# Patient Record
Sex: MALE | Race: WHITE | NOT HISPANIC OR LATINO | Employment: FULL TIME | URBAN - METROPOLITAN AREA
[De-identification: names, ages, dates, MRNs, and addresses within clinical notes are randomized per-mention and may not be internally consistent; named-entity substitution may affect disease eponyms.]

---

## 2017-02-16 ENCOUNTER — MEDICAL CORRESPONDENCE (OUTPATIENT)
Dept: HEALTH INFORMATION MANAGEMENT | Facility: CLINIC | Age: 57
End: 2017-02-16

## 2017-02-16 DIAGNOSIS — R21 RASH AND OTHER NONSPECIFIC SKIN ERUPTION: ICD-10-CM

## 2017-02-16 DIAGNOSIS — G82.50 QUADRIPLEGIA (H): ICD-10-CM

## 2017-02-16 NOTE — LETTER
[facility]  [address]  [address] [facility] [address]      February 17, 2017      Bart Corea  [address] [facility] [address]        Dear Bart,      This letter is a reminder that you are due in March to see your Primary Care Provider for an Annual Visit and needed labs. You must be seen by your Primary Care Provider on a yearly basis and have appropriate labs drawn for continued care and prescription refills. Please call [phone] to schedule an appointment for an Annual Visit with Dr Jason Long MD.       You have been given a 30 day supply/refill of your minocycline (MINOCIN/DYNACIN) 50 MG capsule while you get your clinic visit/labs completed.      Regards,    [facility]

## 2017-02-17 RX ORDER — MINOCYCLINE HYDROCHLORIDE 50 MG/1
50 CAPSULE ORAL 2 TIMES DAILY
Qty: 60 CAPSULE | Refills: 0 | Status: SHIPPED
Start: 2017-02-17 | End: 2017-03-20

## 2017-02-17 NOTE — TELEPHONE ENCOUNTER
minocycline (MINOCIN/DYNACIN) 50 MG capsule      Last Written Prescription Date:  9-21-16  Last Fill Quantity: 180,   # refills: 1  Last Office Visit : 3-18-16  Future Office visit:  none    Kathleen M Doege RN

## 2017-02-21 ENCOUNTER — MEDICAL CORRESPONDENCE (OUTPATIENT)
Dept: HEALTH INFORMATION MANAGEMENT | Facility: CLINIC | Age: 57
End: 2017-02-21

## 2017-02-22 ENCOUNTER — TELEPHONE (OUTPATIENT)
Dept: INTERNAL MEDICINE | Facility: CLINIC | Age: 57
End: 2017-02-22

## 2017-02-22 DIAGNOSIS — G82.50 QUADRIPLEGIA (H): ICD-10-CM

## 2017-02-22 DIAGNOSIS — M79.603 PAIN OF UPPER EXTREMITY, UNSPECIFIED LATERALITY: Primary | ICD-10-CM

## 2017-02-22 NOTE — TELEPHONE ENCOUNTER
Pt is requesting a referral for hand clinic due to developing a sore on arm and needing a resting splint.  Ok for the referral ?    Soon-Mi

## 2017-03-20 DIAGNOSIS — G82.50 QUADRIPLEGIA (H): ICD-10-CM

## 2017-03-20 DIAGNOSIS — R21 RASH AND OTHER NONSPECIFIC SKIN ERUPTION: ICD-10-CM

## 2017-03-21 DIAGNOSIS — G82.50 QUADRIPLEGIA (H): ICD-10-CM

## 2017-03-21 DIAGNOSIS — E78.00 HIGH CHOLESTEROL: ICD-10-CM

## 2017-03-21 DIAGNOSIS — R21 RASH AND OTHER NONSPECIFIC SKIN ERUPTION: ICD-10-CM

## 2017-03-21 DIAGNOSIS — R25.2 SPASM: ICD-10-CM

## 2017-03-21 DIAGNOSIS — R10.30 GROIN PAIN, UNSPECIFIED LATERALITY: ICD-10-CM

## 2017-03-21 DIAGNOSIS — G82.50 QUADRIPLEGIA, UNSPECIFIED (H): ICD-10-CM

## 2017-03-21 DIAGNOSIS — G47.00 INSOMNIA, UNSPECIFIED: ICD-10-CM

## 2017-03-21 DIAGNOSIS — J20.9 BRONCHITIS WITH BRONCHOSPASM: ICD-10-CM

## 2017-03-21 DIAGNOSIS — R06.2 WHEEZING: ICD-10-CM

## 2017-03-21 NOTE — TELEPHONE ENCOUNTER
MINOCYCLINE 50MG        Last Written Prescription Date:  2/17/17  Last Fill Quantity: 60,   # refills: 0  Last Office Visit : 3/18/16  Future Office visit:  NONE  Routing refill request to provider for review/approval because:  OVERDUE MD APPT.  REMINDER LETTER + 30 DAY RF 2/17/17  NO PENDING APPT.

## 2017-03-22 RX ORDER — MINOCYCLINE HYDROCHLORIDE 50 MG/1
50 CAPSULE ORAL 2 TIMES DAILY
Qty: 60 CAPSULE | Refills: 3 | Status: SHIPPED | OUTPATIENT
Start: 2017-03-22 | End: 2017-07-24

## 2017-03-22 RX ORDER — DOCUSATE SODIUM 100 MG/1
100 CAPSULE, LIQUID FILLED ORAL DAILY
Qty: 30 CAPSULE | Refills: 0 | Status: SHIPPED | OUTPATIENT
Start: 2017-03-22 | End: 2017-04-20

## 2017-03-22 RX ORDER — DIAZEPAM 10 MG
10 TABLET ORAL DAILY
Qty: 30 TABLET | Refills: 0
Start: 2017-03-22 | End: 2017-04-20

## 2017-03-22 RX ORDER — BACLOFEN 20 MG/1
TABLET ORAL
Qty: 180 TABLET | Refills: 0 | Status: SHIPPED | OUTPATIENT
Start: 2017-03-22 | End: 2017-04-20

## 2017-03-22 RX ORDER — OXYBUTYNIN CHLORIDE 10 MG/1
10 TABLET, EXTENDED RELEASE ORAL DAILY
Qty: 30 TABLET | Refills: 0 | Status: SHIPPED | OUTPATIENT
Start: 2017-03-22 | End: 2017-04-20

## 2017-03-22 RX ORDER — ZOLPIDEM TARTRATE 10 MG/1
TABLET ORAL
Qty: 30 TABLET | Refills: 0
Start: 2017-03-22 | End: 2017-04-20

## 2017-03-22 NOTE — TELEPHONE ENCOUNTER
VITAMIN D3       Last Written Prescription Date:  9/21/16  Last Fill Quantity: 90,   # refills: 1  Last Office Visit : 3/18/16  Future Office visit:  NONE  OVERDUE    DOCQLACE       Last Written Prescription Date:  9/21/16  Last Fill Quantity: 90,   # refills: 1     oxybutynin       Last Written Prescription Date:  3/18/16  Last Fill Quantity: 30,   # refills: 11  3 MEDS  (ABOVE) LETTER TO PT  SCHED./OVERDUE MD APPT. + 30 DAY RF    baclofen (LIORESAL) 20 MG     Last Written Prescription Date:  3/18/16  Last Fill Quantity: 180,   # refills: 11  Routing refill request to provider for review/approval because:  Drug not on refill protocol      diazepam (VALIUM) 10 MG      Last Written Prescription Date:  9/21/16  Last Fill Quantity: 30,   # refills: 5  Routing refill request to provider for review/approval because:  Drug not on the [facility], [facility] or  [facility] refill protocol or controlled substance    zolpidem (AMBIEN) 10 MG        Last Written Prescription Date:  9/21/16  Last Fill Quantity: 30,   # refills: 5  Routing refill request to provider for review/approval because:  Drug not on the [facility], [facility] or [facility] refill protocol or controlled substance

## 2017-03-31 ENCOUNTER — OFFICE VISIT (OUTPATIENT)
Dept: FAMILY MEDICINE | Facility: CLINIC | Age: 57
End: 2017-03-31

## 2017-03-31 VITALS — DIASTOLIC BLOOD PRESSURE: 70 MMHG | HEART RATE: 63 BPM | SYSTOLIC BLOOD PRESSURE: 100 MMHG

## 2017-03-31 DIAGNOSIS — R06.01 ORTHOPNEA: ICD-10-CM

## 2017-03-31 DIAGNOSIS — R63.5 ABNORMAL WEIGHT GAIN: ICD-10-CM

## 2017-03-31 DIAGNOSIS — Z00.00 ROUTINE GENERAL MEDICAL EXAMINATION AT A HEALTH CARE FACILITY: ICD-10-CM

## 2017-03-31 DIAGNOSIS — M54.5 LOW BACK PAIN, UNSPECIFIED BACK PAIN LATERALITY, UNSPECIFIED CHRONICITY, WITH SCIATICA PRESENCE UNSPECIFIED: Primary | ICD-10-CM

## 2017-03-31 DIAGNOSIS — N31.9 NEUROGENIC BLADDER: ICD-10-CM

## 2017-03-31 DIAGNOSIS — R10.30 LOWER ABDOMINAL PAIN: ICD-10-CM

## 2017-03-31 LAB
ALBUMIN SERPL-MCNC: 3.3 G/DL (ref 3.4–5)
ALP SERPL-CCNC: 118 U/L (ref 40–150)
ALT SERPL W P-5'-P-CCNC: 16 U/L (ref 0–70)
ANION GAP SERPL CALCULATED.3IONS-SCNC: 11 MMOL/L (ref 3–14)
AST SERPL W P-5'-P-CCNC: 17 U/L (ref 0–45)
BASOPHILS # BLD AUTO: 0.1 10E9/L (ref 0–0.2)
BASOPHILS NFR BLD AUTO: 1 %
BILIRUB SERPL-MCNC: 0.5 MG/DL (ref 0.2–1.3)
BUN SERPL-MCNC: 18 MG/DL (ref 7–30)
CALCIUM SERPL-MCNC: 8.8 MG/DL (ref 8.5–10.1)
CHLORIDE SERPL-SCNC: 102 MMOL/L (ref 94–109)
CHOLEST SERPL-MCNC: 170 MG/DL
CO2 SERPL-SCNC: 25 MMOL/L (ref 20–32)
CREAT SERPL-MCNC: 0.28 MG/DL (ref 0.66–1.25)
DIFFERENTIAL METHOD BLD: NORMAL
EOSINOPHIL # BLD AUTO: 0.2 10E9/L (ref 0–0.7)
EOSINOPHIL NFR BLD AUTO: 2.8 %
ERYTHROCYTE [DISTWIDTH] IN BLOOD BY AUTOMATED COUNT: 13.7 % (ref 10–15)
GFR SERPL CREATININE-BSD FRML MDRD: ABNORMAL ML/MIN/1.7M2
GLUCOSE SERPL-MCNC: 84 MG/DL (ref 70–99)
HCT VFR BLD AUTO: 47.4 % (ref 40–53)
HDLC SERPL-MCNC: 39 MG/DL
HGB BLD-MCNC: 15.1 G/DL (ref 13.3–17.7)
IMM GRANULOCYTES # BLD: 0 10E9/L (ref 0–0.4)
IMM GRANULOCYTES NFR BLD: 0.5 %
LDLC SERPL CALC-MCNC: 110 MG/DL
LYMPHOCYTES # BLD AUTO: 1.2 10E9/L (ref 0.8–5.3)
LYMPHOCYTES NFR BLD AUTO: 20.3 %
MCH RBC QN AUTO: 30.7 PG (ref 26.5–33)
MCHC RBC AUTO-ENTMCNC: 31.9 G/DL (ref 31.5–36.5)
MCV RBC AUTO: 96 FL (ref 78–100)
MONOCYTES # BLD AUTO: 0.6 10E9/L (ref 0–1.3)
MONOCYTES NFR BLD AUTO: 9.5 %
NEUTROPHILS # BLD AUTO: 3.9 10E9/L (ref 1.6–8.3)
NEUTROPHILS NFR BLD AUTO: 65.9 %
NONHDLC SERPL-MCNC: 131 MG/DL
NRBC # BLD AUTO: 0 10*3/UL
NRBC BLD AUTO-RTO: 0 /100
PLATELET # BLD AUTO: 164 10E9/L (ref 150–450)
POTASSIUM SERPL-SCNC: 4.5 MMOL/L (ref 3.4–5.3)
PROT SERPL-MCNC: 7.2 G/DL (ref 6.8–8.8)
RBC # BLD AUTO: 4.92 10E12/L (ref 4.4–5.9)
SODIUM SERPL-SCNC: 138 MMOL/L (ref 133–144)
TRIGL SERPL-MCNC: 105 MG/DL
TSH SERPL DL<=0.005 MIU/L-ACNC: 1.71 MU/L (ref 0.4–4)
WBC # BLD AUTO: 6 10E9/L (ref 4–11)

## 2017-03-31 RX ORDER — MULTIVIT WITH MINERALS/LUTEIN
1 TABLET ORAL DAILY
COMMUNITY
End: 2021-01-01

## 2017-03-31 ASSESSMENT — PAIN SCALES - GENERAL: PAINLEVEL: MILD PAIN (2)
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
## 2017-03-31 NOTE — MR AVS SNAPSHOT
After Visit Summary   3/31/2017    Bart Corea    MRN: [id number]           Patient Information     Date Of Birth          1960        Visit Information        Provider Department      3/31/2017 9:35 AM Jason Long MD [facility]        Today's Diagnoses     Low back pain, unspecified back pain laterality, unspecified chronicity, with sciatica presence unspecified    -  1    Lower abdominal pain        Orthopnea        Neurogenic bladder        Routine general medical examination at a health care facility        Abnormal weight gain          Care Instructions    Primary Care Center Medication Refill Request Information:  * Please contact your pharmacy regarding ANY request for medication refills.  ** [facility] Prescription Fax = [phone]  * Please allow 3 business days for routine medication refills.  * Please allow 5 business days for controlled substance medication refills.     Primary Care Center Test Result notification information:  *You will be notified within 7-10 days of your appointment day regarding the results of your test.  If you are on MyChart you will be notified as soon as the provider has reviewed the results and signed off on them.    MRI Screening Tool    Does the patient have any metals in their body? no  Is the patient claustrophobic? no  Does the patient need sedation? no  Is the patient able to transport themself to a table? No              Follow-ups after your visit        Additional Services     UROLOGY ADULT REFERRAL       Your provider has referred you to: [facility]: [facility] [place] [phone]   [email or web address]    Please be aware that coverage of these services is subject to the terms and limitations of your health insurance plan.  Call member services at your health plan with any benefit or coverage questions.      Please bring the  following with you to your appointment:    (1) Any X-Rays, CTs or MRIs which have been performed.  Contact the facility where they were done to arrange for  prior to your scheduled appointment.    (2) List of current medications  (3) This referral request   (4) Any documents/labs given to you for this referral                  Your next 10 appointments already scheduled     Apr 06, 2017 11:00 AM CDT   Ech Complete with [address] Echo ([facility])    [address] [place] [address]  3rd [facility] [address] [phone]           1.  Please bring or wear a comfortable two-piece outfit. 2.  You may eat, drink and take your normal medicines. 3.  For any questions that cannot be answered, please contact the ordering physician            Apr 06, 2017 12:20 PM CDT   (Arrive by 12:05 PM)   CT ABDOMEN PELVIS W/O & W CONTRAST with [address] ([facility])    [address] [place] [id number] [phone]           Please bring any scans or X-rays taken at other hospitals, if similar tests were done. Also bring a list of your medicines, including vitamins, minerals and over-the-counter drugs. It is safest to leave personal items at home.  Be sure to tell your doctor:   If you have any allergies.   If there s any chance you are pregnant.   If you are breastfeeding.   If you have any special needs.  You may have contrast for this exam. To prepare:   Do not eat or drink for 2 hours before your exam. If you need to take medicine, you may take it with small sips of water. (We may ask you to take liquid medicine as well.)   The day before your exam, drink extra fluids at least six 8-ounce glasses (unless your doctor tells you to restrict your fluids).  Patients over 70 or patients with diabetes or kidney problems:   If you haven t had a blood test (creatinine test) within the last 30 days, go to your clinic or  Diagnostic Imaging Department for this test.  If you have diabetes:   If your kidney function is normal, continue taking your metformin (Avandamet, Glucophage, Glucovance, Metaglip) on the day of your exam.   If your kidney function is abnormal, wait 48 hours before restarting this medicine.  You will have oral contrast for this exam:   You will drink the contrast at home. Get this from your clinic or Diagnostic Imaging Department. Please follow the directions given.  Please wear loose clothing, such as a sweat suit or jogging clothes. Avoid snaps, zippers and other metal. We may ask you to undress and put on a hospital gown.  If you have any questions, please call the Imaging Department where you will have your exam.            Apr 06, 2017  1:00 PM CDT   (Arrive by 12:45 PM)   MR LUMBAR SPINE W/O CONTRAST with [address] MRI ([facility])    [address] [address] [phone]           Take your medicines as usual, unless your doctor tells you not to. Bring a list of your current medicines to your exam (including vitamins, minerals and over-the-counter drugs). Also bring the results of similar scans you may have had.  Please remove any body piercings and hair extensions before you arrive.  Follow your doctor s orders. If you do not, we may have to postpone your exam.  You will not have contrast for this exam. You do not need to do anything special to prepare.  The MRI machine uses a strong magnet. Please wear clothes without metal (snaps, zippers). A sweatsuit works well, or we may give you a hospital gown.   **IMPORTANT** THE INSTRUCTIONS BELOW ARE ONLY FOR THOSE PATIENTS WHO HAVE BEEN TOLD THEY WILL RECEIVE SEDATION OR GENERAL ANESTHESIA DURING THEIR MRI PROCEDURE:  IF YOU WILL RECEIVE SEDATION (take medicine to help you relax during your exam):   You must get the medicine from your doctor before you arrive. Bring the medicine to  the exam. Do not take it at home.   Arrive one hour early. Bring someone who can take you home after the test. Your medicine will make you sleepy. After the exam, you may not drive, take a bus or take a taxi by yourself.   No eating 8 hours before your exam. You may have clear liquids up until 4 hours before your exam. (Clear liquids include water, clear tea, black coffee and fruit juice without pulp.)  IF YOU WILL RECEIVE ANESTHESIA (be asleep for your exam):   Arrive 1 1/2 hours early. Bring someone who can take you home after the test. You may not drive, take a bus or take a taxi by yourself.   No eating 8 hours before your exam. You may have clear liquids up until 4 hours before your exam. (Clear liquids include water, clear tea, black coffee and fruit juice without pulp.)   You will spend four to five hours in the recovery room.  Please call the Imaging Department at your exam site with any questions.            Apr 14, 2017 10:35 AM CDT   (Arrive by 10:20 AM)   Return Visit with Jason Long MD   [facility] ([facility])    [address] [address] [phone]            Apr 20, 2017  9:00 AM CDT   LUISITO Hand with Rabia Ruiz, [facility] ([facility])    [address] [place] [address]  [address] [address] [phone]            Apr 24, 2017  1:00 PM CDT   (Arrive by 12:45 PM)   Return Visit with Obi Uriostegui MD   [facility] ([facility])    [address] [address] [phone]              Future tests that were ordered for you today     Open Future Orders        Priority Expected Expires Ordered    Echocardiogram Routine  3/31/2018 3/31/2017    CT Abdomen/Pelvis w/o & w contrast Routine  3/31/2018 3/31/2017    MRI Lumbar spine w/o contrast Routine   3/31/2018 3/31/2017            Who to contact     Please call your clinic at [phone] to:    Ask questions about your health    Make or cancel appointments    Discuss your medicines    Learn about your test results    Speak to your doctor   If you have compliments or concerns about an experience at your clinic, or if you wish to file a complaint, please contact [facility] Patient Relations at [phone] or email us at [email or web address]         Additional Information About Your Visit        [employer] Information     [employer] is an electronic gateway that provides easy, online access to your medical records. With [employer], you can request a clinic appointment, read your test results, renew a prescription or communicate with your care team.     To sign up for [employer] visit the website at [email or web address]   You will be asked to enter the access code listed below, as well as some personal information. Please follow the directions to create your username and password.     Your access code is: [email or web address]  Expires: 2017  6:30 AM     Your access code will  in 90 days. If you need help or a new code, please contact your [facility] or call [phone] for assistance.        Care EveryWhere ID     This is your Care EveryWhere ID. This could be used by other organizations to access your [place] medical records  [id number]        Your Vitals Were     Pulse                   63            Blood Pressure from Last 3 Encounters:   17 100/70   16 (!) 109/96   16 (!) 86/59    Weight from Last 3 Encounters:   16 77.1 kg (170 lb)   16 81.6 kg (180 lb)   16 81.6 kg (180 lb)              We Performed the Following     UROLOGY ADULT REFERRAL          Today's Medication Changes          These changes are accurate as of: 3/31/17  2:01 PM.  If you have any questions, ask your nurse or doctor.                These medicines have changed or have updated prescriptions.        Dose/Directions    clindamycin 1 % topical gel   Commonly known as:  CLINDAMAX   This may have changed:    - how to take this  - when to take this  - reasons to take this  - additional instructions   Used for:  Need for prophylactic vaccination against Hemophilus influenza type B (Hib), Insomnia, unspecified, Bronchitis with bronchospasm, Rash and other nonspecific skin eruption, Wheezing, Quadriplegia, unspecified (H), Unspecified constipation, Neurogenic bladder, NOS, Quadriplegia (H), Spasm, High cholesterol        Apply bid   Quantity:  60 g   Refills:  11                Primary Care Provider Office Phone # Fax #    Jason Long [phone]       PRIMARY CARE CENTER [address] [place] [id number]        Thank you!     Thank you for choosing [facility]  for your care. Our goal is always to provide you with excellent care. Hearing back from our patients is one way we can continue to improve our services. Please take a few minutes to complete the written survey that you may receive in the mail after your visit with us. Thank you!             Your Updated Medication List - Protect others around you: Learn how to safely use, store and throw away your medicines at www.disposemymeds.org.          This list is accurate as of: 3/31/17  2:01 PM.  Always use your most recent med list.                   Brand Name Dispense Instructions for use    * albuterol (2.5 MG/3ML) 0.083% neb solution     1 Box    Take 1 vial (2.5 mg) by nebulization 4 times daily as needed       * albuterol 108 (90 BASE) MCG/ACT Inhaler    VENTOLIN HFA    3 Inhaler    Inhale 2 puffs into the lungs every 4 hours as needed       ALCOHOL PREP PADS      USE AS DIRECTED       amcinonide 0.1 % cream    CYCLOCORT    120 g    Apply topically 2 times daily as needed for itching       ascorbic acid 1000 MG Tabs    vitamin C     Take 1  "tablet by mouth daily       baclofen 20 MG tablet    LIORESAL    180 tablet    TAKE ONE TABLET BY MOUTH UP TO 6 TIMES DAILY       * bisacodyl 10 MG Suppository    DULCOLAX     Place 20 mg rectally every 3 days Active ingredient in Magic Bullet (10mg per suppository)       * MAGIC BULLETS 10 MG Suppository   Generic drug:  bisacodyl     30 suppository    Place 1 suppository (10 mg) rectally daily as needed for constipation       cholecalciferol 1000 UNIT tablet    vitamin D    30 tablet    Take 1 tablet (1,000 Units) by mouth daily *LETTER SENT  / MD APPT. NEEDED FOR REFILLS *       clindamycin 1 % topical gel    CLINDAMAX    60 g    Apply bid       Cotton Swabs Swab     450 each    Please dispense sterile wrapped cotton swabs; use up to five/day for medical needs       diazepam 10 MG tablet    VALIUM    30 tablet    Take 1 tablet (10 mg) by mouth daily AS DIRECTED       docusate sodium 100 MG capsule    DOCQLACE    30 capsule    Take 1 capsule (100 mg) by mouth daily **LETTER SENT  / MD APPT. NEEDED FOR REFILLS *       * Gauze Bandage 2\" Misc     360 each    IV STERILE GUAZE Use up to 4/day Supply 360 for three month supply       * Gauze Dressing 4\"X4\" Pads     720 each    NON STERILE GAUZE Use up to 8/day  Supply 720 for three month supply       hydrocortisone 2.5 % cream    ANUSOL-HC    120 g    Bid prn       ibuprofen 800 MG tablet    ADVIL/MOTRIN    60 tablet    Take 1 tablet (800 mg) by mouth 3 times daily as needed       lidocaine 2 % topical gel    XYLOCAINE     Apply  topically. APPLY AS DIRECTED       minocycline 50 MG capsule    MINOCIN/DYNACIN    60 capsule    Take 1 capsule (50 mg) by mouth 2 times daily       MULTIVITAMIN & MINERAL PO      Take 1 capsule by mouth daily.       NORVASC PO      Take 2.5 mg by mouth daily as needed       nystatin 193371 UNIT/GM Powd    MYCOSTATIN    1 Bottle    Apply 1 dose topically 3 times daily as needed.       order for DME     1 Units    Equipment being ordered: Other: " Nebulizer Treatment Diagnosis: Mild intermittent asthma       oxybutynin 10 MG 24 hr tablet    DITROPAN-XL    30 tablet    Take 1 tablet (10 mg) by mouth daily LETTER SENT  / MD APPT. NEEDED FOR REFILLS *       oxyCODONE 5 MG IR tablet    ROXICODONE     Take 1 tablet by mouth every 4 hours as needed.       sennosides 8.6 MG tablet    SENOKOT    90 each    Take 1 tablet by mouth daily       sildenafil 100 MG cap/tab    VIAGRA    10 tablet    Take 1 tablet (100 mg) by mouth daily as needed TAKE 1 HOUR BEFORE NEEDED       sodium chloride 3 % Nebu neb solution     500 mL    Take 3 mLs by nebulization every 3 hours as needed for wheezing       terbinafine 1 % cream    lamISIL     Apply topically daily as needed       URINARY LEG BAG      USE AS NEEDED       Urostomy Night Bag Misc     9 Bottle    Please dispense three night bags/month (three month worth at a time) with 4 refills re: needed for chronic urinary dysfunction due to quadreplegia. Type: Bard 2000 ml.       YADI DAYQUIL COLD & FLU 10-5-325 MG/15ML Liqd   Generic drug:  DM-Phenylephrine-Acetaminophen      Take by mouth as needed       VICKS NYQUIL COLD & FLU NIGHT 15-6. MG/15ML Liqd   Generic drug:  DM-Doxylamine-Acetaminophen      Take by mouth as needed       zolpidem 10 MG tablet    AMBIEN    30 tablet    1/2 TO 1 TABLET BY MOUTH EVERY NIGHT AT BEDTIME ASNEEDED       * Notice:  This list has 6 medication(s) that are the same as other medications prescribed for you. Read the directions carefully, and ask your doctor or other care provider to review them with you.

## 2017-03-31 NOTE — PATIENT INSTRUCTIONS
Primary Care Center Medication Refill Request Information:  * Please contact your pharmacy regarding ANY request for medication refills.  ** [facility] Prescription Fax = [phone]  * Please allow 3 business days for routine medication refills.  * Please allow 5 business days for controlled substance medication refills.     Primary Care Center Test Result notification information:  *You will be notified within 7-10 days of your appointment day regarding the results of your test.  If you are on MyChart you will be notified as soon as the provider has reviewed the results and signed off on them.    MRI Screening Tool    Does the patient have any metals in their body? no  Is the patient claustrophobic? no  Does the patient need sedation? no  Is the patient able to transport themself to a table? No

## 2017-03-31 NOTE — NURSING NOTE
Chief Complaint   Patient presents with     Physical     Pt is here for a physical.     Rachel Dill LPN March 31, 2017 9:41 AM

## 2017-04-06 ENCOUNTER — RADIANT APPOINTMENT (OUTPATIENT)
Dept: CARDIOLOGY | Facility: CLINIC | Age: 57
End: 2017-04-06
Attending: FAMILY MEDICINE

## 2017-04-06 DIAGNOSIS — R06.01 ORTHOPNEA: ICD-10-CM

## 2017-04-07 ENCOUNTER — TELEPHONE (OUTPATIENT)
Dept: INTERNAL MEDICINE | Facility: CLINIC | Age: 57
End: 2017-04-07

## 2017-04-07 DIAGNOSIS — K76.9 LIVER LESION: Primary | ICD-10-CM

## 2017-04-07 DIAGNOSIS — K86.9 PANCREATIC LESION: ICD-10-CM

## 2017-04-07 NOTE — TELEPHONE ENCOUNTER
I usually communicate with the pt through e mail due to pt's physical limit.  I e mailed him regarding the results and recommendations.  -----------------------------------------------------------      ----- Message from Jason Long MD sent at 4/6/2017  4:52 PM CDT -----  Can you call him    Echo looked fine    Nothing on belly ct to explain the discomfort he's had since [place] incident but radiologist mentioned what looked like cysts in liver and pancreas--these are very very common and usually nothing, but an MRI would give us a better look--he should have abdomen MRI soon re: liver and pancreas lesions. This would be with and without contrast.    If cannot do MR for some reason let me know.

## 2017-04-12 ENCOUNTER — HOSPITAL ENCOUNTER (OUTPATIENT)
Dept: MRI IMAGING | Facility: CLINIC | Age: 57
Discharge: HOME | End: 2017-04-12
Attending: FAMILY MEDICINE | Admitting: FAMILY MEDICINE
Payer: COMMERCIAL

## 2017-04-12 DIAGNOSIS — K76.9 LIVER LESION: ICD-10-CM

## 2017-04-12 DIAGNOSIS — K86.9 PANCREATIC LESION: ICD-10-CM

## 2017-04-12 PROCEDURE — 40000556 ZZH STATISTIC PERIPHERAL IV START W US GUIDANCE

## 2017-04-12 PROCEDURE — 25000128 H RX IP 250 OP 636: Performed by: FAMILY MEDICINE

## 2017-04-12 PROCEDURE — 25500064 ZZH RX 255 OP 636: Performed by: FAMILY MEDICINE

## 2017-04-12 PROCEDURE — 74183 MRI ABD W/O CNTR FLWD CNTR: CPT

## 2017-04-12 PROCEDURE — A9581 GADOXETATE DISODIUM INJ: HCPCS | Performed by: FAMILY MEDICINE

## 2017-04-12 RX ADMIN — GADOXETATE DISODIUM 10 ML: 181.43 INJECTION, SOLUTION INTRAVENOUS at 09:34

## 2017-04-12 RX ADMIN — SODIUM CHLORIDE 50 ML: 9 INJECTION, SOLUTION INTRAVENOUS at 09:34

## 2017-04-14 ENCOUNTER — OFFICE VISIT (OUTPATIENT)
Dept: FAMILY MEDICINE | Facility: CLINIC | Age: 57
End: 2017-04-14

## 2017-04-14 VITALS — SYSTOLIC BLOOD PRESSURE: 98 MMHG | HEART RATE: 48 BPM | DIASTOLIC BLOOD PRESSURE: 65 MMHG

## 2017-04-14 DIAGNOSIS — K04.7 DENTAL INFECTION: Primary | ICD-10-CM

## 2017-04-14 ASSESSMENT — PAIN SCALES - GENERAL: PAINLEVEL: MILD PAIN (3)

## 2017-04-14 NOTE — PATIENT INSTRUCTIONS
Primary Care Center Medication Refill Request Information:  * Please contact your pharmacy regarding ANY request for medication refills.  ** [facility] Prescription Fax = [phone]  * Please allow 3 business days for routine medication refills.  * Please allow 5 business days for controlled substance medication refills.     Primary Care Center Test Result notification information:  *You will be notified with in 7-10 days of your appointment day regarding the results of your test.  If you are on MyChart you will be notified as soon as the provider has reviewed the results and signed off on them.      Primary Care Center   [facility]  [address] [place] [address] (4th Floor )   [place] [address] [phone]  [phone]

## 2017-04-14 NOTE — MR AVS SNAPSHOT
After Visit Summary   4/14/2017    Bart Corea    MRN: [id number]           Patient Information     Date Of Birth          1960        Visit Information        Provider Department      4/14/2017 10:35 AM Jason Long MD [facility]        Today's Diagnoses     Dental infection    -  1      Care Instructions    Primary Care Center Medication Refill Request Information:  * Please contact your pharmacy regarding ANY request for medication refills.  ** [facility] Prescription Fax = [phone]  * Please allow 3 business days for routine medication refills.  * Please allow 5 business days for controlled substance medication refills.     Primary Care Center Test Result notification information:  *You will be notified with in 7-10 days of your appointment day regarding the results of your test.  If you are on MyChart you will be notified as soon as the provider has reviewed the results and signed off on them.      Primary Care Center   [facility]  [address] [place] [address] (4th Floor )   [place] [address] [phone]  [phone]          Follow-ups after your visit        Your next 10 appointments already scheduled     Apr 20, 2017  9:00 AM CDT   [facility] Hand with Rabia Ruiz [facility] ([facility])    [facility] [address]  [address]  [facility] [id number] [phone]            Apr 24, 2017  1:00 PM CDT   (Arrive by 12:45 PM)   Return Visit with Obi Uriostegui MD   [facility] ([facility])    [address] [place] [address]  4th Floor  [facility] [id number] [phone]              Who to contact     Please call your clinic at [phone] to:    Ask questions about your health    Make or cancel appointments    Discuss your medicines    Learn about your test results    Speak to your doctor   If you have compliments or concerns about an experience  at your clinic, or if you wish to file a complaint, please contact [facility] Patient Relations at [phone] or email us at [email or web address]         Additional Information About Your Visit        Fundgrazinghart Information     Phloronol is an electronic gateway that provides easy, online access to your medical records. With Phloronol, you can request a clinic appointment, read your test results, renew a prescription or communicate with your care team.     To sign up for Phloronol visit the website at [email or web address]   You will be asked to enter the access code listed below, as well as some personal information. Please follow the directions to create your username and password.     Your access code is: [email or web address]  Expires: 2017  6:30 AM     Your access code will  in 90 days. If you need help or a new code, please contact your [facility] or call [phone] for assistance.        Care EveryWhere ID     This is your Care EveryWhere ID. This could be used by other organizations to access your [place] medical records  [id number]        Your Vitals Were     Pulse                   48            Blood Pressure from Last 3 Encounters:   17 98/65   17 100/70   16 (!) 109/96    Weight from Last 3 Encounters:   16 77.1 kg (170 lb)   16 81.6 kg (180 lb)   16 81.6 kg (180 lb)              Today, you had the following     No orders found for display         Today's Medication Changes          These changes are accurate as of: 17  5:30 PM.  If you have any questions, ask your nurse or doctor.               Start taking these medicines.        Dose/Directions    amoxicillin-clavulanate 875-125 MG per tablet   Commonly known as:  AUGMENTIN   Used for:  Dental infection   Started by:  Jason Long MD        Dose:  1 tablet   Take 1 tablet by mouth 2 times daily   Quantity:  14 tablet   Refills:   0         These medicines have changed or have updated prescriptions.        Dose/Directions    clindamycin 1 % topical gel   Commonly known as:  CLINDAMAX   This may have changed:    - how to take this  - when to take this  - reasons to take this  - additional instructions   Used for:  Need for prophylactic vaccination against Hemophilus influenza type B (Hib), Insomnia, unspecified, Bronchitis with bronchospasm, Rash and other nonspecific skin eruption, Wheezing, Quadriplegia, unspecified (H), Unspecified constipation, Neurogenic bladder, NOS, Quadriplegia (H), Spasm, High cholesterol        Apply bid   Quantity:  60 g   Refills:  11            Where to get your medicines      These medications were sent to [facility] [address]  [address] [place] [id number]     Phone:  [phone]     amoxicillin-clavulanate 875-125 MG per tablet                Primary Care Provider Office Phone # Fax #    Jason Lnog [phone]       PRIMARY CARE CENTER [address] [place] [id number]        Thank you!     Thank you for choosing [facility]  for your care. Our goal is always to provide you with excellent care. Hearing back from our patients is one way we can continue to improve our services. Please take a few minutes to complete the written survey that you may receive in the mail after your visit with us. Thank you!             Your Updated Medication List - Protect others around you: Learn how to safely use, store and throw away your medicines at www.disposemymeds.org.          This list is accurate as of: 4/14/17  5:30 PM.  Always use your most recent med list.                   Brand Name Dispense Instructions for use    * albuterol (2.5 MG/3ML) 0.083% neb solution     1 Box    Take 1 vial (2.5 mg) by nebulization 4 times daily as needed       * albuterol 108 (90 BASE) MCG/ACT Inhaler    VENTOLIN HFA    3 Inhaler    Inhale 2  "puffs into the lungs every 4 hours as needed       ALCOHOL PREP PADS      USE AS DIRECTED       amcinonide 0.1 % cream    CYCLOCORT    120 g    Apply topically 2 times daily as needed for itching       amoxicillin-clavulanate 875-125 MG per tablet    AUGMENTIN    14 tablet    Take 1 tablet by mouth 2 times daily       ascorbic acid 1000 MG Tabs    vitamin C     Take 1 tablet by mouth daily       baclofen 20 MG tablet    LIORESAL    180 tablet    TAKE ONE TABLET BY MOUTH UP TO 6 TIMES DAILY       * bisacodyl 10 MG Suppository    DULCOLAX     Place 20 mg rectally every 3 days Active ingredient in Magic Bullet (10mg per suppository)       * MAGIC BULLETS 10 MG Suppository   Generic drug:  bisacodyl     30 suppository    Place 1 suppository (10 mg) rectally daily as needed for constipation       cholecalciferol 1000 UNIT tablet    vitamin D    30 tablet    Take 1 tablet (1,000 Units) by mouth daily *LETTER SENT  / MD APPT. NEEDED FOR REFILLS *       clindamycin 1 % topical gel    CLINDAMAX    60 g    Apply bid       Cotton Swabs Swab     450 each    Please dispense sterile wrapped cotton swabs; use up to five/day for medical needs       diazepam 10 MG tablet    VALIUM    30 tablet    Take 1 tablet (10 mg) by mouth daily AS DIRECTED       docusate sodium 100 MG capsule    DOCQLACE    30 capsule    Take 1 capsule (100 mg) by mouth daily **LETTER SENT  / MD APPT. NEEDED FOR REFILLS *       * Gauze Bandage 2\" Misc     360 each    IV STERILE GUAZE Use up to 4/day Supply 360 for three month supply       * Gauze Dressing 4\"X4\" Pads     720 each    NON STERILE GAUZE Use up to 8/day  Supply 720 for three month supply       hydrocortisone 2.5 % cream    ANUSOL-HC    120 g    Bid prn       ibuprofen 800 MG tablet    ADVIL/MOTRIN    60 tablet    Take 1 tablet (800 mg) by mouth 3 times daily as needed       lidocaine 2 % topical gel    XYLOCAINE     Apply  topically. APPLY AS DIRECTED       minocycline 50 MG capsule    " MINOCIN/DYNACIN    60 capsule    Take 1 capsule (50 mg) by mouth 2 times daily       MULTIVITAMIN & MINERAL PO      Take 1 capsule by mouth daily.       NORVASC PO      Take 2.5 mg by mouth daily as needed       nystatin 829193 UNIT/GM Powd    MYCOSTATIN    1 Bottle    Apply 1 dose topically 3 times daily as needed.       order for DME     1 Units    Equipment being ordered: Other: Nebulizer Treatment Diagnosis: Mild intermittent asthma       oxybutynin 10 MG 24 hr tablet    DITROPAN-XL    30 tablet    Take 1 tablet (10 mg) by mouth daily LETTER SENT  / MD APPT. NEEDED FOR REFILLS *       oxyCODONE 5 MG IR tablet    ROXICODONE     Take 1 tablet by mouth every 4 hours as needed.       sennosides 8.6 MG tablet    SENOKOT    90 each    Take 1 tablet by mouth daily       sildenafil 100 MG cap/tab    VIAGRA    10 tablet    Take 1 tablet (100 mg) by mouth daily as needed TAKE 1 HOUR BEFORE NEEDED       sodium chloride 3 % Nebu neb solution     500 mL    Take 3 mLs by nebulization every 3 hours as needed for wheezing       terbinafine 1 % cream    lamISIL     Apply topically daily as needed       URINARY LEG BAG      USE AS NEEDED       Urostomy Night Bag Misc     9 Bottle    Please dispense three night bags/month (three month worth at a time) with 4 refills re: needed for chronic urinary dysfunction due to quadreplegia. Type: Bard 2000 ml.       LULYKS DAYQUIL COLD & FLU 10-5-325 MG/15ML Liqd   Generic drug:  DM-Phenylephrine-Acetaminophen      Take by mouth as needed       VICKS NYQUIL COLD & FLU NIGHT 15-6. MG/15ML Liqd   Generic drug:  DM-Doxylamine-Acetaminophen      Take by mouth as needed       zolpidem 10 MG tablet    AMBIEN    30 tablet    1/2 TO 1 TABLET BY MOUTH EVERY NIGHT AT BEDTIME ASNEEDED       * Notice:  This list has 6 medication(s) that are the same as other medications prescribed for you. Read the directions carefully, and ask your doctor or other care provider to review them with you.

## 2017-04-14 NOTE — NURSING NOTE
Chief Complaint   Patient presents with     Results     pt here to discuss test results     Medication Request     pt would like to discuss medications     She Jack CMA at 11:08 AM on 4/14/2017.

## 2017-04-14 NOTE — PROGRESS NOTES
SUBJECTIVE:    Pt is a 57 year old male with pmh of     Patient Active Problem List   Diagnosis     Abdominal pain     Neurogenic bladder      Mild LIZA with hypoventilation     Sleep related hypoventilation/hypoxemia in other disease     Cataracts, bilateral     Myopic astigmatism of both eyes     Presbyopia     Injury at C4 level of cervical spinal cord (H)     Injury at C5 level of cervical spinal cord (H)     Injury at C6 level of cervical spinal cord (H)     Pneumonia     Bacteremia     Contracture of hand joint, right     Quadriplegia (H)     Mechanical problems with limbs     Scrotal swelling       who is here for evaluation of had concerns including Results and Medication Request.    Here with his wife.  The odd symptom where his abdominals tense up and he can hardly breath, just pants, for about five minutes, is still there--he gets this when goes from lying to sitting, or when goes from sitting to lying, nearly every time. Lasts about five minutes then passes. Might've started w/ shyanne incident mentioned in my last note, but he's not sure if even there before that. Echo normal. Lumbar MR minimal degenerative change.    Abd ct unrevealing except tract of the urine catheter thickened--unchanged, no abscess, likely scar vs infection--he sees Urology soon. On CT pancreas and liver lesions seen; on MR pancreas no lesions and MR benign lesions, discussed and reviewed.      Allergies   Allergen Reactions     Vancomycin Anaphylaxis         Current Outpatient Prescriptions   Medication Sig Dispense Refill     amoxicillin-clavulanate (AUGMENTIN) 875-125 MG per tablet Take 1 tablet by mouth 2 times daily 14 tablet 0     AmLODIPine Besylate (NORVASC PO) Take 2.5 mg by mouth daily as needed       ascorbic acid (VITAMIN C) 1000 MG TABS Take 1 tablet by mouth daily       minocycline (MINOCIN/DYNACIN) 50 MG capsule Take 1 capsule (50 mg) by mouth 2 times daily 60 capsule 3     cholecalciferol (VITAMIN D3) 1000 UNIT tablet  Take 1 tablet (1,000 Units) by mouth daily *LETTER SENT  / MD APPT. NEEDED FOR REFILLS * 30 tablet 0     docusate sodium (DOCQLACE) 100 MG capsule Take 1 capsule (100 mg) by mouth daily **LETTER SENT  / MD APPT. NEEDED FOR REFILLS * 30 capsule 0     diazepam (VALIUM) 10 MG tablet Take 1 tablet (10 mg) by mouth daily AS DIRECTED 30 tablet 0     zolpidem (AMBIEN) 10 MG tablet 1/2 TO 1 TABLET BY MOUTH EVERY NIGHT AT BEDTIME ASNEEDED 30 tablet 0     oxybutynin (DITROPAN-XL) 10 MG 24 hr tablet Take 1 tablet (10 mg) by mouth daily LETTER SENT  / MD APPT. NEEDED FOR REFILLS * 30 tablet 0     baclofen (LIORESAL) 20 MG tablet TAKE ONE TABLET BY MOUTH UP TO 6 TIMES DAILY 180 tablet 0     MAGIC BULLETS 10 MG Suppository Place 1 suppository (10 mg) rectally daily as needed for constipation 30 suppository 3     sennosides (SENOKOT) 8.6 MG tablet Take 1 tablet by mouth daily 90 each 1     nystatin (MYCOSTATIN) 930698 UNIT/GM POWD Apply 1 dose topically 3 times daily as needed. 1 Bottle 5     sodium chloride 3 % NEBU Take 3 mLs by nebulization every 3 hours as needed for wheezing 500 mL 0     albuterol (VENTOLIN HFA) 108 (90 BASE) MCG/ACT inhaler Inhale 2 puffs into the lungs every 4 hours as needed 3 Inhaler 1     order for DME Equipment being ordered: Other: Nebulizer  Treatment Diagnosis: Mild intermittent asthma 1 Units 0     DM-Phenylephrine-Acetaminophen (VICKS DAYQUIL COLD & FLU) 10-5-325 MG/15ML LIQD Take by mouth as needed       DM-Doxylamine-Acetaminophen (VICKS NYQUIL COLD & FLU NIGHT) 15-6. MG/15ML LIQD Take by mouth as needed       bisacodyl (DULCOLAX) 10 MG suppository Place 20 mg rectally every 3 days Active ingredient in Magic Bullet (10mg per suppository)       amcinonide (CYCLOCORT) 0.1 % cream Apply topically 2 times daily as needed for itching 120 g 1     hydrocortisone (ANUSOL-HC) 2.5 % rectal cream Bid prn 120 g 11     albuterol (2.5 MG/3ML) 0.083% nebulizer solution Take 1 vial (2.5 mg) by nebulization  "4 times daily as needed 1 Box 2     Applicators (COTTON SWABS) SWAB Please dispense sterile wrapped cotton swabs; use up to five/day for medical needs 450 each 3     clindamycin (CLINDAMAX) 1 % gel Apply bid (Patient taking differently: Apply topically 2 times daily as needed (face) ) 60 g 11     Gauze Pads & Dressings (GAUZE BANDAGE 2\") MISC IV STERILE GUAZE Use up to 4/day Supply 360 for three month supply 360 each 3     Gauze Pads & Dressings (GAUZE DRESSING) 4\"X4\" PADS NON STERILE GAUZE Use up to 8/day  Supply 720 for three month supply 720 each 3     ibuprofen (ADVIL,MOTRIN) 800 MG tablet Take 1 tablet (800 mg) by mouth 3 times daily as needed 60 tablet 11     Ostomy Supplies (UROSTOMY NIGHT BAG) MISC Please dispense three night bags/month (three month worth at a time) with 4 refills re: needed for chronic urinary dysfunction due to quadreplegia. Type: Bard 2000 ml. 9 Bottle 4     sildenafil (VIAGRA) 100 MG tablet Take 1 tablet (100 mg) by mouth daily as needed TAKE 1 HOUR BEFORE NEEDED 10 tablet 11     Alcohol Swabs (ALCOHOL PREP PADS) USE AS DIRECTED       lidocaine (XYLOCAINE) 2 % jelly Apply  topically. APPLY AS DIRECTED       Multiple Vitamins-Minerals (MULTIVITAMIN & MINERAL PO) Take 1 capsule by mouth daily.       oxycodone (ROXICODONE) 5 MG immediate release tablet Take 1 tablet by mouth every 4 hours as needed.       terbinafine (LAMISIL) 1 % cream Apply topically daily as needed        Incontinence Supplies (URINARY LEG BAG) USE AS NEEDED         Social History   Substance Use Topics     Smoking status: Current Every Day Smoker     Packs/day: 0.30     Types: Cigarettes     Smokeless tobacco: Never Used      Comment: since early teens     Alcohol use Yes      Comment: on weekends, 3-4+           OBJECTIVE:  BP 98/65 (BP Location: Left arm, Patient Position: Chair, Cuff Size: Adult Regular)  Pulse (!) 48  GENERAL APPEARANCE: Alert, no acute distress  NEURO: Alert, oriented, speech and mentation " normal  PSYCHE: mentation appears normal, affect and mood normal    ASSESSMENT/PLAN:    Position related dyspnea:   I'm unclear as to etiology but will rvw w/ his PMR MD, consider asking Dr Plascencia pulsydnie input too    Benign liver lesions rvwd    1/2 hr visit over half couns/coord care majority discussing possible eval/treat options for the dyspnea        ALMA HODGE MD

## 2017-04-17 ENCOUNTER — PRE VISIT (OUTPATIENT)
Dept: UROLOGY | Facility: CLINIC | Age: 57
End: 2017-04-17

## 2017-04-17 DIAGNOSIS — N31.9 NEUROGENIC BLADDER: Primary | ICD-10-CM

## 2017-04-17 NOTE — TELEPHONE ENCOUNTER
Patient coming in for NGB follow up with renal US and labs to be done prior. Patient chart reviewed, imaging and lab appointment made with patient.

## 2017-04-20 ENCOUNTER — THERAPY VISIT (OUTPATIENT)
Dept: OCCUPATIONAL THERAPY | Facility: CLINIC | Age: 57
End: 2017-04-20
Payer: COMMERCIAL

## 2017-04-20 DIAGNOSIS — M24.541 CONTRACTURE OF HAND JOINT, RIGHT: ICD-10-CM

## 2017-04-20 DIAGNOSIS — R06.2 WHEEZING: ICD-10-CM

## 2017-04-20 DIAGNOSIS — R29.898 MECHANICAL LIMB PROBLEMS: ICD-10-CM

## 2017-04-20 DIAGNOSIS — R10.30 GROIN PAIN, UNSPECIFIED LATERALITY: ICD-10-CM

## 2017-04-20 DIAGNOSIS — M24.542 CONTRACTURE OF HAND JOINT, LEFT: Primary | ICD-10-CM

## 2017-04-20 DIAGNOSIS — R25.2 SPASM: ICD-10-CM

## 2017-04-20 DIAGNOSIS — G82.50 QUADRIPLEGIA, UNSPECIFIED (H): ICD-10-CM

## 2017-04-20 DIAGNOSIS — R21 RASH AND OTHER NONSPECIFIC SKIN ERUPTION: ICD-10-CM

## 2017-04-20 DIAGNOSIS — G82.50 QUADRIPLEGIA (H): ICD-10-CM

## 2017-04-20 DIAGNOSIS — J20.9 BRONCHITIS WITH BRONCHOSPASM: ICD-10-CM

## 2017-04-20 DIAGNOSIS — G47.00 INSOMNIA, UNSPECIFIED: ICD-10-CM

## 2017-04-20 DIAGNOSIS — E78.00 HIGH CHOLESTEROL: ICD-10-CM

## 2017-04-20 PROCEDURE — 97165 OT EVAL LOW COMPLEX 30 MIN: CPT | Mod: GO | Performed by: OCCUPATIONAL THERAPIST

## 2017-04-20 PROCEDURE — 97760 ORTHOTIC MGMT&TRAING 1ST ENC: CPT | Mod: GO | Performed by: OCCUPATIONAL THERAPIST

## 2017-04-20 NOTE — PROGRESS NOTES
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    Patient reports needing new replacement orthosis and symptoms of a new forearm sore on the left arm, volar forearm, and is requesting the left splint be replaced.  The sore has been noted for a couple of months, which occurred due to compromised health due to his tetraplegia status. It was his PCA which noted the forearm sore. It is being watched and cared for by his PCA. Since onset symptoms are Gradually getting worse.  Special tests:  none.  Previous treatment: wound care for the forearm sore.    General health as reported by patient is good.  Pertinent medical history includes:quadraplegic  Medical allergies:See EMR  Surgical history: orthopedic: SCI cervical fusion, multilple medical procedures, see EMR.  Medication history: sleep, muscle relaxants.  Additional Occupational Profile Information (patterns of daily living, interests, values and needs): . Pt reports difficulty with bathing/showering, toileting, dressing, feeding, functional mobility, personal device care, hygiene and grooming, driving and community mobility, health management and maintenance, home establishment and management, meal preparation and cleanup and sleep.    Job Tasks: prolonged sitting, pushing/pulling, repetitive tasks, computer work  Barriers include:transportation, requires assistance with dressing, personal hygiene, transfers, mobility, has a  with his own van.   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  Living situation: lives with their spouse  Additional work context information: Employed - occupation - , working full time  Mobility: No difficulty getting around  home and community  With electric w/c  Transportation: Unable to drive car for transportation due to current injury, Has a  with own van,   Caregiver for: none others  Values / Spirituality: Patient identifies with [place] community  Daily routine: works full time, likes sports  Leisure activities / hobbies: sports participant from the [facility]    Functional Outcome Measure:  See flowsheet      Objective:    Pain:  VAS (0-10)  DATE: 4/20/2017      At Rest: 0 /10      With Use:  0/10            Orthotic Status: Pt arrives with reports of skin maceration/open sore on the Left volar midforearm. It is bandaged today. He notes the sore has been surfacing for a couple of months, it is healing per the pt, however, he is requesting a new orthosis. The orthosis he currently has for the LUE has old moleskin from 2013, and noting a wet area which matches his current forearm open sore. In removing the moleskin, it is noted there is moisture under the moleskin, on the plastic forearm base.   The orthosis is in good condition, no fractures noted in the material. No new orthosis is required this date.   The moleskin was then replaced, and ventilation holes were made in the thermoplastic orthosis. Matched holes were cut into the moleskin to allow for ventilation.     Strength:  [x]   Contraindicated  Edema:  none of the  hand  Wound/Scar: actively draining and dressed per the PCA   Sensation:  Decreased due to C5-6 quadriplegic status       Assessment:   Patient presents with symptoms consistent with above diagnosis,  with non-surgical intervention.     Patient's limitations or Problem List includes:  Decreased ROM/motion, Sensory disturbance, Tightness in musculature and requires orthotic support to maintain current ROM for skin hygiene of the bilateral wrist and hand which interferes with the patient's ability to have good skin integrity and to prevent further finger contractures as compared to previous level of  function.    Rehab Potential:  Poor - Return to restricted activity    Patient will benefit from skilled Occupational Therapy to increase ROM and skin integrity and decrease propensity for skin breakdown to return to previous activity level and resume normal daily tasks and to reach their rehab potential.    Barriers to Learning:  No barrier    Communication Issues:  Patient appears to be able to clearly communicate and understand verbal and written communication and follow directions correctly.  Clinical Decision Making (Complexity): Low complexity  Chart Review, Brief history including review of medical and/or therapy records relating to the presenting problem and Simple history review with patient    Assessment of Occupational Performance:  5 or more Performance Deficits  Identified Performance Deficits: bathing/showering, toileting, dressing, feeding, functional mobility, personal device care, hygiene and grooming, driving and community mobility, health management and maintenance, home establishment and management, meal preparation and cleanup, sleep and work      Treatment Explanation:  The following has been discussed with the patient:  RX ordered/plan of care  Anticipated outcomes  Possible risks and side effects    Treatment Plan:   Frequency:  3 X a month, once daily  Duration:  for 3 months     Orthotic Fabrication:  Static Forearm based resting hand orthosis, Bilateral      Discharge Plan:  Achieve all LTG.  Independent in home treatment program.  Reach maximal therapeutic benefit.    Home Exercise Program:  Wear orthosis at night    Next Visit:  Fit R orthosis with new moleskin, assess for replacement  Replace L orthosis as needed  Needs 2 hours time for next appt

## 2017-04-20 NOTE — MR AVS SNAPSHOT
After Visit Summary   4/20/2017    Bart Corea    MRN: [id number]           Patient Information     Date Of Birth          1960        Visit Information        Provider Department      4/20/2017 9:00 AM Rabia Ruiz [facility]        Today's Diagnoses     Contracture of hand joint, left    -  1    Contracture of hand joint, right        Mechanical limb problems           Follow-ups after your visit        Your next 10 appointments already scheduled     Jul 19, 2017  8:40 AM CDT   (Arrive by 8:25 AM)   New Patient Visit with Jaziel Webster MD   [facility] ([facility])    [address] [place] [address] [id number] [phone]            Apr 09, 2018 10:45 AM CDT   US RENAL COMPLETE with [facility] [place] [address] Imaging Center [facility] ([facility])    [address] [place] [id number] [phone]           Please bring a list of your medicines (including vitamins, minerals and over-the-counter drugs). Also, tell your doctor about any allergies you may have. Wear comfortable clothes and leave your valuables at home.  You do not need to do anything special to prepare for your exam.  Please call the Imaging Department at your exam site with any questions.            Apr 09, 2018 12:00 PM CDT   LAB with  LAB   [facility] ([facility])    [address] [place] [id number] [phone]           Patient must bring picture ID.  Patient should be prepared to give a urine specimen  Please do not eat 10-12 hours before your appointment if you are coming in fasting for labs on lipids, cholesterol, or glucose (sugar).  Pregnant women should follow their Care Team instructions. Water with medications is okay. Do not drink coffee or other fluids.   If you have concerns about taking  your medications,  "please ask at office or if scheduling via [employer], send a message by clicking on Secure Messaging, Message Your Care Team.            2018  1:00 PM CDT   (Arrive by 12:45 PM)   Return Visit with Obi Uriostegui MD   [facility] ([facility])    [address] [id number] [phone]              Who to contact     If you have questions or need follow up information about today's clinic visit or your schedule please contact [facility] directly at [phone].  Normal or non-critical lab and imaging results will be communicated to you by MyChart, letter or phone within 4 business days after the clinic has received the results. If you do not hear from us within 7 days, please contact the clinic through [employer] or phone. If you have a critical or abnormal lab result, we will notify you by phone as soon as possible.  Submit refill requests through [employer] or call your pharmacy and they will forward the refill request to us. Please allow 3 business days for your refill to be completed.          Additional Information About Your Visit        [employer] Information     [employer] lets you send messages to your doctor, view your test results, renew your prescriptions, schedule appointments and more. To sign up, go to [email or web address] . Click on \"Log in\" on the left side of the screen, which will take you to the Welcome page. Then click on \"Sign up Now\" on the right side of the page.     You will be asked to enter the access code listed below, as well as some personal information. Please follow the directions to create your username and password.     Your access code is: [email or web address]  Expires: 2017  6:30 AM     Your access code will  in 90 days. If you need help or a new code, please call your [facility] clinic or [phone].        Care EveryWhere ID     This is your " Care EveryWhere ID. This could be used by other organizations to access your [facility] medical records  [id number]         Blood Pressure from Last 3 Encounters:   04/14/17 98/65   03/31/17 100/70   08/28/16 (!) 109/96    Weight from Last 3 Encounters:   08/28/16 77.1 kg (170 lb)   06/20/16 81.6 kg (180 lb)   06/14/16 81.6 kg (180 lb)              We Performed the Following     HC OT EVAL, LOW COMPLEXITY     LUISITO INITIAL EVAL REPORT     ORTHOTIC MGMT AND TRAINING, EACH 15 MIN          Today's Medication Changes          These changes are accurate as of: 4/20/17  2:35 PM.  If you have any questions, ask your nurse or doctor.               These medicines have changed or have updated prescriptions.        Dose/Directions    clindamycin 1 % topical gel   Commonly known as:  CLINDAMAX   This may have changed:    - how to take this  - when to take this  - reasons to take this  - additional instructions   Used for:  Need for prophylactic vaccination against Hemophilus influenza type B (Hib), Insomnia, unspecified, Bronchitis with bronchospasm, Rash and other nonspecific skin eruption, Wheezing, Quadriplegia, unspecified (H), Unspecified constipation, Neurogenic bladder, NOS, Quadriplegia (H), Spasm, High cholesterol        Apply bid   Quantity:  60 g   Refills:  11                Primary Care Provider Office Phone # Fax #    Jason Long [phone]       [facility] [address] [place] [address]  [facility] [address]        Thank you!     Thank you for choosing [facility]  for your care. Our goal is always to provide you with excellent care. Hearing back from our patients is one way we can continue to improve our services. Please take a few minutes to complete the written survey that you may receive in the mail after your visit with us. Thank you!             Your Updated Medication List - Protect others around you: Learn how to safely use, store and throw away your  "medicines at www.disposemymeds.org.          This list is accurate as of: 4/20/17  2:35 PM.  Always use your most recent med list.                   Brand Name Dispense Instructions for use    * albuterol (2.5 MG/3ML) 0.083% neb solution     1 Box    Take 1 vial (2.5 mg) by nebulization 4 times daily as needed       * albuterol 108 (90 BASE) MCG/ACT Inhaler    VENTOLIN HFA    3 Inhaler    Inhale 2 puffs into the lungs every 4 hours as needed       ALCOHOL PREP PADS      USE AS DIRECTED       amcinonide 0.1 % cream    CYCLOCORT    120 g    Apply topically 2 times daily as needed for itching       amoxicillin-clavulanate 875-125 MG per tablet    AUGMENTIN    14 tablet    Take 1 tablet by mouth 2 times daily       ascorbic acid 1000 MG Tabs    vitamin C     Take 1 tablet by mouth daily       baclofen 20 MG tablet    LIORESAL    180 tablet    TAKE ONE TABLET BY MOUTH UP TO 6 TIMES DAILY       * bisacodyl 10 MG Suppository    DULCOLAX     Place 20 mg rectally every 3 days Active ingredient in Magic Bullet (10mg per suppository)       * MAGIC BULLETS 10 MG Suppository   Generic drug:  bisacodyl     30 suppository    Place 1 suppository (10 mg) rectally daily as needed for constipation       cholecalciferol 1000 UNIT tablet    vitamin D    30 tablet    Take 1 tablet (1,000 Units) by mouth daily *LETTER SENT  / MD APPT. NEEDED FOR REFILLS *       clindamycin 1 % topical gel    CLINDAMAX    60 g    Apply bid       Cotton Swabs Swab     450 each    Please dispense sterile wrapped cotton swabs; use up to five/day for medical needs       diazepam 10 MG tablet    VALIUM    30 tablet    Take 1 tablet (10 mg) by mouth daily AS DIRECTED       docusate sodium 100 MG capsule    DOCQLACE    30 capsule    Take 1 capsule (100 mg) by mouth daily **LETTER SENT  / MD APPT. NEEDED FOR REFILLS *       * Gauze Bandage 2\" Misc     360 each    IV STERILE GUAZE Use up to 4/day Supply 360 for three month supply       * Gauze Dressing 4\"X4\" Pads "     720 each    NON STERILE GAUZE Use up to 8/day  Supply 720 for three month supply       hydrocortisone 2.5 % cream    ANUSOL-HC    120 g    Bid prn       ibuprofen 800 MG tablet    ADVIL/MOTRIN    60 tablet    Take 1 tablet (800 mg) by mouth 3 times daily as needed       lidocaine 2 % topical gel    XYLOCAINE     Apply  topically. APPLY AS DIRECTED       minocycline 50 MG capsule    MINOCIN/DYNACIN    60 capsule    Take 1 capsule (50 mg) by mouth 2 times daily       MULTIVITAMIN & MINERAL PO      Take 1 capsule by mouth daily.       NORVASC PO      Take 2.5 mg by mouth daily as needed       nystatin 283171 UNIT/GM Powd    MYCOSTATIN    1 Bottle    Apply 1 dose topically 3 times daily as needed.       order for DME     1 Units    Equipment being ordered: Other: Nebulizer Treatment Diagnosis: Mild intermittent asthma       oxybutynin 10 MG 24 hr tablet    DITROPAN-XL    30 tablet    Take 1 tablet (10 mg) by mouth daily LETTER SENT  / MD APPT. NEEDED FOR REFILLS *       oxyCODONE 5 MG IR tablet    ROXICODONE     Take 1 tablet by mouth every 4 hours as needed.       sennosides 8.6 MG tablet    SENOKOT    90 each    Take 1 tablet by mouth daily       sildenafil 100 MG cap/tab    VIAGRA    10 tablet    Take 1 tablet (100 mg) by mouth daily as needed TAKE 1 HOUR BEFORE NEEDED       sodium chloride 3 % Nebu neb solution     500 mL    Take 3 mLs by nebulization every 3 hours as needed for wheezing       terbinafine 1 % cream    lamISIL     Apply topically daily as needed       URINARY LEG BAG      USE AS NEEDED       Urostomy Night Bag Misc     9 Bottle    Please dispense three night bags/month (three month worth at a time) with 4 refills re: needed for chronic urinary dysfunction due to quadreplegia. Type: Bard 2000 ml.       YADI DAYQUIL COLD & FLU 10-5-325 MG/15ML Liqd   Generic drug:  DM-Phenylephrine-Acetaminophen      Take by mouth as needed       VICKS NYQUIL COLD & FLU NIGHT 15-6. MG/15ML Liqd   Generic  drug:  DM-Doxylamine-Acetaminophen      Take by mouth as needed       zolpidem 10 MG tablet    AMBIEN    30 tablet    1/2 TO 1 TABLET BY MOUTH EVERY NIGHT AT BEDTIME ASNEEDED       * Notice:  This list has 6 medication(s) that are the same as other medications prescribed for you. Read the directions carefully, and ask your doctor or other care provider to review them with you.

## 2017-04-22 ENCOUNTER — MEDICAL CORRESPONDENCE (OUTPATIENT)
Dept: HEALTH INFORMATION MANAGEMENT | Facility: CLINIC | Age: 57
End: 2017-04-22

## 2017-04-23 RX ORDER — OXYBUTYNIN CHLORIDE 10 MG/1
10 TABLET, EXTENDED RELEASE ORAL DAILY
Qty: 90 TABLET | Refills: 0 | Status: SHIPPED | OUTPATIENT
Start: 2017-04-23 | End: 2017-07-24

## 2017-04-23 RX ORDER — DOCUSATE SODIUM 100 MG/1
100 CAPSULE, LIQUID FILLED ORAL DAILY
Qty: 90 CAPSULE | Refills: 0 | Status: SHIPPED | OUTPATIENT
Start: 2017-04-23 | End: 2017-07-24

## 2017-04-24 RX ORDER — ZOLPIDEM TARTRATE 10 MG/1
5-10 TABLET ORAL AT BEDTIME
Qty: 30 TABLET | Refills: 0 | Status: SHIPPED | OUTPATIENT
Start: 2017-04-24 | End: 2017-05-17

## 2017-04-24 RX ORDER — BACLOFEN 20 MG/1
TABLET ORAL
Qty: 180 TABLET | Refills: 0 | Status: SHIPPED | OUTPATIENT
Start: 2017-04-24 | End: 2017-05-17

## 2017-04-24 RX ORDER — DIAZEPAM 10 MG
10 TABLET ORAL DAILY
Qty: 30 TABLET | Refills: 0 | Status: SHIPPED | OUTPATIENT
Start: 2017-04-24 | End: 2017-05-17

## 2017-04-24 NOTE — TELEPHONE ENCOUNTER
DOCQLACE    VITAMIN D3   oxybutynin    Last Written Prescription Date:  3/22/17  Last Fill Quantity:30,   # refills: 0  Last Office Visit : 4/14/17  Future Office visit:  none       ZOLPIDEM 10MG TAB    DIAZEPAM 10MG TAB  Last Written Prescription Date:  3/22/17  Last Fill Quantity: 30,   # refills: 0  Routing refill req ZOLPIDEM 10MG TABuest to provider for review/approval because:  Drug not on the [facility], [facility] or [facility] refill protocol or controlled substance     BACLOFEN       Last Written Prescription Date:  3/22/17  Last Fill Quantity: 180,   # refills: 0  Drug not on the [facility], [facility] or [facility] refill protocol or controlled substance

## 2017-05-03 ENCOUNTER — TELEPHONE (OUTPATIENT)
Dept: INTERNAL MEDICINE | Facility: CLINIC | Age: 57
End: 2017-05-03

## 2017-05-03 DIAGNOSIS — S14.105A: ICD-10-CM

## 2017-05-03 DIAGNOSIS — S14.106A: ICD-10-CM

## 2017-05-03 DIAGNOSIS — S14.104A: Primary | ICD-10-CM

## 2017-05-03 NOTE — TELEPHONE ENCOUNTER
Pt is requesting DME orders to fax to [facility] (fax [phone]).    1. needs some adaptations to wheelchair to mount an I-pad/cellphone and mouthstick sanchez to wheelchair.   2. uses a swivel spork to eat.  current one broke and the new one (swivel spork) requires an extension.  3. uses a mobile arm support to assist motion to eat and exercise.  The mobile arm support requires some adjustments on the chair to function properly.  4. needs a couple of mouthstick sanchez/stands made.    He needs to see one of their occupational therapist to get things straight there.    Referral was faxed.

## 2017-05-09 ENCOUNTER — TELEPHONE (OUTPATIENT)
Dept: INTERNAL MEDICINE | Facility: CLINIC | Age: 57
End: 2017-05-09

## 2017-05-09 DIAGNOSIS — S14.105A: ICD-10-CM

## 2017-05-09 DIAGNOSIS — N31.9 NEUROGENIC BLADDER: ICD-10-CM

## 2017-05-09 DIAGNOSIS — S14.104A: Primary | ICD-10-CM

## 2017-05-09 DIAGNOSIS — S14.106A: ICD-10-CM

## 2017-05-09 NOTE — TELEPHONE ENCOUNTER
Pt is requesting DME order for M 9 cleaning solution for leg and night bag cleaning. Rx faxed to [facility] per request.

## 2017-05-17 DIAGNOSIS — R10.30 GROIN PAIN, UNSPECIFIED LATERALITY: ICD-10-CM

## 2017-05-17 DIAGNOSIS — G47.00 INSOMNIA, UNSPECIFIED: ICD-10-CM

## 2017-05-17 DIAGNOSIS — J20.9 BRONCHITIS WITH BRONCHOSPASM: ICD-10-CM

## 2017-05-17 DIAGNOSIS — G82.50 QUADRIPLEGIA, UNSPECIFIED (H): ICD-10-CM

## 2017-05-17 DIAGNOSIS — R25.2 SPASM: ICD-10-CM

## 2017-05-17 DIAGNOSIS — R06.2 WHEEZING: ICD-10-CM

## 2017-05-17 DIAGNOSIS — G82.50 QUADRIPLEGIA (H): ICD-10-CM

## 2017-05-17 DIAGNOSIS — R21 RASH AND OTHER NONSPECIFIC SKIN ERUPTION: ICD-10-CM

## 2017-05-18 RX ORDER — ZOLPIDEM TARTRATE 10 MG/1
TABLET ORAL
Qty: 90 TABLET | Refills: 1
Start: 2017-05-18 | End: 2017-11-10

## 2017-05-18 RX ORDER — BACLOFEN 20 MG/1
TABLET ORAL
Qty: 540 TABLET | Refills: 3 | Status: SHIPPED | OUTPATIENT
Start: 2017-05-18 | End: 2018-05-04

## 2017-05-18 RX ORDER — DIAZEPAM 10 MG
TABLET ORAL
Qty: 90 TABLET | Refills: 1
Start: 2017-05-18 | End: 2017-11-10

## 2017-06-05 ENCOUNTER — RADIANT APPOINTMENT (OUTPATIENT)
Dept: GENERAL RADIOLOGY | Facility: CLINIC | Age: 57
End: 2017-06-05
Attending: FAMILY MEDICINE
Payer: COMMERCIAL

## 2017-06-05 ENCOUNTER — OFFICE VISIT (OUTPATIENT)
Dept: URGENT CARE | Facility: URGENT CARE | Age: 57
End: 2017-06-05
Payer: COMMERCIAL

## 2017-06-05 VITALS
TEMPERATURE: 101.6 F | DIASTOLIC BLOOD PRESSURE: 61 MMHG | HEART RATE: 60 BPM | SYSTOLIC BLOOD PRESSURE: 94 MMHG | OXYGEN SATURATION: 97 %

## 2017-06-05 DIAGNOSIS — R05.9 COUGH: ICD-10-CM

## 2017-06-05 DIAGNOSIS — R50.9 FEVER AND CHILLS: Primary | ICD-10-CM

## 2017-06-05 DIAGNOSIS — R50.9 FEVER AND CHILLS: ICD-10-CM

## 2017-06-05 DIAGNOSIS — R06.02 SOB (SHORTNESS OF BREATH): ICD-10-CM

## 2017-06-05 DIAGNOSIS — I10 BENIGN ESSENTIAL HYPERTENSION: ICD-10-CM

## 2017-06-05 LAB
ANION GAP SERPL CALCULATED.3IONS-SCNC: 9 MMOL/L (ref 3–14)
BASOPHILS # BLD AUTO: 0 10E9/L (ref 0–0.2)
BASOPHILS NFR BLD AUTO: 0.3 %
BUN SERPL-MCNC: 8 MG/DL (ref 7–30)
CALCIUM SERPL-MCNC: 8.2 MG/DL (ref 8.5–10.1)
CHLORIDE SERPL-SCNC: 104 MMOL/L (ref 94–109)
CO2 SERPL-SCNC: 26 MMOL/L (ref 20–32)
CREAT SERPL-MCNC: 0.22 MG/DL (ref 0.66–1.25)
DIFFERENTIAL METHOD BLD: ABNORMAL
EOSINOPHIL # BLD AUTO: 0.2 10E9/L (ref 0–0.7)
EOSINOPHIL NFR BLD AUTO: 4.2 %
ERYTHROCYTE [DISTWIDTH] IN BLOOD BY AUTOMATED COUNT: 14.9 % (ref 10–15)
GFR SERPL CREATININE-BSD FRML MDRD: ABNORMAL ML/MIN/1.7M2
GLUCOSE SERPL-MCNC: 89 MG/DL (ref 70–99)
HCT VFR BLD AUTO: 45.8 % (ref 40–53)
HGB BLD-MCNC: 14.9 G/DL (ref 13.3–17.7)
LYMPHOCYTES # BLD AUTO: 0.7 10E9/L (ref 0.8–5.3)
LYMPHOCYTES NFR BLD AUTO: 19.5 %
MCH RBC QN AUTO: 31.4 PG (ref 26.5–33)
MCHC RBC AUTO-ENTMCNC: 32.5 G/DL (ref 31.5–36.5)
MCV RBC AUTO: 96 FL (ref 78–100)
MONOCYTES # BLD AUTO: 0.6 10E9/L (ref 0–1.3)
MONOCYTES NFR BLD AUTO: 16.1 %
NEUTROPHILS # BLD AUTO: 2.3 10E9/L (ref 1.6–8.3)
NEUTROPHILS NFR BLD AUTO: 59.9 %
PLATELET # BLD AUTO: ABNORMAL 10E9/L (ref 150–450)
POTASSIUM SERPL-SCNC: 4.9 MMOL/L (ref 3.4–5.3)
RBC # BLD AUTO: 4.75 10E12/L (ref 4.4–5.9)
SODIUM SERPL-SCNC: 139 MMOL/L (ref 133–144)
WBC # BLD AUTO: 3.8 10E9/L (ref 4–11)

## 2017-06-05 PROCEDURE — 80048 BASIC METABOLIC PNL TOTAL CA: CPT | Performed by: FAMILY MEDICINE

## 2017-06-05 PROCEDURE — 85025 COMPLETE CBC W/AUTO DIFF WBC: CPT | Performed by: FAMILY MEDICINE

## 2017-06-05 PROCEDURE — 71020 XR CHEST 2 VW: CPT

## 2017-06-05 PROCEDURE — 94640 AIRWAY INHALATION TREATMENT: CPT | Performed by: FAMILY MEDICINE

## 2017-06-05 PROCEDURE — 99214 OFFICE O/P EST MOD 30 MIN: CPT | Mod: 25 | Performed by: FAMILY MEDICINE

## 2017-06-05 PROCEDURE — 36415 COLL VENOUS BLD VENIPUNCTURE: CPT | Performed by: FAMILY MEDICINE

## 2017-06-05 RX ORDER — ALBUTEROL SULFATE 0.83 MG/ML
1 SOLUTION RESPIRATORY (INHALATION) EVERY 6 HOURS PRN
Qty: 25 VIAL | Refills: 1 | Status: SHIPPED | OUTPATIENT
Start: 2017-06-05 | End: 2019-07-12

## 2017-06-05 RX ORDER — ALBUTEROL SULFATE 0.83 MG/ML
SOLUTION RESPIRATORY (INHALATION)
Qty: 1 VIAL | Refills: 0
Start: 2017-06-05 | End: 2020-01-10

## 2017-06-05 RX ORDER — ALBUTEROL SULFATE 0.83 MG/ML
SOLUTION RESPIRATORY (INHALATION)
Qty: 1 VIAL | Refills: 0 | Status: CANCELLED
Start: 2017-06-05 | End: 2017-06-06

## 2017-06-05 NOTE — NURSING NOTE
"Chief Complaint   Patient presents with     Cough     low grade fever, cough, runny nose , difficulty breathing. Pt had left over Z- jackie that he has been using the past 2 days.        Initial BP 94/61 (BP Location: Left arm, Patient Position: Chair, Cuff Size: Adult Regular)  Pulse 60  Temp 101.6  F (38.7  C) (Oral)  SpO2 97% Estimated body mass index is 25.85 kg/(m^2) as calculated from the following:    Height as of 8/28/16: 5' 8\" (1.727 m).    Weight as of 8/28/16: 170 lb (77.1 kg).  Medication Reconciliation: complete    "

## 2017-06-05 NOTE — MR AVS SNAPSHOT
After Visit Summary   6/5/2017    Bart Corea    MRN: [id number]           Patient Information     Date Of Birth          1960        Visit Information        Provider Department      6/5/2017 3:20 PM Sandro Carablalo DO [facility]        Today's Diagnoses     Fever and chills    -  1    Cough        SOB (shortness of breath)        Benign essential hypertension           Follow-ups after your visit        Your next 10 appointments already scheduled     Jul 19, 2017  8:40 AM CDT   (Arrive by 8:25 AM)   New Patient Visit with Jaziel Webster MD   [facility] ([facility])    [address] [place] [address] [id number] [phone]            Apr 09, 2018 10:45 AM CDT   US RENAL COMPLETE with [address] [facility] ([facility])    [address] [place] [id number] [phone]           Please bring a list of your medicines (including vitamins, minerals and over-the-counter drugs). Also, tell your doctor about any allergies you may have. Wear comfortable clothes and leave your valuables at home.  You do not need to do anything special to prepare for your exam.  Please call the Imaging Department at your exam site with any questions.            Apr 09, 2018 12:00 PM CDT   LAB with  LAB   [facility] ([facility])    [address] [place] [address] [phone] [phone]           Patient must bring picture ID.  Patient should be prepared to give a urine specimen  Please do not eat 10-12 hours before your appointment if you are coming in fasting for labs on lipids, cholesterol, or glucose (sugar).  Pregnant women should follow their Care Team instructions. Water with medications is okay. Do not drink coffee or other fluids.   If you have concerns about taking  your medications,  "please ask at office or if scheduling via [employer], send a message by clicking on Secure Messaging, Message Your Care Team.            2018  1:00 PM CDT   (Arrive by 12:45 PM)   Return Visit with Obi Uriostegui MD   [facility] ([facility])    [address] [id number] [phone]              Who to contact     If you have questions or need follow up information about today's clinic visit or your schedule please contact [facility] directly at [phone].  Normal or non-critical lab and imaging results will be communicated to you by MyChart, letter or phone within 4 business days after the clinic has received the results. If you do not hear from us within 7 days, please contact the clinic through [employer] or phone. If you have a critical or abnormal lab result, we will notify you by phone as soon as possible.  Submit refill requests through [employer] or call your pharmacy and they will forward the refill request to us. Please allow 3 business days for your refill to be completed.          Additional Information About Your Visit        [employer] Information     [employer] lets you send messages to your doctor, view your test results, renew your prescriptions, schedule appointments and more. To sign up, go to [email or web address] . Click on \"Log in\" on the left side of the screen, which will take you to the Welcome page. Then click on \"Sign up Now\" on the right side of the page.     You will be asked to enter the access code listed below, as well as some personal information. Please follow the directions to create your username and password.     Your access code is: [email or web address]  Expires: 2017  6:30 AM     Your access code will  in 90 days. If you need help or a new code, please call your [place] clinic or [phone].        Care EveryWhere ID     This is " your Care EveryWhere ID. This could be used by other organizations to access your [place] medical records  [id number]        Your Vitals Were     Pulse Temperature Pulse Oximetry             60 101.6  F (38.7  C) (Oral) 97%          Blood Pressure from Last 3 Encounters:   06/05/17 94/61   04/14/17 98/65   03/31/17 100/70    Weight from Last 3 Encounters:   08/28/16 170 lb (77.1 kg)   06/20/16 180 lb (81.6 kg)   06/14/16 180 lb (81.6 kg)              We Performed the Following     ALBUTEROL UNIT DOSE, 1 MG -      Basic metabolic panel     CBC with platelets differential     INHALATION/NEBULIZER TREATMENT, INITIAL     INHALATION/NEBULIZER TREATMENT, INITIAL          Today's Medication Changes          These changes are accurate as of: 6/5/17  5:30 PM.  If you have any questions, ask your nurse or doctor.               These medicines have changed or have updated prescriptions.        Dose/Directions    * albuterol (2.5 MG/3ML) 0.083% neb solution   This may have changed:  Another medication with the same name was added. Make sure you understand how and when to take each.   Used for:  Bronchitis with bronchospasm, Need for prophylactic vaccination against Hemophilus influenza type B (Hib), Insomnia, unspecified, Rash and other nonspecific skin eruption, Wheezing, Quadriplegia, unspecified (H), Unspecified constipation, Neurogenic bladder, NOS, Quadriplegia (H), Spasm, High cholesterol   Changed by:  Jason Long MD        Dose:  1 vial   Take 1 vial (2.5 mg) by nebulization 4 times daily as needed   Quantity:  1 Box   Refills:  2       * albuterol 108 (90 BASE) MCG/ACT Inhaler   Commonly known as:  VENTOLIN HFA   This may have changed:  Another medication with the same name was added. Make sure you understand how and when to take each.   Used for:  Wheezing, Bronchitis with bronchospasm, Quadriplegia (H), Spasm   Changed by:  Jason Long MD        Dose:  2 puff   Inhale 2 puffs into the lungs  every 4 hours as needed   Quantity:  3 Inhaler   Refills:  1       * albuterol (2.5 MG/3ML) 0.083% neb solution   This may have changed:  You were already taking a medication with the same name, and this prescription was added. Make sure you understand how and when to take each.   Used for:  Cough, SOB (shortness of breath)   Changed by:  Sandro Caraballo DO        1 neb in clinic   Quantity:  1 vial   Refills:  0       * albuterol (2.5 MG/3ML) 0.083% neb solution   This may have changed:  You were already taking a medication with the same name, and this prescription was added. Make sure you understand how and when to take each.   Used for:  SOB (shortness of breath)   Changed by:  Sandro Caraballo DO        Dose:  1 vial   Take 1 vial (2.5 mg) by nebulization every 6 hours as needed for shortness of breath / dyspnea or wheezing   Quantity:  25 vial   Refills:  1       clindamycin 1 % topical gel   Commonly known as:  CLINDAMAX   This may have changed:    - how to take this  - when to take this  - reasons to take this  - additional instructions   Used for:  Need for prophylactic vaccination against Hemophilus influenza type B (Hib), Insomnia, unspecified, Bronchitis with bronchospasm, Rash and other nonspecific skin eruption, Wheezing, Quadriplegia, unspecified (H), Unspecified constipation, Neurogenic bladder, NOS, Quadriplegia (H), Spasm, High cholesterol        Apply bid   Quantity:  60 g   Refills:  11       * order for DME   This may have changed:  Another medication with the same name was added. Make sure you understand how and when to take each.   Used for:  Pneumonia of left lower lobe due to infectious organism, Mild intermittent asthma with acute exacerbation        Equipment being ordered: Other: Nebulizer Treatment Diagnosis: Mild intermittent asthma   Quantity:  1 Units   Refills:  0       * order for DME   This may have changed:  You were already taking a medication with the same name, and this  prescription was added. Make sure you understand how and when to take each.   Used for:  Cough, SOB (shortness of breath)   Changed by:  Sandro Caraballo, DO        Nebulizer with compressor   Quantity:  1 Device   Refills:  0       * order for DME   This may have changed:  You were already taking a medication with the same name, and this prescription was added. Make sure you understand how and when to take each.   Used for:  Benign essential hypertension   Changed by:  Sandro Caraballo, DO        Blood Pressure monitor for home   Quantity:  1 Device   Refills:  0       * Notice:  This list has 7 medication(s) that are the same as other medications prescribed for you. Read the directions carefully, and ask your doctor or other care provider to review them with you.         Where to get your medicines      These medications were sent to [facility] - [address] [address]     Phone:  [phone]     albuterol (2.5 MG/3ML) 0.083% neb solution         Some of these will need a paper prescription and others can be bought over the counter.  Ask your nurse if you have questions.     Bring a paper prescription for each of these medications     order for DME    order for DME       You don't need a prescription for these medications     albuterol (2.5 MG/3ML) 0.083% neb solution                Primary Care Provider Office Phone # Fax #    Jason Long [phone]       [facility] [address] [place] [id number]        Thank you!     Thank you for choosing [facility]  for your care. Our goal is always to provide you with excellent care. Hearing back from our patients is one way we can continue to improve our services. Please take a few minutes to complete the written survey that you may receive in the mail after your visit with us. Thank you!             Your Updated Medication List - Protect  others around you: Learn how to safely use, store and throw away your medicines at www.disposemymeds.org.          This list is accurate as of: 6/5/17  5:30 PM.  Always use your most recent med list.                   Brand Name Dispense Instructions for use    * albuterol (2.5 MG/3ML) 0.083% neb solution     1 Box    Take 1 vial (2.5 mg) by nebulization 4 times daily as needed       * albuterol 108 (90 BASE) MCG/ACT Inhaler    VENTOLIN HFA    3 Inhaler    Inhale 2 puffs into the lungs every 4 hours as needed       * albuterol (2.5 MG/3ML) 0.083% neb solution     1 vial    1 neb in clinic       * albuterol (2.5 MG/3ML) 0.083% neb solution     25 vial    Take 1 vial (2.5 mg) by nebulization every 6 hours as needed for shortness of breath / dyspnea or wheezing       ALCOHOL PREP PADS      USE AS DIRECTED       amcinonide 0.1 % cream    CYCLOCORT    120 g    Apply topically 2 times daily as needed for itching       amoxicillin-clavulanate 875-125 MG per tablet    AUGMENTIN    14 tablet    Take 1 tablet by mouth 2 times daily       ascorbic acid 1000 MG Tabs    vitamin C     Take 1 tablet by mouth daily       baclofen 20 MG tablet    LIORESAL    540 tablet    TAKE ONE TABLET BY MOUTH UP TO 6 TIMES DAILY       * bisacodyl 10 MG Suppository    DULCOLAX     Place 20 mg rectally every 3 days Active ingredient in Magic Bullet (10mg per suppository)       * MAGIC BULLETS 10 MG Suppository   Generic drug:  bisacodyl     30 suppository    Place 1 suppository (10 mg) rectally daily as needed for constipation       cholecalciferol 1000 UNIT tablet    vitamin D    90 tablet    Take 1 tablet (1,000 Units) by mouth daily       clindamycin 1 % topical gel    CLINDAMAX    60 g    Apply bid       Cotton Swabs Swab     450 each    Please dispense sterile wrapped cotton swabs; use up to five/day for medical needs       diazepam 10 MG tablet    VALIUM    90 tablet    TAKE 1 TABLET BY MOUTH ONCE DAILY AS DIRECTED       docusate sodium 100  "MG capsule    DOCQLACE    90 capsule    Take 1 capsule (100 mg) by mouth daily       * Gauze Bandage 2\" Misc     360 each    IV STERILE GUAZE Use up to 4/day Supply 360 for three month supply       * Gauze Dressing 4\"X4\" Pads     720 each    NON STERILE GAUZE Use up to 8/day  Supply 720 for three month supply       hydrocortisone 2.5 % cream    ANUSOL-HC    120 g    Bid prn       ibuprofen 800 MG tablet    ADVIL/MOTRIN    60 tablet    Take 1 tablet (800 mg) by mouth 3 times daily as needed       lidocaine 2 % topical gel    XYLOCAINE     Apply  topically. APPLY AS DIRECTED       minocycline 50 MG capsule    MINOCIN/DYNACIN    60 capsule    Take 1 capsule (50 mg) by mouth 2 times daily       MULTIVITAMIN & MINERAL PO      Take 1 capsule by mouth daily.       NORVASC PO      Take 2.5 mg by mouth daily as needed       nystatin 054187 UNIT/GM Powd    MYCOSTATIN    1 Bottle    Apply 1 dose topically 3 times daily as needed.       * order for DME     1 Units    Equipment being ordered: Other: Nebulizer Treatment Diagnosis: Mild intermittent asthma       * order for DME     1 Device    Nebulizer with compressor       * order for DME     1 Device    Blood Pressure monitor for home       oxybutynin 10 MG 24 hr tablet    DITROPAN-XL    90 tablet    Take 1 tablet (10 mg) by mouth daily       oxyCODONE 5 MG IR tablet    ROXICODONE     Take 1 tablet by mouth every 4 hours as needed.       sennosides 8.6 MG tablet    SENOKOT    90 each    Take 1 tablet by mouth daily       sildenafil 100 MG cap/tab    VIAGRA    10 tablet    Take 1 tablet (100 mg) by mouth daily as needed TAKE 1 HOUR BEFORE NEEDED       sodium chloride 3 % Nebu neb solution     500 mL    Take 3 mLs by nebulization every 3 hours as needed for wheezing       terbinafine 1 % cream    lamISIL     Apply topically daily as needed       URINARY LEG BAG      USE AS NEEDED       Urostomy Night Bag Misc     9 Bottle    Please dispense three night bags/month (three month " worth at a time) with 4 refills re: needed for chronic urinary dysfunction due to quadreplegia. Type: Bard 2000 ml.       YADI DAYQUIL COLD & FLU 10-5-325 MG/15ML Liqd   Generic drug:  DM-Phenylephrine-Acetaminophen      Take by mouth as needed       VICKS NYQUIL COLD & FLU NIGHT 15-6. MG/15ML Liqd   Generic drug:  DM-Doxylamine-Acetaminophen      Take by mouth as needed       zolpidem 10 MG tablet    AMBIEN    90 tablet    TAKE 1/2 TO 1 TABLET BY MOUTH AT BEDTIME       * Notice:  This list has 11 medication(s) that are the same as other medications prescribed for you. Read the directions carefully, and ask your doctor or other care provider to review them with you.

## 2017-06-16 ENCOUNTER — OFFICE VISIT (OUTPATIENT)
Dept: INTERNAL MEDICINE | Facility: CLINIC | Age: 57
End: 2017-06-16

## 2017-06-16 VITALS — DIASTOLIC BLOOD PRESSURE: 69 MMHG | HEART RATE: 53 BPM | SYSTOLIC BLOOD PRESSURE: 104 MMHG | TEMPERATURE: 97.9 F

## 2017-06-16 DIAGNOSIS — N31.9 NEUROGENIC BLADDER: ICD-10-CM

## 2017-06-16 DIAGNOSIS — Z12.11 SCREEN FOR COLON CANCER: ICD-10-CM

## 2017-06-16 DIAGNOSIS — Z11.59 NEED FOR HEPATITIS C SCREENING TEST: ICD-10-CM

## 2017-06-16 DIAGNOSIS — R05.9 COUGH: Primary | ICD-10-CM

## 2017-06-16 LAB
ANION GAP SERPL CALCULATED.3IONS-SCNC: 8 MMOL/L (ref 3–14)
BUN SERPL-MCNC: 13 MG/DL (ref 7–30)
CALCIUM SERPL-MCNC: 8.7 MG/DL (ref 8.5–10.1)
CHLORIDE SERPL-SCNC: 104 MMOL/L (ref 94–109)
CO2 SERPL-SCNC: 26 MMOL/L (ref 20–32)
CREAT SERPL-MCNC: 0.27 MG/DL (ref 0.66–1.25)
GFR SERPL CREATININE-BSD FRML MDRD: ABNORMAL ML/MIN/1.7M2
GLUCOSE SERPL-MCNC: 91 MG/DL (ref 70–99)
HCV AB SERPL QL IA: NORMAL
POTASSIUM SERPL-SCNC: 4.4 MMOL/L (ref 3.4–5.3)
SODIUM SERPL-SCNC: 138 MMOL/L (ref 133–144)

## 2017-06-16 RX ORDER — MONTELUKAST SODIUM 10 MG/1
10 TABLET ORAL AT BEDTIME
Qty: 30 TABLET | Refills: 1 | Status: SHIPPED | OUTPATIENT
Start: 2017-06-16 | End: 2020-01-10

## 2017-06-16 NOTE — MR AVS SNAPSHOT
After Visit Summary   6/16/2017    Bart Corea    MRN: [id number]           Patient Information     Date Of Birth          1960        Visit Information        Provider Department      6/16/2017 9:20 AM Paula Millan MD [facility]        Today's Diagnoses     Cough    -  1    Screen for colon cancer        Need for hepatitis C screening test          Care Instructions    Primary Care Center Medication Refill Request Information:  * Please contact your pharmacy regarding ANY request for medication refills.  ** [facility] Prescription Fax = [phone]  * Please allow 3 business days for routine medication refills.  * Please allow 5 business days for controlled substance medication refills.     Primary Care Center Test Result notification information:  *You will be notified with in 7-10 days of your appointment day regarding the results of your test.  If you are on MyChart you will be notified as soon as the provider has reviewed the results and signed off on them.    Primary Care Center [phone]           Follow-ups after your visit        Your next 10 appointments already scheduled     Jun 16, 2017 10:30 AM CDT   LAB with [facility] ([facility])    [address] [place] [id number] [phone]           Patient must bring picture ID.  Patient should be prepared to give a urine specimen  Please do not eat 10-12 hours before your appointment if you are coming in fasting for labs on lipids, cholesterol, or glucose (sugar).  Pregnant women should follow their Care Team instructions. Water with medications is okay. Do not drink coffee or other fluids.   If you have concerns about taking  your medications, please ask at office or if scheduling via [employer], send a message by clicking on Secure Messaging, Message Your Care Team.            Jun 16, 2017 10:45 AM CDT   (Arrive by 10:30 AM)   XR CHEST 2  VIEWS with [address] [place] [address] Center Xray ([facility])    [address] [place] [id number] [phone]           Please bring a list of your current medicines to your exam. (Include vitamins, minerals and over-thecounter medicines.) Leave your valuables at home.  Tell your doctor if there is a chance you may be pregnant.  You do not need to do anything special for this exam.            Jul 19, 2017  8:40 AM CDT   (Arrive by 8:25 AM)   New Patient Visit with Jaziel Webster MD   [facility] ([facility])    [address] [place] [id number] [phone]            Apr 09, 2018 10:45 AM CDT   US RENAL COMPLETE with [address] [place] [address] Center US ([facility])    [address] [place] [id number] [phone]           Please bring a list of your medicines (including vitamins, minerals and over-the-counter drugs). Also, tell your doctor about any allergies you may have. Wear comfortable clothes and leave your valuables at home.  You do not need to do anything special to prepare for your exam.  Please call the Imaging Department at your exam site with any questions.            Apr 09, 2018 12:00 PM CDT   LAB with  LAB   [facility] ([facility])    [address] [place] [address] [id number] [phone]           Patient must bring picture ID.  Patient should be prepared to give a urine specimen  Please do not eat 10-12 hours before your appointment if you are coming in fasting for labs on lipids, cholesterol, or glucose (sugar).  Pregnant women should follow their Care Team instructions. Water with medications is okay. Do not drink coffee or other fluids.   If you have concerns about taking  your medications, please ask at office or if scheduling via mPATH, send a message by  clicking on Secure Messaging, Message Your Care Team.            2018  1:00 PM CDT   (Arrive by 12:45 PM)   Return Visit with Obi Uriostegui MD   [facility] ([facility])    [address] [address] [phone]              Future tests that were ordered for you today     Open Future Orders        Priority Expected Expires Ordered    Hepatitis C Screen Reflex to HCV RNA Quant and Genotype Routine 2017    Fecal colorectal cancer screen FIT - Future (S+30) Routine 2017    XR Chest 2 Views Routine 2017            Who to contact     Please call your clinic at [phone] to:    Ask questions about your health    Make or cancel appointments    Discuss your medicines    Learn about your test results    Speak to your doctor   If you have compliments or concerns about an experience at your clinic, or if you wish to file a complaint, please contact [facility] Patient Relations at [phone] or email us at [email or web address]         Additional Information About Your Visit        [employer] Information     [employer] is an electronic gateway that provides easy, online access to your medical records. With [employer], you can request a clinic appointment, read your test results, renew a prescription or communicate with your care team.     To sign up for [employer] visit the website at [email or web address]   You will be asked to enter the access code listed below, as well as some personal information. Please follow the directions to create your username and password.     Your access code is: [email or web address]  Expires: 2017  6:30 AM     Your access code will  in 90 days. If you need help or a new code, please contact your [facility] or call [phone] for  assistance.        Care EveryWhere ID     This is your Care EveryWhere ID. This could be used by other organizations to access your [place] medical records  [id number]        Your Vitals Were     Pulse Temperature                53 97.9  F (36.6  C)           Blood Pressure from Last 3 Encounters:   06/16/17 104/69   06/05/17 94/61   04/14/17 98/65    Weight from Last 3 Encounters:   08/28/16 77.1 kg (170 lb)   06/20/16 81.6 kg (180 lb)   06/14/16 81.6 kg (180 lb)                 Today's Medication Changes          These changes are accurate as of: 6/16/17  9:47 AM.  If you have any questions, ask your nurse or doctor.               Start taking these medicines.        Dose/Directions    montelukast 10 MG tablet   Commonly known as:  SINGULAIR   Used for:  Cough   Started by:  Paula Millan MD        Dose:  10 mg   Take 1 tablet (10 mg) by mouth At Bedtime   Quantity:  30 tablet   Refills:  1         These medicines have changed or have updated prescriptions.        Dose/Directions    clindamycin 1 % topical gel   Commonly known as:  CLINDAMAX   This may have changed:    - how to take this  - when to take this  - reasons to take this  - additional instructions   Used for:  Need for prophylactic vaccination against Hemophilus influenza type B (Hib), Insomnia, unspecified, Bronchitis with bronchospasm, Rash and other nonspecific skin eruption, Wheezing, Quadriplegia, unspecified (H), Unspecified constipation, Neurogenic bladder, NOS, Quadriplegia (H), Spasm, High cholesterol        Apply bid   Quantity:  60 g   Refills:  11            Where to get your medicines      These medications were sent to [address] [address]     Phone:  [phone]     montelukast 10 MG tablet                Primary Care Provider Office Phone # Fax #    Jason Long [phone]       PRIMARY CARE CENTER [address] [place] [address]  88  [facility] [address]        Thank you!     Thank you for choosing [facility]  for your care. Our goal is always to provide you with excellent care. Hearing back from our patients is one way we can continue to improve our services. Please take a few minutes to complete the written survey that you may receive in the mail after your visit with us. Thank you!             Your Updated Medication List - Protect others around you: Learn how to safely use, store and throw away your medicines at www.disposemymeds.org.          This list is accurate as of: 6/16/17  9:47 AM.  Always use your most recent med list.                   Brand Name Dispense Instructions for use    * albuterol (2.5 MG/3ML) 0.083% neb solution     1 Box    Take 1 vial (2.5 mg) by nebulization 4 times daily as needed       * albuterol 108 (90 BASE) MCG/ACT Inhaler    VENTOLIN HFA    3 Inhaler    Inhale 2 puffs into the lungs every 4 hours as needed       * albuterol (2.5 MG/3ML) 0.083% neb solution     25 vial    Take 1 vial (2.5 mg) by nebulization every 6 hours as needed for shortness of breath / dyspnea or wheezing       ALCOHOL PREP PADS      USE AS DIRECTED       amcinonide 0.1 % cream    CYCLOCORT    120 g    Apply topically 2 times daily as needed for itching       ascorbic acid 1000 MG Tabs    vitamin C     Take 1 tablet by mouth daily       baclofen 20 MG tablet    LIORESAL    540 tablet    TAKE ONE TABLET BY MOUTH UP TO 6 TIMES DAILY       * bisacodyl 10 MG Suppository    DULCOLAX     Place 20 mg rectally every 3 days Active ingredient in Magic Bullet (10mg per suppository)       * MAGIC BULLETS 10 MG Suppository   Generic drug:  bisacodyl     30 suppository    Place 1 suppository (10 mg) rectally daily as needed for constipation       cholecalciferol 1000 UNIT tablet    vitamin D    90 tablet    Take 1 tablet (1,000 Units) by mouth daily       clindamycin 1 % topical gel    CLINDAMAX    60 g    Apply bid       Cotton  "Swabs Swab     450 each    Please dispense sterile wrapped cotton swabs; use up to five/day for medical needs       diazepam 10 MG tablet    VALIUM    90 tablet    TAKE 1 TABLET BY MOUTH ONCE DAILY AS DIRECTED       docusate sodium 100 MG capsule    DOCQLACE    90 capsule    Take 1 capsule (100 mg) by mouth daily       * Gauze Bandage 2\" Misc     360 each    IV STERILE GUAZE Use up to 4/day Supply 360 for three month supply       * Gauze Dressing 4\"X4\" Pads     720 each    NON STERILE GAUZE Use up to 8/day  Supply 720 for three month supply       hydrocortisone 2.5 % cream    ANUSOL-HC    120 g    Bid prn       ibuprofen 800 MG tablet    ADVIL/MOTRIN    60 tablet    Take 1 tablet (800 mg) by mouth 3 times daily as needed       lidocaine 2 % topical gel    XYLOCAINE     Apply  topically. APPLY AS DIRECTED       minocycline 50 MG capsule    MINOCIN/DYNACIN    60 capsule    Take 1 capsule (50 mg) by mouth 2 times daily       montelukast 10 MG tablet    SINGULAIR    30 tablet    Take 1 tablet (10 mg) by mouth At Bedtime       MULTIVITAMIN & MINERAL PO      Take 1 capsule by mouth daily.       NORVASC PO      Take 2.5 mg by mouth daily as needed       nystatin 208645 UNIT/GM Powd    MYCOSTATIN    1 Bottle    Apply 1 dose topically 3 times daily as needed.       * order for DME     1 Units    Equipment being ordered: Other: Nebulizer Treatment Diagnosis: Mild intermittent asthma       * order for DME     1 Device    Nebulizer with compressor       * order for DME     1 Device    Blood Pressure monitor for home       oxybutynin 10 MG 24 hr tablet    DITROPAN-XL    90 tablet    Take 1 tablet (10 mg) by mouth daily       oxyCODONE 5 MG IR tablet    ROXICODONE     Take 1 tablet by mouth every 4 hours as needed.       sennosides 8.6 MG tablet    SENOKOT    90 each    Take 1 tablet by mouth daily       sildenafil 100 MG cap/tab    VIAGRA    10 tablet    Take 1 tablet (100 mg) by mouth daily as needed TAKE 1 HOUR BEFORE NEEDED "       sodium chloride 3 % Nebu neb solution     500 mL    Take 3 mLs by nebulization every 3 hours as needed for wheezing       terbinafine 1 % cream    lamISIL     Apply topically daily as needed       URINARY LEG BAG      USE AS NEEDED       Urostomy Night Bag Misc     9 Bottle    Please dispense three night bags/month (three month worth at a time) with 4 refills re: needed for chronic urinary dysfunction due to quadreplegia. Type: Bard 2000 ml.       YADI DAYQUIL COLD & FLU 10-5-325 MG/15ML Liqd   Generic drug:  DM-Phenylephrine-Acetaminophen      Take by mouth as needed       VICKS NYQUIL COLD & FLU NIGHT 15-6. MG/15ML Liqd   Generic drug:  DM-Doxylamine-Acetaminophen      Take by mouth as needed       zolpidem 10 MG tablet    AMBIEN    90 tablet    TAKE 1/2 TO 1 TABLET BY MOUTH AT BEDTIME       * Notice:  This list has 10 medication(s) that are the same as other medications prescribed for you. Read the directions carefully, and ask your doctor or other care provider to review them with you.

## 2017-06-16 NOTE — NURSING NOTE
Chief Complaint   Patient presents with     Cough     follow up pneumonia(diagnosed 2 weeks ago)   Silvana Grewal LPN 9:28 AM on 6/16/2017

## 2017-06-16 NOTE — PROGRESS NOTES
Bart is a 57 year old male with a past medical history of LIZA (obstructive sleep apnea) (12/3/2014) and Quadriplegia, C1-C4 incomplete (H).     He is here today for follow up pneumonia.  He was seen at a local urgent care and diagnosed with pneumonia and given a z pack.  He tells me that there was pneumonia on the lower left lung.  He finished the zpack, but is still coughing, particularly at night.  He denies any chest pain, dyspnea, fevers or chills.  His caregiver had a similar illness.    On exam  B/P: 104/69, T: 97.9, P: 53  Gen:  Alert, NAD  OP clear  Cor RRR  Lungs CTA  Ext no edema        A/P  57 year old male recently treated for pneumonia; clinically improving, other than residual cough.  Offered to treat symptomatically with singulair.      Cough  -     XR Chest 2 Views; Future:  Shows a retrocardiac opacity but no new findings.  Likely this will take weeks to clear.  -     montelukast (SINGULAIR) 10 MG tablet; Take 1 tablet (10 mg) by mouth At Bedtime    Screen for colon cancer  -     Fecal colorectal cancer screen FIT - Future (S+30); Future    Need for hepatitis C screening test  -     Hepatitis C Screen Reflex to HCV RNA Quant and Genotype; Future    RTC in 1-2 months with usual primary MD for f/u    Patrice Gay MD

## 2017-06-16 NOTE — PATIENT INSTRUCTIONS
[facility] Medication Refill Request Information:  * Please contact your pharmacy regarding ANY request for medication refills.  ** [facility] Prescription Fax = [phone]  * Please allow 3 business days for routine medication refills.  * Please allow 5 business days for controlled substance medication refills.     [facility] Test Result notification information:  *You will be notified with in 7-10 days of your appointment day regarding the results of your test.  If you are on MyChart you will be notified as soon as the provider has reviewed the results and signed off on them.    [facility] [phone]

## 2017-06-19 NOTE — PROGRESS NOTES
"SUBJECTIVE: Bart Corea is a 57 year old male presenting with a chief complaint of fever, cough  and sob and elevated BP.  Onset of symptoms was day(s) ago.  Course of illness is worsening.    Severity moderate  Current and Associated symptoms: \"cold symptoms\"  Treatment measures tried include OTC meds.  Predisposing factors include None.    Past Medical History:   Diagnosis Date     LIZA (obstructive sleep apnea) 12/3/2014     Quadriplegia, C1-C4 incomplete (H)      Allergies   Allergen Reactions     Vancomycin Anaphylaxis     Social History   Substance Use Topics     Smoking status: Current Every Day Smoker     Packs/day: 0.30     Types: Cigarettes     Smokeless tobacco: Never Used      Comment: since early teens     Alcohol use Yes      Comment: on weekends, 3-4+       ROS:  SKIN: no rash  GI: no vomiting    OBJECTIVE:  BP 94/61 (BP Location: Left arm, Patient Position: Chair, Cuff Size: Adult Regular)  Pulse 60  Temp 101.6  F (38.7  C) (Oral)  SpO2 97%GENERAL APPEARANCE: healthy, alert and no distress  EYES: EOMI,  PERRL, conjunctiva clear  HENT: ear canals and TM's normal.  Nose and mouth without ulcers, erythema or lesions  NECK: supple, nontender, no lymphadenopathy  RESP: lungs clear to auscultation - no rales, rhonchi or wheezes  CV: regular rates and rhythm, normal S1 S2, no murmur noted  ABDOMEN:  soft, nontender, no HSM or masses and bowel sounds normal  NEURO: Normal strength and tone, sensory exam grossly normal,  normal speech and mentation  SKIN: no suspicious lesions or rashes    Xray without acute findings, read by Sandro Caraballo D.O.      ICD-10-CM    1. Fever and chills R50.9 CBC with platelets differential     Basic metabolic panel     XR Chest 2 Views     ALBUTEROL UNIT DOSE, 1 MG -      INHALATION/NEBULIZER TREATMENT, INITIAL     CANCELED: UA with Microscopic   2. Cough R05 CBC with platelets differential     Basic metabolic panel     XR Chest 2 Views     albuterol (2.5 MG/3ML) " 0.083% neb solution     INHALATION/NEBULIZER TREATMENT, INITIAL     ALBUTEROL UNIT DOSE, 1 MG -      INHALATION/NEBULIZER TREATMENT, INITIAL     order for DME   3. SOB (shortness of breath) R06.02 CBC with platelets differential     Basic metabolic panel     XR Chest 2 Views     albuterol (2.5 MG/3ML) 0.083% neb solution     INHALATION/NEBULIZER TREATMENT, INITIAL     ALBUTEROL UNIT DOSE, 1 MG -      INHALATION/NEBULIZER TREATMENT, INITIAL     albuterol (2.5 MG/3ML) 0.083% neb solution     order for DME   4. Benign essential hypertension I10 order for DME     Felt better after neb  Fluids/Rest, f/u if worse/not any better

## 2017-06-21 ENCOUNTER — MEDICAL CORRESPONDENCE (OUTPATIENT)
Dept: HEALTH INFORMATION MANAGEMENT | Facility: CLINIC | Age: 57
End: 2017-06-21

## 2017-06-22 DIAGNOSIS — K59.00 CONSTIPATION: ICD-10-CM

## 2017-06-22 NOTE — TELEPHONE ENCOUNTER
lyndsay  Last Written Prescription Date:  12/7/16  Last Fill Quantity: 90,   # refills: 1  Last Office Visit : 4/4/17  Future Office visit:  no    Pt also prescribed Ducolax tabs and suppository and magic bullet supp.  Routing due to multiple similar meds

## 2017-06-28 RX ORDER — SENNOSIDES 8.6 MG
1 TABLET ORAL DAILY
Qty: 90 EACH | Refills: 3 | Status: SHIPPED | OUTPATIENT
Start: 2017-06-28 | End: 2018-05-17

## 2017-07-13 ENCOUNTER — TELEPHONE (OUTPATIENT)
Dept: INTERNAL MEDICINE | Facility: CLINIC | Age: 57
End: 2017-07-13

## 2017-07-13 DIAGNOSIS — S14.105A: ICD-10-CM

## 2017-07-13 DIAGNOSIS — S14.106A: ICD-10-CM

## 2017-07-13 DIAGNOSIS — N31.9 NEUROGENIC BLADDER: Primary | ICD-10-CM

## 2017-07-13 DIAGNOSIS — S14.104A: ICD-10-CM

## 2017-07-13 NOTE — TELEPHONE ENCOUNTER
Pt states that his BP was 220/100 when he was sitting on the toilet for BM yesterday, then rechecked BP was 150-100/100-80, c/o headache, no other sxs. I coordinated scheduling tomorrow.    He also requested to send DME order for sterile urine sample cup to [facility]. He will collect urine sample at home due to his physical limit. DME order was faxed.

## 2017-07-14 ENCOUNTER — HOSPITAL ENCOUNTER (EMERGENCY)
Facility: CLINIC | Age: 57
Discharge: HOME | End: 2017-07-14
Attending: EMERGENCY MEDICINE | Admitting: EMERGENCY MEDICINE
Payer: COMMERCIAL

## 2017-07-14 ENCOUNTER — OFFICE VISIT (OUTPATIENT)
Dept: INTERNAL MEDICINE | Facility: CLINIC | Age: 57
End: 2017-07-14

## 2017-07-14 ENCOUNTER — APPOINTMENT (OUTPATIENT)
Dept: GENERAL RADIOLOGY | Facility: CLINIC | Age: 57
End: 2017-07-14
Attending: EMERGENCY MEDICINE
Payer: COMMERCIAL

## 2017-07-14 ENCOUNTER — APPOINTMENT (OUTPATIENT)
Dept: CT IMAGING | Facility: CLINIC | Age: 57
End: 2017-07-14
Attending: EMERGENCY MEDICINE
Payer: COMMERCIAL

## 2017-07-14 VITALS
HEART RATE: 62 BPM | SYSTOLIC BLOOD PRESSURE: 200 MMHG | DIASTOLIC BLOOD PRESSURE: 92 MMHG | RESPIRATION RATE: 20 BRPM | OXYGEN SATURATION: 96 %

## 2017-07-14 VITALS
BODY MASS INDEX: 27.43 KG/M2 | HEART RATE: 61 BPM | SYSTOLIC BLOOD PRESSURE: 106 MMHG | OXYGEN SATURATION: 100 % | WEIGHT: 180.4 LBS | RESPIRATION RATE: 16 BRPM | TEMPERATURE: 97.9 F | DIASTOLIC BLOOD PRESSURE: 75 MMHG

## 2017-07-14 DIAGNOSIS — T83.510A URINARY TRACT INFECTION ASSOCIATED WITH CYSTOSTOMY CATHETER, INITIAL ENCOUNTER (H): ICD-10-CM

## 2017-07-14 DIAGNOSIS — G90.4 AUTONOMIC DYSREFLEXIA: ICD-10-CM

## 2017-07-14 DIAGNOSIS — G90.4 AUTONOMIC DYSREFLEXIA: Primary | ICD-10-CM

## 2017-07-14 DIAGNOSIS — N31.9 NEUROGENIC BLADDER: ICD-10-CM

## 2017-07-14 DIAGNOSIS — N39.0 URINARY TRACT INFECTION ASSOCIATED WITH CYSTOSTOMY CATHETER, INITIAL ENCOUNTER (H): ICD-10-CM

## 2017-07-14 LAB
ALBUMIN SERPL-MCNC: 3.3 G/DL (ref 3.4–5)
ALBUMIN UR-MCNC: NEGATIVE MG/DL
ALP SERPL-CCNC: 101 U/L (ref 40–150)
ALT SERPL W P-5'-P-CCNC: 18 U/L (ref 0–70)
ANION GAP SERPL CALCULATED.3IONS-SCNC: 4 MMOL/L (ref 3–14)
APPEARANCE UR: ABNORMAL
AST SERPL W P-5'-P-CCNC: 8 U/L (ref 0–45)
BACTERIA #/AREA URNS HPF: ABNORMAL /HPF
BASOPHILS # BLD AUTO: 0 10E9/L (ref 0–0.2)
BASOPHILS NFR BLD AUTO: 0.7 %
BILIRUB SERPL-MCNC: 0.2 MG/DL (ref 0.2–1.3)
BILIRUB UR QL STRIP: NEGATIVE
BUN SERPL-MCNC: 16 MG/DL (ref 7–30)
CALCIUM SERPL-MCNC: 9 MG/DL (ref 8.5–10.1)
CHLORIDE SERPL-SCNC: 108 MMOL/L (ref 94–109)
CO2 SERPL-SCNC: 31 MMOL/L (ref 20–32)
COLOR UR AUTO: YELLOW
CREAT SERPL-MCNC: 0.28 MG/DL (ref 0.66–1.25)
DIFFERENTIAL METHOD BLD: NORMAL
EOSINOPHIL # BLD AUTO: 0.3 10E9/L (ref 0–0.7)
EOSINOPHIL NFR BLD AUTO: 4.2 %
ERYTHROCYTE [DISTWIDTH] IN BLOOD BY AUTOMATED COUNT: 14.2 % (ref 10–15)
GFR SERPL CREATININE-BSD FRML MDRD: ABNORMAL ML/MIN/1.7M2
GLUCOSE SERPL-MCNC: 103 MG/DL (ref 70–99)
GLUCOSE UR STRIP-MCNC: NEGATIVE MG/DL
HCT VFR BLD AUTO: 42.3 % (ref 40–53)
HGB BLD-MCNC: 13.7 G/DL (ref 13.3–17.7)
HGB UR QL STRIP: ABNORMAL
IMM GRANULOCYTES # BLD: 0 10E9/L (ref 0–0.4)
IMM GRANULOCYTES NFR BLD: 0.5 %
INTERPRETATION ECG - MUSE: NORMAL
KETONES UR STRIP-MCNC: NEGATIVE MG/DL
LEUKOCYTE ESTERASE UR QL STRIP: ABNORMAL
LIPASE SERPL-CCNC: 187 U/L (ref 73–393)
LYMPHOCYTES # BLD AUTO: 1.2 10E9/L (ref 0.8–5.3)
LYMPHOCYTES NFR BLD AUTO: 19.6 %
MCH RBC QN AUTO: 31.1 PG (ref 26.5–33)
MCHC RBC AUTO-ENTMCNC: 32.4 G/DL (ref 31.5–36.5)
MCV RBC AUTO: 96 FL (ref 78–100)
MONOCYTES # BLD AUTO: 0.6 10E9/L (ref 0–1.3)
MONOCYTES NFR BLD AUTO: 9.6 %
NEUTROPHILS # BLD AUTO: 3.9 10E9/L (ref 1.6–8.3)
NEUTROPHILS NFR BLD AUTO: 65.4 %
NITRATE UR QL: NEGATIVE
NRBC # BLD AUTO: 0 10*3/UL
NRBC BLD AUTO-RTO: 0 /100
PH UR STRIP: 6 PH (ref 5–7)
PLATELET # BLD AUTO: 180 10E9/L (ref 150–450)
POTASSIUM SERPL-SCNC: 4.1 MMOL/L (ref 3.4–5.3)
PROT SERPL-MCNC: 6.7 G/DL (ref 6.8–8.8)
RBC # BLD AUTO: 4.4 10E12/L (ref 4.4–5.9)
RBC #/AREA URNS AUTO: 13 /HPF (ref 0–2)
SODIUM SERPL-SCNC: 143 MMOL/L (ref 133–144)
SP GR UR STRIP: 1 (ref 1–1.03)
TROPONIN I SERPL-MCNC: NORMAL UG/L (ref 0–0.04)
URN SPEC COLLECT METH UR: ABNORMAL
UROBILINOGEN UR STRIP-MCNC: 0 MG/DL (ref 0–2)
WBC # BLD AUTO: 6 10E9/L (ref 4–11)
WBC #/AREA URNS AUTO: 114 /HPF (ref 0–2)
WBC CLUMPS #/AREA URNS HPF: PRESENT /HPF

## 2017-07-14 PROCEDURE — 40000556 ZZH STATISTIC PERIPHERAL IV START W US GUIDANCE

## 2017-07-14 PROCEDURE — 93010 ELECTROCARDIOGRAM REPORT: CPT | Mod: Z6 | Performed by: EMERGENCY MEDICINE

## 2017-07-14 PROCEDURE — 85025 COMPLETE CBC W/AUTO DIFF WBC: CPT | Performed by: EMERGENCY MEDICINE

## 2017-07-14 PROCEDURE — 99285 EMERGENCY DEPT VISIT HI MDM: CPT | Mod: 25 | Performed by: EMERGENCY MEDICINE

## 2017-07-14 PROCEDURE — 99285 EMERGENCY DEPT VISIT HI MDM: CPT | Mod: 25

## 2017-07-14 PROCEDURE — 74176 CT ABD & PELVIS W/O CONTRAST: CPT

## 2017-07-14 PROCEDURE — 25000125 ZZHC RX 250

## 2017-07-14 PROCEDURE — 71010 XR CHEST PORT 1 VW: CPT

## 2017-07-14 PROCEDURE — 25000132 ZZH RX MED GY IP 250 OP 250 PS 637: Performed by: EMERGENCY MEDICINE

## 2017-07-14 PROCEDURE — 83690 ASSAY OF LIPASE: CPT | Performed by: EMERGENCY MEDICINE

## 2017-07-14 PROCEDURE — 80053 COMPREHEN METABOLIC PANEL: CPT | Performed by: EMERGENCY MEDICINE

## 2017-07-14 PROCEDURE — 84484 ASSAY OF TROPONIN QUANT: CPT | Performed by: EMERGENCY MEDICINE

## 2017-07-14 PROCEDURE — 93005 ELECTROCARDIOGRAM TRACING: CPT

## 2017-07-14 RX ORDER — GRANULES FOR ORAL 3 G/1
3 POWDER ORAL
Qty: 2 PACKET | Refills: 0 | Status: SHIPPED | OUTPATIENT
Start: 2017-07-17 | End: 2017-07-14

## 2017-07-14 RX ORDER — GRANULES FOR ORAL 3 G/1
3 POWDER ORAL ONCE
Status: COMPLETED | OUTPATIENT
Start: 2017-07-14 | End: 2017-07-14

## 2017-07-14 RX ADMIN — FOSFOMYCIN TROMETHAMINE 3 G: 3 POWDER ORAL at 16:03

## 2017-07-14 ASSESSMENT — ENCOUNTER SYMPTOMS
COUGH: 0
HEADACHES: 0
CONSTIPATION: 0
VOMITING: 0
ARTHRALGIAS: 0
ABDOMINAL PAIN: 0
FEVER: 0
CONFUSION: 0
NECK STIFFNESS: 0
SHORTNESS OF BREATH: 0
COLOR CHANGE: 0
NAUSEA: 0
DIFFICULTY URINATING: 0
EYE REDNESS: 0

## 2017-07-14 ASSESSMENT — PAIN SCALES - GENERAL: PAINLEVEL: EXTREME PAIN (8)

## 2017-07-14 NOTE — NURSING NOTE
Patient brought urine for UA/UC from home (saved in refrigerator since collected at 11 pm-7/13/17). Urine take to lab and lab did take urine to do UA/UC on.Silvana Grewal LPN 9:38 AM on 7/14/2017

## 2017-07-14 NOTE — ED PROVIDER NOTES
History     Chief Complaint   Patient presents with     Hypertension     HPI  Bart Corea is a 57 year old male who with a history of quadriplegia C1-C4 incomplete and autonomic dysreflexia. Patient states his symptoms began 2 days ago and states his blood pressure spiked after he took his suppository. He reports he has been taking a suppository every 3 days for many years now with normal bowel movements and did have a recent bowel movement. He states he has been having normal function of his suprapubic catheter and has had kidney stones in the past. He also has complaints of some centralized chest discomfort.  He otherwise currently denies cough, shortness of breath, nausea, or vomiting.       PAST MEDICAL HISTORY  Past Medical History:   Diagnosis Date     LIZA (obstructive sleep apnea) 12/3/2014     Quadriplegia, C1-C4 incomplete (H)      PAST SURGICAL HISTORY  Past Surgical History:   Procedure Laterality Date     Bilateral ureteroscopy with holmium laser lithotripsy and basketing and removal of fragments  Left ureteral stent placement.  02/10/2010     skin flap on bottom       sp tube absess surgery       FAMILY HISTORY  No family history on file.  SOCIAL HISTORY  Social History   Substance Use Topics     Smoking status: Current Every Day Smoker     Packs/day: 0.30     Types: Cigarettes     Smokeless tobacco: Never Used      Comment: since early teens     Alcohol use Yes      Comment: on weekends, 3-4+     MEDICATIONS  No current facility-administered medications for this encounter.      Current Outpatient Prescriptions   Medication     nitroGLYcerin (NITRO-BID) 2 % OINT ointment     sennosides (SENOKOT) 8.6 MG tablet     montelukast (SINGULAIR) 10 MG tablet     albuterol (2.5 MG/3ML) 0.083% neb solution     baclofen (LIORESAL) 20 MG tablet     diazepam (VALIUM) 10 MG tablet     zolpidem (AMBIEN) 10 MG tablet     oxybutynin (DITROPAN-XL) 10 MG 24 hr tablet     cholecalciferol (VITAMIN D3) 1000 UNIT  "tablet     docusate sodium (DOCQLACE) 100 MG capsule     AmLODIPine Besylate (NORVASC PO)     ascorbic acid (VITAMIN C) 1000 MG TABS     minocycline (MINOCIN/DYNACIN) 50 MG capsule     MAGIC BULLETS 10 MG Suppository     nystatin (MYCOSTATIN) 376737 UNIT/GM POWD     sodium chloride 3 % NEBU     albuterol (VENTOLIN HFA) 108 (90 BASE) MCG/ACT inhaler     order for DME     DM-Phenylephrine-Acetaminophen (VICKS DAYQUIL COLD & FLU) 10-5-325 MG/15ML LIQD     DM-Doxylamine-Acetaminophen (VICKS NYQUIL COLD & FLU NIGHT) 15-6. MG/15ML LIQD     bisacodyl (DULCOLAX) 10 MG suppository     amcinonide (CYCLOCORT) 0.1 % cream     hydrocortisone (ANUSOL-HC) 2.5 % rectal cream     albuterol (2.5 MG/3ML) 0.083% nebulizer solution     Applicators (COTTON SWABS) SWAB     clindamycin (CLINDAMAX) 1 % gel     Gauze Pads & Dressings (GAUZE BANDAGE 2\") MISC     Gauze Pads & Dressings (GAUZE DRESSING) 4\"X4\" PADS     ibuprofen (ADVIL,MOTRIN) 800 MG tablet     Ostomy Supplies (UROSTOMY NIGHT BAG) MISC     sildenafil (VIAGRA) 100 MG tablet     Alcohol Swabs (ALCOHOL PREP PADS)     lidocaine (XYLOCAINE) 2 % jelly     Multiple Vitamins-Minerals (MULTIVITAMIN & MINERAL PO)     oxycodone (ROXICODONE) 5 MG immediate release tablet     terbinafine (LAMISIL) 1 % cream     Incontinence Supplies (URINARY LEG BAG)     ALLERGIES  Allergies   Allergen Reactions     Vancomycin Anaphylaxis       I have reviewed the Medications, Allergies, Past Medical and Surgical History, and Social History in the Epic system.    Review of Systems   Constitutional: Negative for fever.   HENT: Negative for congestion.    Eyes: Negative for redness.   Respiratory: Negative for cough and shortness of breath.    Cardiovascular: Positive for chest pain (centralized chest discomfort).   Gastrointestinal: Negative for abdominal pain, constipation, nausea and vomiting.   Genitourinary: Negative for difficulty urinating.   Musculoskeletal: Negative for arthralgias and neck " "stiffness.   Skin: Negative for color change.   Neurological: Negative for headaches.   Psychiatric/Behavioral: Negative for confusion.   All other systems reviewed and are negative.      Physical Exam   BP: 100/58  Pulse: 61  Temp: 97.9  F (36.6  C)  Resp: 16  Weight: 81.8 kg (180 lb 6.4 oz)  SpO2: 100 %  Physical Exam   Constitutional: He appears well-developed and well-nourished. No distress.   HENT:   Head: Normocephalic and atraumatic.   Mouth/Throat: Oropharynx is clear and moist. No oropharyngeal exudate.   Eyes: Pupils are equal, round, and reactive to light. No scleral icterus.   Cardiovascular: Normal rate, regular rhythm, normal heart sounds and intact distal pulses.    Pulmonary/Chest: Effort normal and breath sounds normal. No respiratory distress.   Abdominal: Soft. Bowel sounds are normal. There is no tenderness.   Musculoskeletal: He exhibits no edema or tenderness.   Neurological:   Quadriplegia   Skin: Skin is warm. He is not diaphoretic.   Nursing note and vitals reviewed.      ED Course     ED Course     Procedures   11:04 AM  The patient was seen and examined by Dr. Low in Room 3.  Patient resisting request to undress for skin inspection.  \"My skin is fine.\"  Requested PMNR input.  Discussed with Dr. Lujan- will see patient.  Also seen by Urology to evaluate for recurrent scrotal abscess- scrotum and sacrum/perineum looks normal.             EKG Interpretation:      Interpreted by ARMOND LOW MD  Time reviewed: 1130  Symptoms at time of EKG: chest pain   Rhythm: sinus bradycardia  Rate: 47  Axis: Normal  Ectopy: none  Conduction: normal  ST Segments/ T Waves: Early repolarization  Q Waves: none  Comparison to prior: Unchanged from 10/5/2009    Clinical Impression: no acute changes    Results for orders placed or performed during the hospital encounter of 07/14/17 (from the past 24 hour(s))   EKG 12 lead   Result Value Ref Range    Interpretation ECG Click View Image link to view waveform " and result    Chest  XR, 1 view portable    Narrative    XR CHEST PORT 1 VW  7/14/2017 11:35 AM      HISTORY: chest pain    COMPARISON: 6/16/2017    FINDINGS: Upper abdomen is unremarkable.  Asymmetric elevation of the  left hemidiaphragm. Left pleural effusion. Moderate enlargement of the  cardiac silhouette. No pneumothorax. Left lower lobe linear opacities.  No acute osseous abnormalities.      Impression    IMPRESSION:   1. Left lower lobe opacities, which may represent atelectasis or  pneumonia.  2. Left pleural effusion.  3. Moderate enlargement of the cardiac silhouette.    I have personally reviewed the examination and initial interpretation  and I agree with the findings.    GUSTAVO DUNBAR MD   Abd/pelvis CT no contrast - Stone Protocol    Narrative    EXAMINATION: CT ABDOMEN PELVIS W/O CONTRAST, 7/14/2017 11:45 AM    TECHNIQUE:  Helical CT images from the lung bases through the  symphysis pubis were obtained without IV contrast.    COMPARISON: MRI 4/12/2017, CT 4/6/2017    HISTORY: Autonomic dysreflexia, history of ureterolithiasis    FINDINGS:  LIVER: The previously described enhancing lesion is slightly hypodense  on this noncontrast examination and remains compatible with FNH.  Scattered cysts in the liver are unchanged, including 1.8 cm  hypodensity in segment 4.    BILIARY: Within normal limits.    PANCREAS: Within normal limits.    SPLEEN: Within normal limits.    ADRENAL GLANDS: Adrenal lesions measuring up to 1.5 cm on the left,  with average attenuation consistent with a lipid rich adenoma.    URINARY TRACT: Multiple stones visualized in the bladder neck  measuring up to 4 mm. 2 mm nonobstructing calyceal tip stone seen in  the right kidney lower pole. Punctate stone within the proximal left  ureter just beyond the ureteral pelvic junction, with minimal  stranding about the ureter at this level.  No hydroureteronephrosis  bilaterally. Suprapubic tube in place. Soft tissue stranding in  density  around the suprapubic catheter tract, unchanged from 4/6/2017,  compatible with chronic inflammation versus scarring.    REPRODUCTIVE ORGANS: Coarse prostatic calcifications.    STOMACH: Within normal limits.    BOWEL: Normal caliber of the small and large bowel.  Appendix is  within normal limits, which is noted to extend superiorly and lie just  underneath the liver.    PERITONEUM/FLUID: No free fluid.    VESSELS: Atherosclerotic plaque of the abdominal aorta and iliac  arteries, without dilatation.    LYMPH NODES: Subcentimeter lymph nodes, including 8 mm portacaval node  (series 2, image 43).    LUNG BASES: Left pleural effusion, stable from 4/6/2017, with  overlying linear opacities. Small pericardial effusion.    BONES/SOFT TISSUES: Diffuse osteopenia and fatty replacement of the  musculature, consistent with history of quadriplegia. No aggressive  osseous lesion.      Impression    IMPRESSION:   1. New punctate <2 mm stone in the proximal left ureter with  associated periureteral stranding, without proximal dilatation of the  ureter to suggest obstruction. Additional stones are seen near the  bladder neck, largest 4 mm. Nonobstructing punctate right lower pole  calyceal tip stone.  2. Unchanged soft tissue thickening along the suprapubic catheter  track, similar to prior, compatible with chronic inflammation vs  scarring.  3. Small left pleural effusion with overlying linear opacities, most  likely atelectasis, but infection and aspiration are on the  differential.   4. Bilateral adrenal adenomas.    Findings of urothelial stones discussed with Dr. Summers at 1:28 PM  7/14/2017.    I have personally reviewed the examination and initial interpretation  and I agree with the findings.    DENNIS CANDELARIA MD   CBC with platelets differential   Result Value Ref Range    WBC 6.0 4.0 - 11.0 10e9/L    RBC Count 4.40 4.4 - 5.9 10e12/L    Hemoglobin 13.7 13.3 - 17.7 g/dL    Hematocrit 42.3 40.0 - 53.0 %    MCV 96 78 -  100 fl    MCH 31.1 26.5 - 33.0 pg    MCHC 32.4 31.5 - 36.5 g/dL    RDW 14.2 10.0 - 15.0 %    Platelet Count 180 150 - 450 10e9/L    Diff Method Automated Method     % Neutrophils 65.4 %    % Lymphocytes 19.6 %    % Monocytes 9.6 %    % Eosinophils 4.2 %    % Basophils 0.7 %    % Immature Granulocytes 0.5 %    Nucleated RBCs 0 0 /100    Absolute Neutrophil 3.9 1.6 - 8.3 10e9/L    Absolute Lymphocytes 1.2 0.8 - 5.3 10e9/L    Absolute Monocytes 0.6 0.0 - 1.3 10e9/L    Absolute Eosinophils 0.3 0.0 - 0.7 10e9/L    Absolute Basophils 0.0 0.0 - 0.2 10e9/L    Abs Immature Granulocytes 0.0 0 - 0.4 10e9/L    Absolute Nucleated RBC 0.0    Comprehensive metabolic panel   Result Value Ref Range    Sodium 143 133 - 144 mmol/L    Potassium 4.1 3.4 - 5.3 mmol/L    Chloride 108 94 - 109 mmol/L    Carbon Dioxide 31 20 - 32 mmol/L    Anion Gap 4 3 - 14 mmol/L    Glucose 103 (H) 70 - 99 mg/dL    Urea Nitrogen 16 7 - 30 mg/dL    Creatinine 0.28 (L) 0.66 - 1.25 mg/dL    GFR Estimate >90  Non  GFR Calc   >60 mL/min/1.7m2    GFR Estimate If Black >90   GFR Calc   >60 mL/min/1.7m2    Calcium 9.0 8.5 - 10.1 mg/dL    Bilirubin Total 0.2 0.2 - 1.3 mg/dL    Albumin 3.3 (L) 3.4 - 5.0 g/dL    Protein Total 6.7 (L) 6.8 - 8.8 g/dL    Alkaline Phosphatase 101 40 - 150 U/L    ALT 18 0 - 70 U/L    AST 8 0 - 45 U/L   Lipase   Result Value Ref Range    Lipase 187 73 - 393 U/L   Troponin I   Result Value Ref Range    Troponin I ES  0.000 - 0.045 ug/L     <0.015  The 99th percentile for upper reference range is 0.045 ug/L.  Troponin values in   the range of 0.045 - 0.120 ug/L may be associated with risks of adverse   clinical events.     Physical Medicine & Rehab General Adult IP Consult: Patient to be seen: ASAP within 4 hrs; Call back #: [phone]; Autonomic dysreflexia; Consultant may enter orders: Yes    Narrative    Eddie Lujan MD     7/14/2017  2:55 PM  [facility]  "  PM&R CONSULT    Consulting Provider: Myles Summers MD (ER)  Reason for Consult: Autonomic Dysreflexia   Location of Patient: Emergency Room 03  Date of Encounter: 7/14/2017       ASSESSMENT/PLAN:    Mr. Bart Corea is a 57 year old male with a past   medical history most notable for \"C4-5-6 incomplete\" tetraplegia   (FREDDY score unknown) for 35 years who presents with autonomic   dysreflexia for 2 days.  Given the positional component (occurs   with forward bending/trunk flexion), differentials may include   intra-abdominal process or skin involvement such as scrotal   abscess or suprapubic abscess compression however the   differentials of autonomic dysreflexia remain wide and is not   always discoverable.      1. Agree with work-up already in progress (labs, chest x-ray, CT   abdomen with stone protocol) to evaluate and treat for noxious   stimuli that could produce autonomic dysreflexia.      2. Recommend establishing a home autonomic dysreflexia protocol   (0.5-1\" nitropaste, education on use of nitropaste for patient   caregivers, etc.).    3. Recommend follow-up appointment as soon as possible in PM&R   Clinic with Dr. Jaziel Webster.     History of Presenting Illness:      Mr. Corea is a 57 year old male with a past medical history   notable for a C4-5-6 incomplete spinal cord injury (FREDDY score   unknown) 35 years ago.  Two days ago, during his routine bowel   program, he had a sudden onset of a severe pounding headache   consistent with his autonomic dysreflexia symptoms.  He took   2.5mg amlodipine and an ibuprofen tablet and the pounding   headache subsided.  He did not check his blood pressure at that   time.  He went to work, and when he got home, he checked his   blood pressure and it was 160/85mmHg (vs baseline of   100/45-55mmHg).    Since this episode, he has been getting severe headaches every   time his attendants reposition him, which involves forward trunk   flexion and twisting, or " "any time he even leans far forward in   his chair or shyanne lift. The initial pounding headache will   resolve in 15 minutes after repositioning, but a dull headache   lingers.      His bowel program 2 days ago was successful and he denies any   change in bowel programs (usual bowel program Q3day - next   scheduled is tomorrow). He denies nausea and vomiting.  He is   insensate below the nipple line so he cannot comment on abdominal   pain.    He has a suprapubic catheter which is changed every 2 weeks (due   tomorrow for next change) and he reports that for about a year,   every time it is changed brownish pus is discharged from the cath   site. He says this has been dicussed with his urologist and he   does not think there is anything to be done with this.  He also   reports a history of an abscess at the site of his suprapubic   catheter that was surgically treated many years ago.  He denies   any change in urine appearance or odour. He does report a history   of kidney stones.     He denies any recent skin breakdown.  He does endorse a groin   abscess that opens and closes, and he states that \"everytime I'm   about to come in and have it lanced, it pops on its own.\" Chart   review demonstrates a history of a scrotal abscess.     He endorses a pneumonia a few months ago, but states that he   feels fully recovered from this episode. He denies recent fever,   chills and difficulty breathing.       SOCIAL HISTORY/Home Setting/Support:  Mr. Corea works as a  40hours/week.  He   lives in an accessible home with his wife.  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     Social History     Social History     Marital status:      Spouse name: N/A     Number of children: N/A     Years of education: N/A     Social History Main Topics     Smoking status: Current Every Day Smoker     Packs/day: 0.30     Types: Cigarettes     Smokeless tobacco: Never Used      Comment: since " "early teens     Alcohol use Yes      Comment: on weekends, 3-4+     Drug use: Yes      Comment: occ marijuana for spasms     Sexual activity: No     Other Topics Concern     None     Social History Narrative         Past Medical History:  Past Medical History:   Diagnosis Date     LIZA (obstructive sleep apnea) 12/3/2014     Quadriplegia, C1-C4 incomplete (H)          Current Medications:  No current facility-administered medications for this encounter.        Current Outpatient Prescriptions   Medication     sennosides (SENOKOT) 8.6 MG tablet     montelukast (SINGULAIR) 10 MG tablet     albuterol (2.5 MG/3ML) 0.083% neb solution     baclofen (LIORESAL) 20 MG tablet     diazepam (VALIUM) 10 MG tablet     zolpidem (AMBIEN) 10 MG tablet     oxybutynin (DITROPAN-XL) 10 MG 24 hr tablet     cholecalciferol (VITAMIN D3) 1000 UNIT tablet     docusate sodium (DOCQLACE) 100 MG capsule     AmLODIPine Besylate (NORVASC PO)     ascorbic acid (VITAMIN C) 1000 MG TABS     minocycline (MINOCIN/DYNACIN) 50 MG capsule     MAGIC BULLETS 10 MG Suppository     nystatin (MYCOSTATIN) 308258 UNIT/GM POWD     sodium chloride 3 % NEBU     albuterol (VENTOLIN HFA) 108 (90 BASE) MCG/ACT inhaler     order for DME     DM-Phenylephrine-Acetaminophen (VICKS DAYQUIL COLD & FLU)   10-5-325 MG/15ML LIQD     DM-Doxylamine-Acetaminophen (VICKS NYQUIL COLD & FLU NIGHT)   15-6. MG/15ML LIQD     bisacodyl (DULCOLAX) 10 MG suppository     amcinonide (CYCLOCORT) 0.1 % cream     hydrocortisone (ANUSOL-HC) 2.5 % rectal cream     albuterol (2.5 MG/3ML) 0.083% nebulizer solution     Applicators (COTTON SWABS) SWAB     clindamycin (CLINDAMAX) 1 % gel     Gauze Pads & Dressings (GAUZE BANDAGE 2\") MISC     Gauze Pads & Dressings (GAUZE DRESSING) 4\"X4\" PADS     ibuprofen (ADVIL,MOTRIN) 800 MG tablet     Ostomy Supplies (UROSTOMY NIGHT BAG) MISC     sildenafil (VIAGRA) 100 MG tablet     Alcohol Swabs (ALCOHOL PREP PADS)     lidocaine (XYLOCAINE) 2 % jelly     " Multiple Vitamins-Minerals (MULTIVITAMIN & MINERAL PO)     oxycodone (ROXICODONE) 5 MG immediate release tablet     terbinafine (LAMISIL) 1 % cream     Incontinence Supplies (URINARY LEG BAG)         Review of Systems:  General: feels well overall apart from intermittent headaches   Eyes/Nose/Throat: Denies change in vision and cold symptoms.   Cardiovascular: No palpitations, no chest pain  Pulmonary: No cough or shortness of breath  Gastrointestinal: No nausea, vomiting, diarrhea or constipation  Extremities: Denies pain over sensate region (upper limbs and   above nipple line)  Neurological: Endorses progressive arm weakness over past several   years.   Skin: endorses groin abscess that will open and close  : denies change in urine appearance or odour, or issues with   suprapubic catheter.       Labs   Lab Results   Component Value Date    WBC 3.8 (L) 06/05/2017    HGB 14.9 06/05/2017    HCT 45.8 06/05/2017    MCV 96 06/05/2017    PLT Platelets clumped, platelet count unavailable 06/05/2017     Lab Results   Component Value Date     06/16/2017    POTASSIUM 4.4 06/16/2017    CHLORIDE 104 06/16/2017    CO2 26 06/16/2017    GLC 91 06/16/2017     Lab Results   Component Value Date    GFRESTIMATED >90  Non  GFR Calc   06/16/2017    GFRESTBLACK >90   GFR Calc   06/16/2017     Lab Results   Component Value Date    AST 17 03/31/2017    ALT 16 03/31/2017    ALKPHOS 118 03/31/2017    BILITOTAL 0.5 03/31/2017    BILICONJ 0.0 07/27/2008    KIA <9 (L) 10/21/2009     Lab Results   Component Value Date    INR 1.06 02/01/2010     Lab Results   Component Value Date    BUN 13 06/16/2017    CR 0.27 (L) 06/16/2017         ON EXAMINATION:  Vitals:    07/14/17 1045 07/14/17 1115 07/14/17 1130   BP: 100/58     Pulse: 61     Resp: 16     Temp: 97.9  F (36.6  C)     TempSrc: Oral     SpO2: 100% 100% 100%   Weight: 81.8 kg (180 lb 6.4 oz)         Physical Exam:  Blood pressure 100/58, pulse 61,  temperature 97.9  F (36.6  C),   temperature source Oral, resp. rate 16, weight 81.8 kg (180 lb   6.4 oz), SpO2 100 %.    GENERAL: alert and oriented, no acute distress, lying on bed in   hospital room with blankets on and a towel over his head for   warmth.   NEUROLOGIC: able to demonstrate limited antigravity strength in   upper limbs with right having greater strength than the left.   CARDIOVASCULAR: regular rate and rhythm and systolic murmur on   auscultation  PULMONARY/CHEST: speaking in full sentences, no cyanosis, no   respiratory distress, breathing comfortably on room air,   symmetrical chest wall expansion, lungs clear to auscultation  ABDOMEN: Positive Bowel Sounds, rounded but soft and   non-distended, soft, abdomen is insensate    --  Patient interviewed and discussed with attending Dr. Tai Wheeler-Connie Askew MD  PM&R Resident, PGY-4    Patient seen by me and discussed with Dr Askew. I agree with her   note, exam and plan for this patient.    Total time spent >60 min with this consult.    Eddie Lujan MD    [phone]              Critical Care time:    Medications   fosfomycin (MONUROL) Packet 3 g (3 g Oral Given 7/14/17 1603)        No recurrent episodes of autonomic dysreflexia here in the emergency department.        Assessments & Plan (with Medical Decision Making)   57 year old male to the emergency department with 2 days of autonomic dysreflexia symptoms.  The patient underwent extensive evaluation here in the emergency department.  He was reluctant to allow me to do a cutaneous evaluation but did allow the urology consultants to evaluate his scrotum which appear normal as well as his perineum and sacrum which all appeared normal per report.  The patient's labs are normal.  He does have a suprapubic catheter and so does have white cells, red cells, and bacteria in his urine.  Urine culture is currently pending.  The patient does not have any EKG changes and his troponin is negative  after 2 days of intermittent symptoms like do not suspect ACS.  Chest radiograph did not reveal any convincing infiltrate or acute abnormality.  He does not have a fever nor an elevated white blood cell count so do not suspect pneumonia.  There is a small, less than 2 mm left proximal ureteral stone that is not felt to be a condition related factor by urology nor PMNR.  Aside from his urine, there is no other cause for his autonomic dysreflexia evident.  Previously, it has occurred when his suprapubic catheter gets kinked during transfers and this may be what is occurring now as well.  As there is no obvious identifiable source, urology is recommended that we treat the patient's urine as if it is infected.  He was given a single dose of fosfomycin here in the emergency department based on previous urine cultures.  An additional 2 doses were prescribed but unfortunately is not covered by the patient's insurance.  He would not be due for another dose for 3 more days.  As such, we will await his urine culture result and base further treatment on that.  Additionally, him and are as recommended the patient utilizes nitroglycerin paste instead of amlodipine to treat his autonomic dysreflexia symptoms.  Specific verbal and written instructions as to how to execute administration and discontinuing the nitroglycerin paste were given to the patient.  He will follow up with Dr. Webster as possible.  Patient is stable and asymptomatic.  He will be discharged home.    I have reviewed the nursing notes.    I have reviewed the findings, diagnosis, plan and need for follow up with the patient.    Discharge Medication List as of 7/14/2017  4:10 PM      START taking these medications    Details   nitroGLYcerin (NITRO-BID) 2 % OINT ointment Place 0.5-1 inches (7.5-15 mg) onto the skin every 24 hours as needed for autonomic dysreflexia symptoms.  Remove ointment with gloved hand once symptoms resolve., Disp-30 g, R-0, Local Print       fosfomycin (MONUROL) 3 G Packet Take 1 packet (3 g) by mouth every 3 days for 2 doses, Disp-2 packet, R-0, Local Print             Final diagnoses:   Autonomic dysreflexia   Urinary tract infection associated with cystostomy catheter, initial encounter (H)     Lee MORRISON, am serving as a trained medical scribe to document services personally performed by Myles Summers MD, based on the provider's statements to me.      IMyles MD, was physically present and have reviewed and verified the accuracy of this note documented by Lee López.     7/14/2017   [facility], [place], EMERGENCY DEPARTMENT     Myles Summers MD  07/14/17 1727       Myles Summers MD  07/14/17 1727

## 2017-07-14 NOTE — PROGRESS NOTES
Bart Corea is a 57 year old male who comes in for    CC: hypertension  HPI:    Mr. Corea presents for BP of 200/100 two days ago when going to the bathroom. He had a severe headache from this, rated 10+/10 and felt chest pain. Eyes were pounding and ears ringing. Wasn't sweating during these episodes. The BP came down to 150s/80s over the next 10-15 minutes.  He has been taking 3 of his amlodipine tablets (7.5 mg) per day over the last 3 days. BPs normally run around 80s/50s.  Bowel movements are regular, per his baseline--uses a suppository every 3 days and has a BM. They are not hard or painful to pass. He is able to pass gas without difficulty.  Has a suprapubic catheter for neurogenic bladder--it has been draining without difficulty. He did try irrigating it to make sure it was flowing okay. It is due to be changed tomorrow.  Denies SOB, dyspnea, cough, N/V.   Denies skin changes, swelling or abdominal distension.   Previously saw Dr. Webster in PM&R and Dr. Gramajo in urology.      Other issues discussed today:     Patient Active Problem List   Diagnosis     Abdominal pain     Neurogenic bladder      Mild LIZA with hypoventilation     Sleep related hypoventilation/hypoxemia in other disease     Cataracts, bilateral     Myopic astigmatism of both eyes     Presbyopia     Injury at C4 level of cervical spinal cord (H)     Injury at C5 level of cervical spinal cord (H)     Injury at C6 level of cervical spinal cord (H)     Pneumonia     Bacteremia     Scrotal swelling     Contracture of hand joint, right     Contracture of hand joint, left     Mechanical limb problems       Current Outpatient Prescriptions   Medication Sig Dispense Refill     sennosides (SENOKOT) 8.6 MG tablet Take 1 tablet by mouth daily 90 each 3     montelukast (SINGULAIR) 10 MG tablet Take 1 tablet (10 mg) by mouth At Bedtime 30 tablet 1     albuterol (2.5 MG/3ML) 0.083% neb solution Take 1 vial (2.5 mg) by nebulization every 6  hours as needed for shortness of breath / dyspnea or wheezing 25 vial 1     baclofen (LIORESAL) 20 MG tablet TAKE ONE TABLET BY MOUTH UP TO 6 TIMES DAILY 540 tablet 3     diazepam (VALIUM) 10 MG tablet TAKE 1 TABLET BY MOUTH ONCE DAILY AS DIRECTED 90 tablet 1     zolpidem (AMBIEN) 10 MG tablet TAKE 1/2 TO 1 TABLET BY MOUTH AT BEDTIME 90 tablet 1     oxybutynin (DITROPAN-XL) 10 MG 24 hr tablet Take 1 tablet (10 mg) by mouth daily 90 tablet 0     cholecalciferol (VITAMIN D3) 1000 UNIT tablet Take 1 tablet (1,000 Units) by mouth daily 90 tablet 3     docusate sodium (DOCQLACE) 100 MG capsule Take 1 capsule (100 mg) by mouth daily 90 capsule 0     AmLODIPine Besylate (NORVASC PO) Take 2.5 mg by mouth daily as needed       ascorbic acid (VITAMIN C) 1000 MG TABS Take 1 tablet by mouth daily       minocycline (MINOCIN/DYNACIN) 50 MG capsule Take 1 capsule (50 mg) by mouth 2 times daily 60 capsule 3     MAGIC BULLETS 10 MG Suppository Place 1 suppository (10 mg) rectally daily as needed for constipation 30 suppository 3     nystatin (MYCOSTATIN) 839486 UNIT/GM POWD Apply 1 dose topically 3 times daily as needed. 1 Bottle 5     sodium chloride 3 % NEBU Take 3 mLs by nebulization every 3 hours as needed for wheezing 500 mL 0     albuterol (VENTOLIN HFA) 108 (90 BASE) MCG/ACT inhaler Inhale 2 puffs into the lungs every 4 hours as needed 3 Inhaler 1     order for DME Equipment being ordered: Other: Nebulizer  Treatment Diagnosis: Mild intermittent asthma 1 Units 0     DM-Phenylephrine-Acetaminophen (VICKS DAYQUIL COLD & FLU) 10-5-325 MG/15ML LIQD Take by mouth as needed       DM-Doxylamine-Acetaminophen (VICKS NYQUIL COLD & FLU NIGHT) 15-6. MG/15ML LIQD Take by mouth as needed       bisacodyl (DULCOLAX) 10 MG suppository Place 20 mg rectally every 3 days Active ingredient in Magic Bullet (10mg per suppository)       amcinonide (CYCLOCORT) 0.1 % cream Apply topically 2 times daily as needed for itching 120 g 1      "hydrocortisone (ANUSOL-HC) 2.5 % rectal cream Bid prn 120 g 11     albuterol (2.5 MG/3ML) 0.083% nebulizer solution Take 1 vial (2.5 mg) by nebulization 4 times daily as needed 1 Box 2     Applicators (COTTON SWABS) SWAB Please dispense sterile wrapped cotton swabs; use up to five/day for medical needs 450 each 3     clindamycin (CLINDAMAX) 1 % gel Apply bid (Patient taking differently: Apply topically 2 times daily as needed (face) ) 60 g 11     Gauze Pads & Dressings (GAUZE BANDAGE 2\") MISC IV STERILE GUAZE Use up to 4/day Supply 360 for three month supply 360 each 3     Gauze Pads & Dressings (GAUZE DRESSING) 4\"X4\" PADS NON STERILE GAUZE Use up to 8/day  Supply 720 for three month supply 720 each 3     ibuprofen (ADVIL,MOTRIN) 800 MG tablet Take 1 tablet (800 mg) by mouth 3 times daily as needed 60 tablet 11     Ostomy Supplies (UROSTOMY NIGHT BAG) MISC Please dispense three night bags/month (three month worth at a time) with 4 refills re: needed for chronic urinary dysfunction due to quadreplegia. Type: Bard 2000 ml. 9 Bottle 4     sildenafil (VIAGRA) 100 MG tablet Take 1 tablet (100 mg) by mouth daily as needed TAKE 1 HOUR BEFORE NEEDED 10 tablet 11     Alcohol Swabs (ALCOHOL PREP PADS) USE AS DIRECTED       lidocaine (XYLOCAINE) 2 % jelly Apply  topically. APPLY AS DIRECTED       Multiple Vitamins-Minerals (MULTIVITAMIN & MINERAL PO) Take 1 capsule by mouth daily.       oxycodone (ROXICODONE) 5 MG immediate release tablet Take 1 tablet by mouth every 4 hours as needed.       terbinafine (LAMISIL) 1 % cream Apply topically daily as needed        Incontinence Supplies (URINARY LEG BAG) USE AS NEEDED           ALLERGIES: Vancomycin    PAST MEDICAL HX:   Past Medical History:   Diagnosis Date     LIZA (obstructive sleep apnea) 12/3/2014     Quadriplegia, C1-C4 incomplete (H)        PAST SURGICAL HX:   Past Surgical History:   Procedure Laterality Date     Bilateral ureteroscopy with holmium laser lithotripsy and " basketing and removal of fragments  Left ureteral stent placement.  02/10/2010     skin flap on bottom       sp tube absess surgery         IMMUNIZATION HX:   Immunization History   Administered Date(s) Administered     Influenza (H1N1) 12/09/2009     Influenza (IIV3) 10/12/2012, 10/15/2013     Influenza Vaccine IM 3yrs+ 4 Valent IIV4 11/16/2015     Pneumococcal 23 valent 12/09/2009, 11/16/2015     TDAP Vaccine (Boostrix) 10/05/2009       SOCIAL HX:   Social History     Social History Narrative       ROS:   CONSTITUTIONAL: no fatigue, no unexpected change in weight  SKIN: no worrisome rashes, no worrisome moles, no worrisome lesions  EYES: see HPI  ENT: no ear problems, no mouth problems, no throat problems  RESP: no significant cough, no shortness of breath  CV: see HPI  GI: no nausea, no vomiting, no constipation, no diarrhea  NEURO: see HPI    OBJECTIVE:  BP (!) 200/92 (BP Location: Left arm)  Pulse 62  Resp 20  SpO2 96%   Wt Readings from Last 1 Encounters:   07/14/17 81.8 kg (180 lb 6.4 oz)     Constitutional: no acute distress, sitting in wheelchair  Eyes: anicteric, conjunctiva pink, normal extra-ocular movements   Ears, Nose and Throat:  nose clear and free of lesions, throat clear, mucosa pink and moist.   Cardiovascular: regular rate and rhythm, normal S1 and S2, no murmurs, rubs or gallops, peripheral pulses full and symmetric, cap refill <2 sec  Respiratory: clear to auscultation with good air movement bilaterally, no wheezes or crackles, non-labored  Gastrointestinal: active bowel sounds, soft, nontender, no hepatosplenomegaly, no masses, SP catheter in place and draining without complication, bladder scan: 0 ml   Musculoskeletal: full range of motion, strength 5/5, no edema   Skin: no concerning lesions or rash, no jaundice, temp normal, no diaphoresis   Neurological: quadriplegic   Psychological: appropriate mood, demonstrates intact judgment and logical thought process      ASSESSMENT/PLAN:    1.  Autonomic dysreflexia    2. Neurogenic bladder      Consulted with Dr. Webster. No clear sign inciting rapid jump in BP--no bladder distension, sign of bowel impaction or obstruction, skin breakdown or wounds, or extremity swelling.   UA sent to lab, results pending.  Given complexity of pt hx with dangerous rise in BPs, will send to ED for further work-up. Pt agrees.     FOLLOW UP: If not improving or if worsening after ED visit  JC Interiano CNP

## 2017-07-14 NOTE — CONSULTS
"[facility]   PM&R CONSULT    Consulting Provider: Myles Summers MD (ER)  Reason for Consult: Autonomic Dysreflexia   Location of Patient: Emergency Room 03  Date of Encounter: 7/14/2017       ASSESSMENT/PLAN:    Mr. Bart Corea is a 57 year old male with a past medical history most notable for \"C4-5-6 incomplete\" tetraplegia (FREDDY score unknown) for 35 years who presents with autonomic dysreflexia for 2 days.  Given the positional component (occurs with forward bending/trunk flexion), differentials may include intra-abdominal process or skin involvement such as scrotal abscess or suprapubic abscess compression however the differentials of autonomic dysreflexia remain wide and is not always discoverable.      1. Agree with work-up already in progress (labs, chest x-ray, CT abdomen with stone protocol) to evaluate and treat for noxious stimuli that could produce autonomic dysreflexia.      2. Recommend establishing a home autonomic dysreflexia protocol (0.5-1\" nitropaste, education on use of nitropaste for patient caregivers, etc.).    3. Recommend follow-up appointment as soon as possible in PM&R Clinic with Dr. Jaziel Webster.     History of Presenting Illness:      Mr. Corea is a 57 year old male with a past medical history notable for a C4-5-6 incomplete spinal cord injury (FREDDY score unknown) 35 years ago.  Two days ago, during his routine bowel program, he had a sudden onset of a severe pounding headache consistent with his autonomic dysreflexia symptoms.  He took 2.5mg amlodipine and an ibuprofen tablet and the pounding headache subsided.  He did not check his blood pressure at that time.  He went to work, and when he got home, he checked his blood pressure and it was 160/85mmHg (vs baseline of 100/45-55mmHg).    Since this episode, he has been getting severe headaches every time his attendants reposition him, which involves forward trunk flexion and twisting, or " "any time he even leans far forward in his chair or shyanne lift. The initial pounding headache will resolve in 15 minutes after repositioning, but a dull headache lingers.      His bowel program 2 days ago was successful and he denies any change in bowel programs (usual bowel program Q3day - next scheduled is tomorrow). He denies nausea and vomiting.  He is insensate below the nipple line so he cannot comment on abdominal pain.    He has a suprapubic catheter which is changed every 2 weeks (due tomorrow for next change) and he reports that for about a year, every time it is changed brownish pus is discharged from the cath site. He says this has been dicussed with his urologist and he does not think there is anything to be done with this.  He also reports a history of an abscess at the site of his suprapubic catheter that was surgically treated many years ago.  He denies any change in urine appearance or odour. He does report a history of kidney stones.     He denies any recent skin breakdown.  He does endorse a groin abscess that opens and closes, and he states that \"everytime I'm about to come in and have it lanced, it pops on its own.\" Chart review demonstrates a history of a scrotal abscess.     He endorses a pneumonia a few months ago, but states that he feels fully recovered from this episode. He denies recent fever, chills and difficulty breathing.       SOCIAL HISTORY/Home Setting/Support:  Mr. Corea works as a  40hours/week.  He lives in an accessible home with his wife.  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     Social History     Social History     Marital status:      Spouse name: N/A     Number of children: N/A     Years of education: N/A     Social History Main Topics     Smoking status: Current Every Day Smoker     Packs/day: 0.30     Types: Cigarettes     Smokeless tobacco: Never Used      Comment: since early teens     Alcohol use Yes      Comment: " "on weekends, 3-4+     Drug use: Yes      Comment: occ marijuana for spasms     Sexual activity: No     Other Topics Concern     None     Social History Narrative         Past Medical History:  Past Medical History:   Diagnosis Date     LIZA (obstructive sleep apnea) 12/3/2014     Quadriplegia, C1-C4 incomplete (H)          Current Medications:  No current facility-administered medications for this encounter.      Current Outpatient Prescriptions   Medication     sennosides (SENOKOT) 8.6 MG tablet     montelukast (SINGULAIR) 10 MG tablet     albuterol (2.5 MG/3ML) 0.083% neb solution     baclofen (LIORESAL) 20 MG tablet     diazepam (VALIUM) 10 MG tablet     zolpidem (AMBIEN) 10 MG tablet     oxybutynin (DITROPAN-XL) 10 MG 24 hr tablet     cholecalciferol (VITAMIN D3) 1000 UNIT tablet     docusate sodium (DOCQLACE) 100 MG capsule     AmLODIPine Besylate (NORVASC PO)     ascorbic acid (VITAMIN C) 1000 MG TABS     minocycline (MINOCIN/DYNACIN) 50 MG capsule     MAGIC BULLETS 10 MG Suppository     nystatin (MYCOSTATIN) 982413 UNIT/GM POWD     sodium chloride 3 % NEBU     albuterol (VENTOLIN HFA) 108 (90 BASE) MCG/ACT inhaler     order for DME     DM-Phenylephrine-Acetaminophen (VICKS DAYQUIL COLD & FLU) 10-5-325 MG/15ML LIQD     DM-Doxylamine-Acetaminophen (VICKS NYQUIL COLD & FLU NIGHT) 15-6. MG/15ML LIQD     bisacodyl (DULCOLAX) 10 MG suppository     amcinonide (CYCLOCORT) 0.1 % cream     hydrocortisone (ANUSOL-HC) 2.5 % rectal cream     albuterol (2.5 MG/3ML) 0.083% nebulizer solution     Applicators (COTTON SWABS) SWAB     clindamycin (CLINDAMAX) 1 % gel     Gauze Pads & Dressings (GAUZE BANDAGE 2\") MISC     Gauze Pads & Dressings (GAUZE DRESSING) 4\"X4\" PADS     ibuprofen (ADVIL,MOTRIN) 800 MG tablet     Ostomy Supplies (UROSTOMY NIGHT BAG) MISC     sildenafil (VIAGRA) 100 MG tablet     Alcohol Swabs (ALCOHOL PREP PADS)     lidocaine (XYLOCAINE) 2 % jelly     Multiple Vitamins-Minerals (MULTIVITAMIN & MINERAL " PO)     oxycodone (ROXICODONE) 5 MG immediate release tablet     terbinafine (LAMISIL) 1 % cream     Incontinence Supplies (URINARY LEG BAG)         Review of Systems:  General: feels well overall apart from intermittent headaches   Eyes/Nose/Throat: Denies change in vision and cold symptoms. Cardiovascular: No palpitations, no chest pain  Pulmonary: No cough or shortness of breath  Gastrointestinal: No nausea, vomiting, diarrhea or constipation  Extremities: Denies pain over sensate region (upper limbs and above nipple line)  Neurological: Endorses progressive arm weakness over past several years.   Skin: endorses groin abscess that will open and close  : denies change in urine appearance or odour, or issues with suprapubic catheter.       Labs   Lab Results   Component Value Date    WBC 3.8 (L) 06/05/2017    HGB 14.9 06/05/2017    HCT 45.8 06/05/2017    MCV 96 06/05/2017    PLT Platelets clumped, platelet count unavailable 06/05/2017     Lab Results   Component Value Date     06/16/2017    POTASSIUM 4.4 06/16/2017    CHLORIDE 104 06/16/2017    CO2 26 06/16/2017    GLC 91 06/16/2017     Lab Results   Component Value Date    GFRESTIMATED >90  Non  GFR Calc   06/16/2017    GFRESTBLACK >90   GFR Calc   06/16/2017     Lab Results   Component Value Date    AST 17 03/31/2017    ALT 16 03/31/2017    ALKPHOS 118 03/31/2017    BILITOTAL 0.5 03/31/2017    BILICONJ 0.0 07/27/2008    KIA <9 (L) 10/21/2009     Lab Results   Component Value Date    INR 1.06 02/01/2010     Lab Results   Component Value Date    BUN 13 06/16/2017    CR 0.27 (L) 06/16/2017         ON EXAMINATION:  Vitals:    07/14/17 1045 07/14/17 1115 07/14/17 1130   BP: 100/58     Pulse: 61     Resp: 16     Temp: 97.9  F (36.6  C)     TempSrc: Oral     SpO2: 100% 100% 100%   Weight: 81.8 kg (180 lb 6.4 oz)         Physical Exam:  Blood pressure 100/58, pulse 61, temperature 97.9  F (36.6  C), temperature source Oral, resp.  rate 16, weight 81.8 kg (180 lb 6.4 oz), SpO2 100 %.    GENERAL: alert and oriented, no acute distress, lying on bed in hospital room with blankets on and a towel over his head for warmth.   NEUROLOGIC: able to demonstrate limited antigravity strength in upper limbs with right having greater strength than the left.   CARDIOVASCULAR: regular rate and rhythm and systolic murmur on auscultation  PULMONARY/CHEST: speaking in full sentences, no cyanosis, no respiratory distress, breathing comfortably on room air, symmetrical chest wall expansion, lungs clear to auscultation  ABDOMEN: Positive Bowel Sounds, rounded but soft and non-distended, soft, abdomen is insensate    --  Patient interviewed and discussed with attending Dr. Tai Wheeler-Connie Askew MD  PM&R Resident, PGY-4    Patient seen by me and discussed with Dr Askew. I agree with her note, exam and plan for this patient.    Total time spent >60 min with this consult.    Eddie Lujan MD    [phone]

## 2017-07-14 NOTE — PROGRESS NOTES
"Social Work: Assessment with Discharge Plan    Patient Name:  Bart Corea  :  1960  Age:  57 year old  MRN:  [id number]  Risk/Complexity Score:     Completed assessment with:  Pt    Presenting Information   Reason for Referral:  Discharge plan  Date of Intake:  2017  Referral Source:  Physician  Decision Maker:  Pt  Alternate Decision Maker:  wife  Health Care Directive:  Not on file. SW will check on this after CT scan.  Living Situation:  House  Previous Functional Status:  Assistance with Other:  Pt is quadriplegic and receives help with ADLs from his PCAs and wife.  Patient and family understanding of hospitalization: Pt has thorough understanding of the medical circumstances that caused his blood pressure to spike.  Cultural/Language/Spiritual Considerations:  Pt states he is Protestant.  Adjustment to Illness:     Physical Health  Reason for Admission:  No diagnosis found.  Services Needed/Recommended:  Home with homecare    Mental Health/Chemical Dependency  Diagnosis:  None  Support/Services in Place:  Pt says no mental health supports in place and not interested in receiving any.  Services Needed/Recommended:  None    Support System  Significant relationship at present time:  Wife and professional home healthcare supports.  Family of origin is available for support:  Wife  Other support available:  Not discussed.  Gaps in support system: None identified.  Patient is caregiver to:  None     Provider Information   Primary Care Physician:  Jason Long   [phone]   Clinic:  [facility] [address] [place] [address] / [facility]*      :  Pt states he has a [facility]  who he sees every 3-6 months when \"they need to get paid.\" Does not identify support received from them.    Financial   Income Source:   at [employer].  Financial Concerns:  None Identified.  Insurance:    Payor/Plan Subscriber Name Rel Member # " "Group #   [facility]* MICHOACANO WHITE*  [id number]      [address], [address]       Discharge Plan   Patient and family discharge goal:  Return home with existing services.  Provided education on discharge plan:  NO  Patient agreeable to discharge plan:  NO  A list of Medicare Certified Facilities was provided to the patient and/or family to encourage patient choice. Patient's choices for facility are:  N/A  Will NH provide Skilled rehabilitation or complex medical:  NO  General information regarding anticipated insurance coverage and possible out of pocket cost was discussed. Patient and patient's family are aware patient may incur the cost of transportation to the facility, pending insurance payment: NO  Barriers to discharge:  No social work barriers.    Discharge Recommendations   Anticipated Disposition:  Home with services  Transportation Needs:  Pt's PCA brought wife in and able to bring them both home in pt's van.    Additional comments   SW met with pt briefly prior to CT scan. Pt is well set-up at home with PCAs and the support of his wife to help him complete his ADLs. Pt works full-time as . He also has a couple agencies checking in every couple months to ensure he has a safe, stable living environment. Safe to return home.    [facility]: [phone] comes once every 3-6 months to check in on living arrangement and medical needs.    [facility] nurse comes in every two weeks to change catheter. Ph: [phone].    Pt states [facility] provides 12 hours of PCA services per day. THAD unable to locate a phone number for agency.    Pt states he also has a  through the state who checks in every 3 months \"to get paid.\" Pt states he doesn't receive much assistance through them.    "

## 2017-07-14 NOTE — CONSULTS
Urology Consult    Name: Bart Corea    MRN: [id number]   YOB: 1960               Chief Complaint:   Autonomic dysreflexia  Ureteral stone    History is obtained from the patient and chart review          History of Present Illness:   Bart Corea is a 57 year old male with PMH of C1-C4 quadriplegic and urologic history of NGB managed with SPT (changes q2 weeks, follows with Dr. Uriostegui), with chronic right scrotal abscess. Last seen one year ago- at that time were discussing possible vasectomy to disrupt communication between bladder and scrotum, has not returned to clinic since that time.  Also has a history of urolithiasis, had ureteroscopy with Dr. Chin in 2010.      He presents to ER today from clinic where his BP was 200/100 with severe headache and CP - concerning for autonomic dysreflexia. Seems to be brought on whenever he is being repositioned with Garima lift, bending/twisting, etc. Does not come on during rest. Reportedly having regular BMs with suppository. On arrival to ER BP was normal, 100/58.  No leukocytosis, low Cr. UA from yesterday with large LE, negative nitrite. CT shows tiny <2 mm stone in left proximal ureter.            Past Medical History:     Past Medical History:   Diagnosis Date     LIZA (obstructive sleep apnea) 12/3/2014     Quadriplegia, C1-C4 incomplete (H)             Past Surgical History:     Past Surgical History:   Procedure Laterality Date     Bilateral ureteroscopy with holmium laser lithotripsy and basketing and removal of fragments  Left ureteral stent placement.  02/10/2010     skin flap on bottom       sp tube absess surgery              Social History:     Social History   Substance Use Topics     Smoking status: Current Every Day Smoker     Packs/day: 0.30     Types: Cigarettes     Smokeless tobacco: Never Used      Comment: since early teens     Alcohol use Yes      Comment: on weekends, 3-4+            Family History:   No family  history on file.         Allergies:     Allergies   Allergen Reactions     Vancomycin Anaphylaxis            Medications:     No current facility-administered medications for this encounter.      Current Outpatient Prescriptions   Medication Sig     sennosides (SENOKOT) 8.6 MG tablet Take 1 tablet by mouth daily     montelukast (SINGULAIR) 10 MG tablet Take 1 tablet (10 mg) by mouth At Bedtime     albuterol (2.5 MG/3ML) 0.083% neb solution Take 1 vial (2.5 mg) by nebulization every 6 hours as needed for shortness of breath / dyspnea or wheezing     baclofen (LIORESAL) 20 MG tablet TAKE ONE TABLET BY MOUTH UP TO 6 TIMES DAILY     diazepam (VALIUM) 10 MG tablet TAKE 1 TABLET BY MOUTH ONCE DAILY AS DIRECTED     zolpidem (AMBIEN) 10 MG tablet TAKE 1/2 TO 1 TABLET BY MOUTH AT BEDTIME     oxybutynin (DITROPAN-XL) 10 MG 24 hr tablet Take 1 tablet (10 mg) by mouth daily     cholecalciferol (VITAMIN D3) 1000 UNIT tablet Take 1 tablet (1,000 Units) by mouth daily     docusate sodium (DOCQLACE) 100 MG capsule Take 1 capsule (100 mg) by mouth daily     AmLODIPine Besylate (NORVASC PO) Take 2.5 mg by mouth daily as needed     ascorbic acid (VITAMIN C) 1000 MG TABS Take 1 tablet by mouth daily     minocycline (MINOCIN/DYNACIN) 50 MG capsule Take 1 capsule (50 mg) by mouth 2 times daily     MAGIC BULLETS 10 MG Suppository Place 1 suppository (10 mg) rectally daily as needed for constipation     nystatin (MYCOSTATIN) 788385 UNIT/GM POWD Apply 1 dose topically 3 times daily as needed.     sodium chloride 3 % NEBU Take 3 mLs by nebulization every 3 hours as needed for wheezing     albuterol (VENTOLIN HFA) 108 (90 BASE) MCG/ACT inhaler Inhale 2 puffs into the lungs every 4 hours as needed     order for DME Equipment being ordered: Other: Nebulizer  Treatment Diagnosis: Mild intermittent asthma     DM-Phenylephrine-Acetaminophen (VICKS DAYQUIL COLD & FLU) 10-5-325 MG/15ML LIQD Take by mouth as needed     DM-Doxylamine-Acetaminophen  "(VICKS NYQUIL COLD & FLU NIGHT) 15-6. MG/15ML LIQD Take by mouth as needed     bisacodyl (DULCOLAX) 10 MG suppository Place 20 mg rectally every 3 days Active ingredient in Magic Bullet (10mg per suppository)     amcinonide (CYCLOCORT) 0.1 % cream Apply topically 2 times daily as needed for itching     hydrocortisone (ANUSOL-HC) 2.5 % rectal cream Bid prn     albuterol (2.5 MG/3ML) 0.083% nebulizer solution Take 1 vial (2.5 mg) by nebulization 4 times daily as needed     Applicators (COTTON SWABS) SWAB Please dispense sterile wrapped cotton swabs; use up to five/day for medical needs     clindamycin (CLINDAMAX) 1 % gel Apply bid (Patient taking differently: Apply topically 2 times daily as needed (face) )     Gauze Pads & Dressings (GAUZE BANDAGE 2\") MISC IV STERILE GUAZE Use up to 4/day Supply 360 for three month supply     Gauze Pads & Dressings (GAUZE DRESSING) 4\"X4\" PADS NON STERILE GAUZE Use up to 8/day  Supply 720 for three month supply     ibuprofen (ADVIL,MOTRIN) 800 MG tablet Take 1 tablet (800 mg) by mouth 3 times daily as needed     Ostomy Supplies (UROSTOMY NIGHT BAG) MISC Please dispense three night bags/month (three month worth at a time) with 4 refills re: needed for chronic urinary dysfunction due to quadreplegia. Type: Bard 2000 ml.     sildenafil (VIAGRA) 100 MG tablet Take 1 tablet (100 mg) by mouth daily as needed TAKE 1 HOUR BEFORE NEEDED     Alcohol Swabs (ALCOHOL PREP PADS) USE AS DIRECTED     lidocaine (XYLOCAINE) 2 % jelly Apply  topically. APPLY AS DIRECTED     Multiple Vitamins-Minerals (MULTIVITAMIN & MINERAL PO) Take 1 capsule by mouth daily.     oxycodone (ROXICODONE) 5 MG immediate release tablet Take 1 tablet by mouth every 4 hours as needed.     terbinafine (LAMISIL) 1 % cream Apply topically daily as needed      Incontinence Supplies (URINARY LEG BAG) USE AS NEEDED             Review of Systems:    ROS: 10 point ROS neg other than the symptoms noted above in the HPI           " Physical Exam:   VS:  T: 97.9    HR: 61    BP: 100/58    RR: 16   GEN:  AOx3.  NAD.    CV:  RRR  LUNGS: Non-labored breathing.   BACK:  No midline or CVA tenderness. No sacral decubitus ulcer  ABD:  Soft.  NT.  ND.  No rebound or guarding.  No masses.  :  circumcised.  Normal penile shaft.  Testicles descended bilaterally, no nodules or tenderness.  No inguinal hernias. No evidence of scrotal abscess  EXT:  Warm, well perfused.    SKIN:  Warm.  Dry.  No rashes.  NEURO:  CN grossly intact.            Data:   All laboratory data reviewed:      Recent Labs  Lab 07/14/17  1255   WBC 6.0   HGB 13.7          Recent Labs  Lab 07/14/17  1255      POTASSIUM 4.1   CHLORIDE 108   CO2 31   BUN 16   CR 0.28*   *   LIU 9.0       Recent Labs  Lab 07/13/17  2300   COLOR Yellow   APPEARANCE Slightly Cloudy   URINEGLC Negative   URINEBILI Negative   URINEKETONE Negative   SG 1.004   URINEPH 6.0   PROTEIN Negative   NITRITE Negative   LEUKEST Large*   RBCU 13*   WBCU 114*     Urine cultures over the past few years have grown       All pertinent imaging reviewed:    7/14/17 CT scan of the abdomen:     IMPRESSION:   1. New punctate <2 mm stone in the proximal left ureter with  associated periureteral stranding, without proximal dilatation of the  ureter to suggest obstruction. Additional stones are seen near the  bladder neck, largest 4 mm. Nonobstructing punctate right lower pole  calyceal tip stone.  2. Unchanged soft tissue thickening along the suprapubic catheter  track, similar to prior, compatible with chronic inflammation vs  scarring.  3. Small left pleural effusion with overlying linear opacities, most  likely atelectasis, but infection and aspiration are on the  differential.   4. Bilateral adrenal adenomas.            Impression and Plan:   Impression:   Bart Corea is a 57 year old male with PMH of C1-C4 quadriplegic and urologic history of NGB managed with SPT (changes q2 weeks, follows  with Dr. Uriostegui), with chronic right scrotal abscess. Brought to ER with autonomic dysreflexia of unknown etiology.  Patient gets severe headaches during episodes which have occurred for past few weeks during transfers.  Workup has only been notable for a tiny, 1 mm left proximal ureteral stone, UA mildly dirty with large LE but neg nitrite (from SPT). No evidence of scrotal abscess at this time.      I discussed whether this stone could be source of AD with the PM&R staff physician, Dr. Lujan, who was not aware of ureteral stones being a source of AD typically.  Also it seems to be brought on with movement/transfers which does not seem particularly consistent with the stone being a source.  Given patient's history of only having AD episodes when his catheter is kinked, I suspect this is more likely to be the source in this event as well.  Perhaps during transfers or movement his catheter drainage is impeded causing dysreflexia.      Plan:     - No indications for intervention regarding ureteral stone at this time. Do not feel this is the source of his AD.  If he has another episode then would recommend checking catheter is draining.     - Given patient has had pseudomonas colonization, suspect this will grow again.  Although he has been afebrile, his dysreflexia could be related to infection.  Recommend treatment with fosfomycin granules x 3 doses. One dose today, change catheter tomorrow, then next two doses.      - Will arrange for follow-up with Dr. Uriostegui (as he was supposed to have his yearly NGB follow-up soon) in the next four weeks with repeat CT to ensure stone has passed         This patient's exam findings, labs, and imaging discussed with urology staff surgeon Dr. Jorge, who developed the treatment plan.    Pancho Hernandez MD  Urology Resident

## 2017-07-14 NOTE — ED AVS SNAPSHOT
[facility], Emergency Department    [address] [address]    Phone:  [phone]                                       Bart Corea   MRN: [id number]    Department:  [facility], Emergency Department   Date of Visit:  7/14/2017           Patient Information     Date Of Birth          1960        Your diagnoses for this visit were:     Autonomic dysreflexia     Urinary tract infection associated with cystostomy catheter, initial encounter (H)        You were seen by Myles Summers MD.        Discharge Instructions       Take fosfomycin as directed.  Use nitroglycerin ointment as directed for autonomic dysreflexia as directed instead of using amlodipine.  Apply nitroglycerin ointment when symptoms of autonomic dysreflexia and hypertension develop.  Remove nitroglycerin ointment with a gloved hand as soon as symptoms resolve.    Follow-up with Dr. Webster as soon as possible.    Future Appointments        Provider Department Dept Phone Center    7/19/2017 8:40 AM Jaziel Webster MD [facility] Physical Medicine and Rehabilitation [phone] [facility]    4/9/2018 10:45 AM [facility] ULTRASOUND ROOM 1 [facility] [phone] [facility]    4/9/2018 12:00 PM Lab [facility] Lab [phone] [facility]    4/9/2018 1:00 PM Obi Uriostegui MD [facility] Urology and [facility] [phone] [facility]      24 Hour Appointment Hotline       To make an appointment at any [facility], call [phone] ([phone]). If you don't have a family doctor or clinic, we will help you find one. [place] clinics are conveniently located to serve the needs of you and your family.          ED Discharge Orders     CT Abdomen Pelvis w/o Contrast       Administration of IV contrast (contrast agent, dose, and amount) will be tailored to this examination per the appropriate written protocol listed in the Protocol E-Book, or by the supervising imaging provider.                      Review of your medicines      START taking        Dose / Directions Last dose taken    fosfomycin 3 G Packet   Commonly known as:  MONUROL   Dose:  3 g   Quantity:  2 packet   Start taking on:  7/17/2017        Take 1 packet (3 g) by mouth every 3 days for 2 doses   Refills:  0        nitroGLYcerin 2 % Oint ointment   Commonly known as:  NITRO-BID   Dose:  0.5-1 inch   Quantity:  30 g        Place 0.5-1 inches (7.5-15 mg) onto the skin every 24 hours as needed for autonomic dysreflexia symptoms.  Remove ointment with gloved hand once symptoms resolve.   Refills:  0          Our records show that you are taking the medicines listed below. If these are incorrect, please call your family doctor or clinic.        Dose / Directions Last dose taken    * albuterol (2.5 MG/3ML) 0.083% neb solution   Dose:  1 vial   Quantity:  1 Box        Take 1 vial (2.5 mg) by nebulization 4 times daily as needed   Refills:  2        * albuterol 108 (90 BASE) MCG/ACT Inhaler   Commonly known as:  VENTOLIN HFA   Dose:  2 puff   Quantity:  3 Inhaler        Inhale 2 puffs into the lungs every 4 hours as needed   Refills:  1        * albuterol (2.5 MG/3ML) 0.083% neb solution   Dose:  1 vial   Quantity:  25 vial        Take 1 vial (2.5 mg) by nebulization every 6 hours as needed for shortness of breath / dyspnea or wheezing   Refills:  1        ALCOHOL PREP PADS        USE AS DIRECTED   Refills:  0        amcinonide 0.1 % cream   Commonly known as:  CYCLOCORT   Quantity:  120 g        Apply topically 2 times daily as needed for itching   Refills:  1        ascorbic acid 1000 MG Tabs   Commonly known as:  vitamin C   Dose:  1 tablet        Take 1 tablet by mouth daily   Refills:  0        baclofen 20 MG tablet   Commonly known as:  LIORESAL   Quantity:  540 tablet        TAKE ONE TABLET BY MOUTH UP TO 6 TIMES DAILY   Refills:  3        * bisacodyl 10 MG Suppository   Commonly known as:  DULCOLAX   Dose:  20 mg        Place  "20 mg rectally every 3 days Active ingredient in Magic Bullet (10mg per suppository)   Refills:  0        * MAGIC BULLETS 10 MG Suppository   Dose:  10 mg   Quantity:  30 suppository   Generic drug:  bisacodyl        Place 1 suppository (10 mg) rectally daily as needed for constipation   Refills:  3        cholecalciferol 1000 UNIT tablet   Commonly known as:  vitamin D   Dose:  1000 Units   Quantity:  90 tablet        Take 1 tablet (1,000 Units) by mouth daily   Refills:  3        clindamycin 1 % topical gel   Commonly known as:  CLINDAMAX   Quantity:  60 g        Apply bid   Refills:  11        Cotton Swabs Swab   Quantity:  450 each        Please dispense sterile wrapped cotton swabs; use up to five/day for medical needs   Refills:  3        diazepam 10 MG tablet   Commonly known as:  VALIUM   Quantity:  90 tablet        TAKE 1 TABLET BY MOUTH ONCE DAILY AS DIRECTED   Refills:  1        docusate sodium 100 MG capsule   Commonly known as:  DOCQLACE   Dose:  100 mg   Quantity:  90 capsule        Take 1 capsule (100 mg) by mouth daily   Refills:  0        * Gauze Bandage 2\" Misc   Quantity:  360 each        IV STERILE GUAZE Use up to 4/day Supply 360 for three month supply   Refills:  3        * Gauze Dressing 4\"X4\" Pads   Quantity:  720 each        NON STERILE GAUZE Use up to 8/day  Supply 720 for three month supply   Refills:  3        hydrocortisone 2.5 % cream   Commonly known as:  ANUSOL-HC   Quantity:  120 g        Bid prn   Refills:  11        ibuprofen 800 MG tablet   Commonly known as:  ADVIL/MOTRIN   Dose:  800 mg   Quantity:  60 tablet        Take 1 tablet (800 mg) by mouth 3 times daily as needed   Refills:  11        lidocaine 2 % topical gel   Commonly known as:  XYLOCAINE        Apply  topically. APPLY AS DIRECTED   Refills:  0        minocycline 50 MG capsule   Commonly known as:  MINOCIN/DYNACIN   Dose:  50 mg   Quantity:  60 capsule        Take 1 capsule (50 mg) by mouth 2 times daily   Refills:  3 "        montelukast 10 MG tablet   Commonly known as:  SINGULAIR   Dose:  10 mg   Quantity:  30 tablet        Take 1 tablet (10 mg) by mouth At Bedtime   Refills:  1        MULTIVITAMIN & MINERAL PO   Dose:  1 capsule        Take 1 capsule by mouth daily.   Refills:  0        NORVASC PO   Dose:  2.5 mg        Take 2.5 mg by mouth daily as needed   Refills:  0        nystatin 621121 UNIT/GM Powd   Commonly known as:  MYCOSTATIN   Quantity:  1 Bottle        Apply 1 dose topically 3 times daily as needed.   Refills:  5        order for DME   Quantity:  1 Units        Equipment being ordered: Other: Nebulizer Treatment Diagnosis: Mild intermittent asthma   Refills:  0        oxybutynin 10 MG 24 hr tablet   Commonly known as:  DITROPAN-XL   Dose:  10 mg   Quantity:  90 tablet        Take 1 tablet (10 mg) by mouth daily   Refills:  0        oxyCODONE 5 MG IR tablet   Commonly known as:  ROXICODONE   Dose:  1 tablet        Take 1 tablet by mouth every 4 hours as needed.   Refills:  0        sennosides 8.6 MG tablet   Commonly known as:  SENOKOT   Dose:  1 tablet   Quantity:  90 each        Take 1 tablet by mouth daily   Refills:  3        sildenafil 100 MG cap/tab   Commonly known as:  VIAGRA   Dose:  100 mg   Quantity:  10 tablet        Take 1 tablet (100 mg) by mouth daily as needed TAKE 1 HOUR BEFORE NEEDED   Refills:  11        sodium chloride 3 % Nebu neb solution   Dose:  3 mL   Quantity:  500 mL        Take 3 mLs by nebulization every 3 hours as needed for wheezing   Refills:  0        terbinafine 1 % cream   Commonly known as:  lamISIL        Apply topically daily as needed   Refills:  0        URINARY LEG BAG        USE AS NEEDED   Refills:  0        Urostomy Night Bag Misc   Quantity:  9 Bottle        Please dispense three night bags/month (three month worth at a time) with 4 refills re: needed for chronic urinary dysfunction due to quadreplegia. Type: Bard 2000 ml.   Refills:  4        VICKS DAYQUIL COLD & FLU  10-5-325 MG/15ML Liqd   Generic drug:  DM-Phenylephrine-Acetaminophen        Take by mouth as needed   Refills:  0        VICKS NYQUIL COLD & FLU NIGHT 15-6. MG/15ML Liqd   Generic drug:  DM-Doxylamine-Acetaminophen        Take by mouth as needed   Refills:  0        zolpidem 10 MG tablet   Commonly known as:  AMBIEN   Quantity:  90 tablet        TAKE 1/2 TO 1 TABLET BY MOUTH AT BEDTIME   Refills:  1        * Notice:  This list has 7 medication(s) that are the same as other medications prescribed for you. Read the directions carefully, and ask your doctor or other care provider to review them with you.            Prescriptions were sent or printed at these locations (2 Prescriptions)                   Other Prescriptions                Printed at Department/Unit printer (2 of 2)         fosfomycin (MONUROL) 3 G Packet               nitroGLYcerin (NITRO-BID) 2 % OINT ointment                Procedures and tests performed during your visit     Abd/pelvis CT no contrast - Stone Protocol    CBC with platelets differential    Chest  XR, 1 view portable    Comprehensive metabolic panel    EKG 12 lead    Lipase    Peripheral IV catheter    Physical Medicine & Rehab General Adult IP Consult: Patient to be seen: ASAP within 4 hrs; Call back #: [phone]; Autonomic dysreflexia; Consultant may enter orders: Yes    Troponin I    Vascular Access Care Adult IP Consult      Orders Needing Specimen Collection     None      Pending Results     Date and Time Order Name Status Description    7/13/2017 2300 URINE CULTURE AEROBIC BACTERIAL Preliminary             Pending Culture Results     Date and Time Order Name Status Description    7/13/2017 2300 URINE CULTURE AEROBIC BACTERIAL Preliminary             Pending Results Instructions     If you had any lab results that were not finalized at the time of your Discharge, you can call the ED Lab Result RN at [phone]. You will be contacted by this team for any positive Lab  "results or changes in treatment. The nurses are available 7 days a week from 10A to 6:30P.  You can leave a message 24 hours per day and they will return your call.        Thank you for choosing [facility]       Thank you for choosing [facility] for your care. Our goal is always to provide you with excellent care. Hearing back from our patients is one way we can continue to improve our services. Please take a few minutes to complete the written survey that you may receive in the mail after you visit with us. Thank you!        [employer] Information     [employer] lets you send messages to your doctor, view your test results, renew your prescriptions, schedule appointments and more. To sign up, go to [email or web address] . Click on \"Log in\" on the left side of the screen, which will take you to the Welcome page. Then click on \"Sign up Now\" on the right side of the page.     You will be asked to enter the access code listed below, as well as some personal information. Please follow the directions to create your username and password.     Your access code is: [email or web address]  Expires: 10/3/2017  6:30 AM     Your access code will  in 90 days. If you need help or a new code, please call your [facility] clinic or [phone].        Care EveryWhere ID     This is your Care EveryWhere ID. This could be used by other organizations to access your [facility] medical records  [id number]        Equal Access to Services     MAYRA BOUDREAUX : Hadpriscila Sexton, wajumanada ludmila, qaybta kaalmahansel jacques . So [facility] [phone].    ATENCIÓN: Si habla español, tiene a still disposición servicios gratuitos de asistencia lingüística. Llame al [phone].    We comply with applicable federal civil rights laws and [place] laws. We do not discriminate on the basis of race, color, national origin, age, disability sex, sexual orientation or gender identity.            After Visit Summary     "   This is your record. Keep this with you and show to your community pharmacist(s) and doctor(s) at your next visit.

## 2017-07-14 NOTE — ED NOTES
Arrived to ED from [facility] d/t hypertension, SBP was in 200's, hx of quadriplegia and autonomic dysreflexia, VSS upon arrival to ED, /58, denies pain

## 2017-07-14 NOTE — ED AVS SNAPSHOT
[facility], [place], Emergency Department    [address] [place] [address]    Phone:  [phone]                                       Bart Corea   MRN: [id number]    Department:  [facility], Emergency Department   Date of Visit:  7/14/2017           After Visit Summary Signature Page     I have received my discharge instructions, and my questions have been answered. I have discussed any challenges I see with this plan with the nurse or doctor.    ..........................................................................................................................................  Patient/Patient Representative Signature      ..........................................................................................................................................  Patient Representative Print Name and Relationship to Patient    ..................................................               ................................................  Date                                            Time    ..........................................................................................................................................  Reviewed by Signature/Title    ...................................................              ..............................................  Date                                                            Time

## 2017-07-14 NOTE — NURSING NOTE
"Chief Complaint   Patient presents with     Hypertension     hypertension- \" has been having severe headaches with blood pressure\"   Silvana Grewal LPN 8:22 AM on 7/14/2017    Blood pressure was 96/78 when first roomed in.After adjusting in wheelchair blood pressure elevated to 179 90, pulse-83-86 and developed \"severe headache rated pain at 9\".Silvana Grewal LPN 8:32 AM on 7/14/2017  "

## 2017-07-14 NOTE — MR AVS SNAPSHOT
After Visit Summary   7/14/2017    Bart Corea    MRN: [id number]           Patient Information     Date Of Birth          1960        Visit Information        Provider Department      7/14/2017 8:00 AM Nany Oglesby APRN [facility]        Today's Diagnoses     Autonomic dysreflexia    -  1    Neurogenic bladder          Care Instructions    Primary Care Center Medication Refill Request Information:  * Please contact your pharmacy regarding ANY request for medication refills.  ** [facility] Prescription Fax = [phone]  * Please allow 3 business days for routine medication refills.  * Please allow 5 business days for controlled substance medication refills.     Primary Care Center Test Result notification information:  *You will be notified with in 7-10 days of your appointment day regarding the results of your test.  If you are on MyChart you will be notified as soon as the provider has reviewed the results and signed off on them.    Primary Care Center [phone]          Follow-ups after your visit        Your next 10 appointments already scheduled     Jul 19, 2017  8:40 AM CDT   (Arrive by 8:25 AM)   New Patient Visit with Jaziel Webster MD   [facility] Physical Medicine and Rehabilitation ([facility])    [address] [place] [address] [phone]            Apr 09, 2018 10:45 AM CDT   US RENAL COMPLETE with UCUS1   [facility] Imaging [facility] US ([facility])    [address] [place] [id number] [phone]           Please bring a list of your medicines (including vitamins, minerals and over-the-counter drugs). Also, tell your doctor about any allergies you may have. Wear comfortable clothes and leave your valuables at home.  You do not need to do anything special to prepare for your exam.  Please call the Imaging Department at your exam site with  any questions.            Apr 09, 2018 12:00 PM CDT   LAB with  LAB   [facility] Lab ([facility])    [address] [facility] [address] [phone]           Patient must bring picture ID.  Patient should be prepared to give a urine specimen  Please do not eat 10-12 hours before your appointment if you are coming in fasting for labs on lipids, cholesterol, or glucose (sugar).  Pregnant women should follow their Care Team instructions. Water with medications is okay. Do not drink coffee or other fluids.   If you have concerns about taking  your medications, please ask at office or if scheduling via Yurpy, send a message by clicking on Secure Messaging, Message Your Care Team.            Apr 09, 2018  1:00 PM CDT   (Arrive by 12:45 PM)   Return Visit with Obi Uriostegui MD   [facility] ([facility])    [address] [address] [phone]              Who to contact     Please call your clinic at [phone] to:    Ask questions about your health    Make or cancel appointments    Discuss your medicines    Learn about your test results    Speak to your doctor   If you have compliments or concerns about an experience at your clinic, or if you wish to file a complaint, please contact [facility] Patient Relations at [phone] or email us at [email or web address]         Additional Information About Your Visit        [employer] Information     Yurpy is an electronic gateway that provides easy, online access to your medical records. With Yurpy, you can request a clinic appointment, read your test results, renew a prescription or communicate with your care team.     To sign up for Yurpy visit the website at [email or web address]   You will be asked to enter the access code listed below, as well as some  personal information. Please follow the directions to create your username and password.     Your access code is: [email or web address]  Expires: 10/3/2017  6:30 AM     Your access code will  in 90 days. If you need help or a new code, please contact your [facility] or call [phone] for assistance.        Care EveryWhere ID     This is your Care EveryWhere ID. This could be used by other organizations to access your [place] medical records  [id number]        Your Vitals Were     Pulse Respirations Pulse Oximetry             62 20 96%          Blood Pressure from Last 3 Encounters:   17 106/75   17 (!) 200/92   17 104/69    Weight from Last 3 Encounters:   17 81.8 kg (180 lb 6.4 oz)   16 77.1 kg (170 lb)   16 81.6 kg (180 lb)              We Performed the Following     UA with Microscopic reflex to Culture     Urine Culture Aerobic Bacterial          Today's Medication Changes          These changes are accurate as of: 17 11:59 PM.  If you have any questions, ask your nurse or doctor.               These medicines have changed or have updated prescriptions.        Dose/Directions    clindamycin 1 % topical gel   Commonly known as:  CLINDAMAX   This may have changed:    - how to take this  - when to take this  - reasons to take this  - additional instructions   Used for:  Need for prophylactic vaccination against Hemophilus influenza type B (Hib), Insomnia, unspecified, Bronchitis with bronchospasm, Rash and other nonspecific skin eruption, Wheezing, Quadriplegia, unspecified (H), Unspecified constipation, Neurogenic bladder, NOS, Quadriplegia (H), Spasm, High cholesterol        Apply bid   Quantity:  60 g   Refills:  11                Primary Care Provider Office Phone # Fax #    Jason Long [phone]       PRIMARY CARE CENTER [address] [place] [address]        Equal Access to Services     MAYRA BOUDREAUX  AH: Hadii madalyn calvertmaksimsherry Soloali, waaxda luqadaha, qaybta kacharly hardin, hansel belloin hayaamary baltazarlitzy haas kev guthrie. So [facility] [phone].    ATENCIÓN: Si clifford gupta, tiene a still disposición servicios gratuitos de asistencia lingüística. Llame al [phone].    We comply with applicable federal civil rights laws and [place] laws. We do not discriminate on the basis of race, color, national origin, age, disability sex, sexual orientation or gender identity.            Thank you!     Thank you for choosing [facility]  for your care. Our goal is always to provide you with excellent care. Hearing back from our patients is one way we can continue to improve our services. Please take a few minutes to complete the written survey that you may receive in the mail after your visit with us. Thank you!             Your Updated Medication List - Protect others around you: Learn how to safely use, store and throw away your medicines at www.disposemymeds.org.          This list is accurate as of: 7/14/17 11:59 PM.  Always use your most recent med list.                   Brand Name Dispense Instructions for use Diagnosis    * albuterol (2.5 MG/3ML) 0.083% neb solution     1 Box    Take 1 vial (2.5 mg) by nebulization 4 times daily as needed    Bronchitis with bronchospasm, Need for prophylactic vaccination against Hemophilus influenza type B (Hib), Insomnia, unspecified, Rash and other nonspecific skin eruption, Wheezing, Quadriplegia, unspecified (H), Unspecified constipation, Neurogenic bladder, NOS, Quadriplegia (H), Spasm, High cholesterol       * albuterol 108 (90 BASE) MCG/ACT Inhaler    VENTOLIN HFA    3 Inhaler    Inhale 2 puffs into the lungs every 4 hours as needed    Wheezing, Bronchitis with bronchospasm, Quadriplegia (H), Spasm       * albuterol (2.5 MG/3ML) 0.083% neb solution     25 vial    Take 1 vial (2.5 mg) by nebulization every 6 hours as needed for shortness of breath / dyspnea or  wheezing    SOB (shortness of breath)       ALCOHOL PREP PADS      USE AS DIRECTED        amcinonide 0.1 % cream    CYCLOCORT    120 g    Apply topically 2 times daily as needed for itching    Eczema       ascorbic acid 1000 MG Tabs    vitamin C     Take 1 tablet by mouth daily        baclofen 20 MG tablet    LIORESAL    540 tablet    TAKE ONE TABLET BY MOUTH UP TO 6 TIMES DAILY    Quadriplegia, unspecified (H), Quadriplegia (H), Spasm, Groin pain, unspecified laterality       * bisacodyl 10 MG Suppository    DULCOLAX     Place 20 mg rectally every 3 days Active ingredient in Magic Bullet (10mg per suppository)        * MAGIC BULLETS 10 MG Suppository   Generic drug:  bisacodyl     30 suppository    Place 1 suppository (10 mg) rectally daily as needed for constipation    Constipation       cholecalciferol 1000 UNIT tablet    vitamin D    90 tablet    Take 1 tablet (1,000 Units) by mouth daily    Quadriplegia, unspecified (H)       clindamycin 1 % topical gel    CLINDAMAX    60 g    Apply bid    Need for prophylactic vaccination against Hemophilus influenza type B (Hib), Insomnia, unspecified, Bronchitis with bronchospasm, Rash and other nonspecific skin eruption, Wheezing, Quadriplegia, unspecified (H), Unspecified constipation, Neurogenic bladder, NOS, Quadriplegia (H), Spasm, High cholesterol       Cotton Swabs Swab     450 each    Please dispense sterile wrapped cotton swabs; use up to five/day for medical needs    Quadriplegia, unspecified (H), Need for prophylactic vaccination against Hemophilus influenza type B (Hib), Insomnia, unspecified, Bronchitis with bronchospasm, Rash and other nonspecific skin eruption, Wheezing, Unspecified constipation, Neurogenic bladder, NOS, Quadriplegia (H), Spasm, High cholesterol       diazepam 10 MG tablet    VALIUM    90 tablet    TAKE 1 TABLET BY MOUTH ONCE DAILY AS DIRECTED    Bronchitis with bronchospasm, Insomnia, unspecified, Rash and other nonspecific skin eruption,  "Wheezing, Quadriplegia, unspecified (H), Groin pain, unspecified laterality, Quadriplegia (H), Spasm       docusate sodium 100 MG capsule    DOCQLACE    90 capsule    Take 1 capsule (100 mg) by mouth daily    Insomnia, unspecified, Bronchitis with bronchospasm, Rash and other nonspecific skin eruption, Wheezing, Quadriplegia, unspecified (H), Quadriplegia (H), Spasm, High cholesterol       * Gauze Bandage 2\" Misc     360 each    IV STERILE GUAZE Use up to 4/day Supply 360 for three month supply    Quadriplegia, unspecified (H), Need for prophylactic vaccination against Hemophilus influenza type B (Hib), Insomnia, unspecified, Bronchitis with bronchospasm, Rash and other nonspecific skin eruption, Wheezing, Unspecified constipation, Neurogenic bladder, NOS, Quadriplegia (H), Spasm, High cholesterol       * Gauze Dressing 4\"X4\" Pads     720 each    NON STERILE GAUZE Use up to 8/day  Supply 720 for three month supply    Quadriplegia, unspecified (H), Need for prophylactic vaccination against Hemophilus influenza type B (Hib), Insomnia, unspecified, Bronchitis with bronchospasm, Rash and other nonspecific skin eruption, Wheezing, Unspecified constipation, Neurogenic bladder, NOS, Quadriplegia (H), Spasm, High cholesterol       hydrocortisone 2.5 % cream    ANUSOL-HC    120 g    Bid prn    Rash and other nonspecific skin eruption, Need for prophylactic vaccination against Hemophilus influenza type B (Hib), Insomnia, unspecified, Bronchitis with bronchospasm, Wheezing, Quadriplegia, unspecified (H), Unspecified constipation, Neurogenic bladder, NOS, Quadriplegia (H), Spasm       ibuprofen 800 MG tablet    ADVIL/MOTRIN    60 tablet    Take 1 tablet (800 mg) by mouth 3 times daily as needed    Quadriplegia, unspecified (H), Need for prophylactic vaccination against Hemophilus influenza type B (Hib), Insomnia, unspecified, Bronchitis with bronchospasm, Rash and other nonspecific skin eruption, Wheezing, Unspecified " constipation, Neurogenic bladder, NOS, Quadriplegia (H), Spasm, High cholesterol       lidocaine 2 % topical gel    XYLOCAINE     Apply  topically. APPLY AS DIRECTED        minocycline 50 MG capsule    MINOCIN/DYNACIN    60 capsule    Take 1 capsule (50 mg) by mouth 2 times daily    Rash and other nonspecific skin eruption, Quadriplegia (H)       montelukast 10 MG tablet    SINGULAIR    30 tablet    Take 1 tablet (10 mg) by mouth At Bedtime    Cough       MULTIVITAMIN & MINERAL PO      Take 1 capsule by mouth daily.        nitroGLYcerin 2 % Oint ointment    NITRO-BID    30 g    Place 0.5-1 inches (7.5-15 mg) onto the skin every 24 hours as needed for autonomic dysreflexia symptoms.  Remove ointment with gloved hand once symptoms resolve.        NORVASC PO      Take 2.5 mg by mouth daily as needed        nystatin 860199 UNIT/GM Powd    MYCOSTATIN    1 Bottle    Apply 1 dose topically 3 times daily as needed.    Rash and other nonspecific skin eruption, Quadriplegia, unspecified (H), Quadriplegia (H), Spasm, High cholesterol       order for DME     1 Units    Equipment being ordered: Other: Nebulizer Treatment Diagnosis: Mild intermittent asthma    Pneumonia of left lower lobe due to infectious organism (H), Mild intermittent asthma with acute exacerbation       oxybutynin 10 MG 24 hr tablet    DITROPAN-XL    90 tablet    Take 1 tablet (10 mg) by mouth daily    Quadriplegia, unspecified (H), Quadriplegia (H), Spasm       oxyCODONE 5 MG IR tablet    ROXICODONE     Take 1 tablet by mouth every 4 hours as needed.        sennosides 8.6 MG tablet    SENOKOT    90 each    Take 1 tablet by mouth daily    Constipation       sildenafil 100 MG cap/tab    VIAGRA    10 tablet    Take 1 tablet (100 mg) by mouth daily as needed TAKE 1 HOUR BEFORE NEEDED    Erectile dysfunction, Need for prophylactic vaccination against Hemophilus influenza type B (Hib), Insomnia, unspecified, Bronchitis with bronchospasm, Rash and other nonspecific  skin eruption, Wheezing, Quadriplegia, unspecified (H), Unspecified constipation, Neurogenic bladder, NOS, Quadriplegia (H), Spasm, High cholesterol       sodium chloride 3 % Nebu neb solution     500 mL    Take 3 mLs by nebulization every 3 hours as needed for wheezing    Pneumonia       terbinafine 1 % cream    lamISIL     Apply topically daily as needed        URINARY LEG BAG      USE AS NEEDED        Urostomy Night Bag Misc     9 Bottle    Please dispense three night bags/month (three month worth at a time) with 4 refills re: needed for chronic urinary dysfunction due to quadreplegia. Type: Bard 2000 ml.    Quadriplegia, unspecified (H), Need for prophylactic vaccination against Hemophilus influenza type B (Hib), Insomnia, unspecified, Bronchitis with bronchospasm, Rash and other nonspecific skin eruption, Wheezing, Unspecified constipation, Neurogenic bladder, NOS, Quadriplegia (H), Spasm, High cholesterol       VICKS DAYQUIL COLD & FLU 10-5-325 MG/15ML Liqd   Generic drug:  DM-Phenylephrine-Acetaminophen      Take by mouth as needed        VICKS NYQUIL COLD & FLU NIGHT 15-6. MG/15ML Liqd   Generic drug:  DM-Doxylamine-Acetaminophen      Take by mouth as needed        zolpidem 10 MG tablet    AMBIEN    90 tablet    TAKE 1/2 TO 1 TABLET BY MOUTH AT BEDTIME    Insomnia, unspecified, Bronchitis with bronchospasm, Rash and other nonspecific skin eruption, Wheezing, Quadriplegia, unspecified (H), Groin pain, unspecified laterality, Quadriplegia (H), Spasm       * Notice:  This list has 7 medication(s) that are the same as other medications prescribed for you. Read the directions carefully, and ask your doctor or other care provider to review them with you.

## 2017-07-14 NOTE — PATIENT INSTRUCTIONS
[facility] Medication Refill Request Information:  * Please contact your pharmacy regarding ANY request for medication refills.  ** [facility] Prescription Fax = [phone]  * Please allow 3 business days for routine medication refills.  * Please allow 5 business days for controlled substance medication refills.     [facility] Test Result notification information:  *You will be notified with in 7-10 days of your appointment day regarding the results of your test.  If you are on MyChart you will be notified as soon as the provider has reviewed the results and signed off on them.    [facility] [phone] A

## 2017-07-14 NOTE — DISCHARGE INSTRUCTIONS
Take fosfomycin as directed.  Use nitroglycerin ointment as directed for autonomic dysreflexia as directed instead of using amlodipine.  Apply nitroglycerin ointment when symptoms of autonomic dysreflexia and hypertension develop.  Remove nitroglycerin ointment with a gloved hand as soon as symptoms resolve.    Follow-up with Dr. Webster as soon as possible.

## 2017-07-14 NOTE — ED NOTES
Bed: IN01  Expected date: 7/14/17  Expected time:   Means of arrival:   Comments:  Bart Spaulding C4 Quad with htn ? Autonomic dysreflexia from [facility]sc.

## 2017-07-14 NOTE — LETTER
Patient:  Bart Corea  :   1960  MRN:     [id number]        Mr. Bart Corea  [address] [facility] [address]        2017    Dear Mr. Corea,    Thank you for choosing the [facility] for your healthcare needs.  We appreciate the opportunity to serve you.    The following are your recent test results.     Here is a copy of your urine culture results. Take the fosfomycin and let us know if this does not improve your symptoms.     Results for orders placed or performed in visit on 17   UA with Microscopic reflex to Culture   Result Value Ref Range    Color Urine Yellow     Appearance Urine Slightly Cloudy     Glucose Urine Negative NEG mg/dL    Bilirubin Urine Negative NEG    Ketones Urine Negative NEG mg/dL    Specific Gravity Urine 1.004 1.003 - 1.035    Blood Urine Moderate (A) NEG    pH Urine 6.0 5.0 - 7.0 pH    Protein Albumin Urine Negative NEG mg/dL    Urobilinogen mg/dL 0.0 0.0 - 2.0 mg/dL    Nitrite Urine Negative NEG    Leukocyte Esterase Urine Large (A) NEG    Source Unspecified Urine     WBC Urine 114 (H) 0 - 2 /HPF    RBC Urine 13 (H) 0 - 2 /HPF    WBC Clumps Present (A) NEG /HPF    Bacteria Urine Few (A) NEG /HPF   Urine Culture Aerobic Bacterial   Result Value Ref Range    Specimen Description Unspecified Urine     Special Requests Specimen received in preservative     Culture Micro >100,000 colonies/mL Pseudomonas aeruginosa (A)     Micro Report Status FINAL 2017     Organism: >100,000 colonies/mL Pseudomonas aeruginosa        Susceptibility    >100,000 colonies/ml pseudomonas aeruginosa (janny) -  (no method available)     AMIKACIN 4 Susceptible  ug/mL     CEFEPIME 32 Resistant  ug/mL     CEFTAZIDIME 16 Intermediate  ug/mL     CIPROFLOXACIN >=4 Resistant  ug/mL     GENTAMICIN 4 Susceptible  ug/mL     LEVOFLOXACIN >=8 Resistant  ug/mL     Piperacillin/Tazo 32 Intermediate  ug/mL     TOBRAMYCIN <=1  Susceptible  ug/mL     MEROPENEM 1 Susceptible  ug/mL     Please contact your provider if you have any questions or concerns.  We look forward to serving your needs in the future.      Sincerely,    JC Jauregui CNP

## 2017-07-16 LAB
BACTERIA SPEC CULT: ABNORMAL
Lab: ABNORMAL
MICRO REPORT STATUS: ABNORMAL
MICROORGANISM SPEC CULT: ABNORMAL
SPECIMEN SOURCE: ABNORMAL

## 2017-07-18 ENCOUNTER — TELEPHONE (OUTPATIENT)
Dept: INTERNAL MEDICINE | Facility: CLINIC | Age: 57
End: 2017-07-18

## 2017-07-18 DIAGNOSIS — N39.0 URINARY TRACT INFECTION: Primary | ICD-10-CM

## 2017-07-18 RX ORDER — GRANULES FOR ORAL 3 G/1
3 POWDER ORAL
Qty: 2 PACKET | Refills: 0 | Status: SHIPPED | OUTPATIENT
Start: 2017-07-18 | End: 2017-07-20

## 2017-07-18 NOTE — TELEPHONE ENCOUNTER
[facility] Prior Authorization Team Request    Medication: Monurol 3gm packets  Dosin packet every 3 days  Qty: 2  Day Supply: 6  ND (required for Medicaid members): [address]-10     Insurance   BIN: [id number]  PCN: [id number]  Grp: [id number]  ID: [id number]    CoverMyMeds Key (if applicable):     Additional documentation:       Filling Pharmacy: [facility]  Phone Number: [phone]  Contact:    Pharmacy NPI (required for Medicaid members): [id number]

## 2017-07-19 ENCOUNTER — OFFICE VISIT (OUTPATIENT)
Dept: PHYSICAL MEDICINE AND REHAB | Facility: CLINIC | Age: 57
End: 2017-07-19

## 2017-07-19 VITALS — HEART RATE: 53 BPM | DIASTOLIC BLOOD PRESSURE: 60 MMHG | HEIGHT: 68 IN | SYSTOLIC BLOOD PRESSURE: 97 MMHG

## 2017-07-19 DIAGNOSIS — G90.4 AUTONOMIC DYSREFLEXIA: ICD-10-CM

## 2017-07-19 DIAGNOSIS — S14.105S: Primary | ICD-10-CM

## 2017-07-19 ASSESSMENT — PAIN SCALES - GENERAL: PAINLEVEL: NO PAIN (0)

## 2017-07-19 NOTE — TELEPHONE ENCOUNTER
PA Initiation    Medication: Monurol 3gm  Insurance Company: [employer] - Phone [phone] Fax [phone]  Pharmacy Filling the Rx: [facility] [place], [place] - [address] [place] [address] [phone]  Filling Pharmacy Phone: [phone]  Filling Pharmacy Fax: [phone]  Start Date: 7/18/2017

## 2017-07-19 NOTE — LETTER
"7/19/2017       RE: Batr Corea  [address]  [facility] [address]     Dear Colleague,    Thank you for referring your patient, Bart Corea, to the [facility] PHYSICAL MEDICINE AND REHABILITATION at [facility]. Please see a copy of my visit note below.    Jc is a 57-year-old gentleman with C4-5 complete spinal cord injury returns having seen me since March 2013. The recent weeks he had presented to primary care clinic and intern to the emergency department with autonomic dysreflexia and severe hypertension pushing systolics of 200. His normal baseline pressures systolics 70s to 90s.  There was a urine or tract infection that was identified and started on antibiotics though apparently there are some challenges with this being nonformulary and struggling to get authorization.  We have longer conversation looking at different potential contributions for the dysreflexia. He feels his catheter is flowing freely and is aware of flushing techniques. His bowels possibly are a little bit backed up but he does not feel this is to out of the ordinary. He's had a thorough skin evaluation through his personal care attendant and denies any ulcers or breakdown. He does have an ongoing cyst in the scrotum area but that's not new. In his past he had an abscess at the suprapubic catheter site about 7-8 years ago rated recent emergency room visit with CT of abdomen did show some inflammation in this area but this likely represents some chronic changes and not an abscess.    Since the emergency department visit his pressures have not been quite as high. He has continued to work but he deftly develops headache sweats. Jostling around such as going over bumps in his power wheelchair can exacerbate symptoms.    Physical exam:  BP 97/60  Pulse 53  Ht 5' 8\" (1.727 m)  Jc is alert pleasant excellent historian fluid speech and language  No diaphoresis  Arise with his power " wheelchair which appears to be fitting appropriately.  Suprapubic catheter the draining clear yellow urine and reservoir.  He is had recent thorough skin check another exam this is deferred today    Assessment and recommendations:  1. The etiology of wakes recent dysreflexia does not fully clear but may represent the urinary tract infection and continuing antibiotic treatment for this is very appropriate.  2. Reviewed details of different potential continuing factors and he and his caregivers will monitor for these. In particular blocked catheter, skin irritation or pressure sores, and bowel program with constipation possibly hemorrhoids. Kidney stones on the differential and he is to follow up with urology this next week to further explore that.  3. Management of the higher pressures should always start with tying to identify the source. We reviewed the challenges of giving longer acting antihypertensives as concern is once the pain or drive for higher pressures is relieved, the medication on board can be associated with particularly low blood pressures. That's where the topical Nitropaste which he was prescribed can be wiped off and generally stops its efficacy in a shorter period of time. Alternates of oral medications are all longer half-life.  4. Deftly willing to see Jc Ladd if rehabilitation issues arise otherwise if this settles down we could see him every 1-2 years.  5. Equipment seems to be up-to-date and functioning no orders today.  6. No change in vacation including 4 spasticity he uses baclofen and diazepam for breakthrough.    40 minutes spent in direct patient interaction, greater than 50% in education and counseling    Again, thank you for allowing me to participate in the care of your patient.      Sincerely,    Jaziel Webster MD

## 2017-07-19 NOTE — MR AVS SNAPSHOT
After Visit Summary   7/19/2017    Bart Corea    MRN: [id number]           Patient Information     Date Of Birth          1960        Visit Information        Provider Department      7/19/2017 8:40 AM Jaziel Webster MD [facility] Rehabilitation        Today's Diagnoses     Injury at C5 level of cervical spinal cord, sequela (H)    -  1    Autonomic dysreflexia           Follow-ups after your visit        Your next 10 appointments already scheduled     Apr 09, 2018 10:45 AM CDT   US RENAL COMPLETE with UCUS1   [facility] ([facility])    [address] [place] [address] [id number] [phone]           Please bring a list of your medicines (including vitamins, minerals and over-the-counter drugs). Also, tell your doctor about any allergies you may have. Wear comfortable clothes and leave your valuables at home.  You do not need to do anything special to prepare for your exam.  Please call the Imaging Department at your exam site with any questions.            Apr 09, 2018 12:00 PM CDT   LAB with [facility] ([facility])    [address] [place] [id number] [phone]           Patient must bring picture ID. Patient should be prepared to give a urine specimen  Please do not eat 10-12 hours before your appointment if you are coming in fasting for labs on lipids, cholesterol, or glucose (sugar). Pregnant women should follow their Care Team instructions. Water with medications is okay. Do not drink coffee or other fluids. If you have concerns about taking  your medications, please ask at office or if scheduling via [employer], send a message by clicking on Secure Messaging, Message Your Care Team.            Apr 09, 2018  1:00 PM CDT   (Arrive by 12:45 PM)   Return Visit with Obi Uriostegui MD   [facility]  "Urologic C[facility] ([facility])    [address]  [address] [facility] [id number] [phone]              Who to contact     Please call your clinic at [phone] to:    Ask questions about your health    Make or cancel appointments    Discuss your medicines    Learn about your test results    Speak to your doctor   If you have compliments or concerns about an experience at your clinic, or if you wish to file a complaint, please contact [facility] Patient Relations at [phone] or email us at [email or web address]         Additional Information About Your Visit        [employer] Information     [employer] gives you secure access to your electronic health record. If you see a primary care provider, you can also send messages to your care team and make appointments. If you have questions, please call your primary care clinic.  If you do not have a primary care provider, please call [phone] and they will assist you.      [employer] is an electronic gateway that provides easy, online access to your medical records. With [employer], you can request a clinic appointment, read your test results, renew a prescription or communicate with your care team.     To access your existing account, please contact your [facility] or call [phone] for assistance.        Care EveryWhere ID     This is your Care EveryWhere ID. This could be used by other organizations to access your [place] medical records  [id number]        Your Vitals Were     Pulse Height                53 5' 8\" (1.727 m)           Blood Pressure from Last 3 Encounters:   07/19/17 97/60   07/14/17 106/75   07/14/17 (!) 200/92    Weight from Last 3 Encounters:   07/14/17 180 lb 6.4 oz (81.8 kg)   08/28/16 170 lb (77.1 kg)   06/20/16 180 lb (81.6 kg)              Today, you had the following     No orders found for display         Today's Medication Changes    "       These changes are accurate as of: 7/19/17 11:59 PM.  If you have any questions, ask your nurse or doctor.               These medicines have changed or have updated prescriptions.        Dose/Directions    clindamycin 1 % topical gel   Commonly known as:  CLINDAMAX   This may have changed:    - how to take this  - when to take this  - reasons to take this  - additional instructions   Used for:  Need for prophylactic vaccination against Hemophilus influenza type B (Hib), Insomnia, unspecified, Bronchitis with bronchospasm, Rash and other nonspecific skin eruption, Wheezing, Quadriplegia, unspecified (H), Unspecified constipation, Neurogenic bladder, NOS, Quadriplegia (H), Spasm, High cholesterol        Apply bid   Quantity:  60 g   Refills:  11                Primary Care Provider Office Phone # Fax #    Jason Long [phone]       [facility] [address] [place] [id number]        Equal Access to Services     MAYRA BOUDREAUX : Hadii aad ku hadasho Solouis, waaxda luqadaha, qaybta kaalmada adeegyada, hansel angulo . So [facility] [phone].    ATENCIÓN: Si habla español, tiene a still disposición servicios gratuitos de asistencia lingüística. Marquis al [phone].    We comply with applicable federal civil rights laws and [place] laws. We do not discriminate on the basis of race, color, national origin, age, disability sex, sexual orientation or gender identity.            Thank you!     Thank you for choosing [facility] PHYSICAL MEDICINE AND REHABILITATION  for your care. Our goal is always to provide you with excellent care. Hearing back from our patients is one way we can continue to improve our services. Please take a few minutes to complete the written survey that you may receive in the mail after your visit with us. Thank you!             Your Updated Medication List - Protect others around you: Learn how to safely use, store and throw  away your medicines at www.disposemymeds.org.          This list is accurate as of: 7/19/17 11:59 PM.  Always use your most recent med list.                   Brand Name Dispense Instructions for use Diagnosis    * albuterol (2.5 MG/3ML) 0.083% neb solution     1 Box    Take 1 vial (2.5 mg) by nebulization 4 times daily as needed    Bronchitis with bronchospasm, Need for prophylactic vaccination against Hemophilus influenza type B (Hib), Insomnia, unspecified, Rash and other nonspecific skin eruption, Wheezing, Quadriplegia, unspecified (H), Unspecified constipation, Neurogenic bladder, NOS, Quadriplegia (H), Spasm, High cholesterol       * albuterol (2.5 MG/3ML) 0.083% neb solution     25 vial    Take 1 vial (2.5 mg) by nebulization every 6 hours as needed for shortness of breath / dyspnea or wheezing    SOB (shortness of breath)       ALCOHOL PREP PADS      USE AS DIRECTED        amcinonide 0.1 % cream    CYCLOCORT    120 g    Apply topically 2 times daily as needed for itching    Eczema       ascorbic acid 1000 MG Tabs    vitamin C     Take 1 tablet by mouth daily        baclofen 20 MG tablet    LIORESAL    540 tablet    TAKE ONE TABLET BY MOUTH UP TO 6 TIMES DAILY    Quadriplegia, unspecified (H), Quadriplegia (H), Spasm, Groin pain, unspecified laterality       * bisacodyl 10 MG Suppository    DULCOLAX     Place 20 mg rectally every 3 days Active ingredient in Magic Bullet (10mg per suppository)        * MAGIC BULLETS 10 MG Suppository   Generic drug:  bisacodyl     30 suppository    Place 1 suppository (10 mg) rectally daily as needed for constipation    Constipation       cholecalciferol 1000 UNIT tablet    vitamin D    90 tablet    Take 1 tablet (1,000 Units) by mouth daily    Quadriplegia, unspecified (H)       clindamycin 1 % topical gel    CLINDAMAX    60 g    Apply bid    Need for prophylactic vaccination against Hemophilus influenza type B (Hib), Insomnia, unspecified, Bronchitis with bronchospasm, Rash  "and other nonspecific skin eruption, Wheezing, Quadriplegia, unspecified (H), Unspecified constipation, Neurogenic bladder, NOS, Quadriplegia (H), Spasm, High cholesterol       Cotton Swabs Swab     450 each    Please dispense sterile wrapped cotton swabs; use up to five/day for medical needs    Quadriplegia, unspecified (H), Need for prophylactic vaccination against Hemophilus influenza type B (Hib), Insomnia, unspecified, Bronchitis with bronchospasm, Rash and other nonspecific skin eruption, Wheezing, Unspecified constipation, Neurogenic bladder, NOS, Quadriplegia (H), Spasm, High cholesterol       diazepam 10 MG tablet    VALIUM    90 tablet    TAKE 1 TABLET BY MOUTH ONCE DAILY AS DIRECTED    Bronchitis with bronchospasm, Insomnia, unspecified, Rash and other nonspecific skin eruption, Wheezing, Quadriplegia, unspecified (H), Groin pain, unspecified laterality, Quadriplegia (H), Spasm       fosfomycin 3 G Packet    MONUROL    2 packet    Take 1 packet (3 g) by mouth every 3 days Dissolve contents into 3 to 4 ounces of water and stir, then administer; do not use hot water.    Urinary tract infection       * Gauze Bandage 2\" Misc     360 each    IV STERILE GUAZE Use up to 4/day Supply 360 for three month supply    Quadriplegia, unspecified (H), Need for prophylactic vaccination against Hemophilus influenza type B (Hib), Insomnia, unspecified, Bronchitis with bronchospasm, Rash and other nonspecific skin eruption, Wheezing, Unspecified constipation, Neurogenic bladder, NOS, Quadriplegia (H), Spasm, High cholesterol       * Gauze Dressing 4\"X4\" Pads     720 each    NON STERILE GAUZE Use up to 8/day  Supply 720 for three month supply    Quadriplegia, unspecified (H), Need for prophylactic vaccination against Hemophilus influenza type B (Hib), Insomnia, unspecified, Bronchitis with bronchospasm, Rash and other nonspecific skin eruption, Wheezing, Unspecified constipation, Neurogenic bladder, NOS, Quadriplegia (H), " Spasm, High cholesterol       hydrocortisone 2.5 % cream    ANUSOL-HC    120 g    Bid prn    Rash and other nonspecific skin eruption, Need for prophylactic vaccination against Hemophilus influenza type B (Hib), Insomnia, unspecified, Bronchitis with bronchospasm, Wheezing, Quadriplegia, unspecified (H), Unspecified constipation, Neurogenic bladder, NOS, Quadriplegia (H), Spasm       ibuprofen 800 MG tablet    ADVIL/MOTRIN    60 tablet    Take 1 tablet (800 mg) by mouth 3 times daily as needed    Quadriplegia, unspecified (H), Need for prophylactic vaccination against Hemophilus influenza type B (Hib), Insomnia, unspecified, Bronchitis with bronchospasm, Rash and other nonspecific skin eruption, Wheezing, Unspecified constipation, Neurogenic bladder, NOS, Quadriplegia (H), Spasm, High cholesterol       lidocaine 2 % topical gel    XYLOCAINE     Apply  topically. APPLY AS DIRECTED        montelukast 10 MG tablet    SINGULAIR    30 tablet    Take 1 tablet (10 mg) by mouth At Bedtime    Cough       MULTIVITAMIN & MINERAL PO      Take 1 capsule by mouth daily.        nitroGLYcerin 2 % Oint ointment    NITRO-BID    30 g    Place 0.5-1 inches (7.5-15 mg) onto the skin every 24 hours as needed for autonomic dysreflexia symptoms.  Remove ointment with gloved hand once symptoms resolve.        NORVASC PO      Take 2.5 mg by mouth daily as needed        nystatin 485991 UNIT/GM Powd    MYCOSTATIN    1 Bottle    Apply 1 dose topically 3 times daily as needed.    Rash and other nonspecific skin eruption, Quadriplegia, unspecified (H), Quadriplegia (H), Spasm, High cholesterol       order for DME     1 Units    Equipment being ordered: Other: Nebulizer Treatment Diagnosis: Mild intermittent asthma    Pneumonia of left lower lobe due to infectious organism (H), Mild intermittent asthma with acute exacerbation       oxyCODONE 5 MG IR tablet    ROXICODONE     Take 1 tablet by mouth every 4 hours as needed.        sennosides 8.6 MG  tablet    SENOKOT    90 each    Take 1 tablet by mouth daily    Constipation       sildenafil 100 MG cap/tab    VIAGRA    10 tablet    Take 1 tablet (100 mg) by mouth daily as needed TAKE 1 HOUR BEFORE NEEDED    Erectile dysfunction, Need for prophylactic vaccination against Hemophilus influenza type B (Hib), Insomnia, unspecified, Bronchitis with bronchospasm, Rash and other nonspecific skin eruption, Wheezing, Quadriplegia, unspecified (H), Unspecified constipation, Neurogenic bladder, NOS, Quadriplegia (H), Spasm, High cholesterol       terbinafine 1 % cream    lamISIL     Apply topically daily as needed        URINARY LEG BAG      USE AS NEEDED        Urostomy Night Bag Misc     9 Bottle    Please dispense three night bags/month (three month worth at a time) with 4 refills re: needed for chronic urinary dysfunction due to quadreplegia. Type: Bard 2000 ml.    Quadriplegia, unspecified (H), Need for prophylactic vaccination against Hemophilus influenza type B (Hib), Insomnia, unspecified, Bronchitis with bronchospasm, Rash and other nonspecific skin eruption, Wheezing, Unspecified constipation, Neurogenic bladder, NOS, Quadriplegia (H), Spasm, High cholesterol       VICKS DAYQUIL COLD & FLU 10-5-325 MG/15ML Liqd   Generic drug:  DM-Phenylephrine-Acetaminophen      Take by mouth as needed        VICKS NYQUIL COLD & FLU NIGHT 15-6. MG/15ML Liqd   Generic drug:  DM-Doxylamine-Acetaminophen      Take by mouth as needed        zolpidem 10 MG tablet    AMBIEN    90 tablet    TAKE 1/2 TO 1 TABLET BY MOUTH AT BEDTIME    Insomnia, unspecified, Bronchitis with bronchospasm, Rash and other nonspecific skin eruption, Wheezing, Quadriplegia, unspecified (H), Groin pain, unspecified laterality, Quadriplegia (H), Spasm       * Notice:  This list has 6 medication(s) that are the same as other medications prescribed for you. Read the directions carefully, and ask your doctor or other care provider to review them with you.

## 2017-07-19 NOTE — NURSING NOTE
Chief Complaint   Patient presents with     Consult     Spinal cord/ abominal spasms    Harvey Lucero CMA

## 2017-07-20 RX ORDER — GRANULES FOR ORAL 3 G/1
3 POWDER ORAL
Qty: 2 PACKET | Refills: 0 | Status: SHIPPED | OUTPATIENT
Start: 2017-07-20 | End: 2020-01-10

## 2017-07-20 NOTE — TELEPHONE ENCOUNTER
Prior Authorization Approval    Authorization Effective Date: 7/20/2017  Authorization Expiration Date: 7/20/2017  Medication: Monurol 3gm-APPROVED  Approved Dose/Quantity:    Reference #: [id number]   Insurance Company: [employer] - Phone [phone] Fax [phone]  Expected CoPay: $0.00     CoPay Card Available:      Foundation Assistance Needed:    Which Pharmacy is filling the prescription (Not needed for infusion/clinic administered): [facility] [address] [id number]  Pharmacy Notified: Yes  Patient Notified: Yes

## 2017-07-24 DIAGNOSIS — J20.9 BRONCHITIS WITH BRONCHOSPASM: ICD-10-CM

## 2017-07-24 DIAGNOSIS — R21 RASH AND OTHER NONSPECIFIC SKIN ERUPTION: ICD-10-CM

## 2017-07-24 DIAGNOSIS — R25.2 SPASM: ICD-10-CM

## 2017-07-24 DIAGNOSIS — R06.2 WHEEZING: ICD-10-CM

## 2017-07-24 DIAGNOSIS — G82.50 QUADRIPLEGIA (H): ICD-10-CM

## 2017-07-24 DIAGNOSIS — E78.00 HIGH CHOLESTEROL: ICD-10-CM

## 2017-07-24 DIAGNOSIS — J18.9 PNEUMONIA: ICD-10-CM

## 2017-07-24 DIAGNOSIS — G82.50 QUADRIPLEGIA, UNSPECIFIED (H): ICD-10-CM

## 2017-07-24 DIAGNOSIS — G47.00 INSOMNIA, UNSPECIFIED: ICD-10-CM

## 2017-07-24 RX ORDER — DOCUSATE SODIUM 100 MG/1
100 CAPSULE, LIQUID FILLED ORAL DAILY
Qty: 90 CAPSULE | Refills: 3 | Status: SHIPPED | OUTPATIENT
Start: 2017-07-24 | End: 2018-05-17

## 2017-07-24 RX ORDER — OXYBUTYNIN CHLORIDE 10 MG/1
10 TABLET, EXTENDED RELEASE ORAL DAILY
Qty: 90 TABLET | Refills: 3 | Status: SHIPPED | OUTPATIENT
Start: 2017-07-24 | End: 2018-05-17

## 2017-07-24 RX ORDER — ALBUTEROL SULFATE 90 UG/1
2 AEROSOL, METERED RESPIRATORY (INHALATION) EVERY 4 HOURS PRN
Qty: 3 INHALER | Refills: 1 | Status: SHIPPED | OUTPATIENT
Start: 2017-07-24 | End: 2018-03-13

## 2017-07-24 NOTE — TELEPHONE ENCOUNTER
albuterol (VENTOLIN HFA) 108 (90 BASE) MCG/ACT inhaler  Last Written Prescription Date:  4/22/16  Last Fill Quantity: 3 inhaler,   # refills: 1  Last Office Visit with G, P or [facility] prescribing provider: 7/14/17  Future Office visit:   none       minocycline (MINOCIN/DYNACIN) 50 MG capsule   Last Written Prescription Date:  3/22/17  Last Fill Quantity: 60,   # refills: 3    oxybutynin (DITROPAN-XL) 10 MG 24 hr tablet   Last Written Prescription Date:  4/23/17  Last Fill Quantity: 90,   # refills: 0       sodium chloride 3 % NEBU   Last Written Prescription Date:  9/7/16  Last Fill Quantity: 500ml,   # refills: 0      Routed because: minocycline (MINOCIN/DYNACIN) 50 MG capsule.    Medium med alert.    sodium chloride 3 % NEBU.  Not on SO protocol

## 2017-07-24 NOTE — TELEPHONE ENCOUNTER
docusate sodium (DOCQLACE) 100 MG capsule     Last Written Prescription Date:  4/23/17  Last Fill Quantity: 90,   # refills: 0  Last Office Visit with [facility], [facility] or [facility] prescribing provider: 7/14/17  Future Office visit:   none

## 2017-07-25 RX ORDER — SODIUM CHLORIDE FOR INHALATION 3 %
3 VIAL, NEBULIZER (ML) INHALATION
Qty: 500 ML | Refills: 0 | Status: SHIPPED | OUTPATIENT
Start: 2017-07-25 | End: 2018-05-17

## 2017-07-25 RX ORDER — MINOCYCLINE HYDROCHLORIDE 50 MG/1
50 CAPSULE ORAL 2 TIMES DAILY
Qty: 180 CAPSULE | Refills: 3 | Status: SHIPPED | OUTPATIENT
Start: 2017-07-25 | End: 2018-05-17

## 2017-08-02 NOTE — PROGRESS NOTES
"Jc is a 57-year-old gentleman with C4-5 complete spinal cord injury returns having seen me since March 2013. The recent weeks he had presented to primary care clinic and intern to the emergency department with autonomic dysreflexia and severe hypertension pushing systolics of 200. His normal baseline pressures systolics 70s to 90s.  There was a urine or tract infection that was identified and started on antibiotics though apparently there are some challenges with this being nonformulary and struggling to get authorization.  We have longer conversation looking at different potential contributions for the dysreflexia. He feels his catheter is flowing freely and is aware of flushing techniques. His bowels possibly are a little bit backed up but he does not feel this is to out of the ordinary. He's had a thorough skin evaluation through his personal care attendant and denies any ulcers or breakdown. He does have an ongoing cyst in the scrotum area but that's not new. In his past he had an abscess at the suprapubic catheter site about 7-8 years ago rated recent emergency room visit with CT of abdomen did show some inflammation in this area but this likely represents some chronic changes and not an abscess.    Since the emergency department visit his pressures have not been quite as high. He has continued to work but he deftly develops headache sweats. Jostling around such as going over bumps in his power wheelchair can exacerbate symptoms.    Physical exam:  BP 97/60  Pulse 53  Ht 5' 8\" (1.727 m)  Jc is alert pleasant excellent historian fluid speech and language  No diaphoresis  Arise with his power wheelchair which appears to be fitting appropriately.  Suprapubic catheter the draining clear yellow urine and reservoir.  He is had recent thorough skin check another exam this is deferred today    Assessment and recommendations:  1. The etiology of wakes recent dysreflexia does not fully clear but may represent the " urinary tract infection and continuing antibiotic treatment for this is very appropriate.  2. Reviewed details of different potential continuing factors and he and his caregivers will monitor for these. In particular blocked catheter, skin irritation or pressure sores, and bowel program with constipation possibly hemorrhoids. Kidney stones on the differential and he is to follow up with urology this next week to further explore that.  3. Management of the higher pressures should always start with tying to identify the source. We reviewed the challenges of giving longer acting antihypertensives as concern is once the pain or drive for higher pressures is relieved, the medication on board can be associated with particularly low blood pressures. That's where the topical Nitropaste which he was prescribed can be wiped off and generally stops its efficacy in a shorter period of time. Alternates of oral medications are all longer half-life.  4. Definitely willing to see Jc sooner if rehabilitation issues arise otherwise if this settles down we could see him every 1-2 years.  5. Equipment seems to be up-to-date and functioning no orders today.  6. No change in vacation including 4 spasticity he uses baclofen and diazepam for breakthrough.    40 minutes spent in direct patient interaction, greater than 50% in education and counseling

## 2017-08-20 ENCOUNTER — MEDICAL CORRESPONDENCE (OUTPATIENT)
Dept: HEALTH INFORMATION MANAGEMENT | Facility: CLINIC | Age: 57
End: 2017-08-20

## 2017-08-28 DIAGNOSIS — S14.105A: ICD-10-CM

## 2017-08-28 DIAGNOSIS — N31.9 NEUROGENIC BLADDER: ICD-10-CM

## 2017-08-28 DIAGNOSIS — S14.104A: Primary | ICD-10-CM

## 2017-08-28 DIAGNOSIS — S14.106A: ICD-10-CM

## 2017-10-19 ENCOUNTER — MEDICAL CORRESPONDENCE (OUTPATIENT)
Dept: HEALTH INFORMATION MANAGEMENT | Facility: CLINIC | Age: 57
End: 2017-10-19

## 2017-10-20 ENCOUNTER — MEDICAL CORRESPONDENCE (OUTPATIENT)
Dept: HEALTH INFORMATION MANAGEMENT | Facility: CLINIC | Age: 57
End: 2017-10-20

## 2017-11-10 DIAGNOSIS — E78.00 HIGH CHOLESTEROL: ICD-10-CM

## 2017-11-10 DIAGNOSIS — R06.2 WHEEZING: ICD-10-CM

## 2017-11-10 DIAGNOSIS — R25.2 SPASM: ICD-10-CM

## 2017-11-10 DIAGNOSIS — R10.30 GROIN PAIN, UNSPECIFIED LATERALITY: ICD-10-CM

## 2017-11-10 DIAGNOSIS — G47.00 INSOMNIA: ICD-10-CM

## 2017-11-10 DIAGNOSIS — R21 RASH AND OTHER NONSPECIFIC SKIN ERUPTION: ICD-10-CM

## 2017-11-10 DIAGNOSIS — G82.50 QUADRIPLEGIA (H): ICD-10-CM

## 2017-11-10 DIAGNOSIS — J20.9 BRONCHITIS WITH BRONCHOSPASM: ICD-10-CM

## 2017-11-13 RX ORDER — NYSTATIN 100000 [USP'U]/G
POWDER TOPICAL
Qty: 60 G | Refills: 2 | Status: SHIPPED | OUTPATIENT
Start: 2017-11-13 | End: 2018-05-17

## 2017-11-13 NOTE — TELEPHONE ENCOUNTER
zolpidem         Last Written Prescription Date:  5/18/17  Last Fill Quantity: 90,   # refills: 1  Last Office Visit : 7/14/17  Future Office visit:  NONE  Routing refill request to provider for review/approval because:  Drug not on  refill protocol or controlled substance    diazepam     Last Written Prescription Date:  5/18/17  Last Fill Quantity: 90,   # refills: 1  Routing refill request to provider for review/approval because:  Drug not on refill protocol or controlled substance    nystatin        Last Written Prescription Date:  10/26/16  Last Fill Quantity: 1BOT,   # refills: 5

## 2017-11-14 RX ORDER — DIAZEPAM 10 MG
10 TABLET ORAL DAILY
Qty: 90 TABLET | Refills: 1
Start: 2017-11-14 | End: 2018-05-04

## 2017-11-14 RX ORDER — ZOLPIDEM TARTRATE 10 MG/1
TABLET ORAL
Qty: 90 TABLET | Refills: 1
Start: 2017-11-14 | End: 2018-05-04

## 2018-01-04 ENCOUNTER — OFFICE VISIT (OUTPATIENT)
Dept: OPHTHALMOLOGY | Facility: CLINIC | Age: 58
End: 2018-01-04
Attending: OPHTHALMOLOGY
Payer: COMMERCIAL

## 2018-01-04 DIAGNOSIS — H25.013 CORTICAL AGE-RELATED CATARACT OF BOTH EYES: Primary | ICD-10-CM

## 2018-01-04 DIAGNOSIS — H52.203 MYOPIC ASTIGMATISM OF BOTH EYES: ICD-10-CM

## 2018-01-04 DIAGNOSIS — H43.812 POSTERIOR VITREOUS DETACHMENT OF LEFT EYE: ICD-10-CM

## 2018-01-04 DIAGNOSIS — H04.123 BILATERAL DRY EYES: ICD-10-CM

## 2018-01-04 DIAGNOSIS — H52.13 MYOPIC ASTIGMATISM OF BOTH EYES: ICD-10-CM

## 2018-01-04 DIAGNOSIS — H52.4 PRESBYOPIA: ICD-10-CM

## 2018-01-04 PROCEDURE — G0463 HOSPITAL OUTPT CLINIC VISIT: HCPCS | Mod: ZF

## 2018-01-04 PROCEDURE — 92015 DETERMINE REFRACTIVE STATE: CPT | Mod: ZF

## 2018-01-04 ASSESSMENT — CUP TO DISC RATIO
OS_RATIO: 0.2
OD_RATIO: 0.2

## 2018-01-04 ASSESSMENT — VISUAL ACUITY
CORRECTION_TYPE: GLASSES
METHOD: SNELLEN - LINEAR
OS_CC: 20/20
OS_CC: J1+
OD_CC: J1+
OD_CC: 20/25
OS_CC+: -2

## 2018-01-04 ASSESSMENT — REFRACTION_MANIFEST
OS_AXIS: 080
OD_ADD: +1.50
OS_CYLINDER: +3.00
OD_AXIS: 097
OS_SPHERE: -4.00
OD_CYLINDER: +3.75
OD_SPHERE: -4.50
OS_ADD: +1.50

## 2018-01-04 ASSESSMENT — REFRACTION_WEARINGRX
OD_AXIS: 097
OS_CYLINDER: +3.00
OS_ADD: +1.50
OS_AXIS: 080
OD_SPHERE: -4.50
OD_ADD: +1.50
OS_SPHERE: -4.00
OD_CYLINDER: +3.75

## 2018-01-04 ASSESSMENT — SLIT LAMP EXAM - LIDS
COMMENTS: NORMAL
COMMENTS: NORMAL

## 2018-01-04 ASSESSMENT — TONOMETRY
IOP_METHOD: TONOPEN
OD_IOP_MMHG: 05
OS_IOP_MMHG: 06

## 2018-01-04 ASSESSMENT — EXTERNAL EXAM - RIGHT EYE: OD_EXAM: NORMAL

## 2018-01-04 ASSESSMENT — CONF VISUAL FIELD
OD_NORMAL: 1
OS_NORMAL: 1

## 2018-01-04 ASSESSMENT — EXTERNAL EXAM - LEFT EYE: OS_EXAM: NORMAL

## 2018-01-04 NOTE — MR AVS SNAPSHOT
After Visit Summary   1/4/2018    Bart Corea    MRN: [id number]           Patient Information     Date Of Birth          1960        Visit Information        Provider Department      1/4/2018 9:00 AM Kris Joseph MD [facility]        Today's Diagnoses     Cortical age-related cataract of both eyes    -  1    Posterior vitreous detachment of left eye        Bilateral dry eyes        Myopic astigmatism of both eyes        Presbyopia           Follow-ups after your visit        Follow-up notes from your care team     Return in about 1 year (around 1/4/2019) for 1-2 years, Annual Visit, DFE.      Your next 10 appointments already scheduled     Apr 09, 2018 10:45 AM CDT   US RENAL COMPLETE with UCUS1   [facility] US ([facility])    [address]  [address] [facility] [address] [phone]           Please bring a list of your medicines (including vitamins, minerals and over-the-counter drugs). Also, tell your doctor about any allergies you may have. Wear comfortable clothes and leave your valuables at home.  You do not need to do anything special to prepare for your exam.  Please call the Imaging Department at your exam site with any questions.            Apr 09, 2018 12:00 PM CDT   LAB with  LAB   [facility] ([facility])    [address] [address] [phone]           Please do not eat 10-12 hours before your appointment if you are coming in fasting for labs on lipids, cholesterol, or glucose (sugar). This does not apply to pregnant women. Water, hot tea and black coffee (with nothing added) are okay. Do not drink other fluids, diet soda or chew gum.            Apr 09, 2018  1:00 PM CDT   (Arrive by 12:45 PM)   Return Visit with Obi Uriostegui MD   [facility] ([facility])    [facility]  [address] [id number] [phone]              Who to contact     Please call your clinic at [phone] to:    Ask questions about your health    Make or cancel appointments    Discuss your medicines    Learn about your test results    Speak to your doctor   If you have compliments or concerns about an experience at your clinic, or if you wish to file a complaint, please contact [facility] Patient Relations at [phone] or email us at [email or web address]         Additional Information About Your Visit        [employer] Information     [employer] gives you secure access to your electronic health record. If you see a primary care provider, you can also send messages to your care team and make appointments. If you have questions, please call your primary care clinic.  If you do not have a primary care provider, please call [phone] and they will assist you.      [employer] is an electronic gateway that provides easy, online access to your medical records. With [employer], you can request a clinic appointment, read your test results, renew a prescription or communicate with your care team.     To access your existing account, please contact your [facility] Clinic or call [phone] for assistance.        Care EveryWhere ID     This is your Care EveryWhere ID. This could be used by other organizations to access your [facility] medical records  [id number]         Blood Pressure from Last 3 Encounters:   07/19/17 97/60   07/14/17 106/75   07/14/17 (!) 200/92    Weight from Last 3 Encounters:   07/14/17 81.8 kg (180 lb 6.4 oz)   08/28/16 77.1 kg (170 lb)   06/20/16 81.6 kg (180 lb)              Today, you had the following     No orders found for display         Today's Medication Changes          These changes are accurate as of: 1/4/18 10:48 AM.  If you have any questions, ask your nurse or doctor.               These  medicines have changed or have updated prescriptions.        Dose/Directions    clindamycin 1 % topical gel   Commonly known as:  CLINDAMAX   This may have changed:    - how to take this  - when to take this  - reasons to take this  - additional instructions   Used for:  Need for prophylactic vaccination against Hemophilus influenza type B (Hib), Insomnia, unspecified, Bronchitis with bronchospasm, Rash and other nonspecific skin eruption, Wheezing, Quadriplegia, unspecified, Unspecified constipation, Neurogenic bladder, NOS, Quadriplegia (H), Spasm, High cholesterol        Apply bid   Quantity:  60 g   Refills:  11                Primary Care Provider Office Phone # Fax #    Jason Long [phone]       [address] [address]        Equal Access to Services     MAYRA BOUDREAUX : Rose Sexton, waaxda luqadaha, qaybta kaalmada adeegyabekah, hansel guthrie. So [facility] [phone].    ATENCIÓN: Si habla español, tiene a still disposición servicios gratuitos de asistencia lingüística. [facility] [phone].    We comply with applicable federal civil rights laws and [place] laws. We do not discriminate on the basis of race, color, national origin, age, disability, sex, sexual orientation, or gender identity.            Thank you!     Thank you for choosing [facility]  for your care. Our goal is always to provide you with excellent care. Hearing back from our patients is one way we can continue to improve our services. Please take a few minutes to complete the written survey that you may receive in the mail after your visit with us. Thank you!             Your Updated Medication List - Protect others around you: Learn how to safely use, store and throw away your medicines at www.disposemymeds.org.          This list is accurate as of: 1/4/18 10:48 AM.  Always use your most recent med list.                   Brand Name Dispense Instructions  for use Diagnosis    * albuterol (2.5 MG/3ML) 0.083% neb solution     25 vial    Take 1 vial (2.5 mg) by nebulization every 6 hours as needed for shortness of breath / dyspnea or wheezing    SOB (shortness of breath)       * albuterol 108 (90 BASE) MCG/ACT Inhaler    VENTOLIN HFA    3 Inhaler    Inhale 2 puffs into the lungs every 4 hours as needed    Wheezing, Bronchitis with bronchospasm, Quadriplegia (H), Spasm       ALCOHOL PREP PADS      USE AS DIRECTED        amcinonide 0.1 % cream    CYCLOCORT    120 g    Apply topically 2 times daily as needed for itching    Eczema       ascorbic acid 1000 MG Tabs    vitamin C     Take 1 tablet by mouth daily        baclofen 20 MG tablet    LIORESAL    540 tablet    TAKE ONE TABLET BY MOUTH UP TO 6 TIMES DAILY    Quadriplegia, unspecified, Quadriplegia (H), Spasm, Groin pain, unspecified laterality       * bisacodyl 10 MG Suppository    DULCOLAX     Place 20 mg rectally every 3 days Active ingredient in Magic Bullet (10mg per suppository)        * MAGIC BULLETS 10 MG Suppository   Generic drug:  bisacodyl     30 suppository    Place 1 suppository (10 mg) rectally daily as needed for constipation    Constipation       cholecalciferol 1000 UNIT tablet    vitamin D3    90 tablet    Take 1 tablet (1,000 Units) by mouth daily    Quadriplegia, unspecified       clindamycin 1 % topical gel    CLINDAMAX    60 g    Apply bid    Need for prophylactic vaccination against Hemophilus influenza type B (Hib), Insomnia, unspecified, Bronchitis with bronchospasm, Rash and other nonspecific skin eruption, Wheezing, Quadriplegia, unspecified, Unspecified constipation, Neurogenic bladder, NOS, Quadriplegia (H), Spasm, High cholesterol       Cotton Swabs Swab     450 each    Please dispense sterile wrapped cotton swabs; use up to five/day for medical needs    Quadriplegia, unspecified, Need for prophylactic vaccination against Hemophilus influenza type B (Hib), Insomnia, unspecified, Bronchitis  "with bronchospasm, Rash and other nonspecific skin eruption, Wheezing, Unspecified constipation, Neurogenic bladder, NOS, Quadriplegia (H), Spasm, High cholesterol       diazepam 10 MG tablet    VALIUM    90 tablet    Take 1 tablet (10 mg) by mouth daily    Bronchitis with bronchospasm, Insomnia, Rash and other nonspecific skin eruption, Wheezing, Groin pain, unspecified laterality, Quadriplegia (H), Spasm       docusate sodium 100 MG capsule    DOCQLACE    90 capsule    Take 1 capsule (100 mg) by mouth daily    Insomnia, unspecified, Bronchitis with bronchospasm, Rash and other nonspecific skin eruption, Wheezing, Quadriplegia, unspecified, Quadriplegia (H), Spasm, High cholesterol       fosfomycin 3 G Packet    MONUROL    2 packet    Take 1 packet (3 g) by mouth every 3 days Dissolve contents into 3 to 4 ounces of water and stir, then administer; do not use hot water.    Urinary tract infection       * Gauze Bandage 2\" Misc     360 each    IV STERILE GUAZE Use up to 4/day Supply 360 for three month supply    Quadriplegia, unspecified, Need for prophylactic vaccination against Hemophilus influenza type B (Hib), Insomnia, unspecified, Bronchitis with bronchospasm, Rash and other nonspecific skin eruption, Wheezing, Unspecified constipation, Neurogenic bladder, NOS, Quadriplegia (H), Spasm, High cholesterol       * Gauze Dressing 4\"X4\" Pads     720 each    NON STERILE GAUZE Use up to 8/day  Supply 720 for three month supply    Quadriplegia, unspecified, Need for prophylactic vaccination against Hemophilus influenza type B (Hib), Insomnia, unspecified, Bronchitis with bronchospasm, Rash and other nonspecific skin eruption, Wheezing, Unspecified constipation, Neurogenic bladder, NOS, Quadriplegia (H), Spasm, High cholesterol       hydrocortisone 2.5 % cream    ANUSOL-HC    120 g    Bid prn    Rash and other nonspecific skin eruption, Need for prophylactic vaccination against Hemophilus influenza type B (Hib), Insomnia, " unspecified, Bronchitis with bronchospasm, Wheezing, Quadriplegia, unspecified, Unspecified constipation, Neurogenic bladder, NOS, Quadriplegia (H), Spasm       ibuprofen 800 MG tablet    ADVIL/MOTRIN    60 tablet    Take 1 tablet (800 mg) by mouth 3 times daily as needed    Quadriplegia, unspecified, Need for prophylactic vaccination against Hemophilus influenza type B (Hib), Insomnia, unspecified, Bronchitis with bronchospasm, Rash and other nonspecific skin eruption, Wheezing, Unspecified constipation, Neurogenic bladder, NOS, Quadriplegia (H), Spasm, High cholesterol       lidocaine 2 % topical gel    XYLOCAINE     Apply  topically. APPLY AS DIRECTED        minocycline 50 MG capsule    MINOCIN/DYNACIN    180 capsule    Take 1 capsule (50 mg) by mouth 2 times daily    Rash and other nonspecific skin eruption, Quadriplegia (H)       montelukast 10 MG tablet    SINGULAIR    30 tablet    Take 1 tablet (10 mg) by mouth At Bedtime    Cough       MULTIVITAMIN & MINERAL PO      Take 1 capsule by mouth daily.        nitroGLYcerin 2 % Oint ointment    NITRO-BID    30 g    Place 0.5-1 inches (7.5-15 mg) onto the skin every 24 hours as needed for autonomic dysreflexia symptoms.  Remove ointment with gloved hand once symptoms resolve.        NORVASC PO      Take 2.5 mg by mouth daily as needed        nystatin 317094 UNIT/GM Powd    NYSTOP    60 g    APPLY 1 DOSE TOPICALLY 3 TIMES DAILY AS NEEDED    Rash and other nonspecific skin eruption, Quadriplegia (H), Spasm, High cholesterol       oxybutynin 10 MG 24 hr tablet    DITROPAN-XL    90 tablet    Take 1 tablet (10 mg) by mouth daily    Quadriplegia, unspecified, Quadriplegia (H), Spasm       sennosides 8.6 MG tablet    SENOKOT    90 each    Take 1 tablet by mouth daily    Constipation       sildenafil 100 MG tablet    VIAGRA    10 tablet    Take 1 tablet (100 mg) by mouth daily as needed TAKE 1 HOUR BEFORE NEEDED    Erectile dysfunction, Need for prophylactic vaccination  against Hemophilus influenza type B (Hib), Insomnia, unspecified, Bronchitis with bronchospasm, Rash and other nonspecific skin eruption, Wheezing, Quadriplegia, unspecified, Unspecified constipation, Neurogenic bladder, NOS, Quadriplegia (H), Spasm, High cholesterol       sodium chloride (PF) 0.9% PF flush     500 mL    Use for urinary catheter irrigation    Injury at C4 level of cervical spinal cord (H), Injury at C5 level of cervical spinal cord (H), Injury at C6 level of cervical spinal cord (H), Neurogenic bladder       sodium chloride 3 % Nebu neb solution     500 mL    Take 3 mLs by nebulization every 3 hours as needed for wheezing    Pneumonia       terbinafine 1 % cream    lamISIL     Apply topically daily as needed        URINARY LEG BAG      USE AS NEEDED        Urostomy Night Bag Misc     9 Bottle    Please dispense three night bags/month (three month worth at a time) with 4 refills re: needed for chronic urinary dysfunction due to quadreplegia. Type: Bard 2000 ml.    Quadriplegia, unspecified, Need for prophylactic vaccination against Hemophilus influenza type B (Hib), Insomnia, unspecified, Bronchitis with bronchospasm, Rash and other nonspecific skin eruption, Wheezing, Unspecified constipation, Neurogenic bladder, NOS, Quadriplegia (H), Spasm, High cholesterol       zolpidem 10 MG tablet    AMBIEN    90 tablet    TAKE 1/2 TO 1 TABLET BY MOUTH AT BEDTIME    Insomnia, Bronchitis with bronchospasm, Rash and other nonspecific skin eruption, Wheezing, Groin pain, unspecified laterality, Quadriplegia (H), Spasm       * Notice:  This list has 6 medication(s) that are the same as other medications prescribed for you. Read the directions carefully, and ask your doctor or other care provider to review them with you.

## 2018-01-04 NOTE — PROGRESS NOTES
Bart Corea is a 57 year old male who presents today for a comprehensive eye exam. Patient reports that his vision has been overall ok but reports a new floaters in the left eye that started 3 months ago. Patient denies any flashes, pain, redness, discharge.    SocHx: Wife with aniridia (luis manuel Gilliland)    Assessment & Plan      Bart Corea is a 57 year old male with the following diagnoses:   1. Cortical age-related cataract of both eyes    2. Myopic astigmatism of both eyes    3. Presbyopia       Cataract mild, not visually significant, observe for now  Posterior vitreous detachment (PVD) about 3-4 mo ago left eye.  Discussed symptoms of retinal tear/detachment and the need to be seen urgently should they occur     Stable refraction, new prescription dispensed  Bifocal segment too low in current glasses, discussed  Recommend artificial tears twice a day and as needed     Patient disposition:   Return in about 1 year (around 1/4/2019) for 1-2 years, Annual Visit, DFE.        Damián Gonzales MD  PGY-2 Ophthalmology  [phone]    Attending Physician Attestation:  Complete documentation of historical and exam elements from today's encounter can be found in the full encounter summary report (not reduplicated in this progress note).  I personally obtained the chief complaint(s) and history of present illness.  I confirmed and edited as necessary the review of systems, past medical/surgical history, family history, social history, and examination findings as documented by others; and I examined the patient myself.  I personally reviewed the relevant tests, images, and reports as documented above.  I formulated and edited as necessary the assessment and plan and discussed the findings and management plan with the patient and family. . - Kris Joseph MD

## 2018-01-12 DIAGNOSIS — K04.7 DENTAL INFECTION: Primary | ICD-10-CM

## 2018-01-15 NOTE — TELEPHONE ENCOUNTER
"amoxicillin-clavulanate (AUGMENTIN) 875-125 MG per tablet (Discontinued)      Last Written Prescription Date:  4/14/17  Last Fill Quantity: 14,   # refills: 0  Last Office Visit : 7/14/17  Future Office visit:  none    Routing refill request to provider for review/approval because:  Drug not active on patient's medication list *d/c date 6/16/2017.  Pended Rx as previously filled with assoc dx of  \"dental infection\"        "

## 2018-02-07 DIAGNOSIS — G47.00 INSOMNIA: ICD-10-CM

## 2018-02-07 DIAGNOSIS — R21 RASH AND OTHER NONSPECIFIC SKIN ERUPTION: ICD-10-CM

## 2018-02-07 DIAGNOSIS — J20.9 BRONCHITIS WITH BRONCHOSPASM: ICD-10-CM

## 2018-02-07 DIAGNOSIS — R06.2 WHEEZING: ICD-10-CM

## 2018-02-07 DIAGNOSIS — R10.30 GROIN PAIN, UNSPECIFIED LATERALITY: ICD-10-CM

## 2018-02-07 DIAGNOSIS — R25.2 SPASM: ICD-10-CM

## 2018-02-07 DIAGNOSIS — G82.50 QUADRIPLEGIA (H): ICD-10-CM

## 2018-02-08 DIAGNOSIS — G47.00 INSOMNIA: ICD-10-CM

## 2018-02-08 DIAGNOSIS — R10.30 GROIN PAIN, UNSPECIFIED LATERALITY: ICD-10-CM

## 2018-02-08 DIAGNOSIS — R06.2 WHEEZING: ICD-10-CM

## 2018-02-08 DIAGNOSIS — R25.2 SPASM: ICD-10-CM

## 2018-02-08 DIAGNOSIS — R21 RASH AND OTHER NONSPECIFIC SKIN ERUPTION: ICD-10-CM

## 2018-02-08 DIAGNOSIS — J20.9 BRONCHITIS WITH BRONCHOSPASM: ICD-10-CM

## 2018-02-08 DIAGNOSIS — G82.50 QUADRIPLEGIA (H): ICD-10-CM

## 2018-02-08 RX ORDER — ZOLPIDEM TARTRATE 10 MG/1
TABLET ORAL
Qty: 90 TABLET | Refills: 0 | OUTPATIENT
Start: 2018-02-08

## 2018-02-08 RX ORDER — DIAZEPAM 10 MG
TABLET ORAL
Qty: 90 TABLET | Refills: 0 | OUTPATIENT
Start: 2018-02-08

## 2018-02-16 ENCOUNTER — MEDICAL CORRESPONDENCE (OUTPATIENT)
Dept: HEALTH INFORMATION MANAGEMENT | Facility: CLINIC | Age: 58
End: 2018-02-16

## 2018-03-13 DIAGNOSIS — G82.50 QUADRIPLEGIA (H): ICD-10-CM

## 2018-03-13 DIAGNOSIS — R06.2 WHEEZING: ICD-10-CM

## 2018-03-13 DIAGNOSIS — J20.9 BRONCHITIS WITH BRONCHOSPASM: ICD-10-CM

## 2018-03-13 DIAGNOSIS — R25.2 SPASM: ICD-10-CM

## 2018-03-14 RX ORDER — ALBUTEROL SULFATE 90 UG/1
2 AEROSOL, METERED RESPIRATORY (INHALATION) EVERY 4 HOURS PRN
Qty: 3 INHALER | Refills: 0 | Status: SHIPPED | OUTPATIENT
Start: 2018-03-14 | End: 2018-08-09

## 2018-04-02 ENCOUNTER — PRE VISIT (OUTPATIENT)
Dept: UROLOGY | Facility: CLINIC | Age: 58
End: 2018-04-02

## 2018-04-02 NOTE — TELEPHONE ENCOUNTER
Patient with history of NGB coming in for follow up. Imaging and labs to be done prior. Patient chart reviewed, no need for call, all records available and ready for appointment.

## 2018-04-09 ENCOUNTER — TELEPHONE (OUTPATIENT)
Dept: UROLOGY | Facility: CLINIC | Age: 58
End: 2018-04-09

## 2018-04-09 DIAGNOSIS — N31.9 NEUROGENIC BLADDER: Primary | ICD-10-CM

## 2018-04-09 NOTE — TELEPHONE ENCOUNTER
[facility] Call Center    Phone Message    May a detailed message be left on voicemail: yes    Reason for Call: Order(s): Other: Pt needs to reschedule appointments today. We got him set up for  but the orders for his US and Labs  by then. He is requesting a new order be placed. He would like a call once they are in.   Date needed:   Provider name: Dr. Jaime      Action Taken: Message routed to:  [facility] ([facility] Urology

## 2018-04-11 DIAGNOSIS — G82.50 QUADRIPLEGIA (H): ICD-10-CM

## 2018-04-17 ENCOUNTER — PATIENT OUTREACH (OUTPATIENT)
Dept: INTERNAL MEDICINE | Facility: CLINIC | Age: 58
End: 2018-04-17

## 2018-04-17 DIAGNOSIS — K64.9 BLEEDING HEMORRHOIDS: Primary | ICD-10-CM

## 2018-04-17 NOTE — PROGRESS NOTES
Pt is having very uncomfortable hemorrhoid with bleeding, wonders what to do.  Is colorectal clinic referral OK ?    Soon-Mi

## 2018-04-23 ENCOUNTER — PRE VISIT (OUTPATIENT)
Dept: UROLOGY | Facility: CLINIC | Age: 58
End: 2018-04-23

## 2018-04-30 ENCOUNTER — OFFICE VISIT (OUTPATIENT)
Dept: UROLOGY | Facility: CLINIC | Age: 58
End: 2018-04-30
Payer: COMMERCIAL

## 2018-04-30 ENCOUNTER — HOSPITAL ENCOUNTER (OUTPATIENT)
Dept: ULTRASOUND IMAGING | Facility: CLINIC | Age: 58
Discharge: HOME | End: 2018-04-30
Attending: UROLOGY | Admitting: UROLOGY
Payer: COMMERCIAL

## 2018-04-30 VITALS — DIASTOLIC BLOOD PRESSURE: 80 MMHG | HEART RATE: 45 BPM | SYSTOLIC BLOOD PRESSURE: 86 MMHG | HEIGHT: 68 IN

## 2018-04-30 DIAGNOSIS — N31.9 NEUROGENIC BLADDER: Primary | ICD-10-CM

## 2018-04-30 DIAGNOSIS — N31.9 NEUROGENIC BLADDER: ICD-10-CM

## 2018-04-30 PROCEDURE — 76770 US EXAM ABDO BACK WALL COMP: CPT

## 2018-04-30 ASSESSMENT — PAIN SCALES - GENERAL: PAINLEVEL: NO PAIN (0)

## 2018-04-30 NOTE — NURSING NOTE
"Chief Complaint   Patient presents with     RECHECK     NGB, review labs and imaging       Blood pressure (!) 86/80, pulse (!) 45, height 1.727 m (5' 8\"). There is no height or weight on file to calculate BMI.    Patient Active Problem List   Diagnosis     Abdominal pain     Neurogenic bladder      Mild LIZA with hypoventilation     Sleep related hypoventilation/hypoxemia in other disease     Cataracts, bilateral     Myopic astigmatism of both eyes     Presbyopia     Injury at C4 level of cervical spinal cord (H)     Injury at C5 level of cervical spinal cord (H)     Injury at C6 level of cervical spinal cord (H)     Pneumonia     Bacteremia     Scrotal swelling     Contracture of hand joint, right     Contracture of hand joint, left     Mechanical limb problems     Autonomic dysreflexia       Allergies   Allergen Reactions     Vancomycin Anaphylaxis       Current Outpatient Prescriptions   Medication Sig Dispense Refill     albuterol (2.5 MG/3ML) 0.083% neb solution Take 1 vial (2.5 mg) by nebulization every 6 hours as needed for shortness of breath / dyspnea or wheezing 25 vial 1     albuterol (VENTOLIN HFA) 108 (90 BASE) MCG/ACT Inhaler Inhale 2 puffs into the lungs every 4 hours as needed * ANNUAL MD APPT. DUE July 2018 3 Inhaler 0     Alcohol Swabs (ALCOHOL PREP PADS) USE AS DIRECTED       amcinonide (CYCLOCORT) 0.1 % cream Apply topically 2 times daily as needed for itching 120 g 1     AmLODIPine Besylate (NORVASC PO) Take 2.5 mg by mouth daily as needed       Applicators (COTTON SWABS) SWAB Please dispense sterile wrapped cotton swabs; use up to five/day for medical needs 450 each 3     ascorbic acid (VITAMIN C) 1000 MG TABS Take 1 tablet by mouth daily       baclofen (LIORESAL) 20 MG tablet TAKE ONE TABLET BY MOUTH UP TO 6 TIMES DAILY 540 tablet 3     bisacodyl (DULCOLAX) 10 MG suppository Place 20 mg rectally every 3 days Active ingredient in Magic Bullet (10mg per suppository)       cholecalciferol (VITAMIN " "D3) 1000 UNIT tablet Take 1 tablet (1,000 Units) by mouth daily 90 tablet 0     clindamycin (CLINDAMAX) 1 % gel Apply bid (Patient taking differently: Apply topically 2 times daily as needed (face) ) 60 g 11     diazepam (VALIUM) 10 MG tablet Take 1 tablet (10 mg) by mouth daily 90 tablet 1     docusate sodium (DOCQLACE) 100 MG capsule Take 1 capsule (100 mg) by mouth daily 90 capsule 3     fosfomycin (MONUROL) 3 G Packet Take 1 packet (3 g) by mouth every 3 days Dissolve contents into 3 to 4 ounces of water and stir, then administer; do not use hot water. 2 packet 0     Gauze Pads & Dressings (GAUZE BANDAGE 2\") MISC IV STERILE GUAZE Use up to 4/day Supply 360 for three month supply 360 each 3     Gauze Pads & Dressings (GAUZE DRESSING) 4\"X4\" PADS NON STERILE GAUZE Use up to 8/day  Supply 720 for three month supply 720 each 3     hydrocortisone (ANUSOL-HC) 2.5 % rectal cream Bid prn 120 g 11     ibuprofen (ADVIL,MOTRIN) 800 MG tablet Take 1 tablet (800 mg) by mouth 3 times daily as needed 60 tablet 11     Incontinence Supplies (URINARY LEG BAG) USE AS NEEDED       lidocaine (XYLOCAINE) 2 % jelly Apply  topically. APPLY AS DIRECTED       MAGIC BULLETS 10 MG Suppository Place 1 suppository (10 mg) rectally daily as needed for constipation 30 suppository 3     minocycline (MINOCIN/DYNACIN) 50 MG capsule Take 1 capsule (50 mg) by mouth 2 times daily 180 capsule 3     montelukast (SINGULAIR) 10 MG tablet Take 1 tablet (10 mg) by mouth At Bedtime 30 tablet 1     Multiple Vitamins-Minerals (MULTIVITAMIN & MINERAL PO) Take 1 capsule by mouth daily.       nitroGLYcerin (NITRO-BID) 2 % OINT ointment Place 0.5-1 inches (7.5-15 mg) onto the skin every 24 hours as needed for autonomic dysreflexia symptoms.  Remove ointment with gloved hand once symptoms resolve. 30 g 0     nystatin (NYSTOP) 947285 UNIT/GM POWD APPLY 1 DOSE TOPICALLY 3 TIMES DAILY AS NEEDED 60 g 2     Ostomy Supplies (UROSTOMY NIGHT BAG) MISC Please dispense " three night bags/month (three month worth at a time) with 4 refills re: needed for chronic urinary dysfunction due to quadreplegia. Type: Bard 2000 ml. 9 Bottle 4     oxybutynin (DITROPAN-XL) 10 MG 24 hr tablet Take 1 tablet (10 mg) by mouth daily 90 tablet 3     sennosides (SENOKOT) 8.6 MG tablet Take 1 tablet by mouth daily 90 each 3     sildenafil (VIAGRA) 100 MG tablet Take 1 tablet (100 mg) by mouth daily as needed TAKE 1 HOUR BEFORE NEEDED 10 tablet 11     sodium chloride 3 % NEBU neb solution Take 3 mLs by nebulization every 3 hours as needed for wheezing 500 mL 0     sodium chloride, PF, 0.9% PF flush Use for urinary catheter irrigation 500 mL 11     terbinafine (LAMISIL) 1 % cream Apply topically daily as needed        zolpidem (AMBIEN) 10 MG tablet TAKE 1/2 TO 1 TABLET BY MOUTH AT BEDTIME 90 tablet 1       Social History   Substance Use Topics     Smoking status: Current Every Day Smoker     Packs/day: 0.30     Types: Cigarettes     Smokeless tobacco: Never Used      Comment: since early teens     Alcohol use Yes      Comment: on weekends, 3-4+       Alla Guillory LPN  4/30/2018  10:21 AM
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
## 2018-04-30 NOTE — MR AVS SNAPSHOT
After Visit Summary   4/30/2018    Bart Corea    MRN: [id number]           Patient Information     Date Of Birth          1960        Visit Information        Provider Department      4/30/2018 10:30 AM Jason Jaime MD [facility]rologic Cancers        Today's Diagnoses     Neurogenic bladder    -  1       Follow-ups after your visit        Follow-up notes from your care team     Return in about 1 year (around 4/30/2019).      Your next 10 appointments already scheduled     May 17, 2018  9:15 AM CDT   (Arrive by 9:00 AM)   New Patient Visit with JC Arellano CNP   [facility] Colon and Rectal Surgery ([facility])    [address]  [address] [facility] [address] [phone]              Who to contact     Please call your clinic at [phone] to:    Ask questions about your health    Make or cancel appointments    Discuss your medicines    Learn about your test results    Speak to your doctor            Additional Information About Your Visit        MyChart Information     [employer] gives you secure access to your electronic health record. If you see a primary care provider, you can also send messages to your care team and make appointments. If you have questions, please call your primary care clinic.  If you do not have a primary care provider, please call [phone] and they will assist you.      [employer] is an electronic gateway that provides easy, online access to your medical records. With [employer], you can request a clinic appointment, read your test results, renew a prescription or communicate with your care team.     To access your existing account, please contact your [facility] or call [phone] for assistance.        Care EveryWhere ID     This is your Care EveryWhere ID. This could be used by other organizations to access your [facility]  "records  [id number]        Your Vitals Were     Pulse Height                45 1.727 m (5' 8\")           Blood Pressure from Last 3 Encounters:   04/30/18 (!) 86/80   07/19/17 97/60   07/14/17 106/75    Weight from Last 3 Encounters:   07/14/17 81.8 kg (180 lb 6.4 oz)   08/28/16 77.1 kg (170 lb)   06/20/16 81.6 kg (180 lb)              Today, you had the following     No orders found for display         Today's Medication Changes          These changes are accurate as of 4/30/18 11:05 AM.  If you have any questions, ask your nurse or doctor.               These medicines have changed or have updated prescriptions.        Dose/Directions    clindamycin 1 % topical gel   Commonly known as:  CLINDAMAX   This may have changed:    - how to take this  - when to take this  - reasons to take this  - additional instructions   Used for:  Need for prophylactic vaccination against Hemophilus influenza type B (Hib), Insomnia, unspecified, Bronchitis with bronchospasm, Rash and other nonspecific skin eruption, Wheezing, Quadriplegia, unspecified, Unspecified constipation, Neurogenic bladder, NOS, Quadriplegia (H), Spasm, High cholesterol        Apply bid   Quantity:  60 g   Refills:  11                Primary Care Provider Office Phone # Fax #    Jason Long [phone]       [address] [address]        Equal Access to Services     [facility] Hospital[facility]LANDA AH: Hadii madalyn velizo Solouis, waaxda luqadaha, qaybta kaalmada adetiffanieda, waxmarilyn pérez angulo . So [facility] [phone].    ATENCIÓN: Si habla español, tiene a still disposición servicios gratuitos de asistencia lingüística. Llame al [phone].    We comply with applicable federal civil rights laws and [place] laws. We do not discriminate on the basis of race, color, national origin, age, disability, sex, sexual orientation, or gender identity.            Thank you!     Thank you for choosing [facility] UROLOGY AND [facility] " FOR PROSTATE AND UROLOGIC CANCERS  for your care. Our goal is always to provide you with excellent care. Hearing back from our patients is one way we can continue to improve our services. Please take a few minutes to complete the written survey that you may receive in the mail after your visit with us. Thank you!             Your Updated Medication List - Protect others around you: Learn how to safely use, store and throw away your medicines at www.disposemymeds.org.          This list is accurate as of 4/30/18 11:05 AM.  Always use your most recent med list.                   Brand Name Dispense Instructions for use Diagnosis    * albuterol (2.5 MG/3ML) 0.083% neb solution     25 vial    Take 1 vial (2.5 mg) by nebulization every 6 hours as needed for shortness of breath / dyspnea or wheezing    SOB (shortness of breath)       * albuterol 108 (90 Base) MCG/ACT Inhaler    VENTOLIN HFA    3 Inhaler    Inhale 2 puffs into the lungs every 4 hours as needed * ANNUAL MD APPT. DUE July 2018    Wheezing, Bronchitis with bronchospasm, Quadriplegia (H), Spasm       ALCOHOL PREP PADS      USE AS DIRECTED        amcinonide 0.1 % cream    CYCLOCORT    120 g    Apply topically 2 times daily as needed for itching    Eczema       ascorbic acid 1000 MG Tabs    vitamin C     Take 1 tablet by mouth daily        baclofen 20 MG tablet    LIORESAL    540 tablet    TAKE ONE TABLET BY MOUTH UP TO 6 TIMES DAILY    Quadriplegia, unspecified, Quadriplegia (H), Spasm, Groin pain, unspecified laterality       * bisacodyl 10 MG Suppository    DULCOLAX     Place 20 mg rectally every 3 days Active ingredient in Magic Bullet (10mg per suppository)        * MAGIC BULLETS 10 MG Suppository   Generic drug:  bisacodyl     30 suppository    Place 1 suppository (10 mg) rectally daily as needed for constipation    Constipation       cholecalciferol 1000 UNIT tablet    vitamin D3    90 tablet    Take 1 tablet (1,000 Units) by mouth daily    Quadriplegia  "(H)       clindamycin 1 % topical gel    CLINDAMAX    60 g    Apply bid    Need for prophylactic vaccination against Hemophilus influenza type B (Hib), Insomnia, unspecified, Bronchitis with bronchospasm, Rash and other nonspecific skin eruption, Wheezing, Quadriplegia, unspecified, Unspecified constipation, Neurogenic bladder, NOS, Quadriplegia (H), Spasm, High cholesterol       Cotton Swabs Swab     450 each    Please dispense sterile wrapped cotton swabs; use up to five/day for medical needs    Quadriplegia, unspecified, Need for prophylactic vaccination against Hemophilus influenza type B (Hib), Insomnia, unspecified, Bronchitis with bronchospasm, Rash and other nonspecific skin eruption, Wheezing, Unspecified constipation, Neurogenic bladder, NOS, Quadriplegia (H), Spasm, High cholesterol       diazepam 10 MG tablet    VALIUM    90 tablet    Take 1 tablet (10 mg) by mouth daily    Bronchitis with bronchospasm, Insomnia, Rash and other nonspecific skin eruption, Wheezing, Groin pain, unspecified laterality, Quadriplegia (H), Spasm       docusate sodium 100 MG capsule    DOCQLACE    90 capsule    Take 1 capsule (100 mg) by mouth daily    Insomnia, unspecified, Bronchitis with bronchospasm, Rash and other nonspecific skin eruption, Wheezing, Quadriplegia, unspecified, Quadriplegia (H), Spasm, High cholesterol       fosfomycin 3 g Packet    MONUROL    2 packet    Take 1 packet (3 g) by mouth every 3 days Dissolve contents into 3 to 4 ounces of water and stir, then administer; do not use hot water.    Urinary tract infection       * Gauze Bandage 2\" Misc     360 each    IV STERILE GUAZE Use up to 4/day Supply 360 for three month supply    Quadriplegia, unspecified, Need for prophylactic vaccination against Hemophilus influenza type B (Hib), Insomnia, unspecified, Bronchitis with bronchospasm, Rash and other nonspecific skin eruption, Wheezing, Unspecified constipation, Neurogenic bladder, NOS, Quadriplegia (H), " "Spasm, High cholesterol       * Gauze Dressing 4\"X4\" Pads     720 each    NON STERILE GAUZE Use up to 8/day  Supply 720 for three month supply    Quadriplegia, unspecified, Need for prophylactic vaccination against Hemophilus influenza type B (Hib), Insomnia, unspecified, Bronchitis with bronchospasm, Rash and other nonspecific skin eruption, Wheezing, Unspecified constipation, Neurogenic bladder, NOS, Quadriplegia (H), Spasm, High cholesterol       hydrocortisone 2.5 % cream    ANUSOL-HC    120 g    Bid prn    Rash and other nonspecific skin eruption, Need for prophylactic vaccination against Hemophilus influenza type B (Hib), Insomnia, unspecified, Bronchitis with bronchospasm, Wheezing, Quadriplegia, unspecified, Unspecified constipation, Neurogenic bladder, NOS, Quadriplegia (H), Spasm       ibuprofen 800 MG tablet    ADVIL/MOTRIN    60 tablet    Take 1 tablet (800 mg) by mouth 3 times daily as needed    Quadriplegia, unspecified, Need for prophylactic vaccination against Hemophilus influenza type B (Hib), Insomnia, unspecified, Bronchitis with bronchospasm, Rash and other nonspecific skin eruption, Wheezing, Unspecified constipation, Neurogenic bladder, NOS, Quadriplegia (H), Spasm, High cholesterol       lidocaine 2 % topical gel    XYLOCAINE     Apply  topically. APPLY AS DIRECTED        minocycline 50 MG capsule    MINOCIN/DYNACIN    180 capsule    Take 1 capsule (50 mg) by mouth 2 times daily    Rash and other nonspecific skin eruption, Quadriplegia (H)       montelukast 10 MG tablet    SINGULAIR    30 tablet    Take 1 tablet (10 mg) by mouth At Bedtime    Cough       MULTIVITAMIN & MINERAL PO      Take 1 capsule by mouth daily.        nitroGLYcerin 2 % Oint ointment    NITRO-BID    30 g    Place 0.5-1 inches (7.5-15 mg) onto the skin every 24 hours as needed for autonomic dysreflexia symptoms.  Remove ointment with gloved hand once symptoms resolve.        NORVASC PO      Take 2.5 mg by mouth daily as " needed        nystatin 928330 UNIT/GM Powd    NYSTOP    60 g    APPLY 1 DOSE TOPICALLY 3 TIMES DAILY AS NEEDED    Rash and other nonspecific skin eruption, Quadriplegia (H), Spasm, High cholesterol       oxybutynin 10 MG 24 hr tablet    DITROPAN-XL    90 tablet    Take 1 tablet (10 mg) by mouth daily    Quadriplegia, unspecified, Quadriplegia (H), Spasm       sennosides 8.6 MG tablet    SENOKOT    90 each    Take 1 tablet by mouth daily    Constipation       sildenafil 100 MG tablet    VIAGRA    10 tablet    Take 1 tablet (100 mg) by mouth daily as needed TAKE 1 HOUR BEFORE NEEDED    Erectile dysfunction, Need for prophylactic vaccination against Hemophilus influenza type B (Hib), Insomnia, unspecified, Bronchitis with bronchospasm, Rash and other nonspecific skin eruption, Wheezing, Quadriplegia, unspecified, Unspecified constipation, Neurogenic bladder, NOS, Quadriplegia (H), Spasm, High cholesterol       sodium chloride (PF) 0.9% PF flush     500 mL    Use for urinary catheter irrigation    Injury at C4 level of cervical spinal cord (H), Injury at C5 level of cervical spinal cord (H), Injury at C6 level of cervical spinal cord (H), Neurogenic bladder       sodium chloride 3 % Nebu neb solution     500 mL    Take 3 mLs by nebulization every 3 hours as needed for wheezing    Pneumonia       terbinafine 1 % cream    lamISIL     Apply topically daily as needed        URINARY LEG BAG      USE AS NEEDED        Urostomy Night Bag Misc     9 Bottle    Please dispense three night bags/month (three month worth at a time) with 4 refills re: needed for chronic urinary dysfunction due to quadreplegia. Type: Bard 2000 ml.    Quadriplegia, unspecified, Need for prophylactic vaccination against Hemophilus influenza type B (Hib), Insomnia, unspecified, Bronchitis with bronchospasm, Rash and other nonspecific skin eruption, Wheezing, Unspecified constipation, Neurogenic bladder, NOS, Quadriplegia (H), Spasm, High cholesterol        zolpidem 10 MG tablet    AMBIEN    90 tablet    TAKE 1/2 TO 1 TABLET BY MOUTH AT BEDTIME    Insomnia, Bronchitis with bronchospasm, Rash and other nonspecific skin eruption, Wheezing, Groin pain, unspecified laterality, Quadriplegia (H), Spasm       * Notice:  This list has 6 medication(s) that are the same as other medications prescribed for you. Read the directions carefully, and ask your doctor or other care provider to review them with you.
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
## 2018-05-03 ENCOUNTER — MEDICAL CORRESPONDENCE (OUTPATIENT)
Dept: HEALTH INFORMATION MANAGEMENT | Facility: CLINIC | Age: 58
End: 2018-05-03

## 2018-05-04 DIAGNOSIS — R06.2 WHEEZING: ICD-10-CM

## 2018-05-04 DIAGNOSIS — G82.50 QUADRIPLEGIA (H): ICD-10-CM

## 2018-05-04 DIAGNOSIS — G47.00 INSOMNIA: ICD-10-CM

## 2018-05-04 DIAGNOSIS — R10.30 GROIN PAIN, UNSPECIFIED LATERALITY: ICD-10-CM

## 2018-05-04 DIAGNOSIS — R25.2 SPASM: ICD-10-CM

## 2018-05-04 DIAGNOSIS — R21 RASH AND OTHER NONSPECIFIC SKIN ERUPTION: ICD-10-CM

## 2018-05-04 DIAGNOSIS — J20.9 BRONCHITIS WITH BRONCHOSPASM: ICD-10-CM

## 2018-05-09 RX ORDER — BACLOFEN 20 MG/1
TABLET ORAL
Qty: 540 TABLET | Refills: 3 | Status: SHIPPED | OUTPATIENT
Start: 2018-05-09 | End: 2018-09-28

## 2018-05-09 RX ORDER — ZOLPIDEM TARTRATE 10 MG/1
TABLET ORAL
Qty: 90 TABLET | Refills: 0
Start: 2018-05-09 | End: 2018-08-08

## 2018-05-09 RX ORDER — DIAZEPAM 10 MG
TABLET ORAL
Qty: 90 TABLET | Refills: 0
Start: 2018-05-09 | End: 2018-05-17

## 2018-05-14 ENCOUNTER — MEDICAL CORRESPONDENCE (OUTPATIENT)
Dept: HEALTH INFORMATION MANAGEMENT | Facility: CLINIC | Age: 58
End: 2018-05-14

## 2018-05-14 ENCOUNTER — TELEPHONE (OUTPATIENT)
Dept: FAMILY MEDICINE | Facility: CLINIC | Age: 58
End: 2018-05-14

## 2018-05-14 DIAGNOSIS — S14.105A: ICD-10-CM

## 2018-05-14 DIAGNOSIS — S14.104A: Primary | ICD-10-CM

## 2018-05-14 DIAGNOSIS — R29.898 MECHANICAL LIMB PROBLEMS: ICD-10-CM

## 2018-05-14 DIAGNOSIS — S14.106A: ICD-10-CM

## 2018-05-14 NOTE — TELEPHONE ENCOUNTER
[facility] Call Center    Phone Message    May a detailed message be left on voicemail: yes    Reason for Call: Other: Per pt, sent an email to [employer], requesting for [employer] to respond. Stated it was regarding orders but did not go into detail. Please follow up.     Action Taken: Message routed to:  [facility] ([facility]): BILL

## 2018-05-16 NOTE — PROGRESS NOTES
[facility]  Primary Care Center   Jason Long MD  05/17/2018      Chief Complaint:   Physical       History of Present Illness:   Bart Corea is a 58 year old male with a history of quadriplegia C1-C4 incomplete, neurogenic bladder, myopic astigmatism of both eyes, presbyopia, injury at C4-C6 level of cervical spinal cord, pneumonia, mild LIZA with hypoventilation, and bacteremia  who presents for an annual physical examination.    HCM: He is trying to see everyone today. He saw colorectal and occupational therapy. He is due for a tetanus shot next year.    BP: He used to take Nifedipine for emergency BP spikes. He does not take the Amlodipine much.    Pain: He takes Oxycodone which he takes when he absolutely needs it about 1X/month for scrotal pain and UTIs. He would like a refill. He still takes Baclofen, and Ibuprofen    Sleep: He takes Zolpidem 1X/day.    GI: They banded one of his hemorrhoids. They were going to try to do a colonoscopy in the next few months. He still uses Dulcolax suppositories. He needs stool softener. He is eating okay, and needs to have his food cut up finely or else he will choke.    Anxiety/Insomnia: He takes Valium 1X/day, and needs a prescription for it. He is not sleeping enough, and wakes up once or twice.    Respiratory: Last June, he visited urgent care for pneumonia. He uses Albuterol.    Ears: He has Elephant Ear at home, which is a spray that cleans out his ears.    Renal/Urinary: His BP elevated with a kidney stone. He has had a history of kidney stones, but he denied hematuria. They did an ultrasound recently on 4/30/18, and they did not find any stones. He handles UTI's with lots of water and cranberry juice. He takes Minocycline, oxybutynin.    Genital: He is still dealing with a cyst on his scrotum, which they did an ultrasound for 2 years ago. They will bernardino it when it needs to be lanced, and it usually leaks pus and blood.    Skin: He deals with rosacea,  and follows with an outside dermatologist. He uses a topical medication for rosacea. He needs Nystatin powder and Lamisil cream.    FH: His dad  of asbestosis. His mom got asbestosis from washing his dad's clothes. They put her on Keytruda and she is 90% cured of stage 3 cancer.       Other concerns discussed:  1. He needs more alcohol swabs, 2x2 and 4x4 gauze pads, incontinence pads, ostomy supplies, urinary leg bag, and NaCl flush for catheter.  2. He has not seen Dr. Webster since he had his kidney stone.  3. He needs help turning.  4. He wants more PCA hours.     Review of Systems:   Pertinent items are noted in HPI and as below, remainder of complete ROS is negative.    Answers for HPI/ROS submitted by the patient on 2018   HEART SYMPTOMS: No  LUNG SYMPTOMS: No  INTESTINAL SYMPTOMS: No  URINARY SYMPTOMS: No  REPRODUCTIVE SYMPTOMS: No  SKELETAL SYMPTOMS: No  BLOOD SYMPTOMS: No  NERVOUS SYSTEM SYMPTOMS: No  MENTAL HEALTH SYMPTOMS: No      Active Medications:     Current Outpatient Prescriptions:      albuterol (2.5 MG/3ML) 0.083% neb solution, Take 1 vial (2.5 mg) by nebulization every 6 hours as needed for shortness of breath / dyspnea or wheezing, Disp: 25 vial, Rfl: 1     albuterol (VENTOLIN HFA) 108 (90 BASE) MCG/ACT Inhaler, Inhale 2 puffs into the lungs every 4 hours as needed * ANNUAL MD APPT. DUE 2018, Disp: 3 Inhaler, Rfl: 0     Alcohol Swabs (ALCOHOL PREP PADS), USE AS DIRECTED, Disp: , Rfl:      amcinonide (CYCLOCORT) 0.1 % cream, Apply topically 2 times daily as needed for itching, Disp: 120 g, Rfl: 1     AmLODIPine Besylate (NORVASC PO), Take 2.5 mg by mouth daily as needed, Disp: , Rfl:      Applicators (COTTON SWABS) SWAB, Please dispense sterile wrapped cotton swabs; use up to five/day for medical needs, Disp: 450 each, Rfl: 3     ascorbic acid (VITAMIN C) 1000 MG TABS, Take 1 tablet by mouth daily, Disp: , Rfl:      baclofen (LIORESAL) 20 MG tablet, TAKE ONE TABLET BY MOUTH UP TO 6  "TIMES DAILY, Disp: 540 tablet, Rfl: 3     bisacodyl (DULCOLAX) 10 MG suppository, Place 20 mg rectally every 3 days Active ingredient in Magic Bullet (10mg per suppository), Disp: , Rfl:      cholecalciferol (VITAMIN D3) 1000 UNIT tablet, Take 1 tablet (1,000 Units) by mouth daily, Disp: 90 tablet, Rfl: 0     clindamycin (CLINDAMAX) 1 % gel, Apply bid (Patient taking differently: Apply topically 2 times daily as needed (face) ), Disp: 60 g, Rfl: 11     diazepam (VALIUM) 10 MG tablet, TAKE ONE TABLET BY MOUTH EVERY DAY, Disp: 90 tablet, Rfl: 0     docusate sodium (DOCQLACE) 100 MG capsule, Take 1 capsule (100 mg) by mouth daily, Disp: 90 capsule, Rfl: 3     fosfomycin (MONUROL) 3 G Packet, Take 1 packet (3 g) by mouth every 3 days Dissolve contents into 3 to 4 ounces of water and stir, then administer; do not use hot water., Disp: 2 packet, Rfl: 0     Gauze Pads & Dressings (GAUZE BANDAGE 2\") MISC, IV STERILE GUAZE Use up to 4/day Supply 360 for three month supply, Disp: 360 each, Rfl: 3     Gauze Pads & Dressings (GAUZE DRESSING) 4\"X4\" PADS, NON STERILE GAUZE Use up to 8/day  Supply 720 for three month supply, Disp: 720 each, Rfl: 3     hydrocortisone (ANUSOL-HC) 2.5 % rectal cream, Bid prn, Disp: 120 g, Rfl: 11     ibuprofen (ADVIL,MOTRIN) 800 MG tablet, Take 1 tablet (800 mg) by mouth 3 times daily as needed, Disp: 60 tablet, Rfl: 11     Incontinence Supplies (URINARY LEG BAG), USE AS NEEDED, Disp: , Rfl:      lidocaine (XYLOCAINE) 2 % jelly, Apply  topically. APPLY AS DIRECTED, Disp: , Rfl:      MAGIC BULLETS 10 MG Suppository, Place 1 suppository (10 mg) rectally daily as needed for constipation, Disp: 30 suppository, Rfl: 3     minocycline (MINOCIN/DYNACIN) 50 MG capsule, Take 1 capsule (50 mg) by mouth 2 times daily, Disp: 180 capsule, Rfl: 3     montelukast (SINGULAIR) 10 MG tablet, Take 1 tablet (10 mg) by mouth At Bedtime, Disp: 30 tablet, Rfl: 1     Multiple Vitamins-Minerals (MULTIVITAMIN & MINERAL PO), " Take 1 capsule by mouth daily., Disp: , Rfl:      nitroGLYcerin (NITRO-BID) 2 % OINT ointment, Place 0.5-1 inches (7.5-15 mg) onto the skin every 24 hours as needed for autonomic dysreflexia symptoms.  Remove ointment with gloved hand once symptoms resolve., Disp: 30 g, Rfl: 0     nystatin (NYSTOP) 264282 UNIT/GM POWD, APPLY 1 DOSE TOPICALLY 3 TIMES DAILY AS NEEDED, Disp: 60 g, Rfl: 2     order for DME, two leg bag straps and 2 mouth sticks, Disp: 2 each, Rfl: 0     Ostomy Supplies (UROSTOMY NIGHT BAG) MISC, Please dispense three night bags/month (three month worth at a time) with 4 refills re: needed for chronic urinary dysfunction due to quadreplegia. Type: Bard 2000 ml., Disp: 9 Bottle, Rfl: 4     oxybutynin (DITROPAN-XL) 10 MG 24 hr tablet, Take 1 tablet (10 mg) by mouth daily, Disp: 90 tablet, Rfl: 3     sennosides (SENOKOT) 8.6 MG tablet, Take 1 tablet by mouth daily, Disp: 90 each, Rfl: 3     sildenafil (VIAGRA) 100 MG tablet, Take 1 tablet (100 mg) by mouth daily as needed TAKE 1 HOUR BEFORE NEEDED, Disp: 10 tablet, Rfl: 11     sodium chloride 3 % NEBU neb solution, Take 3 mLs by nebulization every 3 hours as needed for wheezing, Disp: 500 mL, Rfl: 0     sodium chloride, PF, 0.9% PF flush, Use for urinary catheter irrigation, Disp: 500 mL, Rfl: 11     terbinafine (LAMISIL) 1 % cream, Apply topically daily as needed , Disp: , Rfl:      zolpidem (AMBIEN) 10 MG tablet, TAKE 1/2 TO 1 TABLET BY MOUTH AT BEDTIME, Disp: 90 tablet, Rfl: 0      Allergies:   Vancomycin      Past Medical History:  LIZA (obstructive sleep apnea)  Quadriplegia, C1-C4 incomplete   Neurogenic bladder  Mild LIZA with hypoventilation  Cataracts, bilateral  Myopic astigmatism of both eyes  Presbyopia  Injury at C4 level of cervical spinal cord  Injury at C5 level of cervical spinal cord  Injury at C6 level of cervical spinal cord   Pneumonia  Bacteremia  Contracture of hand joint, right and left  Autonomic dysreflexia    Past Surgical  History:  Bilateral ureteroscopy with holmium laser lithotripsy and basketing and removal of fragments  Left ureteral stent placement. 02/10/2010  Skin flap on bottom  Sp tube absess surgery    Family History:    Father-Cancer  No family history of Diabetes, Glaucoma, Hypertension, Macular Degeneration, or Retinal detachment.     Social History:   Current every day cigarette smoker of 0.3 packs/day since early teens. Never used smokeless tobacco. Has 3-4+ drinks on weekends. He works as an .     Physical Exam:   BP 92/55  Pulse 53   Constitutional: Alert. In no distress.  Head: Normocephalic. No masses, lesions, tenderness or abnormalities.  ENT: No neck nodes or sinus tenderness. Right ear cerumen  Cardiovascular: RRR. No murmurs, clicks, gallops, or rub.  Respiratory: Clear to auscultation bilaterally, no wheezes or crackles.  Gastrointestinal: Abdomen soft. Non-tender. BS normal. No masses or organomegaly.  Musculoskeletal: Extremities normal. No gross deformities noted. Normal muscle tone.  Skin: No suspicious lesions. No rashes.  Neurologic: Patient has leg paralysis complete, and partial paralysis of arms bilaterally, which is chronic and stable.  Psychiatric: Mentation appears normal. Normal affect.   Hematologic/Lymphatic/Immunologic: Normal cervical lymph nodes.      Assessment and Plan:       Rash and other nonspecific skin eruption    - clindamycin (CLINDAMAX) 1 % topical gel  Dispense: 60 g; Refill: 11    - minocycline (MINOCIN/DYNACIN) 50 MG capsule  Dispense: 180 capsule; Refill: 3  - nystatin (NYSTOP) 697677 UNIT/GM POWD  Dispense: 60 g; Refill: 2    Quadriplegia (H)    - oxyCODONE-acetaminophen (PERCOCET) 5-325 MG per tablet  Dispense: 30 tablet; Refill: 0  - Applicators (COTTON SWABS) SWAB  Dispense: 450 each; Refill: 3  - cholecalciferol (VITAMIN D3) 1000 UNIT tablet  Dispense: 90 tablet; Refill: 0  - clindamycin (CLINDAMAX) 1 % topical gel  Dispense: 60 g; Refill: 11  - diazepam (VALIUM)  "10 MG tablet  Dispense: 90 tablet; Refill: 0  - docusate sodium (DOCQLACE) 100 MG capsule  Dispense: 90 capsule; Refill: 3  - Gauze Pads & Dressings (GAUZE BANDAGE 2\") MISC  Dispense: 360 each; Refill: 3  - Gauze Pads & Dressings (GAUZE DRESSING) 4\"X4\" PADS  Dispense: 720 each; Refill: 3  - hydrocortisone (ANUSOL-HC) 2.5 % cream  Dispense: 120 g; Refill: 11  - ibuprofen (ADVIL/MOTRIN) 800 MG tablet  Dispense: 60 tablet; Refill: 11  - minocycline (MINOCIN/DYNACIN) 50 MG capsule  Dispense: 180 capsule; Refill: 3  - nystatin (NYSTOP) [id number] UNIT/GM POWD  Dispense: 60 g; Refill: 2  - Ostomy Supplies (UROSTOMY NIGHT BAG) MISC  Dispense: 9 each; Refill: 3  - oxybutynin (DITROPAN-XL) 10 MG 24 hr tablet  Dispense: 90 tablet; Refill: 3  - order for DME  Dispense: 120 Device; Refill: 11  - order for DME  Dispense: 3 Device; Refill: 11    Spasm    - oxyCODONE-acetaminophen (PERCOCET) 5-325 MG per tablet  Dispense: 30 tablet; Refill: 0  - Applicators (COTTON SWABS) SWAB  Dispense: 450 each; Refill: 3  - clindamycin (CLINDAMAX) 1 % topical gel  Dispense: 60 g; Refill: 11  - diazepam (VALIUM) 10 MG tablet  Dispense: 90 tablet; Refill: 0  - docusate sodium (DOCQLACE) 100 MG capsule  Dispense: 90 capsule; Refill: 3  - Gauze Pads & Dressings (GAUZE BANDAGE 2\") MISC  Dispense: 360 each; Refill: 3  - Gauze Pads & Dressings (GAUZE DRESSING) 4\"X4\" PADS  Dispense: 720 each; Refill: 3  - hydrocortisone (ANUSOL-HC) 2.5 % cream  Dispense: 120 g; Refill: 11  - ibuprofen (ADVIL/MOTRIN) 800 MG tablet  Dispense: 60 tablet; Refill: 11  - nystatin (NYSTOP) [id number] UNIT/GM POWD  Dispense: 60 g; Refill: 2  - Ostomy Supplies (UROSTOMY NIGHT BAG) MISC  Dispense: 9 each; Refill: 3  - oxybutynin (DITROPAN-XL) 10 MG 24 hr tablet  Dispense: 90 tablet; Refill: 3      Autonomic dysreflexia    - amLODIPine (NORVASC) 2.5 MG tablet  Dispense: 30 tablet; Refill: 1    Eczema, unspecified type    - amcinonide (CYCLOCORT) 0.1 % cream  Dispense: 120 g; Refill: " 1    Insomnia, unspecified type    - diazepam (VALIUM) 10 MG tablet  Dispense: 90 tablet; Refill: 0    Groin pain, unspecified laterality    - diazepam (VALIUM) 10 MG tablet  Dispense: 90 tablet; Refill: 0    Constipation, unspecified constipation type    - MAGIC BULLETS 10 MG Suppository  Dispense: 30 suppository; Refill: 3  - sennosides (SENOKOT) 8.6 MG tablet  Dispense: 90 each; Refill: 3    Injury at C4 level of cervical spinal cord, subsequent encounter (H)    - sodium chloride, PF, 0.9% PF flush  Dispense: 500 mL; Refill: 11    Injury of fifth cervical spinal cord, subsequent encounter (H)    - sodium chloride, PF, 0.9% PF flush  Dispense: 500 mL; Refill: 11    Injury at C6 level of cervical spinal cord, subsequent encounter (H)    - sodium chloride, PF, 0.9% PF flush  Dispense: 500 mL; Refill: 11    Neurogenic bladder    - sodium chloride, PF, 0.9% PF flush  Dispense: 500 mL; Refill: 11    Routine general medical examination at a health care facility    - Lipid panel reflex to direct LDL Fasting  - PSA screen  - Hemoglobin A1c     He will see me yearly earlier PRN. He was informed about the new Shingles vaccine and to check and see if it is covered.  We did review his meds and supplies one by one prior to refills.     Follow-up: Data Unavailable         Scribe Disclosure:   Vickie MORRISON, am serving as a scribe to document services personally performed by Jason Long MD at this visit, based upon the provider's statements to me. All documentation has been reviewed by the aforementioned provider prior to being entered into the official medical record.     Portions of this medical record were completed by a scribe. UPON MY REVIEW AND AUTHENTICATION BY ELECTRONIC SIGNATURE, this confirms (a) I performed the applicable clinical services, and (b) the record is accurate.   Jason Long MD

## 2018-05-16 NOTE — TELEPHONE ENCOUNTER
Spoke with pt.  He needs resting splints fixed ASAP. They are causing a rash on wrist which is beginning to spread.    Also, he needs an order for two leg bag straps and 2 mouth sticks to be made.    He has an appt with [facility] Q[facility]hi [facility] tomorrow, needs referral.    Referral & DME order placed.

## 2018-05-17 ENCOUNTER — OFFICE VISIT (OUTPATIENT)
Dept: FAMILY MEDICINE | Facility: CLINIC | Age: 58
End: 2018-05-17
Payer: COMMERCIAL

## 2018-05-17 ENCOUNTER — OFFICE VISIT (OUTPATIENT)
Dept: SURGERY | Facility: CLINIC | Age: 58
End: 2018-05-17
Payer: COMMERCIAL

## 2018-05-17 ENCOUNTER — THERAPY VISIT (OUTPATIENT)
Dept: OCCUPATIONAL THERAPY | Facility: CLINIC | Age: 58
End: 2018-05-17
Payer: COMMERCIAL

## 2018-05-17 VITALS
HEART RATE: 48 BPM | WEIGHT: 200 LBS | DIASTOLIC BLOOD PRESSURE: 57 MMHG | OXYGEN SATURATION: 97 % | TEMPERATURE: 97.5 F | SYSTOLIC BLOOD PRESSURE: 97 MMHG | BODY MASS INDEX: 30.31 KG/M2 | HEIGHT: 68 IN

## 2018-05-17 VITALS — SYSTOLIC BLOOD PRESSURE: 92 MMHG | DIASTOLIC BLOOD PRESSURE: 55 MMHG | HEART RATE: 53 BPM

## 2018-05-17 DIAGNOSIS — R25.2 SPASM: ICD-10-CM

## 2018-05-17 DIAGNOSIS — L30.9 ECZEMA, UNSPECIFIED TYPE: ICD-10-CM

## 2018-05-17 DIAGNOSIS — R10.30 GROIN PAIN, UNSPECIFIED LATERALITY: ICD-10-CM

## 2018-05-17 DIAGNOSIS — S14.105D INJURY OF FIFTH CERVICAL SPINAL CORD, SUBSEQUENT ENCOUNTER (H): ICD-10-CM

## 2018-05-17 DIAGNOSIS — R06.2 WHEEZING: ICD-10-CM

## 2018-05-17 DIAGNOSIS — S14.106D: ICD-10-CM

## 2018-05-17 DIAGNOSIS — R21 RASH AND OTHER NONSPECIFIC SKIN ERUPTION: ICD-10-CM

## 2018-05-17 DIAGNOSIS — S14.104D: ICD-10-CM

## 2018-05-17 DIAGNOSIS — Z00.00 ROUTINE GENERAL MEDICAL EXAMINATION AT A HEALTH CARE FACILITY: Primary | ICD-10-CM

## 2018-05-17 DIAGNOSIS — N31.9 NEUROGENIC BLADDER: ICD-10-CM

## 2018-05-17 DIAGNOSIS — J20.9 BRONCHITIS WITH BRONCHOSPASM: ICD-10-CM

## 2018-05-17 DIAGNOSIS — K59.00 CONSTIPATION, UNSPECIFIED CONSTIPATION TYPE: ICD-10-CM

## 2018-05-17 DIAGNOSIS — E78.00 HIGH CHOLESTEROL: ICD-10-CM

## 2018-05-17 DIAGNOSIS — G47.00 INSOMNIA, UNSPECIFIED TYPE: ICD-10-CM

## 2018-05-17 DIAGNOSIS — G82.50 QUADRIPLEGIA (H): ICD-10-CM

## 2018-05-17 DIAGNOSIS — M24.541 CONTRACTURE OF JOINT OF FINGER OF RIGHT HAND: ICD-10-CM

## 2018-05-17 DIAGNOSIS — M24.542 CONTRACTURE OF JOINT OF LEFT HAND: ICD-10-CM

## 2018-05-17 DIAGNOSIS — K62.5 HEMORRHAGE OF RECTUM AND ANUS: ICD-10-CM

## 2018-05-17 DIAGNOSIS — G90.4 AUTONOMIC DYSREFLEXIA: ICD-10-CM

## 2018-05-17 DIAGNOSIS — R29.898 DEFICIENCIES OF LIMBS: Primary | ICD-10-CM

## 2018-05-17 DIAGNOSIS — K64.8 INTERNAL HEMORRHOIDS: Primary | ICD-10-CM

## 2018-05-17 PROCEDURE — 97760 ORTHOTIC MGMT&TRAING 1ST ENC: CPT | Mod: GO | Performed by: OCCUPATIONAL THERAPIST

## 2018-05-17 PROCEDURE — 97763 ORTHC/PROSTC MGMT SBSQ ENC: CPT | Mod: GO | Performed by: OCCUPATIONAL THERAPIST

## 2018-05-17 PROCEDURE — 97535 SELF CARE MNGMENT TRAINING: CPT | Mod: GO | Performed by: OCCUPATIONAL THERAPIST

## 2018-05-17 RX ORDER — OXYCODONE AND ACETAMINOPHEN 5; 325 MG/1; MG/1
1-2 TABLET ORAL EVERY 4 HOURS PRN
Qty: 30 TABLET | Refills: 0 | Status: SHIPPED | OUTPATIENT
Start: 2018-05-17 | End: 2021-01-15

## 2018-05-17 RX ORDER — BISACODYL 10 MG/1
10 SUPPOSITORY RECTAL DAILY PRN
Qty: 30 SUPPOSITORY | Refills: 3 | Status: SHIPPED | OUTPATIENT
Start: 2018-05-17 | End: 2020-12-29

## 2018-05-17 RX ORDER — AMCINONIDE 1 MG/G
CREAM TOPICAL 2 TIMES DAILY PRN
Qty: 120 G | Refills: 1 | Status: SHIPPED | OUTPATIENT
Start: 2018-05-17 | End: 2021-01-15

## 2018-05-17 RX ORDER — IBUPROFEN 800 MG/1
800 TABLET, FILM COATED ORAL 3 TIMES DAILY PRN
Qty: 60 TABLET | Refills: 11 | Status: SHIPPED | OUTPATIENT
Start: 2018-05-17 | End: 2019-07-03

## 2018-05-17 RX ORDER — OXYBUTYNIN CHLORIDE 10 MG/1
10 TABLET, EXTENDED RELEASE ORAL DAILY
Qty: 90 TABLET | Refills: 3 | Status: SHIPPED | OUTPATIENT
Start: 2018-05-17 | End: 2019-08-14

## 2018-05-17 RX ORDER — SODIUM PHOSPHATE,MONO-DIBASIC 19G-7G/118
ENEMA (ML) RECTAL
Qty: 450 EACH | Refills: 3 | Status: SHIPPED | OUTPATIENT
Start: 2018-05-17 | End: 2021-02-08

## 2018-05-17 RX ORDER — CLINDAMYCIN PHOSPHATE 10 MG/G
GEL TOPICAL
Qty: 60 G | Refills: 11 | Status: SHIPPED | OUTPATIENT
Start: 2018-05-17 | End: 2021-02-08

## 2018-05-17 RX ORDER — GAUZE BANDAGE 4" X 4"
BANDAGE TOPICAL
Qty: 720 EACH | Refills: 3 | Status: SHIPPED | OUTPATIENT
Start: 2018-05-17 | End: 2021-02-08

## 2018-05-17 RX ORDER — SENNOSIDES 8.6 MG
1 TABLET ORAL DAILY
Qty: 90 EACH | Refills: 3 | Status: SHIPPED | OUTPATIENT
Start: 2018-05-17 | End: 2019-07-03

## 2018-05-17 RX ORDER — NYSTATIN 100000 [USP'U]/G
POWDER TOPICAL
Qty: 60 G | Refills: 2 | Status: SHIPPED | OUTPATIENT
Start: 2018-05-17 | End: 2020-03-19

## 2018-05-17 RX ORDER — DOCUSATE SODIUM 100 MG/1
100 CAPSULE, LIQUID FILLED ORAL DAILY
Qty: 90 CAPSULE | Refills: 3 | Status: SHIPPED | OUTPATIENT
Start: 2018-05-17 | End: 2019-07-03

## 2018-05-17 RX ORDER — DIAZEPAM 10 MG
10 TABLET ORAL DAILY
Qty: 90 TABLET | Refills: 0 | Status: SHIPPED | OUTPATIENT
Start: 2018-05-17 | End: 2018-08-08

## 2018-05-17 RX ORDER — MINOCYCLINE HYDROCHLORIDE 50 MG/1
50 CAPSULE ORAL 2 TIMES DAILY
Qty: 180 CAPSULE | Refills: 3 | Status: SHIPPED | OUTPATIENT
Start: 2018-05-17 | End: 2019-06-05

## 2018-05-17 RX ORDER — AMLODIPINE BESYLATE 2.5 MG/1
2.5 TABLET ORAL DAILY PRN
Qty: 30 TABLET | Refills: 1 | Status: SHIPPED | OUTPATIENT
Start: 2018-05-17 | End: 2021-01-01

## 2018-05-17 ASSESSMENT — ENCOUNTER SYMPTOMS
HEARTBURN: 0
RECTAL PAIN: 1
DYSPNEA ON EXERTION: 0
WHEEZING: 0
SKIN CHANGES: 0
CONSTIPATION: 1
HEMOPTYSIS: 0
POSTURAL DYSPNEA: 1
BLOATING: 1
DIARRHEA: 0
DIFFICULTY URINATING: 0
SPUTUM PRODUCTION: 0
NAIL CHANGES: 0
JAUNDICE: 0
ABDOMINAL PAIN: 0
COUGH: 0
BOWEL INCONTINENCE: 0
NAUSEA: 0
SHORTNESS OF BREATH: 0
VOMITING: 0
BLOOD IN STOOL: 0
FLANK PAIN: 0
HEMATURIA: 0
COUGH DISTURBING SLEEP: 0
POOR WOUND HEALING: 1
DYSURIA: 1
SNORES LOUDLY: 0

## 2018-05-17 ASSESSMENT — PAIN SCALES - GENERAL
PAINLEVEL: NO PAIN (0)
PAINLEVEL: NO PAIN (0)

## 2018-05-17 NOTE — NURSING NOTE
"Chief Complaint   Patient presents with     Rectal Problem     hemorrhoids       Vitals:    05/17/18 0904   BP: 97/57   Pulse: (!) 48   Temp: 97.5  F (36.4  C)   TempSrc: Oral   SpO2: 97%   Weight: 200 lb   Height: 5' 8\"       Body mass index is 30.41 kg/(m^2).    Ivy GERMAIN LPN                          "

## 2018-05-17 NOTE — NURSING NOTE
Chief Complaint   Patient presents with     Physical     pt here for physical     She Jack CMA at 1:09 PM on 5/17/2018.

## 2018-05-17 NOTE — MR AVS SNAPSHOT
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, [facility])    [address] [place] [address]   [phone]           [facility] is located on the corner of [facility] [facility] and [facility] on the [facility]. It is easily accessible from virtually any point in the [facility] area, via [employer] and [employer].              Future tests that were ordered for you today     Open Future Orders        Priority Expected Expires Ordered    Lipid panel reflex to direct LDL Fasting Routine 5/17/2018 5/31/2018 5/17/2018    PSA screen Routine 5/17/2018 5/17/2019 5/17/2018    Hemoglobin A1c Routine 5/17/2018 5/31/2018 5/17/2018            Who to contact     If you have questions or need follow up information about today's clinic visit or your schedule please contact [facility] directly at [phone].  Normal or non-critical lab and imaging results will be communicated to you by MyChart, letter or phone within 4 business days after the clinic has received the results. If you do not hear from us within 7 days, please contact the clinic through MyChart or phone. If you have a critical or abnormal lab result, we will notify you by phone as soon as possible.  Submit refill requests through [employer] or call your pharmacy and they will forward the refill request to us. Please allow 3 business days for  your refill to be completed.          Additional Information About Your Visit        [facility] Information     [employer] gives you secure access to your electronic health record. If you see a primary care provider, you can also send messages to your care team and make appointments. If you have questions, please call your primary care clinic.  If you do not have a primary care provider, please call [phone] and they will assist you.        Care EveryWhere ID     This is your Care EveryWhere ID. This could be used by other organizations to access your [facility] medical records  [id number]         Blood Pressure from Last 3 Encounters:   05/17/18 92/55   05/17/18 97/57   04/30/18 (!) 86/80    Weight from Last 3 Encounters:   05/17/18 90.7 kg (200 lb)   07/14/17 81.8 kg (180 lb 6.4 oz)   08/28/16 77.1 kg (170 lb)              We Performed the Following     C OT ORTHOTICS/PROSTH MGMT &/TRAING SBSQ ENCTR, EA 15 MIN     ORTHOTIC MGMT AND TRAINING, EACH 15 MIN     SELF CARE MNGMENT TRAINING          Today's Medication Changes          These changes are accurate as of 5/17/18 11:59 PM.  If you have any questions, ask your nurse or doctor.               Start taking these medicines.        Dose/Directions    order for DME   Used for:  Quadriplegia (H)   Started by:  Jason Long MD        Equipment being ordered: Alcohol swabs Use 4 a day for catheter change   Quantity:  120 Device   Refills:  11       order for DME   Used for:  Quadriplegia (H)   Started by:  Jason Long MD        Equipment being ordered: Urinary leg bag Change three times a month Dispense 3   Quantity:  3 Device   Refills:  11       oxyCODONE-acetaminophen 5-325 MG per tablet   Commonly known as:  PERCOCET   Used for:  Bronchitis with bronchospasm, Rash and other nonspecific skin eruption, Wheezing, Quadriplegia (H), Spasm, High cholesterol   Started by:  Jason Long MD        Dose:  1-2 tablet   Take 1-2 tablets by mouth every 4  hours as needed   Quantity:  30 tablet   Refills:  0         These medicines have changed or have updated prescriptions.        Dose/Directions    clindamycin 1 % topical gel   Commonly known as:  CLINDAMAX   This may have changed:    - how to take this  - when to take this  - reasons to take this  - additional instructions   Used for:  Bronchitis with bronchospasm, Rash and other nonspecific skin eruption, Wheezing, Quadriplegia (H), Spasm, High cholesterol        Apply bid   Quantity:  60 g   Refills:  11       diazepam 10 MG tablet   Commonly known as:  VALIUM   This may have changed:  See the new instructions.   Used for:  Bronchitis with bronchospasm, Insomnia, unspecified type, Rash and other nonspecific skin eruption, Wheezing, Groin pain, unspecified laterality, Quadriplegia (H), Spasm   Changed by:  Jason Long MD        Dose:  10 mg   Take 1 tablet (10 mg) by mouth daily   Quantity:  90 tablet   Refills:  0       * NORVASC PO   This may have changed:  Another medication with the same name was added. Make sure you understand how and when to take each.   Changed by:  Jason Long MD        Dose:  2.5 mg   Take 2.5 mg by mouth daily as needed   Refills:  0       * amLODIPine 2.5 MG tablet   Commonly known as:  NORVASC   This may have changed:  You were already taking a medication with the same name, and this prescription was added. Make sure you understand how and when to take each.   Used for:  Autonomic dysreflexia   Changed by:  Jason Long MD        Dose:  2.5 mg   Take 1 tablet (2.5 mg) by mouth daily as needed (autonomic dysreflexia)   Quantity:  30 tablet   Refills:  1       * Notice:  This list has 2 medication(s) that are the same as other medications prescribed for you. Read the directions carefully, and ask your doctor or other care provider to review them with you.      Stop taking these medicines if you haven't already. Please contact your care team if you have questions.   "   sodium chloride 3 % Nebu neb solution   Stopped by:  Jason Long MD                Where to get your medicines      These medications were sent to [facility] - [place], [place] - [address] [place] [address]  [address] [place] [address]     Phone:  [phone]     amcinonide 0.1 % cream    amLODIPine 2.5 MG tablet    cholecalciferol 1000 UNIT tablet    clindamycin 1 % topical gel    Cotton Swabs Swab    docusate sodium 100 MG capsule    Gauze Bandage 2\" Misc    Gauze Dressing 4\"X4\" Pads    hydrocortisone 2.5 % cream    ibuprofen 800 MG tablet    MAGIC BULLETS 10 MG Suppository    minocycline 50 MG capsule    nystatin 044224 UNIT/GM Powd    oxybutynin 10 MG 24 hr tablet    sennosides 8.6 MG tablet    sodium chloride (PF) 0.9% PF flush         Some of these will need a paper prescription and others can be bought over the counter.  Ask your nurse if you have questions.     Bring a paper prescription for each of these medications     diazepam 10 MG tablet    order for DME    order for DME    oxyCODONE-acetaminophen 5-325 MG per tablet    Urostomy Night Bag Misc                Primary Care Provider Office Phone # Fax #    Jason Long [phone]       [address] [address]        Equal Access to Services     MAYRA BOUDREAUX AH: Hadii madalyn ku hadasho Soomaali, waaxda luqadaha, qaybta kaalmada adeegyada, waxay idiin hayaan adelitzy kharash kev guthrie. So [facility] [phone].    ATENCIÓN: Si habla español, tiene a still disposición servicios gratneerajos de asistencia lingüística. ame al [phone].    We comply with applicable federal civil rights laws and [place] laws. We do not discriminate on the basis of race, color, national origin, age, disability, sex, sexual orientation, or gender identity.            Thank you!     Thank you for choosing [facility]  for your care. Our goal is always to provide you with excellent care. Hearing back from our patients is " one way we can continue to improve our services. Please take a few minutes to complete the written survey that you may receive in the mail after your visit with us. Thank you!             Your Updated Medication List - Protect others around you: Learn how to safely use, store and throw away your medicines at www.disposemymeds.org.          This list is accurate as of 5/17/18 11:59 PM.  Always use your most recent med list.                   Brand Name Dispense Instructions for use Diagnosis    * albuterol (2.5 MG/3ML) 0.083% neb solution     25 vial    Take 1 vial (2.5 mg) by nebulization every 6 hours as needed for shortness of breath / dyspnea or wheezing    SOB (shortness of breath)       * albuterol 108 (90 Base) MCG/ACT Inhaler    VENTOLIN HFA    3 Inhaler    Inhale 2 puffs into the lungs every 4 hours as needed * ANNUAL MD APPT. DUE July 2018    Wheezing, Bronchitis with bronchospasm, Quadriplegia (H), Spasm       ALCOHOL PREP PADS      USE AS DIRECTED        amcinonide 0.1 % cream    CYCLOCORT    120 g    Apply topically 2 times daily as needed for itching    Eczema, unspecified type       ascorbic acid 1000 MG Tabs    vitamin C     Take 1 tablet by mouth daily        baclofen 20 MG tablet    LIORESAL    540 tablet    TAKE ONE TABLET BY MOUTH UP TO 6 TIMES DAILY    Quadriplegia (H), Spasm, Groin pain, unspecified laterality       * bisacodyl 10 MG Suppository    DULCOLAX     Place 20 mg rectally every 3 days Active ingredient in Magic Bullet (10mg per suppository)        * MAGIC BULLETS 10 MG Suppository   Generic drug:  bisacodyl     30 suppository    Place 1 suppository (10 mg) rectally daily as needed for constipation    Constipation, unspecified constipation type       cholecalciferol 1000 UNIT tablet    vitamin D3    90 tablet    Take 1 tablet (1,000 Units) by mouth daily    Quadriplegia (H)       clindamycin 1 % topical gel    CLINDAMAX    60 g    Apply bid    Bronchitis with bronchospasm, Rash and  "other nonspecific skin eruption, Wheezing, Quadriplegia (H), Spasm, High cholesterol       Cotton Swabs Swab     450 each    Please dispense sterile wrapped cotton swabs; use up to five/day for medical needs    Bronchitis with bronchospasm, Rash and other nonspecific skin eruption, Wheezing, Quadriplegia (H), Spasm, High cholesterol       diazepam 10 MG tablet    VALIUM    90 tablet    Take 1 tablet (10 mg) by mouth daily    Bronchitis with bronchospasm, Insomnia, unspecified type, Rash and other nonspecific skin eruption, Wheezing, Groin pain, unspecified laterality, Quadriplegia (H), Spasm       docusate sodium 100 MG capsule    DOCQLACE    90 capsule    Take 1 capsule (100 mg) by mouth daily    Bronchitis with bronchospasm, Rash and other nonspecific skin eruption, Wheezing, Quadriplegia (H), Spasm, High cholesterol       fosfomycin 3 g Packet    MONUROL    2 packet    Take 1 packet (3 g) by mouth every 3 days Dissolve contents into 3 to 4 ounces of water and stir, then administer; do not use hot water.    Urinary tract infection       * Gauze Bandage 2\" Misc     360 each    IV STERILE GUAZE Use up to 4/day Supply 360 for three month supply    Bronchitis with bronchospasm, Rash and other nonspecific skin eruption, Wheezing, Quadriplegia (H), Spasm, High cholesterol       * Gauze Dressing 4\"X4\" Pads     720 each    NON STERILE GAUZE Use up to 8/day  Supply 720 for three month supply    Bronchitis with bronchospasm, Rash and other nonspecific skin eruption, Wheezing, Quadriplegia (H), Spasm, High cholesterol       hydrocortisone 2.5 % cream    ANUSOL-HC    120 g    Bid prn    Rash and other nonspecific skin eruption, Bronchitis with bronchospasm, Wheezing, Quadriplegia (H), Spasm       ibuprofen 800 MG tablet    ADVIL/MOTRIN    60 tablet    Take 1 tablet (800 mg) by mouth 3 times daily as needed    Bronchitis with bronchospasm, Rash and other nonspecific skin eruption, Wheezing, Quadriplegia (H), Spasm, High " cholesterol       lidocaine 2 % topical gel    XYLOCAINE     Apply  topically. APPLY AS DIRECTED        minocycline 50 MG capsule    MINOCIN/DYNACIN    180 capsule    Take 1 capsule (50 mg) by mouth 2 times daily    Rash and other nonspecific skin eruption, Quadriplegia (H)       montelukast 10 MG tablet    SINGULAIR    30 tablet    Take 1 tablet (10 mg) by mouth At Bedtime    Cough       MULTIVITAMIN & MINERAL PO      Take 1 capsule by mouth daily.        nitroGLYcerin 2 % Oint ointment    NITRO-BID    30 g    Place 0.5-1 inches (7.5-15 mg) onto the skin every 24 hours as needed for autonomic dysreflexia symptoms.  Remove ointment with gloved hand once symptoms resolve.        * NORVASC PO      Take 2.5 mg by mouth daily as needed        * amLODIPine 2.5 MG tablet    NORVASC    30 tablet    Take 1 tablet (2.5 mg) by mouth daily as needed (autonomic dysreflexia)    Autonomic dysreflexia       nystatin 169707 UNIT/GM Powd    NYSTOP    60 g    APPLY 1 DOSE TOPICALLY 3 TIMES DAILY AS NEEDED    Rash and other nonspecific skin eruption, Quadriplegia (H), Spasm, High cholesterol       order for DME     2 each    two leg bag straps and 2 mouth sticks    Injury at C4 level of cervical spinal cord (H), Injury at C5 level of cervical spinal cord (H), Injury at C6 level of cervical spinal cord (H), Mechanical limb problems       order for DME     120 Device    Equipment being ordered: Alcohol swabs Use 4 a day for catheter change    Quadriplegia (H)       order for DME     3 Device    Equipment being ordered: Urinary leg bag Change three times a month Dispense 3    Quadriplegia (H)       oxybutynin 10 MG 24 hr tablet    DITROPAN-XL    90 tablet    Take 1 tablet (10 mg) by mouth daily    Quadriplegia (H), Spasm       oxyCODONE-acetaminophen 5-325 MG per tablet    PERCOCET    30 tablet    Take 1-2 tablets by mouth every 4 hours as needed    Bronchitis with bronchospasm, Rash and other nonspecific skin eruption, Wheezing,  Quadriplegia (H), Spasm, High cholesterol       sennosides 8.6 MG tablet    SENOKOT    90 each    Take 1 tablet by mouth daily    Constipation, unspecified constipation type       sildenafil 100 MG tablet    VIAGRA    10 tablet    Take 1 tablet (100 mg) by mouth daily as needed TAKE 1 HOUR BEFORE NEEDED    Erectile dysfunction, Need for prophylactic vaccination against Hemophilus influenza type B (Hib), Insomnia, unspecified, Bronchitis with bronchospasm, Rash and other nonspecific skin eruption, Wheezing, Quadriplegia, unspecified, Unspecified constipation, Neurogenic bladder, NOS, Quadriplegia (H), Spasm, High cholesterol       sodium chloride (PF) 0.9% PF flush     500 mL    Use for urinary catheter irrigation    Injury at C4 level of cervical spinal cord, subsequent encounter (H), Injury of fifth cervical spinal cord, subsequent encounter (H), Injury at C6 level of cervical spinal cord, subsequent encounter (H), Neurogenic bladder       terbinafine 1 % cream    lamISIL     Apply topically daily as needed        URINARY LEG BAG      USE AS NEEDED        Urostomy Night Bag Misc     9 each    Please dispense three night bags/month (three month worth at a time) with 4 refills re: needed for chronic urinary dysfunction due to quadreplegia. Type: Bard 2000 ml.    Bronchitis with bronchospasm, Rash and other nonspecific skin eruption, Wheezing, Quadriplegia (H), Spasm, High cholesterol       zolpidem 10 MG tablet    AMBIEN    90 tablet    TAKE 1/2 TO 1 TABLET BY MOUTH AT BEDTIME    Insomnia, Bronchitis with bronchospasm, Rash and other nonspecific skin eruption, Wheezing, Groin pain, unspecified laterality, Quadriplegia (H), Spasm       * Notice:  This list has 8 medication(s) that are the same as other medications prescribed for you. Read the directions carefully, and ask your doctor or other care provider to review them with you.

## 2018-05-17 NOTE — PROGRESS NOTES
Colon and Rectal Surgery Consult Clinic Note    Date: 2018     Referring provider:  Jose Eduardo Berry RN  No address on file     RE: Bart Corea  : 1960  DELPHINE: 2018    Bart Corea is a very pleasant 58 year old quadriplegic male with neurogenic bladder with a recent diagnosis of hemorrhoids.  Given these findings they were subsequently sent to the [facility] for an opinion on this and a new patient consultation.     Bart reports having hemorrhoids in the past. He had a surgical hemorrhoidectomy over 20 years ago. He has been wheelchair bound for 37 years and feels his hemorrhoids are from this and his bowel program. He does suppositories and digital stimulation every 3 days. He has intermittent rectal bleeding that has been going on for many months. He denies any pain and does have some sensation. He is not on any blood thinners. He has never had a colonoscopy. He denies any family history of colon cancer.    Assessment/Plan: 58 year old quadriplegic male with hemorrhoid prolapse and rectal bleeding. On exam he has large external skin tags. Some prolapsing hemorrhoid component in the posterior midline. Internal hemorrhoids with some overlying metaplasia consistent with prolapse or trauma. No active bleeding.  Discussed managing hemorrhoids with hemorrhoid banding although I did advise him that this would not get rid of the external skin tags.  These would need to be surgically removed.  I recommended surgical removal of skin tags only if they are significantly symptomatic.  I also advised a colonoscopy given his rectal bleeding and he has never had a colonoscopy in the past.  Discussed that although bleeding is likely related to his hemorrhoids that I cannot guarantee that he does not have another, possibly malignant source of bleeding.  He is agreeable to a colonoscopy.  Discussed risks of banding today including bleeding today and when the band falls off  in 1-2 weeks.  He stated an understanding of these risks and wished to proceed today.  One band was placed in the posterior position, which he tolerated well.  We will have him return to clinic after his colonoscopy if he has any continued symptoms. Patient's questions were answered to his stated satisfaction and he is in agreement with this plan.    Medical history:  Past Medical History:   Diagnosis Date     LIZA (obstructive sleep apnea) 12/3/2014     Quadriplegia, C1-C4 incomplete (H)        Surgical history:  Past Surgical History:   Procedure Laterality Date     Bilateral ureteroscopy with holmium laser lithotripsy and basketing and removal of fragments  Left ureteral stent placement.  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/01/2017     Priority: Medium     Scrotal swelling 06/14/2016     Priority: Medium     Bacteremia 11/17/2015     Priority: Medium     Pneumonia 11/15/2015     Priority: Medium     Injury at C4 level of cervical spinal cord (H) 09/15/2015     Priority: Medium     Injury at C5 level of cervical spinal cord (H) 09/15/2015     Priority: Medium     Injury at C6 level of cervical spinal cord (H) 09/15/2015     Priority: Medium     Cataracts, bilateral 01/14/2015     Priority: Medium     Myopic astigmatism of both eyes 01/14/2015     Priority: Medium     Presbyopia 01/14/2015     Priority: Medium      Mild LIZA with hypoventilation 12/03/2014     Priority: Medium     [facility] psg 12/2/14       Sleep related hypoventilation/hypoxemia in other disease 12/03/2014     Priority: Medium     Problem list name updated by automated process. Provider to review       Neurogenic bladder 12/16/2013     Priority: Medium     Abdominal pain 12/13/2011     Priority: Medium     Problem list name updated by automated process. Provider to review         Medications:  Current Outpatient  "Prescriptions   Medication Sig Dispense Refill     albuterol (2.5 MG/3ML) 0.083% neb solution Take 1 vial (2.5 mg) by nebulization every 6 hours as needed for shortness of breath / dyspnea or wheezing 25 vial 1     albuterol (VENTOLIN HFA) 108 (90 BASE) MCG/ACT Inhaler Inhale 2 puffs into the lungs every 4 hours as needed * ANNUAL MD APPT. DUE July 2018 3 Inhaler 0     Alcohol Swabs (ALCOHOL PREP PADS) USE AS DIRECTED       amcinonide (CYCLOCORT) 0.1 % cream Apply topically 2 times daily as needed for itching 120 g 1     AmLODIPine Besylate (NORVASC PO) Take 2.5 mg by mouth daily as needed       Applicators (COTTON SWABS) SWAB Please dispense sterile wrapped cotton swabs; use up to five/day for medical needs 450 each 3     ascorbic acid (VITAMIN C) 1000 MG TABS Take 1 tablet by mouth daily       baclofen (LIORESAL) 20 MG tablet TAKE ONE TABLET BY MOUTH UP TO 6 TIMES DAILY 540 tablet 3     bisacodyl (DULCOLAX) 10 MG suppository Place 20 mg rectally every 3 days Active ingredient in Magic Bullet (10mg per suppository)       cholecalciferol (VITAMIN D3) 1000 UNIT tablet Take 1 tablet (1,000 Units) by mouth daily 90 tablet 0     clindamycin (CLINDAMAX) 1 % gel Apply bid (Patient taking differently: Apply topically 2 times daily as needed (face) ) 60 g 11     diazepam (VALIUM) 10 MG tablet TAKE ONE TABLET BY MOUTH EVERY DAY 90 tablet 0     docusate sodium (DOCQLACE) 100 MG capsule Take 1 capsule (100 mg) by mouth daily 90 capsule 3     fosfomycin (MONUROL) 3 G Packet Take 1 packet (3 g) by mouth every 3 days Dissolve contents into 3 to 4 ounces of water and stir, then administer; do not use hot water. 2 packet 0     Gauze Pads & Dressings (GAUZE BANDAGE 2\") MISC IV STERILE GUAZE Use up to 4/day Supply 360 for three month supply 360 each 3     Gauze Pads & Dressings (GAUZE DRESSING) 4\"X4\" PADS NON STERILE GAUZE Use up to 8/day  Supply 720 for three month supply 720 each 3     hydrocortisone (ANUSOL-HC) 2.5 % rectal cream " Bid prn 120 g 11     ibuprofen (ADVIL,MOTRIN) 800 MG tablet Take 1 tablet (800 mg) by mouth 3 times daily as needed 60 tablet 11     Incontinence Supplies (URINARY LEG BAG) USE AS NEEDED       lidocaine (XYLOCAINE) 2 % jelly Apply  topically. APPLY AS DIRECTED       MAGIC BULLETS 10 MG Suppository Place 1 suppository (10 mg) rectally daily as needed for constipation 30 suppository 3     minocycline (MINOCIN/DYNACIN) 50 MG capsule Take 1 capsule (50 mg) by mouth 2 times daily 180 capsule 3     montelukast (SINGULAIR) 10 MG tablet Take 1 tablet (10 mg) by mouth At Bedtime 30 tablet 1     Multiple Vitamins-Minerals (MULTIVITAMIN & MINERAL PO) Take 1 capsule by mouth daily.       nitroGLYcerin (NITRO-BID) 2 % OINT ointment Place 0.5-1 inches (7.5-15 mg) onto the skin every 24 hours as needed for autonomic dysreflexia symptoms.  Remove ointment with gloved hand once symptoms resolve. 30 g 0     nystatin (NYSTOP) 275934 UNIT/GM POWD APPLY 1 DOSE TOPICALLY 3 TIMES DAILY AS NEEDED 60 g 2     order for DME two leg bag straps and 2 mouth sticks 2 each 0     Ostomy Supplies (UROSTOMY NIGHT BAG) MISC Please dispense three night bags/month (three month worth at a time) with 4 refills re: needed for chronic urinary dysfunction due to quadreplegia. Type: Bard 2000 ml. 9 Bottle 4     oxybutynin (DITROPAN-XL) 10 MG 24 hr tablet Take 1 tablet (10 mg) by mouth daily 90 tablet 3     sennosides (SENOKOT) 8.6 MG tablet Take 1 tablet by mouth daily 90 each 3     sildenafil (VIAGRA) 100 MG tablet Take 1 tablet (100 mg) by mouth daily as needed TAKE 1 HOUR BEFORE NEEDED 10 tablet 11     sodium chloride 3 % NEBU neb solution Take 3 mLs by nebulization every 3 hours as needed for wheezing 500 mL 0     sodium chloride, PF, 0.9% PF flush Use for urinary catheter irrigation 500 mL 11     terbinafine (LAMISIL) 1 % cream Apply topically daily as needed        zolpidem (AMBIEN) 10 MG tablet TAKE 1/2 TO 1 TABLET BY MOUTH AT BEDTIME 90 tablet 0  "      Allergies:  Allergies   Allergen Reactions     Vancomycin Anaphylaxis       Family history:  Family History   Problem Relation Age of Onset     CANCER Father      DIABETES No family hx of      Glaucoma No family hx of      Hypertension No family hx of      Macular Degeneration No family hx of      Retinal detachment No family hx of        Social history:  Social History   Substance Use Topics     Smoking status: Current Every Day Smoker     Packs/day: 0.30     Types: Cigarettes     Smokeless tobacco: Never Used      Comment: since early teens     Alcohol use Yes      Comment: on weekends, 3-4+    Marital status: .  Occupation: .    Nursing Notes:   Ivy Wallace LPN  5/17/2018  9:09 AM  Signed  Chief Complaint   Patient presents with     Rectal Problem     hemorrhoids       Vitals:    05/17/18 0904   BP: 97/57   Pulse: (!) 48   Temp: 97.5  F (36.4  C)   TempSrc: Oral   SpO2: 97%   Weight: 200 lb   Height: 5' 8\"       Body mass index is 30.41 kg/(m^2).    Ivy GERMAIN LPN                             Physical Examination: Exam was chaperoned by Ivy Wallace LPN and Annette Tenorio RN  BP 97/57  Pulse (!) 48  Temp 97.5  F (36.4  C) (Oral)  Ht 5' 8\"  Wt 200 lb  SpO2 97%  BMI 30.41 kg/m2  General: alert, oriented, in no acute distress, sitting comfortably in wheelchair, transfer to exam table with shyanne lift  HEENT: mucous membranes moist  Perianal external examination:  Perianal skin: Intact with no excoriation or lichenification.  Lesions: No evidence of an external lesion, nodularity, or induration in the perianal region.  Eversion of buttocks: There was not evidence of an anal fissure. Details: N/A.  Skin tags or external hemorrhoids: Yes: moderate sized external anal skin tags without bleeding.  Digital rectal examination: Was performed.   Sphincter tone: Good.  Palpable lesions: No.  Prostate: Normal.  Other: None.    Anoscopy: Was performed.   Hemorrhoids: Yes. Circumferential " internal hemorrhoids with overlying metaplasia and largest hemorrhoid in the posterior position. No bleeding  Lesions: No    Procedures:  After discussing the risks and benefits, the patient agreed to proceed with internal hemorrhoidal banding.    Prior to the start of the procedure and with procedural staff participation, I verbally confirmed the patient s identity using two indicators, relevant allergies, that the procedure was appropriate and matched the consent or emergent situation, and that the correct equipment/implants were available. Immediately prior to starting the procedure I conducted the Time Out with the procedural staff and re-confirmed the patient s name, procedure, and site/side. (The Joint Commission universal protocol was followed.)  Yes    Sedation (Moderate or Deep): None    A suction hemorrhoidal  was used to place a total of 1 band(s) in the posterior position(s).    There was no significant bleeding. The patient tolerated the procedure well.    This procedure was performed under a collaborative privileging agreement with Dr. Dow, Chief of Colon and Rectal Surgery.    Total face to face time was 30 minutes, outside the procedure time, >50% counseling.    JC Martin, NP-C  Colon and Rectal Surgery   [facility]    This note was created using speech recognition software and may contain unintended word substitutions.

## 2018-05-17 NOTE — MR AVS SNAPSHOT
After Visit Summary   5/17/2018    Bart Corea    MRN: [id number]           Patient Information     Date Of Birth          1960        Visit Information        Provider Department      5/17/2018 9:15 AM Emily Golden APRN [facility] Colon and Rectal Surgery        Today's Diagnoses     Internal hemorrhoids    -  1    Hemorrhage of rectum and anus           Follow-ups after your visit        Your next 10 appointments already scheduled     May 17, 2018 12:35 PM CDT   (Arrive by 12:20 PM)   PHYSICAL with Jason Long MD   [facility] ([facility])    [address]  [address] [facility] [address] [phone]              Who to contact     Please call your clinic at [phone] to:    Ask questions about your health    Make or cancel appointments    Discuss your medicines    Learn about your test results    Speak to your doctor            Additional Information About Your Visit        MyChart Information     WAYN gives you secure access to your electronic health record. If you see a primary care provider, you can also send messages to your care team and make appointments. If you have questions, please call your primary care clinic.  If you do not have a primary care provider, please call [phone] and they will assist you.      WAYN is an electronic gateway that provides easy, online access to your medical records. With WAYN, you can request a clinic appointment, read your test results, renew a prescription or communicate with your care team.     To access your existing account, please contact your [facility] or call [phone] for assistance.        Care EveryWhere ID     This is your Care EveryWhere ID. This could be used by other organizations to access your [place] medical records  [id number]        Your Vitals Were     Pulse Temperature Height Pulse  "Oximetry BMI (Body Mass Index)       48 97.5  F (36.4  C) (Oral) 5' 8\" 97% 30.41 kg/m2        Blood Pressure from Last 3 Encounters:   05/17/18 97/57   04/30/18 (!) 86/80   07/19/17 97/60    Weight from Last 3 Encounters:   05/17/18 200 lb   07/14/17 180 lb 6.4 oz   08/28/16 170 lb              We Performed the Following     ANOSCOPY W/WO BRUSH/WASH     HEMORRHOIDECTOMY W BANDING/LIGATION          Today's Medication Changes          These changes are accurate as of 5/17/18 11:59 AM.  If you have any questions, ask your nurse or doctor.               These medicines have changed or have updated prescriptions.        Dose/Directions    clindamycin 1 % topical gel   Commonly known as:  CLINDAMAX   This may have changed:    - how to take this  - when to take this  - reasons to take this  - additional instructions   Used for:  Need for prophylactic vaccination against Hemophilus influenza type B (Hib), Insomnia, unspecified, Bronchitis with bronchospasm, Rash and other nonspecific skin eruption, Wheezing, Quadriplegia, unspecified, Unspecified constipation, Neurogenic bladder, NOS, Quadriplegia (H), Spasm, High cholesterol        Apply bid   Quantity:  60 g   Refills:  11                Primary Care Provider Office Phone # Fax #    Jason Long [phone]       [address] [address]        Equal Access to Services     MAYRA BOUDREAUX AH: Hadii madalyn nava Solouis, waaxda luqadaha, qaybta kaalmada wilfred, hansel angulo . So [facility] [phone].    ATENCIÓN: Si habla español, tiene a still disposición servicios gratuitos de asistencia lingüística. Marquis al [phone].    We comply with applicable federal civil rights laws and [place] laws. We do not discriminate on the basis of race, color, national origin, age, disability, sex, sexual orientation, or gender identity.            Thank you!     Thank you for choosing [facility] COLON AND RECTAL SURGERY "  for your care. Our goal is always to provide you with excellent care. Hearing back from our patients is one way we can continue to improve our services. Please take a few minutes to complete the written survey that you may receive in the mail after your visit with us. Thank you!             Your Updated Medication List - Protect others around you: Learn how to safely use, store and throw away your medicines at www.disposemymeds.org.          This list is accurate as of 5/17/18 11:59 AM.  Always use your most recent med list.                   Brand Name Dispense Instructions for use Diagnosis    * albuterol (2.5 MG/3ML) 0.083% neb solution     25 vial    Take 1 vial (2.5 mg) by nebulization every 6 hours as needed for shortness of breath / dyspnea or wheezing    SOB (shortness of breath)       * albuterol 108 (90 Base) MCG/ACT Inhaler    VENTOLIN HFA    3 Inhaler    Inhale 2 puffs into the lungs every 4 hours as needed * ANNUAL MD APPT. DUE July 2018    Wheezing, Bronchitis with bronchospasm, Quadriplegia (H), Spasm       ALCOHOL PREP PADS      USE AS DIRECTED        amcinonide 0.1 % cream    CYCLOCORT    120 g    Apply topically 2 times daily as needed for itching    Eczema       ascorbic acid 1000 MG Tabs    vitamin C     Take 1 tablet by mouth daily        baclofen 20 MG tablet    LIORESAL    540 tablet    TAKE ONE TABLET BY MOUTH UP TO 6 TIMES DAILY    Quadriplegia (H), Spasm, Groin pain, unspecified laterality       * bisacodyl 10 MG Suppository    DULCOLAX     Place 20 mg rectally every 3 days Active ingredient in Magic Bullet (10mg per suppository)        * MAGIC BULLETS 10 MG Suppository   Generic drug:  bisacodyl     30 suppository    Place 1 suppository (10 mg) rectally daily as needed for constipation    Constipation       cholecalciferol 1000 UNIT tablet    vitamin D3    90 tablet    Take 1 tablet (1,000 Units) by mouth daily    Quadriplegia (H)       clindamycin 1 % topical gel    CLINDAMAX    60 g     "Apply bid    Need for prophylactic vaccination against Hemophilus influenza type B (Hib), Insomnia, unspecified, Bronchitis with bronchospasm, Rash and other nonspecific skin eruption, Wheezing, Quadriplegia, unspecified, Unspecified constipation, Neurogenic bladder, NOS, Quadriplegia (H), Spasm, High cholesterol       Cotton Swabs Swab     450 each    Please dispense sterile wrapped cotton swabs; use up to five/day for medical needs    Quadriplegia, unspecified, Need for prophylactic vaccination against Hemophilus influenza type B (Hib), Insomnia, unspecified, Bronchitis with bronchospasm, Rash and other nonspecific skin eruption, Wheezing, Unspecified constipation, Neurogenic bladder, NOS, Quadriplegia (H), Spasm, High cholesterol       diazepam 10 MG tablet    VALIUM    90 tablet    TAKE ONE TABLET BY MOUTH EVERY DAY    Bronchitis with bronchospasm, Insomnia, Rash and other nonspecific skin eruption, Wheezing, Groin pain, unspecified laterality, Quadriplegia (H), Spasm       docusate sodium 100 MG capsule    DOCQLACE    90 capsule    Take 1 capsule (100 mg) by mouth daily    Insomnia, unspecified, Bronchitis with bronchospasm, Rash and other nonspecific skin eruption, Wheezing, Quadriplegia, unspecified, Quadriplegia (H), Spasm, High cholesterol       fosfomycin 3 g Packet    MONUROL    2 packet    Take 1 packet (3 g) by mouth every 3 days Dissolve contents into 3 to 4 ounces of water and stir, then administer; do not use hot water.    Urinary tract infection       * Gauze Bandage 2\" Misc     360 each    IV STERILE GUAZE Use up to 4/day Supply 360 for three month supply    Quadriplegia, unspecified, Need for prophylactic vaccination against Hemophilus influenza type B (Hib), Insomnia, unspecified, Bronchitis with bronchospasm, Rash and other nonspecific skin eruption, Wheezing, Unspecified constipation, Neurogenic bladder, NOS, Quadriplegia (H), Spasm, High cholesterol       * Gauze Dressing 4\"X4\" Pads     720 " each    NON STERILE GAUZE Use up to 8/day  Supply 720 for three month supply    Quadriplegia, unspecified, Need for prophylactic vaccination against Hemophilus influenza type B (Hib), Insomnia, unspecified, Bronchitis with bronchospasm, Rash and other nonspecific skin eruption, Wheezing, Unspecified constipation, Neurogenic bladder, NOS, Quadriplegia (H), Spasm, High cholesterol       hydrocortisone 2.5 % cream    ANUSOL-HC    120 g    Bid prn    Rash and other nonspecific skin eruption, Need for prophylactic vaccination against Hemophilus influenza type B (Hib), Insomnia, unspecified, Bronchitis with bronchospasm, Wheezing, Quadriplegia, unspecified, Unspecified constipation, Neurogenic bladder, NOS, Quadriplegia (H), Spasm       ibuprofen 800 MG tablet    ADVIL/MOTRIN    60 tablet    Take 1 tablet (800 mg) by mouth 3 times daily as needed    Quadriplegia, unspecified, Need for prophylactic vaccination against Hemophilus influenza type B (Hib), Insomnia, unspecified, Bronchitis with bronchospasm, Rash and other nonspecific skin eruption, Wheezing, Unspecified constipation, Neurogenic bladder, NOS, Quadriplegia (H), Spasm, High cholesterol       lidocaine 2 % topical gel    XYLOCAINE     Apply  topically. APPLY AS DIRECTED        minocycline 50 MG capsule    MINOCIN/DYNACIN    180 capsule    Take 1 capsule (50 mg) by mouth 2 times daily    Rash and other nonspecific skin eruption, Quadriplegia (H)       montelukast 10 MG tablet    SINGULAIR    30 tablet    Take 1 tablet (10 mg) by mouth At Bedtime    Cough       MULTIVITAMIN & MINERAL PO      Take 1 capsule by mouth daily.        nitroGLYcerin 2 % Oint ointment    NITRO-BID    30 g    Place 0.5-1 inches (7.5-15 mg) onto the skin every 24 hours as needed for autonomic dysreflexia symptoms.  Remove ointment with gloved hand once symptoms resolve.        NORVASC PO      Take 2.5 mg by mouth daily as needed        nystatin [id number] UNIT/GM Powd    NYSTOP    60 g    APPLY  1 DOSE TOPICALLY 3 TIMES DAILY AS NEEDED    Rash and other nonspecific skin eruption, Quadriplegia (H), Spasm, High cholesterol       order for DME     2 each    two leg bag straps and 2 mouth sticks    Injury at C4 level of cervical spinal cord (H), Injury at C5 level of cervical spinal cord (H), Injury at C6 level of cervical spinal cord (H), Mechanical limb problems       oxybutynin 10 MG 24 hr tablet    DITROPAN-XL    90 tablet    Take 1 tablet (10 mg) by mouth daily    Quadriplegia, unspecified, Quadriplegia (H), Spasm       sennosides 8.6 MG tablet    SENOKOT    90 each    Take 1 tablet by mouth daily    Constipation       sildenafil 100 MG tablet    VIAGRA    10 tablet    Take 1 tablet (100 mg) by mouth daily as needed TAKE 1 HOUR BEFORE NEEDED    Erectile dysfunction, Need for prophylactic vaccination against Hemophilus influenza type B (Hib), Insomnia, unspecified, Bronchitis with bronchospasm, Rash and other nonspecific skin eruption, Wheezing, Quadriplegia, unspecified, Unspecified constipation, Neurogenic bladder, NOS, Quadriplegia (H), Spasm, High cholesterol       sodium chloride (PF) 0.9% PF flush     500 mL    Use for urinary catheter irrigation    Injury at C4 level of cervical spinal cord (H), Injury at C5 level of cervical spinal cord (H), Injury at C6 level of cervical spinal cord (H), Neurogenic bladder       sodium chloride 3 % Nebu neb solution     500 mL    Take 3 mLs by nebulization every 3 hours as needed for wheezing    Pneumonia       terbinafine 1 % cream    lamISIL     Apply topically daily as needed        URINARY LEG BAG      USE AS NEEDED        Urostomy Night Bag Misc     9 Bottle    Please dispense three night bags/month (three month worth at a time) with 4 refills re: needed for chronic urinary dysfunction due to quadreplegia. Type: Bard 2000 ml.    Quadriplegia, unspecified, Need for prophylactic vaccination against Hemophilus influenza type B (Hib), Insomnia, unspecified,  Bronchitis with bronchospasm, Rash and other nonspecific skin eruption, Wheezing, Unspecified constipation, Neurogenic bladder, NOS, Quadriplegia (H), Spasm, High cholesterol       zolpidem 10 MG tablet    AMBIEN    90 tablet    TAKE 1/2 TO 1 TABLET BY MOUTH AT BEDTIME    Insomnia, Bronchitis with bronchospasm, Rash and other nonspecific skin eruption, Wheezing, Groin pain, unspecified laterality, Quadriplegia (H), Spasm       * Notice:  This list has 6 medication(s) that are the same as other medications prescribed for you. Read the directions carefully, and ask your doctor or other care provider to review them with you.

## 2018-05-17 NOTE — PROGRESS NOTES
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  Bart is a patient well known to this clinic.  Patient reports needing new replacement orthoses and liners, and mouth sticks and other related adaptive equipment.   Since onset symptoms are Unchanged.  Special tests:  none.  Previous treatment: Hand therapy for orthoses and adaptive equipment.    General health as reported by patient is good.  Pertinent medical history includes:quadraplegic  Medical allergies:See EMR  Surgical history: orthopedic: SCI cervical fusion, multilple medical procedures, see EMR.  Medication history: sleep, muscle relaxants.  Additional Occupational Profile Information (patterns of daily living, interests, values and needs): . Pt reports difficulty with bathing/showering, toileting, dressing, feeding, functional mobility, personal device care, hygiene and grooming, driving and community mobility, health management and maintenance, home establishment and management, meal preparation and cleanup and sleep.    Job Tasks: prolonged sitting, pushing/pulling, repetitive tasks, computer work  Barriers include:transportation, requires assistance with dressing, personal hygiene, transfers, mobility, has a  with his own van.   Prior functional level:  semi-supervised setting, personal care attendant  Living situation: lives with their spouse  Additional work context information: Employed - occupation - , working full time  Mobility: No difficulty getting around home and community  With electric w/c  Transportation: Unable to drive car for transportation due to current injury, Has a  with own van,   Caregiver for: none others  Values / Spirituality: Patient  identifies with [facility]  Daily routine: works full time, likes sports  Leisure activities / hobbies: sports participant from the [employer]    Functional Outcome Measure:   See flowsheet    Objective: Pt attends therapy for refitting of orthoses and adaptive equipment for his mouth sticks and leg bags.This visit, pt's bilateral forearm resting orthoses were refit with moleskin and ventilated for skin health. The mouth sticks are to be revised in next visits. Also pt reports his current leg bag holders are not fitting due to increase in his weight. He notes he is on a weight reduction plan with his wife.   Overall pt has finger contractures which required nightly resting pan orthoses to reduce further contractures.     Pain:  VAS (0-10)  DATE: 5/17/2018      At Rest:  0/10      With Use:  0/10      Location:wrist and hand    Wound/Scar:  noting a small sore, per pt's direction, and this is on the volar forearm, mid-ulnar Forearm It is healing, but pt questions as to the source. This area did not appear to be near any pressure from the orthosis  Sensation: decreased due to the tetraplegia      Assessment:   Patient presents with symptoms consistent with above diagnosis,  with non-surgical intervention. Pt is being seen for specialized hand therapy orthotic and adaptive tool fabrication which is not available through home health services.     Patient's limitations or Problem List includes:  Decreased ROM/motion and Sensory disturbance of the bilateral elbow, wrist and hand which interferes with the patient's ability to perform Self Care Tasks (dressing, eating, bathing, hygiene/toileting) as compared to previous level of function.    Rehab Potential:  Poor - Return to restricted activity    Patient will benefit from skilled Occupational Therapy to decrease contractures and provide support adaptive equipment needs  to return to previous activity level and resume normal daily tasks and to reach their rehab  potential.    Barriers to Learning:  No barrier    Communication Issues:  Patient appears to be able to clearly communicate and understand verbal and written communication and follow directions correctly.      Chart Review: Chart Review, Brief history including review of medical and/or therapy records relating to the presenting problem and Simple history review with patient    Identified Performance Deficits: bathing/showering, toileting, dressing, feeding, functional mobility, personal device care, hygiene and grooming, driving and community mobility, health management and maintenance, home establishment and management, meal preparation and cleanup, sleep, work and social participation    Assessment of Occupational Performance:  5 or more Performance Deficits    Clinical Decision Making (Complexity): Low complexity      Treatment Explanation:  The following has been discussed with the patient:  RX ordered/plan of care  Anticipated outcomes  Possible risks and side effects        Treatment Plan:   Frequency:  2 X a month, once daily  Duration:  for 3 months     Orthotic Fabrication:  Static orthosis, Forearm based orthosis and mouth stick revisions  Self Care:  Self Care Tasks and Adaptive equipment use/fitting    Discharge Plan:  Achieve all LTG.  Independent in home treatment program.  Reach maximal therapeutic benefit.    Home Exercise Program:   Report if the orthoses are noted to have any skin pressure areas

## 2018-05-17 NOTE — MR AVS SNAPSHOT
After Visit Summary   5/17/2018    Bart Corea    MRN: [id number]           Patient Information     Date Of Birth          1960        Visit Information        Provider Department      5/17/2018 12:35 PM Jason Long MD [facility]        Today's Diagnoses     Routine general medical examination at a health care facility    -  1    Bronchitis with bronchospasm        Rash and other nonspecific skin eruption        Wheezing        Quadriplegia (H)        Spasm        High cholesterol        Autonomic dysreflexia        Eczema, unspecified type        Insomnia, unspecified type        Groin pain, unspecified laterality        Constipation, unspecified constipation type        Injury at C4 level of cervical spinal cord, subsequent encounter (H)        Injury of fifth cervical spinal cord, subsequent encounter (H)        Injury at C6 level of cervical spinal cord, subsequent encounter (H)        Neurogenic bladder           Follow-ups after your visit        Future tests that were ordered for you today     Open Future Orders        Priority Expected Expires Ordered    Lipid panel reflex to direct LDL Fasting Routine 5/17/2018 5/31/2018 5/17/2018    PSA screen Routine 5/17/2018 5/17/2019 5/17/2018    Hemoglobin A1c Routine 5/17/2018 5/31/2018 5/17/2018            Who to contact     Please call your clinic at [phone] to:    Ask questions about your health    Make or cancel appointments    Discuss your medicines    Learn about your test results    Speak to your doctor            Additional Information About Your Visit        MyChart Information     [email or web address] gives you secure access to your electronic health record. If you see a primary care provider, you can also send messages to your care team and make appointments. If you have questions, please call your primary care clinic.  If you do not have a primary care provider, please call [phone] and they will assist  you.      [employer] is an electronic gateway that provides easy, online access to your medical records. With [employer], you can request a clinic appointment, read your test results, renew a prescription or communicate with your care team.     To access your existing account, please contact your [facility] or call [phone] for assistance.        Care EveryWhere ID     This is your Care EveryWhere ID. This could be used by other organizations to access your [facility] medical records  [id number]        Your Vitals Were     Pulse                   53            Blood Pressure from Last 3 Encounters:   05/17/18 92/55   05/17/18 97/57   04/30/18 (!) 86/80    Weight from Last 3 Encounters:   05/17/18 90.7 kg (200 lb)   07/14/17 81.8 kg (180 lb 6.4 oz)   08/28/16 77.1 kg (170 lb)                 Today's Medication Changes          These changes are accurate as of 5/17/18  2:24 PM.  If you have any questions, ask your nurse or doctor.               Start taking these medicines.        Dose/Directions    order for DME   Used for:  Quadriplegia (H)   Started by:  Jason Long MD        Equipment being ordered: Alcohol swabs Use 4 a day for catheter change   Quantity:  120 Device   Refills:  11       order for DME   Used for:  Quadriplegia (H)   Started by:  Jason Long MD        Equipment being ordered: Urinary leg bag Change three times a month Dispense 3   Quantity:  3 Device   Refills:  11       oxyCODONE-acetaminophen 5-325 MG per tablet   Commonly known as:  PERCOCET   Used for:  Bronchitis with bronchospasm, Rash and other nonspecific skin eruption, Wheezing, Quadriplegia (H), Spasm, High cholesterol   Started by:  Jason Long MD        Dose:  1-2 tablet   Take 1-2 tablets by mouth every 4 hours as needed   Quantity:  30 tablet   Refills:  0         These medicines have changed or have updated prescriptions.        Dose/Directions    clindamycin 1 % topical gel    Commonly known as:  CLINDAMAX   This may have changed:    - how to take this  - when to take this  - reasons to take this  - additional instructions   Used for:  Bronchitis with bronchospasm, Rash and other nonspecific skin eruption, Wheezing, Quadriplegia (H), Spasm, High cholesterol        Apply bid   Quantity:  60 g   Refills:  11       diazepam 10 MG tablet   Commonly known as:  VALIUM   This may have changed:  See the new instructions.   Used for:  Bronchitis with bronchospasm, Insomnia, unspecified type, Rash and other nonspecific skin eruption, Wheezing, Groin pain, unspecified laterality, Quadriplegia (H), Spasm   Changed by:  Jason Long MD        Dose:  10 mg   Take 1 tablet (10 mg) by mouth daily   Quantity:  90 tablet   Refills:  0       * NORVASC PO   This may have changed:  Another medication with the same name was added. Make sure you understand how and when to take each.   Changed by:  Jason Long MD        Dose:  2.5 mg   Take 2.5 mg by mouth daily as needed   Refills:  0       * amLODIPine 2.5 MG tablet   Commonly known as:  NORVASC   This may have changed:  You were already taking a medication with the same name, and this prescription was added. Make sure you understand how and when to take each.   Used for:  Autonomic dysreflexia   Changed by:  Jason Long MD        Dose:  2.5 mg   Take 1 tablet (2.5 mg) by mouth daily as needed (autonomic dysreflexia)   Quantity:  30 tablet   Refills:  1       * Notice:  This list has 2 medication(s) that are the same as other medications prescribed for you. Read the directions carefully, and ask your doctor or other care provider to review them with you.      Stop taking these medicines if you haven't already. Please contact your care team if you have questions.     sodium chloride 3 % Nebu neb solution   Stopped by:  Jason Long MD                Where to get your medicines      These medications were sent to [facility]  "- [place], [place] - [address]  [address] [place] [address]     Phone:  [phone]     amcinonide 0.1 % cream    amLODIPine 2.5 MG tablet    cholecalciferol 1000 UNIT tablet    clindamycin 1 % topical gel    Cotton Swabs Swab    docusate sodium 100 MG capsule    Gauze Bandage 2\" Misc    Gauze Dressing 4\"X4\" Pads    hydrocortisone 2.5 % cream    ibuprofen 800 MG tablet    MAGIC BULLETS 10 MG Suppository    minocycline 50 MG capsule    nystatin 673289 UNIT/GM Powd    oxybutynin 10 MG 24 hr tablet    sennosides 8.6 MG tablet    sodium chloride (PF) 0.9% PF flush         Some of these will need a paper prescription and others can be bought over the counter.  Ask your nurse if you have questions.     Bring a paper prescription for each of these medications     diazepam 10 MG tablet    order for DME    order for DME    oxyCODONE-acetaminophen 5-325 MG per tablet    Urostomy Night Bag Misc               Information about OPIOIDS     PRESCRIPTION OPIOIDS: WHAT YOU NEED TO KNOW   You have a prescription for an opioid (narcotic) pain medicine. Opioids can cause addiction. If you have a history of chemical dependency of any type, you are at a higher risk of becoming addicted to opioids. Only take this medicine after all other options have been tried. Take it for as short a time and as few doses as possible.     Do not:    Drive. If you drive while taking these medicines, you could be arrested for driving under the influence (DUI).    Operate heavy machinery    Do any other dangerous activities while taking these medicines.     Drink any alcohol while taking these medicines.      Take with any other medicines that contain acetaminophen. Read all labels carefully. Look for the word  acetaminophen  or  Tylenol.  Ask your pharmacist if you have questions or are unsure.    Store your pills in a secure place, locked if possible. We will not replace any lost or stolen medicine. If you don t finish your medicine, " please throw away (dispose) as directed by your pharmacist. The [place] Pollution [employer] has more information about safe disposal: https://www.pca.[facility].[place].us/living-green/managing-unwanted-medications    All opioids tend to cause constipation. Drink plenty of water and eat foods that have a lot of fiber, such as fruits, vegetables, prune juice, apple juice and high-fiber cereal. Take a laxative (Miralax, milk of magnesia, Colace, Senna) if you don t move your bowels at least every other day.          Primary Care Provider Office Phone # Fax #    Jason Long [phone] [address] [address]        Equal Access to Services     MAYRA BOUDREAUX : Rose velizo Solouis, waaxda luqadaha, qaybta kaalmada adeegyada, hansel guthrie. So [facility] [phone].    ATENCIÓN: Si habla español, tiene a still disposición servicios gratuitos de asistencia lingüística. [facility] [phone].    We comply with applicable federal civil rights laws and [place] laws. We do not discriminate on the basis of race, color, national origin, age, disability, sex, sexual orientation, or gender identity.            Thank you!     Thank you for choosing [facility]  for your care. Our goal is always to provide you with excellent care. Hearing back from our patients is one way we can continue to improve our services. Please take a few minutes to complete the written survey that you may receive in the mail after your visit with us. Thank you!             Your Updated Medication List - Protect others around you: Learn how to safely use, store and throw away your medicines at www.disposemymeds.org.          This list is accurate as of 5/17/18  2:24 PM.  Always use your most recent med list.                   Brand Name Dispense Instructions for use Diagnosis    * albuterol (2.5 MG/3ML) 0.083% neb solution     25 vial    Take 1 vial (2.5 mg) by  nebulization every 6 hours as needed for shortness of breath / dyspnea or wheezing    SOB (shortness of breath)       * albuterol 108 (90 Base) MCG/ACT Inhaler    VENTOLIN HFA    3 Inhaler    Inhale 2 puffs into the lungs every 4 hours as needed * ANNUAL MD APPT. DUE July 2018    Wheezing, Bronchitis with bronchospasm, Quadriplegia (H), Spasm       ALCOHOL PREP PADS      USE AS DIRECTED        amcinonide 0.1 % cream    CYCLOCORT    120 g    Apply topically 2 times daily as needed for itching    Eczema, unspecified type       ascorbic acid 1000 MG Tabs    vitamin C     Take 1 tablet by mouth daily        baclofen 20 MG tablet    LIORESAL    540 tablet    TAKE ONE TABLET BY MOUTH UP TO 6 TIMES DAILY    Quadriplegia (H), Spasm, Groin pain, unspecified laterality       * bisacodyl 10 MG Suppository    DULCOLAX     Place 20 mg rectally every 3 days Active ingredient in Magic Bullet (10mg per suppository)        * MAGIC BULLETS 10 MG Suppository   Generic drug:  bisacodyl     30 suppository    Place 1 suppository (10 mg) rectally daily as needed for constipation    Constipation, unspecified constipation type       cholecalciferol 1000 UNIT tablet    vitamin D3    90 tablet    Take 1 tablet (1,000 Units) by mouth daily    Quadriplegia (H)       clindamycin 1 % topical gel    CLINDAMAX    60 g    Apply bid    Bronchitis with bronchospasm, Rash and other nonspecific skin eruption, Wheezing, Quadriplegia (H), Spasm, High cholesterol       Cotton Swabs Swab     450 each    Please dispense sterile wrapped cotton swabs; use up to five/day for medical needs    Bronchitis with bronchospasm, Rash and other nonspecific skin eruption, Wheezing, Quadriplegia (H), Spasm, High cholesterol       diazepam 10 MG tablet    VALIUM    90 tablet    Take 1 tablet (10 mg) by mouth daily    Bronchitis with bronchospasm, Insomnia, unspecified type, Rash and other nonspecific skin eruption, Wheezing, Groin pain, unspecified laterality, Quadriplegia  "(H), Spasm       docusate sodium 100 MG capsule    DOCQLACE    90 capsule    Take 1 capsule (100 mg) by mouth daily    Bronchitis with bronchospasm, Rash and other nonspecific skin eruption, Wheezing, Quadriplegia (H), Spasm, High cholesterol       fosfomycin 3 g Packet    MONUROL    2 packet    Take 1 packet (3 g) by mouth every 3 days Dissolve contents into 3 to 4 ounces of water and stir, then administer; do not use hot water.    Urinary tract infection       * Gauze Bandage 2\" Misc     360 each    IV STERILE GUAZE Use up to 4/day Supply 360 for three month supply    Bronchitis with bronchospasm, Rash and other nonspecific skin eruption, Wheezing, Quadriplegia (H), Spasm, High cholesterol       * Gauze Dressing 4\"X4\" Pads     720 each    NON STERILE GAUZE Use up to 8/day  Supply 720 for three month supply    Bronchitis with bronchospasm, Rash and other nonspecific skin eruption, Wheezing, Quadriplegia (H), Spasm, High cholesterol       hydrocortisone 2.5 % cream    ANUSOL-HC    120 g    Bid prn    Rash and other nonspecific skin eruption, Bronchitis with bronchospasm, Wheezing, Quadriplegia (H), Spasm       ibuprofen 800 MG tablet    ADVIL/MOTRIN    60 tablet    Take 1 tablet (800 mg) by mouth 3 times daily as needed    Bronchitis with bronchospasm, Rash and other nonspecific skin eruption, Wheezing, Quadriplegia (H), Spasm, High cholesterol       lidocaine 2 % topical gel    XYLOCAINE     Apply  topically. APPLY AS DIRECTED        minocycline 50 MG capsule    MINOCIN/DYNACIN    180 capsule    Take 1 capsule (50 mg) by mouth 2 times daily    Rash and other nonspecific skin eruption, Quadriplegia (H)       montelukast 10 MG tablet    SINGULAIR    30 tablet    Take 1 tablet (10 mg) by mouth At Bedtime    Cough       MULTIVITAMIN & MINERAL PO      Take 1 capsule by mouth daily.        nitroGLYcerin 2 % Oint ointment    NITRO-BID    30 g    Place 0.5-1 inches (7.5-15 mg) onto the skin every 24 hours as needed for " autonomic dysreflexia symptoms.  Remove ointment with gloved hand once symptoms resolve.        * NORVASC PO      Take 2.5 mg by mouth daily as needed        * amLODIPine 2.5 MG tablet    NORVASC    30 tablet    Take 1 tablet (2.5 mg) by mouth daily as needed (autonomic dysreflexia)    Autonomic dysreflexia       nystatin [id number] UNIT/GM Powd    NYSTOP    60 g    APPLY 1 DOSE TOPICALLY 3 TIMES DAILY AS NEEDED    Rash and other nonspecific skin eruption, Quadriplegia (H), Spasm, High cholesterol       order for DME     2 each    two leg bag straps and 2 mouth sticks    Injury at C4 level of cervical spinal cord (H), Injury at C5 level of cervical spinal cord (H), Injury at C6 level of cervical spinal cord (H), Mechanical limb problems       order for DME     120 Device    Equipment being ordered: Alcohol swabs Use 4 a day for catheter change    Quadriplegia (H)       order for DME     3 Device    Equipment being ordered: Urinary leg bag Change three times a month Dispense 3    Quadriplegia (H)       oxybutynin 10 MG 24 hr tablet    DITROPAN-XL    90 tablet    Take 1 tablet (10 mg) by mouth daily    Quadriplegia (H), Spasm       oxyCODONE-acetaminophen 5-325 MG per tablet    PERCOCET    30 tablet    Take 1-2 tablets by mouth every 4 hours as needed    Bronchitis with bronchospasm, Rash and other nonspecific skin eruption, Wheezing, Quadriplegia (H), Spasm, High cholesterol       sennosides 8.6 MG tablet    SENOKOT    90 each    Take 1 tablet by mouth daily    Constipation, unspecified constipation type       sildenafil 100 MG tablet    VIAGRA    10 tablet    Take 1 tablet (100 mg) by mouth daily as needed TAKE 1 HOUR BEFORE NEEDED    Erectile dysfunction, Need for prophylactic vaccination against Hemophilus influenza type B (Hib), Insomnia, unspecified, Bronchitis with bronchospasm, Rash and other nonspecific skin eruption, Wheezing, Quadriplegia, unspecified, Unspecified constipation, Neurogenic bladder, NOS,  Quadriplegia (H), Spasm, High cholesterol       sodium chloride (PF) 0.9% PF flush     500 mL    Use for urinary catheter irrigation    Injury at C4 level of cervical spinal cord, subsequent encounter (H), Injury of fifth cervical spinal cord, subsequent encounter (H), Injury at C6 level of cervical spinal cord, subsequent encounter (H), Neurogenic bladder       terbinafine 1 % cream    lamISIL     Apply topically daily as needed        URINARY LEG BAG      USE AS NEEDED        Urostomy Night Bag Misc     9 each    Please dispense three night bags/month (three month worth at a time) with 4 refills re: needed for chronic urinary dysfunction due to quadreplegia. Type: Bard 2000 ml.    Bronchitis with bronchospasm, Rash and other nonspecific skin eruption, Wheezing, Quadriplegia (H), Spasm, High cholesterol       zolpidem 10 MG tablet    AMBIEN    90 tablet    TAKE 1/2 TO 1 TABLET BY MOUTH AT BEDTIME    Insomnia, Bronchitis with bronchospasm, Rash and other nonspecific skin eruption, Wheezing, Groin pain, unspecified laterality, Quadriplegia (H), Spasm       * Notice:  This list has 8 medication(s) that are the same as other medications prescribed for you. Read the directions carefully, and ask your doctor or other care provider to review them with you.
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
## 2018-05-18 ENCOUNTER — TELEPHONE (OUTPATIENT)
Dept: FAMILY MEDICINE | Facility: CLINIC | Age: 58
End: 2018-05-18

## 2018-05-18 NOTE — TELEPHONE ENCOUNTER
Prior Authorization Retail Medication Request    Medication/Dose: AMCINONIDE 0.1% CREAM  ICD code (if different than what is on RX):  Eczema, unspecified type [L30.9]  Previously Tried and Failed:    Rationale:      Insurance Name:  [employer]  Insurance ID:  [id number]  BIN: [id number]  PCN: [id number]  GROUP: [facility]      Pharmacy Information (if different than what is on RX)  Name:  [place], [place]  Phone:  [phone]

## 2018-05-21 NOTE — TELEPHONE ENCOUNTER
Central Prior Authorization Team   Phone: [phone]      PA Initiation    Medication: AMCINONIDE 0.1% CREAM  Insurance Company: [employer] - Phone [phone] Fax [phone]  Pharmacy Filling the Rx: [facility] - [place] - [address] [place] [address]  Filling Pharmacy Phone: [phone]  Filling Pharmacy Fax: [phone]  Start Date: 5/21/2018

## 2018-05-22 NOTE — TELEPHONE ENCOUNTER
Received a call from [employer] asking that I correct some information on the P/A request. I have resubmitted the P/A request with corrected info.

## 2018-05-23 NOTE — TELEPHONE ENCOUNTER
PRIOR AUTHORIZATION DENIED    Medication: AMCINONIDE 0.1% CREAM- P/A DENIED    Denial Date: 5/23/2018    Denial Rational: This is non-formulary. There are multiple topical steroids that are formulary  per Abby at [facility] [phone].    Appeal Information:

## 2018-05-29 NOTE — TELEPHONE ENCOUNTER
Called [facility]  [phone] (spoke to Sandro), he says the best options close in potency are Betamethasone Dipropianate, Betamethasone Dipropianate Augemented, and Fluocinonide.

## 2018-05-29 NOTE — TELEPHONE ENCOUNTER
Dr. Mercedes ANTONY of Amcinonide cream was denied.  Please see the alternatives below and advise. Thanks.    Scott-Mi

## 2018-06-04 ENCOUNTER — MEDICAL CORRESPONDENCE (OUTPATIENT)
Dept: HEALTH INFORMATION MANAGEMENT | Facility: CLINIC | Age: 58
End: 2018-06-04

## 2018-06-18 ENCOUNTER — MEDICAL CORRESPONDENCE (OUTPATIENT)
Dept: HEALTH INFORMATION MANAGEMENT | Facility: CLINIC | Age: 58
End: 2018-06-18

## 2018-06-25 ENCOUNTER — TELEPHONE (OUTPATIENT)
Dept: PHYSICAL MEDICINE AND REHAB | Facility: CLINIC | Age: 58
End: 2018-06-25

## 2018-06-25 NOTE — TELEPHONE ENCOUNTER
Returned patient's phone call regarding decreased use of his right arm and shoulder after working with physical therapist last Friday. Patient has quadriplegia and relies on use of his right arm for maneuvering his power wheelchair and van. Patient was given phone number of Orthopedics and will call to schedule an appointment.

## 2018-06-25 NOTE — TELEPHONE ENCOUNTER
[facility] Call Center    Phone Message    May a detailed message be left on voicemail: yes    Reason for Call: Symptoms or Concerns     If patient has red-flag symptoms, warm transfer to triage line    Current symptom or concern: Pt is unable to move right arm since he did PT range of motion on Friday. This is the arm he uses to drive his chair so he is having a hard time. Please give him a call back.     Symptoms have been present for:  4 day(s)    Has patient previously been seen for this? No    Are there any new or worsening symptoms? Yes: new symptom       Action Taken: Message routed to:  [facility] ([facility]): PM&R

## 2018-06-27 ENCOUNTER — RADIANT APPOINTMENT (OUTPATIENT)
Dept: GENERAL RADIOLOGY | Facility: CLINIC | Age: 58
End: 2018-06-27
Attending: INTERNAL MEDICINE
Payer: COMMERCIAL

## 2018-06-27 ENCOUNTER — OFFICE VISIT (OUTPATIENT)
Dept: INTERNAL MEDICINE | Facility: CLINIC | Age: 58
End: 2018-06-27
Payer: COMMERCIAL

## 2018-06-27 VITALS — HEART RATE: 48 BPM | DIASTOLIC BLOOD PRESSURE: 66 MMHG | SYSTOLIC BLOOD PRESSURE: 109 MMHG | OXYGEN SATURATION: 98 %

## 2018-06-27 DIAGNOSIS — M25.511 ACUTE PAIN OF RIGHT SHOULDER: ICD-10-CM

## 2018-06-27 DIAGNOSIS — R10.13 DYSPEPSIA: ICD-10-CM

## 2018-06-27 DIAGNOSIS — M25.511 ACUTE PAIN OF RIGHT SHOULDER: Primary | ICD-10-CM

## 2018-06-27 ASSESSMENT — PAIN SCALES - GENERAL: PAINLEVEL: MODERATE PAIN (5)

## 2018-06-27 NOTE — LETTER
Return to Work Release    Date: 6/27/2018      Name: Bart Corae                       YOB: 1960    Medical Record Number: [id number]    The [facility] was [facility] at: [facility]    Restrictions if any: Work from home only; OK to come to work to retrieve computer only.    Resume Activity: July 5, 2018        _________________________  Yonathan Reyes MD
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
## 2018-06-27 NOTE — MR AVS SNAPSHOT
After Visit Summary   6/27/2018    Bart Corea    MRN: [id number]           Patient Information     Date Of Birth          1960        Visit Information        Provider Department      6/27/2018 10:00 AM Yonathan Reyes MD [facility]        Today's Diagnoses     Acute pain of right shoulder    -  1    Dyspepsia          Care Instructions    Primary Care Center: [phone]     Primary Care Center Medication Refill Request Information:  * Please contact your pharmacy regarding ANY request for medication refills.  ** [facility] Prescription Fax = [phone]  * Please allow 3 business days for routine medication refills.  * Please allow 5 business days for controlled substance medication refills.     Primary Care Center Test Result notification information:  *You will be notified with in 7-10 days of your appointment day regarding the results of your test.  If you are on MyChart you will be notified as soon as the provider has reviewed the results and signed off on them.            Follow-ups after your visit        Your next 10 appointments already scheduled     Jul 10, 2018  9:00 AM CDT   (Arrive by 8:45 AM)   [facility] Hand with Rabia Ruiz OT   [facility] ([facility])    [address]  4th [address]  [place] [address] [phone]            Jul 20, 2018   Procedure with Jose Luis Aranda MD   [facility] [place] ([facility])    [address] [id number] [phone]           The [facility] is located on the corner of [address] and [address] on the [place]. It is easily accessible from virtually any point in the [place] area, via [address].              Who to contact     Please call your clinic at [phone] to:    Ask questions about your health    Make or cancel appointments    Discuss your  medicines    Learn about your test results    Speak to your doctor            Additional Information About Your Visit        [employer] Information     [employer] gives you secure access to your electronic health record. If you see a primary care provider, you can also send messages to your care team and make appointments. If you have questions, please call your primary care clinic.  If you do not have a primary care provider, please call [phone] and they will assist you.      [employer] is an electronic gateway that provides easy, online access to your medical records. With [employer], you can request a clinic appointment, read your test results, renew a prescription or communicate with your care team.     To access your existing account, please contact your [facility] or call [phone] for assistance.        Care EveryWhere ID     This is your Care EveryWhere ID. This could be used by other organizations to access your [facility] medical records  [id number]        Your Vitals Were     Pulse Pulse Oximetry                48 98%           Blood Pressure from Last 3 Encounters:   06/27/18 109/66   05/17/18 92/55   05/17/18 97/57    Weight from Last 3 Encounters:   05/17/18 90.7 kg (200 lb)   07/14/17 81.8 kg (180 lb 6.4 oz)   08/28/16 77.1 kg (170 lb)                 Today's Medication Changes          These changes are accurate as of 6/27/18  6:22 PM.  If you have any questions, ask your nurse or doctor.               Start taking these medicines.        Dose/Directions    omeprazole 20 MG CR capsule   Commonly known as:  priLOSEC   Used for:  Dyspepsia   Started by:  Yonathan Reyes MD        Dose:  20 mg   Take 1 capsule (20 mg) by mouth daily   Quantity:  30 capsule   Refills:  1            Where to get your medicines      These medications were sent to [facility] [address]  [address] [address]     Phone:  [phone]      omeprazole 20 MG CR capsule                Primary Care Provider Office Phone # Fax #    Jason Long [phone]       [address] [address]        Equal Access to Services     MAYRA BOUDREAUX : Rose bergman ku jose alfredoo Soloali, waaxda luqadaha, qaybta kaalmada adeegyada, hansel haas laKarensahra guthrie. So [facility] [phone].    ATENCIÓN: Si habla español, tiene a still disposición servicios gratuitos de asistencia lingüística. Llame al [phone].    We comply with applicable federal civil rights laws and [place] laws. We do not discriminate on the basis of race, color, national origin, age, disability, sex, sexual orientation, or gender identity.            Thank you!     Thank you for choosing [facility]  for your care. Our goal is always to provide you with excellent care. Hearing back from our patients is one way we can continue to improve our services. Please take a few minutes to complete the written survey that you may receive in the mail after your visit with us. Thank you!             Your Updated Medication List - Protect others around you: Learn how to safely use, store and throw away your medicines at www.disposemymeds.org.          This list is accurate as of 6/27/18  6:22 PM.  Always use your most recent med list.                   Brand Name Dispense Instructions for use Diagnosis    * albuterol (2.5 MG/3ML) 0.083% neb solution     25 vial    Take 1 vial (2.5 mg) by nebulization every 6 hours as needed for shortness of breath / dyspnea or wheezing    SOB (shortness of breath)       * albuterol 108 (90 Base) MCG/ACT Inhaler    VENTOLIN HFA    3 Inhaler    Inhale 2 puffs into the lungs every 4 hours as needed * ANNUAL MD APPT. DUE July 2018    Wheezing, Bronchitis with bronchospasm, Quadriplegia (H), Spasm       ALCOHOL PREP PADS      USE AS DIRECTED        amcinonide 0.1 % cream    CYCLOCORT    120 g    Apply topically 2 times daily  as needed for itching    Eczema, unspecified type       ascorbic acid 1000 MG Tabs    vitamin C     Take 1 tablet by mouth daily        baclofen 20 MG tablet    LIORESAL    540 tablet    TAKE ONE TABLET BY MOUTH UP TO 6 TIMES DAILY    Quadriplegia (H), Spasm, Groin pain, unspecified laterality       * bisacodyl 10 MG Suppository    DULCOLAX     Place 20 mg rectally every 3 days Active ingredient in Magic Bullet (10mg per suppository)        * MAGIC BULLETS 10 MG Suppository   Generic drug:  bisacodyl     30 suppository    Place 1 suppository (10 mg) rectally daily as needed for constipation    Constipation, unspecified constipation type       cholecalciferol 1000 UNIT tablet    vitamin D3    90 tablet    Take 1 tablet (1,000 Units) by mouth daily    Quadriplegia (H)       clindamycin 1 % topical gel    CLINDAMAX    60 g    Apply bid    Bronchitis with bronchospasm, Rash and other nonspecific skin eruption, Wheezing, Quadriplegia (H), Spasm, High cholesterol       Cotton Swabs Swab     450 each    Please dispense sterile wrapped cotton swabs; use up to five/day for medical needs    Bronchitis with bronchospasm, Rash and other nonspecific skin eruption, Wheezing, Quadriplegia (H), Spasm, High cholesterol       diazepam 10 MG tablet    VALIUM    90 tablet    Take 1 tablet (10 mg) by mouth daily    Bronchitis with bronchospasm, Insomnia, unspecified type, Rash and other nonspecific skin eruption, Wheezing, Groin pain, unspecified laterality, Quadriplegia (H), Spasm       docusate sodium 100 MG capsule    DOCQLACE    90 capsule    Take 1 capsule (100 mg) by mouth daily    Bronchitis with bronchospasm, Rash and other nonspecific skin eruption, Wheezing, Quadriplegia (H), Spasm, High cholesterol       fosfomycin 3 g Packet    MONUROL    2 packet    Take 1 packet (3 g) by mouth every 3 days Dissolve contents into 3 to 4 ounces of water and stir, then administer; do not use hot water.    Urinary tract infection       * Gauze  "Bandage 2\" Misc     360 each    IV STERILE GUAZE Use up to 4/day Supply 360 for three month supply    Bronchitis with bronchospasm, Rash and other nonspecific skin eruption, Wheezing, Quadriplegia (H), Spasm, High cholesterol       * Gauze Dressing 4\"X4\" Pads     720 each    NON STERILE GAUZE Use up to 8/day  Supply 720 for three month supply    Bronchitis with bronchospasm, Rash and other nonspecific skin eruption, Wheezing, Quadriplegia (H), Spasm, High cholesterol       hydrocortisone 2.5 % cream    ANUSOL-HC    120 g    Bid prn    Rash and other nonspecific skin eruption, Bronchitis with bronchospasm, Wheezing, Quadriplegia (H), Spasm       ibuprofen 800 MG tablet    ADVIL/MOTRIN    60 tablet    Take 1 tablet (800 mg) by mouth 3 times daily as needed    Bronchitis with bronchospasm, Rash and other nonspecific skin eruption, Wheezing, Quadriplegia (H), Spasm, High cholesterol       lidocaine 2 % topical gel    XYLOCAINE     Apply  topically. APPLY AS DIRECTED        minocycline 50 MG capsule    MINOCIN/DYNACIN    180 capsule    Take 1 capsule (50 mg) by mouth 2 times daily    Rash and other nonspecific skin eruption, Quadriplegia (H)       montelukast 10 MG tablet    SINGULAIR    30 tablet    Take 1 tablet (10 mg) by mouth At Bedtime    Cough       MULTIVITAMIN & MINERAL PO      Take 1 capsule by mouth daily.        nitroGLYcerin 2 % Oint ointment    NITRO-BID    30 g    Place 0.5-1 inches (7.5-15 mg) onto the skin every 24 hours as needed for autonomic dysreflexia symptoms.  Remove ointment with gloved hand once symptoms resolve.        * NORVASC PO      Take 2.5 mg by mouth daily as needed        * amLODIPine 2.5 MG tablet    NORVASC    30 tablet    Take 1 tablet (2.5 mg) by mouth daily as needed (autonomic dysreflexia)    Autonomic dysreflexia       nystatin 979125 UNIT/GM Powd    NYSTOP    60 g    APPLY 1 DOSE TOPICALLY 3 TIMES DAILY AS NEEDED    Rash and other nonspecific skin eruption, Quadriplegia (H), " Spasm, High cholesterol       omeprazole 20 MG CR capsule    priLOSEC    30 capsule    Take 1 capsule (20 mg) by mouth daily    Dyspepsia       order for DME     2 each    two leg bag straps and 2 mouth sticks    Injury at C4 level of cervical spinal cord (H), Injury at C5 level of cervical spinal cord (H), Injury at C6 level of cervical spinal cord (H), Mechanical limb problems       order for DME     120 Device    Equipment being ordered: Alcohol swabs Use 4 a day for catheter change    Quadriplegia (H)       order for DME     3 Device    Equipment being ordered: Urinary leg bag Change three times a month Dispense 3    Quadriplegia (H)       oxybutynin 10 MG 24 hr tablet    DITROPAN-XL    90 tablet    Take 1 tablet (10 mg) by mouth daily    Quadriplegia (H), Spasm       oxyCODONE-acetaminophen 5-325 MG per tablet    PERCOCET    30 tablet    Take 1-2 tablets by mouth every 4 hours as needed    Bronchitis with bronchospasm, Rash and other nonspecific skin eruption, Wheezing, Quadriplegia (H), Spasm, High cholesterol       sennosides 8.6 MG tablet    SENOKOT    90 each    Take 1 tablet by mouth daily    Constipation, unspecified constipation type       sildenafil 100 MG tablet    VIAGRA    10 tablet    Take 1 tablet (100 mg) by mouth daily as needed TAKE 1 HOUR BEFORE NEEDED    Erectile dysfunction, Need for prophylactic vaccination against Hemophilus influenza type B (Hib), Insomnia, unspecified, Bronchitis with bronchospasm, Rash and other nonspecific skin eruption, Wheezing, Quadriplegia, unspecified, Unspecified constipation, Neurogenic bladder, NOS, Quadriplegia (H), Spasm, High cholesterol       sodium chloride (PF) 0.9% PF flush     500 mL    Use for urinary catheter irrigation    Injury at C4 level of cervical spinal cord, subsequent encounter (H), Injury of fifth cervical spinal cord, subsequent encounter (H), Injury at C6 level of cervical spinal cord, subsequent encounter (H), Neurogenic bladder        terbinafine 1 % cream    lamISIL     Apply topically daily as needed        URINARY LEG BAG      USE AS NEEDED        Urostomy Night Bag Misc     9 each    Please dispense three night bags/month (three month worth at a time) with 4 refills re: needed for chronic urinary dysfunction due to quadreplegia. Type: Bard 2000 ml.    Bronchitis with bronchospasm, Rash and other nonspecific skin eruption, Wheezing, Quadriplegia (H), Spasm, High cholesterol       zolpidem 10 MG tablet    AMBIEN    90 tablet    TAKE 1/2 TO 1 TABLET BY MOUTH AT BEDTIME    Insomnia, Bronchitis with bronchospasm, Rash and other nonspecific skin eruption, Wheezing, Groin pain, unspecified laterality, Quadriplegia (H), Spasm       * Notice:  This list has 8 medication(s) that are the same as other medications prescribed for you. Read the directions carefully, and ask your doctor or other care provider to review them with you.

## 2018-06-27 NOTE — NURSING NOTE
Chief Complaint   Patient presents with     Musculoskeletal Problem     pt states having pain in his right arm shoulder       She Jack CMA at 10:10 AM on 6/27/2018.

## 2018-06-27 NOTE — PATIENT INSTRUCTIONS
[facility]: [phone]     [facility] Medication Refill Request Information:  * Please contact your pharmacy regarding ANY request for medication refills.  ** [facility] Prescription Fax = [phone]  * Please allow 3 business days for routine medication refills.  * Please allow 5 business days for controlled substance medication refills.     [facility] Test Result notification information:  *You will be notified with in 7-10 days of your appointment day regarding the results of your test.  If you are on MyChart you will be notified as soon as the provider has reviewed the results and signed off on them.

## 2018-07-10 ENCOUNTER — THERAPY VISIT (OUTPATIENT)
Dept: OCCUPATIONAL THERAPY | Facility: CLINIC | Age: 58
End: 2018-07-10
Payer: COMMERCIAL

## 2018-07-10 DIAGNOSIS — M24.541 CONTRACTURE OF JOINT OF FINGER OF RIGHT HAND: ICD-10-CM

## 2018-07-10 DIAGNOSIS — R29.898 DEFICIENCIES OF LIMBS: Primary | ICD-10-CM

## 2018-07-10 DIAGNOSIS — M24.542 CONTRACTURE OF JOINT OF LEFT HAND: ICD-10-CM

## 2018-07-10 PROCEDURE — 97535 SELF CARE MNGMENT TRAINING: CPT | Mod: GO | Performed by: OCCUPATIONAL THERAPIST

## 2018-07-10 NOTE — MR AVS SNAPSHOT
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 is located on the corner of [address] and [address] on the [address] [facility]. It is easily accessible from virtually any point in the [place] area, via [employer] and [email or web address].              Who to contact     If you have questions or need follow up information about today's clinic visit or your schedule please contact [facility] HAND THERAPY directly at [phone].  Normal or non-critical lab and imaging results will be communicated to you by Pop.ithart, letter or phone within 4 business days after the clinic has received the results. If you do not hear from us within 7 days, please contact the clinic through Xcel[employer] or phone. If you have a critical or abnormal lab result, we will notify you by phone as soon as possible.  Submit refill requests through [employer] or call your pharmacy and they will forward the refill request to us. Please allow 3 business days for your refill to be completed.          Additional Information About Your Visit        Pop.ithart Information     [employer] gives you secure access to your electronic health record. If you see a primary care provider, you can also send messages to your care team and make appointments. If you have questions, please  call your primary care clinic.  If you do not have a primary care provider, please call [phone] and they will assist you.        Care EveryWhere ID     This is your Care EveryWhere ID. This could be used by other organizations to access your [place] medical records  [id number]         Blood Pressure from Last 3 Encounters:   06/27/18 109/66   05/17/18 92/55   05/17/18 97/57    Weight from Last 3 Encounters:   05/17/18 90.7 kg (200 lb)   07/14/17 81.8 kg (180 lb 6.4 oz)   08/28/16 77.1 kg (170 lb)              We Performed the Following     SELF CARE MNGMENT TRAINING        Primary Care Provider Office Phone # Fax #    Jason Long [phone]       [address] [address]        Equal Access to Services     [facility]: Hadii aad ku hadasho Soomaali, waaxda luqadaha, qaybta kaalmada adeegyada, waxay belloin hayaan adelitzy angulo . So [facility] [place] [phone].    ATENCIÓN: Si habla español, tiene a still disposición servicios gratuitos de asistencia lingüística. Marquis al [phone].    We comply with applicable federal civil rights laws and [place] laws. We do not discriminate on the basis of race, color, national origin, age, disability, sex, sexual orientation, or gender identity.            Thank you!     Thank you for choosing [facility] HAND THERAPY  for your care. Our goal is always to provide you with excellent care. Hearing back from our patients is one way we can continue to improve our services. Please take a few minutes to complete the written survey that you may receive in the mail after your visit with us. Thank you!             Your Updated Medication List - Protect others around you: Learn how to safely use, store and throw away your medicines at www.disposemymeds.org.          This list is accurate as of 7/10/18 10:47 PM.  Always use your most recent med list.                   Brand Name Dispense Instructions for use Diagnosis    * albuterol (2.5  MG/3ML) 0.083% neb solution     25 vial    Take 1 vial (2.5 mg) by nebulization every 6 hours as needed for shortness of breath / dyspnea or wheezing    SOB (shortness of breath)       * albuterol 108 (90 Base) MCG/ACT Inhaler    VENTOLIN HFA    3 Inhaler    Inhale 2 puffs into the lungs every 4 hours as needed * ANNUAL MD APPT. DUE July 2018    Wheezing, Bronchitis with bronchospasm, Quadriplegia (H), Spasm       ALCOHOL PREP PADS      USE AS DIRECTED        amcinonide 0.1 % cream    CYCLOCORT    120 g    Apply topically 2 times daily as needed for itching    Eczema, unspecified type       ascorbic acid 1000 MG Tabs    vitamin C     Take 1 tablet by mouth daily        baclofen 20 MG tablet    LIORESAL    540 tablet    TAKE ONE TABLET BY MOUTH UP TO 6 TIMES DAILY    Quadriplegia (H), Spasm, Groin pain, unspecified laterality       * bisacodyl 10 MG Suppository    DULCOLAX     Place 20 mg rectally every 3 days Active ingredient in Magic Bullet (10mg per suppository)        * MAGIC BULLETS 10 MG Suppository   Generic drug:  bisacodyl     30 suppository    Place 1 suppository (10 mg) rectally daily as needed for constipation    Constipation, unspecified constipation type       cholecalciferol 1000 UNIT tablet    vitamin D3    90 tablet    Take 1 tablet (1,000 Units) by mouth daily    Quadriplegia (H)       clindamycin 1 % topical gel    CLINDAMAX    60 g    Apply bid    Bronchitis with bronchospasm, Rash and other nonspecific skin eruption, Wheezing, Quadriplegia (H), Spasm, High cholesterol       Cotton Swabs Swab     450 each    Please dispense sterile wrapped cotton swabs; use up to five/day for medical needs    Bronchitis with bronchospasm, Rash and other nonspecific skin eruption, Wheezing, Quadriplegia (H), Spasm, High cholesterol       diazepam 10 MG tablet    VALIUM    90 tablet    Take 1 tablet (10 mg) by mouth daily    Bronchitis with bronchospasm, Insomnia, unspecified type, Rash and other nonspecific skin  "eruption, Wheezing, Groin pain, unspecified laterality, Quadriplegia (H), Spasm       docusate sodium 100 MG capsule    DOCQLACE    90 capsule    Take 1 capsule (100 mg) by mouth daily    Bronchitis with bronchospasm, Rash and other nonspecific skin eruption, Wheezing, Quadriplegia (H), Spasm, High cholesterol       fosfomycin 3 g Packet    MONUROL    2 packet    Take 1 packet (3 g) by mouth every 3 days Dissolve contents into 3 to 4 ounces of water and stir, then administer; do not use hot water.    Urinary tract infection       * Gauze Bandage 2\" Misc     360 each    IV STERILE GUAZE Use up to 4/day Supply 360 for three month supply    Bronchitis with bronchospasm, Rash and other nonspecific skin eruption, Wheezing, Quadriplegia (H), Spasm, High cholesterol       * Gauze Dressing 4\"X4\" Pads     720 each    NON STERILE GAUZE Use up to 8/day  Supply 720 for three month supply    Bronchitis with bronchospasm, Rash and other nonspecific skin eruption, Wheezing, Quadriplegia (H), Spasm, High cholesterol       hydrocortisone 2.5 % cream    ANUSOL-HC    120 g    Bid prn    Rash and other nonspecific skin eruption, Bronchitis with bronchospasm, Wheezing, Quadriplegia (H), Spasm       ibuprofen 800 MG tablet    ADVIL/MOTRIN    60 tablet    Take 1 tablet (800 mg) by mouth 3 times daily as needed    Bronchitis with bronchospasm, Rash and other nonspecific skin eruption, Wheezing, Quadriplegia (H), Spasm, High cholesterol       lidocaine 2 % topical gel    XYLOCAINE     Apply  topically. APPLY AS DIRECTED        minocycline 50 MG capsule    MINOCIN/DYNACIN    180 capsule    Take 1 capsule (50 mg) by mouth 2 times daily    Rash and other nonspecific skin eruption, Quadriplegia (H)       montelukast 10 MG tablet    SINGULAIR    30 tablet    Take 1 tablet (10 mg) by mouth At Bedtime    Cough       MULTIVITAMIN & MINERAL PO      Take 1 capsule by mouth daily.        nitroGLYcerin 2 % Oint ointment    NITRO-BID    30 g    Place " 0.5-1 inches (7.5-15 mg) onto the skin every 24 hours as needed for autonomic dysreflexia symptoms.  Remove ointment with gloved hand once symptoms resolve.        * NORVASC PO      Take 2.5 mg by mouth daily as needed        * amLODIPine 2.5 MG tablet    NORVASC    30 tablet    Take 1 tablet (2.5 mg) by mouth daily as needed (autonomic dysreflexia)    Autonomic dysreflexia       nystatin 444399 UNIT/GM Powd    NYSTOP    60 g    APPLY 1 DOSE TOPICALLY 3 TIMES DAILY AS NEEDED    Rash and other nonspecific skin eruption, Quadriplegia (H), Spasm, High cholesterol       omeprazole 20 MG CR capsule    priLOSEC    30 capsule    Take 1 capsule (20 mg) by mouth daily    Dyspepsia       order for DME     2 each    two leg bag straps and 2 mouth sticks    Injury at C4 level of cervical spinal cord (H), Injury at C5 level of cervical spinal cord (H), Injury at C6 level of cervical spinal cord (H), Mechanical limb problems       order for DME     120 Device    Equipment being ordered: Alcohol swabs Use 4 a day for catheter change    Quadriplegia (H)       order for DME     3 Device    Equipment being ordered: Urinary leg bag Change three times a month Dispense 3    Quadriplegia (H)       oxybutynin 10 MG 24 hr tablet    DITROPAN-XL    90 tablet    Take 1 tablet (10 mg) by mouth daily    Quadriplegia (H), Spasm       oxyCODONE-acetaminophen 5-325 MG per tablet    PERCOCET    30 tablet    Take 1-2 tablets by mouth every 4 hours as needed    Bronchitis with bronchospasm, Rash and other nonspecific skin eruption, Wheezing, Quadriplegia (H), Spasm, High cholesterol       sennosides 8.6 MG tablet    SENOKOT    90 each    Take 1 tablet by mouth daily    Constipation, unspecified constipation type       sildenafil 100 MG tablet    VIAGRA    10 tablet    Take 1 tablet (100 mg) by mouth daily as needed TAKE 1 HOUR BEFORE NEEDED    Erectile dysfunction, Need for prophylactic vaccination against Hemophilus influenza type B (Hib), Insomnia,  unspecified, Bronchitis with bronchospasm, Rash and other nonspecific skin eruption, Wheezing, Quadriplegia, unspecified, Unspecified constipation, Neurogenic bladder, NOS, Quadriplegia (H), Spasm, High cholesterol       sodium chloride (PF) 0.9% PF flush     500 mL    Use for urinary catheter irrigation    Injury at C4 level of cervical spinal cord, subsequent encounter (H), Injury of fifth cervical spinal cord, subsequent encounter (H), Injury at C6 level of cervical spinal cord, subsequent encounter (H), Neurogenic bladder       terbinafine 1 % cream    lamISIL     Apply topically daily as needed        URINARY LEG BAG      USE AS NEEDED        Urostomy Night Bag Misc     9 each    Please dispense three night bags/month (three month worth at a time) with 4 refills re: needed for chronic urinary dysfunction due to quadreplegia. Type: Bard 2000 ml.    Bronchitis with bronchospasm, Rash and other nonspecific skin eruption, Wheezing, Quadriplegia (H), Spasm, High cholesterol       zolpidem 10 MG tablet    AMBIEN    90 tablet    TAKE 1/2 TO 1 TABLET BY MOUTH AT BEDTIME    Insomnia, Bronchitis with bronchospasm, Rash and other nonspecific skin eruption, Wheezing, Groin pain, unspecified laterality, Quadriplegia (H), Spasm       * Notice:  This list has 8 medication(s) that are the same as other medications prescribed for you. Read the directions carefully, and ask your doctor or other care provider to review them with you.

## 2018-07-11 NOTE — PROGRESS NOTES
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  Bart is a patient well known to this clinic.  Patient reports needing new replacement orthoses and liners, and mouth sticks and other related adaptive equipment.       S:  Subjective changes as noted by patient: Pt note fit of current hand orthoses is good, no skin irritation  Functional changes noted by patient: No Change to Self Care Tasks continues to required full assist. Response to previous treatment:  good  Patient has noted adverse reaction to:   None      Functional Outcome Measure:   See flowsheet    Objective: Pt attends therapy for refitting of orthoses and adaptive equipment for his mouth sticks and leg bags.This visit, pt's bilateral forearm resting orthoses were refit with moleskin and ventilated for skin health. The mouth sticks are to be revised in next visits. Also pt reports his current leg bag holders are not fitting due to increase in his weight. He notes he is on a weight reduction plan with his wife.   Overall pt has finger contractures which required nightly resting pan orthoses to reduce further contractures.     Pain:  VAS (0-10)  DATE: 5/17/2018 7/10/18     At Rest:  0/10 0     With Use:  0/10 0     Location:wrist and hand          Assessment:  Response to therapy has been improvement to:  Pt is fitted today with customized fitted legbag with no skin irritations noted    Overall Assessment:  Patient would benefit from continued therapy to achieve rehab potential  STG/LTG:  STGoals have been reviewed and no progress has been made;  see goal sheet for details and changes.  I have re-evaluated this patient and find that the  nature, scope, duration and intensity of the therapy is appropriate for the medical condition of the patient.        Treatment Plan:   Frequency/Duration:  Recommend continuing with the current treatment plan.    Frequency:  2 X a month, once daily  Duration:  for 3 months    Recommendations for Continued Therapy       Orthotic Fabrication:  Static orthosis, Forearm based orthosis and mouth stick revisions  Self Care:  Self Care Tasks and Adaptive equipment use/fitting    Discharge Plan:  Achieve all LTG.  Independent in home treatment program.  Reach maximal therapeutic benefit.    Home Exercise Program:   Report if the orthoses are noted to have any skin pressure areas

## 2018-07-16 ENCOUNTER — TELEPHONE (OUTPATIENT)
Dept: GASTROENTEROLOGY | Facility: CLINIC | Age: 58
End: 2018-07-16

## 2018-07-16 DIAGNOSIS — Z12.11 ENCOUNTER FOR SCREENING COLONOSCOPY: Primary | ICD-10-CM

## 2018-07-16 NOTE — TELEPHONE ENCOUNTER
Patient scheduled for colonoscopy     Indication for procedure. hemhorage of rectum and anus     Referring Provider. Jose Eduardo Berry RN    ? No     Arrival time verified? Patient to arrive at 9:00 am     Facility location verified? [address] [facility], [phone]     Instructions given regarding prep and procedure. Prep reviewed and RN answered questions at length. Transportation policy reviewed and verbalized understanding.     Prep Type Golytely.     Are you taking any anticoagulants or blood thinners? Denies     Instructions given? Yes. Sent via email and my chart.     Electronic implanted devices? Denies     Pre procedure teaching completed? Yes    Transportation from procedure? Yes     H&P / Pre op physical completed? N/A    Neptali Montano RN

## 2018-07-19 ENCOUNTER — TELEPHONE (OUTPATIENT)
Dept: GASTROENTEROLOGY | Facility: CLINIC | Age: 58
End: 2018-07-19

## 2018-07-20 ENCOUNTER — HOSPITAL ENCOUNTER (OUTPATIENT)
Facility: CLINIC | Age: 58
Discharge: HOME | End: 2018-07-20
Attending: INTERNAL MEDICINE | Admitting: INTERNAL MEDICINE
Payer: COMMERCIAL

## 2018-07-20 ENCOUNTER — SURGERY (OUTPATIENT)
Age: 58
End: 2018-07-20

## 2018-07-20 VITALS
RESPIRATION RATE: 9 BRPM | OXYGEN SATURATION: 99 % | SYSTOLIC BLOOD PRESSURE: 74 MMHG | DIASTOLIC BLOOD PRESSURE: 37 MMHG | HEART RATE: 44 BPM

## 2018-07-20 LAB — COLONOSCOPY: NORMAL

## 2018-07-20 PROCEDURE — 25000132 ZZH RX MED GY IP 250 OP 250 PS 637: Performed by: INTERNAL MEDICINE

## 2018-07-20 PROCEDURE — G0500 MOD SEDAT ENDO SERVICE >5YRS: HCPCS | Performed by: INTERNAL MEDICINE

## 2018-07-20 PROCEDURE — 88305 TISSUE EXAM BY PATHOLOGIST: CPT | Performed by: INTERNAL MEDICINE

## 2018-07-20 PROCEDURE — 25000128 H RX IP 250 OP 636: Performed by: INTERNAL MEDICINE

## 2018-07-20 PROCEDURE — 99153 MOD SED SAME PHYS/QHP EA: CPT | Performed by: INTERNAL MEDICINE

## 2018-07-20 PROCEDURE — 45380 COLONOSCOPY AND BIOPSY: CPT | Mod: XU | Performed by: INTERNAL MEDICINE

## 2018-07-20 PROCEDURE — 45385 COLONOSCOPY W/LESION REMOVAL: CPT | Performed by: INTERNAL MEDICINE

## 2018-07-20 RX ORDER — SIMETHICONE
LIQUID (ML) MISCELLANEOUS PRN
Status: DISCONTINUED | OUTPATIENT
Start: 2018-07-20 | End: 2018-07-24 | Stop reason: HOSPADM

## 2018-07-20 RX ORDER — FENTANYL CITRATE 50 UG/ML
INJECTION, SOLUTION INTRAMUSCULAR; INTRAVENOUS PRN
Status: DISCONTINUED | OUTPATIENT
Start: 2018-07-20 | End: 2018-07-24 | Stop reason: HOSPADM

## 2018-07-20 RX ADMIN — MIDAZOLAM 2 MG: 1 INJECTION INTRAMUSCULAR; INTRAVENOUS at 10:20

## 2018-07-20 RX ADMIN — FENTANYL CITRATE 25 MCG: 50 INJECTION, SOLUTION INTRAMUSCULAR; INTRAVENOUS at 10:30

## 2018-07-20 RX ADMIN — Medication 2 ML: at 10:37

## 2018-07-20 RX ADMIN — FENTANYL CITRATE 12.5 MCG: 50 INJECTION, SOLUTION INTRAMUSCULAR; INTRAVENOUS at 10:56

## 2018-07-20 RX ADMIN — MIDAZOLAM 1 MG: 1 INJECTION INTRAMUSCULAR; INTRAVENOUS at 10:30

## 2018-07-20 RX ADMIN — FENTANYL CITRATE 50 MCG: 50 INJECTION, SOLUTION INTRAMUSCULAR; INTRAVENOUS at 10:20

## 2018-07-25 LAB — COPATH REPORT: NORMAL

## 2018-08-01 ENCOUNTER — OFFICE VISIT (OUTPATIENT)
Dept: PHYSICAL MEDICINE AND REHAB | Facility: CLINIC | Age: 58
End: 2018-08-01
Payer: COMMERCIAL

## 2018-08-01 VITALS — DIASTOLIC BLOOD PRESSURE: 57 MMHG | HEART RATE: 72 BPM | SYSTOLIC BLOOD PRESSURE: 93 MMHG

## 2018-08-01 DIAGNOSIS — G82.50 QUADRIPLEGIA (H): Primary | ICD-10-CM

## 2018-08-01 DIAGNOSIS — M25.511 ACUTE PAIN OF RIGHT SHOULDER: ICD-10-CM

## 2018-08-01 ASSESSMENT — PAIN SCALES - GENERAL: PAINLEVEL: MODERATE PAIN (5)

## 2018-08-01 NOTE — LETTER
8/1/2018       RE: Bart Corea  [address]  [facility] [address]     Dear Colleague,    Thank you for referring your patient, Bart Corea, to the [facility] PHYSICAL MEDICINE AND REHABILITATION at [facility]. Please see a copy of my visit note below.    Chief Complaint: Left shoulder weakness    History of Present Illness: 57 year old gentleman with C4-5 complete SCI presents with 4 week history of right shoulder weakness.  He states that 4 weeks ago he was getting ROM activities on his right arm when immediately afterwards he noted his shoulder being weak.  While he normally could hold his arm in about 90 degrees of abduction, he could only manage about 40-50 degrees of abduction now.  He also noted some mild loss of sensation along his right lateral forearm.  He denies any shoulder pain, but does stay that his right shoulder blade was more painful, especially down the medial border of it.  Denies any increased sensation of tightness or ascending loss of sensation or further weakness.  No changes in his left arm which he describes as 'ballast' meaning that it has extremely limited use (can maybe get 5 degrees of arm abduction).  No changes in bladder, bowel function.  He did see his IM physician, Dr. Reyes, who obtained XR (negative for fracture) and recommended to continue to montior and gradual return to ROM exercises (with a follow up in 1 month).     Did recently have a colonoscopy as well (7/20/2018) without any problems.    He also denotes that he is still using tobacco products (smoking).  He does not intend to quit at this time.    Currently working 40 hours a week as a .  Current problem is affecting his ability to perform ADLs and utilize his electric WC.    Physical Exam:  BP 93/57 (BP Location: Right arm, Patient Position: Sitting, Cuff Size: Adult Regular)  Pulse 72   Gen: AAOx3, sitting comfortably in his  electric WC, NAD.  Neuro/MSK  Sensation: V1-3 intact bilaterally.  Sensation intact to LT in bilateral C2-C4 dermatomes.  Right: C5 dermatome present, decreased/limited sensation to LT along C6 (change).  RUE: wears right wrist brace keeping wrist in neutral.  No erythema, eccymoses noted along RUE.  Tenderness to palpation along medial border of scapula with noted increased tonicity of rhomboids.  No tenderness over AC joint or subacromion region.  PROM notes increased clicking and stiffness in right shoulder compared to left shoulder.  To tenderness/pain against resistance to abduction.  AROM: R shoulder abduction 45 degree (change from previous description of 90 degree), R elbow flexion 60 degrees.    A/P:  57 year old gentleman with C4-5 complete SCI with sudden weakness after ROM exercise.  XR negative for fracture.  Concern for potential syringomelia due to loss of sensation, shoulder pathology due to rotator cuff tear, or potential pancoast tumor secondary to continued tobacco use.  Will obtain advanced imaging at this time with cervical spine and right shoulder MRI to r/o serious pathology.  Advise to continue with light ROM exercises at this time.  Also counseled on tobacco cessation.  Mr. Corea agreed with this plan.  In addition, he had previously been using a mechanical arm support, however, he has had some difficulty with it.  Will refer to OT for reevaluation.  No other questions or concerns.    1. Right shoulder pain  2. Tobacco Use  3. C4-5 SCI, AIS A    Clifton Roblero,   PGY-2, PM&R Resident    Seen and discussed with Dr. Webster    I, Dr. Webster, also saw and examined Shawn. I have reviewed and edited the above resident note and agree.  My key decisions and exam: some concern with syrinx and MRI today to f/u with this. Done after clinic though results prior to this tie in. Shows no signs of syrinx or other new findings. Comparison MRI was available from years ago. I called Bart to review  findings. For now conservative approach. Will have occupational therapy see him and work with optimizing function and mobile arm support assessment and any other equipment.  40 minutes spent in direct patient interaction greater than 50% in counseling and education.    Again, thank you for allowing me to participate in the care of your patient.      Sincerely,    Jaziel Webster MD

## 2018-08-01 NOTE — MR AVS SNAPSHOT
After Visit Summary   8/1/2018    Bart Corea    MRN: [id number]           Patient Information     Date Of Birth          1960        Visit Information        Provider Department      8/1/2018 1:30 PM Jaziel Webster MD [facility] Physical Medicine and Rehabilitation        Today's Diagnoses     Quadriplegia (H)    -  1    Acute pain of right shoulder           Follow-ups after your visit        Additional Services     OCCUPATIONAL THERAPY REFERRAL       *This order will print in the [facility] Central Scheduling Office*    [facility] provides Occupational Therapy evaluation and treatment and many specialty services across the [place] system.  If requesting a specialty program, please choose from the list below.    Call one number to schedule at any [facility] location   [phone].    Gomez Lloyd.    Treatment: Evaluation & Treatment  Special Instructions/Modalities: 57 yo with tetraplegia. Has been having progressive loss of arm function. Has a mobile arm support but not working well with shift in function. OT to eval arm support and any additional ADL work.     Please be aware that coverage of these services is subject to the terms and limitations of your health insurance plan.  Call member services at your health plan with any benefit or coverage questions.      **Note to Provider** To refer patients to therapy outside of the location list, change the order class to External Referral in the order composer.                  Your next 10 appointments already scheduled     Aug 03, 2018  3:00 PM CDT   (Arrive by 2:30 PM)   MR SHOULDER RIGHT W/O CONTRAST with RHMR1   [facility] MRI ([facility])    [address] [facility] [id number] [phone]           Take your medicines as usual, unless your doctor tells you not to. Bring a list of your current medicines to your exam (including  vitamins, minerals and over-the-counter drugs). Also bring the results of similar scans you may have had.  Please remove any body piercings and hair extensions before you arrive.  Follow your doctor s orders. If you do not, we may have to postpone your exam.  You may or may not receive IV contrast for this exam pending the discretion of the Radiologist.  You do not need to do anything special to prepare.  The MRI machine uses a strong magnet. Please wear clothes without metal (snaps, zippers). A sweatsuit works well, or we may give you a hospital gown.   **IMPORTANT** THE INSTRUCTIONS BELOW ARE ONLY FOR THOSE PATIENTS WHO HAVE BEEN PRESCRIBED SEDATION OR GENERAL ANESTHESIA DURING THEIR MRI PROCEDURE:  IF YOUR DOCTOR PRESCRIBED ORAL SEDATION (take medicine to help you relax during your exam):   You must get the medicine from your doctor (oral medication) before you arrive. Bring the medicine to the exam. Do not take it at home. You ll be told when to take it upon arriving for your exam.   Arrive one hour early. Bring someone who can take you home after the test. Your medicine will make you sleepy. After the exam, you may not drive, take a bus or take a taxi by yourself.  IF YOUR DOCTOR PRESCRIBED IV SEDATION:   Arrive one hour early. Bring someone who can take you home after the test. Your medicine will make you sleepy. After the exam, you may not drive, take a bus or take a taxi by yourself.   No eating 6 hours before your exam. You may have clear liquids up until 4 hours before your exam. (Clear liquids include water, clear tea, black coffee and fruit juice without pulp.)  IF YOUR DOCTOR PRESCRIBED ANESTHESIA (be asleep for your exam):   Arrive 1 1/2 hours early. Bring someone who can take you home after the test. You may not drive, take a bus or take a taxi by yourself.   No eating 8 hours before your exam. You may have clear liquids up until 4 hours before your exam. (Clear liquids include water, clear tea, black  coffee and fruit juice without pulp.)   You will spend four to five hours in the recovery room.  Please call the Imaging Department at your exam site with any questions.            Aug 03, 2018  3:45 PM CDT   MR CERVICAL SPINE W/O CONTRAST with RHMR1   [facility] MRI ([facility])    [address]  [facility] [address]   [phone]           Take your medicines as usual, unless your doctor tells you not to. Bring a list of your current medicines to your exam (including vitamins, minerals and over-the-counter drugs). Also bring the results of similar scans you may have had.  Please remove any body piercings and hair extensions before you arrive.  Follow your doctor s orders. If you do not, we may have to postpone your exam.  You may or may not receive IV contrast for this exam pending the discretion of the Radiologist.  You do not need to do anything special to prepare.  The MRI machine uses a strong magnet. Please wear clothes without metal (snaps, zippers). A sweatsuit works well, or we may give you a hospital gown.   **IMPORTANT** THE INSTRUCTIONS BELOW ARE ONLY FOR THOSE PATIENTS WHO HAVE BEEN PRESCRIBED SEDATION OR GENERAL ANESTHESIA DURING THEIR MRI PROCEDURE:  IF YOUR DOCTOR PRESCRIBED ORAL SEDATION (take medicine to help you relax during your exam):   You must get the medicine from your doctor (oral medication) before you arrive. Bring the medicine to the exam. Do not take it at home. You ll be told when to take it upon arriving for your exam.   Arrive one hour early. Bring someone who can take you home after the test. Your medicine will make you sleepy. After the exam, you may not drive, take a bus or take a taxi by yourself.  IF YOUR DOCTOR PRESCRIBED IV SEDATION:   Arrive one hour early. Bring someone who can take you home after the test. Your medicine will make you sleepy. After the exam, you may not drive, take a bus or take a taxi by yourself.   No eating 6 hours before  your exam. You may have clear liquids up until 4 hours before your exam. (Clear liquids include water, clear tea, black coffee and fruit juice without pulp.)  IF YOUR DOCTOR PRESCRIBED ANESTHESIA (be asleep for your exam):   Arrive 1 1/2 hours early. Bring someone who can take you home after the test. You may not drive, take a bus or take a taxi by yourself.   No eating 8 hours before your exam. You may have clear liquids up until 4 hours before your exam. (Clear liquids include water, clear tea, black coffee and fruit juice without pulp.)   You will spend four to five hours in the recovery room.  Please call the Imaging Department at your exam site with any questions.            Oct 04, 2018  3:40 PM CDT   (Arrive by 3:25 PM)   Return Visit with Jaziel Webster MD   [facility] Physical Medicine and Rehabilitation ([facility] [place])    [address] [address] [phone]              Future tests that were ordered for you today     Open Future Orders        Priority Expected Expires Ordered    MRI Cervical spine w/o contrast Routine  8/1/2019 8/1/2018    MR Shoulder Right w/o Contrast Routine  8/1/2019 8/1/2018            Who to contact     Please call your clinic at [phone] to:    Ask questions about your health    Make or cancel appointments    Discuss your medicines    Learn about your test results    Speak to your doctor            Additional Information About Your Visit        [employer] Information     [employer] gives you secure access to your electronic health record. If you see a primary care provider, you can also send messages to your care team and make appointments. If you have questions, please call your primary care clinic.  If you do not have a primary care provider, please call [phone] and they will assist you.      [employer] is an electronic gateway that provides easy, online access to your medical records. With [employer], you can request a  clinic appointment, read your test results, renew a prescription or communicate with your care team.     To access your existing account, please contact your [facility] or call [phone] for assistance.        Care EveryWhere ID     This is your Care EveryWhere ID. This could be used by other organizations to access your [facility] medical records  [id number]        Your Vitals Were     Pulse                   72            Blood Pressure from Last 3 Encounters:   08/01/18 93/57   07/20/18 (!) 74/37   06/27/18 109/66    Weight from Last 3 Encounters:   05/17/18 90.7 kg (200 lb)   07/14/17 81.8 kg (180 lb 6.4 oz)   08/28/16 77.1 kg (170 lb)              We Performed the Following     OCCUPATIONAL THERAPY REFERRAL        Primary Care Provider Office Phone # Fax #    Jason Long [phone]       [address] [address]        Equal Access to Services     MAYRA BOUDREAUX : Hadii aad ku hadasho Soomaali, waaxda luqadaha, qaybta kaalmada adeegyada, waxay idiin hayambern armando angulo . So [facility] [phone].    ATENCIÓN: Si habla español, tiene a still disposición servicios gratuitos de asistencia lingüística. Llame al [phone].    We comply with applicable federal civil rights laws and [place] laws. We do not discriminate on the basis of race, color, national origin, age, disability, sex, sexual orientation, or gender identity.            Thank you!     Thank you for choosing [facility] PHYSICAL MEDICINE AND REHABILITATION  for your care. Our goal is always to provide you with excellent care. Hearing back from our patients is one way we can continue to improve our services. Please take a few minutes to complete the written survey that you may receive in the mail after your visit with us. Thank you!             Your Updated Medication List - Protect others around you: Learn how to safely use, store and throw away your medicines at  www.disposemymeds.org.          This list is accurate as of 8/1/18  2:53 PM.  Always use your most recent med list.                   Brand Name Dispense Instructions for use Diagnosis    * albuterol (2.5 MG/3ML) 0.083% neb solution     25 vial    Take 1 vial (2.5 mg) by nebulization every 6 hours as needed for shortness of breath / dyspnea or wheezing    SOB (shortness of breath)       * albuterol 108 (90 Base) MCG/ACT Inhaler    VENTOLIN HFA    3 Inhaler    Inhale 2 puffs into the lungs every 4 hours as needed * ANNUAL MD APPT. DUE July 2018    Wheezing, Bronchitis with bronchospasm, Quadriplegia (H), Spasm       ALCOHOL PREP PADS      USE AS DIRECTED        amcinonide 0.1 % cream    CYCLOCORT    120 g    Apply topically 2 times daily as needed for itching    Eczema, unspecified type       ascorbic acid 1000 MG Tabs    vitamin C     Take 1 tablet by mouth daily        baclofen 20 MG tablet    LIORESAL    540 tablet    TAKE ONE TABLET BY MOUTH UP TO 6 TIMES DAILY    Quadriplegia (H), Spasm, Groin pain, unspecified laterality       * bisacodyl 10 MG Suppository    DULCOLAX     Place 20 mg rectally every 3 days Active ingredient in Magic Bullet (10mg per suppository)        * MAGIC BULLETS 10 MG Suppository   Generic drug:  bisacodyl     30 suppository    Place 1 suppository (10 mg) rectally daily as needed for constipation    Constipation, unspecified constipation type       cholecalciferol 1000 UNIT tablet    vitamin D3    90 tablet    Take 1 tablet (1,000 Units) by mouth daily    Quadriplegia (H)       clindamycin 1 % topical gel    CLINDAMAX    60 g    Apply bid    Bronchitis with bronchospasm, Rash and other nonspecific skin eruption, Wheezing, Quadriplegia (H), Spasm, High cholesterol       Cotton Swabs Swab     450 each    Please dispense sterile wrapped cotton swabs; use up to five/day for medical needs    Bronchitis with bronchospasm, Rash and other nonspecific skin eruption, Wheezing, Quadriplegia (H),  "Spasm, High cholesterol       diazepam 10 MG tablet    VALIUM    90 tablet    Take 1 tablet (10 mg) by mouth daily    Bronchitis with bronchospasm, Insomnia, unspecified type, Rash and other nonspecific skin eruption, Wheezing, Groin pain, unspecified laterality, Quadriplegia (H), Spasm       docusate sodium 100 MG capsule    DOCQLACE    90 capsule    Take 1 capsule (100 mg) by mouth daily    Bronchitis with bronchospasm, Rash and other nonspecific skin eruption, Wheezing, Quadriplegia (H), Spasm, High cholesterol       fosfomycin 3 g Packet    MONUROL    2 packet    Take 1 packet (3 g) by mouth every 3 days Dissolve contents into 3 to 4 ounces of water and stir, then administer; do not use hot water.    Urinary tract infection       * Gauze Bandage 2\" Misc     360 each    IV STERILE GUAZE Use up to 4/day Supply 360 for three month supply    Bronchitis with bronchospasm, Rash and other nonspecific skin eruption, Wheezing, Quadriplegia (H), Spasm, High cholesterol       * Gauze Dressing 4\"X4\" Pads     720 each    NON STERILE GAUZE Use up to 8/day  Supply 720 for three month supply    Bronchitis with bronchospasm, Rash and other nonspecific skin eruption, Wheezing, Quadriplegia (H), Spasm, High cholesterol       hydrocortisone 2.5 % cream    ANUSOL-HC    120 g    Bid prn    Rash and other nonspecific skin eruption, Bronchitis with bronchospasm, Wheezing, Quadriplegia (H), Spasm       ibuprofen 800 MG tablet    ADVIL/MOTRIN    60 tablet    Take 1 tablet (800 mg) by mouth 3 times daily as needed    Bronchitis with bronchospasm, Rash and other nonspecific skin eruption, Wheezing, Quadriplegia (H), Spasm, High cholesterol       lidocaine 2 % topical gel    XYLOCAINE     Apply  topically. APPLY AS DIRECTED        minocycline 50 MG capsule    MINOCIN/DYNACIN    180 capsule    Take 1 capsule (50 mg) by mouth 2 times daily    Rash and other nonspecific skin eruption, Quadriplegia (H)       montelukast 10 MG tablet    " SINGULAIR    30 tablet    Take 1 tablet (10 mg) by mouth At Bedtime    Cough       MULTIVITAMIN & MINERAL PO      Take 1 capsule by mouth daily.        nitroGLYcerin 2 % Oint ointment    NITRO-BID    30 g    Place 0.5-1 inches (7.5-15 mg) onto the skin every 24 hours as needed for autonomic dysreflexia symptoms.  Remove ointment with gloved hand once symptoms resolve.        * NORVASC PO      Take 2.5 mg by mouth daily as needed        * amLODIPine 2.5 MG tablet    NORVASC    30 tablet    Take 1 tablet (2.5 mg) by mouth daily as needed (autonomic dysreflexia)    Autonomic dysreflexia       nystatin [id number] UNIT/GM Powd    NYSTOP    60 g    APPLY 1 DOSE TOPICALLY 3 TIMES DAILY AS NEEDED    Rash and other nonspecific skin eruption, Quadriplegia (H), Spasm, High cholesterol       omeprazole 20 MG CR capsule    priLOSEC    30 capsule    Take 1 capsule (20 mg) by mouth daily    Dyspepsia       order for DME     2 each    two leg bag straps and 2 mouth sticks    Injury at C4 level of cervical spinal cord (H), Injury at C5 level of cervical spinal cord (H), Injury at C6 level of cervical spinal cord (H), Mechanical limb problems       order for DME     120 Device    Equipment being ordered: Alcohol swabs Use 4 a day for catheter change    Quadriplegia (H)       order for DME     3 Device    Equipment being ordered: Urinary leg bag Change three times a month Dispense 3    Quadriplegia (H)       oxybutynin 10 MG 24 hr tablet    DITROPAN-XL    90 tablet    Take 1 tablet (10 mg) by mouth daily    Quadriplegia (H), Spasm       oxyCODONE-acetaminophen 5-325 MG per tablet    PERCOCET    30 tablet    Take 1-2 tablets by mouth every 4 hours as needed    Bronchitis with bronchospasm, Rash and other nonspecific skin eruption, Wheezing, Quadriplegia (H), Spasm, High cholesterol       sennosides 8.6 MG tablet    SENOKOT    90 each    Take 1 tablet by mouth daily    Constipation, unspecified constipation type       sildenafil 100 MG  tablet    VIAGRA    10 tablet    Take 1 tablet (100 mg) by mouth daily as needed TAKE 1 HOUR BEFORE NEEDED    Erectile dysfunction, Need for prophylactic vaccination against Hemophilus influenza type B (Hib), Insomnia, unspecified, Bronchitis with bronchospasm, Rash and other nonspecific skin eruption, Wheezing, Quadriplegia, unspecified, Unspecified constipation, Neurogenic bladder, NOS, Quadriplegia (H), Spasm, High cholesterol       sodium chloride (PF) 0.9% PF flush     500 mL    Use for urinary catheter irrigation    Injury at C4 level of cervical spinal cord, subsequent encounter (H), Injury of fifth cervical spinal cord, subsequent encounter (H), Injury at C6 level of cervical spinal cord, subsequent encounter (H), Neurogenic bladder       terbinafine 1 % cream    lamISIL     Apply topically daily as needed        URINARY LEG BAG      USE AS NEEDED        Urostomy Night Bag Misc     9 each    Please dispense three night bags/month (three month worth at a time) with 4 refills re: needed for chronic urinary dysfunction due to quadreplegia. Type: Bard 2000 ml.    Bronchitis with bronchospasm, Rash and other nonspecific skin eruption, Wheezing, Quadriplegia (H), Spasm, High cholesterol       zolpidem 10 MG tablet    AMBIEN    90 tablet    TAKE 1/2 TO 1 TABLET BY MOUTH AT BEDTIME    Insomnia, Bronchitis with bronchospasm, Rash and other nonspecific skin eruption, Wheezing, Groin pain, unspecified laterality, Quadriplegia (H), Spasm       * Notice:  This list has 8 medication(s) that are the same as other medications prescribed for you. Read the directions carefully, and ask your doctor or other care provider to review them with you.

## 2018-08-01 NOTE — PROGRESS NOTES
Chief Complaint: Left shoulder weakness    History of Present Illness: 57 year old gentleman with C4-5 complete SCI presents with 4 week history of right shoulder weakness.  He states that 4 weeks ago he was getting ROM activities on his right arm when immediately afterwards he noted his shoulder being weak.  While he normally could hold his arm in about 90 degrees of abduction, he could only manage about 40-50 degrees of abduction now.  He also noted some mild loss of sensation along his right lateral forearm.  He denies any shoulder pain, but does stay that his right shoulder blade was more painful, especially down the medial border of it.  Denies any increased sensation of tightness or ascending loss of sensation or further weakness.  No changes in his left arm which he describes as 'ballast' meaning that it has extremely limited use (can maybe get 5 degrees of arm abduction).  No changes in bladder, bowel function.  He did see his IM physician, Dr. Reyes, who obtained XR (negative for fracture) and recommended to continue to montior and gradual return to ROM exercises (with a follow up in 1 month).     Did recently have a colonoscopy as well (7/20/2018) without any problems.    He also denotes that he is still using tobacco products (smoking).  He does not intend to quit at this time.    Currently working 40 hours a week as a .  Current problem is affecting his ability to perform ADLs and utilize his electric WC.    Physical Exam:  BP 93/57 (BP Location: Right arm, Patient Position: Sitting, Cuff Size: Adult Regular)  Pulse 72   Gen: AAOx3, sitting comfortably in his electric WC, NAD.  Neuro/MSK  Sensation: V1-3 intact bilaterally.  Sensation intact to LT in bilateral C2-C4 dermatomes.  Right: C5 dermatome present, decreased/limited sensation to LT along C6 (change).  RUE: wears right wrist brace keeping wrist in neutral.  No erythema, eccymoses noted along RUE.  Tenderness to palpation  along medial border of scapula with noted increased tonicity of rhomboids.  No tenderness over AC joint or subacromion region.  PROM notes increased clicking and stiffness in right shoulder compared to left shoulder.  To tenderness/pain against resistance to abduction.  AROM: R shoulder abduction 45 degree (change from previous description of 90 degree), R elbow flexion 60 degrees.    A/P:  57 year old gentleman with C4-5 complete SCI with sudden weakness after ROM exercise.  XR negative for fracture.  Concern for potential syringomelia due to loss of sensation, shoulder pathology due to rotator cuff tear, or potential pancoast tumor secondary to continued tobacco use.  Will obtain advanced imaging at this time with cervical spine and right shoulder MRI to r/o serious pathology.  Advise to continue with light ROM exercises at this time.  Also counseled on tobacco cessation.  Mr. Corea agreed with this plan.  In addition, he had previously been using a mechanical arm support, however, he has had some difficulty with it.  Will refer to OT for reevaluation.  No other questions or concerns.    1. Right shoulder pain  2. Tobacco Use  3. C4-5 SCI, AIS A    Clifton Roblero,   PGY-2, PM&R Resident    Seen and discussed with Dr. Webster    I, Dr. Webster, also saw and examined Shawn. I have reviewed and edited the above resident note and agree.  My key decisions and exam: some concern with syrinx and MRI today to f/u with this. Done after clinic though results prior to this tie in. Shows no signs of syrinx or other new findings. Comparison MRI was available from years ago. I called Bart to review findings. For now conservative approach. Will have occupational therapy see him and work with optimizing function and mobile arm support assessment and any other equipment.  40 minutes spent in direct patient interaction greater than 50% in counseling and education.

## 2018-08-03 ENCOUNTER — HOSPITAL ENCOUNTER (OUTPATIENT)
Dept: MRI IMAGING | Facility: CLINIC | Age: 58
End: 2018-08-03
Attending: PHYSICAL MEDICINE & REHABILITATION
Payer: COMMERCIAL

## 2018-08-03 ENCOUNTER — HOSPITAL ENCOUNTER (OUTPATIENT)
Dept: MRI IMAGING | Facility: CLINIC | Age: 58
Discharge: HOME | End: 2018-08-03
Attending: PHYSICAL MEDICINE & REHABILITATION | Admitting: PHYSICAL MEDICINE & REHABILITATION
Payer: COMMERCIAL

## 2018-08-03 DIAGNOSIS — G82.50 QUADRIPLEGIA (H): ICD-10-CM

## 2018-08-03 DIAGNOSIS — M25.511 ACUTE PAIN OF RIGHT SHOULDER: ICD-10-CM

## 2018-08-03 PROCEDURE — 73221 MRI JOINT UPR EXTREM W/O DYE: CPT | Mod: RT

## 2018-08-03 PROCEDURE — 72141 MRI NECK SPINE W/O DYE: CPT

## 2018-08-08 DIAGNOSIS — R25.2 SPASM: ICD-10-CM

## 2018-08-08 DIAGNOSIS — R21 RASH AND OTHER NONSPECIFIC SKIN ERUPTION: ICD-10-CM

## 2018-08-08 DIAGNOSIS — G47.00 INSOMNIA, UNSPECIFIED TYPE: ICD-10-CM

## 2018-08-08 DIAGNOSIS — J20.9 BRONCHITIS WITH BRONCHOSPASM: ICD-10-CM

## 2018-08-08 DIAGNOSIS — G82.50 QUADRIPLEGIA (H): ICD-10-CM

## 2018-08-08 DIAGNOSIS — R06.2 WHEEZING: ICD-10-CM

## 2018-08-08 DIAGNOSIS — R10.30 GROIN PAIN, UNSPECIFIED LATERALITY: ICD-10-CM

## 2018-08-08 DIAGNOSIS — G47.00 INSOMNIA: ICD-10-CM

## 2018-08-08 NOTE — TELEPHONE ENCOUNTER
zolpidem (AMBIEN) 10 MG tablet  Last Written Prescription Date:  5/9/18   Last Fill Quantity: 90,   # refills: 0  Last Office Visit : 6/27/18 [place], 5/17/18 [facility]  Future Office visit:  None scheduled    Routing refill request to provider for review/approval because:  Drug not on the [facility], [facility] or  Health refill protocol or controlled substance       diazepam (VALIUM) 10 MG tablet  Last Written Prescription Date:  5/17/18  Last Fill Quantity: 90,   # refills: 0  Last Office Visit : 6/27/18 [place], 5/17/18 [facility]  Future Office visit:  None scheduled    Routing refill request to provider for review/approval because:  Drug not on the [facility], [facility] or [facility] refill protocol or controlled substance

## 2018-08-09 RX ORDER — ZOLPIDEM TARTRATE 10 MG/1
5-10 TABLET ORAL
Qty: 90 TABLET | Refills: 1
Start: 2018-08-09 | End: 2019-02-01

## 2018-08-09 RX ORDER — DIAZEPAM 10 MG
10 TABLET ORAL DAILY
Qty: 90 TABLET | Refills: 1
Start: 2018-08-09 | End: 2019-02-01

## 2018-08-09 RX ORDER — ALBUTEROL SULFATE 90 UG/1
2 AEROSOL, METERED RESPIRATORY (INHALATION) EVERY 4 HOURS PRN
Qty: 3 INHALER | Refills: 3 | Status: SHIPPED | OUTPATIENT
Start: 2018-08-09 | End: 2021-01-15

## 2018-08-28 ENCOUNTER — HOSPITAL ENCOUNTER (OUTPATIENT)
Dept: OCCUPATIONAL THERAPY | Facility: CLINIC | Age: 58
Setting detail: THERAPIES SERIES
End: 2018-08-28
Attending: PHYSICAL MEDICINE & REHABILITATION
Payer: COMMERCIAL

## 2018-08-28 PROCEDURE — 97110 THERAPEUTIC EXERCISES: CPT | Mod: GO | Performed by: OCCUPATIONAL THERAPIST

## 2018-08-28 PROCEDURE — 97166 OT EVAL MOD COMPLEX 45 MIN: CPT | Mod: GO | Performed by: OCCUPATIONAL THERAPIST

## 2018-08-28 PROCEDURE — 40000125 ZZHC STATISTIC OT OUTPT VISIT: Performed by: OCCUPATIONAL THERAPIST

## 2018-08-28 PROCEDURE — 97535 SELF CARE MNGMENT TRAINING: CPT | Mod: GO | Performed by: OCCUPATIONAL THERAPIST

## 2018-09-01 NOTE — PROGRESS NOTES
08/28/18 0900   Quick Adds   Type of Visit Initial Outpatient Occupational Therapy Evaluation   General Information   Start Of Care Date 08/28/18   Referring Physician Dr. Jaziel Webster   Orders Evaluate and treat as indicated   Other Orders 59 yo with tetraplegia. Has been having progressive loss of arm function. Has a mobile arm support but not working well with shift in function. OT to eval arm support and any additional ADL work. See Gomez Bravo   Orders Date 08/01/18   Medical Diagnosis quadraplegia  (C4-5 SCI, AIS A)   Onset of Illness/Injury or Date of Surgery 08/01/18   Surgical/Medical History Reviewed Yes   Additional Occupational Profile Info/Pertinent History of Current Problem Pt is a 58 y.o. man who lives with quadraplegia from C5-6 SCI s/p cervical fusion and resides with his wife and works FT as a . He noticed approximately early July 2018 that he was getting ROM by a new PCA who was quite strong and did some aggressive stretching of his R UE and after this he felt he lost some strength of his R UE such as for basic functions to lift his R arm onto and off his joystick of his power w/c, to push the joystick forward when driving it distances and to power the chair on and off. He notes his arm fatigues more readily my afternoon limiting these functions even further.   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)   Patient role/Employment history Employed  (FT )   Community/Avocational Activities watching sports; Job duties require: computer-dragon voice access and field site visits   Home/Community Accessibility Comments Will further assess as indicated   Prior Level - Transfers Assistive equipment and person;Completely dependent   Prior Level - Ambulation Completely dependent   Prior Level - ADLS Completely dependent   Prior Level Comments Has own w/c accessible van and PCA drives it    Current Assistive Devices - Mobility Power wheelchair   Current Assistive Devices - ADL (R wrist brace, mouthstick with cupholder)   Patient/family Goals Statement Patient has concerns with new Right UE weakness affecting his ability to power on/off his power w/c, drive it, place his hand on and off the joystick; and would like to address his [employer] Mobile Arm Support for feeding himself and for exercise as it doesn't seem to be positioned well any longer.(mount is on power w/c for R UE use). He would like to get a phone that he could communicate with hands free or with AT use.   Pain   Patient currently in pain Yes   Pain location R shoulder/upper arm and between shoulder blades   Pain rating mild   Fall Risk Screen   Fall screen completed by OT   Have you fallen 2 or more times in the past year? No   Have you fallen and had an injury in the past year? No   Cognitive Status Examination   Cognitive Comment appears intact, not formally tested   Visual Perception   Visual Perception Comments wears glasses   Range of Motion (ROM)   ROM Comments AROM R UE: shoulder abd 40, flex 10; ext 40; elbow flex full; elbow ext full; wrist 0; fingers 0; forearm sup 30; pron: full; PROM R UE; shoulder abd to 90, flex 90+, elbow full, forearm full, fingers contracted in claw. L:UE AROM impaired; PROM shoulder flex 90, abd 80, ext 40, elbow flex/ext near full with end range limitations,  fingers contracted claw;   Hand Strength   Hand Dominance Right   Hand Strength Comments impaired-rests in claw with MCP's extended and PIP/DIP's flexed   Coordination   Coordination Comments impaired   Functional Mobility   Wheelchair min assist  (to position R UE onto joystick or power w/c on/off)   Transfer Skill   Level of [place]: Transfers dependent (less than 25% patients effort)   Bathing   Level of [place] - Bathing dependent (less than 25% patients effort)   Upper Body Dressing   Level of [place]: Dress Upper Body dependent  (less than 25% patients effort)   Lower Body Dressing   Level of [place]: Dress Lower Body dependent (less than 25% patients effort)   Toileting   Level of [place]: Toilet dependent (less than 25% patients effort)   Grooming   Level of [place]: Grooming dependent (less than 25% patients effort)   Eating/Self-Feeding   Level of [place]: Eating dependent (less than 25% patients effort)   Planned Therapy Interventions   Planned Therapy Interventions ADL training;IADL training;Joint mobilization;Manual therapy;Neuromuscular re-education;ROM;Self care/Home management;Strengthening;Stretching;Therapeutic activities   Planned Modalities Electrical stimulation   OT Goal 1   Goal Description Pt will demonstrate use of his [employer] mobile arm support with adjustments to settings or additional elevating proximal arm in order to feed himself at least 50% of meal with set up with AE with R UE.   Target Date 11/20/18   OT Goal 2   Goal Description Pt will be able to independently operate his power w/c with adjustments to joystick/controls/assistive technology changes as needed for safe functional mobility.   Target Date 11/20/18   OT Goal 3   Goal Description Pt will demonstrate improved R UE strength to report at least one ADL task ( such as operatnig power w/c or other) that he can do wtih his R UE with ind R HEP with assist of his PCA for strengthening.   Target Date 11/20/18   Clinical Impression   Criteria for Skilled Therapeutic Interventions Met Yes, treatment indicated   OT Diagnosis Impaired ADL/IADL and functional mobility with power w/c   Influenced by the following impairments Impaired R UE AROM/PROM, strength and coordination, fatigue, reduced endurance   Assessment of Occupational Performance 3-5 Performance Deficits   Identified Performance Deficits operating power w/c for functional mobility, feeding self, work, functional communication   Clinical Decision Making (Complexity) Moderate complexity    Therapy Frequency 2x/week x 6-8 weeks and then taper to 1x/week for up to 20 visits   Predicted Duration of Therapy Intervention (days/wks) 12 weeks   Risks and Benefits of Treatment have been explained. Yes   Patient, Family & other staff in agreement with plan of care Yes   Education Assessment   Barriers To Learning No Barriers   Preferred Learning Style Listening;Reading;Demonstration   Total Evaluation Time   Total Evaluation Time 20

## 2018-09-01 NOTE — ADDENDUM NOTE
Encounter addended by: Gomez Bravo OT on: 9/1/2018 11:25 AM<BR>     Actions taken: Charge Capture section accepted, Sign clinical note, Flowsheet accepted

## 2018-09-06 ENCOUNTER — HOSPITAL ENCOUNTER (OUTPATIENT)
Dept: OCCUPATIONAL THERAPY | Facility: CLINIC | Age: 58
Setting detail: THERAPIES SERIES
End: 2018-09-06
Attending: PHYSICAL MEDICINE & REHABILITATION
Payer: COMMERCIAL

## 2018-09-06 PROCEDURE — 97032 APPL MODALITY 1+ESTIM EA 15: CPT | Mod: GO | Performed by: OCCUPATIONAL THERAPIST

## 2018-09-14 ENCOUNTER — HOSPITAL ENCOUNTER (OUTPATIENT)
Dept: OCCUPATIONAL THERAPY | Facility: CLINIC | Age: 58
Setting detail: THERAPIES SERIES
End: 2018-09-14
Attending: PHYSICAL MEDICINE & REHABILITATION
Payer: COMMERCIAL

## 2018-09-14 PROCEDURE — 40000125 ZZHC STATISTIC OT OUTPT VISIT: Performed by: OCCUPATIONAL THERAPIST

## 2018-09-14 PROCEDURE — 97535 SELF CARE MNGMENT TRAINING: CPT | Mod: GO | Performed by: OCCUPATIONAL THERAPIST

## 2018-09-21 NOTE — ADDENDUM NOTE
Encounter addended by: Gomez Bravo OT on: 9/21/2018  9:20 AM<BR>     Actions taken: Flowsheet data copied forward, Flowsheet accepted

## 2018-09-26 ENCOUNTER — HOSPITAL ENCOUNTER (OUTPATIENT)
Dept: OCCUPATIONAL THERAPY | Facility: CLINIC | Age: 58
Setting detail: THERAPIES SERIES
End: 2018-09-26
Attending: PHYSICAL MEDICINE & REHABILITATION
Payer: COMMERCIAL

## 2018-09-26 ENCOUNTER — MEDICAL CORRESPONDENCE (OUTPATIENT)
Dept: HEALTH INFORMATION MANAGEMENT | Facility: CLINIC | Age: 58
End: 2018-09-26

## 2018-09-26 PROCEDURE — 97032 APPL MODALITY 1+ESTIM EA 15: CPT | Mod: GO | Performed by: OCCUPATIONAL THERAPIST

## 2018-09-26 PROCEDURE — 40000125 ZZHC STATISTIC OT OUTPT VISIT: Performed by: OCCUPATIONAL THERAPIST

## 2018-09-28 ENCOUNTER — OFFICE VISIT (OUTPATIENT)
Dept: FAMILY MEDICINE | Facility: CLINIC | Age: 58
End: 2018-09-28
Payer: COMMERCIAL

## 2018-09-28 ENCOUNTER — TELEPHONE (OUTPATIENT)
Dept: INTERNAL MEDICINE | Facility: CLINIC | Age: 58
End: 2018-09-28

## 2018-09-28 ENCOUNTER — PRE VISIT (OUTPATIENT)
Dept: UROLOGY | Facility: CLINIC | Age: 58
End: 2018-09-28

## 2018-09-28 ENCOUNTER — HOSPITAL ENCOUNTER (OUTPATIENT)
Dept: OCCUPATIONAL THERAPY | Facility: CLINIC | Age: 58
Setting detail: THERAPIES SERIES
End: 2018-09-28
Attending: PHYSICAL MEDICINE & REHABILITATION
Payer: COMMERCIAL

## 2018-09-28 VITALS — SYSTOLIC BLOOD PRESSURE: 87 MMHG | HEART RATE: 55 BPM | DIASTOLIC BLOOD PRESSURE: 55 MMHG

## 2018-09-28 DIAGNOSIS — S14.105D INJURY OF FIFTH CERVICAL SPINAL CORD, SUBSEQUENT ENCOUNTER (H): ICD-10-CM

## 2018-09-28 DIAGNOSIS — Z00.00 ROUTINE GENERAL MEDICAL EXAMINATION AT A HEALTH CARE FACILITY: Primary | ICD-10-CM

## 2018-09-28 DIAGNOSIS — G82.50 QUADRIPLEGIA (H): ICD-10-CM

## 2018-09-28 DIAGNOSIS — S14.104D: ICD-10-CM

## 2018-09-28 DIAGNOSIS — N31.9 NEUROGENIC BLADDER: ICD-10-CM

## 2018-09-28 DIAGNOSIS — S14.106D: ICD-10-CM

## 2018-09-28 DIAGNOSIS — R10.30 GROIN PAIN, UNSPECIFIED LATERALITY: ICD-10-CM

## 2018-09-28 DIAGNOSIS — R25.2 SPASM: ICD-10-CM

## 2018-09-28 PROCEDURE — 40000125 ZZHC STATISTIC OT OUTPT VISIT: Performed by: OCCUPATIONAL THERAPIST

## 2018-09-28 PROCEDURE — 97032 APPL MODALITY 1+ESTIM EA 15: CPT | Mod: GO | Performed by: OCCUPATIONAL THERAPIST

## 2018-09-28 RX ORDER — BACLOFEN 20 MG/1
TABLET ORAL
Qty: 540 TABLET | Refills: 11 | Status: SHIPPED | OUTPATIENT
Start: 2018-09-28 | End: 2018-10-15

## 2018-09-28 ASSESSMENT — PAIN SCALES - GENERAL: PAINLEVEL: NO PAIN (0)

## 2018-09-28 NOTE — TELEPHONE ENCOUNTER
Chief Complaint   Patient presents with     Previsit     16 fr SP tube catheter change       Erika Thornton MA

## 2018-09-28 NOTE — PATIENT INSTRUCTIONS
[facility] Medication Refill Request Information:  * Please contact your pharmacy regarding ANY request for medication refills.  ** [facility] Prescription Fax = [phone]  * Please allow 3 business days for routine medication refills.  * Please allow 5 business days for controlled substance medication refills.     [facility] Test Result notification information:  *You will be notified with in 7-10 days of your appointment day regarding the results of your test.  If you are on MyChart you will be notified as soon as the provider has reviewed the results and signed off on them.    [facility]: [phone]

## 2018-09-28 NOTE — TELEPHONE ENCOUNTER
[facility] Call Center    Phone Message    May a detailed message be left on voicemail: yes    Reason for Call: Order(s): Home Care Orders: Other: Verbal orders for Nurse start of care, occuring Monday, Oct 1. (due to pt insurance activation on that date). Please call Annelise at [phone]. Voicemail OK.    Action Taken: Message routed to:  [facility] ([facility] PCC      Verbal order given and documented. Renetta Eduardo LPN 10/1/2018 10:42 AM

## 2018-09-28 NOTE — NURSING NOTE
Bart Corea      1.  Has the patient received the information for the influenza vaccine? YES    2.  Does the patient have any of the following contraindications?     Allergy to eggs? No     Allergic reaction to previous influenza vaccines? No     Any other problems to previous influenza vaccines? No     Paralyzed by Guillain-[place] syndrome? No     Currently pregnant? NO     Current moderate or severe illness? No     Allergy to contact lens solution? No    3.  The vaccine has been administered in the usual fashion and the patient was instructed to wait 20 minutes before leaving the building in the event of an allergic reaction: YES    Vaccination given by Dieter Elaine, EMT 1:21 PM on 9/28/2018.    Recorded by Dieter Elaine

## 2018-09-28 NOTE — PROGRESS NOTES
Home care referral was faxed to [facility] at [phone].    Jose Eduardo Berry RN

## 2018-09-28 NOTE — NURSING NOTE
Chief Complaint   Patient presents with     Recheck Medication     face to face per pt for new home care order       Dieter Elaine, EMT 12:37 PM on 9/28/2018.

## 2018-09-28 NOTE — PROGRESS NOTES
[facility]  Primary Care Center   Jason Long MD  09/28/2018      Chief Complaint:   Recheck Medication     History of Present Illness:   Bart Corea is a 58 year old male with a history of quadriplegia C1-C4 incomplete, neurogenic bladder, myopic astigmatism of both eyes, presbyopia, injury at C4-C6 level of cervical spinal cord, pneumonia, mild LIZA with hypoventilation, and bacteremia who presents to discuss home care provider change. He is changing home care services to [facility] in [place] and needs a face to face first. Every two weeks he requires catheter changes and nail trimming. He wanted to keep his previous nurse who switched care companies and will be changing insurance to keep the same nurse.     Review of Systems:   Pertinent items are noted in HPI, remainder of complete ROS is negative.      Active Medications:     Current Outpatient Prescriptions:      albuterol (2.5 MG/3ML) 0.083% neb solution, Take 1 vial (2.5 mg) by nebulization every 6 hours as needed for shortness of breath / dyspnea or wheezing, Disp: 25 vial, Rfl: 1     albuterol (VENTOLIN HFA) 108 (90 Base) MCG/ACT Inhaler, Inhale 2 puffs into the lungs every 4 hours as needed, Disp: 3 Inhaler, Rfl: 3     Alcohol Swabs (ALCOHOL PREP PADS), USE AS DIRECTED, Disp: , Rfl:      amcinonide (CYCLOCORT) 0.1 % cream, Apply topically 2 times daily as needed for itching, Disp: 120 g, Rfl: 1     amLODIPine (NORVASC) 2.5 MG tablet, Take 1 tablet (2.5 mg) by mouth daily as needed (autonomic dysreflexia), Disp: 30 tablet, Rfl: 1     AmLODIPine Besylate (NORVASC PO), Take 2.5 mg by mouth daily as needed, Disp: , Rfl:      Applicators (COTTON SWABS) SWAB, Please dispense sterile wrapped cotton swabs; use up to five/day for medical needs, Disp: 450 each, Rfl: 3     ascorbic acid (VITAMIN C) 1000 MG TABS, Take 1 tablet by mouth daily, Disp: , Rfl:      baclofen (LIORESAL) 20 MG tablet, TAKE ONE TABLET BY MOUTH UP TO 6  "TIMES DAILY, Disp: 540 tablet, Rfl: 11     bisacodyl (DULCOLAX) 10 MG suppository, Place 20 mg rectally every 3 days Active ingredient in Magic Bullet (10mg per suppository), Disp: , Rfl:      cholecalciferol (VITAMIN D3) 1000 UNIT tablet, Take 1 tablet (1,000 Units) by mouth daily, Disp: 90 tablet, Rfl: 0     clindamycin (CLINDAMAX) 1 % topical gel, Apply bid, Disp: 60 g, Rfl: 11     diazepam (VALIUM) 10 MG tablet, Take 1 tablet (10 mg) by mouth daily, Disp: 90 tablet, Rfl: 1     docusate sodium (DOCQLACE) 100 MG capsule, Take 1 capsule (100 mg) by mouth daily, Disp: 90 capsule, Rfl: 3     fosfomycin (MONUROL) 3 G Packet, Take 1 packet (3 g) by mouth every 3 days Dissolve contents into 3 to 4 ounces of water and stir, then administer; do not use hot water., Disp: 2 packet, Rfl: 0     Gauze Pads & Dressings (GAUZE BANDAGE 2\") MISC, IV STERILE GUAZE Use up to 4/day Supply 360 for three month supply, Disp: 360 each, Rfl: 3     Gauze Pads & Dressings (GAUZE DRESSING) 4\"X4\" PADS, NON STERILE GAUZE Use up to 8/day  Supply 720 for three month supply, Disp: 720 each, Rfl: 3     hydrocortisone (ANUSOL-HC) 2.5 % cream, Bid prn, Disp: 120 g, Rfl: 11     ibuprofen (ADVIL/MOTRIN) 800 MG tablet, Take 1 tablet (800 mg) by mouth 3 times daily as needed, Disp: 60 tablet, Rfl: 11     Incontinence Supplies (URINARY LEG BAG), USE AS NEEDED, Disp: , Rfl:      lidocaine (XYLOCAINE) 2 % jelly, Apply  topically. APPLY AS DIRECTED, Disp: , Rfl:      MAGIC BULLETS 10 MG Suppository, Place 1 suppository (10 mg) rectally daily as needed for constipation, Disp: 30 suppository, Rfl: 3     minocycline (MINOCIN/DYNACIN) 50 MG capsule, Take 1 capsule (50 mg) by mouth 2 times daily, Disp: 180 capsule, Rfl: 3     montelukast (SINGULAIR) 10 MG tablet, Take 1 tablet (10 mg) by mouth At Bedtime, Disp: 30 tablet, Rfl: 1     Multiple Vitamins-Minerals (MULTIVITAMIN & MINERAL PO), Take 1 capsule by mouth daily., Disp: , Rfl:      nitroGLYcerin (NITRO-BID) " 2 % OINT ointment, Place 0.5-1 inches (7.5-15 mg) onto the skin every 24 hours as needed for autonomic dysreflexia symptoms.  Remove ointment with gloved hand once symptoms resolve., Disp: 30 g, Rfl: 0     nystatin (NYSTOP) 752998 UNIT/GM POWD, APPLY 1 DOSE TOPICALLY 3 TIMES DAILY AS NEEDED, Disp: 60 g, Rfl: 2     omeprazole (PRILOSEC) 20 MG CR capsule, Take 1 capsule (20 mg) by mouth daily, Disp: 30 capsule, Rfl: 1     order for DME, Equipment being ordered: Alcohol swabs Use 4 a day for catheter change, Disp: 120 Device, Rfl: 11     order for DME, Equipment being ordered: Urinary leg bag Change three times a month Dispense 3, Disp: 3 Device, Rfl: 11     order for DME, two leg bag straps and 2 mouth sticks, Disp: 2 each, Rfl: 0     Ostomy Supplies (UROSTOMY NIGHT BAG) MISC, Please dispense three night bags/month (three month worth at a time) with 4 refills re: needed for chronic urinary dysfunction due to quadreplegia. Type: Bard 2000 ml., Disp: 9 each, Rfl: 3     oxybutynin (DITROPAN-XL) 10 MG 24 hr tablet, Take 1 tablet (10 mg) by mouth daily, Disp: 90 tablet, Rfl: 3     oxyCODONE-acetaminophen (PERCOCET) 5-325 MG per tablet, Take 1-2 tablets by mouth every 4 hours as needed, Disp: 30 tablet, Rfl: 0     sennosides (SENOKOT) 8.6 MG tablet, Take 1 tablet by mouth daily, Disp: 90 each, Rfl: 3     sildenafil (VIAGRA) 100 MG tablet, Take 1 tablet (100 mg) by mouth daily as needed TAKE 1 HOUR BEFORE NEEDED, Disp: 10 tablet, Rfl: 11     sodium chloride, PF, 0.9% PF flush, Use for urinary catheter irrigation, Disp: 500 mL, Rfl: 11     terbinafine (LAMISIL) 1 % cream, Apply topically daily as needed , Disp: , Rfl:      zolpidem (AMBIEN) 10 MG tablet, Take 0.5-1 tablets (5-10 mg) by mouth nightly as needed for sleep, Disp: 90 tablet, Rfl: 1      Allergies:   Vancomycin      Past Medical History:  Obstructive sleep apnea  Quadriplegia, C1-C4 incomplete  Neurogenic bladder  Pneumonia     Past Surgical History:  Past Surgical  History:   Procedure Laterality Date     Bilateral ureteroscopy with holmium laser lithotripsy and basketing and removal of fragments  Left ureteral stent placement.  02/10/2010     COLONOSCOPY N/A 7/20/2018    Procedure: COMBINED COLONOSCOPY, SINGLE OR MULTIPLE BIOPSY/POLYPECTOMY BY BIOPSY;;  Surgeon: Grace Yang MD;  Location: [facility]     skin flap on bottom       sp tube absess surgery       Family History:   Father: cancer     Social History:     Current 0.3 PPD smoker  Uses smokeless tobacco    Physical Exam:   BP (!) 87/55 (BP Location: Left arm, Patient Position: Sitting, Cuff Size: Adult Regular)  Pulse 55   Constitutional: Alert. In no distress. In wheelchair.  Head: The scalp, face, and head appear normal.  Musculoskeletal: Extremities appear normal. No gross deformities noted.   Neurologic: Gait normal. Speech is normal and fluent.  Psychiatric: Mentation appears normal. Normal affect.     Assessment and Plan: Seen today for home health need assessment due to quadriplegia with related needs and complication.     Quadriplegia (H)  Switching home health care agencies, new referral done and given today. My RN knows to fax it to new agency. Influenza vaccine given. Follow up per previous plan.  - baclofen (LIORESAL) 20 MG tablet  Dispense: 540 tablet; Refill: 11  - cholecalciferol (VITAMIN D3) 1000 UNIT tablet  Dispense: 90 tablet; Refill: 0  - HOME CARE NURSING REFERRAL  - sodium chloride, PF, 0.9% PF flush  Dispense: 500 mL; Refill: 11    Spasm  - baclofen (LIORESAL) 20 MG tablet  Dispense: 540 tablet; Refill: 11    Neurogenic bladder  - sodium chloride, PF, 0.9% PF flush  Dispense: 500 mL; Refill: 11    Routine general medical examination at a health care facility  - FLU VACCINE, AGE >= 3 YR    1/2 hr visit over half couns/care       Follow-up: Data Unavailable         Scribe Disclosure:   I, Stevenson Christianson, am serving as a scribe to document services personally performed by Jason Long MD  at this visit, based upon the provider's statements to me. All documentation has been reviewed by the aforementioned provider prior to being entered into the official medical record.     Portions of this medical record were completed by a scribe. UPON MY REVIEW AND AUTHENTICATION BY ELECTRONIC SIGNATURE, this confirms (a) I performed the applicable clinical services, and (b) the record is accurate.   Jason Long MD

## 2018-09-28 NOTE — MR AVS SNAPSHOT
After Visit Summary   9/28/2018    Bart Corea    MRN: [id number]           Patient Information     Date Of Birth          1960        Visit Information        Provider Department      9/28/2018 12:20 PM Jason Long MD [facility]        Today's Diagnoses     Routine general medical examination at a health care facility    -  1    Quadriplegia (H)        Spasm        Groin pain, unspecified laterality        Injury at C4 level of cervical spinal cord, subsequent encounter (H)        Injury of fifth cervical spinal cord, subsequent encounter (H)        Injury at C6 level of cervical spinal cord, subsequent encounter (H)        Neurogenic bladder          Care Instructions    Primary Care Center Medication Refill Request Information:  * Please contact your pharmacy regarding ANY request for medication refills.  ** [facility] Prescription Fax = [phone]  * Please allow 3 business days for routine medication refills.  * Please allow 5 business days for controlled substance medication refills.     Primary Care Center Test Result notification information:  *You will be notified with in 7-10 days of your appointment day regarding the results of your test.  If you are on MyChart you will be notified as soon as the provider has reviewed the results and signed off on them.    Primary Care Center: [phone]             Follow-ups after your visit        Your next 10 appointments already scheduled     Sep 28, 2018  2:20 PM CDT   (Arrive by 2:05 PM)   Return Visit with  Prostate Cancer Ctr Nurse   [facility] ([facility])    [address] [place] [address] [id number] [phone]            Oct 03, 2018  9:30 AM CDT   Neuro Treatment with Gomez Bravo OT, ELAINE JOYNER   [facility] [place], Occupational Therapy - Outpatient ([facility])     [address]  [place] [address]   [phone]            Oct 04, 2018  3:40 PM CDT   (Arrive by 3:25 PM)   Return Visit with Jaziel Webster MD   [facility] ([facility])    [address] [place] [id number]   [phone]            Oct 05, 2018  1:00 PM CDT   Neuro Treatment with Gomez Bravo OT, UR FES BIKE   [facility], [place], Occupational Therapy - Outpatient ([facility])    [address], [address]  [place] [address]   [phone]            Oct 10, 2018  9:30 AM CDT   Neuro Treatment with Gomez Bravo OT, UR FES BIKE   [facility] [place], Occupational Therapy - Outpatient ([facility])    [address]  [place] [address]   [phone]            Oct 12, 2018  2:00 PM CDT   Neuro Treatment with Gomez Bravo OT, UR FES BIKE   [facility], [place], Occupational Therapy - Outpatient ([facility])    [address]  [place] [address]   [phone]              Who to contact     Please call your clinic at [phone] to:    Ask questions about your health    Make or cancel appointments    Discuss your medicines    Learn about your test results    Speak to your doctor            Additional Information About Your Visit        [email or web address] Information     [employer] gives you secure access to your electronic health record. If you see a primary care provider, you can also send messages to your care team and make appointments. If you have questions, please call your primary care clinic.  If you do not have a primary care provider, please call [phone] and they will assist you.      [employer] is an electronic gateway that provides easy, online access to your medical records. With [employer], you can request a clinic appointment, read your test results,  renew a prescription or communicate with your care team.     To access your existing account, please contact your [facility] or call [phone] for assistance.        Care EveryWhere ID     This is your Care EveryWhere ID. This could be used by other organizations to access your [facility] medical records  [id number]        Your Vitals Were     Pulse                   55            Blood Pressure from Last 3 Encounters:   09/28/18 (!) 87/55   08/01/18 93/57   07/20/18 (!) 74/37    Weight from Last 3 Encounters:   05/17/18 90.7 kg (200 lb)   07/14/17 81.8 kg (180 lb 6.4 oz)   08/28/16 77.1 kg (170 lb)              We Performed the Following     FLU VACCINE, AGE >= 3 YR          Where to get your medicines      These medications were sent to [facility] [address]  [address] [address]     Phone:  [phone]     baclofen 20 MG tablet    cholecalciferol 1000 UNIT tablet    sodium chloride (PF) 0.9% PF flush          Primary Care Provider Office Phone # Fax #    Jason Long [phone]       [address] [address]        Equal Access to Services     MAYRA BOUDREAUX : Hadii aad ku hadasho Soomaali, waaxda luqadaha, qaybta kaalmada adeegyada, waxay idiin hayambern armando guthrie. So [facility] [phone].    ATENCIÓN: Si habla español, tiene a still disposición servicios gratuitos de asistencia lingüística. Llame al [phone].    We comply with applicable federal civil rights laws and [place] laws. We do not discriminate on the basis of race, color, national origin, age, disability, sex, sexual orientation, or gender identity.            Thank you!     Thank you for choosing [facility]  for your care. Our goal is always to provide you with excellent care. Hearing back from our patients is one way we can continue to improve our services. Please take a few minutes to complete  the written survey that you may receive in the mail after your visit with us. Thank you!             Your Updated Medication List - Protect others around you: Learn how to safely use, store and throw away your medicines at www.disposemymeds.org.          This list is accurate as of 9/28/18  1:21 PM.  Always use your most recent med list.                   Brand Name Dispense Instructions for use Diagnosis    * albuterol (2.5 MG/3ML) 0.083% neb solution     25 vial    Take 1 vial (2.5 mg) by nebulization every 6 hours as needed for shortness of breath / dyspnea or wheezing    SOB (shortness of breath)       * albuterol 108 (90 Base) MCG/ACT inhaler    VENTOLIN HFA    3 Inhaler    Inhale 2 puffs into the lungs every 4 hours as needed    Wheezing, Bronchitis with bronchospasm, Quadriplegia (H), Spasm       ALCOHOL PREP PADS      USE AS DIRECTED        amcinonide 0.1 % cream    CYCLOCORT    120 g    Apply topically 2 times daily as needed for itching    Eczema, unspecified type       ascorbic acid 1000 MG Tabs    vitamin C     Take 1 tablet by mouth daily        baclofen 20 MG tablet    LIORESAL    540 tablet    TAKE ONE TABLET BY MOUTH UP TO 6 TIMES DAILY    Quadriplegia (H), Spasm, Groin pain, unspecified laterality       * bisacodyl 10 MG Suppository    DULCOLAX     Place 20 mg rectally every 3 days Active ingredient in Magic Bullet (10mg per suppository)        * MAGIC BULLETS 10 MG Suppository   Generic drug:  bisacodyl     30 suppository    Place 1 suppository (10 mg) rectally daily as needed for constipation    Constipation, unspecified constipation type       cholecalciferol 1000 UNIT tablet    vitamin D3    90 tablet    Take 1 tablet (1,000 Units) by mouth daily    Quadriplegia (H)       clindamycin 1 % topical gel    CLINDAMAX    60 g    Apply bid    Bronchitis with bronchospasm, Rash and other nonspecific skin eruption, Wheezing, Quadriplegia (H), Spasm, High cholesterol       Cotton Swabs Swab     450 each  "   Please dispense sterile wrapped cotton swabs; use up to five/day for medical needs    Bronchitis with bronchospasm, Rash and other nonspecific skin eruption, Wheezing, Quadriplegia (H), Spasm, High cholesterol       diazepam 10 MG tablet    VALIUM    90 tablet    Take 1 tablet (10 mg) by mouth daily    Bronchitis with bronchospasm, Insomnia, unspecified type, Rash and other nonspecific skin eruption, Wheezing, Groin pain, unspecified laterality, Quadriplegia (H), Spasm       docusate sodium 100 MG capsule    DOCQLACE    90 capsule    Take 1 capsule (100 mg) by mouth daily    Bronchitis with bronchospasm, Rash and other nonspecific skin eruption, Wheezing, Quadriplegia (H), Spasm, High cholesterol       fosfomycin 3 g Packet    MONUROL    2 packet    Take 1 packet (3 g) by mouth every 3 days Dissolve contents into 3 to 4 ounces of water and stir, then administer; do not use hot water.    Urinary tract infection       * Gauze Bandage 2\" Misc     360 each    IV STERILE GUAZE Use up to 4/day Supply 360 for three month supply    Bronchitis with bronchospasm, Rash and other nonspecific skin eruption, Wheezing, Quadriplegia (H), Spasm, High cholesterol       * Gauze Dressing 4\"X4\" Pads     720 each    NON STERILE GAUZE Use up to 8/day  Supply 720 for three month supply    Bronchitis with bronchospasm, Rash and other nonspecific skin eruption, Wheezing, Quadriplegia (H), Spasm, High cholesterol       hydrocortisone 2.5 % cream    ANUSOL-HC    120 g    Bid prn    Rash and other nonspecific skin eruption, Bronchitis with bronchospasm, Wheezing, Quadriplegia (H), Spasm       ibuprofen 800 MG tablet    ADVIL/MOTRIN    60 tablet    Take 1 tablet (800 mg) by mouth 3 times daily as needed    Bronchitis with bronchospasm, Rash and other nonspecific skin eruption, Wheezing, Quadriplegia (H), Spasm, High cholesterol       lidocaine 2 % topical gel    XYLOCAINE     Apply  topically. APPLY AS DIRECTED        minocycline 50 MG " capsule    MINOCIN/DYNACIN    180 capsule    Take 1 capsule (50 mg) by mouth 2 times daily    Rash and other nonspecific skin eruption, Quadriplegia (H)       montelukast 10 MG tablet    SINGULAIR    30 tablet    Take 1 tablet (10 mg) by mouth At Bedtime    Cough       MULTIVITAMIN & MINERAL PO      Take 1 capsule by mouth daily.        nitroGLYcerin 2 % Oint ointment    NITRO-BID    30 g    Place 0.5-1 inches (7.5-15 mg) onto the skin every 24 hours as needed for autonomic dysreflexia symptoms.  Remove ointment with gloved hand once symptoms resolve.        * NORVASC PO      Take 2.5 mg by mouth daily as needed        * amLODIPine 2.5 MG tablet    NORVASC    30 tablet    Take 1 tablet (2.5 mg) by mouth daily as needed (autonomic dysreflexia)    Autonomic dysreflexia       nystatin 037950 UNIT/GM Powd    NYSTOP    60 g    APPLY 1 DOSE TOPICALLY 3 TIMES DAILY AS NEEDED    Rash and other nonspecific skin eruption, Quadriplegia (H), Spasm, High cholesterol       omeprazole 20 MG CR capsule    priLOSEC    30 capsule    Take 1 capsule (20 mg) by mouth daily    Dyspepsia       order for DME     2 each    two leg bag straps and 2 mouth sticks    Injury at C4 level of cervical spinal cord (H), Injury at C5 level of cervical spinal cord (H), Injury at C6 level of cervical spinal cord (H), Mechanical limb problems       order for DME     120 Device    Equipment being ordered: Alcohol swabs Use 4 a day for catheter change    Quadriplegia (H)       order for DME     3 Device    Equipment being ordered: Urinary leg bag Change three times a month Dispense 3    Quadriplegia (H)       oxybutynin 10 MG 24 hr tablet    DITROPAN-XL    90 tablet    Take 1 tablet (10 mg) by mouth daily    Quadriplegia (H), Spasm       oxyCODONE-acetaminophen 5-325 MG per tablet    PERCOCET    30 tablet    Take 1-2 tablets by mouth every 4 hours as needed    Bronchitis with bronchospasm, Rash and other nonspecific skin eruption, Wheezing, Quadriplegia  (H), Spasm, High cholesterol       sennosides 8.6 MG tablet    SENOKOT    90 each    Take 1 tablet by mouth daily    Constipation, unspecified constipation type       sildenafil 100 MG tablet    VIAGRA    10 tablet    Take 1 tablet (100 mg) by mouth daily as needed TAKE 1 HOUR BEFORE NEEDED    Erectile dysfunction, Need for prophylactic vaccination against Hemophilus influenza type B (Hib), Insomnia, unspecified, Bronchitis with bronchospasm, Rash and other nonspecific skin eruption, Wheezing, Quadriplegia, unspecified, Unspecified constipation, Neurogenic bladder, NOS, Quadriplegia (H), Spasm, High cholesterol       sodium chloride (PF) 0.9% PF flush     500 mL    Use for urinary catheter irrigation    Injury at C4 level of cervical spinal cord, subsequent encounter (H), Injury of fifth cervical spinal cord, subsequent encounter (H), Injury at C6 level of cervical spinal cord, subsequent encounter (H), Neurogenic bladder       terbinafine 1 % cream    lamISIL     Apply topically daily as needed        URINARY LEG BAG      USE AS NEEDED        Urostomy Night Bag Misc     9 each    Please dispense three night bags/month (three month worth at a time) with 4 refills re: needed for chronic urinary dysfunction due to quadreplegia. Type: Bard 2000 ml.    Bronchitis with bronchospasm, Rash and other nonspecific skin eruption, Wheezing, Quadriplegia (H), Spasm, High cholesterol       zolpidem 10 MG tablet    AMBIEN    90 tablet    Take 0.5-1 tablets (5-10 mg) by mouth nightly as needed for sleep    Insomnia, Bronchitis with bronchospasm, Rash and other nonspecific skin eruption, Wheezing, Groin pain, unspecified laterality, Quadriplegia (H), Spasm       * Notice:  This list has 8 medication(s) that are the same as other medications prescribed for you. Read the directions carefully, and ask your doctor or other care provider to review them with you.

## 2018-10-01 NOTE — TELEPHONE ENCOUNTER
Verbal orders given to Annelise from [facility], per Dr. Long, for Nurse start of care, occuring Monday, Oct 1. (due to pt insurance activation on that date). Annelise at [phone]. Renetta Eduardo LPN 10/1/2018 10:44 AM

## 2018-10-03 ENCOUNTER — TELEPHONE (OUTPATIENT)
Dept: INTERNAL MEDICINE | Facility: CLINIC | Age: 58
End: 2018-10-03

## 2018-10-03 ENCOUNTER — HOSPITAL ENCOUNTER (OUTPATIENT)
Dept: OCCUPATIONAL THERAPY | Facility: CLINIC | Age: 58
Setting detail: THERAPIES SERIES
End: 2018-10-03
Attending: PHYSICAL MEDICINE & REHABILITATION
Payer: COMMERCIAL

## 2018-10-03 PROCEDURE — 97032 APPL MODALITY 1+ESTIM EA 15: CPT | Mod: GO | Performed by: OCCUPATIONAL THERAPIST

## 2018-10-03 PROCEDURE — 40000125 ZZHC STATISTIC OT OUTPT VISIT: Performed by: OCCUPATIONAL THERAPIST

## 2018-10-03 NOTE — TELEPHONE ENCOUNTER
Health Call Center    Phone Message    May a detailed message be left on voicemail: yes    Reason for Call: [facility] not able to accept the orders put in for pt by Dr. Long and Nurse Robinson due to the time issue. Pt works throughout the day and is only available during nighttime and the home care does not have nurse during night time to help the pt. Please call back pt to inform this situation.    Action Taken: Message routed to:  [facility] ([facility]): BILL

## 2018-10-04 ENCOUNTER — MEDICAL CORRESPONDENCE (OUTPATIENT)
Dept: HEALTH INFORMATION MANAGEMENT | Facility: CLINIC | Age: 58
End: 2018-10-04

## 2018-10-05 ENCOUNTER — TELEPHONE (OUTPATIENT)
Dept: INTERNAL MEDICINE | Facility: CLINIC | Age: 58
End: 2018-10-05

## 2018-10-05 DIAGNOSIS — G82.50 QUADRIPLEGIA (H): Primary | ICD-10-CM

## 2018-10-05 NOTE — TELEPHONE ENCOUNTER
[facility] is not going to take new pts. Therefore pt needs a new home care agency and agreed to have [facility]. New referral placed.

## 2018-10-07 ENCOUNTER — MEDICAL CORRESPONDENCE (OUTPATIENT)
Dept: HEALTH INFORMATION MANAGEMENT | Facility: CLINIC | Age: 58
End: 2018-10-07

## 2018-10-08 ENCOUNTER — TELEPHONE (OUTPATIENT)
Dept: INTERNAL MEDICINE | Facility: CLINIC | Age: 58
End: 2018-10-08

## 2018-10-08 NOTE — TELEPHONE ENCOUNTER
[facility] Call Center    Phone Message    May a detailed message be left on voicemail: yes    Reason for Call: Order(s): Home Care Orders: Skilled Nursing: Every two weeks for Catheter change. Nails and fingers cutting.     Action Taken: Message routed to:  [facility]): dayanara anton

## 2018-10-08 NOTE — TELEPHONE ENCOUNTER
Nurse needs to know size and how often to flush/change catheter and then will need a call back. Renetta Eduardo LPN 10/8/2018 4:15 PM

## 2018-10-09 ENCOUNTER — TELEPHONE (OUTPATIENT)
Dept: INTERNAL MEDICINE | Facility: CLINIC | Age: 58
End: 2018-10-09

## 2018-10-09 DIAGNOSIS — G82.50 QUADRIPLEGIA (H): ICD-10-CM

## 2018-10-09 NOTE — TELEPHONE ENCOUNTER
[facility] Call Center    Phone Message    May a detailed message be left on voicemail: yes  Order(s): Home Care Orders: Other: PHN needs specifc orders on 1. How often to flush urinary catherter, how much fluid to use, 2. How much  Sterile water is needed to inflate to  urinary catheter balloon?  Reviewed Soon-Mi's note.  PHN needs more info and was unable to retrieve full message from phone left this AM. Stated EPIC order is fine as they can review that also at [facility].  Reason for Call:    Action Taken: Message routed to:  [facility]

## 2018-10-09 NOTE — TELEPHONE ENCOUNTER
Left a detailed message to home care nurse regarding the below.    16 Moldovan catheter with 8 ml balloon  Changing the catheter & irrigation with 60 ml NS syringe x 3-4 biweekly.

## 2018-10-09 NOTE — TELEPHONE ENCOUNTER
It is 16 Tuvaluan gastelum catheter, needs to be changed every other week.  Left a detailed message to home care nurse.

## 2018-10-09 NOTE — ADDENDUM NOTE
Encounter addended by: Gomez Bravo OT on: 10/9/2018  8:41 AM<BR>     Actions taken: Flowsheet accepted

## 2018-10-10 ENCOUNTER — HOSPITAL ENCOUNTER (OUTPATIENT)
Dept: OCCUPATIONAL THERAPY | Facility: CLINIC | Age: 58
Setting detail: THERAPIES SERIES
End: 2018-10-10
Attending: PHYSICAL MEDICINE & REHABILITATION
Payer: COMMERCIAL

## 2018-10-10 DIAGNOSIS — G82.50 QUADRIPLEGIA (H): ICD-10-CM

## 2018-10-10 PROCEDURE — 40000125 ZZHC STATISTIC OT OUTPT VISIT: Performed by: OCCUPATIONAL THERAPIST

## 2018-10-10 PROCEDURE — 97032 APPL MODALITY 1+ESTIM EA 15: CPT | Mod: GO | Performed by: OCCUPATIONAL THERAPIST

## 2018-10-10 RX ORDER — CHOLECALCIFEROL (VITAMIN D3) 25 MCG
TABLET ORAL
Qty: 90 TABLET | Refills: 0 | OUTPATIENT
Start: 2018-10-10

## 2018-10-11 RX ORDER — CHOLECALCIFEROL (VITAMIN D3) 25 MCG
TABLET ORAL
Qty: 90 TABLET | Refills: 0 | OUTPATIENT
Start: 2018-10-11

## 2018-10-12 ENCOUNTER — HOSPITAL ENCOUNTER (OUTPATIENT)
Dept: OCCUPATIONAL THERAPY | Facility: CLINIC | Age: 58
Setting detail: THERAPIES SERIES
End: 2018-10-12
Attending: PHYSICAL MEDICINE & REHABILITATION
Payer: COMMERCIAL

## 2018-10-12 PROCEDURE — 40000125 ZZHC STATISTIC OT OUTPT VISIT: Performed by: OCCUPATIONAL THERAPIST

## 2018-10-12 PROCEDURE — 97032 APPL MODALITY 1+ESTIM EA 15: CPT | Mod: GO | Performed by: OCCUPATIONAL THERAPIST

## 2018-10-15 DIAGNOSIS — R25.2 SPASM: ICD-10-CM

## 2018-10-15 DIAGNOSIS — R10.30 GROIN PAIN, UNSPECIFIED LATERALITY: ICD-10-CM

## 2018-10-15 DIAGNOSIS — G82.50 QUADRIPLEGIA (H): ICD-10-CM

## 2018-10-16 ENCOUNTER — HOSPITAL ENCOUNTER (OUTPATIENT)
Dept: OCCUPATIONAL THERAPY | Facility: CLINIC | Age: 58
Setting detail: THERAPIES SERIES
End: 2018-10-16
Attending: PHYSICAL MEDICINE & REHABILITATION
Payer: COMMERCIAL

## 2018-10-16 DIAGNOSIS — G82.50 QUADRIPLEGIA (H): ICD-10-CM

## 2018-10-16 PROCEDURE — 40000125 ZZHC STATISTIC OT OUTPT VISIT: Performed by: OCCUPATIONAL THERAPIST

## 2018-10-16 PROCEDURE — 97032 APPL MODALITY 1+ESTIM EA 15: CPT | Mod: GO | Performed by: OCCUPATIONAL THERAPIST

## 2018-10-16 PROCEDURE — 97535 SELF CARE MNGMENT TRAINING: CPT | Mod: GO | Performed by: OCCUPATIONAL THERAPIST

## 2018-10-18 RX ORDER — CHOLECALCIFEROL (VITAMIN D3) 25 MCG
TABLET ORAL
Qty: 90 TABLET | Refills: 0 | OUTPATIENT
Start: 2018-10-18

## 2018-10-18 NOTE — TELEPHONE ENCOUNTER
baclofen (LIORESAL) 20 MG tablet      Last Written Prescription Date:  9/28/18  Last Fill Quantity: 540,   # refills: 11  Last Office Visit : 9/28/2018  [facility]    Future Office visit:  Not scheduled    Routing refill request to provider for review/approval because:  Drug not on the FM, P or [facility] refill protocol or controlled substance----note from pharmacy.......INSURANCE REJECTING CLAIM, STATING THEY ONLY COVER 4 TABS PER DAY. NO OPTION FOR PA, BUT SAYS IT REQUIRES 'S OVERRIDE

## 2018-10-19 DIAGNOSIS — G82.50 QUADRIPLEGIA (H): ICD-10-CM

## 2018-10-19 RX ORDER — CHOLECALCIFEROL (VITAMIN D3) 25 MCG
TABLET ORAL
Qty: 90 TABLET | Refills: 0 | OUTPATIENT
Start: 2018-10-19

## 2018-10-31 ENCOUNTER — HOSPITAL ENCOUNTER (OUTPATIENT)
Dept: OCCUPATIONAL THERAPY | Facility: CLINIC | Age: 58
Setting detail: THERAPIES SERIES
End: 2018-10-31
Attending: PHYSICAL MEDICINE & REHABILITATION
Payer: COMMERCIAL

## 2018-10-31 PROCEDURE — 97110 THERAPEUTIC EXERCISES: CPT | Mod: GO | Performed by: OCCUPATIONAL THERAPIST

## 2018-10-31 PROCEDURE — 97535 SELF CARE MNGMENT TRAINING: CPT | Mod: GO | Performed by: OCCUPATIONAL THERAPIST

## 2018-10-31 PROCEDURE — 97032 APPL MODALITY 1+ESTIM EA 15: CPT | Mod: GO | Performed by: OCCUPATIONAL THERAPIST

## 2018-10-31 PROCEDURE — 40000125 ZZHC STATISTIC OT OUTPT VISIT: Performed by: OCCUPATIONAL THERAPIST

## 2018-11-02 ENCOUNTER — HOSPITAL ENCOUNTER (OUTPATIENT)
Dept: OCCUPATIONAL THERAPY | Facility: CLINIC | Age: 58
Setting detail: THERAPIES SERIES
End: 2018-11-02
Attending: PHYSICAL MEDICINE & REHABILITATION
Payer: COMMERCIAL

## 2018-11-02 DIAGNOSIS — G82.50 QUADRIPLEGIA (H): ICD-10-CM

## 2018-11-02 PROCEDURE — 40000125 ZZHC STATISTIC OT OUTPT VISIT: Performed by: OCCUPATIONAL THERAPIST

## 2018-11-02 PROCEDURE — 97110 THERAPEUTIC EXERCISES: CPT | Mod: GO | Performed by: OCCUPATIONAL THERAPIST

## 2018-11-02 PROCEDURE — 97032 APPL MODALITY 1+ESTIM EA 15: CPT | Mod: GO | Performed by: OCCUPATIONAL THERAPIST

## 2018-11-05 RX ORDER — CHOLECALCIFEROL (VITAMIN D3) 25 MCG
TABLET ORAL
Qty: 90 TABLET | Refills: 0 | OUTPATIENT
Start: 2018-11-05

## 2018-11-15 ENCOUNTER — HOSPITAL ENCOUNTER (OUTPATIENT)
Dept: OCCUPATIONAL THERAPY | Facility: CLINIC | Age: 58
Setting detail: THERAPIES SERIES
End: 2018-11-15
Attending: PHYSICAL MEDICINE & REHABILITATION
Payer: COMMERCIAL

## 2018-11-15 PROCEDURE — 97112 NEUROMUSCULAR REEDUCATION: CPT | Mod: GO

## 2018-11-15 PROCEDURE — 40000125 ZZHC STATISTIC OT OUTPT VISIT

## 2018-11-16 NOTE — PROGRESS NOTES
11/15/18 0900   Signing Clinician's Name / Credentials   Signing clinician's name / credentials Gerry Pulliam OTR/DORA   Session Number   Session Number 12/20;  OT Progress Note   Authorization status As of 10/1/18 [facility]-Requested auth for 15 visits 10/1-11/20/18 and 15 visits auth'd 10/1-11/20.   Progress/Recertification   Progress Note Due 11/20/18   OT Goal 1   Goal Description Pt will demonstrate use of his Jaeco mobile arm support with adjustments to settings or additional elevating proximal arm in order to feed himself at least 50% of meal with set up with AE with R UE.   Target Date 11/20/18   OT Goal 2   Goal Description Pt will be able to independently operate his power w/c with adjustments to joystick/controls/assistive technology changes as needed for safe functional mobility.   Target Date 11/20/18   OT Goal 3   Goal Description Pt will demonstrate improved R UE strength to report at least one ADL task ( such as operatnig power w/c or other) that he can do wtih his R UE with ind R HEP with assist of his PCA for strengthening.   Target Date 11/20/18   Subjective Report   Subjective Report Today is alright, let's workout!   Objective Measure 1   Objective Measure From: 10/31/18 R UE AROM/strength   Details Scapular elevation: 4/5 ; shoulder abd: 50 degrees , flex 20, elbow flex 100(anti gravity) 3-/5; elbow ext full (gravity reduced) 2/5;; wrist flex/ext: 1   Objective Measure 2   Objective Measure From: 10/31/18  L UE AROM/strength   Details Scapular elevation: 3/5; shoulder abd 20 degrees 1+/5 , elbow flex 10 degrees mid range (gravity eliminated) 1+/5  and elbow ext 30 degrees mid to end range gravity eliminated) 1+/5   Electrical Stimulation   Minutes 60 Minutes   Skilled Intervention FES cycle set up/removal, and monitoring of setting intensities and parameters for purposes of improved R muscle function and edema management. FES cycling used for closed chain, proprioceptive and sensory and  "motor input for neurorehabiliation of UE/scapulae.    Patient Response 2.86 miles, 34:00 min,  11.6 uC   Treatment Detail FES parameters: .50 RPM,   resistance .50 Nm : Scapular stabilizers: 250 pulse duration, 40 pps, 2\" x 3\" electrodes, intensity 63 (increased 11/15) ; Shoulder (ant deltoid-changed from subluxation placement on ARU): 250 pulse duration, 40 ps, 2' x 2\" electrodes, intensity: 32 mA intensity ; Bicep: 250 pulse duration, 40 ps, 2\" x 2\" electrodes, intensity: 34; Tricep: 250 pulse duration, 40 ps, 2\" x 2\" electrodes, intensity: 51 (increased 11/15); Wrist/finger extensors: 150 pulse duration, 40 pps, 2\" x 2\" electrodes; intensity: 42; Wrist/finger flexors: 150 pulse duration, 40 pps, 2\" x 2\" electrodes, intensity: 44. Pt tolerated well with intensities set to patient tolerance and tonic muscle contraction level; with appearance of skin after electrode removal appearing: none to mild redness. Pt seated in W/c, bilateral hand wraps used with forearm troughs.   Progress goals progressing   Education   Learner Patient   Readiness Acceptance;Eager   Method Explanation;Demonstration   Response Verbalizes understanding;Demonstrates understanding;Needs reinforcement   Plan   Updates to plan of care OT Progress Note Summary: PT has been seen for 12 total visits since OT [facility] on 8/28/18 with dx of quadraplegia C4-5, AIS A where he felt he had lost some strength in his R UE after some aggressive stretching  of his R UE. He has been seen in OT [facility] emphasis on Functional electrical stimulation arm cycling, training in HEP training for UE AAROM, training in use of Jaeco mobile arm support for gravity reduced exercise and feeding/R UE use. He is demonstrating improved R UE strength and ROM as noted above. He notes improvement in his ability to operate his power w/c now, place his R UE onto an doff of the goal post joystick, turn the power on his power w/c and to operate the power w/c for longer periods such as to " take longer walks with his dog outdoors. He works as a  and feels he has improved endurance overall with his work as well. He is tolerating longer duration on te FES cycle, and as noted above today, up to 34 min of cycling. He remains motivated to optimize his R UE function, works with his PCA support and onhis own withhis mobile arm support to exer his UE's at home for  PROM and AAROM to strengthen.He will benefit from ongoing outpatient OT after auth ends on 11/20 at frequency of 2x/week for 1  week and then taper to 1x/week for .5 weeks 11/20-12/31/18 to address FES cycling, progression of HEP, and further address self-feeding andother UE ADL with mobile arm support.-Gomez Walker, OTR/L, MSCS   Plan for next session F/u on how MAS worked for pt at home with added Dycem under mount; f/u on how large spork with splint material worked for pt; FES cycle for upper body.; define HEP with mobile arm support and  progress AAROM with  PCA assist   Total Session Time   Timed Code Treatment Minutes 60   Total Treatment Time (sum of timed and untimed services) 60

## 2018-11-16 NOTE — PROGRESS NOTES
11/15/18 0900   Signing Clinician's Name / Credentials   Signing clinician's name / credentials Gerry Pulliam OTR/DORA   Session Number   Session Number 12/20;  OT Progress Note   Authorization status As of 10/1/18 [facility]-Requested auth for 15 visits 10/1-11/20/18 and 15 visits auth'd 10/1-11/20.   Progress/Recertification   Progress Note Due 11/20/18   OT Goal 1   Goal Description Pt will demonstrate use of his [employer] mobile arm support with adjustments to settings or additional elevating proximal arm in order to feed himself at least 50% of meal with set up with AE with R UE.   Target Date 11/20/18   OT Goal 2   Goal Description Pt will be able to independently operate his power w/c with adjustments to joystick/controls/assistive technology changes as needed for safe functional mobility.   Target Date 11/20/18   OT Goal 3   Goal Description Pt will demonstrate improved R UE strength to report at least one ADL task ( such as operatnig power w/c or other) that he can do wtih his R UE with ind R HEP with assist of his PCA for strengthening.   Target Date 11/20/18   Subjective Report   Subjective Report Today is alright, let's workout!   Objective Measure 1   Objective Measure From: 10/31/18 R UE AROM/strength   Details Scapular elevation: 4/5 ; shoulder abd: 50 degrees , flex 20, elbow flex 100(anti gravity) 3-/5; elbow ext full (gravity reduced) 2/5;; wrist flex/ext: 1   Objective Measure 2   Objective Measure From: 10/31/18  L UE AROM/strength   Details Scapular elevation: 3/5; shoulder abd 20 degrees 1+/5 , elbow flex 10 degrees mid range (gravity eliminated) 1+/5  and elbow ext 30 degrees mid to end range gravity eliminated) 1+/5   Electrical Stimulation   Minutes 60 Minutes   Skilled Intervention FES cycle set up/removal, and monitoring of setting intensities and parameters for purposes of improved R muscle function and edema management. FES cycling used for closed chain, proprioceptive and sensory and  "motor input for neurorehabiliation of UE/scapulae.    Patient Response 2.86 miles, 34:00 min,  11.6 uC   Treatment Detail FES parameters: .50 RPM,   resistance .50 Nm : Scapular stabilizers: 250 pulse duration, 40 pps, 2\" x 3\" electrodes, intensity 63 (increased 11/15) ; Shoulder (ant deltoid-changed from subluxation placement on ARU): 250 pulse duration, 40 ps, 2' x 2\" electrodes, intensity: 32 mA intensity ; Bicep: 250 pulse duration, 40 ps, 2\" x 2\" electrodes, intensity: 34; Tricep: 250 pulse duration, 40 ps, 2\" x 2\" electrodes, intensity: 51 (increased 11/15); Wrist/finger extensors: 150 pulse duration, 40 pps, 2\" x 2\" electrodes; intensity: 42; Wrist/finger flexors: 150 pulse duration, 40 pps, 2\" x 2\" electrodes, intensity: 44. Pt tolerated well with intensities set to patient tolerance and tonic muscle contraction level; with appearance of skin after electrode removal appearing: none to mild redness. Pt seated in W/c, bilateral hand wraps used with forearm troughs.   Progress goals progressing   Education   Learner Patient   Readiness Acceptance;Eager   Method Explanation;Demonstration   Response Verbalizes understanding;Demonstrates understanding;Needs reinforcement   Plan   Updates to plan of care OT Progress Note Summary: PT has been seen for 12 total visits since OT [facility] on 8/28/18 with dx of quadraplegia C4-5, AIS A where he felt he had lost some strength in his R UE after some aggressive stretching  of his R UE. He has been seen in OT [facility] emphasis on Functional electrical stimulation arm cycling, training in HEP training for UE AAROM, training in use of [employer] mobile arm support for gravity reduced exercise and feeding/R UE use. He is demonstrating improved R UE strength and ROM as noted above. He notes improvement in his ability to operate his power w/c now, place his R UE onto an doff of the goal post joystick, turn the power on his power w/c and to operate the power w/c for longer periods such as to " take longer walks with his dog outdoors. He works as a  and feels he has improved endurance overall with his work as well. He is tolerating longer duration on te FES cycle, and as noted above today, up to 34 min of cycling. He remains motivated to optimize his R UE function, works with his PCA support and onhis own withhis mobile arm support to exer his UE's at home for  PROM and AAROM to strengthen.He will benefit from ongoing outpatient OT after auth ends on 11/20 at frequency of 2x/week for 2 weeks and then taper to 1x/week for 3 weeks 11/20-12/31/18 to address FES cycling, progression of HEP, and further address self-feeding andother UE ADL with mobile arm support.-Gomez Walker, OTR/L, MSCS   Plan for next session F/u on how MAS worked for pt at home with added Dycem under mount; f/u on how large spork with splint material worked for pt; FES cycle for upper body.; define HEP with mobile arm support and  progress AAROM with  PCA assist   Total Session Time   Timed Code Treatment Minutes 60   Total Treatment Time (sum of timed and untimed services) 60

## 2018-11-19 ENCOUNTER — TELEPHONE (OUTPATIENT)
Dept: INTERNAL MEDICINE | Facility: CLINIC | Age: 58
End: 2018-11-19

## 2018-11-19 NOTE — TELEPHONE ENCOUNTER
Spoke to Dmoinga on the phone to relay that the patient will need a face to face with Dr. Long for evaluation of the hospital bed. Renetta Eduardo LPN 11/19/2018 8:56 AM

## 2018-11-19 NOTE — TELEPHONE ENCOUNTER
[facility] Call Center    Phone Message    May a detailed message be left on voicemail: yes    Reason for Call: Order(s): Other:   Reason for requested: Pt is in need of a new hospital bed.  Call Dominga back if there are any questions  Date needed: 11/19/2018   Provider name: Dr Abebe      Action Taken: Message routed to:  [facility] KHAN

## 2018-11-20 ENCOUNTER — HOSPITAL ENCOUNTER (OUTPATIENT)
Dept: OCCUPATIONAL THERAPY | Facility: CLINIC | Age: 58
Setting detail: THERAPIES SERIES
End: 2018-11-20
Attending: PHYSICAL MEDICINE & REHABILITATION
Payer: COMMERCIAL

## 2018-11-20 PROCEDURE — 40000125 ZZHC STATISTIC OT OUTPT VISIT: Performed by: OCCUPATIONAL THERAPIST

## 2018-11-20 PROCEDURE — 97032 APPL MODALITY 1+ESTIM EA 15: CPT | Mod: GO | Performed by: OCCUPATIONAL THERAPIST

## 2018-11-30 ENCOUNTER — HOSPITAL ENCOUNTER (OUTPATIENT)
Dept: OCCUPATIONAL THERAPY | Facility: CLINIC | Age: 58
Setting detail: THERAPIES SERIES
End: 2018-11-30
Attending: PHYSICAL MEDICINE & REHABILITATION
Payer: COMMERCIAL

## 2018-11-30 PROCEDURE — 40000125 ZZHC STATISTIC OT OUTPT VISIT: Performed by: OCCUPATIONAL THERAPIST

## 2018-11-30 PROCEDURE — 97032 APPL MODALITY 1+ESTIM EA 15: CPT | Mod: GO | Performed by: OCCUPATIONAL THERAPIST

## 2018-12-05 ENCOUNTER — DOCUMENTATION ONLY (OUTPATIENT)
Dept: CARE COORDINATION | Facility: CLINIC | Age: 58
End: 2018-12-05

## 2018-12-06 ENCOUNTER — MEDICAL CORRESPONDENCE (OUTPATIENT)
Dept: HEALTH INFORMATION MANAGEMENT | Facility: CLINIC | Age: 58
End: 2018-12-06

## 2018-12-06 NOTE — ADDENDUM NOTE
Encounter addended by: Gomez Bravo OT on: 12/5/2018  7:00 PM<BR>     Actions taken: Flowsheet accepted

## 2018-12-10 NOTE — PROGRESS NOTES
[facility] Call Center    Phone Message    May a detailed message be left on voicemail: yes    Reason for Call: Other: Marva from [facility], Ph # [phone] has been waiting for a call back since the 5th for her Orders - please see her message she sent you and call her with Orders - thanks     Action Taken: Message routed to:  [facility]

## 2019-01-09 ENCOUNTER — TELEPHONE (OUTPATIENT)
Dept: OCCUPATIONAL THERAPY | Facility: CLINIC | Age: 59
End: 2019-01-09

## 2019-01-09 NOTE — TELEPHONE ENCOUNTER
Called pt and spoke to wife as pt was sleeping and provided reminder of missed OT appton 1/8/19 and next OT appt on 1/11/19 at 3:00 pm and she will remind him. She reports he likely forgot and pt has been off work with government shut-down.  Gomez Bravo OTR/L, MSCS  Occupational Therapy  [facility]  [address], [address]  [place] [address]  Voice mail:[phone]  Fax: [phone]

## 2019-01-11 ENCOUNTER — HOSPITAL ENCOUNTER (OUTPATIENT)
Dept: OCCUPATIONAL THERAPY | Facility: CLINIC | Age: 59
Setting detail: THERAPIES SERIES
End: 2019-01-11
Attending: PHYSICAL MEDICINE & REHABILITATION
Payer: COMMERCIAL

## 2019-01-11 PROCEDURE — 97032 APPL MODALITY 1+ESTIM EA 15: CPT | Mod: GO | Performed by: OCCUPATIONAL THERAPIST

## 2019-01-15 ENCOUNTER — HOSPITAL ENCOUNTER (OUTPATIENT)
Dept: OCCUPATIONAL THERAPY | Facility: CLINIC | Age: 59
Setting detail: THERAPIES SERIES
End: 2019-01-15
Attending: PHYSICAL MEDICINE & REHABILITATION
Payer: COMMERCIAL

## 2019-01-15 PROCEDURE — 97032 APPL MODALITY 1+ESTIM EA 15: CPT | Mod: GO | Performed by: OCCUPATIONAL THERAPIST

## 2019-01-16 RX ORDER — BACLOFEN 20 MG/1
TABLET ORAL
Qty: 540 TABLET | Refills: 3 | Status: SHIPPED | OUTPATIENT
Start: 2019-01-16 | End: 2020-01-30

## 2019-01-18 ENCOUNTER — HOSPITAL ENCOUNTER (OUTPATIENT)
Dept: OCCUPATIONAL THERAPY | Facility: CLINIC | Age: 59
Setting detail: THERAPIES SERIES
End: 2019-01-18
Attending: PHYSICAL MEDICINE & REHABILITATION
Payer: COMMERCIAL

## 2019-01-18 PROCEDURE — 97032 APPL MODALITY 1+ESTIM EA 15: CPT | Mod: GO | Performed by: OCCUPATIONAL THERAPIST

## 2019-01-18 NOTE — ADDENDUM NOTE
Encounter addended by: Gomez Bravo OT on: 1/18/2019 9:42 AM   Actions taken: Flowsheet data copied forward, Flowsheet accepted

## 2019-01-22 ENCOUNTER — HOSPITAL ENCOUNTER (OUTPATIENT)
Dept: OCCUPATIONAL THERAPY | Facility: CLINIC | Age: 59
Setting detail: THERAPIES SERIES
End: 2019-01-22
Attending: PHYSICAL MEDICINE & REHABILITATION
Payer: COMMERCIAL

## 2019-01-22 PROCEDURE — 97032 APPL MODALITY 1+ESTIM EA 15: CPT | Mod: GO | Performed by: OCCUPATIONAL THERAPIST

## 2019-01-25 ENCOUNTER — HOSPITAL ENCOUNTER (OUTPATIENT)
Dept: OCCUPATIONAL THERAPY | Facility: CLINIC | Age: 59
Setting detail: THERAPIES SERIES
End: 2019-01-25
Attending: PHYSICAL MEDICINE & REHABILITATION
Payer: COMMERCIAL

## 2019-01-25 PROCEDURE — 97032 APPL MODALITY 1+ESTIM EA 15: CPT | Mod: GO | Performed by: OCCUPATIONAL THERAPIST

## 2019-02-01 ENCOUNTER — TELEPHONE (OUTPATIENT)
Dept: FAMILY MEDICINE | Facility: CLINIC | Age: 59
End: 2019-02-01

## 2019-02-01 DIAGNOSIS — R21 RASH AND OTHER NONSPECIFIC SKIN ERUPTION: ICD-10-CM

## 2019-02-01 DIAGNOSIS — R10.30 GROIN PAIN, UNSPECIFIED LATERALITY: ICD-10-CM

## 2019-02-01 DIAGNOSIS — J20.9 BRONCHITIS WITH BRONCHOSPASM: ICD-10-CM

## 2019-02-01 DIAGNOSIS — G47.00 INSOMNIA, UNSPECIFIED TYPE: ICD-10-CM

## 2019-02-01 DIAGNOSIS — G47.00 INSOMNIA: ICD-10-CM

## 2019-02-01 DIAGNOSIS — R06.2 WHEEZING: ICD-10-CM

## 2019-02-01 DIAGNOSIS — G82.50 QUADRIPLEGIA (H): ICD-10-CM

## 2019-02-01 DIAGNOSIS — R25.2 SPASM: ICD-10-CM

## 2019-02-01 NOTE — TELEPHONE ENCOUNTER
[facility] Call Center    Phone Message    May a detailed message be left on voicemail: yes    Reason for Call: Order(s): Home Care Orders: Skilled Nursinx a month for 1 month,   3x a month for 1 month, 1x a month for 1 month, and 3 as needed visits catheter management orders change every 2 week 16 FR 8CC Balloon irrigate with 60cc normal saline daily and as needed        Action Taken: Message routed to:  [facility] pcc     Verbal order given and documented. Renetta Eduadro LPN 2019 1:34 PM

## 2019-02-01 NOTE — TELEPHONE ENCOUNTER
Verbal orders given to Marva from [facility], per Dr. Long, for 1x a month for 1 month,   3x a month for 1 month, 1x a month for 1 month, and 3 as needed visits catheter management orders change every 2 week 16 FR 8CC Balloon irrigate with 60cc normal saline daily and as needed. Renetta Eduardo LPN 2/1/2019 1:35 PM

## 2019-02-04 ENCOUNTER — MEDICAL CORRESPONDENCE (OUTPATIENT)
Dept: HEALTH INFORMATION MANAGEMENT | Facility: CLINIC | Age: 59
End: 2019-02-04

## 2019-02-04 NOTE — TELEPHONE ENCOUNTER
site reviewed 2/04/19. Last zolpidem and diazepam refills received 1/12/19. Last office visit 9/28/18.     Will send to PCP for review.    Ignacia Vicente RN

## 2019-02-05 ENCOUNTER — HOSPITAL ENCOUNTER (OUTPATIENT)
Dept: OCCUPATIONAL THERAPY | Facility: CLINIC | Age: 59
Setting detail: THERAPIES SERIES
End: 2019-02-05
Attending: PHYSICAL MEDICINE & REHABILITATION
Payer: COMMERCIAL

## 2019-02-05 PROCEDURE — 97032 APPL MODALITY 1+ESTIM EA 15: CPT | Mod: GO | Performed by: OCCUPATIONAL THERAPIST

## 2019-02-05 RX ORDER — DIAZEPAM 10 MG
TABLET ORAL
Qty: 90 TABLET | Refills: 1 | Status: SHIPPED | OUTPATIENT
Start: 2019-02-11 | End: 2019-07-24

## 2019-02-05 RX ORDER — ZOLPIDEM TARTRATE 10 MG/1
TABLET ORAL
Qty: 90 TABLET | Refills: 1 | Status: SHIPPED | OUTPATIENT
Start: 2019-02-11 | End: 2019-07-24

## 2019-02-06 ENCOUNTER — HOSPITAL ENCOUNTER (OUTPATIENT)
Dept: OCCUPATIONAL THERAPY | Facility: CLINIC | Age: 59
Setting detail: THERAPIES SERIES
End: 2019-02-06
Attending: PHYSICAL MEDICINE & REHABILITATION
Payer: COMMERCIAL

## 2019-02-06 DIAGNOSIS — G82.50 QUADRIPLEGIA (H): ICD-10-CM

## 2019-02-06 DIAGNOSIS — J20.9 BRONCHITIS WITH BRONCHOSPASM: ICD-10-CM

## 2019-02-06 DIAGNOSIS — G47.00 INSOMNIA: ICD-10-CM

## 2019-02-06 DIAGNOSIS — R21 RASH AND OTHER NONSPECIFIC SKIN ERUPTION: ICD-10-CM

## 2019-02-06 DIAGNOSIS — R06.2 WHEEZING: ICD-10-CM

## 2019-02-06 DIAGNOSIS — R25.2 SPASM: ICD-10-CM

## 2019-02-06 DIAGNOSIS — R10.30 GROIN PAIN, UNSPECIFIED LATERALITY: ICD-10-CM

## 2019-02-06 DIAGNOSIS — G47.00 INSOMNIA, UNSPECIFIED TYPE: ICD-10-CM

## 2019-02-06 PROCEDURE — 97032 APPL MODALITY 1+ESTIM EA 15: CPT | Mod: GO | Performed by: OCCUPATIONAL THERAPIST

## 2019-02-07 ENCOUNTER — TELEPHONE (OUTPATIENT)
Dept: INTERNAL MEDICINE | Facility: CLINIC | Age: 59
End: 2019-02-07

## 2019-02-07 DIAGNOSIS — S14.105A: ICD-10-CM

## 2019-02-07 DIAGNOSIS — S14.104A: Primary | ICD-10-CM

## 2019-02-07 DIAGNOSIS — S14.106A: ICD-10-CM

## 2019-02-07 RX ORDER — ZOLPIDEM TARTRATE 10 MG/1
TABLET ORAL
Qty: 90 TABLET | Refills: 1
Start: 2019-02-07

## 2019-02-07 RX ORDER — DIAZEPAM 10 MG
TABLET ORAL
Qty: 90 TABLET | Refills: 1
Start: 2019-02-07

## 2019-02-07 NOTE — TELEPHONE ENCOUNTER
DME orders were faxed to [facility] per pt's reqeust.    custom mouth stick holders  spork extension  mouth sticks  other ADL equipment    Fax # [phone].

## 2019-02-12 ENCOUNTER — MEDICAL CORRESPONDENCE (OUTPATIENT)
Dept: HEALTH INFORMATION MANAGEMENT | Facility: CLINIC | Age: 59
End: 2019-02-12

## 2019-02-19 ENCOUNTER — HOSPITAL ENCOUNTER (OUTPATIENT)
Dept: OCCUPATIONAL THERAPY | Facility: CLINIC | Age: 59
Setting detail: THERAPIES SERIES
End: 2019-02-19
Attending: PHYSICAL MEDICINE & REHABILITATION
Payer: COMMERCIAL

## 2019-02-19 PROCEDURE — 97032 APPL MODALITY 1+ESTIM EA 15: CPT | Mod: GO | Performed by: OCCUPATIONAL THERAPIST

## 2019-02-26 ENCOUNTER — HOSPITAL ENCOUNTER (OUTPATIENT)
Dept: OCCUPATIONAL THERAPY | Facility: CLINIC | Age: 59
Setting detail: THERAPIES SERIES
End: 2019-02-26
Attending: PHYSICAL MEDICINE & REHABILITATION
Payer: COMMERCIAL

## 2019-02-26 PROCEDURE — 97032 APPL MODALITY 1+ESTIM EA 15: CPT | Mod: GO | Performed by: OCCUPATIONAL THERAPIST

## 2019-02-26 PROCEDURE — 97110 THERAPEUTIC EXERCISES: CPT | Mod: GO | Performed by: OCCUPATIONAL THERAPIST

## 2019-02-28 NOTE — PROGRESS NOTES
02/26/19 0800   Signing Clinician's Name / Credentials   Signing clinician's name / credentials SARA Mayo/DORA, MSCS   Session Number   Session Number 22; Progress Note-Outpatient Occupational Therapy   Additional Session Number 9/14 from progress 12/31/18   Authorization status [facility]   Progress/Recertification   Progress Note Due 02/26/19   Progress Note Completed 02/26/19   OT Goal 1   Goal Description Pt will demonstrate use of his Jaeco mobile arm support with adjustments to settings or additional elevating proximal arm in order to feed himself at least 50% of meal with set up with AE with R UE.   Target Date 02/26/19   Date Met (not met as needs spork adjusted at [place])   OT Goal 2   Goal Description Pt will be able to independently operate his power w/c with adjustments to joystick/controls/assistive technology changes as needed for safe functional mobility.   Target Date 02/26/19   Date Met (some help up ramps, 100% Ind turns on w/c; 35% turn off)   OT Goal 3   Goal Description Pt will demonstrate improved R UE strength to report at least one ADL task ( such as operatnig power w/c or other) that he can do wtih his R UE with ind R HEP with assist of his PCA for strengthening.   Target Date 02/26/19   Subjective Report   Subjective Report Pt  notes he may need to take some time off due to heavy work schedule. I also want to f/u after I get some things taken care of at [place] so we can work with that equipment.   Objective Measure 1   Objective Measure R UE AROM/strength   Details Scapular elevation: 4/5 ; shoulder abd: 50 degrees , flex 20, 2-/5, elbow flex 100(anti gravity) 3-/5; elbow ext full (gravity reduced) 2/5;; forearm pronation 3-; supination 2+   Objective Measure 2   Objective Measure L UE AROM/strength   Details Scapular elevation: 3/5; shoulder abd 20 degrees 1+/5 , elbow flex 10 degrees mid range (gravity eliminated) 1+/5  and elbow ext 2-/5   Electrical Stimulation   E-Stim, Attended  "Minutes (36292) 09   Progress goals progressing   Skilled Intervention FES cycle set up/removal, and monitoring and changing/adding of setting intensities and parameters for purposes of improved UE/upper body muscle function and edema management. Dependent set up of LE's off/on footplates at cycle, positioning at FES cycle-removal R universal cuff wrist brace; removal R goal post joystick;  remove/replace chest strap on w/c to apply extensor spinae electrodes, readjust clothing pre and post electrodes.   Patient Response Pt working at 24-26 RPM's, 2.65 miles, and 31min   Treatment Detail FES parameters: 28 RPM,   resistance .50 Nm : Scapular stabilizers: 200 pulse duration, 40 pps, 2\" x 3\" electrodes ; Shoulder (ant deltoid): 200 pulse duration, 40 pps, 2' x 2\" electrodes; Bicep: 200 pulse duration, 40 pps, 2\" x 2\" electrodes; Tricep: 200 pulse duration, 40 pps, 2\" x 2\" electrodes; Added Erector Spinae L and R: 250 pulse duration, 40 pulse rate, 2-2\" x 3.5\" electrodes.  Pt tolerated well with intensities set to patient tolerance and tonic muscle contraction level; with appearance of skin after electrode removal appearing: none to mild redness. Pt seated in W/c, bilateral hand wraps used with forearm troughs.   Therapeutic Procedure/Exercise   Therapeutic Procedure: strength, endurance, ROM, flexibillity minutes (64418) 10   Skilled Intervention Reassessed UE strength-see above   Progress goals progressing   Education   Learner Patient   Readiness Acceptance;Eager   Method Explanation;Demonstration   Response Verbalizes understanding;Demonstrates understanding;Needs reinforcement   Plan   Updates to plan of care Progress Summary: Able to now shut his power w/c off 25-30% of the time and can turn on 100% of the time. Operates into/out of van and up ramp most of time himself and with ambulation distances with walking his dog which is improvement. Has had an occasion or two where he has needed help up his ramp due to " some weakness in R UE being variable. Today he shuts off and turns on his power w/c with R UE 3/3 times consecutive to demonstrate without assistance. Muscle grade strength is not changed with assessment today, but pt's endurance has improved. FES cycling graphs note improved muscle activation during sessions. Pt is now tolerating 2.83 miles, 33.37 min of cycling. he does rely on full motor support of the cycle. He works on HEP with support of his PCA's, wife and his mobile arm support for AAROM of his R UE mainly. 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 He has been seen for a total of 9 sessions of 14 planned in the progress period since 12/31/18. All goals are progressing. Plan is to see patient to continue FEs cycling, address feeding with Mobile Arm Support once patient gets his spork custom adjusted at [place], to finalize and progress HEP. Plan to see patient for up to 6 more visits in the next 12 weeks.   Comments   Comments 3/14 Appt at [place] for custom AE for feeding and other ADL/assisdtive technology Extend goals to: 5/21/19    Total Session Time   Timed Code Treatment Minutes 75   Total Treatment Time (sum of timed and untimed services) 75

## 2019-03-05 ENCOUNTER — HOSPITAL ENCOUNTER (OUTPATIENT)
Dept: OCCUPATIONAL THERAPY | Facility: CLINIC | Age: 59
Setting detail: THERAPIES SERIES
End: 2019-03-05
Attending: PHYSICAL MEDICINE & REHABILITATION
Payer: COMMERCIAL

## 2019-03-05 PROCEDURE — 97032 APPL MODALITY 1+ESTIM EA 15: CPT | Mod: GO | Performed by: OCCUPATIONAL THERAPIST

## 2019-03-14 ENCOUNTER — TRANSFERRED RECORDS (OUTPATIENT)
Dept: HEALTH INFORMATION MANAGEMENT | Facility: CLINIC | Age: 59
End: 2019-03-14

## 2019-03-14 ENCOUNTER — MEDICAL CORRESPONDENCE (OUTPATIENT)
Dept: HEALTH INFORMATION MANAGEMENT | Facility: CLINIC | Age: 59
End: 2019-03-14

## 2019-03-15 ENCOUNTER — HOSPITAL ENCOUNTER (OUTPATIENT)
Dept: OCCUPATIONAL THERAPY | Facility: CLINIC | Age: 59
Setting detail: THERAPIES SERIES
End: 2019-03-15
Attending: PHYSICAL MEDICINE & REHABILITATION
Payer: COMMERCIAL

## 2019-03-15 PROCEDURE — 97032 APPL MODALITY 1+ESTIM EA 15: CPT | Mod: GO | Performed by: OCCUPATIONAL THERAPIST

## 2019-03-20 ENCOUNTER — HOSPITAL ENCOUNTER (OUTPATIENT)
Dept: OCCUPATIONAL THERAPY | Facility: CLINIC | Age: 59
Setting detail: THERAPIES SERIES
End: 2019-03-20
Attending: PHYSICAL MEDICINE & REHABILITATION
Payer: COMMERCIAL

## 2019-03-20 PROCEDURE — 97140 MANUAL THERAPY 1/> REGIONS: CPT | Mod: GO | Performed by: OCCUPATIONAL THERAPIST

## 2019-03-20 PROCEDURE — 97032 APPL MODALITY 1+ESTIM EA 15: CPT | Mod: GO | Performed by: OCCUPATIONAL THERAPIST

## 2019-03-20 PROCEDURE — 97110 THERAPEUTIC EXERCISES: CPT | Mod: GO | Performed by: OCCUPATIONAL THERAPIST

## 2019-03-26 NOTE — ADDENDUM NOTE
Encounter addended by: Gomez Bravo, OT on: 3/26/2019 1:40 PM   Actions taken: Sign clinical note, Flowsheet accepted, Document created, Document edited

## 2019-03-26 NOTE — PROGRESS NOTES
[facility]      OUTPATIENT OCCUPATIONAL THERAPY  PLAN OF TREATMENT FOR OUTPATIENT REHABILITATION    Patient's Last Name, First Name, M.I.                YOB: 1960  YulianaliamlizbethBart Parsons                        Provider's Name  [facility] Medical Record No.  [id number]                               Onset Date: 8/1/18   Start of Care Date: 8/28/18   Type:     ___PT   _X_OT   ___SLP Medical Diagnosis: quadraplegia  (C4-5 SCI, AIS A)                       OT Diagnosis: Impaired ADL/IADL and functional mobility with power w/c      _________________________________________________________________________________  Plan of Treatment:    Frequency/Duration: 6 more visits in 12 weeks     Goals:    Goal Identifier     Goal Description Pt will demonstrate use of his [employer] mobile arm support with adjustments to settings or additional elevating proximal arm in order to feed himself at least 50% of meal with set up with AE with R UE.   Target Date 02/26/19; extend to 5/21/19   Date Met  (not met as needs spork adjusted at [place])   Progress:     Goal Identifier     Goal Description Pt will be able to independently operate his power w/c with adjustments to joystick/controls/assistive technology changes as needed for safe functional mobility.   Target Date 02/26/19; extend to 5/21/19   Date Met  (some help up ramps, 100% Ind turns on w/c; 35% turn off)   Progress:     Goal Identifier     Goal Description Pt will demonstrate improved R UE strength to report at least one ADL task ( such as operatnig power w/c or other) that he can do wtih his R UE with ind R HEP with assist of his PCA for strengthening.   Target Date 02/26/19; extend to 5/21/19   Date Met      Progress:       Progress Toward Goals:   Progress this reporting period: see 2/26/19 progress note.  Progress Summary: Able to now shut  his power w/c off 25-30% of the time and can turn on 100% of the time. Operates into/out of van and up ramp most of time himself and with ambulation distances with walking his dog which is improvement. Has had an occasion or two where he has needed help up his ramp due to some weakness in R UE being variable. Today he shuts off and turns on his power w/c with R UE 3/3 times consecutive to demonstrate without assistance. Muscle grade strength is not changed with assessment today, but pt's endurance has improved. FES cycling graphs note improved muscle activation during sessions. Pt is now tolerating 2.83 miles, 33.37 min of cycling. he does rely on full motor support of the cycle. He works on HEP with support of his PCA's, wife and his mobile arm support for AAROM of his R UE mainly. 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 He has been seen for a total of 9 sessions of 14 planned in the progress period since 12/31/18. All goals are progressing. Plan is to see patient to continue FEs cycling, address feeding with Mobile Arm Support once patient gets his spork custom adjusted at [place], to finalize and progress HEP. Plan to see patient for up to 6 more visits in the next 12 weeks.    Certification date from 2/27/19 to 5/21/19.    Gomez Bravo, OTR/L, MSCS          I CERTIFY THE NEED FOR THESE SERVICES FURNISHED UNDER        THIS PLAN OF TREATMENT AND WHILE UNDER MY CARE     (Physician co-signature of this document indicates review and certification of the therapy plan).                Referring Provider: Dr. Jaziel Webster

## 2019-03-26 NOTE — PROGRESS NOTES
[facility]          OUTPATIENT OCCUPATIONAL THERAPY  EVALUATION  PLAN OF TREATMENT FOR OUTPATIENT REHABILITATION  (COMPLETE FOR INITIAL CLAIMS ONLY)  Patient's Last Name, First Name, M.I.  YOB: 1960  Bart Corea                        Provider's Name  [facility] Medical Record No.  [id number]                               Onset Date:      8/1/18   Start of Care Date:      8/28/18   Type:     ___PT   _X_OT   ___SLP Medical Diagnosis:   quadraplegia  (C4-5 SCI, AIS A)                             OT Diagnosis:   Impaired ADL/IADL and functional mobility with power w/c    Visits from SOC:  22 (through 2/26/19)   _________________________________________________________________________________  Plan of Treatment/Functional Goals:   Continue outpatient OT 14 visits 1/1/19-2/26/19     Goals:     Goal Description: Pt will demonstrate use of his [employer] mobile arm support with adjustments to settings or additional elevating proximal arm in order to feed himself at least 50% of meal with set up with AE with R UE.  Target Date:  2/26/19  Date Met: ongoing-(not met as needs spork adjusted at [place])-appt in March, 2019     Goal Description: Pt will be able to independently operate his power w/c with adjustments to joystick/controls/assistive technology changes as needed for safe functional mobility.  Target Date:   2/26/19  Date Met: ongoing-(some help up ramps, 100% Ind turns on w/c; 35% turn off)     Goal Description: Pt will demonstrate improved R UE strength to report at least one ADL task ( such as operatnig power w/c or other) that he can do wtih his R UE with ind R HEP with assist of his PCA for strengthening.  Target Date:   2/26/19, ongoing           Gomez Bravo, OTR/L, MSCS          I CERTIFY THE NEED FOR THESE SERVICES FURNISHED UNDER        THIS PLAN OF TREATMENT  AND WHILE UNDER MY CARE     (Physician co-signature of this document indicates review and certification of the therapy plan).                 Dr. Jaziel Webster                Initial Assessment        See Epic Evaluation, past progress notes 11/30 for 12/31/18 update).

## 2019-03-26 NOTE — ADDENDUM NOTE
Encounter addended by: Gomez Bravo, OT on: 3/26/2019 2:40 PM   Actions taken: Sign clinical note, Document created, Document edited

## 2019-04-02 ENCOUNTER — TELEPHONE (OUTPATIENT)
Dept: FAMILY MEDICINE | Facility: CLINIC | Age: 59
End: 2019-04-02

## 2019-04-02 NOTE — TELEPHONE ENCOUNTER
[facility] Call Center    Phone Message    May a detailed message be left on voicemail: yes    Reason for Call: Order(s): Other:   Reason for requested: skilled nursing visit 2 times a month for one month and 3 times a month for one month and 3 as needed visits. Catheter management, ok to change super pubic cath every two weeks, cath is 16 french 8cc syringe, irrigate with 60 ml of normal saline daily ans as needed. Caregiver can irrigate on non nursing days.   Date needed: asap   Provider name: [facility]      Action Taken: Message routed to:  Other: [facility]      Verbal order given and documented. Renetta Eduardo LPN 4/2/2019 10:34 AM

## 2019-04-02 NOTE — TELEPHONE ENCOUNTER
Verbal orders given to [facility] from [facility], per Dr. Long, for  skilled nursing visit 2 times a month for one month and 3 times a month for one month and 3 as needed visits. Catheter management, ok to change super pubic cath every two weeks, cath is 16 french 8cc syringe, irrigate with 60 ml of normal saline daily ans as needed. Caregiver can irrigate on non nursing days. Renetta Eduardo LPN 4/2/2019 10:35 AM

## 2019-04-05 ENCOUNTER — MEDICAL CORRESPONDENCE (OUTPATIENT)
Dept: HEALTH INFORMATION MANAGEMENT | Facility: CLINIC | Age: 59
End: 2019-04-05

## 2019-05-16 ENCOUNTER — TELEPHONE (OUTPATIENT)
Dept: INTERNAL MEDICINE | Facility: CLINIC | Age: 59
End: 2019-05-16

## 2019-05-16 DIAGNOSIS — S14.105A: ICD-10-CM

## 2019-05-16 DIAGNOSIS — S14.104A: Primary | ICD-10-CM

## 2019-05-16 DIAGNOSIS — S14.106A: ICD-10-CM

## 2019-05-16 NOTE — TELEPHONE ENCOUNTER
Pt sent me an email regarding the DME order for mouth stick.    '  Rabia Ruiz from [facility]. She is located at the [facility]. She's the only one over there that knows how to do these. '    DME order placed.

## 2019-05-21 PROBLEM — M24.542 CONTRACTURE OF JOINT OF LEFT HAND: Status: RESOLVED | Noted: 2018-05-17 | Resolved: 2019-05-21

## 2019-05-21 PROBLEM — R29.898 DEFICIENCIES OF LIMBS: Status: RESOLVED | Noted: 2018-05-17 | Resolved: 2019-05-21

## 2019-05-21 PROBLEM — M24.541 CONTRACTURE OF JOINT OF FINGER OF RIGHT HAND: Status: RESOLVED | Noted: 2018-05-17 | Resolved: 2019-05-21

## 2019-05-21 NOTE — PROGRESS NOTES
Discharge Note -Hand Therapy    Initial Evaluation Date:  5/17/18  Current Date: 5/21/2019  Number of Visits: 2    S:   Pt did not follow up for scheduled visits.  O:  Objective information is not available as pt has not returned for therapy.  Last visit or last objective information will serve as final entry.      A: Pt did not return for further treatment.    Status of goals is unknown due to lack of followup of patient.    P:  Discharge from Hand Therapy.  Discharge from OT this date due to lack of follow-up.

## 2019-05-29 ENCOUNTER — TELEPHONE (OUTPATIENT)
Dept: INTERNAL MEDICINE | Facility: CLINIC | Age: 59
End: 2019-05-29

## 2019-05-29 DIAGNOSIS — S14.104A: Primary | ICD-10-CM

## 2019-05-29 DIAGNOSIS — S14.106A: ICD-10-CM

## 2019-05-29 DIAGNOSIS — S14.105A: ICD-10-CM

## 2019-05-29 NOTE — TELEPHONE ENCOUNTER
Pt sent an email to me.    ' Would you please put in a referral/orders so that I can resume electrical stimulation on my arms and shoulders using the bike through [facility]. I was using the OT Gomez Bravo at the following email.    [email or web address] '       is requesting OT referral for electrical stimulation on both arms and shoulders using the bike.   Referral placed.

## 2019-05-31 ENCOUNTER — THERAPY VISIT (OUTPATIENT)
Dept: OCCUPATIONAL THERAPY | Facility: CLINIC | Age: 59
End: 2019-05-31
Payer: COMMERCIAL

## 2019-05-31 ENCOUNTER — TELEPHONE (OUTPATIENT)
Dept: FAMILY MEDICINE | Facility: CLINIC | Age: 59
End: 2019-05-31

## 2019-05-31 DIAGNOSIS — R29.898 WEAKNESS OF BOTH ARMS: ICD-10-CM

## 2019-05-31 PROCEDURE — 97760 ORTHOTIC MGMT&TRAING 1ST ENC: CPT | Mod: GO | Performed by: OCCUPATIONAL THERAPIST

## 2019-05-31 NOTE — TELEPHONE ENCOUNTER
Verbal okay given for orders as written.   Per RN update was given of bowel changes. Pt typically having bowel movement every 3 days/ more constipated. Lately seems to be more loose. Denies any stomach upset   No known reason behind this change in stools   afebrile today. Alla Raymundo CMA at 12:56 PM on 5/31/2019

## 2019-05-31 NOTE — PROGRESS NOTES
Hand Therapy Initial Evaluation    Current Date:  5/31/2019    Diagnosis: Tetraplegia, C5-6 SCI  DOI: 4/2/19  (MD VISIT)  DOI:  9.6.81 Initial injury date    Referring MD: Jason Long MD    Initial Subjective:       Initial Subjective:  Bart Corea is a 59 year old right hand dominant male.  Bart is a patient well known to this clinic.  Patient reports needing new and  replacement mouth sticks.   Since onset symptoms are Unchanged.  Special tests:  none.  Previous treatment: Hand therapy for orthoses and adaptive equipment.    General health as reported by patient is good.  Pertinent medical history includes:quadraplegic  Medical allergies:See EMR  Surgical history: orthopedic: SCI cervical fusion, multilple medical procedures, see EMR.  Medication history: sleep, muscle relaxants.  Additional Occupational Profile Information (patterns of daily living, interests, values and needs): . Pt reports difficulty with bathing/showering, toileting, dressing, feeding, functional mobility, personal device care, hygiene and grooming, driving and community mobility, health management and maintenance, home establishment and management, meal preparation and cleanup and sleep.     Occupational Profile Information:  Current occupation is , mechanical  Job Tasks: prolonged sitting, pushing/pulling, repetitive tasks, computer work  Barriers include:transportation, requires assistance with dressing, personal hygiene, transfers, mobility, has a  with his own van.   Prior functional level:  semi-supervised setting, personal care attendant  Living situation: lives with their spouse  Additional work context information: Employed - occupation - , working full time  Mobility: No difficulty getting around home and community  With electric w/c  Transportation: Unable to drive car for transportation due to current injury, Has a  with own van,   Caregiver for: none others  Values / Spirituality:  "Patient identifies with [place] community  Daily routine: works full time, likes sports  Leisure activities / hobbies: sports participant from the [facility] [employer]        Functional Outcome Measure:   See flowsheet    Objective:    PAIN 5/31/2019     Pt reports no pain in his arms      Pt seen only for fabrication of new mouthsticks, 4 sticks were fabricated, using the pattern of his previous mouthsticks. Fabricated with Aquaplast, preferably to the pt as white. And 4 thicknesses of 1/8th\" material.    Sensation: deficits in UEs due to Tetraplegia      Assessment:   Patient presents with symptoms consistent with above diagnosis,  with conservative intervention.     Patient's limitations or Problem List includes:  Decreased coordination of the bilateral elbow, wrist and hand  which interferes with the patient's ability to perform Self Care Tasks (dressing, eating, bathing, hygiene/toileting), Work Tasks, Sleep Patterns, Recreational Activities, Household Chores and Driving  as compared to previous level of function.    Rehab Potential:  Poor - Return to restricted activity    Patient will benefit from skilled Occupational Therapy to increase coordination to return to previous activity level and resume normal daily tasks and to reach their rehab potential.    Barriers to Learning:  No barrier    Communication Issues:  Patient appears to be able to clearly communicate and understand verbal and written communication and follow directions correctly.      Chart Review: Chart Review, Brief history including review of medical and/or therapy records relating to the presenting problem and Simple history review with patient    Identified Performance Deficits: bathing/showering, toileting, dressing, feeding, functional mobility, personal device care, hygiene and grooming, driving and community mobility, health management and maintenance, home establishment and management, meal preparation and cleanup, Alevism and spiritual activities " and expression, shopping, sleep, work and leisure activities    Assessment of Occupational Performance:  5 or more Performance Deficits    Clinical Decision Making (Complexity): Low complexity      Treatment Explanation:  The following has been discussed with the patient:  RX ordered/plan of care  Anticipated outcomes  Possible risks and side effects        Treatment Plan:   Frequency:  1 x visit  Duration:  NA; 1 x visit    Orthotic Fabrication:  Custom mouthsticks for coordination and independence     Discharge Plan:  Achieve all LTG.  Independent in home treatment program.  Reach maximal therapeutic benefit.

## 2019-05-31 NOTE — TELEPHONE ENCOUNTER
He can hold docusate for several days or take every other day. If still having diarrhea, pt needs to be seen. It is about for him to have annual check up any way. Thanks.

## 2019-05-31 NOTE — TELEPHONE ENCOUNTER
Relayed message to pt, pt states understanding and will schedule appt if no change/worsening. Alla Raymundo CMA at 1:37 PM on 5/31/2019

## 2019-06-04 ENCOUNTER — MEDICAL CORRESPONDENCE (OUTPATIENT)
Dept: HEALTH INFORMATION MANAGEMENT | Facility: CLINIC | Age: 59
End: 2019-06-04

## 2019-06-05 ENCOUNTER — MEDICAL CORRESPONDENCE (OUTPATIENT)
Dept: HEALTH INFORMATION MANAGEMENT | Facility: CLINIC | Age: 59
End: 2019-06-05

## 2019-06-05 DIAGNOSIS — R21 RASH AND OTHER NONSPECIFIC SKIN ERUPTION: ICD-10-CM

## 2019-06-05 DIAGNOSIS — G82.50 QUADRIPLEGIA (H): ICD-10-CM

## 2019-06-06 RX ORDER — MINOCYCLINE HYDROCHLORIDE 50 MG/1
50 CAPSULE ORAL 2 TIMES DAILY
Qty: 180 CAPSULE | Refills: 3 | Status: SHIPPED | OUTPATIENT
Start: 2019-06-06 | End: 2020-09-01

## 2019-06-06 NOTE — TELEPHONE ENCOUNTER
minocycline (MINOCIN/DYNACIN) 50 MG capsule      Last Written Prescription Date:  5/17/18  Last Fill Quantity: 180,   # refills: 3  Last Office Visit : 6/27/18  Future Office visit:  none    Routing refill request to provider for review/approval because:  If NOT acne or rosacea; refer request to provider for further evaluation.

## 2019-06-07 ENCOUNTER — HOSPITAL ENCOUNTER (OUTPATIENT)
Dept: OCCUPATIONAL THERAPY | Facility: CLINIC | Age: 59
Setting detail: THERAPIES SERIES
End: 2019-06-07
Attending: PHYSICAL MEDICINE & REHABILITATION
Payer: COMMERCIAL

## 2019-06-07 PROCEDURE — 97535 SELF CARE MNGMENT TRAINING: CPT | Mod: GO | Performed by: OCCUPATIONAL THERAPIST

## 2019-06-07 PROCEDURE — 97032 APPL MODALITY 1+ESTIM EA 15: CPT | Mod: GO | Performed by: OCCUPATIONAL THERAPIST

## 2019-06-10 NOTE — ADDENDUM NOTE
Encounter addended by: Gomez Bravo OT on: 6/10/2019 7:12 AM   Actions taken: Flowsheet data copied forward, Flowsheet accepted

## 2019-06-10 NOTE — PROGRESS NOTES
[facility]      OUTPATIENT OCCUPATIONAL THERAPY  PLAN OF TREATMENT FOR OUTPATIENT REHABILITATION    Patient's Last Name, First Name, M.I.                YOB: 1960  NahomyBart Parsons                        Provider's Name  [facility] Medical Record No.  [id number]                               Onset Date: 8/1/18   Start of Care Date: 8/28/18   Type:     ___PT   _X_OT   ___SLP Medical Diagnosis: quadraplegia  (C4-5 SCI, AIS A)                       OT Diagnosis:  Impaired ADL/IADL and functional mobility with power w/c      _________________________________________________________________________________  Plan of Treatment:    Frequency/Duration: Plan to see patient for up to 6 visits in 12 weeks     Goals:    Goal Identifier     Goal Description Pt will demonstrate use of his [employer] mobile arm support with adjustments to settings or additional elevating proximal arm in order to feed himself at least 50% of meal with set up with AE with R UE.   Target Date 05/21/19; extend to 8/13/19   Date Met      Progress:     Goal Identifier     Goal Description Pt will be able to independently operate his power w/c with adjustments to joystick/controls/assistive technology changes as needed for safe functional mobility.   Target Date 05/21/19; extend to 8/13/19   Date Met      Progress:     Goal Identifier     Goal Description Pt will demonstrate improved R UE strength to report at least one ADL task ( such as operatnig power w/c or other) that he can do wtih his R UE with ind R HEP with assist of his PCA for strengthening.   Target Date 05/21/19; extend to 8/13/19   Date Met      Progress:       Progress Toward Goals:   Progress limited due to patient was not able to attend as he was getting adaptive needs taken care of through [facility] and awaiting specialized fabrication of equipment  needed to use mobile arm support with this therapist (emilyk for self feeding).  He plans to return when he has this equipment so will extend plan of care at this time. Plan is to resume FES cycling then.    Certification date from 5/22/19 to 8/13/19.    Gomez Bravo, OTR/L, MSCS          I CERTIFY THE NEED FOR THESE SERVICES FURNISHED UNDER        THIS PLAN OF TREATMENT AND WHILE UNDER MY CARE     (Physician co-signature of this document indicates review and certification of the therapy plan).                Referring Provider: Dr. Jaziel Webster

## 2019-06-10 NOTE — ADDENDUM NOTE
Encounter addended by: Gomez Bravo OT on: 6/10/2019 7:29 AM   Actions taken: Pend clinical note, Sign clinical note, Document created, Document edited

## 2019-06-22 ENCOUNTER — MEDICAL CORRESPONDENCE (OUTPATIENT)
Dept: HEALTH INFORMATION MANAGEMENT | Facility: CLINIC | Age: 59
End: 2019-06-22

## 2019-07-03 DIAGNOSIS — R21 RASH AND OTHER NONSPECIFIC SKIN ERUPTION: ICD-10-CM

## 2019-07-03 DIAGNOSIS — R06.2 WHEEZING: ICD-10-CM

## 2019-07-03 DIAGNOSIS — G82.50 QUADRIPLEGIA (H): ICD-10-CM

## 2019-07-03 DIAGNOSIS — E78.00 HIGH CHOLESTEROL: ICD-10-CM

## 2019-07-03 DIAGNOSIS — J20.9 BRONCHITIS WITH BRONCHOSPASM: ICD-10-CM

## 2019-07-03 DIAGNOSIS — K59.00 CONSTIPATION, UNSPECIFIED CONSTIPATION TYPE: ICD-10-CM

## 2019-07-03 DIAGNOSIS — R25.2 SPASM: ICD-10-CM

## 2019-07-03 RX ORDER — DOCUSATE SODIUM 100 MG/1
CAPSULE, LIQUID FILLED ORAL
Qty: 90 CAPSULE | Refills: 0 | Status: SHIPPED | OUTPATIENT
Start: 2019-07-03 | End: 2019-08-02

## 2019-07-03 RX ORDER — SENNOSIDES 8.6 MG
1 TABLET ORAL DAILY
Qty: 90 TABLET | Refills: 3 | Status: SHIPPED | OUTPATIENT
Start: 2019-07-03 | End: 2021-02-08

## 2019-07-03 RX ORDER — IBUPROFEN 800 MG/1
TABLET, FILM COATED ORAL
Qty: 60 TABLET | Refills: 11 | Status: SHIPPED | OUTPATIENT
Start: 2019-07-03 | End: 2021-02-08

## 2019-07-03 NOTE — TELEPHONE ENCOUNTER
docusate sodium (DOCQLACE) 100 MG capsule  Last Written Prescription Date:  5/17/18  Last Fill Quantity: 90,   # refills: 3  Last Office Visit : 9/28/18  Future Office visit:  none  90 day to pharmacy.     ibuprofen (ADVIL/MOTRIN) 800 MG tablet   Last Written Prescription Date:  5/17/18  Last Fill Quantity: 60,   # refills: 11    Routing refill request to provider for review/approval because:  Failed protocol. Requires annual labs. Overdue labs     ALT    AST    CBC    Creatinine   FYI to [facility] for labs.     sennosides (SENOKOT) 8.6 MG tablet      Last Written Prescription Date:  5/17/18  Last Fill Quantity: 90,   # refills: 3    Routing refill request to provider for review/approval because:  Drug not on the [facility], [facility] or [facility] refill protocol or controlled substance

## 2019-07-05 ENCOUNTER — HOSPITAL ENCOUNTER (OUTPATIENT)
Dept: OCCUPATIONAL THERAPY | Facility: CLINIC | Age: 59
Setting detail: THERAPIES SERIES
End: 2019-07-05
Attending: PHYSICAL MEDICINE & REHABILITATION
Payer: COMMERCIAL

## 2019-07-05 PROCEDURE — 97032 APPL MODALITY 1+ESTIM EA 15: CPT | Mod: GO | Performed by: OCCUPATIONAL THERAPIST

## 2019-07-11 DIAGNOSIS — R06.02 SOB (SHORTNESS OF BREATH): ICD-10-CM

## 2019-07-12 ENCOUNTER — TRANSFERRED RECORDS (OUTPATIENT)
Dept: HEALTH INFORMATION MANAGEMENT | Facility: CLINIC | Age: 59
End: 2019-07-12

## 2019-07-12 ENCOUNTER — MEDICAL CORRESPONDENCE (OUTPATIENT)
Dept: HEALTH INFORMATION MANAGEMENT | Facility: CLINIC | Age: 59
End: 2019-07-12

## 2019-07-12 ENCOUNTER — HOSPITAL ENCOUNTER (OUTPATIENT)
Dept: OCCUPATIONAL THERAPY | Facility: CLINIC | Age: 59
Setting detail: THERAPIES SERIES
End: 2019-07-12
Attending: PHYSICAL MEDICINE & REHABILITATION
Payer: COMMERCIAL

## 2019-07-12 PROCEDURE — 97032 APPL MODALITY 1+ESTIM EA 15: CPT | Mod: GO | Performed by: OCCUPATIONAL THERAPIST

## 2019-07-12 RX ORDER — ALBUTEROL SULFATE 0.83 MG/ML
2.5 SOLUTION RESPIRATORY (INHALATION) EVERY 6 HOURS PRN
Qty: 25 VIAL | Refills: 1 | Status: SHIPPED | OUTPATIENT
Start: 2019-07-12 | End: 2019-10-31

## 2019-07-12 NOTE — TELEPHONE ENCOUNTER
albuterol (2.5 MG/3ML) 0.083% neb solution  Last Written Prescription Date:  6/5/17  Last Fill Quantity: 25 vial,   # refills: 1  Last Office Visit : 9/28/18  Future Office visit:  None

## 2019-07-17 ENCOUNTER — HOSPITAL ENCOUNTER (OUTPATIENT)
Dept: OCCUPATIONAL THERAPY | Facility: CLINIC | Age: 59
Setting detail: THERAPIES SERIES
End: 2019-07-17
Attending: PHYSICAL MEDICINE & REHABILITATION
Payer: COMMERCIAL

## 2019-07-17 PROCEDURE — 97032 APPL MODALITY 1+ESTIM EA 15: CPT | Mod: GO | Performed by: OCCUPATIONAL THERAPIST

## 2019-07-24 ENCOUNTER — HOSPITAL ENCOUNTER (OUTPATIENT)
Dept: OCCUPATIONAL THERAPY | Facility: CLINIC | Age: 59
Setting detail: THERAPIES SERIES
End: 2019-07-24
Attending: PHYSICAL MEDICINE & REHABILITATION
Payer: COMMERCIAL

## 2019-07-24 DIAGNOSIS — G82.50 QUADRIPLEGIA (H): ICD-10-CM

## 2019-07-24 DIAGNOSIS — G47.00 INSOMNIA: ICD-10-CM

## 2019-07-24 DIAGNOSIS — J20.9 BRONCHITIS WITH BRONCHOSPASM: ICD-10-CM

## 2019-07-24 DIAGNOSIS — R10.30 GROIN PAIN, UNSPECIFIED LATERALITY: ICD-10-CM

## 2019-07-24 DIAGNOSIS — R06.2 WHEEZING: ICD-10-CM

## 2019-07-24 DIAGNOSIS — R21 RASH AND OTHER NONSPECIFIC SKIN ERUPTION: ICD-10-CM

## 2019-07-24 DIAGNOSIS — G47.00 INSOMNIA, UNSPECIFIED TYPE: ICD-10-CM

## 2019-07-24 DIAGNOSIS — R25.2 SPASM: ICD-10-CM

## 2019-07-24 PROCEDURE — 97032 APPL MODALITY 1+ESTIM EA 15: CPT | Mod: GO | Performed by: OCCUPATIONAL THERAPIST

## 2019-07-25 RX ORDER — ZOLPIDEM TARTRATE 10 MG/1
TABLET ORAL
Qty: 90 TABLET | Refills: 1 | Status: SHIPPED | OUTPATIENT
Start: 2019-08-02 | End: 2020-01-31

## 2019-07-25 RX ORDER — DIAZEPAM 10 MG
TABLET ORAL
Qty: 90 TABLET | Refills: 1 | Status: SHIPPED | OUTPATIENT
Start: 2019-08-02 | End: 2020-01-31

## 2019-07-25 NOTE — TELEPHONE ENCOUNTER
RX Valium and Ambien faxed to [facility].    ARPITA PINA at 7:36 AM on 7/26/2019.        Patient Requested  diazepam (VALIUM) 10 MG tablet  zolpidem (AMBIEN) 10 MG tablet  Last Filled  07/03/2019  Last Office Visit  09/28/2019  Next Office Visit  Nothing Scheduled   Checked  07/25/19     DX: Insomnia    Pharmacy: [facility], [address]    ARPITA PINA at 8:53 AM on 7/25/2019.

## 2019-07-30 ENCOUNTER — TELEPHONE (OUTPATIENT)
Dept: INTERNAL MEDICINE | Facility: CLINIC | Age: 59
End: 2019-07-30

## 2019-07-30 DIAGNOSIS — Z93.59 SUPRAPUBIC CATHETER (H): ICD-10-CM

## 2019-07-30 DIAGNOSIS — L02.219 SUPRAPUBIC ABSCESS: ICD-10-CM

## 2019-07-30 DIAGNOSIS — N31.9 NEUROGENIC BLADDER: Primary | ICD-10-CM

## 2019-07-30 NOTE — TELEPHONE ENCOUNTER
Pt emailed me below. Urology referral placed.      ' I've been having some discharge from my SP site the plus couple of weeks. It's blood and Brown in color. If you feel around the SP site it feels a bit stiff on one side. I'm a bit concerned it may be another abscess. Please let me know how to proceed.'    We are trying to schedule with urology.

## 2019-07-30 NOTE — TELEPHONE ENCOUNTER
Called patient to schedule with urology. Patient would like a call back tomorrow at his work number.

## 2019-07-31 ENCOUNTER — HOSPITAL ENCOUNTER (OUTPATIENT)
Dept: OCCUPATIONAL THERAPY | Facility: CLINIC | Age: 59
Setting detail: THERAPIES SERIES
End: 2019-07-31
Attending: PHYSICAL MEDICINE & REHABILITATION
Payer: COMMERCIAL

## 2019-07-31 PROCEDURE — 97535 SELF CARE MNGMENT TRAINING: CPT | Mod: GO | Performed by: OCCUPATIONAL THERAPIST

## 2019-07-31 PROCEDURE — 97110 THERAPEUTIC EXERCISES: CPT | Mod: GO | Performed by: OCCUPATIONAL THERAPIST

## 2019-08-02 ENCOUNTER — TELEPHONE (OUTPATIENT)
Dept: FAMILY MEDICINE | Facility: CLINIC | Age: 59
End: 2019-08-02

## 2019-08-02 DIAGNOSIS — R21 RASH AND OTHER NONSPECIFIC SKIN ERUPTION: ICD-10-CM

## 2019-08-02 DIAGNOSIS — J20.9 BRONCHITIS WITH BRONCHOSPASM: ICD-10-CM

## 2019-08-02 DIAGNOSIS — E78.00 HIGH CHOLESTEROL: ICD-10-CM

## 2019-08-02 DIAGNOSIS — R25.2 SPASM: ICD-10-CM

## 2019-08-02 DIAGNOSIS — G82.50 QUADRIPLEGIA (H): ICD-10-CM

## 2019-08-02 DIAGNOSIS — R06.2 WHEEZING: ICD-10-CM

## 2019-08-02 RX ORDER — DOCUSATE SODIUM 100 MG/1
100 CAPSULE, LIQUID FILLED ORAL DAILY
Qty: 90 CAPSULE | Refills: 0 | Status: SHIPPED | OUTPATIENT
Start: 2019-08-02 | End: 2020-01-03

## 2019-08-02 NOTE — TELEPHONE ENCOUNTER
[facility] Call Center    Phone Message    May a detailed message be left on voicemail: yes    Reason for Call: Order(s): Home Care Orders: Skilled Nursinx month for 2 months, and ok to cont supra pubic catheters every 2 weeks as previously ordered    Action Taken: Message routed to:  [facility] Primary care      Verbal order given and documented. Renetta Eduardo LPN 2019 9:53 AM

## 2019-08-02 NOTE — TELEPHONE ENCOUNTER
Verbal orders given to Felecia from Home Care, per Dr. Long, for Skilled Nursinx month for 2 months, and ok to cont supra pubic catheters every 2 weeks as previously ordered. Renetta Eduardo LPN 2019 9:54 AM

## 2019-08-02 NOTE — TELEPHONE ENCOUNTER
Last Clinic Visit: 9/28/2018  [facility]  Scheduling has been notified to contact the pt for appointment.

## 2019-08-02 NOTE — TELEPHONE ENCOUNTER
Tried to call patient several times no answer  We are seeing patient next week and can check this area out Cheryl Lantigua LPN Staff Nurse

## 2019-08-02 NOTE — TELEPHONE ENCOUNTER
Health Call Center    Phone Message    May a detailed message be left on voicemail: yes    Reason for Call: Felecia calling for an update on the pt: update on pt had bloody discharge and firmness on the 9 oclock supra pubic site, his vital signs within normal limits,  pt declined eval today. Pt will be seeing a urologist on monday per Felecia [facility]      Action Taken: Message routed to:  [facility] Primary care

## 2019-08-03 ENCOUNTER — MEDICAL CORRESPONDENCE (OUTPATIENT)
Dept: HEALTH INFORMATION MANAGEMENT | Facility: CLINIC | Age: 59
End: 2019-08-03

## 2019-08-06 ENCOUNTER — PRE VISIT (OUTPATIENT)
Dept: UROLOGY | Facility: CLINIC | Age: 59
End: 2019-08-06

## 2019-08-06 NOTE — TELEPHONE ENCOUNTER
Reason for Visit: Pt experiencing brown discharge from SP cath site    Diagnosis: Neurogenic bladder    Orders/Procedures/Records: Records available    Contact Patient: N/A    Rooming Requirements: Davi Block  08/06/19  9:54 AM

## 2019-08-08 NOTE — PROGRESS NOTES
[facility]      OUTPATIENT OCCUPATIONAL THERAPY  PLAN OF TREATMENT FOR OUTPATIENT REHABILITATION    Patient's Last Name, First Name, M.I.                YOB: 1960  YulianaliamlizbethBart Parsons                        Provider's Name  [facility] Medical Record No.  [id number]                               Onset Date: 8/1/18   Start of Care Date: 8/28/18   Type:     ___PT   _X_OT   ___SLP Medical Diagnosis: quadraplegia  (C4-5 SCI, AIS A)                       OT Diagnosis: Impaired ADL/IADL and functional mobility with power w/c      _________________________________________________________________________________  Plan of Treatment:    Frequency/Duration: Plan for 10 visits in 12 weeks-through 11/5/19     Goals:    Goal Identifier     Goal Description Pt will demonstrate use of his [employer] mobile arm support with adjustments to settings or additional elevating proximal arm in order to feed himself at least 25% of meal with set up with AE with R UE.   Target Date 08/13/19; extend to 10/24/19   Date Met      Progress:Used mobile arm support and could bring 10+ simulates spoon bites plate to mouth with R UE-declined food trial as had just eaten lunch. Will trial further at home.     Goal Identifier     Goal Description Pt will be able to independently operate his power w/c with adjustments to joystick/controls/assistive technology changes as needed for safe functional mobility.   Target Date 08/13/19; extend to 10/24/19   Date Met      Progress:Improving in sessions with powering w/c off/on, placing hand up on goal joystick and taking off, needs someone to adjust speed control as hasn't been able to do that normally. Progressing but inconsistent with loss of control of wheelchair off embankment recently.     Goal Identifier     Goal Description Pt will demonstrate improved R UE strength to  "report at least one ADL task ( such as operating power w/c or other) that he can do with his R UE with ind R HEP with assist of his PCA for strengthening.   Target Date 08/13/19; extend to 10/24/19   Date Met      Progress: Does do HEP with PCA, PROM and \"helps\" PCA with UE ROM, no set reps or degree of assistance. Will address further       Progress Toward Goals:   Progress this reporting period: Please see note dated 7/31/19.    Certification date from 8/1/19 to 10/24/19.    Gomez Bravo, OTR/L, MSCS          I CERTIFY THE NEED FOR THESE SERVICES FURNISHED UNDER        THIS PLAN OF TREATMENT AND WHILE UNDER MY CARE     (Physician co-signature of this document indicates review and certification of the therapy plan).                Referring Provider: Dr. Jaziel Webster (no longer at practice) Dr. Jefe Luke to follow.  "
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
## 2019-08-08 NOTE — ADDENDUM NOTE
Encounter addended by: Gomez Bravo OT on: 8/8/2019 8:59 AM   Actions taken: Flowsheet accepted, Delete clinical note, Pend clinical note, Sign clinical note, Document created, Document edited
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
## 2019-08-12 ENCOUNTER — OFFICE VISIT (OUTPATIENT)
Dept: UROLOGY | Facility: CLINIC | Age: 59
End: 2019-08-12
Attending: FAMILY MEDICINE
Payer: COMMERCIAL

## 2019-08-12 VITALS — HEIGHT: 68 IN | WEIGHT: 210 LBS | BODY MASS INDEX: 31.83 KG/M2

## 2019-08-12 DIAGNOSIS — T83.010A SUPRAPUBIC CATHETER DYSFUNCTION, INITIAL ENCOUNTER (H): ICD-10-CM

## 2019-08-12 DIAGNOSIS — N31.9 NEUROGENIC BLADDER: Primary | ICD-10-CM

## 2019-08-12 DIAGNOSIS — R53.83 FATIGUE, UNSPECIFIED TYPE: ICD-10-CM

## 2019-08-12 ASSESSMENT — MIFFLIN-ST. JEOR: SCORE: 1752.05

## 2019-08-12 ASSESSMENT — PAIN SCALES - GENERAL: PAINLEVEL: NO PAIN (0)
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
## 2019-08-12 NOTE — PATIENT INSTRUCTIONS
Please make a appointment for a cystoscopy    It was a pleasure meeting with you today.  Thank you for allowing me and my team the privilege of caring for you today.  YOU are the reason we are here, and I truly hope we provided you with the excellent service you deserve.  Please let us know if there is anything else we can do for you so that we can be sure you are leaving completely satisfied with your care experience.

## 2019-08-12 NOTE — NURSING NOTE
Chief Complaint   Patient presents with     RECHECK     problems with SP tube - discharge - changes every 2 weeks (next change this Saturday)       Genet Sharif, RN-BC, BSN  Urology Care Coordinator

## 2019-08-13 ENCOUNTER — TELEPHONE (OUTPATIENT)
Dept: INTERNAL MEDICINE | Facility: CLINIC | Age: 59
End: 2019-08-13

## 2019-08-13 NOTE — TELEPHONE ENCOUNTER
I see that Pt made an appt with Nany tomorrow. He will have this order from Nany as he needs a face-to-face visit note.

## 2019-08-13 NOTE — TELEPHONE ENCOUNTER
Pt is requesting a new order for hospital bed as his current one is 12 year old and not working properly. OK to order he new one ?    Soon-Mi

## 2019-08-14 ENCOUNTER — OFFICE VISIT (OUTPATIENT)
Dept: INTERNAL MEDICINE | Facility: CLINIC | Age: 59
End: 2019-08-14
Payer: COMMERCIAL

## 2019-08-14 VITALS — HEART RATE: 47 BPM | DIASTOLIC BLOOD PRESSURE: 64 MMHG | OXYGEN SATURATION: 96 % | SYSTOLIC BLOOD PRESSURE: 97 MMHG

## 2019-08-14 DIAGNOSIS — R29.898 MECHANICAL LIMB PROBLEMS: ICD-10-CM

## 2019-08-14 DIAGNOSIS — T83.010A SUPRAPUBIC CATHETER DYSFUNCTION, INITIAL ENCOUNTER (H): ICD-10-CM

## 2019-08-14 DIAGNOSIS — S14.105S: ICD-10-CM

## 2019-08-14 DIAGNOSIS — S14.106S: ICD-10-CM

## 2019-08-14 DIAGNOSIS — G82.50 QUADRIPLEGIA (H): ICD-10-CM

## 2019-08-14 DIAGNOSIS — N31.9 NEUROGENIC BLADDER: ICD-10-CM

## 2019-08-14 DIAGNOSIS — R53.83 FATIGUE, UNSPECIFIED TYPE: ICD-10-CM

## 2019-08-14 DIAGNOSIS — S14.104S: ICD-10-CM

## 2019-08-14 DIAGNOSIS — E78.00 HIGH CHOLESTEROL: ICD-10-CM

## 2019-08-14 DIAGNOSIS — R25.2 SPASM: ICD-10-CM

## 2019-08-14 DIAGNOSIS — R21 RASH AND OTHER NONSPECIFIC SKIN ERUPTION: ICD-10-CM

## 2019-08-14 DIAGNOSIS — N31.9 NEUROGENIC BLADDER: Primary | ICD-10-CM

## 2019-08-14 DIAGNOSIS — R06.2 WHEEZING: ICD-10-CM

## 2019-08-14 DIAGNOSIS — Z00.00 ROUTINE GENERAL MEDICAL EXAMINATION AT A HEALTH CARE FACILITY: ICD-10-CM

## 2019-08-14 DIAGNOSIS — Z93.59 SUPRAPUBIC CATHETER (H): ICD-10-CM

## 2019-08-14 DIAGNOSIS — R60.0 PERIPHERAL EDEMA: ICD-10-CM

## 2019-08-14 DIAGNOSIS — Z00.00 ROUTINE HISTORY AND PHYSICAL EXAMINATION OF ADULT: ICD-10-CM

## 2019-08-14 DIAGNOSIS — J20.9 BRONCHITIS WITH BRONCHOSPASM: ICD-10-CM

## 2019-08-14 LAB
ALBUMIN SERPL-MCNC: 3.3 G/DL (ref 3.4–5)
ALP SERPL-CCNC: 104 U/L (ref 40–150)
ALT SERPL W P-5'-P-CCNC: 17 U/L (ref 0–70)
ANION GAP SERPL CALCULATED.3IONS-SCNC: 4 MMOL/L (ref 3–14)
AST SERPL W P-5'-P-CCNC: 11 U/L (ref 0–45)
BILIRUB SERPL-MCNC: 0.4 MG/DL (ref 0.2–1.3)
BUN SERPL-MCNC: 12 MG/DL (ref 7–30)
CALCIUM SERPL-MCNC: 8.8 MG/DL (ref 8.5–10.1)
CHLORIDE SERPL-SCNC: 102 MMOL/L (ref 94–109)
CHOLEST SERPL-MCNC: 179 MG/DL
CO2 SERPL-SCNC: 31 MMOL/L (ref 20–32)
CREAT SERPL-MCNC: 0.34 MG/DL (ref 0.66–1.25)
ERYTHROCYTE [DISTWIDTH] IN BLOOD BY AUTOMATED COUNT: 13.7 % (ref 10–15)
GFR SERPL CREATININE-BSD FRML MDRD: >90 ML/MIN/{1.73_M2}
GLUCOSE SERPL-MCNC: 88 MG/DL (ref 70–99)
HBA1C MFR BLD: 5 % (ref 0–5.6)
HCT VFR BLD AUTO: 47.6 % (ref 40–53)
HDLC SERPL-MCNC: 38 MG/DL
HGB BLD-MCNC: 15.1 G/DL (ref 13.3–17.7)
LDLC SERPL CALC-MCNC: 113 MG/DL
MCH RBC QN AUTO: 31.1 PG (ref 26.5–33)
MCHC RBC AUTO-ENTMCNC: 31.7 G/DL (ref 31.5–36.5)
MCV RBC AUTO: 98 FL (ref 78–100)
NONHDLC SERPL-MCNC: 141 MG/DL
PLATELET # BLD AUTO: 174 10E9/L (ref 150–450)
POTASSIUM SERPL-SCNC: 4.1 MMOL/L (ref 3.4–5.3)
PROT SERPL-MCNC: 7.1 G/DL (ref 6.8–8.8)
PSA SERPL-ACNC: 0.9 UG/L (ref 0–4)
RBC # BLD AUTO: 4.86 10E12/L (ref 4.4–5.9)
SODIUM SERPL-SCNC: 137 MMOL/L (ref 133–144)
TRIGL SERPL-MCNC: 143 MG/DL
WBC # BLD AUTO: 6 10E9/L (ref 4–11)

## 2019-08-14 RX ORDER — OXYBUTYNIN CHLORIDE 10 MG/1
10 TABLET, EXTENDED RELEASE ORAL DAILY
Qty: 90 TABLET | Refills: 3 | Status: SHIPPED | OUTPATIENT
Start: 2019-08-14 | End: 2020-09-01

## 2019-08-14 ASSESSMENT — PAIN SCALES - GENERAL: PAINLEVEL: NO PAIN (0)
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
## 2019-08-14 NOTE — NURSING NOTE
Chief Complaint   Patient presents with     Physical     Pt here for annual physcial     Shala HANY Sanders at 9:46 AM sign on 8/14/2019

## 2019-08-17 ENCOUNTER — ANCILLARY PROCEDURE (OUTPATIENT)
Dept: GENERAL RADIOLOGY | Facility: CLINIC | Age: 59
End: 2019-08-17
Attending: PHYSICIAN ASSISTANT
Payer: COMMERCIAL

## 2019-08-17 ENCOUNTER — OFFICE VISIT (OUTPATIENT)
Dept: URGENT CARE | Facility: URGENT CARE | Age: 59
End: 2019-08-17
Payer: COMMERCIAL

## 2019-08-17 VITALS
HEART RATE: 47 BPM | SYSTOLIC BLOOD PRESSURE: 118 MMHG | WEIGHT: 200 LBS | DIASTOLIC BLOOD PRESSURE: 74 MMHG | OXYGEN SATURATION: 96 % | TEMPERATURE: 97.9 F | RESPIRATION RATE: 16 BRPM | BODY MASS INDEX: 30.41 KG/M2

## 2019-08-17 DIAGNOSIS — R09.89 CHEST CONGESTION: Primary | ICD-10-CM

## 2019-08-17 DIAGNOSIS — R09.89 CHEST CONGESTION: ICD-10-CM

## 2019-08-17 LAB
BACTERIA SPEC CULT: ABNORMAL
Lab: ABNORMAL
SPECIMEN SOURCE: ABNORMAL

## 2019-08-17 PROCEDURE — 71045 X-RAY EXAM CHEST 1 VIEW: CPT

## 2019-08-17 PROCEDURE — 99214 OFFICE O/P EST MOD 30 MIN: CPT | Performed by: PHYSICIAN ASSISTANT

## 2019-08-17 RX ORDER — AZITHROMYCIN 250 MG/1
TABLET, FILM COATED ORAL
Qty: 6 TABLET | Refills: 0 | Status: SHIPPED | OUTPATIENT
Start: 2019-08-17 | End: 2020-01-10

## 2019-08-20 ENCOUNTER — MYC MEDICAL ADVICE (OUTPATIENT)
Dept: FAMILY MEDICINE | Facility: CLINIC | Age: 59
End: 2019-08-20

## 2019-08-20 ENCOUNTER — MEDICAL CORRESPONDENCE (OUTPATIENT)
Dept: HEALTH INFORMATION MANAGEMENT | Facility: CLINIC | Age: 59
End: 2019-08-20

## 2019-08-20 NOTE — PROGRESS NOTES
SUBJECTIVE:   Bart Corea is a 59 year old male presenting with a chief complaint of chest congestion, coughing.  Onset of symptoms was 2 day(s) ago.  Course of illness is same.    Severity mild  Current and Associated symptoms: coughing, chest congestion  Treatment measures tried include albuterol.  Predisposing factors include hx of UTIs and is being seen by urology at this time for possible infection.    Past Medical History:   Diagnosis Date     LIZA (obstructive sleep apnea) 12/3/2014     Quadriplegia, C1-C4 incomplete (H)         Allergies   Allergen Reactions     Vancomycin Anaphylaxis     Family History   Problem Relation Age of Onset     Cancer Father      Diabetes No family hx of      Glaucoma No family hx of      Hypertension No family hx of      Macular Degeneration No family hx of      Retinal detachment No family hx of        Social History     Tobacco Use     Smoking status: Current Every Day Smoker     Packs/day: 0.30     Types: Cigarettes     Smokeless tobacco: Never Used     Tobacco comment: since early teens   Substance Use Topics     Alcohol use: Yes     Comment: on weekends, 3-4+       ROS:  CONSTITUTIONAL:NEGATIVE for fever, chills, change in weight  INTEGUMENTARY/SKIN: NEGATIVE for worrisome rashes, moles or lesions  ENT/MOUTH: NEGATIVE for ear, mouth and throat problems  RESP:POSITIVE for chest congestion, productive cough, coughing  CV: NEGATIVE for chest pain, palpitations or peripheral edema  GI: NEGATIVE for nausea, abdominal pain, heartburn, or change in bowel habits  : negative for and dysuria  MUSCULOSKELETAL: NEGATIVE for significant arthralgias or myalgia  NEURO: NEGATIVE for weakness, dizziness or paresthesias    OBJECTIVE  :/74   Pulse (!) 47   Temp 97.9  F (36.6  C) (Oral)   Resp 16   Wt 90.7 kg (200 lb)   SpO2 96%   BMI 30.41 kg/m    GENERAL APPEARANCE: healthy, alert and no distress  EYES: EOMI,  PERRL, conjunctiva clear  HENT: ear canals and TM's  normal.  Nose and mouth without ulcers, erythema or lesions  NECK: supple, nontender, no lymphadenopathy  RESP: lungs clear to auscultation - no rales, rhonchi or wheezes  CV: regular rates and rhythm, normal S1 S2, no murmur noted  NEURO: Normal strength and tone, sensory exam grossly normal,  normal speech and mentation  SKIN: no suspicious lesions or rashes       Chest ray Negative for acute findings, read by Bran ORDONEZ at time of visit.    ASSESSMENT/PLAN      ICD-10-CM    1. Chest congestion R09.89 azithromycin (ZITHROMAX) 250 MG tablet         Orders Placed This Encounter     azithromycin (ZITHROMAX) 250 MG tablet     Fluids, rest  Follow up with PCP as needed

## 2019-08-21 DIAGNOSIS — N39.0 URINARY TRACT INFECTION: Primary | ICD-10-CM

## 2019-08-21 RX ORDER — GRANULES FOR ORAL 3 G/1
3 POWDER ORAL ONCE
Qty: 1 PACKET | Refills: 0 | Status: SHIPPED | OUTPATIENT
Start: 2019-08-21 | End: 2020-01-10

## 2019-08-22 ENCOUNTER — TELEPHONE (OUTPATIENT)
Dept: UROLOGY | Facility: CLINIC | Age: 59
End: 2019-08-22

## 2019-08-22 ENCOUNTER — MYC MEDICAL ADVICE (OUTPATIENT)
Dept: FAMILY MEDICINE | Facility: CLINIC | Age: 59
End: 2019-08-22

## 2019-08-22 NOTE — TELEPHONE ENCOUNTER
Central Prior Authorization Team   Phone: [phone]    PA Initiation    Medication: Monurol packet  Insurance Company: [employer] - Phone [phone] Fax [phone]  Pharmacy Filling the Rx: [facility] - [place] - [address] [place] [address]  Filling Pharmacy Phone: [phone]  Filling Pharmacy Fax:    Start Date: 8/22/2019

## 2019-08-23 NOTE — TELEPHONE ENCOUNTER
Prior Authorization Approval        Authorization Effective Date: 7/23/2019  Authorization Expiration Date: 8/21/2020  Medication: Monurol packet - P/A APPROVED  Approved Dose/Quantity: 1  Reference #: [id number]   Insurance Company: [employer] - Phone [phone] Fax [phone]  Expected CoPay:       CoPay Card Available:      Foundation Assistance Needed:    Which Pharmacy is filling the prescription (Not needed for infusion/clinic administered): [facility] - [place], [place] - [address] [place] [address]  Pharmacy Notified: Yes - spoke to pharmacy, they had already processed the rx late yesterday and ordered the drug in for this morning, and they will give patient a call as soon as it is ready (this morning)  Patient Notified:

## 2019-08-24 ENCOUNTER — TRANSFERRED RECORDS (OUTPATIENT)
Dept: HEALTH INFORMATION MANAGEMENT | Facility: CLINIC | Age: 59
End: 2019-08-24

## 2019-08-27 ENCOUNTER — PRE VISIT (OUTPATIENT)
Dept: UROLOGY | Facility: CLINIC | Age: 59
End: 2019-08-27

## 2019-08-27 NOTE — TELEPHONE ENCOUNTER
Reason for Visit: Cystoscopy, SP catheter    Diagnosis: NGB, surveillance for bladder cancer    Orders/Procedures/Records: Records available    Contact Patient: N/A    Rooming Requirements: Cystoscopy      Anisha Block  08/27/19  9:33 AM

## 2019-08-28 ENCOUNTER — MEDICAL CORRESPONDENCE (OUTPATIENT)
Dept: HEALTH INFORMATION MANAGEMENT | Facility: CLINIC | Age: 59
End: 2019-08-28

## 2019-08-28 DIAGNOSIS — N39.0 URINARY TRACT INFECTION: ICD-10-CM

## 2019-08-28 PROCEDURE — 87088 URINE BACTERIA CULTURE: CPT | Performed by: UROLOGY

## 2019-08-28 PROCEDURE — 87186 SC STD MICRODIL/AGAR DIL: CPT | Performed by: UROLOGY

## 2019-08-28 PROCEDURE — 87086 URINE CULTURE/COLONY COUNT: CPT | Performed by: UROLOGY

## 2019-08-30 ENCOUNTER — HOSPITAL ENCOUNTER (OUTPATIENT)
Dept: OCCUPATIONAL THERAPY | Facility: CLINIC | Age: 59
Setting detail: THERAPIES SERIES
End: 2019-08-30
Attending: PHYSICAL MEDICINE & REHABILITATION
Payer: COMMERCIAL

## 2019-08-30 PROCEDURE — 97032 APPL MODALITY 1+ESTIM EA 15: CPT | Mod: GO | Performed by: OCCUPATIONAL THERAPIST

## 2019-09-01 LAB
BACTERIA SPEC CULT: ABNORMAL
Lab: ABNORMAL
SPECIMEN SOURCE: ABNORMAL

## 2019-09-03 ENCOUNTER — HOSPITAL ENCOUNTER (OUTPATIENT)
Dept: OCCUPATIONAL THERAPY | Facility: CLINIC | Age: 59
Setting detail: THERAPIES SERIES
End: 2019-09-03
Attending: PHYSICAL MEDICINE & REHABILITATION
Payer: COMMERCIAL

## 2019-09-03 PROCEDURE — 97032 APPL MODALITY 1+ESTIM EA 15: CPT | Mod: GO | Performed by: OCCUPATIONAL THERAPIST

## 2019-09-09 ENCOUNTER — MYC MEDICAL ADVICE (OUTPATIENT)
Dept: FAMILY MEDICINE | Facility: CLINIC | Age: 59
End: 2019-09-09

## 2019-09-09 DIAGNOSIS — S14.106A: ICD-10-CM

## 2019-09-09 DIAGNOSIS — S14.105A: ICD-10-CM

## 2019-09-09 DIAGNOSIS — S14.104A: Primary | ICD-10-CM

## 2019-09-16 ENCOUNTER — ANCILLARY PROCEDURE (OUTPATIENT)
Dept: ULTRASOUND IMAGING | Facility: CLINIC | Age: 59
End: 2019-09-16
Payer: COMMERCIAL

## 2019-09-16 ENCOUNTER — OFFICE VISIT (OUTPATIENT)
Dept: UROLOGY | Facility: CLINIC | Age: 59
End: 2019-09-16
Payer: COMMERCIAL

## 2019-09-16 DIAGNOSIS — R53.83 FATIGUE, UNSPECIFIED TYPE: ICD-10-CM

## 2019-09-16 DIAGNOSIS — N31.9 NEUROGENIC BLADDER: Primary | ICD-10-CM

## 2019-09-16 DIAGNOSIS — T83.010A SUPRAPUBIC CATHETER DYSFUNCTION, INITIAL ENCOUNTER (H): ICD-10-CM

## 2019-09-16 DIAGNOSIS — N31.9 NEUROGENIC BLADDER: ICD-10-CM

## 2019-09-16 DIAGNOSIS — N21.0 BLADDER STONES: ICD-10-CM

## 2019-09-16 RX ORDER — MAGNESIUM HYDROXIDE 1200 MG/15ML
LIQUID ORAL
Refills: 11 | COMMUNITY
Start: 2019-08-24 | End: 2020-01-10

## 2019-09-16 ASSESSMENT — PAIN SCALES - GENERAL: PAINLEVEL: NO PAIN (0)
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
## 2019-09-17 ENCOUNTER — HOSPITAL ENCOUNTER (OUTPATIENT)
Dept: OCCUPATIONAL THERAPY | Facility: CLINIC | Age: 59
Setting detail: THERAPIES SERIES
End: 2019-09-17
Attending: PHYSICAL MEDICINE & REHABILITATION
Payer: COMMERCIAL

## 2019-09-17 PROCEDURE — 97032 APPL MODALITY 1+ESTIM EA 15: CPT | Mod: GO | Performed by: OCCUPATIONAL THERAPIST

## 2019-09-21 ENCOUNTER — OFFICE VISIT (OUTPATIENT)
Dept: URGENT CARE | Facility: URGENT CARE | Age: 59
End: 2019-09-21
Payer: COMMERCIAL

## 2019-09-21 VITALS
RESPIRATION RATE: 16 BRPM | OXYGEN SATURATION: 95 % | HEART RATE: 53 BPM | TEMPERATURE: 97.7 F | DIASTOLIC BLOOD PRESSURE: 72 MMHG | SYSTOLIC BLOOD PRESSURE: 108 MMHG

## 2019-09-21 DIAGNOSIS — R05.8 PRODUCTIVE COUGH: ICD-10-CM

## 2019-09-21 DIAGNOSIS — R09.89 CHEST CONGESTION: ICD-10-CM

## 2019-09-21 DIAGNOSIS — R06.2 WHEEZING: ICD-10-CM

## 2019-09-21 DIAGNOSIS — R07.89 CHEST TIGHTNESS: Primary | ICD-10-CM

## 2019-09-21 PROCEDURE — 99214 OFFICE O/P EST MOD 30 MIN: CPT | Performed by: PHYSICIAN ASSISTANT

## 2019-09-21 RX ORDER — PREDNISONE 20 MG/1
20 TABLET ORAL 2 TIMES DAILY
Qty: 10 TABLET | Refills: 0 | Status: SHIPPED | OUTPATIENT
Start: 2019-09-21 | End: 2020-01-10

## 2019-09-21 RX ORDER — AZITHROMYCIN 250 MG/1
TABLET, FILM COATED ORAL
Qty: 6 TABLET | Refills: 0 | Status: SHIPPED | OUTPATIENT
Start: 2019-09-21 | End: 2020-01-10

## 2019-09-23 ENCOUNTER — TELEPHONE (OUTPATIENT)
Dept: UROLOGY | Facility: CLINIC | Age: 59
End: 2019-09-23

## 2019-09-23 NOTE — TELEPHONE ENCOUNTER
Patient is scheduled for surgery with Dr. Uriostegui      Spoke or left message with: Bart    Date of Surgery: 11/15/19    Location: [facility] OR    Informed patient they will need an adult  yes    Pre-op with surgeon (if applicable): n/a    H&P: Scheduled with pcp    Additional imaging/appointments: n/a    Surgery packet: mailed 9/23/19     Additional comments: n/a

## 2019-09-23 NOTE — PROGRESS NOTES
SUBJECTIVE:   Bart Corea is a 59 year old male presenting with a chief complaint of chest tightness, wheezing, chest congestion and productive cough.  Onset of symptoms was 4 day(s) ago.  Course of illness is worsening.    Severity moderate  Current and Associated symptoms: chest congestion, coughing  Treatment measures tried include OTC medications.  Predisposing factors include recent illness.    Past Medical History:   Diagnosis Date     LIZA (obstructive sleep apnea) 12/3/2014     Quadriplegia, C1-C4 incomplete (H)         Allergies   Allergen Reactions     Vancomycin Anaphylaxis     Family History   Problem Relation Age of Onset     Cancer Father      Diabetes No family hx of      Glaucoma No family hx of      Hypertension No family hx of      Macular Degeneration No family hx of      Retinal detachment No family hx of        Social History     Tobacco Use     Smoking status: Current Every Day Smoker     Packs/day: 0.30     Types: Cigarettes     Smokeless tobacco: Never Used     Tobacco comment: since early teens   Substance Use Topics     Alcohol use: Yes     Comment: on weekends, 3-4+       ROS:  CONSTITUTIONAL:NEGATIVE for fever, chills, change in weight  INTEGUMENTARY/SKIN: NEGATIVE for worrisome rashes, moles or lesions  EYES: NEGATIVE for vision changes or irritation  ENT/MOUTH: POSITIVE for sinus congestion  RESP:POSITIVE for cough-productive and chest congestion  CV: NEGATIVE for chest pain, palpitations or peripheral edema  GI: NEGATIVE for nausea, abdominal pain, heartburn, or change in bowel habits  : negative for and dysuria  MUSCULOSKELETAL: NEGATIVE for significant arthralgias or myalgia  NEURO: NEGATIVE for weakness, dizziness or paresthesias    OBJECTIVE  :/72 (BP Location: Right arm, Patient Position: Sitting, Cuff Size: Adult Regular)   Pulse 53   Temp 97.7  F (36.5  C) (Tympanic)   Resp 16   SpO2 95%   GENERAL APPEARANCE: healthy, alert and no distress  EYES: EOMI,   PERRL, conjunctiva clear  HENT: ear canals and TM's normal.  Nose and mouth without ulcers, erythema or lesions  NECK: supple, nontender, no lymphadenopathy  RESP: expiratory wheezes and congestion  CV: regular rates and rhythm, normal S1 S2, no murmur noted  ABDOMEN:  soft, nontender, no HSM or masses and bowel sounds normal  NEURO: Normal strength and tone, sensory exam grossly normal,  normal speech and mentation  SKIN: no suspicious lesions or rashes    ASSESSMENT/PLAN      ICD-10-CM    1. Chest tightness R07.89 predniSONE (DELTASONE) 20 MG tablet   2. Wheezing R06.2 predniSONE (DELTASONE) 20 MG tablet   3. Chest congestion R09.89 azithromycin (ZITHROMAX) 250 MG tablet   4. Productive cough R05 azithromycin (ZITHROMAX) 250 MG tablet       Orders Placed This Encounter     azithromycin (ZITHROMAX) 250 MG tablet     predniSONE (DELTASONE) 20 MG tablet       Continue nebulizer for wheezing  zpak for infection  Prednisone for chest tightness, wheezing  Follow up with PCP as needed  See orders in Epic

## 2019-09-25 ENCOUNTER — HOSPITAL ENCOUNTER (OUTPATIENT)
Dept: OCCUPATIONAL THERAPY | Facility: CLINIC | Age: 59
Setting detail: THERAPIES SERIES
End: 2019-09-25
Attending: PHYSICAL MEDICINE & REHABILITATION
Payer: COMMERCIAL

## 2019-09-25 PROCEDURE — 97535 SELF CARE MNGMENT TRAINING: CPT | Mod: GO | Performed by: OCCUPATIONAL THERAPIST

## 2019-09-28 ENCOUNTER — HEALTH MAINTENANCE LETTER (OUTPATIENT)
Age: 59
End: 2019-09-28

## 2019-10-01 ENCOUNTER — HOSPITAL ENCOUNTER (OUTPATIENT)
Dept: OCCUPATIONAL THERAPY | Facility: CLINIC | Age: 59
Setting detail: THERAPIES SERIES
End: 2019-10-01
Attending: PHYSICAL MEDICINE & REHABILITATION
Payer: COMMERCIAL

## 2019-10-01 ENCOUNTER — DOCUMENTATION ONLY (OUTPATIENT)
Dept: FAMILY MEDICINE | Facility: CLINIC | Age: 59
End: 2019-10-01

## 2019-10-01 PROCEDURE — 97032 APPL MODALITY 1+ESTIM EA 15: CPT | Mod: GO | Performed by: OCCUPATIONAL THERAPIST

## 2019-10-02 ENCOUNTER — MEDICAL CORRESPONDENCE (OUTPATIENT)
Dept: HEALTH INFORMATION MANAGEMENT | Facility: CLINIC | Age: 59
End: 2019-10-02
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
## 2019-10-04 ENCOUNTER — MYC MEDICAL ADVICE (OUTPATIENT)
Dept: UROLOGY | Facility: CLINIC | Age: 59
End: 2019-10-04

## 2019-10-09 ENCOUNTER — MYC MEDICAL ADVICE (OUTPATIENT)
Dept: FAMILY MEDICINE | Facility: CLINIC | Age: 59
End: 2019-10-09

## 2019-10-09 ENCOUNTER — ANCILLARY PROCEDURE (OUTPATIENT)
Dept: GENERAL RADIOLOGY | Facility: CLINIC | Age: 59
End: 2019-10-09
Attending: FAMILY MEDICINE
Payer: COMMERCIAL

## 2019-10-09 ENCOUNTER — NURSE TRIAGE (OUTPATIENT)
Dept: NURSING | Facility: CLINIC | Age: 59
End: 2019-10-09

## 2019-10-09 ENCOUNTER — OFFICE VISIT (OUTPATIENT)
Dept: URGENT CARE | Facility: URGENT CARE | Age: 59
End: 2019-10-09
Payer: COMMERCIAL

## 2019-10-09 VITALS
OXYGEN SATURATION: 97 % | SYSTOLIC BLOOD PRESSURE: 98 MMHG | RESPIRATION RATE: 16 BRPM | DIASTOLIC BLOOD PRESSURE: 67 MMHG | HEART RATE: 46 BPM

## 2019-10-09 DIAGNOSIS — R05.9 COUGH: Primary | ICD-10-CM

## 2019-10-09 PROCEDURE — 71045 X-RAY EXAM CHEST 1 VIEW: CPT

## 2019-10-09 PROCEDURE — 99214 OFFICE O/P EST MOD 30 MIN: CPT | Performed by: FAMILY MEDICINE

## 2019-10-09 RX ORDER — CEFDINIR 300 MG/1
300 CAPSULE ORAL 2 TIMES DAILY
Qty: 20 CAPSULE | Refills: 0 | Status: SHIPPED | OUTPATIENT
Start: 2019-10-09 | End: 2020-01-10

## 2019-10-09 NOTE — PROGRESS NOTES
Subjective     Bart Corea is a 59 year old male who presents to clinic today for the following health issues:    Jc was seen previously and had a pneumonia.  He has had 7 weeks of continuing signs and symptoms even though he was treated with antibiotics.    Says he is not improved.    Symptoms are worse when he lies down with a feeling of congestion in his throat and some shortness of breath.    Should mention that Jc is limited to by being exclusively in his wheelchair    HPI         Patient Active Problem List   Diagnosis     Abdominal pain     Neurogenic bladder      Mild LIZA with hypoventilation     Sleep related hypoventilation/hypoxemia in other disease     Cataracts, bilateral     Myopic astigmatism of both eyes     Presbyopia     Injury at C4 level of cervical spinal cord (H)     Injury at C5 level of cervical spinal cord (H)     Injury at C6 level of cervical spinal cord (H)     Pneumonia     Bacteremia     Scrotal swelling     Autonomic dysreflexia     Decubitus ulcer of hip     Past Surgical History:   Procedure Laterality Date     Bilateral ureteroscopy with holmium laser lithotripsy and basketing and removal of fragments  Left ureteral stent placement.  02/10/2010     COLONOSCOPY N/A 7/20/2018    Procedure: COMBINED COLONOSCOPY, SINGLE OR MULTIPLE BIOPSY/POLYPECTOMY BY BIOPSY;;  Surgeon: Grace Yang MD;  Location: [facility]     skin flap on bottom       sp tube absess surgery         Social History     Tobacco Use     Smoking status: Current Every Day Smoker     Packs/day: 0.30     Types: Cigarettes     Smokeless tobacco: Never Used     Tobacco comment: since early teens   Substance Use Topics     Alcohol use: Yes     Comment: on weekends, 3-4+     Family History   Problem Relation Age of Onset     Cancer Father      Diabetes No family hx of      Glaucoma No family hx of      Hypertension No family hx of      Macular Degeneration No family hx of      Retinal detachment No family hx of   "          Reviewed and updated as needed this visit by Provider         Review of Systems   ROS COMP: Constitutional, HEENT, cardiovascular, pulmonary, GI, , musculoskeletal, neuro, skin, endocrine and psych systems are negative, except as otherwise noted.      Objective    BP 98/67   Pulse (!) 46   Resp 16   SpO2 97%   There is no height or weight on file to calculate BMI.  Physical Exam   GENERAL: alert and Mild distress  NECK: no adenopathy, no asymmetry, masses, or scars and thyroid normal to palpation  RESP: lungs clear to auscultation - no rales, rhonchi or wheezes  CV: regular rate and rhythm, normal S1 S2, no S3 or S4, no murmur, click or rub, no peripheral edema and peripheral pulses strong  ABDOMEN: soft, nontender, no hepatosplenomegaly, no masses and bowel sounds normal  MS: no gross musculoskeletal defects noted, no edema  NEURO: Confined to wheelchair limited use of his upper extremities he is on a mobile chair    Diagnostic Test Results:  Labs reviewed in Epic  Chest x-ray; unclear slightly change from previous with a little bit last air feels on the left side    I personally reviewed and compared to x-rays.  Previous reading suggested that he had a pneumonia or infiltrate and as well as atelectasis.    Today's reading suggest more atelectasis    Elevated left hemidiaphragm and possibly an effusion        Assessment & Plan       ICD-10-CM    1. Cough R05 XR Chest 1 View     cefdinir (OMNICEF) 300 MG capsule   2. Abnormal chest xray; as before last CT scan in 2015    3. Bronchitis       Tobacco Cessation:   reports that he has been smoking cigarettes. He has been smoking about 0.30 packs per day. He has never used smokeless tobacco.        BMI:   Estimated body mass index is 30.41 kg/m  as calculated from the following:    Height as of 8/12/19: 1.727 m (5' 8\").    Weight as of 8/17/19: 90.7 kg (200 lb).               I will treat him again today with a broader spectrum antibiotic and we chose " Omnicef.  It was suggested to use prednisone which he has at home and I suggested if this again for him today    I do believe that he needs to be seen within a week and I encouraged him to see his primary care  Milton Mina MD  [facility]

## 2019-10-09 NOTE — TELEPHONE ENCOUNTER
"Patient calling requesting appointment for chest congestion. Reporting symptoms started \"2 weeks before football season.\" Reporting he has completed 2 rounds of Z-jackie.  Albuterol nebulizer treatment done at 8 a.m. this morning. Patient reporting he is unable to cough as he is wheelchair bound spinal cord injury.     Patient stating he is worse then when seen 9/21/19 in Urgent Care for chest congestion.   Advised Emergency Room as patient reporting wheezing and shortness of breath.    Patient refusing ER disposition requesting appointment.  Connected patient to Central Scheduling.    Caller verbalized understanding. Denies further questions.    Pina Nicole RN  [facility]      Additional Information    Negative: Severe difficulty breathing (e.g., struggling for each breath, speaks in single words)    Negative: Bluish (or gray) lips or face    Negative: Wheezing started suddenly after medicine, an allergic food, or bee sting    Negative: Passed out (i.e., fainted, collapsed and was not responding)    Negative: SEVERE asthma attack (e.g., very SOB at rest, speaks in single words, loud wheezes)    Negative: Sounds like a life-threatening emergency to the triager    Negative: SEVERE wheezing or coughing and doesn't have nebulizer or inhaler available    Negative: Peak flow rate less than 50% of baseline level (RED zone)    Negative: Hospitalized before with asthma; now feels same    Negative: Chest pain    Patient sounds very sick or weak to the triager    Protocols used: ASTHMA ATTACK-A-OH      "

## 2019-10-14 ENCOUNTER — TELEPHONE (OUTPATIENT)
Dept: OPHTHALMOLOGY | Facility: CLINIC | Age: 59
End: 2019-10-14

## 2019-10-14 NOTE — TELEPHONE ENCOUNTER
[facility] Call Center    Phone Message    May a detailed message be left on voicemail: yes    Reason for Call: Other: Spencer Martinez at [facility] is requesting the most recent exam results for the Pt to be faxed to them at [phone]. Thanks     Action Taken: Message routed to:  [facility]

## 2019-10-16 ENCOUNTER — OFFICE VISIT (OUTPATIENT)
Dept: PHYSICAL MEDICINE AND REHAB | Facility: CLINIC | Age: 59
End: 2019-10-16
Payer: COMMERCIAL

## 2019-10-16 ENCOUNTER — OFFICE VISIT (OUTPATIENT)
Dept: FAMILY MEDICINE | Facility: CLINIC | Age: 59
End: 2019-10-16
Payer: COMMERCIAL

## 2019-10-16 VITALS
DIASTOLIC BLOOD PRESSURE: 81 MMHG | SYSTOLIC BLOOD PRESSURE: 121 MMHG | HEART RATE: 44 BPM | OXYGEN SATURATION: 98 % | TEMPERATURE: 97.5 F

## 2019-10-16 VITALS — HEIGHT: 68 IN | BODY MASS INDEX: 30.31 KG/M2 | WEIGHT: 200 LBS

## 2019-10-16 DIAGNOSIS — Z99.3 USES POWERED WHEELCHAIR: ICD-10-CM

## 2019-10-16 DIAGNOSIS — R05.9 COUGH: Primary | ICD-10-CM

## 2019-10-16 DIAGNOSIS — G82.50 QUADRIPLEGIA (H): ICD-10-CM

## 2019-10-16 DIAGNOSIS — Z01.818 PREOP GENERAL PHYSICAL EXAM: ICD-10-CM

## 2019-10-16 DIAGNOSIS — Z78.9 IMPAIRED MOBILITY AND ADLS: ICD-10-CM

## 2019-10-16 DIAGNOSIS — N31.9 NEUROGENIC BLADDER: ICD-10-CM

## 2019-10-16 DIAGNOSIS — Z98.890 HISTORY OF SUPRAPUBIC CATHETER: ICD-10-CM

## 2019-10-16 DIAGNOSIS — Z00.00 HEALTH CARE MAINTENANCE: ICD-10-CM

## 2019-10-16 DIAGNOSIS — Z74.09 IMPAIRED MOBILITY AND ADLS: ICD-10-CM

## 2019-10-16 DIAGNOSIS — L02.219 SUPRAPUBIC ABSCESS: ICD-10-CM

## 2019-10-16 DIAGNOSIS — K59.2 NEUROGENIC BOWEL: ICD-10-CM

## 2019-10-16 RX ORDER — LEVOFLOXACIN 500 MG/1
500 TABLET, FILM COATED ORAL DAILY
Qty: 7 TABLET | Refills: 0 | Status: SHIPPED | OUTPATIENT
Start: 2019-10-16 | End: 2020-01-10

## 2019-10-16 ASSESSMENT — MIFFLIN-ST. JEOR: SCORE: 1706.69

## 2019-10-16 ASSESSMENT — PAIN SCALES - GENERAL: PAINLEVEL: NO PAIN (0)

## 2019-10-16 NOTE — PATIENT INSTRUCTIONS
[facility] Medication Refill Request Information:  * Please contact your pharmacy regarding ANY request for medication refills.  ** [facility] Prescription Fax = [phone]  * Please allow 3 business days for routine medication refills.  * Please allow 5 business days for controlled substance medication refills.     [facility] Test Result notification information:  *You will be notified with in 7-10 days of your appointment day regarding the results of your test.  If you are on MyChart you will be notified as soon as the provider has reviewed the results and signed off on them.    [facility]: [phone]
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
## 2019-10-16 NOTE — NURSING NOTE
Chief Complaint   Patient presents with     Consult     Spinal Cord    Medications reviewed and vital signs taken.   Harvey Lucero CMA

## 2019-10-16 NOTE — PROGRESS NOTES
Surgeon: Dr. Obi Uriostegui  Location of the surgery: [facility] OR  Date of the surgery: 11/15/2019  Procedure: Cystolitholapaxy  History of reaction to anesthesia?:  NO  Jason Long MD MD
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
## 2019-10-16 NOTE — NURSING NOTE
Chief Complaint   Patient presents with     Pre-Op Exam     pt here pre-op and flu shot       Luis Alberto Ho CMA, EMT at 2:06 PM on 10/16/2019.

## 2019-10-18 ENCOUNTER — THERAPY VISIT (OUTPATIENT)
Dept: OCCUPATIONAL THERAPY | Facility: CLINIC | Age: 59
End: 2019-10-18
Payer: COMMERCIAL

## 2019-10-18 ENCOUNTER — ALLIED HEALTH/NURSE VISIT (OUTPATIENT)
Dept: INTERNAL MEDICINE | Facility: CLINIC | Age: 59
End: 2019-10-18
Payer: COMMERCIAL

## 2019-10-18 DIAGNOSIS — M24.541 CONTRACTURE OF JOINT OF BOTH HANDS: ICD-10-CM

## 2019-10-18 DIAGNOSIS — Z23 NEED FOR VACCINATION: Primary | ICD-10-CM

## 2019-10-18 DIAGNOSIS — M24.542 CONTRACTURE OF JOINT OF BOTH HANDS: ICD-10-CM

## 2019-10-18 DIAGNOSIS — Z01.818 PREOP GENERAL PHYSICAL EXAM: ICD-10-CM

## 2019-10-18 LAB
ANION GAP SERPL CALCULATED.3IONS-SCNC: 5 MMOL/L (ref 3–14)
BASOPHILS # BLD AUTO: 0 10E9/L (ref 0–0.2)
BASOPHILS NFR BLD AUTO: 0.6 %
BUN SERPL-MCNC: 13 MG/DL (ref 7–30)
CALCIUM SERPL-MCNC: 8.4 MG/DL (ref 8.5–10.1)
CHLORIDE SERPL-SCNC: 103 MMOL/L (ref 94–109)
CO2 SERPL-SCNC: 31 MMOL/L (ref 20–32)
CREAT SERPL-MCNC: 0.33 MG/DL (ref 0.66–1.25)
DIFFERENTIAL METHOD BLD: NORMAL
EOSINOPHIL # BLD AUTO: 0.1 10E9/L (ref 0–0.7)
EOSINOPHIL NFR BLD AUTO: 1.9 %
ERYTHROCYTE [DISTWIDTH] IN BLOOD BY AUTOMATED COUNT: 13.5 % (ref 10–15)
GFR SERPL CREATININE-BSD FRML MDRD: >90 ML/MIN/{1.73_M2}
GLUCOSE SERPL-MCNC: 101 MG/DL (ref 70–99)
HCT VFR BLD AUTO: 44.2 % (ref 40–53)
HGB BLD-MCNC: 14.2 G/DL (ref 13.3–17.7)
IMM GRANULOCYTES # BLD: 0 10E9/L (ref 0–0.4)
IMM GRANULOCYTES NFR BLD: 0.3 %
LYMPHOCYTES # BLD AUTO: 0.9 10E9/L (ref 0.8–5.3)
LYMPHOCYTES NFR BLD AUTO: 13.4 %
MCH RBC QN AUTO: 31 PG (ref 26.5–33)
MCHC RBC AUTO-ENTMCNC: 32.1 G/DL (ref 31.5–36.5)
MCV RBC AUTO: 97 FL (ref 78–100)
MONOCYTES # BLD AUTO: 0.7 10E9/L (ref 0–1.3)
MONOCYTES NFR BLD AUTO: 10.9 %
NEUTROPHILS # BLD AUTO: 4.9 10E9/L (ref 1.6–8.3)
NEUTROPHILS NFR BLD AUTO: 72.9 %
NRBC # BLD AUTO: 0 10*3/UL
NRBC BLD AUTO-RTO: 0 /100
PLATELET # BLD AUTO: 202 10E9/L (ref 150–450)
POTASSIUM SERPL-SCNC: 4 MMOL/L (ref 3.4–5.3)
RBC # BLD AUTO: 4.58 10E12/L (ref 4.4–5.9)
SODIUM SERPL-SCNC: 139 MMOL/L (ref 133–144)
WBC # BLD AUTO: 6.7 10E9/L (ref 4–11)

## 2019-10-18 PROCEDURE — 97760 ORTHOTIC MGMT&TRAING 1ST ENC: CPT | Mod: GO | Performed by: OCCUPATIONAL THERAPIST

## 2019-10-18 NOTE — PROGRESS NOTES
Hand Therapy Initial Evaluation    Current Date:  10/18/2019      Diagnosis: Tetraplegia, C5-6 SCI  DOI: 4/2/19  (MD VISIT)  DOI:  9.6.81 Initial injury date    Referring MD: Jason Long MD      Initial Subjective:  Brat Corea is a 59 year old right hand dominant male.  Bart is a patient well known to this clinic.  Patient reports needing replacement bilateral forearm based resting orthoses   Since onset symptoms are Unchanged.  Special tests:  none.  Previous treatment: Hand therapy for orthoses and adaptive equipment.    General health as reported by patient is good.  Pertinent medical history includes:quadraplegic  Medical allergies:See EMR  Surgical history: orthopedic: SCI cervical fusion, multilple medical procedures, see EMR.  Medication history: sleep, muscle relaxants.  Additional Occupational Profile Information (patterns of daily living, interests, values and needs): . Pt reports difficulty with bathing/showering, toileting, dressing, feeding, functional mobility, personal device care, hygiene and grooming, driving and community mobility, health management and maintenance, home establishment and management, meal preparation and cleanup and sleep.     Occupational Profile Information:  Current occupation is , mechanical  Job Tasks: prolonged sitting, pushing/pulling, repetitive tasks, computer work  Barriers include:transportation, requires assistance with dressing, personal hygiene, transfers, mobility, has a  with his own van.   Prior functional level:  semi-supervised setting, personal care attendant  Living situation: lives with their spouse  Additional work context information: Employed - occupation - , working full time  Mobility: No difficulty getting around home and community  With electric w/c  Transportation: Unable to drive car for transportation due to current injury, Has a  with own van,   Caregiver for: none others  Values / Spirituality:  Patient identifies with ashley [employer]  Daily routine: works full time, likes sports  Leisure activities / hobbies: sports participant from the [facility]        Functional Outcome Measure:   See flowsheet    Objective:    PAIN 10/18/2019     Pt reports no pain in his arms      Pt seen only for fabrication of replacement bilateral FA resting splints, due to fracturing and aging of previous orthoses which he wears nightly for prevention of further fingers and wrist contractures    Sensation: deficits in UEs due to Tetraplegia      Assessment:   Patient presents with symptoms consistent with above diagnosis,  with conservative intervention.     Patient's limitations or Problem List includes:  Decreased coordination of the bilateral elbow, wrist and hand  which interferes with the patient's ability to perform Self Care Tasks (dressing, eating, bathing, hygiene/toileting), Work Tasks, Sleep Patterns, Recreational Activities, Household Chores and Driving  as compared to previous level of function.    Rehab Potential:  Poor - Return to restricted activity    Patient will benefit from skilled Occupational Therapy to increase coordination to return to previous activity level and resume normal daily tasks and to reach their rehab potential.    Barriers to Learning:  No barrier    Communication Issues:  Patient appears to be able to clearly communicate and understand verbal and written communication and follow directions correctly.      Chart Review: Chart Review, Brief history including review of medical and/or therapy records relating to the presenting problem and Simple history review with patient    Identified Performance Deficits: bathing/showering, toileting, dressing, feeding, functional mobility, personal device care, hygiene and grooming, driving and community mobility, health management and maintenance, home establishment and management, meal preparation and cleanup, Jehovah's witness and spiritual activities and  expression, shopping, sleep, work and leisure activities    Assessment of Occupational Performance:  5 or more Performance Deficits    Clinical Decision Making (Complexity): Low complexity      Treatment Explanation:  The following has been discussed with the patient:  RX ordered/plan of care  Anticipated outcomes  Possible risks and side effects        Treatment Plan:   Frequency:  1 x visit  Duration:  NA; 1 x visit    Orthotic Fabrication: Static Forearm based resting hand orthosis bilateral      Discharge Plan:  Achieve all LTG.  Independent in home treatment program.  Reach maximal therapeutic benefit.

## 2019-10-18 NOTE — PROGRESS NOTES
Bart Corea comes into clinic today at the request of Dr. Long Ordering Provider for Med Injection only shingles vaccine and previously ordered EKG.    This service provided today was under the supervising provider of the day Dr. Long, who was available if needed.    Luis Alberto Ho, EMT.

## 2019-10-21 LAB — INTERPRETATION ECG - MUSE: NORMAL

## 2019-10-31 DIAGNOSIS — G82.50 QUADRIPLEGIA (H): ICD-10-CM

## 2019-10-31 DIAGNOSIS — R06.02 SOB (SHORTNESS OF BREATH): ICD-10-CM

## 2019-11-01 RX ORDER — CHOLECALCIFEROL (VITAMIN D3) 25 MCG
TABLET ORAL
Qty: 90 TABLET | Refills: 2 | Status: SHIPPED | OUTPATIENT
Start: 2019-11-01 | End: 2020-12-02

## 2019-11-01 RX ORDER — ALBUTEROL SULFATE 0.83 MG/ML
SOLUTION RESPIRATORY (INHALATION)
Qty: 75 ML | Refills: 1 | Status: SHIPPED | OUTPATIENT
Start: 2019-11-01 | End: 2021-02-08

## 2019-11-11 ENCOUNTER — HOSPITAL ENCOUNTER (OUTPATIENT)
Dept: LAB | Facility: CLINIC | Age: 59
Discharge: HOME | End: 2019-11-11
Attending: UROLOGY | Admitting: UROLOGY
Payer: COMMERCIAL

## 2019-11-11 DIAGNOSIS — Z01.818 PREOP GENERAL PHYSICAL EXAM: ICD-10-CM

## 2019-11-11 DIAGNOSIS — Z01.818 PREOP GENERAL PHYSICAL EXAM: Primary | ICD-10-CM

## 2019-11-11 LAB
ALBUMIN UR-MCNC: NEGATIVE MG/DL
APPEARANCE UR: CLEAR
BILIRUB UR QL STRIP: NEGATIVE
COLOR UR AUTO: YELLOW
GLUCOSE UR STRIP-MCNC: NEGATIVE MG/DL
HGB UR QL STRIP: NEGATIVE
KETONES UR STRIP-MCNC: NEGATIVE MG/DL
LEUKOCYTE ESTERASE UR QL STRIP: ABNORMAL
MUCOUS THREADS #/AREA URNS LPF: PRESENT /LPF
NITRATE UR QL: NEGATIVE
PH UR STRIP: 6.5 PH (ref 5–7)
RBC #/AREA URNS AUTO: 4 /HPF (ref 0–2)
SOURCE: ABNORMAL
SP GR UR STRIP: 1.01 (ref 1–1.03)
TRANS CELLS #/AREA URNS HPF: 24 /HPF (ref 0–1)
UROBILINOGEN UR STRIP-MCNC: NORMAL MG/DL (ref 0–2)
WBC #/AREA URNS AUTO: 49 /HPF (ref 0–5)

## 2019-11-11 PROCEDURE — 87086 URINE CULTURE/COLONY COUNT: CPT | Performed by: UROLOGY

## 2019-11-11 PROCEDURE — 87088 URINE BACTERIA CULTURE: CPT | Performed by: UROLOGY

## 2019-11-11 PROCEDURE — 81001 URINALYSIS AUTO W/SCOPE: CPT | Performed by: UROLOGY

## 2019-11-11 PROCEDURE — 87186 SC STD MICRODIL/AGAR DIL: CPT | Performed by: UROLOGY

## 2019-11-14 ENCOUNTER — MYC MEDICAL ADVICE (OUTPATIENT)
Dept: FAMILY MEDICINE | Facility: CLINIC | Age: 59
End: 2019-11-14

## 2019-11-15 ENCOUNTER — ANESTHESIA (OUTPATIENT)
Dept: SURGERY | Facility: AMBULATORY SURGERY CENTER | Age: 59
End: 2019-11-15

## 2019-11-15 ENCOUNTER — ANCILLARY PROCEDURE (OUTPATIENT)
Dept: GENERAL RADIOLOGY | Facility: CLINIC | Age: 59
End: 2019-11-15
Attending: NURSE PRACTITIONER
Payer: COMMERCIAL

## 2019-11-15 ENCOUNTER — OFFICE VISIT (OUTPATIENT)
Dept: FAMILY MEDICINE | Facility: CLINIC | Age: 59
End: 2019-11-15
Payer: COMMERCIAL

## 2019-11-15 ENCOUNTER — ANESTHESIA EVENT (OUTPATIENT)
Dept: SURGERY | Facility: AMBULATORY SURGERY CENTER | Age: 59
End: 2019-11-15

## 2019-11-15 ENCOUNTER — HOSPITAL ENCOUNTER (OUTPATIENT)
Facility: AMBULATORY SURGERY CENTER | Age: 59
End: 2019-11-15
Attending: UROLOGY
Payer: COMMERCIAL

## 2019-11-15 VITALS
TEMPERATURE: 97.6 F | HEART RATE: 50 BPM | OXYGEN SATURATION: 99 % | BODY MASS INDEX: 30.31 KG/M2 | WEIGHT: 200 LBS | HEIGHT: 68 IN | SYSTOLIC BLOOD PRESSURE: 96 MMHG | RESPIRATION RATE: 16 BRPM | DIASTOLIC BLOOD PRESSURE: 59 MMHG

## 2019-11-15 VITALS
HEART RATE: 50 BPM | DIASTOLIC BLOOD PRESSURE: 48 MMHG | TEMPERATURE: 97.6 F | OXYGEN SATURATION: 95 % | SYSTOLIC BLOOD PRESSURE: 76 MMHG

## 2019-11-15 DIAGNOSIS — N31.9 NEUROGENIC BLADDER: ICD-10-CM

## 2019-11-15 DIAGNOSIS — M79.662 PAIN IN LEFT LOWER LEG: ICD-10-CM

## 2019-11-15 DIAGNOSIS — M79.662 PAIN IN LEFT LOWER LEG: Primary | ICD-10-CM

## 2019-11-15 DIAGNOSIS — N21.0 BLADDER STONES: ICD-10-CM

## 2019-11-15 DIAGNOSIS — I95.81 POSTPROCEDURAL HYPOTENSION: ICD-10-CM

## 2019-11-15 DIAGNOSIS — T83.010A SUPRAPUBIC CATHETER DYSFUNCTION, INITIAL ENCOUNTER (H): ICD-10-CM

## 2019-11-15 LAB
BACTERIA SPEC CULT: ABNORMAL
BACTERIA SPEC CULT: ABNORMAL
Lab: ABNORMAL
SPECIMEN SOURCE: ABNORMAL

## 2019-11-15 RX ORDER — ONDANSETRON 2 MG/ML
4 INJECTION INTRAMUSCULAR; INTRAVENOUS EVERY 30 MIN PRN
Status: CANCELLED | OUTPATIENT
Start: 2019-11-15

## 2019-11-15 RX ORDER — KETOROLAC TROMETHAMINE 30 MG/ML
30 INJECTION, SOLUTION INTRAMUSCULAR; INTRAVENOUS ONCE
Status: COMPLETED | OUTPATIENT
Start: 2019-11-15 | End: 2019-11-15

## 2019-11-15 RX ORDER — NALOXONE HYDROCHLORIDE 0.4 MG/ML
.1-.4 INJECTION, SOLUTION INTRAMUSCULAR; INTRAVENOUS; SUBCUTANEOUS
Status: CANCELLED | OUTPATIENT
Start: 2019-11-15 | End: 2019-11-16

## 2019-11-15 RX ORDER — ONDANSETRON 4 MG/1
4 TABLET, ORALLY DISINTEGRATING ORAL EVERY 30 MIN PRN
Status: CANCELLED | OUTPATIENT
Start: 2019-11-15

## 2019-11-15 RX ORDER — OXYCODONE HYDROCHLORIDE 5 MG/1
5 TABLET ORAL EVERY 4 HOURS PRN
Status: CANCELLED | OUTPATIENT
Start: 2019-11-15

## 2019-11-15 RX ORDER — SODIUM CHLORIDE, SODIUM LACTATE, POTASSIUM CHLORIDE, CALCIUM CHLORIDE 600; 310; 30; 20 MG/100ML; MG/100ML; MG/100ML; MG/100ML
INJECTION, SOLUTION INTRAVENOUS CONTINUOUS
Status: CANCELLED | OUTPATIENT
Start: 2019-11-15

## 2019-11-15 RX ORDER — MEPERIDINE HYDROCHLORIDE 25 MG/ML
12.5 INJECTION INTRAMUSCULAR; INTRAVENOUS; SUBCUTANEOUS
Status: CANCELLED | OUTPATIENT
Start: 2019-11-15

## 2019-11-15 RX ADMIN — KETOROLAC TROMETHAMINE 30 MG: 30 INJECTION, SOLUTION INTRAMUSCULAR; INTRAVENOUS at 12:20

## 2019-11-15 ASSESSMENT — MIFFLIN-ST. JEOR: SCORE: 1706.69

## 2019-11-15 ASSESSMENT — ENCOUNTER SYMPTOMS
LEG PAIN: 1
FEVER: 0
FATIGUE: 1
CHILLS: 0

## 2019-11-15 ASSESSMENT — PAIN SCALES - GENERAL: PAINLEVEL: NO PAIN (0)

## 2019-11-15 NOTE — PROGRESS NOTES
Called earlier today for assistance due to hypotension in the OR. Patient was undergoing cystolitholapaxy in the OR under local anesthesia without anesthesia involvement. Patient's BP reading in OR was 36/24 and I was called by OR team for assistance. Upon arrival, patient alert and talking but noted to be flushed in the upper extremities and face. Patient denied dizziness, shortness of breath, or chest pain. Patient had strong radial pulse. BP cuff moved to forearm and re-checked with reading of 204/100. I called for help and Katina Henderson CRNA, quickly arrived. Patient noted not to have IV so I placed a 20 gauge IV in left AC. Hydralazine 20 mg administered and BP came down to 140s/80s. Patient reported feeling better but noted a headache. Patient monitored in OR for about 10 minutes and blood pressure remained stable. Discussion had with Dr. Uriostegui and Urology team regarding case. I raised my concerns of patient's possible challenging airway based on exam and my concerns about continuing case and I recommended cancelling procedure. In addition, I think that patient's future cases be performed at [facility] where patient can be admitted if needed and where full supply of medications and airway equipment are available if anything were to happen. In the PACU, I discussed this with the patient and his wife who reported understanding and were in agreement with plan.     Bart Mendoza MD   Anesthesiology

## 2019-11-15 NOTE — PROGRESS NOTES
"       HPI       Bart Corea is a 59 year old man with a PMHx of Asthma, smoker, Type 2 DM, Hypertension, Seizures, Stroke, LIZA, Quadriplegia, C1-C4 incomplete (H); heart murmur. He presents with left leg and foot pain after an injury at work yesterday. He was backing his wheelchair up in the aisle when his left foot rest was down and his left leg was caught on the end of the aisle shelf. When he has pain, he feels a \"twinge in his head\" and this happened. He knew he was going to have a procedure here today, so he waited till now to present for evaluation of this left lower leg.   Next, he did have a procedure done today to remove a kidney stone. He reports having a medicine to lower his blood pressure after an episode of autonomic dysreflexia. His blood pressure was stable after the procedure with a systolic of 110. His blood pressure here is 76 systolic. He does report feeling a bit sleepy.   Chief Complaint   Patient presents with     Musculoskeletal Problem     Pt is here to discuss twisted L ankle.      Problem, Medication and Allergy Lists were reviewed and updated if needed.    Patient is an established patient of this clinic.         Review of Systems:   Review of Systems     Constitutional:  Positive for fatigue. Negative for fever and chills.   Cardiovascular:  Positive for leg pain.               Physical Exam:     Vitals:    11/15/19 1314   BP: (!) 76/48   Pulse: 50   Temp: 97.6  F (36.4  C)   SpO2: 95%     There is no height or weight on file to calculate BMI.  Vitals were reviewed and were normal     Physical Exam  Constitutional:       Appearance: Normal appearance.   HENT:      Head: Normocephalic.   Musculoskeletal:         General: Swelling present.      Comments: Quadriplegic.    Skin:     General: Skin is warm and dry.      Findings: Bruising present.             Comments: I did not observe this leg as patient was concerned about replacing the compression stocking. Wife reports " "swelling and bruising on left latera leg.    Neurological:      General: No focal deficit present.      Mental Status: He is alert and oriented to person, place, and time.   Psychiatric:         Mood and Affect: Mood normal.         Behavior: Behavior normal.         Results:     X rays pending.     Assessment and Plan     1. Pain in left lower leg    - XR Foot Left 2 Views; Future  - XR Tibia & Fibula Left 2 Views; Future    2. Postprocedural hypotension    There are no discontinued medications.  First, have an x ray of your left leg and foot.Next,  I reviewed procedure note and could not find a medication given to \"lower blood pressure\" as patient explained that he received. I also tried to reach out to nurse on case and was unable to reach her. Next, go home and monitor Blood pressure closely. Eat and drink something. Present to ER with Consistent BP lower than 80/50. He and wife, Sarah, verbalize understanding of the plan. Options for treatment and follow-up care were reviewed with the patient. Bart Corea  engaged in the decision making process and verbalized understanding of the options discussed and agreed with the final plan.  JC Almonte, CNP  Addendum 1630-I have not been able to locate the medication that he received in the OR. Jc is not able to get a reading on his electronic bp machine. I am recommending that he be seen in the ER. Jc and Sarah feel that his blood pressure will improve, they will continue to monitor. I also shared that his x rays were negative for fracture.   Addendum 1700-I called and talked with Jc, they are still unable to get a blood pressure. I again recommended that he be seen in the ER. Jc and Sarah will discuss this and arrange transportation as able.   Addendum 1730-I learned from a note by that Jc Mendoza MD, that the patient had received 20 mg Hydralazine IV. I called to talk with the pharmacy on the half life, 15 to 35 hours; the medicine " should be gone. I called Jc and Sarah back and they area aware that this medication may not be cleared for a few days due to Jc's quadraplegia. They will montior him and get him to the ER with continued low blood pressures, change of mentation, or any other signs that are concerning.   JC Almonte, CNP  Addendum 11/18/2019 0750: I called Jc, he is feeling a lot better. He is actually going to work today. He thanked me for the call. He will return for follow up as needed.   JC Almonte, CNP

## 2019-11-15 NOTE — PATIENT INSTRUCTIONS

## 2019-11-16 NOTE — OP NOTE
OPERATIVE REPORT    PREOPERATIVE DIAGNOSIS:  Neurogenic bladder    POSTOPERATIVE DIAGNOSIS: Same    PROCEDURES PERFORMED:   1. Cystourethroscopy    STAFF SURGEON: Dr. Obi Uriostegui MD  RESIDENT(S):  Etelvina Maradiaga MD    ANESTHESIA: Local    ESTIMATED BLOOD LOSS: 0 mL.     DRAINS: Suprapubic catheter, replaced    SIGNIFICANT FINDINGS:  1. Small trabeculated bladder  2. Difficult visualization  3. Patient developed autonomic dysreflexia with severe BP elevation. Anesthesia team was ultimately able to manage this with A/Ppropriate medications but recommended cancelling the procedure given the high risk for AD with further stimulation and the lack of facilities to manage this if pt continues to decompensate     OPERATIVE INDICATIONS:   Bart Corea is a(n) 59 year old male with a history of neurogenic bladder due to C5 SCI and new diagnosis of bladder stones on surveillance cystoscopy. The patient was counseled on the alternatives, risks, and benefits and elected to proceed with the above stated procedure.    DESCRIPTION OF PROCEDURE:   After verification of informed consent was obtained, the patient was brought to the operating room, and moved to the operating table. After adequate anesthesia was induced, the patient was repositioned in the lithotomy position and prepped and draped in the usual sterile fashion. A formal timeout was performed to confirm the correct patient, procedure and operative site.     Given the patient's history of AD, we kept his SPT to drainage while performing cystoscopy per urethra. This was done using the 22F rigid cystoscope. On entry into the bladder, we noted it to be small, trabeculated and quite erythematous. The patient then started c/o severe headache and chills with our telemetry noting BP elevation up to 200/100. We therefore drained the bladder through the scope and emptied it. The patient's blood pressure remained elevated and so the anesthesia team placed an IV and  provided IV hydralazine, which improved the BP down to 140/80 . However, given the rapid onset of this episode and its severity, the anesthesia team felt that it would be safest to abort the procedure and have it done at the [facility], which would have the necessary staff and facilities if this does occur again. This was explained to the patient by the anesthesia team and our team and he stated understanding of the plan. The case ws then aborted and the patient was taken back to the PACU for further recvovery.     POSTOP PLAN:  1. D/c home today  2. Reschedule procedure at the [facility]    As the attending surgeon I, Obi Uriostegui, was present and scrubbed throughout the procedure.

## 2019-11-27 ENCOUNTER — TELEPHONE (OUTPATIENT)
Dept: UROLOGY | Facility: CLINIC | Age: 59
End: 2019-11-27

## 2019-11-27 DIAGNOSIS — N31.9 NEUROGENIC BLADDER: Primary | ICD-10-CM

## 2019-11-27 DIAGNOSIS — N21.0 BLADDER STONES: ICD-10-CM

## 2019-11-27 DIAGNOSIS — T83.010A SUPRAPUBIC CATHETER DYSFUNCTION, INITIAL ENCOUNTER (H): ICD-10-CM

## 2019-11-27 RX ORDER — CEFAZOLIN SODIUM 2 G/50ML
2 SOLUTION INTRAVENOUS
Status: CANCELLED | OUTPATIENT
Start: 2019-11-27

## 2019-11-27 RX ORDER — CEFAZOLIN SODIUM 1 G/50ML
1 INJECTION, SOLUTION INTRAVENOUS SEE ADMIN INSTRUCTIONS
Status: CANCELLED | OUTPATIENT
Start: 2019-11-27

## 2019-11-27 NOTE — TELEPHONE ENCOUNTER
Called and left a voicemail regarding surgery orders. Left my direct number for a call back. [phone]

## 2019-11-29 ENCOUNTER — DOCUMENTATION ONLY (OUTPATIENT)
Dept: FAMILY MEDICINE | Facility: CLINIC | Age: 59
End: 2019-11-29

## 2019-11-29 NOTE — PROGRESS NOTES
[facility] now requests orders and shares plan of care/discharge summaries for some patients through [employer].  Please REPLY TO THIS MESSAGE OR ROUTE BACK TO THE AUTHOR in order to give authorization for orders when needed.  This is considered a verbal order, you will still receive a faxed copy of orders for signature.  Thank you for your assistance in improving collaboration for our patients.    ORDER    Patient seen today for 60 day recertification for service with [facility]. Requesting the following Home Care orders to start with new certification period beginning 12/1/2019:     SN  2 month 1         3 month 1         3 PRN     Treatment order as below:     Okay to change SP catheter every two weeks. Catheter is a 16 FR with  8 cc syringe. Irrigate with 60cc normal saline daily and as needed. Caregiver can irrigate catheter on non-nursing days.       Thank you,    Marva COLLAZO Case Manager  [facility]  [phone]  [email or web address]

## 2019-12-01 ENCOUNTER — MEDICAL CORRESPONDENCE (OUTPATIENT)
Dept: HEALTH INFORMATION MANAGEMENT | Facility: CLINIC | Age: 59
End: 2019-12-01

## 2019-12-02 ENCOUNTER — TELEPHONE (OUTPATIENT)
Dept: UROLOGY | Facility: CLINIC | Age: 59
End: 2019-12-02

## 2019-12-02 ENCOUNTER — PREP FOR PROCEDURE (OUTPATIENT)
Dept: UROLOGY | Facility: CLINIC | Age: 59
End: 2019-12-02

## 2019-12-02 PROBLEM — T83.010A SUPRAPUBIC CATHETER DYSFUNCTION, INITIAL ENCOUNTER (H): Status: ACTIVE | Noted: 2019-12-02

## 2019-12-02 PROBLEM — N21.0 BLADDER STONES: Status: ACTIVE | Noted: 2019-12-02

## 2019-12-02 NOTE — TELEPHONE ENCOUNTER
Patient is scheduled for surgery with Dr. Uriostegui      Spoke or left message with: spoke with patient in November regarding surgery. Was waiting for surgery orders to schedule    Date of Surgery: 1/16/20    Location: [place] OR    Informed patient they will need an adult  yes    Pre-op with surgeon (if applicable): n/a    H&P: Scheduled with pcp    Additional imaging/appointments: n/a    Surgery packet: mailed 12/2/19     Additional comments: n/a

## 2019-12-06 ENCOUNTER — MEDICAL CORRESPONDENCE (OUTPATIENT)
Dept: HEALTH INFORMATION MANAGEMENT | Facility: CLINIC | Age: 59
End: 2019-12-06

## 2019-12-06 ENCOUNTER — TRANSFERRED RECORDS (OUTPATIENT)
Dept: HEALTH INFORMATION MANAGEMENT | Facility: CLINIC | Age: 59
End: 2019-12-06

## 2019-12-27 ENCOUNTER — MYC MEDICAL ADVICE (OUTPATIENT)
Dept: FAMILY MEDICINE | Facility: CLINIC | Age: 59
End: 2019-12-27

## 2019-12-30 DIAGNOSIS — J20.9 BRONCHITIS WITH BRONCHOSPASM: ICD-10-CM

## 2019-12-30 DIAGNOSIS — R21 RASH AND OTHER NONSPECIFIC SKIN ERUPTION: ICD-10-CM

## 2019-12-30 DIAGNOSIS — E78.00 HIGH CHOLESTEROL: ICD-10-CM

## 2019-12-30 DIAGNOSIS — R25.2 SPASM: ICD-10-CM

## 2019-12-30 DIAGNOSIS — G82.50 QUADRIPLEGIA (H): Primary | ICD-10-CM

## 2019-12-30 DIAGNOSIS — R06.2 WHEEZING: ICD-10-CM

## 2020-01-03 RX ORDER — DOCUSATE SODIUM 100 MG/1
100 CAPSULE, LIQUID FILLED ORAL DAILY
Qty: 90 CAPSULE | Refills: 3 | Status: SHIPPED | OUTPATIENT
Start: 2020-01-03 | End: 2021-01-06

## 2020-01-07 ENCOUNTER — MEDICAL CORRESPONDENCE (OUTPATIENT)
Dept: HEALTH INFORMATION MANAGEMENT | Facility: CLINIC | Age: 60
End: 2020-01-07

## 2020-01-07 NOTE — TELEPHONE ENCOUNTER
FUTURE VISIT INFORMATION      SURGERY INFORMATION:    Date: 1/16/20    Location: [facility] OR    Surgeon:  Obi Uriostegui    Anesthesia Type:  General  Procedure:   LITHOTRIPSY, CALCULUS, BLADDER, USING HOLMIUM LASER N/A       Consult:  9/16/19- Obi Uriostegui- Urology     RECORDS REQUESTED FROM:       Primary Care Provider: Jason Long- [employer]    Pertinent Medical History: LIZA    Most recent EKG+ Tracing: 10/16/19    Most recent ECHO: 4/6/17    Most recent PFT's: 12/1/2008    Most recent Sleep Study:  12/2/14

## 2020-01-08 DIAGNOSIS — Z01.818 PREOP GENERAL PHYSICAL EXAM: Primary | ICD-10-CM

## 2020-01-10 ENCOUNTER — ALLIED HEALTH/NURSE VISIT (OUTPATIENT)
Dept: INTERNAL MEDICINE | Facility: CLINIC | Age: 60
End: 2020-01-10
Payer: COMMERCIAL

## 2020-01-10 ENCOUNTER — PRE VISIT (OUTPATIENT)
Dept: SURGERY | Facility: CLINIC | Age: 60
End: 2020-01-10

## 2020-01-10 ENCOUNTER — ANESTHESIA EVENT (OUTPATIENT)
Dept: SURGERY | Facility: CLINIC | Age: 60
End: 2020-01-10
Payer: COMMERCIAL

## 2020-01-10 ENCOUNTER — OFFICE VISIT (OUTPATIENT)
Dept: SURGERY | Facility: CLINIC | Age: 60
End: 2020-01-10
Payer: COMMERCIAL

## 2020-01-10 VITALS
SYSTOLIC BLOOD PRESSURE: 95 MMHG | HEART RATE: 46 BPM | HEIGHT: 68 IN | DIASTOLIC BLOOD PRESSURE: 62 MMHG | OXYGEN SATURATION: 99 % | TEMPERATURE: 97.5 F | RESPIRATION RATE: 16 BRPM | BODY MASS INDEX: 30.41 KG/M2

## 2020-01-10 DIAGNOSIS — N31.9 NEUROGENIC BLADDER: ICD-10-CM

## 2020-01-10 DIAGNOSIS — Z01.818 PRE-OP EVALUATION: Primary | ICD-10-CM

## 2020-01-10 DIAGNOSIS — N21.0 BLADDER STONES: ICD-10-CM

## 2020-01-10 DIAGNOSIS — Z23 NEED FOR VACCINATION: Primary | ICD-10-CM

## 2020-01-10 ASSESSMENT — LIFESTYLE VARIABLES: TOBACCO_USE: 1

## 2020-01-10 NOTE — PATIENT INSTRUCTIONS
Preparing for Your Surgery      Name:  Bart Corea   MRN:  [id number]   :  1960   Today's Date:  1/10/2020     Arriving for surgery:  Surgery date:  20  Arrival time:  1:15 pm    Please come to:     [facility] [address]  [address] [place]  [address]    - ? parking is available in front of the hospital      -    Please proceed to [address] on the 3rd floor. [phone]?     - ?If you are in need of directions, wheelchair or escort please stop at the Information Desk in the lobby.  Inform the information person that you are here for surgery; a wheelchair and escort to [address] will be provided.?     -  Bring your ID and insurance card.    - If you are scheduled to go home the Same Day as surgery you must have a responsible adult as a  and to stay with you overnight the first 24 hours after surgery.     What can I eat or drink?  -  You may have solid food or milk products until 8 hours prior to your surgery. (Until 7:15 am )  -  You may have water, apple juice or 7up/Sprite until 2 hours prior to your surgery. (Until 1:15 pm )    Which medicines can I take?       -  Do not bring your own medications to the hospital, except for inhaler.        -  Follow Urology Clinic instructions regarding Ibuprofen. If no instructions given, NO Ibuprofen the day prior to surgery.             -  Hold Naproxen (Aleve) 2 days prior to surgery.        -  Hold Supplements, Multivitamins, Aspirin, and Aspirin products for 7 days prior to surgery.    -  Do NOT take these medications in the morning, the day of surgery:  Docusate, Senokot, Vitamin D3, Vitamin C  No creams, lotions, or gels on the skin.    -  Please take these medications the morning of surgery:  Baclofen, Diazepam (Valium), Minocycline, Omeprazole, Oxybutynin,   Amlodipine if needed  Albuterol inhaler/neb if needed  Oxycodone (Percocet) OR Acetaminophen (Tylenol) if needed    How do I prepare  myself?  -  Take two showers: one the night before surgery; and one the morning of surgery.         Use Scrubcare or Hibiclens to wash from neck down.  You may use your own shampoo and conditioner. No other hair products.   -  Do NOT use lotion, powder, colognes, deodorant, or antiperspirant the day of your surgery.  -  Do NOT wear any jewelry.    Questions or Concerns:  If you have questions or concerns prior to your surgery, call [phone]. (Mon - Fri   8 am- 5:30 pm)  Questions about surgery, contact your Surgeons office.  If you have any health changes prior to surgery, please notify your Surgeon.    AFTER YOUR SURGERY  Breathing exercises   Breathing exercises help you recover faster. Take deep breaths and let the air out slowly. This will:     Help you wake up after surgery.    Help prevent complications like pneumonia.  Preventing complications will help you go home sooner.   We may give you a breathing device (incentive spirometer) to encourage you to breathe deeply.   Nausea and vomiting   You may feel sick to your stomach after surgery; if so, let your nurse know.    Pain control:  After surgery, you may have pain. Our goal is to help you manage your pain. Pain medicine will help you feel comfortable enough to do activities that will help you heal.  These activities may include breathing exercises, walking and physical therapy.   To help your health care team treat your pain we will ask: 1) If you have pain  2) where it is located 3) describe your pain in your words  Methods of pain control include medications given by mouth, vein or by nerve block for some surgeries.  Sequential Compression Device (SCD) or Pneumo Boots:  You may need to wear SCD S on your legs or feet. These are wraps connected to a machine that pumps in air and releases it. The repeated pumping helps prevent blood clots from forming.

## 2020-01-10 NOTE — NURSING NOTE
Chief Complaint   Patient presents with     Imm/Inj     2nd Shingrix and TD vaccines       Bart Corea comes into clinic today at the request of Jason Long MD for 2nd dose of Shingle Shingrix vaccine and Td vaccine.    Administered 2nd dose of Shingle Shingrix vaccine and Td vaccine (see Immunizations in Chart Review). Patient tolerated well.      This service provided today was under the direct supervision of Jason Long MD, who was available if needed.      Fco Cano CMA at 11:03 AM on 1/10/2020

## 2020-01-10 NOTE — H&P
Pre-Operative H & P     CC:  Preoperative exam to assess for increased cardiopulmonary risk while undergoing surgery and anesthesia.    Date of Encounter: 1/10/2020  Primary Care Physician:  Jason Long  associated diagnosis: neurogenic bladder, bladder stones    HPI  Bart Corea is a 59 year old male who presents for pre-operative H & P in preparation for LITHOTRIPSY, CALCULUS, BLADDER, USING HOLMIUM LASER with Dr. Uriostegui on 1/16/20 at [facility]. Patient is being evaluated for comorbid conditions of asthma, LIZA, current tobacco use disorder, quadriplegia     Mr. Corea has a neurogenic bladder secondary to C5 spinal cord injury. He uses a suprapubic catheter.  He has been experiencing trouble with drainage around the site. Additionally, he has been diagnosed with recurrent UTIs.  He underwent cystoscopy 9/15/19 which revealed bladder stones. He underwent cystourethroscopy 11/16/19 which was aborted due to autonomic dysreflexia with severe BP elevation up to 200/100 with plan to move procedure to [facility].  Above procedure planned.    History is obtained from the patient and chart review.      Past Medical History  Past Medical History:   Diagnosis Date     Asthma      Chronic infection     Bladder     Heart murmur      LIZA (obstructive sleep apnea) 12/3/2014     Quadriplegia, C1-C4 incomplete (H)        Past Surgical History  Past Surgical History:   Procedure Laterality Date     Bilateral ureteroscopy with holmium laser lithotripsy and basketing and removal of fragments  Left ureteral stent placement.  02/10/2010     COLONOSCOPY N/A 7/20/2018    Procedure: COMBINED COLONOSCOPY, SINGLE OR MULTIPLE BIOPSY/POLYPECTOMY BY BIOPSY;;  Surgeon: Grace Yang MD;  [facility]     CYSTOSCOPY, LITHOLAPAXY, COMBINED N/A 11/15/2019    Procedure: Cystolitholapaxy;  Surgeon: Obi Uriostegui MD;  [facility]     skin flap on bottom       sp tube absess  "surgery         Hx of Blood transfusions/reactions: Patient has a history of transfusion, but denies reactions     Hx of abnormal bleeding or anti-platelet use: denies    Menstrual history: No LMP for male patient.    Steroid use in the last year: prednisone dose pack in the fall    Personal or FH with difficulty with Anesthesia:  Patient had aborted procedure earlier this year due to high BPs (200/100) and autonomic dysreflexia symptoms    Prior to Admission Medications  Current Outpatient Medications   Medication Sig Dispense Refill     albuterol (PROVENTIL) (2.5 MG/3ML) 0.083% neb solution NEBULIZE 1 VIAL (2.5 MG) EVERY 6 HOURS AS NEEDED FOR SHORTNESS OF BREATH / DYSPNEA OR WHEEZING 75 mL 1     albuterol (VENTOLIN HFA) 108 (90 Base) MCG/ACT Inhaler Inhale 2 puffs into the lungs every 4 hours as needed 3 Inhaler 3     Alcohol Swabs (ALCOHOL PREP PADS) USE AS DIRECTED       amcinonide (CYCLOCORT) 0.1 % cream Apply topically 2 times daily as needed for itching 120 g 1     amLODIPine (NORVASC) 2.5 MG tablet Take 1 tablet (2.5 mg) by mouth daily as needed (autonomic dysreflexia) 30 tablet 1     Applicators (COTTON SWABS) SWAB Please dispense sterile wrapped cotton swabs; use up to five/day for medical needs 450 each 3     ascorbic acid (VITAMIN C) 1000 MG TABS Take 1 tablet by mouth daily       baclofen (LIORESAL) 20 MG tablet TAKE ONE TABLET BY MOUTH UP TO 6 TIMES DAILY 540 tablet 3     bisacodyl (DULCOLAX) 10 MG suppository Place 20 mg rectally every 3 days Active ingredient in Magic Bullet (10mg per suppository)       clindamycin (CLINDAMAX) 1 % topical gel Apply bid 60 g 11     diazepam (VALIUM) 10 MG tablet TAKE ONE TABLET BY MOUTH EVERY DAY. 90 tablet 1     docusate sodium (DOK) 100 MG capsule Take 1 capsule (100 mg) by mouth daily 90 capsule 3     Gauze Pads & Dressings (GAUZE BANDAGE 2\") MISC IV STERILE GUAZE Use up to 4/day Supply 360 for three month supply 360 each 3     Gauze Pads & Dressings (GAUZE " "DRESSING) 4\"X4\" PADS NON STERILE GAUZE Use up to 8/day  Supply 720 for three month supply 720 each 3     hydrocortisone (ANUSOL-HC) 2.5 % cream Bid prn 120 g 11     ibuprofen (ADVIL/MOTRIN) 800 MG tablet TAKE 1 TABLET BY MOUTH 3 TIMES DAILY AS NEEDED 60 tablet 11     Incontinence Supplies (URINARY LEG BAG) USE AS NEEDED       lidocaine (XYLOCAINE) 2 % jelly Apply  topically. APPLY AS DIRECTED       MAGIC BULLETS 10 MG Suppository Place 1 suppository (10 mg) rectally daily as needed for constipation 30 suppository 3     minocycline (MINOCIN/DYNACIN) 50 MG capsule TAKE 1 CAPSULE (50 MG) BY MOUTH 2 TIMES DAILY 180 capsule 3     Multiple Vitamins-Minerals (MULTIVITAMIN & MINERAL PO) Take 1 capsule by mouth daily.       nitroGLYcerin (NITRO-BID) 2 % OINT ointment Place 0.5-1 inches (7.5-15 mg) onto the skin every 24 hours as needed for autonomic dysreflexia symptoms.  Remove ointment with gloved hand once symptoms resolve. 30 g 0     nystatin (NYSTOP) 685577 UNIT/GM POWD APPLY 1 DOSE TOPICALLY 3 TIMES DAILY AS NEEDED 60 g 2     omeprazole (PRILOSEC) 20 MG CR capsule Take 1 capsule (20 mg) by mouth daily 30 capsule 1     order for DME Equipment being ordered: Urinary leg bag  Change three times a month  Dispense 3 3 Device 11     order for DME Equipment being ordered: Alcohol swabs  Use 4 a day for catheter change 120 Device 11     order for DME Equipment being ordered: Catheter 16 Fr, 8 ml balloon. 3 per month 3 each 11     order for DME Equipment being ordered: 60 ml catheter tip syringes (no needle), 30 per month 30 each 11     order for DME Equipment being ordered: Hospital Bed 1 each 0     order for DME Equipment being ordered: Compression stockings, 20-30 mm Hg strength, knee-high, appropriately sized 2 each 1     order for DME two leg bag straps and 2 mouth sticks 2 each 0     Ostomy Supplies (UROSTOMY NIGHT BAG) MISC Please dispense three night bags/month (three month worth at a time) with 4 refills re: needed for " chronic urinary dysfunction due to quadreplegia. Type: Bard 2000 ml. 9 each 3     oxybutynin ER (DITROPAN-XL) 10 MG 24 hr tablet Take 1 tablet (10 mg) by mouth daily 90 tablet 3     oxyCODONE-acetaminophen (PERCOCET) 5-325 MG per tablet Take 1-2 tablets by mouth every 4 hours as needed 30 tablet 0     sennosides (SENOKOT) 8.6 MG tablet TAKE 1 TABLET BY MOUTH DAILY 90 tablet 3     sodium chloride 0.9%, bottle, 0.9 % irrigation USE FOR URINARY CATHETER IRRIGATION  11     sodium chloride, PF, 0.9% PF flush Use for urinary catheter irrigation 500 mL 11     terbinafine (LAMISIL) 1 % cream Apply topically daily as needed        VITAMIN D3 25 MCG (1000 UT) tablet TAKE 1 TABLET (1,000 UNITS) BY MOUTH DAILY 90 tablet 2     zolpidem (AMBIEN) 10 MG tablet TAKE 0.5-1 TABLET BY MOUTH EVERY NIGHT AT BEDTIME AS NEEDED FOR SLEEP. 90 tablet 1       Allergies  Allergies   Allergen Reactions     Vancomycin Anaphylaxis       Social History  Social History     Socioeconomic History     Marital status:      Spouse name: Not on file     Number of children: Not on file     Years of education: Not on file     Highest education level: Not on file   Occupational History     Not on file   Social Needs     Financial resource strain: Not on file     Food insecurity:     Worry: Not on file     Inability: Not on file     Transportation needs:     Medical: Not on file     Non-medical: Not on file   Tobacco Use     Smoking status: Current Every Day Smoker     Packs/day: 0.30     Types: Cigarettes     Smokeless tobacco: Never Used     Tobacco comment: since early teens   Substance and Sexual Activity     Alcohol use: Yes     Comment: on weekends, 3-4+     Drug use: Yes     Types: Marijuana     Comment: occ marijuana for spasms     Sexual activity: Never   Lifestyle     Physical activity:     Days per week: Not on file     Minutes per session: Not on file     Stress: Not on file   Relationships     Social connections:     Talks on phone: Not on  file     Gets together: Not on file     Attends L[employer]-day service: Not on file     Active member of club or organization: Not on file     Attends meetings of clubs or organizations: Not on file     Relationship status: Not on file     Intimate partner violence:     Fear of current or ex partner: Not on file     Emotionally abused: Not on file     Physically abused: Not on file     Forced sexual activity: Not on file   Other Topics Concern     Parent/sibling w/ CABG, MI or angioplasty before 65F 55M? Not Asked   Social History Narrative     Not on file       Family History  Family History   Problem Relation Age of Onset     Cancer Father      Diabetes No family hx of      Glaucoma No family hx of      Hypertension No family hx of      Macular Degeneration No family hx of      Retinal detachment No family hx of          ROS/MED HX    ENT/Pulmonary:     (+)sleep apnea, tobacco use, asthma , . .    Neurologic:     (+)Spinal cord injury year sustained: 1981 level of injury: C4-C5 with autonomic hyperflexia symptoms,     Cardiovascular:     (+) ----. : . . . :. . Previous cardiac testing Echodate:2017results:Interpretation Summary  Left ventricular size is normal.  The Ejection Fraction is estimated at 50-55%.  Right ventricular function, chamber size, wall motion, and thickness are  normal.  Pulmonary artery systolic pressure is normal.  The inferior vena cava is normal.  Trivial pericardial effusion is present.  date: results:ECG reviewed date:10/16/2019 results:Sinus bradycardia, otherwise normal date: results:         (-) taking anticoagulants/antiplatelets   METS/Exercise Tolerance:  1   Hematologic:  - neg hematologic  ROS      (-) history of blood clots and History of Transfusion   Musculoskeletal:  - neg musculoskeletal ROS       GI/Hepatic:     (+) GERD       Renal/Genitourinary:     (+) Other Renal/ Genitourinary, neurogenic bladder with suprapubic catheter.  bladder stones      Endo:  - neg endo ROS      "  Psychiatric:     negative     Infectious Disease: MRSaa, ESBL, CRE, KPC     Malignancy:      - no malignancy   Other:    (+) No chance of pregnancy other significant disability Wheelchair bound         The complete review of systems is negative other than noted in the HPI or here.   Temp: 97.5  F (36.4  C) Temp src: Oral BP: 95/62 Pulse: (!) 46   Resp: 16 SpO2: 99 %         0 lbs 0 oz  5' 8\"   Body mass index is 30.41 kg/m .       Physical Exam  Constitutional: Awake, alert, cooperative, no apparent distress, and appears stated age.  Eyes: Pupils equal, round and reactive to light, extra ocular muscles intact, sclera clear, conjunctiva normal.  HENT: Normocephalic, oral pharynx with moist mucus membranes, good dentition. No goiter appreciated.   Respiratory: coarse breath sounds lower lobes, otherwise CTAB  Cardiovascular: Regular rate and rhythm, normal S1 and S2, and no murmur noted.  Carotids +2, no bruits. No edema. Palpable pulses to radial arteries.   GI: Normal bowel sounds  Lymph/Hematologic: No cervical lymphadenopathy and no supraclavicular lymphadenopathy.  Genitourinary:  deferred  Skin: Warm and dry.   Musculoskeletal: Full ROM of neck. There is no redness, warmth, or swelling of the exposed joints. Gross motor strength is normal.    Neurologic: Awake, alert, oriented to name, place and time.   Neuropsychiatric: Calm, cooperative. Normal affect.     Labs: (personally reviewed)  Component      Latest Ref Rng & Units 10/18/2019   WBC      4.0 - 11.0 10e9/L 6.7   RBC Count      4.4 - 5.9 10e12/L 4.58   Hemoglobin      13.3 - 17.7 g/dL 14.2   Hematocrit      40.0 - 53.0 % 44.2   MCV      78 - 100 fl 97   MCH      26.5 - 33.0 pg 31.0   MCHC      31.5 - 36.5 g/dL 32.1   RDW      10.0 - 15.0 % 13.5   Platelet Count      150 - 450 10e9/L 202   Diff Method       Automated Method   % Neutrophils      % 72.9   % Lymphocytes      % 13.4   % Monocytes      % 10.9   % Eosinophils      % 1.9   % Basophils      % 0.6 "   % Immature Granulocytes      % 0.3   Nucleated RBCs      0 /100 0   Absolute Neutrophil      1.6 - 8.3 10e9/L 4.9   Absolute Lymphocytes      0.8 - 5.3 10e9/L 0.9   Absolute Monocytes      0.0 - 1.3 10e9/L 0.7   Absolute Eosinophils      0.0 - 0.7 10e9/L 0.1   Absolute Basophils      0.0 - 0.2 10e9/L 0.0   Abs Immature Granulocytes      0 - 0.4 10e9/L 0.0   Absolute Nucleated RBC       0.0   Sodium      133 - 144 mmol/L 139   Potassium      3.4 - 5.3 mmol/L 4.0   Chloride      94 - 109 mmol/L 103   Carbon Dioxide      20 - 32 mmol/L 31   Anion Gap      3 - 14 mmol/L 5   Glucose      70 - 99 mg/dL 101 (H)   Urea Nitrogen      7 - 30 mg/dL 13   Creatinine      0.66 - 1.25 mg/dL 0.33 (L)   GFR Estimate      >60 mL/min/1.73:m2 >90   GFR Estimate If Black      >60 mL/min/1.73:m2 >90     EKG 10/18/19  Sinus bradycardia  Otherwise normal ECG  When compared with ECG of 14-JUL-2017 11:23,  No significant change was found        ECHO 4/2017  Interpretation Summary  Left ventricular size is normal.  The Ejection Fraction is estimated at 50-55%.  Right ventricular function, chamber size, wall motion, and thickness are  normal.  Pulmonary artery systolic pressure is normal.  The inferior vena cava is normal.  Trivial pericardial effusion is present.        ASSESSMENT and PLAN  Jc Corea is a 59 year old male scheduled for LITHOTRIPSY, CALCULUS, BLADDER, USING HOLMIUM LASER on 1/16/20  by Dr. Uriostegui in treatment of Neurogenic bladder, bladder stones.  PAC referral for risk assessment and optimization for anesthesia with comorbid conditions of asthma, LIZA, current tobacco use disorder, quadriplegia:    Pre-operative considerations:  1.  Cardiac:  Functional status- METS 1- wheelchair bound. History of BP elevation (200/100) when undergoing this procure earlier this year at the [facility], so the surgery has been moved to the [place].  No other cardiac history. EKG 10/2019 showed sinus bradycardia, otherwise normal EKG.  Normal  ECHO 2017. low risk surgery with 0.4% (RCRI #) risk of major adverse cardiac event.   2.  Pulm:  Airway feasible.  LIZA not using CPAP. Asthma using Singulair (continue DOS) and albuterol (conitnue DOS). Current tobacco use.  Encouraged smoking cessation and educated not to smoke on the day of the procedure. Antibiotics and prednisone taper last fall. Patient reports symptoms have resolved.   3.  GI:  Risk of PONV score = 1.  If > 2, anti-emetic intervention recommended. GERD well controlled on Prilosec.  Continue bowel regimen as needed.   4.  Neuro: quadriplegia secondary to spinal cord injury C4-C5 in 1981. He has a history of autonomic hyperreflexia symptoms taking baclofen (continue DOS) and amlodipine (continue DOS).  5. : neurogenic bladder with recurrent UTIs. Bladder stones seen on imaging. Above procedure planned. Patient prefers to complete UA and bring it into lab later today.    6. ID: h/o MRSA, ESBL, CRE and KPC.   Precautions as indicated    VTE risk: 0.5%    Patient is optimized and is acceptable candidate for the proposed procedure.  No further diagnostic evaluation is needed.     Patient discussed with Dr. Helga PA-C  Preoperative Assessment Center  [facility]  Phone: [phone]  Fax: [phone]

## 2020-01-12 ENCOUNTER — HOSPITAL ENCOUNTER (OUTPATIENT)
Dept: LAB | Facility: CLINIC | Age: 60
Discharge: HOME | End: 2020-01-12
Attending: UROLOGY | Admitting: UROLOGY
Payer: COMMERCIAL

## 2020-01-12 DIAGNOSIS — Z01.818 PREOP GENERAL PHYSICAL EXAM: ICD-10-CM

## 2020-01-12 PROCEDURE — 87088 URINE BACTERIA CULTURE: CPT | Performed by: UROLOGY

## 2020-01-12 PROCEDURE — 87186 SC STD MICRODIL/AGAR DIL: CPT | Performed by: UROLOGY

## 2020-01-12 PROCEDURE — 87086 URINE CULTURE/COLONY COUNT: CPT | Performed by: UROLOGY

## 2020-01-13 ENCOUNTER — MYC MEDICAL ADVICE (OUTPATIENT)
Dept: FAMILY MEDICINE | Facility: CLINIC | Age: 60
End: 2020-01-13

## 2020-01-13 DIAGNOSIS — S14.106A: ICD-10-CM

## 2020-01-13 DIAGNOSIS — S14.105A: ICD-10-CM

## 2020-01-13 DIAGNOSIS — S14.104A: Primary | ICD-10-CM

## 2020-01-14 LAB
BACTERIA SPEC CULT: ABNORMAL
Lab: ABNORMAL
SPECIMEN SOURCE: ABNORMAL

## 2020-01-15 ENCOUNTER — PATIENT OUTREACH (OUTPATIENT)
Dept: UROLOGY | Facility: CLINIC | Age: 60
End: 2020-01-15

## 2020-01-15 NOTE — PROGRESS NOTES
Patient has positive catheterized UC and is asymptomatic.  Only antibiotics susceptible to bacteria are IV or IM options.  Per Dr. Potter, will give IV antibiotics intraoperatively.     Joycelyn Yates RN, BSN  Care Coordinator- Reconstructive Urology

## 2020-01-16 ENCOUNTER — ANESTHESIA (OUTPATIENT)
Dept: SURGERY | Facility: CLINIC | Age: 60
End: 2020-01-16
Payer: COMMERCIAL

## 2020-01-16 ENCOUNTER — HOSPITAL ENCOUNTER (OUTPATIENT)
Facility: CLINIC | Age: 60
Discharge: HOME | End: 2020-01-16
Attending: UROLOGY | Admitting: UROLOGY
Payer: COMMERCIAL

## 2020-01-16 VITALS
OXYGEN SATURATION: 99 % | BODY MASS INDEX: 30.31 KG/M2 | HEIGHT: 68 IN | HEART RATE: 89 BPM | RESPIRATION RATE: 16 BRPM | SYSTOLIC BLOOD PRESSURE: 159 MMHG | DIASTOLIC BLOOD PRESSURE: 89 MMHG | WEIGHT: 199.96 LBS | TEMPERATURE: 98 F

## 2020-01-16 DIAGNOSIS — N21.0 BLADDER STONES: ICD-10-CM

## 2020-01-16 DIAGNOSIS — T83.010A SUPRAPUBIC CATHETER DYSFUNCTION, INITIAL ENCOUNTER (H): ICD-10-CM

## 2020-01-16 DIAGNOSIS — N31.9 NEUROGENIC BLADDER: ICD-10-CM

## 2020-01-16 LAB
CREAT SERPL-MCNC: 0.27 MG/DL (ref 0.66–1.25)
GFR SERPL CREATININE-BSD FRML MDRD: >90 ML/MIN/{1.73_M2}
GLUCOSE BLDC GLUCOMTR-MCNC: 85 MG/DL (ref 70–99)
POTASSIUM SERPL-SCNC: 4.8 MMOL/L (ref 3.4–5.3)

## 2020-01-16 PROCEDURE — 25000128 H RX IP 250 OP 636: Performed by: UROLOGY

## 2020-01-16 PROCEDURE — C1769 GUIDE WIRE: HCPCS | Performed by: UROLOGY

## 2020-01-16 PROCEDURE — 71000027 ZZH RECOVERY PHASE 2 EACH 15 MINS: Performed by: UROLOGY

## 2020-01-16 PROCEDURE — 71000014 ZZH RECOVERY PHASE 1 LEVEL 2 FIRST HR: Performed by: UROLOGY

## 2020-01-16 PROCEDURE — 40000171 ZZH STATISTIC PRE-PROCEDURE ASSESSMENT III: Performed by: UROLOGY

## 2020-01-16 PROCEDURE — 40000556 ZZH STATISTIC PERIPHERAL IV START W US GUIDANCE

## 2020-01-16 PROCEDURE — 25000125 ZZHC RX 250: Performed by: NURSE ANESTHETIST, CERTIFIED REGISTERED

## 2020-01-16 PROCEDURE — 71000015 ZZH RECOVERY PHASE 1 LEVEL 2 EA ADDTL HR: Performed by: UROLOGY

## 2020-01-16 PROCEDURE — C1758 CATHETER, URETERAL: HCPCS | Performed by: UROLOGY

## 2020-01-16 PROCEDURE — 25000566 ZZH SEVOFLURANE, EA 15 MIN: Performed by: UROLOGY

## 2020-01-16 PROCEDURE — 25000128 H RX IP 250 OP 636: Performed by: NURSE ANESTHETIST, CERTIFIED REGISTERED

## 2020-01-16 PROCEDURE — 37000009 ZZH ANESTHESIA TECHNICAL FEE, EACH ADDTL 15 MIN: Performed by: UROLOGY

## 2020-01-16 PROCEDURE — 25000128 H RX IP 250 OP 636: Performed by: ANESTHESIOLOGY

## 2020-01-16 PROCEDURE — 25800030 ZZH RX IP 258 OP 636: Performed by: NURSE ANESTHETIST, CERTIFIED REGISTERED

## 2020-01-16 PROCEDURE — 82962 GLUCOSE BLOOD TEST: CPT

## 2020-01-16 PROCEDURE — 36415 COLL VENOUS BLD VENIPUNCTURE: CPT | Performed by: ANESTHESIOLOGY

## 2020-01-16 PROCEDURE — 36000064 ZZH SURGERY LEVEL 4 EA 15 ADDTL MIN - UMMC: Performed by: UROLOGY

## 2020-01-16 PROCEDURE — 36000062 ZZH SURGERY LEVEL 4 1ST 30 MIN - UMMC: Performed by: UROLOGY

## 2020-01-16 PROCEDURE — C1894 INTRO/SHEATH, NON-LASER: HCPCS | Performed by: UROLOGY

## 2020-01-16 PROCEDURE — 27211024 ZZHC OR SUPPLY OTHER OPNP: Performed by: UROLOGY

## 2020-01-16 PROCEDURE — 37000008 ZZH ANESTHESIA TECHNICAL FEE, 1ST 30 MIN: Performed by: UROLOGY

## 2020-01-16 PROCEDURE — 84132 ASSAY OF SERUM POTASSIUM: CPT | Performed by: ANESTHESIOLOGY

## 2020-01-16 PROCEDURE — 25800030 ZZH RX IP 258 OP 636: Performed by: UROLOGY

## 2020-01-16 PROCEDURE — 27210794 ZZH OR GENERAL SUPPLY STERILE: Performed by: UROLOGY

## 2020-01-16 PROCEDURE — 88304 TISSUE EXAM BY PATHOLOGIST: CPT | Performed by: UROLOGY

## 2020-01-16 PROCEDURE — 82565 ASSAY OF CREATININE: CPT | Performed by: ANESTHESIOLOGY

## 2020-01-16 PROCEDURE — 82365 CALCULUS SPECTROSCOPY: CPT | Performed by: UROLOGY

## 2020-01-16 RX ORDER — NITROGLYCERIN 10 MG/100ML
INJECTION INTRAVENOUS PRN
Status: DISCONTINUED | OUTPATIENT
Start: 2020-01-16 | End: 2020-01-16

## 2020-01-16 RX ORDER — LIDOCAINE HYDROCHLORIDE 20 MG/ML
INJECTION, SOLUTION INFILTRATION; PERINEURAL PRN
Status: DISCONTINUED | OUTPATIENT
Start: 2020-01-16 | End: 2020-01-16

## 2020-01-16 RX ORDER — ONDANSETRON 2 MG/ML
4 INJECTION INTRAMUSCULAR; INTRAVENOUS EVERY 30 MIN PRN
Status: DISCONTINUED | OUTPATIENT
Start: 2020-01-16 | End: 2020-01-16 | Stop reason: HOSPADM

## 2020-01-16 RX ORDER — FENTANYL CITRATE 50 UG/ML
25-50 INJECTION, SOLUTION INTRAMUSCULAR; INTRAVENOUS
Status: DISCONTINUED | OUTPATIENT
Start: 2020-01-16 | End: 2020-01-16 | Stop reason: HOSPADM

## 2020-01-16 RX ORDER — NALOXONE HYDROCHLORIDE 0.4 MG/ML
.1-.4 INJECTION, SOLUTION INTRAMUSCULAR; INTRAVENOUS; SUBCUTANEOUS
Status: DISCONTINUED | OUTPATIENT
Start: 2020-01-16 | End: 2020-01-16 | Stop reason: HOSPADM

## 2020-01-16 RX ORDER — LIDOCAINE 40 MG/G
CREAM TOPICAL
Status: DISCONTINUED | OUTPATIENT
Start: 2020-01-16 | End: 2020-01-16 | Stop reason: HOSPADM

## 2020-01-16 RX ORDER — GLYCOPYRROLATE 0.2 MG/ML
INJECTION, SOLUTION INTRAMUSCULAR; INTRAVENOUS PRN
Status: DISCONTINUED | OUTPATIENT
Start: 2020-01-16 | End: 2020-01-16

## 2020-01-16 RX ORDER — ONDANSETRON 2 MG/ML
INJECTION INTRAMUSCULAR; INTRAVENOUS PRN
Status: DISCONTINUED | OUTPATIENT
Start: 2020-01-16 | End: 2020-01-16

## 2020-01-16 RX ORDER — ONDANSETRON 4 MG/1
4 TABLET, ORALLY DISINTEGRATING ORAL EVERY 30 MIN PRN
Status: DISCONTINUED | OUTPATIENT
Start: 2020-01-16 | End: 2020-01-16 | Stop reason: HOSPADM

## 2020-01-16 RX ORDER — SODIUM CHLORIDE, SODIUM LACTATE, POTASSIUM CHLORIDE, CALCIUM CHLORIDE 600; 310; 30; 20 MG/100ML; MG/100ML; MG/100ML; MG/100ML
INJECTION, SOLUTION INTRAVENOUS CONTINUOUS
Status: DISCONTINUED | OUTPATIENT
Start: 2020-01-16 | End: 2020-01-16 | Stop reason: HOSPADM

## 2020-01-16 RX ORDER — PROPOFOL 10 MG/ML
INJECTION, EMULSION INTRAVENOUS PRN
Status: DISCONTINUED | OUTPATIENT
Start: 2020-01-16 | End: 2020-01-16

## 2020-01-16 RX ORDER — MEPERIDINE HYDROCHLORIDE 25 MG/ML
12.5 INJECTION INTRAMUSCULAR; INTRAVENOUS; SUBCUTANEOUS
Status: DISCONTINUED | OUTPATIENT
Start: 2020-01-16 | End: 2020-01-16 | Stop reason: HOSPADM

## 2020-01-16 RX ORDER — SODIUM CHLORIDE, SODIUM LACTATE, POTASSIUM CHLORIDE, CALCIUM CHLORIDE 600; 310; 30; 20 MG/100ML; MG/100ML; MG/100ML; MG/100ML
INJECTION, SOLUTION INTRAVENOUS CONTINUOUS PRN
Status: DISCONTINUED | OUTPATIENT
Start: 2020-01-16 | End: 2020-01-16

## 2020-01-16 RX ORDER — DEXAMETHASONE SODIUM PHOSPHATE 4 MG/ML
INJECTION, SOLUTION INTRA-ARTICULAR; INTRALESIONAL; INTRAMUSCULAR; INTRAVENOUS; SOFT TISSUE PRN
Status: DISCONTINUED | OUTPATIENT
Start: 2020-01-16 | End: 2020-01-16

## 2020-01-16 RX ORDER — FENTANYL CITRATE 50 UG/ML
INJECTION, SOLUTION INTRAMUSCULAR; INTRAVENOUS PRN
Status: DISCONTINUED | OUTPATIENT
Start: 2020-01-16 | End: 2020-01-16

## 2020-01-16 RX ORDER — CEFAZOLIN SODIUM 2 G/100ML
2 INJECTION, SOLUTION INTRAVENOUS
Status: DISCONTINUED | OUTPATIENT
Start: 2020-01-16 | End: 2020-01-16 | Stop reason: ALTCHOICE

## 2020-01-16 RX ORDER — HYDRALAZINE HYDROCHLORIDE 20 MG/ML
2.5-5 INJECTION INTRAMUSCULAR; INTRAVENOUS EVERY 10 MIN PRN
Status: DISCONTINUED | OUTPATIENT
Start: 2020-01-16 | End: 2020-01-16 | Stop reason: HOSPADM

## 2020-01-16 RX ORDER — CEFAZOLIN SODIUM 1 G/3ML
1 INJECTION, POWDER, FOR SOLUTION INTRAMUSCULAR; INTRAVENOUS SEE ADMIN INSTRUCTIONS
Status: DISCONTINUED | OUTPATIENT
Start: 2020-01-16 | End: 2020-01-16 | Stop reason: ALTCHOICE

## 2020-01-16 RX ORDER — LABETALOL 20 MG/4 ML (5 MG/ML) INTRAVENOUS SYRINGE
10
Status: DISCONTINUED | OUTPATIENT
Start: 2020-01-16 | End: 2020-01-16 | Stop reason: HOSPADM

## 2020-01-16 RX ORDER — HYDROMORPHONE HYDROCHLORIDE 1 MG/ML
.3-.5 INJECTION, SOLUTION INTRAMUSCULAR; INTRAVENOUS; SUBCUTANEOUS EVERY 10 MIN PRN
Status: DISCONTINUED | OUTPATIENT
Start: 2020-01-16 | End: 2020-01-16 | Stop reason: HOSPADM

## 2020-01-16 RX ADMIN — HYDRALAZINE HYDROCHLORIDE 10 MG: 20 INJECTION INTRAMUSCULAR; INTRAVENOUS at 17:59

## 2020-01-16 RX ADMIN — FENTANYL CITRATE 50 MCG: 50 INJECTION, SOLUTION INTRAMUSCULAR; INTRAVENOUS at 16:15

## 2020-01-16 RX ADMIN — NITROGLYCERIN 150 MCG: 10 INJECTION INTRAVENOUS at 16:53

## 2020-01-16 RX ADMIN — FENTANYL CITRATE 25 MCG: 50 INJECTION, SOLUTION INTRAMUSCULAR; INTRAVENOUS at 17:47

## 2020-01-16 RX ADMIN — NITROGLYCERIN 100 MCG: 10 INJECTION INTRAVENOUS at 16:50

## 2020-01-16 RX ADMIN — GLYCOPYRROLATE 1 MG: 0.2 INJECTION, SOLUTION INTRAMUSCULAR; INTRAVENOUS at 15:56

## 2020-01-16 RX ADMIN — NITROGLYCERIN 150 MCG: 10 INJECTION INTRAVENOUS at 17:32

## 2020-01-16 RX ADMIN — NITROGLYCERIN 50 MCG: 10 INJECTION INTRAVENOUS at 16:38

## 2020-01-16 RX ADMIN — NITROGLYCERIN 200 MCG: 10 INJECTION INTRAVENOUS at 17:50

## 2020-01-16 RX ADMIN — NITROGLYCERIN 100 MCG: 10 INJECTION INTRAVENOUS at 17:57

## 2020-01-16 RX ADMIN — DEXAMETHASONE SODIUM PHOSPHATE 8 MG: 4 INJECTION, SOLUTION INTRA-ARTICULAR; INTRALESIONAL; INTRAMUSCULAR; INTRAVENOUS; SOFT TISSUE at 16:08

## 2020-01-16 RX ADMIN — HYDROMORPHONE HYDROCHLORIDE 0.5 MG: 1 INJECTION, SOLUTION INTRAMUSCULAR; INTRAVENOUS; SUBCUTANEOUS at 19:20

## 2020-01-16 RX ADMIN — NITROGLYCERIN 200 MCG: 10 INJECTION INTRAVENOUS at 17:54

## 2020-01-16 RX ADMIN — FENTANYL CITRATE 25 MCG: 50 INJECTION, SOLUTION INTRAMUSCULAR; INTRAVENOUS at 16:38

## 2020-01-16 RX ADMIN — NITROGLYCERIN 50 MCG: 10 INJECTION INTRAVENOUS at 16:39

## 2020-01-16 RX ADMIN — SODIUM CHLORIDE, POTASSIUM CHLORIDE, SODIUM LACTATE AND CALCIUM CHLORIDE: 600; 310; 30; 20 INJECTION, SOLUTION INTRAVENOUS at 15:38

## 2020-01-16 RX ADMIN — PROPOFOL 50 MG: 10 INJECTION, EMULSION INTRAVENOUS at 16:02

## 2020-01-16 RX ADMIN — SUGAMMADEX 200 MG: 100 INJECTION, SOLUTION INTRAVENOUS at 17:26

## 2020-01-16 RX ADMIN — HYDRALAZINE HYDROCHLORIDE 2.5 MG: 20 INJECTION INTRAMUSCULAR; INTRAVENOUS at 17:49

## 2020-01-16 RX ADMIN — NITROGLYCERIN 100 MCG: 10 INJECTION INTRAVENOUS at 16:44

## 2020-01-16 RX ADMIN — PROPOFOL 30 MG: 10 INJECTION, EMULSION INTRAVENOUS at 16:17

## 2020-01-16 RX ADMIN — NITROGLYCERIN 150 MCG: 10 INJECTION INTRAVENOUS at 17:06

## 2020-01-16 RX ADMIN — NITROGLYCERIN 150 MCG: 10 INJECTION INTRAVENOUS at 16:56

## 2020-01-16 RX ADMIN — LIDOCAINE HYDROCHLORIDE 100 MG: 20 INJECTION, SOLUTION INFILTRATION; PERINEURAL at 15:52

## 2020-01-16 RX ADMIN — NITROGLYCERIN 100 MCG: 10 INJECTION INTRAVENOUS at 16:47

## 2020-01-16 RX ADMIN — GENTAMICIN SULFATE 80 MG: 40 INJECTION, SOLUTION INTRAMUSCULAR; INTRAVENOUS at 16:05

## 2020-01-16 RX ADMIN — NITROGLYCERIN 150 MCG: 10 INJECTION INTRAVENOUS at 17:26

## 2020-01-16 RX ADMIN — NITROGLYCERIN 200 MCG: 10 INJECTION INTRAVENOUS at 17:46

## 2020-01-16 RX ADMIN — NITROGLYCERIN 150 MCG: 10 INJECTION INTRAVENOUS at 17:00

## 2020-01-16 RX ADMIN — PROPOFOL 200 MG: 10 INJECTION, EMULSION INTRAVENOUS at 15:52

## 2020-01-16 RX ADMIN — FENTANYL CITRATE 150 MCG: 50 INJECTION, SOLUTION INTRAMUSCULAR; INTRAVENOUS at 15:52

## 2020-01-16 RX ADMIN — ONDANSETRON 4 MG: 2 INJECTION INTRAMUSCULAR; INTRAVENOUS at 16:20

## 2020-01-16 RX ADMIN — ROCURONIUM BROMIDE 60 MG: 10 INJECTION INTRAVENOUS at 15:52

## 2020-01-16 ASSESSMENT — MIFFLIN-ST. JEOR: SCORE: 1706.5

## 2020-01-16 ASSESSMENT — PAIN DESCRIPTION - DESCRIPTORS: DESCRIPTORS: HEADACHE

## 2020-01-16 NOTE — BRIEF OP NOTE
[facility], [place]    Brief Operative Note    Pre-operative diagnosis: Neurogenic bladder [N31.9]  Bladder stones [N21.0]  Suprapubic catheter dysfunction, initial encounter (H) [T83.010A]  Post-operative diagnosis Same as pre-operative diagnosis    Procedure: Procedure(s):  LITHOTRIPSY, CALCULUS, BLADDER, USING HOLMIUM LASER  Incision and drainage abdomenal Wall with Revision Of Vesicocutaneous Anastomosis  Surgeon: Surgeon(s) and Role:     * Obi Uriostegui MD - Primary     * Jeremy Casey MD - Resident - Assisting     * Claribel Potter MD - Fellow - Assisting  Anesthesia: General   Estimated blood loss: Minimal  Drains: 16 fr gastelum catheter   Specimens:   ID Type Source Tests Collected by Time Destination   1 : Gross only Calculus/Stone Bladder STONE ANALYSIS Obi Uriostegui MD 1/16/2020  4:21 PM    A : Abcess Cavity Tissue Other SURGICAL PATHOLOGY EXAM Obi Uriostegui MD 1/16/2020  4:49 PM      Findings:   None.  Complications: None.  Implants: * No implants in log *

## 2020-01-16 NOTE — ANESTHESIA CARE TRANSFER NOTE
Patient: Bart Corea    Procedure(s):  LITHOTRIPSY, CALCULUS, BLADDER, USING HOLMIUM LASER  Incision and drainage abdomenal Wall with Revision Of Vesicocutaneous Anastomosis    Diagnosis: Neurogenic bladder [N31.9]  Bladder stones [N21.0]  Suprapubic catheter dysfunction, initial encounter (H) [T83.010A]  Diagnosis Additional Information: No value filed.    Anesthesia Type:   No value filed.     Note:  Airway :Face Mask  Patient transferred to:PACU  Comments: Pt hypertensive upon arrival to PACU.  IV nitroglycerin boluses given.  MDA aware, IV hydralazine by PACU RN.  Report given to RN.  Handoff Report: Identifed the Patient, Identified the Reponsible Provider, Reviewed the pertinent medical history, Discussed the surgical course, Reviewed Intra-OP anesthesia mangement and issues during anesthesia, Set expectations for post-procedure period and Allowed opportunity for questions and acknowledgement of understanding      Vitals: (Last set prior to Anesthesia Care Transfer)    C  RNA VITALS  1/16/2020 1708 - 1/16/2020 1747      1/16/2020             Resp Rate (observed):                     Electronically Signed By: JC Abdalla CRNA  January 16, 2020  5:47 PM

## 2020-01-16 NOTE — SIGNIFICANT EVENT
SPIRITUAL HEALTH SERVICES  [facility] 3C   PRE-SURGERY VISIT    Had pre-surgery visit with pt./family. Provided spiritual support, prayer.     PATIENT ANOINTED by Father Cal Garcia 1/16/2020     Cal Garcia M.Div.     Pager [phone]

## 2020-01-17 ENCOUNTER — HOSPITAL ENCOUNTER (EMERGENCY)
Facility: CLINIC | Age: 60
Discharge: HOME | End: 2020-01-18
Attending: EMERGENCY MEDICINE | Admitting: EMERGENCY MEDICINE
Payer: COMMERCIAL

## 2020-01-17 ENCOUNTER — TELEPHONE (OUTPATIENT)
Dept: NURSING | Facility: CLINIC | Age: 60
End: 2020-01-17

## 2020-01-17 ENCOUNTER — APPOINTMENT (OUTPATIENT)
Dept: ULTRASOUND IMAGING | Facility: CLINIC | Age: 60
End: 2020-01-17
Attending: EMERGENCY MEDICINE
Payer: COMMERCIAL

## 2020-01-17 DIAGNOSIS — T83.090A OBSTRUCTION OF SUPRAPUBIC CATHETER, INITIAL ENCOUNTER (H): ICD-10-CM

## 2020-01-17 DIAGNOSIS — S14.104D: ICD-10-CM

## 2020-01-17 DIAGNOSIS — Z86.69 HISTORY OF QUADRIPLEGIA: ICD-10-CM

## 2020-01-17 DIAGNOSIS — N31.9 NEUROGENIC BLADDER: ICD-10-CM

## 2020-01-17 DIAGNOSIS — S14.106D: ICD-10-CM

## 2020-01-17 DIAGNOSIS — S14.105D INJURY OF FIFTH CERVICAL SPINAL CORD, SUBSEQUENT ENCOUNTER (H): ICD-10-CM

## 2020-01-17 DIAGNOSIS — N31.9 NEUROGENIC BLADDER: Primary | ICD-10-CM

## 2020-01-17 LAB
ANION GAP SERPL CALCULATED.3IONS-SCNC: 8 MMOL/L (ref 3–14)
BASOPHILS # BLD AUTO: 0 10E9/L (ref 0–0.2)
BASOPHILS NFR BLD AUTO: 0.2 %
BUN SERPL-MCNC: 30 MG/DL (ref 7–30)
CALCIUM SERPL-MCNC: 8.5 MG/DL (ref 8.5–10.1)
CHLORIDE SERPL-SCNC: 100 MMOL/L (ref 94–109)
CO2 SERPL-SCNC: 27 MMOL/L (ref 20–32)
CREAT SERPL-MCNC: 0.95 MG/DL (ref 0.66–1.25)
DIFFERENTIAL METHOD BLD: ABNORMAL
EOSINOPHIL # BLD AUTO: 0 10E9/L (ref 0–0.7)
EOSINOPHIL NFR BLD AUTO: 0.1 %
ERYTHROCYTE [DISTWIDTH] IN BLOOD BY AUTOMATED COUNT: 14.4 % (ref 10–15)
GFR SERPL CREATININE-BSD FRML MDRD: 88 ML/MIN/{1.73_M2}
GLUCOSE SERPL-MCNC: 83 MG/DL (ref 70–99)
HCT VFR BLD AUTO: 46.7 % (ref 40–53)
HGB BLD-MCNC: 15 G/DL (ref 13.3–17.7)
IMM GRANULOCYTES # BLD: 0.1 10E9/L (ref 0–0.4)
IMM GRANULOCYTES NFR BLD: 0.5 %
LYMPHOCYTES # BLD AUTO: 1 10E9/L (ref 0.8–5.3)
LYMPHOCYTES NFR BLD AUTO: 9.1 %
MCH RBC QN AUTO: 30.5 PG (ref 26.5–33)
MCHC RBC AUTO-ENTMCNC: 32.1 G/DL (ref 31.5–36.5)
MCV RBC AUTO: 95 FL (ref 78–100)
MONOCYTES # BLD AUTO: 1.2 10E9/L (ref 0–1.3)
MONOCYTES NFR BLD AUTO: 10.8 %
NEUTROPHILS # BLD AUTO: 8.7 10E9/L (ref 1.6–8.3)
NEUTROPHILS NFR BLD AUTO: 79.3 %
NRBC # BLD AUTO: 0 10*3/UL
NRBC BLD AUTO-RTO: 0 /100
PLATELET # BLD AUTO: 183 10E9/L (ref 150–450)
POTASSIUM SERPL-SCNC: 4.8 MMOL/L (ref 3.4–5.3)
RBC # BLD AUTO: 4.91 10E12/L (ref 4.4–5.9)
SODIUM SERPL-SCNC: 135 MMOL/L (ref 133–144)
WBC # BLD AUTO: 10.9 10E9/L (ref 4–11)

## 2020-01-17 PROCEDURE — 96361 HYDRATE IV INFUSION ADD-ON: CPT | Performed by: EMERGENCY MEDICINE

## 2020-01-17 PROCEDURE — 99285 EMERGENCY DEPT VISIT HI MDM: CPT | Mod: 25 | Performed by: EMERGENCY MEDICINE

## 2020-01-17 PROCEDURE — 96365 THER/PROPH/DIAG IV INF INIT: CPT | Performed by: EMERGENCY MEDICINE

## 2020-01-17 PROCEDURE — 25000128 H RX IP 250 OP 636: Performed by: EMERGENCY MEDICINE

## 2020-01-17 PROCEDURE — 87040 BLOOD CULTURE FOR BACTERIA: CPT | Performed by: EMERGENCY MEDICINE

## 2020-01-17 PROCEDURE — 87800 DETECT AGNT MULT DNA DIREC: CPT | Performed by: EMERGENCY MEDICINE

## 2020-01-17 PROCEDURE — 87077 CULTURE AEROBIC IDENTIFY: CPT | Performed by: EMERGENCY MEDICINE

## 2020-01-17 PROCEDURE — 80048 BASIC METABOLIC PNL TOTAL CA: CPT | Performed by: EMERGENCY MEDICINE

## 2020-01-17 PROCEDURE — 87186 SC STD MICRODIL/AGAR DIL: CPT | Performed by: EMERGENCY MEDICINE

## 2020-01-17 PROCEDURE — 25800030 ZZH RX IP 258 OP 636: Performed by: EMERGENCY MEDICINE

## 2020-01-17 PROCEDURE — 99285 EMERGENCY DEPT VISIT HI MDM: CPT | Mod: Z6 | Performed by: EMERGENCY MEDICINE

## 2020-01-17 PROCEDURE — 96375 TX/PRO/DX INJ NEW DRUG ADDON: CPT | Performed by: EMERGENCY MEDICINE

## 2020-01-17 PROCEDURE — 76857 US EXAM PELVIC LIMITED: CPT

## 2020-01-17 PROCEDURE — 85025 COMPLETE CBC W/AUTO DIFF WBC: CPT | Performed by: EMERGENCY MEDICINE

## 2020-01-17 RX ORDER — GENTAMICIN SULFATE 80 MG/100ML
80 INJECTION, SOLUTION INTRAVENOUS ONCE
Status: COMPLETED | OUTPATIENT
Start: 2020-01-17 | End: 2020-01-18

## 2020-01-17 RX ORDER — MAGNESIUM HYDROXIDE 1200 MG/15ML
LIQUID ORAL
Status: DISCONTINUED
Start: 2020-01-17 | End: 2020-01-18 | Stop reason: HOSPADM

## 2020-01-17 RX ORDER — MORPHINE SULFATE 2 MG/ML
2 INJECTION, SOLUTION INTRAMUSCULAR; INTRAVENOUS ONCE
Status: COMPLETED | OUTPATIENT
Start: 2020-01-17 | End: 2020-01-17

## 2020-01-17 RX ADMIN — MORPHINE SULFATE 2 MG: 2 INJECTION, SOLUTION INTRAMUSCULAR; INTRAVENOUS at 21:00

## 2020-01-17 RX ADMIN — SODIUM CHLORIDE 500 ML: 9 INJECTION, SOLUTION INTRAVENOUS at 21:00

## 2020-01-17 RX ADMIN — GENTAMICIN SULFATE 80 MG: 80 INJECTION, SOLUTION INTRAVENOUS at 23:25

## 2020-01-17 ASSESSMENT — ENCOUNTER SYMPTOMS
NAUSEA: 0
DIARRHEA: 0
COUGH: 0
RHINORRHEA: 0
FEVER: 0
HEADACHES: 1
CHILLS: 0
DIFFICULTY URINATING: 1
VOMITING: 0

## 2020-01-17 ASSESSMENT — MIFFLIN-ST. JEOR: SCORE: 1702.16

## 2020-01-17 NOTE — TELEPHONE ENCOUNTER
Spoke to patient, wife Sarah and PCA Charito.  PCA states when he tries to irrigate bladder that there's hematuria and the NS goes out his penis and won't stay in his bladder.  Instructed to pull gently back on catheter to see if the tip is in the urethra, was only able to pull back 1/4 inch until met resistant.  Tried to irrigate again and having the same issue of NS going out of penis.  Patient also states his headache is 8/10 since surgery.  BP is 130/80 today, normal BP patient states is 80/60.  Informed patient the increased BP is most likely due to severe headache and pain.  Message sent to Dr. Uriostegui.    Joycelyn Yates, RN, BSN  Care Coordinator- Reconstructive Urology

## 2020-01-17 NOTE — DISCHARGE INSTRUCTIONS
REMEMBER: SAME DAY SURGERY IS NOT SAME DAY RECOVERY. TAKE IT EASY, GET PLENTY OF REST, AND HAVE SOMEONE WITH YOU FOR 24 HOURS. FOLLOW THESE INSTRUCTIONS AND PLEASE DO NOT HESITATE TO CALL WITH ANY QUESTIONS.   [facility], [place]  Same-Day Surgery   Adult Discharge Orders & Instructions   For 24 hours after surgery    1. Get plenty of rest.  A responsible adult must stay with you for at least 24 hours after you leave the hospital.   2. Do not drive or use heavy equipment.  If you have weakness or tingling, don't drive or use heavy equipment until this feeling goes away.  3. Do not drink alcohol.  4. Avoid strenuous or risky activities.  Ask for help when climbing stairs.   5. You may feel lightheaded.  IF so, sit for a few minutes before standing.  Have someone help you get up.   6. If you have nausea (feel sick to your stomach): Drink only clear liquids such as apple juice, ginger ale, broth or 7-Up.  Rest may also help.  Be sure to drink enough fluids.  Move to a regular diet as you feel able.  7. You may have a slight fever. Call the doctor if your fever is over 100 F (37.7 C) (taken under the tongue) or lasts longer than 24 hours.  8. You may have a dry mouth, a sore throat, muscle aches or trouble sleeping.  These should go away after 24 hours.  9. Do not make important or legal decisions.   Call your doctor for any of the followin.  Signs of infection (fever, growing tenderness at the surgery site, a large amount of drainage or bleeding, severe pain, foul-smelling drainage, redness, swelling).    2. It has been over 8 to 10 hours since surgery and you are still not able to urinate (pass water).    To contact a doctor, call Dr Gramajo's office at [phone] (clinic) or:      [phone] and ask for the resident on call for Urology (answered 24 hours a day)      Emergency Department: [facility]: [phone]

## 2020-01-17 NOTE — ANESTHESIA POSTPROCEDURE EVALUATION
Anesthesia POST Procedure Evaluation    Patient: Bart Corea   MRN:     [id number] Gender:   male   Age:    59 year old :      1960        Preoperative Diagnosis: Neurogenic bladder [N31.9]  Bladder stones [N21.0]  Suprapubic catheter dysfunction, initial encounter (H) [T83.010A]   Procedure(s):  LITHOTRIPSY, CALCULUS, BLADDER, USING HOLMIUM LASER  Incision and drainage abdomenal Wall with Revision Of Vesicocutaneous Anastomosis   Postop Comments: No value filed.       Anesthesia Type:  Not documented  No value filed.    Reportable Event: NO     PAIN: Uncomplicated   Sign Out status: Comfortable, Well controlled pain     PONV: No PONV   Sign Out status:  No Nausea or Vomiting     Neuro/Psych: Uneventful perioperative course   Sign Out Status: Preoperative baseline; Age appropriate mentation     Airway/Resp.: Uneventful perioperative course   Sign Out Status: Non labored breathing, age appropriate RR; Resp. Status within EXPECTED Parameters     CV: Uneventful perioperative course   Sign Out status: Appropriate BP and perfusion indices; Appropriate HR/Rhythm     Disposition:   Sign Out in:  PACU  Disposition:  Phase II; Home  Recovery Course: Uneventful  Follow-Up: Not required           Last Anesthesia Record Vitals:  CRNA VITALS  2020 1708 - 2020 1808      2020             Resp Rate (observed):  (!) 1          Last PACU Vitals:  Vitals Value Taken Time   /66 2020  7:30 PM   Temp 36  C (96.8  F) 2020  7:30 PM   Pulse 81 2020  7:30 PM   Resp 14 2020  7:30 PM   SpO2 98 % 2020  7:40 PM   Temp src     NIBP     Pulse     SpO2     Resp     Temp     Ht Rate     Temp 2     Vitals shown include unvalidated device data.      Electronically Signed By: Kassie Álvarez MD, 2020, 7:41 PM

## 2020-01-17 NOTE — OR NURSING
Patient upset in phase II regarding missing urinary leg bag straps. Patient stated he went into the OR with them. During the OR bag was changed to a large drainage bag. Call to OR control desk prior to patient's discharge and the room had already been cleaned. Provided patient hospital supply of leg bag to travel home with.    Dr. Álvarez at bedside prior to discharge. After patient got up with shyanne back to wheelchair, BP elevated a bit again. Dr. Álvarez stated this is close baseline and patient can discharge.

## 2020-01-17 NOTE — ED AVS SNAPSHOT
[facility], [place], Emergency Department  [address] [place] [id number]  Phone:  [phone]                                    Bart Corea   MRN: [id number]    Department:  [facility], Emergency Department   Date of Visit:  1/17/2020           After Visit Summary Signature Page    I have received my discharge instructions, and my questions have been answered. I have discussed any challenges I see with this plan with the nurse or doctor.    ..........................................................................................................................................  Patient/Patient Representative Signature      ..........................................................................................................................................  Patient Representative Print Name and Relationship to Patient    ..................................................               ................................................  Date                                   Time    ..........................................................................................................................................  Reviewed by Signature/Title    ...................................................              ..............................................  Date                                               Time          22EPIC Rev 08/18

## 2020-01-17 NOTE — OP NOTE
Urology Operative Summary    Pre-operative diagnosis: 1. Neurogenic bladder  2. Bladder stones  3. Sinus tracts surrounding suprapubic tract     Post-operative diagnosis: 1. Neurogenic bladder  2. Bladder stones  3. Sinus tracts surrounding suprapubic tract     Procedure: 1. Cystolitholapaxy for many small bladder stones (1.5cm total)  2. Excision of sinus tract/fistula alongside suprapubic tube  3. Revision of vesicocutaneous anastomosis   4. Suprapubic catheter change     Surgeon:  Obi Uriostegui MD, MS   Assistant(s): MD Jeremy Agosto MD        Anesthesia: General       Estimated blood loss: 5cc     Complications: None     Condition: Patient taken to recovery in stable condition.     Indications: Mr. Corea is a 59 year old male with history of SCI many years ago and neurogenic bladder managed with a suprapubic catheter. He recently presented for surveillance cystoscopy and complained of drainage of purulent material around his SP site intermittently and was found to have bladder stones. We discussed the need to remove the stones to prevent growth and infections and he would like to have his possible taya-catheter abscess drained. He gets impressive autonomic dysreflexia so requires careful monitoring and general anesthesia for the procedure. He understands the risks and benefits of the various options and elects to proceed with the procedure understanding the risks for infection, pain, bleeding, need for future procedures and risks of anesthesia.    Procedure: After informed consent was obtained, he was taken to the operating room. After induction of general anesthesia he was placed in the lithotomy position with care to pad all bony prominences. He was prepped and draped in the standard fashion. The SP tube was prepped into the field. A time-out was performed to ensure proper patient, procedure and positioning.  The patient received appropriate IV antibiotics prior to the procedure  based on pre-operative urine culture.    The procedure was initiated with insertion of a 19Fr rigid cystoscope. The urethra was narrow but unremarkable. The bladder mucosa appeared to be normal but there were 6-7 small stones totaling 1.5cm in size. We kept his suprapubic tube open while working in the bladder to prevent it from getting too distended and causing dysreflexia. All the stones were small enough that they were able to be drained through the sheath of the cystoscope. We then proceeded to the I&D.     There were two obvious draining sinus tracts at the 3 and 9 o'clock position around the SP tube that were likely prior abscesses. We placed a series of 3-0 silk stay sutures around the edge of the vesicocutaneous tract. We marked a ring of skin around the SP site that was just outside both sinus tracts. We made this incision and then carried it down the level of the bottom of the sinus tracts with electrocautery. Once we had gotten underneath both sinus tracts we made our inner incision close to the catheter such that the sinus tracts were between the two incisions. All this tissue and sinus tracts were resected. The suprapubic tract epithelium had to be resected down to fascia at the from the 3 o'clock to 9 o'clock positions. The epithelium and vesicocutaneous junction at the superior aspect of the tract was left intact (approximately 10 o'clock to 2 o'clock location). Once all the diseased tissue was cut away we advanced the healthy skin edge to the new suprapubic tract epithelium. A series of 3-0 vicryl interrupted sutures were used to complete our vesicocutaneous re-anastomosis. This did create a slight pucker to the skin but not a sathish moat. We took his old catheter out and a new 16Fr catheter was placed easily.    The patient was awoken from anesthesia and transferred to the post anesthesia care unit in stable condition. He tolerated the procedure well.

## 2020-01-17 NOTE — TELEPHONE ENCOUNTER
.  [facility]: Nurse Triage Note  SITUATION/BACKGROUND                                                      Bart Corea is a 59 year old male s/p LITHOTRIPSY, CALCULUS, BLADDER, USING HOLMIUM LASER and Incision and drainage abdomenal Wall with Revision Of Vesicocutaneous Anastomosis procedure on 1/16/19.   Patient calls along with his personal care assistant, Charito to report having low output from night bag.  Approximately 100 ml last night. Irrigation performed and bladder not holding solution coming out through his penis and appears mostly clots. Irrigated with saline, unable pull though syringe. Suprapubic catheter is in place and gauze is slightly wet, not saturated. Per Onesimobas, patient's BP this morning is 130/80 and temperature is 98.0.     [facility] She stated that Joycelyn (RN) had sent message via Spoke to patient earlier. Routed as High Priority to call patient/ personal care assistant back to address concerns.     This nurse called patient back to inquire about pain. He stated that he has had a headache since his surgery and been taking ibupropen 800 mg for pain 8/10 - not very effective. In addition, his blood pressure is higher than usual - normally runs around 90/60. Noted that it is possible due to his discomfort.     This nurse provided home care instructions for his headache and informed patient that additional information would be routed to Urology and to expect a call from Urology within the hour.

## 2020-01-18 VITALS
TEMPERATURE: 96.9 F | DIASTOLIC BLOOD PRESSURE: 58 MMHG | SYSTOLIC BLOOD PRESSURE: 97 MMHG | WEIGHT: 199 LBS | RESPIRATION RATE: 12 BRPM | HEIGHT: 68 IN | HEART RATE: 64 BPM | OXYGEN SATURATION: 98 % | BODY MASS INDEX: 30.16 KG/M2
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
## 2020-01-18 NOTE — ED TRIAGE NOTES
Pt. Presents to ED in electric wheelchair with feeling generally unwell but only reports a headache as well as leaking around his suprapubic catheter site and from his urethra. Pt. Reports he had some urological reconstruction surgery as well as lithotripsy yesterday. Pt. Reports cherry red urine. AVSS on RA. Afebrile.

## 2020-01-18 NOTE — CONSULTS
Urology Consult    Name: Bart Corea    MRN: [id number]   YOB: 1960               Chief Complaint:   Catheter problem    History is obtained from the patient and chart review          History of Present Illness:   Bart Corea is a 59 year old male with a history of C4-5 spinal cord injury and neurogenic bladder, managed with an SP tube. He has had recurrent bladder stones and small recurrent abscesses surrounding his suprapubic tract. He is currently POD#1 from cystolitholapaxy and I&D of suprapubic tract abscesses. He presented to the ED today as his wife reports he has been experiencing issues with the catheter. His urine appears red and the catheter drains urine intermittently. The wife has tried to irrigate the catheter and the irrigation fluid comes directly out of the urethral meatus. She is unable to withdraw any fluid from the catheter. The patient does not have sensation of his bladder, but he has been experiencing a persistent headache all day. His blood pressure has been elevated to 120s-130s systolic from a baseline of ~90s. He has a history of severe autonomic dysreflexia when his bladder is full and his wife worries his elevated BP could be related to his bladder.    On arrival to the ED, workup was notable for normal vitals with BP 90s/60s. He was given pain medication for his headache with good relief. Irrigation of his catheter was attempted by nursing staff, however fluid came directly out of the urethral meatus. Basic laboratory studies (CBC, BMP were within normal limits). Bladder ultrasound demonstrated a partially distended bladder with mild echogenic debris and evidence of the gastelum balloon in the prostatic urethra. Urology was consulted for additional recommendations and management.         Past Medical History:     Past Medical History:   Diagnosis Date     Asthma      Chronic infection     Bladder     Heart murmur      LIZA (obstructive sleep apnea)  12/3/2014     Quadriplegia, C1-C4 incomplete (H)             Past Surgical History:     Past Surgical History:   Procedure Laterality Date     Bilateral ureteroscopy with holmium laser lithotripsy and basketing and removal of fragments  Left ureteral stent placement.  02/10/2010     COLONOSCOPY N/A 7/20/2018    Procedure: COMBINED COLONOSCOPY, SINGLE OR MULTIPLE BIOPSY/POLYPECTOMY BY BIOPSY;;  Surgeon: Grace Yang MD;  Location: [facility]     CYSTOSCOPY, LITHOLAPAXY, COMBINED N/A 11/15/2019    Procedure: Cystolitholapaxy;  Surgeon: Obi Uriostegui MD;  Location: [facility]     INCISION AND DRAINAGE ABDOMEN WASHOUT, COMBINED N/A 1/16/2020    Procedure: Incision and drainage abdomenal Wall with Revision Of Vesicocutaneous Anastomosis;  Surgeon: Obi Uriostegui MD;  Location: [facility] OR     LASER HOLMIUM LITHOTRIPSY BLADDER N/A 1/16/2020    Procedure: LITHOTRIPSY, CALCULUS, BLADDER, USING HOLMIUM LASER;  Surgeon: Obi Uriostegui MD;  Location: [facility] OR     skin flap on bottom       sp tube absess surgery              Social History:     Social History     Tobacco Use     Smoking status: Current Every Day Smoker     Packs/day: 0.30     Types: Cigarettes     Smokeless tobacco: Never Used     Tobacco comment: since early teens   Substance Use Topics     Alcohol use: Yes     Comment: on weekends, 3-4+            Family History:     Family History   Problem Relation Age of Onset     Cancer Father      Diabetes No family hx of      Glaucoma No family hx of      Hypertension No family hx of      Macular Degeneration No family hx of      Retinal detachment No family hx of      Anesthesia Reaction No family hx of      Deep Vein Thrombosis (DVT) No family hx of             Allergies:     Allergies   Allergen Reactions     Vancomycin Anaphylaxis            Medications:     Current Facility-Administered Medications   Medication     0.9% sodium chloride BOLUS     sodium chloride 0.9% (bottle) 0.9 % irrigation     Current  "Outpatient Medications   Medication Sig     albuterol (PROVENTIL) (2.5 MG/3ML) 0.083% neb solution NEBULIZE 1 VIAL (2.5 MG) EVERY 6 HOURS AS NEEDED FOR SHORTNESS OF BREATH / DYSPNEA OR WHEEZING     albuterol (VENTOLIN HFA) 108 (90 Base) MCG/ACT Inhaler Inhale 2 puffs into the lungs every 4 hours as needed     Alcohol Swabs (ALCOHOL PREP PADS) USE AS DIRECTED     amcinonide (CYCLOCORT) 0.1 % cream Apply topically 2 times daily as needed for itching     amLODIPine (NORVASC) 2.5 MG tablet Take 1 tablet (2.5 mg) by mouth daily as needed (autonomic dysreflexia)     Applicators (COTTON SWABS) SWAB Please dispense sterile wrapped cotton swabs; use up to five/day for medical needs     ascorbic acid (VITAMIN C) 1000 MG TABS Take 1 tablet by mouth daily     baclofen (LIORESAL) 20 MG tablet TAKE ONE TABLET BY MOUTH UP TO 6 TIMES DAILY     bisacodyl (DULCOLAX) 10 MG suppository Place 20 mg rectally every 3 days Active ingredient in Magic Bullet (10mg per suppository)     clindamycin (CLINDAMAX) 1 % topical gel Apply bid     diazepam (VALIUM) 10 MG tablet TAKE ONE TABLET BY MOUTH EVERY DAY.     docusate sodium (DOK) 100 MG capsule Take 1 capsule (100 mg) by mouth daily     Gauze Pads & Dressings (GAUZE BANDAGE 2\") MISC IV STERILE GUAZE Use up to 4/day Supply 360 for three month supply     Gauze Pads & Dressings (GAUZE DRESSING) 4\"X4\" PADS NON STERILE GAUZE Use up to 8/day  Supply 720 for three month supply     hydrocortisone (ANUSOL-HC) 2.5 % cream Bid prn     ibuprofen (ADVIL/MOTRIN) 800 MG tablet TAKE 1 TABLET BY MOUTH 3 TIMES DAILY AS NEEDED     Incontinence Supplies (URINARY LEG BAG) USE AS NEEDED     lidocaine (XYLOCAINE) 2 % jelly Apply  topically. APPLY AS DIRECTED     MAGIC BULLETS 10 MG Suppository Place 1 suppository (10 mg) rectally daily as needed for constipation     minocycline (MINOCIN/DYNACIN) 50 MG capsule TAKE 1 CAPSULE (50 MG) BY MOUTH 2 TIMES DAILY     Multiple Vitamins-Minerals (MULTIVITAMIN & MINERAL PO) " Take 1 capsule by mouth daily.     nitroGLYcerin (NITRO-BID) 2 % OINT ointment Place 0.5-1 inches (7.5-15 mg) onto the skin every 24 hours as needed for autonomic dysreflexia symptoms.  Remove ointment with gloved hand once symptoms resolve.     nystatin (NYSTOP) 528581 UNIT/GM POWD APPLY 1 DOSE TOPICALLY 3 TIMES DAILY AS NEEDED     omeprazole (PRILOSEC) 20 MG CR capsule Take 1 capsule (20 mg) by mouth daily     order for DME Equipment being ordered: Urinary leg bag  Change three times a month  Dispense 3     order for DME Equipment being ordered: Alcohol swabs  Use 4 a day for catheter change     order for DME Equipment being ordered: Catheter 16 Fr, 8 ml balloon. 3 per month     order for DME Equipment being ordered: 60 ml catheter tip syringes (no needle), 30 per month     order for DME Equipment being ordered: Hospital Bed     order for DME Equipment being ordered: Compression stockings, 20-30 mm Hg strength, knee-high, appropriately sized     order for DME two leg bag straps and 2 mouth sticks     Ostomy Supplies (UROSTOMY NIGHT BAG) MISC Please dispense three night bags/month (three month worth at a time) with 4 refills re: needed for chronic urinary dysfunction due to quadreplegia. Type: Bard 2000 ml.     oxybutynin ER (DITROPAN-XL) 10 MG 24 hr tablet Take 1 tablet (10 mg) by mouth daily     oxyCODONE-acetaminophen (PERCOCET) 5-325 MG per tablet Take 1-2 tablets by mouth every 4 hours as needed     sennosides (SENOKOT) 8.6 MG tablet TAKE 1 TABLET BY MOUTH DAILY     sodium chloride, PF, 0.9% PF flush Use for urinary catheter irrigation     terbinafine (LAMISIL) 1 % cream Apply topically daily as needed      VITAMIN D3 25 MCG (1000 UT) tablet TAKE 1 TABLET (1,000 UNITS) BY MOUTH DAILY     zolpidem (AMBIEN) 10 MG tablet TAKE 0.5-1 TABLET BY MOUTH EVERY NIGHT AT BEDTIME AS NEEDED FOR SLEEP.             Review of Systems:    ROS: 10 point ROS neg other than the symptoms noted above in the HPI           Physical  "Exam:   VS:  T: 97.8    HR: 70    BP: 98/55    RR: 16   GEN:  AOx3.  NAD.  Tired-appearing.  CV:  RRR, well perfused  LUNGS: Non-labored breathing on room air  BACK:  No midline or CVA tenderness.  ABD:  Soft. ND.  No palpable bladder. Patient does not have bladder sensation. No masses. SPT site clean, dry, intact with surgical dressings in place. On attempts at manipulating the SP tube, the tube is unable to be advanced or pulled back and feels \"fixed\" in place.   EXT:  Warm, well perfused.  No edema.  SKIN:  Warm.  Dry.  No rashes.          Data:   All laboratory data reviewed:    Recent Labs   Lab 01/17/20 2044   WBC 10.9   HGB 15.0        Recent Labs   Lab 01/17/20 2044 01/16/20  1430     --    POTASSIUM 4.8 4.8   CHLORIDE 100  --    CO2 27  --    BUN 30  --    CR 0.95 0.27*   GLC 83  --    LIU 8.5  --      No lab results found in last 7 days.    Invalid input(s): URINEBLOOD    All pertinent imaging reviewed:    Bladder Ultrasound 1/17/2020:  Impression:    1. Catheter balloon appears inflated along the bladder base, possibly  within the prostatic urethra.  2. Moderately distended urinary bladder with echogenic debris.  3. Consider CT for a more precise anatomic evaluation.         Impression and Plan:   Impression:   Bart Corea is a 59 year old male with PMH of C4-C5 SCI with neurogenic bladder managed with an SPT, POD#1 from cystolitholapaxy and I&D of SP tract abscesses who presents today with an intermittently draining SP tube and headache. Bladder ultrasound appeared suspicious for malpositioned catheter in the prostatic urethra.    His SP tube was gently irrigated with 10 mL NS which immediately flowed out of his penile urethra. The SP tube balloon was taken down and the catheter was pulled back approximately 2 cm. The balloon was reinflated with immediate drainage of 300 mL tea-colored urine which appeared to be old blood. The catheter was then able to be freely manipulated " within the bladder. The bladder was gently irrigated with 100 mL of normal saline and his urine immediately became clear without evidence of clot. Catheter was hooked up to gastelum tubing and draining well.       Plan:     - Will give a 1 time dose of Gentamycin given catheter manipulation- based on previous culture sensitivities  - Patient instructed to monitor for additional signs or symptoms of catheter obstruction or infection (fevers, chills, increased spasticity)    - Follow-up with Urology as previously scheduled on 2/3/2020    - OK to discharge home from a urology perspective.     This patient's exam findings, labs, and imaging discussed with urology staff surgeon Dr. Lyman, who developed the treatment plan.    Jenny Mccain MD  PGY-2 Urology  Pager [phone]

## 2020-01-18 NOTE — DISCHARGE INSTRUCTIONS
You have been seen in the ER today for a suprapubic catheter that is not draining well.  The urology resident has repositioned this and it is now draining.  You have been given a dose of antibiotics in the ER.    Follow up with the urology clinic as directed.  If you notice worsening symptoms, fevers, or other concerns you can come back to the ED for re-evaluation.

## 2020-01-19 LAB
APPEARANCE STONE: NORMAL
COMPN STONE: NORMAL
NUMBER STONE: 6
SIZE STONE: NORMAL MM
WT STONE: 324 MG

## 2020-01-20 LAB — COPATH REPORT: NORMAL

## 2020-01-20 RX ORDER — MAGNESIUM HYDROXIDE 1200 MG/15ML
LIQUID ORAL
Refills: 11 | OUTPATIENT
Start: 2020-01-20

## 2020-01-20 RX ORDER — MAGNESIUM HYDROXIDE 1200 MG/15ML
LIQUID ORAL
Qty: 500 ML | Refills: 11 | Status: SHIPPED | OUTPATIENT
Start: 2020-01-20 | End: 2021-02-08

## 2020-01-21 ENCOUNTER — HOSPITAL ENCOUNTER (EMERGENCY)
Facility: CLINIC | Age: 60
Discharge: HOME | End: 2020-01-21
Attending: EMERGENCY MEDICINE | Admitting: EMERGENCY MEDICINE
Payer: COMMERCIAL

## 2020-01-21 VITALS
SYSTOLIC BLOOD PRESSURE: 135 MMHG | DIASTOLIC BLOOD PRESSURE: 89 MMHG | RESPIRATION RATE: 16 BRPM | HEART RATE: 47 BPM | TEMPERATURE: 97.3 F | OXYGEN SATURATION: 100 %

## 2020-01-21 DIAGNOSIS — R78.81 POSITIVE BLOOD CULTURE: ICD-10-CM

## 2020-01-21 LAB
ALBUMIN SERPL-MCNC: 2.9 G/DL (ref 3.4–5)
ALBUMIN UR-MCNC: 10 MG/DL
ALP SERPL-CCNC: 94 U/L (ref 40–150)
ALT SERPL W P-5'-P-CCNC: 20 U/L (ref 0–70)
ANION GAP SERPL CALCULATED.3IONS-SCNC: 4 MMOL/L (ref 3–14)
APPEARANCE UR: CLEAR
APTT PPP: 33 SEC (ref 22–37)
AST SERPL W P-5'-P-CCNC: 15 U/L (ref 0–45)
BASOPHILS # BLD AUTO: 0 10E9/L (ref 0–0.2)
BASOPHILS NFR BLD AUTO: 0.7 %
BILIRUB SERPL-MCNC: 0.2 MG/DL (ref 0.2–1.3)
BILIRUB UR QL STRIP: NEGATIVE
BUN SERPL-MCNC: 16 MG/DL (ref 7–30)
CALCIUM SERPL-MCNC: 8.8 MG/DL (ref 8.5–10.1)
CHLORIDE SERPL-SCNC: 103 MMOL/L (ref 94–109)
CO2 SERPL-SCNC: 33 MMOL/L (ref 20–32)
COLOR UR AUTO: ABNORMAL
CREAT SERPL-MCNC: 0.3 MG/DL (ref 0.66–1.25)
DIFFERENTIAL METHOD BLD: NORMAL
EOSINOPHIL # BLD AUTO: 0.2 10E9/L (ref 0–0.7)
EOSINOPHIL NFR BLD AUTO: 2.9 %
ERYTHROCYTE [DISTWIDTH] IN BLOOD BY AUTOMATED COUNT: 13.9 % (ref 10–15)
GFR SERPL CREATININE-BSD FRML MDRD: >90 ML/MIN/{1.73_M2}
GLUCOSE SERPL-MCNC: 84 MG/DL (ref 70–99)
GLUCOSE UR STRIP-MCNC: NEGATIVE MG/DL
HCT VFR BLD AUTO: 42.4 % (ref 40–53)
HGB BLD-MCNC: 13.4 G/DL (ref 13.3–17.7)
HGB UR QL STRIP: ABNORMAL
IMM GRANULOCYTES # BLD: 0 10E9/L (ref 0–0.4)
IMM GRANULOCYTES NFR BLD: 0.3 %
INR PPP: 1.01 (ref 0.86–1.14)
KETONES UR STRIP-MCNC: NEGATIVE MG/DL
LACTATE BLD-SCNC: 0.8 MMOL/L (ref 0.7–2)
LEUKOCYTE ESTERASE UR QL STRIP: ABNORMAL
LYMPHOCYTES # BLD AUTO: 1 10E9/L (ref 0.8–5.3)
LYMPHOCYTES NFR BLD AUTO: 17.3 %
MCH RBC QN AUTO: 30 PG (ref 26.5–33)
MCHC RBC AUTO-ENTMCNC: 31.6 G/DL (ref 31.5–36.5)
MCV RBC AUTO: 95 FL (ref 78–100)
MONOCYTES # BLD AUTO: 0.5 10E9/L (ref 0–1.3)
MONOCYTES NFR BLD AUTO: 8.9 %
MUCOUS THREADS #/AREA URNS LPF: PRESENT /LPF
NEUTROPHILS # BLD AUTO: 4.1 10E9/L (ref 1.6–8.3)
NEUTROPHILS NFR BLD AUTO: 69.9 %
NITRATE UR QL: NEGATIVE
NRBC # BLD AUTO: 0 10*3/UL
NRBC BLD AUTO-RTO: 0 /100
PH UR STRIP: 6 PH (ref 5–7)
PLATELET # BLD AUTO: 178 10E9/L (ref 150–450)
POTASSIUM SERPL-SCNC: 4.2 MMOL/L (ref 3.4–5.3)
PROT SERPL-MCNC: 6.6 G/DL (ref 6.8–8.8)
RBC # BLD AUTO: 4.47 10E12/L (ref 4.4–5.9)
RBC #/AREA URNS AUTO: 3 /HPF (ref 0–2)
SODIUM SERPL-SCNC: 139 MMOL/L (ref 133–144)
SOURCE: ABNORMAL
SP GR UR STRIP: 1 (ref 1–1.03)
SPERM #/AREA URNS HPF: PRESENT /HPF
UROBILINOGEN UR STRIP-MCNC: NORMAL MG/DL (ref 0–2)
WBC # BLD AUTO: 5.8 10E9/L (ref 4–11)
WBC #/AREA URNS AUTO: 14 /HPF (ref 0–5)

## 2020-01-21 PROCEDURE — 96360 HYDRATION IV INFUSION INIT: CPT

## 2020-01-21 PROCEDURE — 85730 THROMBOPLASTIN TIME PARTIAL: CPT | Performed by: EMERGENCY MEDICINE

## 2020-01-21 PROCEDURE — 99284 EMERGENCY DEPT VISIT MOD MDM: CPT | Mod: Z6 | Performed by: EMERGENCY MEDICINE

## 2020-01-21 PROCEDURE — 85610 PROTHROMBIN TIME: CPT | Performed by: EMERGENCY MEDICINE

## 2020-01-21 PROCEDURE — 87040 BLOOD CULTURE FOR BACTERIA: CPT | Performed by: EMERGENCY MEDICINE

## 2020-01-21 PROCEDURE — 25800030 ZZH RX IP 258 OP 636: Performed by: EMERGENCY MEDICINE

## 2020-01-21 PROCEDURE — 80053 COMPREHEN METABOLIC PANEL: CPT | Performed by: EMERGENCY MEDICINE

## 2020-01-21 PROCEDURE — 99284 EMERGENCY DEPT VISIT MOD MDM: CPT | Mod: 25

## 2020-01-21 PROCEDURE — 40000556 ZZH STATISTIC PERIPHERAL IV START W US GUIDANCE

## 2020-01-21 PROCEDURE — 81003 URINALYSIS AUTO W/O SCOPE: CPT | Performed by: EMERGENCY MEDICINE

## 2020-01-21 PROCEDURE — 85025 COMPLETE CBC W/AUTO DIFF WBC: CPT | Performed by: EMERGENCY MEDICINE

## 2020-01-21 PROCEDURE — 87086 URINE CULTURE/COLONY COUNT: CPT | Performed by: EMERGENCY MEDICINE

## 2020-01-21 PROCEDURE — 83605 ASSAY OF LACTIC ACID: CPT | Performed by: EMERGENCY MEDICINE

## 2020-01-21 RX ORDER — SODIUM CHLORIDE 9 MG/ML
1000 INJECTION, SOLUTION INTRAVENOUS CONTINUOUS
Status: DISCONTINUED | OUTPATIENT
Start: 2020-01-21 | End: 2020-01-21 | Stop reason: HOSPADM

## 2020-01-21 RX ADMIN — SODIUM CHLORIDE 1000 ML: 9 INJECTION, SOLUTION INTRAVENOUS at 15:46

## 2020-01-21 ASSESSMENT — ENCOUNTER SYMPTOMS
FEVER: 0
CHILLS: 1
WEAKNESS: 1

## 2020-01-21 NOTE — ED NOTES
1/20/2020 2157     Took lab call regarding positive blood cultures: Staph identified in positive culture from right arm. Dr. Power was notified of positive culture earlier in night, and contacted pt. Dr. Sadaf Olea notified of update.     Elva Omalley RN

## 2020-01-21 NOTE — RESULT ENCOUNTER NOTE
Patient contacted by Emergency Department Provider, Sandro Power MD, at 2002 on 1/20/2020.  See ED Provider note.  Patient advised to return to ED by Dr Power

## 2020-01-21 NOTE — ED AVS SNAPSHOT
[facility], [place], Emergency Department  [address] [place] [id number]  Phone:  [phone]                                    Bart Corea   MRN: [id number]    Department:  [facility], Emergency Department   Date of Visit:  1/21/2020           After Visit Summary Signature Page    I have received my discharge instructions, and my questions have been answered. I have discussed any challenges I see with this plan with the nurse or doctor.    ..........................................................................................................................................  Patient/Patient Representative Signature      ..........................................................................................................................................  Patient Representative Print Name and Relationship to Patient    ..................................................               ................................................  Date                                   Time    ..........................................................................................................................................  Reviewed by Signature/Title    ...................................................              ..............................................  Date                                               Time          22EPIC Rev 08/18

## 2020-01-21 NOTE — ED TRIAGE NOTES
Patient presents to triage with c/o abnormal urine result. Patient states he had urology surgery on Thursday and received a call from urology team stating urine sample from Friday was positive and patient should present to ED.

## 2020-01-21 NOTE — ED NOTES
Patient has positive blood culture from his past visit on 1/17/20. He has known resistant pseudomonas infection in the bladder resistant to multiple antibiotics. In the ED he was treated with a single dose of IV gentamycin. Blood culture on the 3rd day of incubation is growing gram positive cocci in clusters.    I contacted the patient. He is still not feeling well. He has no fever, but has fatigue and some cough malaise and shortness of breath. He has no nausea or vomiting.     I discussed that if he is still feeling ill he should return to the ED for repeat examination, blood cultures and labs.    He states that he will be unable to arrange transport tonight. He will try to come back tomorrow morning. I encouraged him to come back immediately via paramedics if he spikes a fever tonight.    Impression:  Positive blood culture. Not pseudomonas. Could represent skin contamination, second urinary organism, or pulmonary infection. Patient and wife notified of result and encouraged to return for further evaluation.     Sandro Power MD  01/20/20 2002
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
## 2020-01-21 NOTE — DISCHARGE INSTRUCTIONS
Follow-up by phone with Dr. rUiostegui and Dr. Abebe.    We will notify you if further blood cultures become positive.    Return to the emergency department for high fevers, worsening symptoms, or any other problems.

## 2020-01-22 LAB
BACTERIA SPEC CULT: NORMAL
Lab: NORMAL
SPECIMEN SOURCE: NORMAL

## 2020-01-22 NOTE — RESULT ENCOUNTER NOTE
Patient did return to the Emergency Dept and had repeat blood cultures drawn.  Discharged home after ED visit on 1/21/2020

## 2020-01-23 NOTE — RESULT ENCOUNTER NOTE
Final urine culture report is NEGATIVE per [facility] ED Lab Result protocol.    If NEGATIVE result, no change in treatment, per [facility] ED Lab Result protocol.

## 2020-01-25 ENCOUNTER — TELEPHONE (OUTPATIENT)
Dept: EMERGENCY MEDICINE | Facility: CLINIC | Age: 60
End: 2020-01-25

## 2020-01-25 LAB
BACTERIA SPEC CULT: ABNORMAL
Lab: ABNORMAL
SPECIMEN SOURCE: ABNORMAL
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
## 2020-01-26 NOTE — TELEPHONE ENCOUNTER
"RN Assessment (Patient s current Symptoms), include time called.  [Insert Left message here if message left]  6:32PM; Spoke with patient. Reviewed ED provider's recommendation as noted below.  Patient states he is \"doing better.\" \"Low on energy.\" Is noting blood clots in his urine, \"They said to expect that.\"     RN Recommendations/Instructions per [facility] ED lab result protocol  Patient notified of lab result and Ed provider's recommendation.   Reviewed current preliminary blood cultures with patient that are no growth to date and reviewed his final urine culture as pasted below.  Advised to contact his urologist if he has questions regarding the blood clots he is noting in his urine, patient states \"they said to expect that\". Advised to contact the urologist on call if he has any concerns.  The patient is comfortable with the information given and has no further questions.    Please Contact your PCP clinic or return to the Emergency department if your:    Symptoms worsen or other concerning symptom's.    PCP follow-up Questions asked: YES       [RN Name]  Dolores Villela RN  [facility] RN  Lung Nodule and ED Lab Result RN  Epic pool (ED late result f/u RN): P [id number]  [facility] INCIDENTAL RADIOLOGY F/U NURSES: P [id number]  # [phone]    Copy of Lab result   Urine Culture Aerobic Bacterial [[id number]] (Order [id number])   Order Requisition     Urine Culture Aerobic Bacterial (Order #[id number]) on 1/21/20   Exam Information     Exam Date Exam Time Accession # Results    1/21/20 11:20 AM [id number]    Specimen Information: Midstream Urine        Component Collected Lab   Specimen Description 01/21/2020 11:20    Midstream Urine    Special Requests 01/21/2020 11:20    Specimen received in preservative    Culture Micro 01/21/2020 11:20    <10,000 colonies/mL   mixed urogenital france      Blood culture [[id number]] (Order [id number])   Order Requisition     Blood culture (Order #[id number]) on " 1/17/20   Exam Information     Exam Date Exam Time Accession # Results    1/17/20  8:59 PM [id number]    Component Results     Specimen Information: Arm, Right; Blood        Component Collected Lab   Specimen Description 01/17/2020  8:59    Blood Right Arm    Special Requests 01/17/2020  8:59    Aerobic and anaerobic bottles received    Culture Micro Abnormal  01/17/2020  8:59    Cultured on the 3rd day of incubation:   Staphylococcus capitis    Culture Micro 01/17/2020  8:59    Critical Value/Significant Value, preliminary result only, called to and read back by   Bella Meeks RN UROCIO 1907 1/20/20 AM    Culture Micro Abnormal  01/17/2020  8:59    Cultured on the 3rd day of incubation:   Strain 2   Staphylococcus capitis    Culture Micro 01/17/2020  8:59    (Note)   POSITIVE for Staphylococci other than S.aureus, S.epidermidis and   S.lugdunensis, by Verigene multiplex nucleic acid test.   Coagulase-negative staphylococci are the most common venipuncture or   collection associated skin CONTAMINANTS grown in blood cultures.   Final identification and antimicrobial susceptibility testing will be   verified by standard methods.     Specimen tested with Verigene multiplex, gram-positive blood culture   nucleic acid test for the following targets: Staph aureus, Staph   epidermidis, Staph lugdunensis, other Staph species, Enterococcus   faecalis, Enterococcus faecium, Streptococcus species, S. agalactiae,   S. anginosus grp., S. pneumoniae, S. pyogenes, Listeria sp., mecA   (methicillin resistance) and Kelle/B (vancomycin resistance).     Critical Value/Significant Value called to and read back by   Madeleine CURRIE 2154 1/20/20 AM        Susceptibility     Staphylococcus capitis (3)     Antibiotic Interpretation Sensitivity Method Status   CIPROFLOXACIN Intermediate 2 ug/mL WALLY Final   CLINDAMYCIN Resistant >=8 ug/mL WALLY Final   ERYTHROMYCIN Resistant >=8 ug/mL WALLY Final    GENTAMICIN Sensitive <=0.5 ug/mL WALLY Final   LEVOFLOXACIN Resistant 4 ug/mL WALLY Final   OXACILLIN Sensitive   WALLY Final   TETRACYCLINE Resistant >=16 ug/mL WALLY Final   VANCOMYCIN Sensitive <=0.5 ug/mL WALLY Final   Staphylococcus capitis (1)     Antibiotic Interpretation Sensitivity Method Status   CIPROFLOXACIN Intermediate 2.0 ug/mL WALLY Final   CLINDAMYCIN Resistant >4.0 ug/mL WALLY Final   ERYTHROMYCIN Resistant >4.0 ug/mL WALLY Final   GENTAMICIN Sensitive <=1.0 ug/mL WALLY Final   LEVOFLOXACIN Intermediate 2.0 ug/mL WALLY Final   OXACILLIN Sensitive <=0.25 ug/mL WALLY Final   TETRACYCLINE Resistant >8.0 ug/mL WALLY Final   VANCOMYCIN Sensitive 1.0 ug/mL WALLY Final    Condensed View     Blood culture [[id number]] (Order [id number])   Order Requisition     Blood culture (Order #[id number]) on 1/21/20   Preliminary Result   Exam Information     Exam Date Exam Time Accession # Results    1/21/20  3:16 PM [id number]    Specimen Information: Hand, Left; Blood        Component Collected Lab   Specimen Description 01/21/2020  3:16    Blood Left Arm    Special Requests 01/21/2020  3:16    Aerobic and anaerobic bottles received    Culture Micro 01/21/2020  3:16    No growth after 4 days      Blood culture [[id number]] (Order [id number])   Order Requisition     Blood culture (Order #[id number]) on 1/21/20   Preliminary Result   Exam Information     Exam Date Exam Time Accession # Results    1/21/20  3:52 PM [id number]    Specimen Information: Arm, Left; Blood        Component Collected Lab   Specimen Description 01/21/2020  3:52    Blood Left Hand    Special Requests 01/21/2020  3:52 PM 51   Aerobic and anaerobic bottles received    Culture Micro 01/21/2020  3:52    No growth after 4 days

## 2020-01-27 LAB
BACTERIA SPEC CULT: NO GROWTH
BACTERIA SPEC CULT: NO GROWTH
Lab: NORMAL
Lab: NORMAL
SPECIMEN SOURCE: NORMAL
SPECIMEN SOURCE: NORMAL

## 2020-01-28 ENCOUNTER — TELEPHONE (OUTPATIENT)
Dept: FAMILY MEDICINE | Facility: CLINIC | Age: 60
End: 2020-01-28

## 2020-01-28 ENCOUNTER — PRE VISIT (OUTPATIENT)
Dept: UROLOGY | Facility: CLINIC | Age: 60
End: 2020-01-28

## 2020-01-28 NOTE — TELEPHONE ENCOUNTER
[facility] Call Center    Phone Message    May a detailed message be left on voicemail: yes    Reason for Call: Order(s): Home Care Orders: Skilled Nursin times per month for 2 months and 3 prn. Also, orders to change sp cath every 2 weeks -16 french 8cc syringe - irrigate with 60cc normal saline - daily and as needed. Caregiver can irrigate cath on non-nursing visit days.     Action Taken: Message routed to:  [facility] PCC    Verbal order given and documented. Renetta Eduardo LPN 2020 1:04 PM

## 2020-01-28 NOTE — TELEPHONE ENCOUNTER
Reason for visit: Wound check and SP tube change     Relevant information: neurogenic bladder, taya catheter abscess    Records/imaging/labs/orders: all records available    Pt called: no need for a call    At Rooming: catheter exchange

## 2020-01-28 NOTE — TELEPHONE ENCOUNTER
Verbal orders given to Marva from [facility], per Dr. Long, for Skilled Nursin times per month for 2 months and 3 prn. Also, orders to change sp cath every 2 weeks -16 french 8cc syringe - irrigate with 60cc normal saline - daily and as needed. Caregiver can irrigate cath on non-nursing visit days. Renetta Eduardo LPN 2020 1:05 PM

## 2020-01-30 ENCOUNTER — MEDICAL CORRESPONDENCE (OUTPATIENT)
Dept: HEALTH INFORMATION MANAGEMENT | Facility: CLINIC | Age: 60
End: 2020-01-30

## 2020-01-30 ENCOUNTER — MYC MEDICAL ADVICE (OUTPATIENT)
Dept: FAMILY MEDICINE | Facility: CLINIC | Age: 60
End: 2020-01-30

## 2020-01-30 DIAGNOSIS — R10.30 GROIN PAIN, UNSPECIFIED LATERALITY: ICD-10-CM

## 2020-01-30 DIAGNOSIS — G82.50 QUADRIPLEGIA (H): ICD-10-CM

## 2020-01-30 DIAGNOSIS — R25.2 SPASM: ICD-10-CM

## 2020-01-30 DIAGNOSIS — R21 RASH AND OTHER NONSPECIFIC SKIN ERUPTION: ICD-10-CM

## 2020-01-30 DIAGNOSIS — G47.00 INSOMNIA: ICD-10-CM

## 2020-01-30 DIAGNOSIS — G47.00 INSOMNIA, UNSPECIFIED TYPE: ICD-10-CM

## 2020-01-30 DIAGNOSIS — J20.9 BRONCHITIS WITH BRONCHOSPASM: ICD-10-CM

## 2020-01-30 DIAGNOSIS — R06.2 WHEEZING: ICD-10-CM

## 2020-01-30 NOTE — TELEPHONE ENCOUNTER
baclofen (LIORESAL) 20 MG tablet      Last Written Prescription Date:  1-16-19  Last Fill Quantity: 540,   # refills: 3  Last Office Visit : 10-16-19  Future Office visit:  none    Routing refill request to provider for review/approval because:  Med not on protocol    Valium and Ambien: controlled meds, please address

## 2020-01-31 RX ORDER — DIAZEPAM 10 MG
10 TABLET ORAL DAILY
Qty: 90 TABLET | Refills: 1 | Status: SHIPPED | OUTPATIENT
Start: 2020-01-31 | End: 2020-08-27

## 2020-01-31 RX ORDER — BACLOFEN 20 MG/1
TABLET ORAL
Qty: 540 TABLET | Refills: 3 | Status: SHIPPED | OUTPATIENT
Start: 2020-01-31 | End: 2020-02-12

## 2020-01-31 RX ORDER — ZOLPIDEM TARTRATE 10 MG/1
TABLET ORAL
Qty: 90 TABLET | Refills: 1 | Status: SHIPPED | OUTPATIENT
Start: 2020-01-31 | End: 2020-08-03

## 2020-01-31 RX ORDER — BACLOFEN 20 MG/1
TABLET ORAL
Qty: 540 TABLET | Refills: 3 | Status: SHIPPED | OUTPATIENT
Start: 2020-01-31 | End: 2021-01-19

## 2020-01-31 RX ORDER — ZOLPIDEM TARTRATE 10 MG/1
TABLET ORAL
Qty: 90 TABLET | Refills: 1 | Status: SHIPPED | OUTPATIENT
Start: 2020-01-31 | End: 2020-02-12

## 2020-01-31 RX ORDER — DIAZEPAM 10 MG
TABLET ORAL
Qty: 90 TABLET | Refills: 1 | Status: SHIPPED | OUTPATIENT
Start: 2020-01-31 | End: 2020-02-12

## 2020-01-31 NOTE — TELEPHONE ENCOUNTER
Patient Requested  diazepam (VALIUM) 10 MG tablet  zolpidem (AMBIEN) 10 MG tablet  Last Filled  12/30/2019  Last Office Visit  09/28/2019  Next Office Visit  Nothing Scheduled   Checked  01/31/2020      DX: Insomnia    Pharmacy: [facility], [place] - [address]    ARPITA PINA CMA at 10:07 AM on 1/31/2020.

## 2020-02-03 ENCOUNTER — OFFICE VISIT (OUTPATIENT)
Dept: UROLOGY | Facility: CLINIC | Age: 60
End: 2020-02-03
Payer: COMMERCIAL

## 2020-02-03 VITALS — SYSTOLIC BLOOD PRESSURE: 130 MMHG | DIASTOLIC BLOOD PRESSURE: 80 MMHG | HEART RATE: 52 BPM

## 2020-02-03 DIAGNOSIS — N31.9 NEUROGENIC BLADDER: Primary | ICD-10-CM

## 2020-02-03 ASSESSMENT — PAIN SCALES - GENERAL: PAINLEVEL: MODERATE PAIN (4)

## 2020-02-03 ASSESSMENT — PATIENT HEALTH QUESTIONNAIRE - PHQ9: SUM OF ALL RESPONSES TO PHQ QUESTIONS 1-9: 18
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
## 2020-02-03 NOTE — NURSING NOTE
Chief Complaint   Patient presents with     Follow Up     Wound check and SP tube change, tired, retaining fluid all over body       Blood pressure 130/80, pulse 52. There is no height or weight on file to calculate BMI.    Patient Active Problem List   Diagnosis     Abdominal pain     Neurogenic bladder      Mild LIZA with hypoventilation     Sleep related hypoventilation/hypoxemia in other disease     Cataracts, bilateral     Myopic astigmatism of both eyes     Presbyopia     Injury at C4 level of cervical spinal cord (H)     Injury at C5 level of cervical spinal cord (H)     Injury at C6 level of cervical spinal cord (H)     Pneumonia     Bacteremia     Scrotal swelling     Autonomic dysreflexia     Decubitus ulcer of hip     Bladder stones     Suprapubic catheter dysfunction, initial encounter (H)       Allergies   Allergen Reactions     Vancomycin Anaphylaxis       Current Outpatient Medications   Medication Sig Dispense Refill     albuterol (PROVENTIL) (2.5 MG/3ML) 0.083% neb solution NEBULIZE 1 VIAL (2.5 MG) EVERY 6 HOURS AS NEEDED FOR SHORTNESS OF BREATH / DYSPNEA OR WHEEZING 75 mL 1     albuterol (VENTOLIN HFA) 108 (90 Base) MCG/ACT Inhaler Inhale 2 puffs into the lungs every 4 hours as needed 3 Inhaler 3     Alcohol Swabs (ALCOHOL PREP PADS) USE AS DIRECTED       amcinonide (CYCLOCORT) 0.1 % cream Apply topically 2 times daily as needed for itching 120 g 1     amLODIPine (NORVASC) 2.5 MG tablet Take 1 tablet (2.5 mg) by mouth daily as needed (autonomic dysreflexia) 30 tablet 1     Applicators (COTTON SWABS) SWAB Please dispense sterile wrapped cotton swabs; use up to five/day for medical needs 450 each 3     ascorbic acid (VITAMIN C) 1000 MG TABS Take 1 tablet by mouth daily       baclofen (LIORESAL) 20 MG tablet TAKE ONE TABLET BY MOUTH UP TO 6 TIMES DAILY 540 tablet 3     baclofen (LIORESAL) 20 MG tablet TAKE ONE TABLET BY MOUTH UP TO 6 TIMES DAILY 540 tablet 3     bisacodyl (DULCOLAX) 10 MG suppository  "Place 20 mg rectally every 3 days Active ingredient in Magic Bullet (10mg per suppository)       clindamycin (CLINDAMAX) 1 % topical gel Apply bid 60 g 11     diazepam (VALIUM) 10 MG tablet TAKE ONE TABLET BY MOUTH EVERY DAY. 90 tablet 1     diazepam (VALIUM) 10 MG tablet Take 1 tablet (10 mg) by mouth daily 90 tablet 1     docusate sodium (DOK) 100 MG capsule Take 1 capsule (100 mg) by mouth daily 90 capsule 3     Gauze Pads & Dressings (GAUZE BANDAGE 2\") MISC IV STERILE GUAZE Use up to 4/day Supply 360 for three month supply 360 each 3     Gauze Pads & Dressings (GAUZE DRESSING) 4\"X4\" PADS NON STERILE GAUZE Use up to 8/day  Supply 720 for three month supply 720 each 3     hydrocortisone (ANUSOL-HC) 2.5 % cream Bid prn 120 g 11     ibuprofen (ADVIL/MOTRIN) 800 MG tablet TAKE 1 TABLET BY MOUTH 3 TIMES DAILY AS NEEDED 60 tablet 11     Incontinence Supplies (URINARY LEG BAG) USE AS NEEDED       lidocaine (XYLOCAINE) 2 % jelly Apply  topically. APPLY AS DIRECTED       MAGIC BULLETS 10 MG Suppository Place 1 suppository (10 mg) rectally daily as needed for constipation 30 suppository 3     minocycline (MINOCIN/DYNACIN) 50 MG capsule TAKE 1 CAPSULE (50 MG) BY MOUTH 2 TIMES DAILY 180 capsule 3     Multiple Vitamins-Minerals (MULTIVITAMIN & MINERAL PO) Take 1 capsule by mouth daily.       nitroGLYcerin (NITRO-BID) 2 % OINT ointment Place 0.5-1 inches (7.5-15 mg) onto the skin every 24 hours as needed for autonomic dysreflexia symptoms.  Remove ointment with gloved hand once symptoms resolve. 30 g 0     nystatin (NYSTOP) 597532 UNIT/GM POWD APPLY 1 DOSE TOPICALLY 3 TIMES DAILY AS NEEDED 60 g 2     omeprazole (PRILOSEC) 20 MG CR capsule Take 1 capsule (20 mg) by mouth daily 30 capsule 1     order for DME Equipment being ordered: Urinary leg bag  Change three times a month  Dispense 3 3 Device 11     order for DME Equipment being ordered: Alcohol swabs  Use 4 a day for catheter change 120 Device 11     order for DME Equipment " being ordered: Catheter 16 Fr, 8 ml balloon. 3 per month 3 each 11     order for DME Equipment being ordered: 60 ml catheter tip syringes (no needle), 30 per month 30 each 11     order for DME Equipment being ordered: Hospital Bed 1 each 0     order for DME Equipment being ordered: Compression stockings, 20-30 mm Hg strength, knee-high, appropriately sized 2 each 1     order for DME two leg bag straps and 2 mouth sticks 2 each 0     Ostomy Supplies (UROSTOMY NIGHT BAG) MISC Please dispense three night bags/month (three month worth at a time) with 4 refills re: needed for chronic urinary dysfunction due to quadreplegia. Type: Bard 2000 ml. 9 each 3     oxybutynin ER (DITROPAN-XL) 10 MG 24 hr tablet Take 1 tablet (10 mg) by mouth daily 90 tablet 3     oxyCODONE-acetaminophen (PERCOCET) 5-325 MG per tablet Take 1-2 tablets by mouth every 4 hours as needed 30 tablet 0     sennosides (SENOKOT) 8.6 MG tablet TAKE 1 TABLET BY MOUTH DAILY 90 tablet 3     sodium chloride 0.9%, bottle, 0.9 % irrigation USE FOR URINARY CATHETER IRRIGATION 500 mL 11     terbinafine (LAMISIL) 1 % cream Apply topically daily as needed        VITAMIN D3 25 MCG (1000 UT) tablet TAKE 1 TABLET (1,000 UNITS) BY MOUTH DAILY 90 tablet 2     zolpidem (AMBIEN) 10 MG tablet TAKE 0.5-1 TABLET BY MOUTH EVERY NIGHT AT BEDTIME AS NEEDED FOR SLEEP. 90 tablet 1     zolpidem (AMBIEN) 10 MG tablet TAKE 0.5-1 TABLET BY MOUTH EVERY NIGHT AT BEDTIME AS NEEDED FOR SLEEP. 90 tablet 1       Social History     Tobacco Use     Smoking status: Current Every Day Smoker     Packs/day: 0.30     Types: Cigarettes     Smokeless tobacco: Never Used     Tobacco comment: since early teens   Substance Use Topics     Alcohol use: Yes     Comment: on weekends, 3-4+     Drug use: Yes     Types: Marijuana     Comment: occ marijuana for spasms       Alla Guillory LPN  2/3/2020  8:46 AM
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
## 2020-02-03 NOTE — PATIENT INSTRUCTIONS
Return to see Jenny Escoto PA-C in one year with labs and imaging.    Schedule an appointment with Dr. Long or your PMR physician regarding weakness following surgery.    It was a pleasure meeting with you today.  Thank you for allowing me and my team the privilege of caring for you today.  YOU are the reason we are here, and I truly hope we provided you with the excellent service you deserve.  Please let us know if there is anything else we can do for you so that we can be sure you are leaving completely satisfied with your care experience.

## 2020-02-05 ENCOUNTER — OFFICE VISIT (OUTPATIENT)
Dept: PHYSICAL MEDICINE AND REHAB | Facility: CLINIC | Age: 60
End: 2020-02-05
Payer: COMMERCIAL

## 2020-02-05 ENCOUNTER — HOSPITAL ENCOUNTER (OUTPATIENT)
Dept: OCCUPATIONAL THERAPY | Facility: CLINIC | Age: 60
Setting detail: THERAPIES SERIES
End: 2020-02-05
Attending: FAMILY MEDICINE
Payer: COMMERCIAL

## 2020-02-05 VITALS
DIASTOLIC BLOOD PRESSURE: 63 MMHG | RESPIRATION RATE: 16 BRPM | HEART RATE: 52 BPM | SYSTOLIC BLOOD PRESSURE: 113 MMHG | OXYGEN SATURATION: 98 %

## 2020-02-05 DIAGNOSIS — S14.106A: ICD-10-CM

## 2020-02-05 DIAGNOSIS — R60.1 GENERALIZED EDEMA: ICD-10-CM

## 2020-02-05 DIAGNOSIS — L02.219 SUPRAPUBIC ABSCESS: ICD-10-CM

## 2020-02-05 DIAGNOSIS — Z99.3 USES POWERED WHEELCHAIR: ICD-10-CM

## 2020-02-05 DIAGNOSIS — S14.105A: ICD-10-CM

## 2020-02-05 DIAGNOSIS — G82.50 QUADRIPLEGIA (H): ICD-10-CM

## 2020-02-05 DIAGNOSIS — R29.898 UPPER EXTREMITY WEAKNESS: ICD-10-CM

## 2020-02-05 DIAGNOSIS — G47.19 EXCESSIVE DAYTIME SLEEPINESS: ICD-10-CM

## 2020-02-05 DIAGNOSIS — Z78.9 IMPAIRED MOBILITY AND ADLS: ICD-10-CM

## 2020-02-05 DIAGNOSIS — S14.104A: ICD-10-CM

## 2020-02-05 DIAGNOSIS — Z74.09 IMPAIRED MOBILITY AND ADLS: ICD-10-CM

## 2020-02-05 DIAGNOSIS — Z93.59 SUPRAPUBIC CATHETER (H): ICD-10-CM

## 2020-02-05 PROCEDURE — 97166 OT EVAL MOD COMPLEX 45 MIN: CPT | Mod: GO | Performed by: OCCUPATIONAL THERAPIST

## 2020-02-05 PROCEDURE — 97110 THERAPEUTIC EXERCISES: CPT | Mod: GO | Performed by: OCCUPATIONAL THERAPIST
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
## 2020-02-05 NOTE — NURSING NOTE
Chief Complaint   Patient presents with     RECHECK     [facility] RETURN F/U SPINAL CORD        Pradip Gray, EMT
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
## 2020-02-09 NOTE — PROGRESS NOTES
[facility]          OUTPATIENT OCCUPATIONAL THERAPY  EVALUATION  PLAN OF TREATMENT FOR OUTPATIENT REHABILITATION  (COMPLETE FOR INITIAL CLAIMS ONLY)  Patient's Last Name, First Name, M.I.  YOB: 1960  Bart Corea                        Provider's Name  [facility] Medical Record No.  [id number]                               Onset Date:     01/16/20   Start of Care Date:     02/05/20   Type:     ___PT   _X_OT   ___SLP Medical Diagnosis:     quadraplegia and excessive daytime sleepiness                          OT Diagnosis:     Impaired ADL/IADL and functional mobility with power w/c Visits from SOC:  1   _________________________________________________________________________________  Plan of Treatment/Functional Goals:  ADL training, IADL training, Neuromuscular re-education, ROM, Self care/Home management, Strengthening, Stretching, Therapeutic activities  Electrical stimulation       Goals     Goal Description: Pt will demonstrate use of his NVELO mobile arm support with adjustments to settings or additional elevating proximal arm in order to feed himself at least 25% of meal with set up with AE with R UE.  Target Date: 04/29/20        Goal Description: Pt will be able to power on and off his w/c with R UE and place his R UE onto joystick to operate chair 75% of trials.  Target Date: 04/29/20       Therapy Frequency: 2x/week x 6-8 weeks and then taper to 1x/week for 12 weeks     Predicted Duration of Therapy Intervention (days/wks): 12 weeks or greater pending progress 2/5/20-4/29/20  SRAA Mayo/DORA, MSCS         I CERTIFY THE NEED FOR THESE SERVICES FURNISHED UNDER        THIS PLAN OF TREATMENT AND WHILE UNDER MY CARE     (Physician co-signature of this document indicates review and certification of the therapy plan).                Referring Physician:   Jason Abebe     Initial Assessment        See Epic Evaluation      Start Of Care Date: 02/05/20

## 2020-02-12 ENCOUNTER — HOSPITAL ENCOUNTER (OUTPATIENT)
Dept: OCCUPATIONAL THERAPY | Facility: CLINIC | Age: 60
Setting detail: THERAPIES SERIES
End: 2020-02-12
Attending: FAMILY MEDICINE
Payer: COMMERCIAL

## 2020-02-12 PROCEDURE — 97032 APPL MODALITY 1+ESTIM EA 15: CPT | Mod: GO | Performed by: OCCUPATIONAL THERAPIST

## 2020-02-17 ENCOUNTER — OFFICE VISIT (OUTPATIENT)
Dept: FAMILY MEDICINE | Facility: CLINIC | Age: 60
End: 2020-02-17
Payer: COMMERCIAL

## 2020-02-17 VITALS
SYSTOLIC BLOOD PRESSURE: 96 MMHG | OXYGEN SATURATION: 94 % | TEMPERATURE: 98.2 F | RESPIRATION RATE: 18 BRPM | HEART RATE: 54 BPM | DIASTOLIC BLOOD PRESSURE: 57 MMHG

## 2020-02-17 DIAGNOSIS — G47.10 HYPERSOMNOLENCE: ICD-10-CM

## 2020-02-17 DIAGNOSIS — G47.00 INSOMNIA, UNSPECIFIED TYPE: ICD-10-CM

## 2020-02-17 DIAGNOSIS — R53.83 FATIGUE, UNSPECIFIED TYPE: Primary | ICD-10-CM

## 2020-02-17 DIAGNOSIS — G82.50 QUADRIPLEGIA (H): ICD-10-CM

## 2020-02-17 DIAGNOSIS — R53.83 FATIGUE, UNSPECIFIED TYPE: ICD-10-CM

## 2020-02-17 DIAGNOSIS — R25.2 SPASM: ICD-10-CM

## 2020-02-17 LAB
ALBUMIN SERPL-MCNC: 3.3 G/DL (ref 3.4–5)
ALP SERPL-CCNC: 130 U/L (ref 40–150)
ALT SERPL W P-5'-P-CCNC: 22 U/L (ref 0–70)
ANION GAP SERPL CALCULATED.3IONS-SCNC: 3 MMOL/L (ref 3–14)
AST SERPL W P-5'-P-CCNC: 16 U/L (ref 0–45)
BASOPHILS # BLD AUTO: 0.1 10E9/L (ref 0–0.2)
BASOPHILS NFR BLD AUTO: 0.7 %
BILIRUB SERPL-MCNC: 0.4 MG/DL (ref 0.2–1.3)
BUN SERPL-MCNC: 18 MG/DL (ref 7–30)
CALCIUM SERPL-MCNC: 9.1 MG/DL (ref 8.5–10.1)
CHLORIDE SERPL-SCNC: 103 MMOL/L (ref 94–109)
CO2 SERPL-SCNC: 33 MMOL/L (ref 20–32)
CORTIS SERPL-MCNC: 13.6 UG/DL (ref 4–22)
CREAT SERPL-MCNC: 0.25 MG/DL (ref 0.66–1.25)
DIFFERENTIAL METHOD BLD: ABNORMAL
EOSINOPHIL # BLD AUTO: 0.2 10E9/L (ref 0–0.7)
EOSINOPHIL NFR BLD AUTO: 2.3 %
ERYTHROCYTE [DISTWIDTH] IN BLOOD BY AUTOMATED COUNT: 14.1 % (ref 10–15)
GFR SERPL CREATININE-BSD FRML MDRD: >90 ML/MIN/{1.73_M2}
GLUCOSE SERPL-MCNC: 81 MG/DL (ref 70–99)
HCT VFR BLD AUTO: 47.1 % (ref 40–53)
HGB BLD-MCNC: 14.6 G/DL (ref 13.3–17.7)
IMM GRANULOCYTES # BLD: 0 10E9/L (ref 0–0.4)
IMM GRANULOCYTES NFR BLD: 0.3 %
LYMPHOCYTES # BLD AUTO: 1.2 10E9/L (ref 0.8–5.3)
LYMPHOCYTES NFR BLD AUTO: 16.2 %
MCH RBC QN AUTO: 30 PG (ref 26.5–33)
MCHC RBC AUTO-ENTMCNC: 31 G/DL (ref 31.5–36.5)
MCV RBC AUTO: 97 FL (ref 78–100)
MONOCYTES # BLD AUTO: 0.8 10E9/L (ref 0–1.3)
MONOCYTES NFR BLD AUTO: 11.1 %
NEUTROPHILS # BLD AUTO: 5.1 10E9/L (ref 1.6–8.3)
NEUTROPHILS NFR BLD AUTO: 69.4 %
NRBC # BLD AUTO: 0 10*3/UL
NRBC BLD AUTO-RTO: 0 /100
PLATELET # BLD AUTO: 197 10E9/L (ref 150–450)
POTASSIUM SERPL-SCNC: 4.2 MMOL/L (ref 3.4–5.3)
PROT SERPL-MCNC: 7 G/DL (ref 6.8–8.8)
RBC # BLD AUTO: 4.87 10E12/L (ref 4.4–5.9)
SODIUM SERPL-SCNC: 139 MMOL/L (ref 133–144)
TSH SERPL DL<=0.005 MIU/L-ACNC: 2.91 MU/L (ref 0.4–4)
WBC # BLD AUTO: 7.3 10E9/L (ref 4–11)

## 2020-02-17 RX ORDER — DIAZEPAM 5 MG
5 TABLET ORAL EVERY 6 HOURS PRN
Qty: 30 TABLET | Refills: 3 | Status: SHIPPED | OUTPATIENT
Start: 2020-02-17 | End: 2020-08-27

## 2020-02-17 RX ORDER — ZOLPIDEM TARTRATE 5 MG/1
5 TABLET ORAL
Qty: 30 TABLET | Refills: 3 | Status: SHIPPED | OUTPATIENT
Start: 2020-02-17 | End: 2021-01-15

## 2020-02-17 ASSESSMENT — PAIN SCALES - GENERAL: PAINLEVEL: NO PAIN (0)

## 2020-02-17 NOTE — PATIENT INSTRUCTIONS
[facility] Medication Refill Request Information:  * Please contact your pharmacy regarding ANY request for medication refills.  ** [facility] Prescription Fax = [phone]  * Please allow 3 business days for routine medication refills.  * Please allow 5 business days for controlled substance medication refills.     [facility] Test Result notification information:  *You will be notified with in 7-10 days of your appointment day regarding the results of your test.  If you are on MyChart you will be notified as soon as the provider has reviewed the results and signed off on them.

## 2020-02-17 NOTE — NURSING NOTE
Chief Complaint   Patient presents with     Surgical Followup     pt. states that he has had some issues after his surgery that he would like to discuss with the doctor        Claudette Whitten, EMT

## 2020-02-17 NOTE — PROGRESS NOTES
[facility]  [facility]   Jason Long MD  02/17/2020      Chief Complaint:   Surgical Followup    History of Present Illness:   Bart Corea is a 59 year old male with a history of quadriplegia, asthma, and heart murmur who presents for evaluation of fatigue and loss of strength.    Loss of strength: He has loss of strength in his arm since his bladder surgery on 1/16/20. His arm feels like it weighs a ton. He was able to use his arms to wheel himself into surgery, but since the surgery he has been not been able to do this. The control of his head and neck are fine. He does not have neck pain. This is causing him some depression. His original spinal injury was to C4-6. He saw Dr. Moya in PM&R last week.     Fatigue: He has increasing fatigue. He can fall asleep very quickly, and this has been worsening and much more pronounced since his surgery. He nods off after dinner often, and anytime during the day. He has been able to sleep at night. He takes Valium 10 mg daily, and would like to try and cut down. He believes he takes Ambien 10 mg daily. He was assessed for sleep apnea many years ago. He has not been told that he snores by his wife. He can feel well-rested when he wakes up, but not often.     Fluid retention: He has been retaining fluid. He has not been short of breath. His blood pressure has been fluctuating up and down.      Review of Systems:   Pertinent items are noted in HPI or as in patient entered ROS below, remainder of complete ROS is negative.     Active Medications:     Current Outpatient Medications:      albuterol (PROVENTIL) (2.5 MG/3ML) 0.083% neb solution, NEBULIZE 1 VIAL (2.5 MG) EVERY 6 HOURS AS NEEDED FOR SHORTNESS OF BREATH / DYSPNEA OR WHEEZING, Disp: 75 mL, Rfl: 1     albuterol (VENTOLIN HFA) 108 (90 Base) MCG/ACT Inhaler, Inhale 2 puffs into the lungs every 4 hours as needed, Disp: 3 Inhaler, Rfl: 3     Alcohol Swabs (ALCOHOL PREP PADS), USE AS DIRECTED, Disp: ,  "Rfl:      amcinonide (CYCLOCORT) 0.1 % cream, Apply topically 2 times daily as needed for itching, Disp: 120 g, Rfl: 1     amLODIPine (NORVASC) 2.5 MG tablet, Take 1 tablet (2.5 mg) by mouth daily as needed (autonomic dysreflexia), Disp: 30 tablet, Rfl: 1     Applicators (COTTON SWABS) SWAB, Please dispense sterile wrapped cotton swabs; use up to five/day for medical needs, Disp: 450 each, Rfl: 3     ascorbic acid (VITAMIN C) 1000 MG TABS, Take 1 tablet by mouth daily, Disp: , Rfl:      baclofen (LIORESAL) 20 MG tablet, TAKE ONE TABLET BY MOUTH UP TO 6 TIMES DAILY, Disp: 540 tablet, Rfl: 3     bisacodyl (DULCOLAX) 10 MG suppository, Place 20 mg rectally every 3 days Active ingredient in Magic Bullet (10mg per suppository), Disp: , Rfl:      clindamycin (CLINDAMAX) 1 % topical gel, Apply bid, Disp: 60 g, Rfl: 11     diazepam (VALIUM) 10 MG tablet, Take 1 tablet (10 mg) by mouth daily, Disp: 90 tablet, Rfl: 1     diazepam (VALIUM) 5 MG tablet, Take 1 tablet (5 mg) by mouth every 6 hours as needed for anxiety, Disp: 30 tablet, Rfl: 3     docusate sodium (DOK) 100 MG capsule, Take 1 capsule (100 mg) by mouth daily, Disp: 90 capsule, Rfl: 3     Gauze Pads & Dressings (GAUZE BANDAGE 2\") MISC, IV STERILE GUAZE Use up to 4/day Supply 360 for three month supply, Disp: 360 each, Rfl: 3     Gauze Pads & Dressings (GAUZE DRESSING) 4\"X4\" PADS, NON STERILE GAUZE Use up to 8/day  Supply 720 for three month supply, Disp: 720 each, Rfl: 3     hydrocortisone (ANUSOL-HC) 2.5 % cream, Bid prn, Disp: 120 g, Rfl: 11     ibuprofen (ADVIL/MOTRIN) 800 MG tablet, TAKE 1 TABLET BY MOUTH 3 TIMES DAILY AS NEEDED, Disp: 60 tablet, Rfl: 11     Incontinence Supplies (URINARY LEG BAG), USE AS NEEDED, Disp: , Rfl:      lidocaine (XYLOCAINE) 2 % jelly, Apply  topically. APPLY AS DIRECTED, Disp: , Rfl:      MAGIC BULLETS 10 MG Suppository, Place 1 suppository (10 mg) rectally daily as needed for constipation, Disp: 30 suppository, Rfl: 3     " minocycline (MINOCIN/DYNACIN) 50 MG capsule, TAKE 1 CAPSULE (50 MG) BY MOUTH 2 TIMES DAILY, Disp: 180 capsule, Rfl: 3     Multiple Vitamins-Minerals (MULTIVITAMIN & MINERAL PO), Take 1 capsule by mouth daily., Disp: , Rfl:      nitroGLYcerin (NITRO-BID) 2 % OINT ointment, Place 0.5-1 inches (7.5-15 mg) onto the skin every 24 hours as needed for autonomic dysreflexia symptoms.  Remove ointment with gloved hand once symptoms resolve., Disp: 30 g, Rfl: 0     nystatin (NYSTOP) 600523 UNIT/GM POWD, APPLY 1 DOSE TOPICALLY 3 TIMES DAILY AS NEEDED, Disp: 60 g, Rfl: 2     omeprazole (PRILOSEC) 20 MG CR capsule, Take 1 capsule (20 mg) by mouth daily, Disp: 30 capsule, Rfl: 1     order for DME, Equipment being ordered: Urinary leg bag Change three times a month Dispense 3, Disp: 3 Device, Rfl: 11     order for DME, Equipment being ordered: Alcohol swabs Use 4 a day for catheter change, Disp: 120 Device, Rfl: 11     order for DME, Equipment being ordered: Catheter 16 Fr, 8 ml balloon. 3 per month, Disp: 3 each, Rfl: 11     order for DME, Equipment being ordered: 60 ml catheter tip syringes (no needle), 30 per month, Disp: 30 each, Rfl: 11     order for DME, Equipment being ordered: Hospital Bed, Disp: 1 each, Rfl: 0     order for DME, Equipment being ordered: Compression stockings, 20-30 mm Hg strength, knee-high, appropriately sized, Disp: 2 each, Rfl: 1     order for DME, two leg bag straps and 2 mouth sticks, Disp: 2 each, Rfl: 0     Ostomy Supplies (UROSTOMY NIGHT BAG) MISC, Please dispense three night bags/month (three month worth at a time) with 4 refills re: needed for chronic urinary dysfunction due to quadreplegia. Type: Bard 2000 ml., Disp: 9 each, Rfl: 3     oxybutynin ER (DITROPAN-XL) 10 MG 24 hr tablet, Take 1 tablet (10 mg) by mouth daily, Disp: 90 tablet, Rfl: 3     oxyCODONE-acetaminophen (PERCOCET) 5-325 MG per tablet, Take 1-2 tablets by mouth every 4 hours as needed, Disp: 30 tablet, Rfl: 0     sennosides  (SENOKOT) 8.6 MG tablet, TAKE 1 TABLET BY MOUTH DAILY, Disp: 90 tablet, Rfl: 3     sodium chloride 0.9%, bottle, 0.9 % irrigation, USE FOR URINARY CATHETER IRRIGATION, Disp: 500 mL, Rfl: 11     terbinafine (LAMISIL) 1 % cream, Apply topically daily as needed , Disp: , Rfl:      VITAMIN D3 25 MCG (1000 UT) tablet, TAKE 1 TABLET (1,000 UNITS) BY MOUTH DAILY, Disp: 90 tablet, Rfl: 2     zolpidem (AMBIEN) 10 MG tablet, TAKE 0.5-1 TABLET BY MOUTH EVERY NIGHT AT BEDTIME AS NEEDED FOR SLEEP., Disp: 90 tablet, Rfl: 1     zolpidem (AMBIEN) 5 MG tablet, Take 1 tablet (5 mg) by mouth nightly as needed for sleep, Disp: 30 tablet, Rfl: 3      Allergies:   Vancomycin      Past Medical History:  Asthma  Chronic infection   Heart murmur  Obstructive sleep apnea with hypoventilation  Quadriplegia  Abdominal pain  Neurogenic bladder  Cataracts bilateral  Myopic astigmatism of both eyes  Presbyopia   Injury at C4-6 level of cervical spinal cord  Pneumonia   Bacteremia  Scrotal swelling  Autonomic dysreflexia  Decubitus ulcer of hip   Bladder stones  Suprapubic catheter dysfunction      Past Surgical History: Bilateral ureteroscopy with holmium laser lithotripsy, basketing, and removal of fragments, left ureteral stent placement: 2/2010  Colonoscopy: 7/2018  Cystoscopy, litholapaxy, combined: 11/2019  Incision and drainage abdomen washout, combined: 1/16/2020  Laser holmium lithotripsy bladder: 1/16/2020  Skin flap on bottom  Tube abscess surgery    Family History:   Cancer: father       Social History:   Smoking status: current every day smoker, 0.3 ppd  Alcohol use: 3-4+ drinks on weekends  Drug use: occasional marijuana for spasms     Physical Exam:   BP 96/57 (BP Location: Left arm, Patient Position: Chair, Cuff Size: Adult Large)   Pulse 54   Temp 98.2  F (36.8  C) (Oral)   Resp 18   SpO2 94%    Constitutional: Alert. In no distress. In a power wheelchair.   Head: The scalp, face, and head appear normal.  Musculoskeletal:  Extremities appear normal. No gross deformities noted.   Neurologic: Speech is normal and fluent. Right arm 1+ strength, which per patient is noticeably less than his baseline.  Psychiatric: Mentation appears normal. Normal affect. Appears sleepy.      Assessment and Plan:  Fatigue, unspecified type  - CBC with platelets differential  - Comprehensive metabolic panel  - TSH with free T4 reflex  - Testosterone total  - Cortisol    Hypersomnolence  - SLEEP EVALUATION & MANAGEMENT REFERRAL - ADULT -Other (Respond in commments) (ump)    Spasm  Will try lower dose of Valium  - diazepam (VALIUM) 5 MG tablet  Dispense: 30 tablet; Refill: 3    Quadriplegia (H)  - diazepam (VALIUM) 5 MG tablet  Dispense: 30 tablet; Refill: 3    Insomnia, unspecified type  Will try lower dose of Ambien.   - zolpidem (AMBIEN) 5 MG tablet  Dispense: 30 tablet; Refill: 3    If no causes found soon, will explore if he could be depressed. Will communicate with his physical medicine doctor about his concern that his right arm is weaker, perhaps starting after a recent procedure for which he was anesthetized. I am not sure with his previously existing problems if an EMG would help to localize if there is a new nerve impingement or not. Per physical medicine doctor's notes, he is starting PT soon, which would be reasonable anyway. However, his right arm has been key for him to use his electric wheelchair as well as do his full time job.      Follow-up: No follow-ups on file.         Scribe Disclosure:  Jenny MORRISON, am serving as a scribe to document services personally performed by Jason Long MD at this visit, based upon the provider's statements to me. All documentation has been reviewed by the aforementioned provider prior to being entered into the official medical record.    Scribe Preparation Attestation:  Jenny MORRISON, a scribe, prepared the chart for today's encounter.      Portions of this medical record were completed by a  scribe. UPON MY REVIEW AND AUTHENTICATION BY ELECTRONIC SIGNATURE, this confirms (a) I performed the applicable clinical services, and (b) the record is accurate.   Jason Long MD MD

## 2020-02-18 ENCOUNTER — MYC MEDICAL ADVICE (OUTPATIENT)
Dept: INTERNAL MEDICINE | Facility: CLINIC | Age: 60
End: 2020-02-18

## 2020-02-18 NOTE — TELEPHONE ENCOUNTER
----- Message from Jason Long MD sent at 2/17/2020  4:32 PM CST -----  Thanks!  I will ask him to wait for a few OT visits and update meWe are doing LIZA work up, also checking cortisol as rare but hypercortisol state could cause these things    Soon mi can you let him know to let me know after 2 weeks of OT how right arm is, if not better will consider MR c spine    Roshan  ----- Message -----  From: Vince Moya MD  Sent: 2/17/2020   4:02 PM CST  To: Jason Long MD    [facility],    Thanks for the message.  Yes, I saw him recently and he complained of this.  He had a similar complaint about a year ago for which an MRI of the shoulder and neck were done, which did not show any major pathology.      I suspect his weakness is a combination of deconditioning and increased fluid retention after his procedure.  Given how weak he is at baseline, increased swelling could make it much more difficult to raise him arm.  I offered him an edema glove/sleeve which he declined and I advised for him to speak to you to see if anything else (I.e., a diuretic) might be helpful to remove some of the fluid.  He told me he was re-establishing with OT as well who can help with upper extremity edema control, range of motion, and strengthening.  He also complained of a lot of daytime sleepiness and fatigue which likely isn't helping either.      However, an issue which occurred during surgery is certainly a possibility, but I think it is lower on the differential.  An EMG could be done which would be looking for peripheral nerve lesions, but if you wanted to pursue further workup I would recommend an MRI of the neck instead which can evaluate the nerve roots, as well as the cord for a syrinx.      Thanks,  Jefe Moya  ----- Message -----  From: Jason Long MD  Sent: 2/17/2020   3:38 PM CST  To: Vince Moya MD    Hi   I'm his primary  You just saw him in PMR I believe    Remote C spine trauma    He told me  right arm always weak, but much weaker lately, now much harder to use to control chair/computer    He thinks might've gotten worse after a procedure for which he was under anesthesia    Which made me wonder if possibly nerve further injury from neck, vs impingement from positioning    But I'm guessing with pre existing neck injury right arm EMG would not be of much value    I'm not sure if he mentioned all of this to you but was curious for your thoughts, he relies on partial use right arm to run his life basically, chair and computer    Thanks  Roshan

## 2020-02-19 LAB — TESTOST SERPL-MCNC: 320 NG/DL (ref 240–950)

## 2020-02-26 ENCOUNTER — HOSPITAL ENCOUNTER (OUTPATIENT)
Dept: OCCUPATIONAL THERAPY | Facility: CLINIC | Age: 60
Setting detail: THERAPIES SERIES
End: 2020-02-26
Attending: FAMILY MEDICINE
Payer: COMMERCIAL

## 2020-02-26 PROCEDURE — 97032 APPL MODALITY 1+ESTIM EA 15: CPT | Mod: GO | Performed by: OCCUPATIONAL THERAPIST

## 2020-03-04 ENCOUNTER — HOSPITAL ENCOUNTER (OUTPATIENT)
Dept: OCCUPATIONAL THERAPY | Facility: CLINIC | Age: 60
Setting detail: THERAPIES SERIES
End: 2020-03-04
Attending: FAMILY MEDICINE
Payer: COMMERCIAL

## 2020-03-04 PROCEDURE — 97032 APPL MODALITY 1+ESTIM EA 15: CPT | Mod: GO | Performed by: OCCUPATIONAL THERAPIST

## 2020-03-10 ENCOUNTER — HOSPITAL ENCOUNTER (OUTPATIENT)
Dept: OCCUPATIONAL THERAPY | Facility: CLINIC | Age: 60
Setting detail: THERAPIES SERIES
End: 2020-03-10
Attending: FAMILY MEDICINE
Payer: COMMERCIAL

## 2020-03-10 PROCEDURE — 97032 APPL MODALITY 1+ESTIM EA 15: CPT | Mod: GO | Performed by: OCCUPATIONAL THERAPIST

## 2020-03-12 ENCOUNTER — HOSPITAL ENCOUNTER (OUTPATIENT)
Dept: OCCUPATIONAL THERAPY | Facility: CLINIC | Age: 60
Setting detail: THERAPIES SERIES
End: 2020-03-12
Attending: FAMILY MEDICINE
Payer: COMMERCIAL

## 2020-03-12 PROCEDURE — 97032 APPL MODALITY 1+ESTIM EA 15: CPT | Mod: GO | Performed by: OCCUPATIONAL THERAPIST

## 2020-03-16 DIAGNOSIS — E78.00 HIGH CHOLESTEROL: ICD-10-CM

## 2020-03-16 DIAGNOSIS — R21 RASH AND OTHER NONSPECIFIC SKIN ERUPTION: ICD-10-CM

## 2020-03-16 DIAGNOSIS — R25.2 SPASM: ICD-10-CM

## 2020-03-16 DIAGNOSIS — G82.50 QUADRIPLEGIA (H): ICD-10-CM

## 2020-03-17 ENCOUNTER — HOSPITAL ENCOUNTER (OUTPATIENT)
Dept: OCCUPATIONAL THERAPY | Facility: CLINIC | Age: 60
Setting detail: THERAPIES SERIES
End: 2020-03-17
Attending: FAMILY MEDICINE
Payer: COMMERCIAL

## 2020-03-17 DIAGNOSIS — R25.2 SPASM: ICD-10-CM

## 2020-03-17 DIAGNOSIS — G82.50 QUADRIPLEGIA (H): ICD-10-CM

## 2020-03-17 DIAGNOSIS — E78.00 HIGH CHOLESTEROL: ICD-10-CM

## 2020-03-17 DIAGNOSIS — R21 RASH AND OTHER NONSPECIFIC SKIN ERUPTION: ICD-10-CM

## 2020-03-17 PROCEDURE — 97032 APPL MODALITY 1+ESTIM EA 15: CPT | Mod: GO | Performed by: OCCUPATIONAL THERAPIST

## 2020-03-19 RX ORDER — NYSTATIN 100000 [USP'U]/G
POWDER TOPICAL
Qty: 60 G | Refills: 3 | Status: SHIPPED | OUTPATIENT
Start: 2020-03-19 | End: 2021-01-01

## 2020-03-20 RX ORDER — NYSTATIN 100000 [USP'U]/G
POWDER TOPICAL
Qty: 60 G | Refills: 2 | OUTPATIENT
Start: 2020-03-20

## 2020-03-26 ENCOUNTER — TELEPHONE (OUTPATIENT)
Dept: FAMILY MEDICINE | Facility: CLINIC | Age: 60
End: 2020-03-26

## 2020-03-27 NOTE — TELEPHONE ENCOUNTER
[facility] and [facility] now requests orders and shares plan of care/discharge summaries for some patients through [employer].  Please REPLY TO THIS MESSAGE OR ROUTE BACK TO THE AUTHOR in order to give authorization for orders when needed.  This is considered a verbal order, you will still receive a faxed copy of orders for signature.  Thank you for your assistance in improving collaboration for our patients.    ORDER    Patient seen today for 60 day recertification for service with [facility]. Requesting the following Home Care orders to start with new certification period beginning 3/30/2020:     SN  2 month 1         3 month 1         3 PRN     Treatment order as below:     Okay to change SP catheter every two weeks. Catheter is a 16 FR with  8 cc syringe. Irrigate with 60cc normal saline daily and as needed. Caregiver can irrigate catheter on non-nursing days.        Thank you,     Marva COLLAZO Case Manager  [facility]  [phone]  [email or web address]

## 2020-03-30 ENCOUNTER — MEDICAL CORRESPONDENCE (OUTPATIENT)
Dept: HEALTH INFORMATION MANAGEMENT | Facility: CLINIC | Age: 60
End: 2020-03-30

## 2020-04-15 ENCOUNTER — PRE VISIT (OUTPATIENT)
Dept: UROLOGY | Facility: CLINIC | Age: 60
End: 2020-04-15

## 2020-04-15 ENCOUNTER — TELEPHONE (OUTPATIENT)
Dept: UROLOGY | Facility: CLINIC | Age: 60
End: 2020-04-15

## 2020-04-15 NOTE — TELEPHONE ENCOUNTER
Spoke with patient and patient's wife to convert upcoming visit with Dr. Potter to a Video Visit. Patient will use their computer and patient verified email.      Anisha Block, EMT

## 2020-04-15 NOTE — TELEPHONE ENCOUNTER
Reason for Visit: Video Visit - follow up to issues with SP tube    Diagnosis: NGB    Orders/Procedures/Records: Records available    Contact Patient: N/A    Rooming Requirements: Virtual check in      Anisha Block  04/15/20  3:00 PM
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
## 2020-04-20 ENCOUNTER — VIRTUAL VISIT (OUTPATIENT)
Dept: UROLOGY | Facility: CLINIC | Age: 60
End: 2020-04-20
Payer: COMMERCIAL

## 2020-04-20 DIAGNOSIS — S14.105S: ICD-10-CM

## 2020-04-20 DIAGNOSIS — Z87.448 HISTORY OF BLADDER STONE: ICD-10-CM

## 2020-04-20 DIAGNOSIS — N31.9 NEUROGENIC BLADDER: Primary | ICD-10-CM

## 2020-04-20 ASSESSMENT — PAIN SCALES - GENERAL: PAINLEVEL: NO PAIN (0)

## 2020-04-20 NOTE — PATIENT INSTRUCTIONS
Follow up in Jan 2021 for annual visit.    Alla Guillory LPN  It was a pleasure meeting with you today.  Thank you for allowing me and my team the privilege of caring for you today.  YOU are the reason we are here, and I truly hope we provided you with the excellent service you deserve.  Please let us know if there is anything else we can do for you so that we can be sure you are leaving completely satisfied with your care experience.

## 2020-05-05 ENCOUNTER — HOSPITAL ENCOUNTER (OUTPATIENT)
Dept: OCCUPATIONAL THERAPY | Facility: CLINIC | Age: 60
Setting detail: THERAPIES SERIES
End: 2020-05-05
Attending: FAMILY MEDICINE
Payer: COMMERCIAL

## 2020-05-05 PROCEDURE — 97032 APPL MODALITY 1+ESTIM EA 15: CPT | Mod: GO | Performed by: OCCUPATIONAL THERAPIST

## 2020-05-07 ENCOUNTER — HOSPITAL ENCOUNTER (OUTPATIENT)
Dept: OCCUPATIONAL THERAPY | Facility: CLINIC | Age: 60
Setting detail: THERAPIES SERIES
End: 2020-05-07
Attending: FAMILY MEDICINE
Payer: COMMERCIAL

## 2020-05-07 PROCEDURE — 97032 APPL MODALITY 1+ESTIM EA 15: CPT | Mod: GO

## 2020-05-07 PROCEDURE — 97032 APPL MODALITY 1+ESTIM EA 15: CPT | Mod: GO | Performed by: OCCUPATIONAL THERAPIST

## 2020-05-07 NOTE — PROGRESS NOTES
[facility]          OUTPATIENT OCCUPATIONAL THERAPY  EVALUATION  PLAN OF TREATMENT FOR OUTPATIENT REHABILITATION  (COMPLETE FOR INITIAL CLAIMS ONLY)  Patient's Last Name, First Name, M.I.  YOB: 1960  Bart Corea                        Provider's Name  [facility] Medical Record No.  [id number]                               Onset Date:     01/16/20   Start of Care Date:     02/05/20   Type:     ___PT   _X_OT   ___SLP Medical Diagnosis:     quadraplegia and excessive daytime sleepiness                          OT Diagnosis:     Impaired ADL/IADL and functional mobility with power w/c Visits from SOC:  1   _________________________________________________________________________________  Plan of Treatment/Functional Goals:  ADL training, IADL training, Neuromuscular re-education, ROM, Self care/Home management, Strengthening, Stretching, Therapeutic activities  Electrical stimulation       Goals     Goal Description: Pt will demonstrate use of his [employer] mobile arm support with adjustments to settings or additional elevating proximal arm in order to feed himself at least 25% of meal with set up with AE with R UE.  Target Date: 06/15/20        Goal Description: Pt will be able to power on and off his w/c with R UE and place his R UE onto joystick to operate chair 75% of trials.  Target Date: 6/15/20       Therapy Frequency: 2x/week x 6-8 weeks and then taper to 1x/week for 12 weeks     Predicted Duration of Therapy Intervention (days/wks): 12 weeks or greater pending progress 3/17/20-6/15/20    Karen Wells OTR/L, ATP         I CERTIFY THE NEED FOR THESE SERVICES FURNISHED UNDER        THIS PLAN OF TREATMENT AND WHILE UNDER MY CARE     (Physician co-signature of this document indicates review and certification of the therapy plan).              Continued certification  needed in order to meet goals due to cancellation of apts secondary to COVID 19 pandemic.    Referring Physician: Dr. Jason Abebe     Initial Assessment        See Epic Evaluation      Start Of Care Date: 02/05/20

## 2020-05-12 ENCOUNTER — HOSPITAL ENCOUNTER (OUTPATIENT)
Dept: OCCUPATIONAL THERAPY | Facility: CLINIC | Age: 60
Setting detail: THERAPIES SERIES
End: 2020-05-12
Attending: FAMILY MEDICINE
Payer: COMMERCIAL

## 2020-05-12 PROCEDURE — 97032 APPL MODALITY 1+ESTIM EA 15: CPT | Mod: GO,59

## 2020-05-12 PROCEDURE — 97032 APPL MODALITY 1+ESTIM EA 15: CPT | Mod: GO | Performed by: OCCUPATIONAL THERAPIST

## 2020-05-14 ENCOUNTER — HOSPITAL ENCOUNTER (OUTPATIENT)
Dept: OCCUPATIONAL THERAPY | Facility: CLINIC | Age: 60
Setting detail: THERAPIES SERIES
End: 2020-05-14
Attending: FAMILY MEDICINE
Payer: COMMERCIAL

## 2020-05-14 PROCEDURE — 97032 APPL MODALITY 1+ESTIM EA 15: CPT | Mod: GO,59

## 2020-05-14 PROCEDURE — 97032 APPL MODALITY 1+ESTIM EA 15: CPT | Mod: GO | Performed by: OCCUPATIONAL THERAPIST

## 2020-05-19 ENCOUNTER — HOSPITAL ENCOUNTER (OUTPATIENT)
Dept: OCCUPATIONAL THERAPY | Facility: CLINIC | Age: 60
Setting detail: THERAPIES SERIES
End: 2020-05-19
Attending: FAMILY MEDICINE
Payer: COMMERCIAL

## 2020-05-19 PROCEDURE — 97032 APPL MODALITY 1+ESTIM EA 15: CPT | Mod: GO,59

## 2020-05-19 PROCEDURE — 97542 WHEELCHAIR MNGMENT TRAINING: CPT | Mod: GO | Performed by: OCCUPATIONAL THERAPIST

## 2020-05-19 PROCEDURE — 97032 APPL MODALITY 1+ESTIM EA 15: CPT | Mod: GO | Performed by: OCCUPATIONAL THERAPIST

## 2020-05-21 ENCOUNTER — HOSPITAL ENCOUNTER (OUTPATIENT)
Dept: OCCUPATIONAL THERAPY | Facility: CLINIC | Age: 60
Setting detail: THERAPIES SERIES
End: 2020-05-21
Attending: FAMILY MEDICINE
Payer: COMMERCIAL

## 2020-05-21 PROCEDURE — 97542 WHEELCHAIR MNGMENT TRAINING: CPT | Mod: GO | Performed by: OCCUPATIONAL THERAPIST

## 2020-05-26 ENCOUNTER — HOSPITAL ENCOUNTER (OUTPATIENT)
Dept: OCCUPATIONAL THERAPY | Facility: CLINIC | Age: 60
Setting detail: THERAPIES SERIES
End: 2020-05-26
Attending: FAMILY MEDICINE
Payer: COMMERCIAL

## 2020-05-26 PROCEDURE — 97032 APPL MODALITY 1+ESTIM EA 15: CPT | Mod: GO,59

## 2020-05-26 PROCEDURE — 97032 APPL MODALITY 1+ESTIM EA 15: CPT | Mod: GO | Performed by: OCCUPATIONAL THERAPIST

## 2020-05-28 ENCOUNTER — TELEPHONE (OUTPATIENT)
Dept: FAMILY MEDICINE | Facility: CLINIC | Age: 60
End: 2020-05-28

## 2020-05-28 NOTE — TELEPHONE ENCOUNTER
[facility] and [facility] now requests orders and shares plan of care/discharge summaries for some patients through [employer].  Please REPLY TO THIS MESSAGE OR ROUTE BACK TO THE AUTHOR in order to give authorization for orders when needed.  This is considered a verbal order, you will still receive a faxed copy of orders for signature.  Thank you for your assistance in improving collaboration for our patients.    ORDER    Patient seen today for 60 day recertification for services with [facility]. Requesting the following Home Care orders to start with new certification period beginning 5/29/2020:    SN  2 Month 1         3 Month 1         3 PRN    And the following treatment order:    Okay to change SP catheter every 2 weeks. Catheter is a 16 Fr with 8 cc syringe. Irrigate with 60cc normal saline daily and as needed. Caregiver can irrigate catheter on non nursing days.       Thank you,    VALE Durham Case Manager  [facility]  [phone]  [email or web address]

## 2020-05-29 ENCOUNTER — MEDICAL CORRESPONDENCE (OUTPATIENT)
Dept: HEALTH INFORMATION MANAGEMENT | Facility: CLINIC | Age: 60
End: 2020-05-29

## 2020-06-03 ENCOUNTER — HOSPITAL ENCOUNTER (OUTPATIENT)
Dept: OCCUPATIONAL THERAPY | Facility: CLINIC | Age: 60
Setting detail: THERAPIES SERIES
End: 2020-06-03
Attending: FAMILY MEDICINE
Payer: COMMERCIAL

## 2020-06-03 PROCEDURE — 97032 APPL MODALITY 1+ESTIM EA 15: CPT | Mod: GO | Performed by: OCCUPATIONAL THERAPIST

## 2020-06-09 ENCOUNTER — HOSPITAL ENCOUNTER (OUTPATIENT)
Dept: OCCUPATIONAL THERAPY | Facility: CLINIC | Age: 60
Setting detail: THERAPIES SERIES
End: 2020-06-09
Attending: FAMILY MEDICINE
Payer: COMMERCIAL

## 2020-06-09 PROCEDURE — 97032 APPL MODALITY 1+ESTIM EA 15: CPT | Mod: GO | Performed by: OCCUPATIONAL THERAPIST

## 2020-06-11 ENCOUNTER — HOSPITAL ENCOUNTER (OUTPATIENT)
Dept: OCCUPATIONAL THERAPY | Facility: CLINIC | Age: 60
Setting detail: THERAPIES SERIES
End: 2020-06-11
Attending: FAMILY MEDICINE
Payer: COMMERCIAL

## 2020-06-11 PROCEDURE — 97032 APPL MODALITY 1+ESTIM EA 15: CPT | Mod: GO | Performed by: OCCUPATIONAL THERAPIST

## 2020-06-11 NOTE — PROGRESS NOTES
[facility]      OUTPATIENT OCCUPATIONAL THERAPY  PLAN OF TREATMENT FOR OUTPATIENT REHABILITATION    Patient's Last Name, First Name, M.I.                YOB: 1960  Bart Corea                        Provider's Name  [facility] Medical Record No.  [id number]                               Onset Date: 1/16/20   Start of Care Date: 2/5/20   Type:     ___PT   _X_OT   ___SLP Medical Diagnosis: quadraplegia and excessive daytime sleepiness                         OT Diagnosis: Impaired ADL/IADL and functional mobility with power w/c      _________________________________________________________________________________  Plan of Treatment:    Frequency/Duration: 2x/week x  6 weeks and then taper to 1x/wk for 4 weeks.     Goals:    Goal Identifier     Goal Description Pt will demonstrate use of his [employer] mobile arm support with adjustments to settings or additional elevating proximal arm in order to feed himself at least 25% of meal with set up with AE with R UE.   Target Date 06/15/20; extend to 9/8/20   Date Met      Progress:     Goal Identifier     Goal Description Pt will be able to power on and off his w/c with R UE and place his R UE onto joystick to operate chair 75% of trials.   Target Date 06/15/20; ; extend to 9/8/20   Date Met      Progress:Pt reports that he has been able to get his on and off goalpost  joystick about 80% trials and can power w/c on with R UE in last week. In clinic today, 6/11/20 he is able to get hand onto goalpost joystick safely 1/3 trials. Pt making gains toward goal with functional e-stim (FES) UE cycling and with follow through with his HEP for AAROM at home with his PCA and this occurring only more recently after restarting FES UE cycling after missed treatment sessions due to Covid crisis.       Progress Toward Goals:   Progress this  reporting period: As noted above pt has started to make gains more recently since resumption of two times per week  Of UE FES cycling x 6 weeks. He has gained endurane and strength in his UE's and notably in his R UE to allow him to work toward goal of using R UE to place his own UE onto and off the goal post joystick and to operate his power w/c and to turn on the power w/c. He will continue to benefit from further treatment given interruption of his care due to Covid crisis x 2 months.    Certification date from 6/16/20 to 9/8/20.    Gomez Bravo, OTR/L, MSCS          I CERTIFY THE NEED FOR THESE SERVICES FURNISHED UNDER        THIS PLAN OF TREATMENT AND WHILE UNDER MY CARE     (Physician co-signature of this document indicates review and certification of the therapy plan).                Referring Provider: Dr. Jason Abebe

## 2020-06-16 ENCOUNTER — HOSPITAL ENCOUNTER (OUTPATIENT)
Dept: OCCUPATIONAL THERAPY | Facility: CLINIC | Age: 60
Setting detail: THERAPIES SERIES
End: 2020-06-16
Attending: FAMILY MEDICINE
Payer: COMMERCIAL

## 2020-06-16 PROCEDURE — 97032 APPL MODALITY 1+ESTIM EA 15: CPT | Mod: GO | Performed by: OCCUPATIONAL THERAPIST

## 2020-06-19 ENCOUNTER — HOSPITAL ENCOUNTER (OUTPATIENT)
Dept: OCCUPATIONAL THERAPY | Facility: CLINIC | Age: 60
Setting detail: THERAPIES SERIES
End: 2020-06-19
Attending: FAMILY MEDICINE
Payer: COMMERCIAL

## 2020-06-19 PROCEDURE — 97032 APPL MODALITY 1+ESTIM EA 15: CPT | Mod: GO | Performed by: OCCUPATIONAL THERAPIST

## 2020-06-23 ENCOUNTER — TELEPHONE (OUTPATIENT)
Dept: FAMILY MEDICINE | Facility: CLINIC | Age: 60
End: 2020-06-23

## 2020-06-23 ENCOUNTER — HOSPITAL ENCOUNTER (OUTPATIENT)
Dept: OCCUPATIONAL THERAPY | Facility: CLINIC | Age: 60
Setting detail: THERAPIES SERIES
End: 2020-06-23
Attending: FAMILY MEDICINE
Payer: COMMERCIAL

## 2020-06-23 ENCOUNTER — MYC MEDICAL ADVICE (OUTPATIENT)
Dept: FAMILY MEDICINE | Facility: CLINIC | Age: 60
End: 2020-06-23

## 2020-06-23 PROCEDURE — 97032 APPL MODALITY 1+ESTIM EA 15: CPT | Mod: GO | Performed by: OCCUPATIONAL THERAPIST

## 2020-06-23 NOTE — TELEPHONE ENCOUNTER
[facility] now requests orders and shares plan of care/discharge summaries for some patients through [employer].  Please REPLY TO THIS MESSAGE OR ROUTE BACK TO THE AUTHOR in order to give authorization for orders when needed.  This is considered a verbal order, you will still receive a faxed copy of orders for signature.  Thank you for your assistance in improving collaboration for our patients.    ORDER    Patient was due to have biweekly catheter change completed 6/20. [facility] SN could not reach patient over the weekend so cath change not completed per MD order.    Patient is requesting delay of catheter change until Saturday 6/27 due to family matters that require his attention.    Requesting order to delay cath change one week and then resume biweekly changes.      Thank you,    VALE Durham Case Manager  [facility]  [phone]  [email or web address]

## 2020-06-26 ENCOUNTER — MEDICAL CORRESPONDENCE (OUTPATIENT)
Dept: HEALTH INFORMATION MANAGEMENT | Facility: CLINIC | Age: 60
End: 2020-06-26

## 2020-06-30 ENCOUNTER — HOSPITAL ENCOUNTER (OUTPATIENT)
Dept: OCCUPATIONAL THERAPY | Facility: CLINIC | Age: 60
Setting detail: THERAPIES SERIES
End: 2020-06-30
Attending: FAMILY MEDICINE
Payer: COMMERCIAL

## 2020-06-30 PROCEDURE — 97032 APPL MODALITY 1+ESTIM EA 15: CPT | Mod: GO | Performed by: OCCUPATIONAL THERAPIST

## 2020-06-30 NOTE — TELEPHONE ENCOUNTER
Duplicate message. Was answered in another [employer] message. Renetta Eduardo LPN 6/29/2020 7:41 PM

## 2020-07-02 ENCOUNTER — HOSPITAL ENCOUNTER (OUTPATIENT)
Dept: OCCUPATIONAL THERAPY | Facility: CLINIC | Age: 60
Setting detail: THERAPIES SERIES
End: 2020-07-02
Attending: FAMILY MEDICINE
Payer: COMMERCIAL

## 2020-07-02 PROCEDURE — 97032 APPL MODALITY 1+ESTIM EA 15: CPT | Mod: GO | Performed by: OCCUPATIONAL THERAPIST

## 2020-07-07 ENCOUNTER — HOSPITAL ENCOUNTER (OUTPATIENT)
Dept: OCCUPATIONAL THERAPY | Facility: CLINIC | Age: 60
Setting detail: THERAPIES SERIES
End: 2020-07-07
Attending: FAMILY MEDICINE
Payer: COMMERCIAL

## 2020-07-07 PROCEDURE — 97032 APPL MODALITY 1+ESTIM EA 15: CPT | Mod: GO | Performed by: OCCUPATIONAL THERAPIST

## 2020-07-15 ENCOUNTER — HOSPITAL ENCOUNTER (OUTPATIENT)
Dept: OCCUPATIONAL THERAPY | Facility: CLINIC | Age: 60
Setting detail: THERAPIES SERIES
End: 2020-07-15
Attending: FAMILY MEDICINE
Payer: COMMERCIAL

## 2020-07-15 PROCEDURE — 97032 APPL MODALITY 1+ESTIM EA 15: CPT | Mod: GO | Performed by: OCCUPATIONAL THERAPIST

## 2020-07-21 ENCOUNTER — HOSPITAL ENCOUNTER (OUTPATIENT)
Dept: OCCUPATIONAL THERAPY | Facility: CLINIC | Age: 60
Setting detail: THERAPIES SERIES
End: 2020-07-21
Attending: FAMILY MEDICINE
Payer: COMMERCIAL

## 2020-07-21 PROCEDURE — 97032 APPL MODALITY 1+ESTIM EA 15: CPT | Mod: GO | Performed by: OCCUPATIONAL THERAPIST

## 2020-07-24 ENCOUNTER — HOSPITAL ENCOUNTER (OUTPATIENT)
Dept: OCCUPATIONAL THERAPY | Facility: CLINIC | Age: 60
Setting detail: THERAPIES SERIES
End: 2020-07-24
Attending: FAMILY MEDICINE
Payer: COMMERCIAL

## 2020-07-24 ENCOUNTER — TELEPHONE (OUTPATIENT)
Dept: FAMILY MEDICINE | Facility: CLINIC | Age: 60
End: 2020-07-24

## 2020-07-24 PROCEDURE — 97032 APPL MODALITY 1+ESTIM EA 15: CPT | Mod: GO | Performed by: OCCUPATIONAL THERAPIST

## 2020-07-24 NOTE — TELEPHONE ENCOUNTER
[facility] and [facility] now requests orders and shares plan of care/discharge summaries for some patients through [employer].  Please REPLY TO THIS MESSAGE OR ROUTE BACK TO THE AUTHOR in order to give authorization for orders when needed.  This is considered a verbal order, you will still receive a faxed copy of orders for signature.  Thank you for your assistance in improving collaboration for our patients.    ORDER    Patient seen today for 60 day recertification for services with [facility]. Requesting the following Home Care orders to start with new certification period beginning 7/28/2020:    SN 2 Month 1        3 Month 1        3 PRN    And the following treatment order for catheter management:    Okay to change SP catheter every 2 weeks. Catheter is a 16 Fr with 8 cc syringe. Irrigate with 60cc normal saline daily and as needed. Caregiver can irrigate catheter on non nursing days.    Thank you,    VALE Durham Case Manager  [facility]  [phone]  [email or web address]

## 2020-07-28 ENCOUNTER — MEDICAL CORRESPONDENCE (OUTPATIENT)
Dept: HEALTH INFORMATION MANAGEMENT | Facility: CLINIC | Age: 60
End: 2020-07-28

## 2020-07-28 ENCOUNTER — HOSPITAL ENCOUNTER (OUTPATIENT)
Dept: OCCUPATIONAL THERAPY | Facility: CLINIC | Age: 60
Setting detail: THERAPIES SERIES
End: 2020-07-28
Attending: FAMILY MEDICINE
Payer: COMMERCIAL

## 2020-07-28 PROCEDURE — 97032 APPL MODALITY 1+ESTIM EA 15: CPT | Mod: GO | Performed by: OCCUPATIONAL THERAPIST

## 2020-07-30 ENCOUNTER — VIRTUAL VISIT (OUTPATIENT)
Dept: FAMILY MEDICINE | Facility: OTHER | Age: 60
End: 2020-07-30
Payer: COMMERCIAL

## 2020-07-30 PROCEDURE — 99421 OL DIG E/M SVC 5-10 MIN: CPT | Performed by: PHYSICIAN ASSISTANT

## 2020-07-30 NOTE — PROGRESS NOTES
"Date: 2020 13:02:42  Clinician: Bran Garcia  Clinician NPI: [id number]  Patient: Bart Corea  Patient : 1960  Patient Address: [address] [place] [address]  Patient Phone: [phone]  Visit Protocol: URI  Patient Summary:  Bart is a 60 year old ( : 1960 ) male who initiated a Visit for COVID-19 (Coronavirus) evaluation and screening. When asked the question \"Please sign me up to receive news, health information and promotions from [employer].\", Bart responded \"No\".    When asked when his symptoms started, Bart reported that he does not have any symptoms.   He denies having recent facial or sinus surgery in the past 60 days.    Pertinent COVID-19 (Coronavirus) information  In the past 14 days, Bart has not worked in a congregate living setting.   He does not work or volunteer as healthcare worker or a  and does not work or volunteer in a healthcare facility.   Bart also has not lived in a congregate living setting in the past 14 days. He lives with a healthcare worker.   Bart has had a close contact with a laboratory-confirmed COVID-19 patient in the last 14 days. Additional information about contact with COVID-19 (Coronavirus) patient as reported by the patient (free text): My son-n-law tested positive for covid today.  I was in contact with him on last    Pertinent medical history  Bart has taken an antibiotic medication in the past month. Antibiotic details as reported by the patient (free text): minocycline for urine   Bart needs a return to work/school note.   Weight: 220 lbs   Bart smokes or uses smokeless tobacco.   Weight: 220 lbs    MEDICATIONS: No current medications, ALLERGIES: NKDA  Clinician Response:  Dear Bart,   Your symptoms show that you may have coronavirus (COVID-19). This illness can cause fever, cough and trouble breathing. Many people get a mild case and get better on their own. Some people can get very " "sick.  What should I do?  We would like to test you for this virus.   1. Please call [phone] to schedule your visit. Explain that you were referred by [facility] to have a COVID-19 test. Be ready to share your [facility] visit ID number.  The following will serve as your written order for this COVID Test, ordered by me, for the indication of suspected COVID [Z20.828]: The test will be ordered in [employer], our electronic health record, after you are scheduled. It will show as ordered and authorized by Wolfgang Cantrell MD.  Order: COVID-19 (Coronavirus) PCR for SYMPTOMATIC testing from [facility].      2. When it's time for your COVID test:  Stay at least 6 feet away from others. (If someone will drive you to your test, stay in the backseat, as far away from the  as you can.)   Cover your mouth and nose with a mask, tissue or washcloth.  Go straight to the testing site. Don't make any stops on the way there or back.      3.Starting now: Stay home and away from others (self-isolate) until:   You've had no fever---and no medicine that reduces fever---for 3 full days (72 hours). And...   Your other symptoms have gotten better. For example, your cough or breathing has improved. And...   At least 10 days have passed since your symptoms started.       During this time, don't leave the house except for testing or medical care.   Stay in your own room, even for meals. Use your own bathroom if you can.   Stay away from others in your home. No hugging, kissing or shaking hands. No visitors.  Don't go to work, school or anywhere else.    Clean \"high touch\" surfaces often (doorknobs, counters, handles, etc.). Use a household cleaning spray or wipes. You'll find a full list of  on the EPA website: www.epa.gov/pesticide-registration/list-n-disinfectants-use-against-sars-cov-2.   Cover your mouth and nose with a mask, tissue or washcloth to avoid spreading germs.  Wash your hands and face often. Use soap and water.  Caregivers in " these groups are at risk for severe illness due to COVID-19:  o People 65 years and older  o People who live in a nursing home or long-term care facility  o People with chronic disease (lung, heart, cancer, diabetes, kidney, liver, immunologic)  o People who have a weakened immune system, including those who:   Are in cancer treatment  Take medicine that weakens the immune system, such as corticosteroids  Had a bone marrow or organ transplant  Have an immune deficiency  Have poorly controlled HIV or AIDS  Are obese (body mass index of 40 or higher)  Smoke regularly   o Caregivers should wear gloves while washing dishes, handling laundry and cleaning bedrooms and bathrooms.  o Use caution when washing and drying laundry: Don't shake dirty laundry, and use the warmest water setting that you can.  o For more tips, go to www.cdc.gov/coronavirus/2019-ncov/downloads/10Things.pdf.    4.Sign up for [employer]. We know it's scary to hear that you might have COVID-19. We want to track your symptoms to make sure you're okay over the next 2 weeks. Please look for an email from [employer]---this is a free, online program that we'll use to keep in touch. To sign up, follow the link in the email. Learn more at [email or web address]  How can I take care of myself?   Get lots of rest. Drink extra fluids (unless a doctor has told you not to).   Take Tylenol (acetaminophen) for fever or pain. If you have liver or kidney problems, ask your family doctor if it's okay to take Tylenol.   Adults can take either:    650 mg (two 325 mg pills) every 4 to 6 hours, or...   1,000 mg (two 500 mg pills) every 8 hours as needed.    Note: Don't take more than 3,000 mg in one day. Acetaminophen is found in many medicines (both prescribed and over-the-counter medicines). Read all labels to be sure you don't take too much.   For children, check the Tylenol bottle for the right dose. The dose is based on the child's age or weight.    If  you have other health problems (like cancer, heart failure, an organ transplant or severe kidney disease): Call your specialty clinic if you don't feel better in the next 2 days.       Know when to call 911. Emergency warning signs include:    Trouble breathing or shortness of breath Pain or pressure in the chest that doesn't go away Feeling confused like you haven't felt before, or not being able to wake up Bluish-colored lips or face.  Where can I get more information?   [facility] -- About COVID-19: [email or web address]   CDC -- What to Do If You're Sick: www.cdc.gov/coronavirus/2019-ncov/about/steps-when-sick.html   CDC -- Ending Home Isolation: www.cdc.gov/coronavirus/2019-ncov/hcp/disposition-in-home-patients.html   [facility] -- Caring for Someone: www.cdc.gov/coronavirus/2019-ncov/if-you-are-sick/care-for-someone.html   [facility] -- Interim Guidance for Hospital Discharge to Home: www.[facility].mn./diseases/coronavirus/hcp/hospdischarge.pdf   [facility] clinical trials (COVID-19 research studies): clinicalaffairs.[facility]-clinical-trials    Below are the COVID-19 hotlines at the [place] Department of Health ([facility]). Interpreters are available.    For health questions: Call 003-360-3256 or 1-550.619.2500 (7 a.m. to 7 p.m.) For questions about schools and childcare: Call 452-517-3602 or 1-335.442.3380 (7 a.m. to 7 p.m.)    Diagnosis: Contact with and (suspected) exposure to other viral communicable diseases  Diagnosis ICD: Z20.828

## 2020-07-31 DIAGNOSIS — Z20.822 EXPOSURE TO COVID-19 VIRUS: Primary | ICD-10-CM

## 2020-08-02 DIAGNOSIS — Z20.822 EXPOSURE TO COVID-19 VIRUS: ICD-10-CM

## 2020-08-02 PROCEDURE — U0003 INFECTIOUS AGENT DETECTION BY NUCLEIC ACID (DNA OR RNA); SEVERE ACUTE RESPIRATORY SYNDROME CORONAVIRUS 2 (SARS-COV-2) (CORONAVIRUS DISEASE [COVID-19]), AMPLIFIED PROBE TECHNIQUE, MAKING USE OF HIGH THROUGHPUT TECHNOLOGIES AS DESCRIBED BY CMS-2020-01-R: HCPCS | Performed by: FAMILY MEDICINE

## 2020-08-03 ENCOUNTER — MYC MEDICAL ADVICE (OUTPATIENT)
Dept: FAMILY MEDICINE | Facility: CLINIC | Age: 60
End: 2020-08-03

## 2020-08-03 DIAGNOSIS — R06.2 WHEEZING: ICD-10-CM

## 2020-08-03 DIAGNOSIS — G47.00 INSOMNIA: ICD-10-CM

## 2020-08-03 DIAGNOSIS — R25.2 SPASM: ICD-10-CM

## 2020-08-03 DIAGNOSIS — J20.9 BRONCHITIS WITH BRONCHOSPASM: ICD-10-CM

## 2020-08-03 DIAGNOSIS — G82.50 QUADRIPLEGIA (H): ICD-10-CM

## 2020-08-03 DIAGNOSIS — R21 RASH AND OTHER NONSPECIFIC SKIN ERUPTION: ICD-10-CM

## 2020-08-03 DIAGNOSIS — R10.30 GROIN PAIN, UNSPECIFIED LATERALITY: ICD-10-CM

## 2020-08-03 LAB
SARS-COV-2 RNA SPEC QL NAA+PROBE: NOT DETECTED
SPECIMEN SOURCE: NORMAL

## 2020-08-03 RX ORDER — ZOLPIDEM TARTRATE 10 MG/1
TABLET ORAL
Qty: 90 TABLET | Refills: 1 | Status: SHIPPED | OUTPATIENT
Start: 2020-08-03 | End: 2021-01-15

## 2020-08-03 NOTE — TELEPHONE ENCOUNTER
Patient Requested  zolpidem (AMBIEN) 10 MG tablet  Last Filled  02/17/2020  Last Office Visit  09/28/2019  Next Office Visit  Nothing Scheduled   Checked  08/03/2020      DX: Insomnia    Pharmacy: [address]    ARPITA PINA CMA at 10:07 AM on 1/31/2020.

## 2020-08-03 NOTE — TELEPHONE ENCOUNTER
Rx pended to provider in another encounter that patient requested via [employer]. Renetta Eduardo LPN 8/3/2020 2:22 PM

## 2020-08-04 ENCOUNTER — MYC MEDICAL ADVICE (OUTPATIENT)
Dept: FAMILY MEDICINE | Facility: CLINIC | Age: 60
End: 2020-08-04

## 2020-08-04 DIAGNOSIS — H66.90 EAR INFECTION: Primary | ICD-10-CM

## 2020-08-04 NOTE — TELEPHONE ENCOUNTER
Sounds more like congestion (perhaps fluid or wax) than infection  See if ever had to have wax removed in past  Might be worth trial debrox drops if wax   Infection usually pain, pus like drainage if exertnal

## 2020-08-05 RX ORDER — AZITHROMYCIN 250 MG/1
TABLET, FILM COATED ORAL
Qty: 6 TABLET | Refills: 0 | Status: SHIPPED | OUTPATIENT
Start: 2020-08-05 | End: 2020-08-10

## 2020-08-06 ENCOUNTER — HOSPITAL ENCOUNTER (OUTPATIENT)
Dept: OCCUPATIONAL THERAPY | Facility: CLINIC | Age: 60
Setting detail: THERAPIES SERIES
End: 2020-08-06
Attending: FAMILY MEDICINE
Payer: COMMERCIAL

## 2020-08-06 PROCEDURE — 97032 APPL MODALITY 1+ESTIM EA 15: CPT | Mod: GO | Performed by: OCCUPATIONAL THERAPIST

## 2020-08-18 ENCOUNTER — HOSPITAL ENCOUNTER (OUTPATIENT)
Dept: OCCUPATIONAL THERAPY | Facility: CLINIC | Age: 60
Setting detail: THERAPIES SERIES
End: 2020-08-18
Attending: FAMILY MEDICINE
Payer: COMMERCIAL

## 2020-08-18 PROCEDURE — 97032 APPL MODALITY 1+ESTIM EA 15: CPT | Mod: GO | Performed by: OCCUPATIONAL THERAPIST

## 2020-08-27 DIAGNOSIS — G47.00 INSOMNIA, UNSPECIFIED TYPE: ICD-10-CM

## 2020-08-27 DIAGNOSIS — G82.50 QUADRIPLEGIA (H): ICD-10-CM

## 2020-08-27 DIAGNOSIS — R25.2 SPASM: ICD-10-CM

## 2020-08-27 DIAGNOSIS — J20.9 BRONCHITIS WITH BRONCHOSPASM: ICD-10-CM

## 2020-08-27 DIAGNOSIS — R10.30 GROIN PAIN, UNSPECIFIED LATERALITY: ICD-10-CM

## 2020-08-27 DIAGNOSIS — R06.2 WHEEZING: ICD-10-CM

## 2020-08-27 DIAGNOSIS — R21 RASH AND OTHER NONSPECIFIC SKIN ERUPTION: ICD-10-CM

## 2020-08-27 RX ORDER — DIAZEPAM 5 MG
5 TABLET ORAL EVERY 6 HOURS PRN
Qty: 30 TABLET | Refills: 3 | Status: SHIPPED | OUTPATIENT
Start: 2020-08-27 | End: 2020-12-23

## 2020-08-27 RX ORDER — DIAZEPAM 10 MG
TABLET ORAL
Qty: 30 TABLET | Refills: 0 | Status: SHIPPED | OUTPATIENT
Start: 2020-08-27 | End: 2020-10-01

## 2020-08-31 ENCOUNTER — MYC MEDICAL ADVICE (OUTPATIENT)
Dept: FAMILY MEDICINE | Facility: CLINIC | Age: 60
End: 2020-08-31

## 2020-08-31 DIAGNOSIS — G82.50 QUADRIPLEGIA (H): ICD-10-CM

## 2020-08-31 DIAGNOSIS — R21 RASH AND OTHER NONSPECIFIC SKIN ERUPTION: ICD-10-CM

## 2020-09-01 RX ORDER — MINOCYCLINE HYDROCHLORIDE 50 MG/1
CAPSULE ORAL
Qty: 180 CAPSULE | Refills: 3 | Status: SHIPPED | OUTPATIENT
Start: 2020-09-01 | End: 2021-09-10

## 2020-09-01 RX ORDER — OXYBUTYNIN CHLORIDE 10 MG/1
10 TABLET, EXTENDED RELEASE ORAL DAILY
Qty: 90 TABLET | Refills: 3 | Status: SHIPPED | OUTPATIENT
Start: 2020-09-01 | End: 2021-08-24

## 2020-09-08 ENCOUNTER — MYC MEDICAL ADVICE (OUTPATIENT)
Dept: FAMILY MEDICINE | Facility: CLINIC | Age: 60
End: 2020-09-08

## 2020-09-08 DIAGNOSIS — H66.90 EAR INFECTION: Primary | ICD-10-CM

## 2020-09-09 ENCOUNTER — HOSPITAL ENCOUNTER (OUTPATIENT)
Dept: OCCUPATIONAL THERAPY | Facility: CLINIC | Age: 60
Setting detail: THERAPIES SERIES
End: 2020-09-09
Attending: FAMILY MEDICINE
Payer: COMMERCIAL

## 2020-09-09 PROCEDURE — 97032 APPL MODALITY 1+ESTIM EA 15: CPT | Mod: GO | Performed by: OCCUPATIONAL THERAPIST

## 2020-09-09 RX ORDER — CIPROFLOXACIN AND DEXAMETHASONE 3; 1 MG/ML; MG/ML
4 SUSPENSION/ DROPS AURICULAR (OTIC) 2 TIMES DAILY
Qty: 1 BOTTLE | Refills: 1 | Status: SHIPPED | OUTPATIENT
Start: 2020-09-09 | End: 2020-10-16

## 2020-09-17 ENCOUNTER — HOSPITAL ENCOUNTER (OUTPATIENT)
Dept: OCCUPATIONAL THERAPY | Facility: CLINIC | Age: 60
Setting detail: THERAPIES SERIES
End: 2020-09-17
Attending: FAMILY MEDICINE
Payer: COMMERCIAL

## 2020-09-17 PROCEDURE — 97032 APPL MODALITY 1+ESTIM EA 15: CPT | Mod: GO | Performed by: OCCUPATIONAL THERAPIST

## 2020-09-21 ENCOUNTER — TELEPHONE (OUTPATIENT)
Dept: FAMILY MEDICINE | Facility: CLINIC | Age: 60
End: 2020-09-21

## 2020-09-21 NOTE — TELEPHONE ENCOUNTER
[facility] now requests orders and shares plan of care/discharge summaries for some patients through [employer].  Please REPLY TO THIS MESSAGE OR ROUTE BACK TO THE AUTHOR in order to give authorization for orders when needed.  This is considered a verbal order, you will still receive a faxed copy of orders for signature.  Thank you for your assistance in improving collaboration for our patients.    ORDER    Patient seen today for 60 day recertification for services with [facility]. Requesting the following orders to begin with new certification period starting 9/16/2020:    SN  3 Month 1         2 Month 1         3 PRN    And the following treatment order:    Okay to change SP catheter every two weeks. Catheter is 16 FR with 8 cc syringe.  Irrigate with 60 cc normal saline daily and as needed, caregiver can irrigate catheter on non nursing visit days.    Patient has not started taking antibiotics prescribed for ear infection.      Thank you,    VALE Durham Case Manager  [facility]  [phone]  [email or web address]

## 2020-09-26 ENCOUNTER — MEDICAL CORRESPONDENCE (OUTPATIENT)
Dept: HEALTH INFORMATION MANAGEMENT | Facility: CLINIC | Age: 60
End: 2020-09-26

## 2020-09-30 DIAGNOSIS — R25.2 SPASM: ICD-10-CM

## 2020-09-30 DIAGNOSIS — G47.00 INSOMNIA, UNSPECIFIED TYPE: ICD-10-CM

## 2020-09-30 DIAGNOSIS — R06.2 WHEEZING: ICD-10-CM

## 2020-09-30 DIAGNOSIS — J20.9 BRONCHITIS WITH BRONCHOSPASM: ICD-10-CM

## 2020-09-30 DIAGNOSIS — R21 RASH AND OTHER NONSPECIFIC SKIN ERUPTION: ICD-10-CM

## 2020-09-30 DIAGNOSIS — G82.50 QUADRIPLEGIA (H): ICD-10-CM

## 2020-09-30 DIAGNOSIS — R10.30 GROIN PAIN, UNSPECIFIED LATERALITY: ICD-10-CM

## 2020-10-01 RX ORDER — DIAZEPAM 10 MG
TABLET ORAL
Qty: 30 TABLET | Refills: 0 | Status: SHIPPED | OUTPATIENT
Start: 2020-10-01 | End: 2020-12-02

## 2020-10-01 NOTE — TELEPHONE ENCOUNTER
Patient Requested  Valium 10 mg  Last Filled  08/27/2020  Last Office Visit  09/28/2019  Next Office Visit  Nothing Scheduled   Checked  10/01/2020      DX: Insomnia    Pharmacy: [address]    ARPITA PINA CMA at 8:40 AM on 10/1/2020.

## 2020-10-02 ENCOUNTER — HOSPITAL ENCOUNTER (OUTPATIENT)
Dept: OCCUPATIONAL THERAPY | Facility: CLINIC | Age: 60
Setting detail: THERAPIES SERIES
End: 2020-10-02
Attending: FAMILY MEDICINE
Payer: COMMERCIAL

## 2020-10-02 PROCEDURE — 97032 APPL MODALITY 1+ESTIM EA 15: CPT | Mod: GO | Performed by: OCCUPATIONAL THERAPIST

## 2020-10-07 ENCOUNTER — VIRTUAL VISIT (OUTPATIENT)
Dept: PHYSICAL MEDICINE AND REHAB | Facility: CLINIC | Age: 60
End: 2020-10-07
Payer: COMMERCIAL

## 2020-10-07 DIAGNOSIS — R60.1 GENERALIZED EDEMA: ICD-10-CM

## 2020-10-07 DIAGNOSIS — R29.898 UPPER EXTREMITY WEAKNESS: ICD-10-CM

## 2020-10-07 PROCEDURE — 99214 OFFICE O/P EST MOD 30 MIN: CPT | Mod: 95 | Performed by: PHYSICAL MEDICINE & REHABILITATION
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
## 2020-10-09 ENCOUNTER — TELEPHONE (OUTPATIENT)
Dept: INTERNAL MEDICINE | Facility: CLINIC | Age: 60
End: 2020-10-09

## 2020-10-09 NOTE — TELEPHONE ENCOUNTER
Central Prior Authorization Team   Phone: [phone]      PA Initiation    Medication: ciprofloxacin-dexamethasone (CIPRODEX) 0.3-0.1 % otic suspension  Insurance Company: [employer] - Phone [phone] Fax [phone]  Pharmacy Filling the Rx: [facility] - [place], [place] - [address] [place] [address]  Filling Pharmacy Phone: [phone]  Filling Pharmacy Fax:    Start Date: 10/9/2020

## 2020-10-09 NOTE — PROGRESS NOTES
[facility]      OUTPATIENT OCCUPATIONAL THERAPY  PLAN OF TREATMENT FOR OUTPATIENT REHABILITATION    Patient's Last Name, First Name, M.I.                YOB: 1960  NahomyBart Parsons                        Provider's Name  [facility] Medical Record No.  [id number]                               Onset Date: 1/16/20   Start of Care Date: 2/5/20   Type:     ___PT   _X_OT   ___SLP Medical Diagnosis: quadraplegia and excessive daytime sleepiness                         OT Diagnosis:  Impaired ADL/IADL and functional mobility with power w/c      _________________________________________________________________________________  Plan of Treatment:    Frequency/Duration: 2x/week for 6 weeks and taper to 1 x/week for 6 weeks     Goals:    Goal Identifier     Goal Description Pt will demonstrate use of his ideeli mobile arm support with adjustments to settings or additional elevating proximal arm in order to feed himself at least 25% of meal with set up with AE with R UE.   Target Date 10/06/20; extend to 12/30/20   Date Met      Progress:wife is having to feed patient currently due to R UE weakness     Goal Identifier     Goal Description Pt will be able to power on and off his w/c with R UE and place his R UE onto joystick to operate chair 75% of trials.   Target Date 10/06/20; extend to 12/30/20   Date Met      Progress: At last visit on 10/2/20 pt required therapist to place his R hand onto goal post joystick each time and power on w/c rotation switch each time due to R UE weakness. Prior to this on        Progress Toward Goals:   Progress this reporting period: Pt has been seen in OT x3 sessions since last re-certification was completed on 9/8/20. He has been seen for his dx of C4-5 complete spinal cord injury and its effects on motor atrophy after many years affecting his ability  to use his R UE to operate his power w/c independently and to help in feeding himself. Pt had made gains with R UE strength in placing his R UE/hand onto goal post joystick, powering on w/c and removing UE/hand from joystick while in therapy sessions (up until 20) with aid of UE e-stim cycling to build endurance and strength and reduce motor atrophy related to his quadraparesis. He reported some  pain in R shoulder and increased weakness arriving for  session and has had increased weakness after this when his mother  in early Sept coping with this emotional loss/grief. He has lost strength that was gained and has required assist at home and in therapy to get his hand on and off the joystick and to power on and off his w/c in order to operate it. Once his hand is on the joystick he is able to drive his power w/c independently with exception of fatigue with ramps/elevation. Pt remains very appropriate to extend his plan of care and visits in OT to address further FES cycling for endurance and strength building due to motor atrophy from his quadraparesis. See new recommended frequency above.    Certification date from 10/7/20 to 20.    Gomez Bravo, OTR/L          I CERTIFY THE NEED FOR THESE SERVICES FURNISHED UNDER        THIS PLAN OF TREATMENT AND WHILE UNDER MY CARE     (Physician co-signature of this document indicates review and certification of the therapy plan).                Referring Provider:  Dr. Jason Abebe

## 2020-10-09 NOTE — TELEPHONE ENCOUNTER
Prior Authorization Retail Medication Request    Medication/Dose: ciprofloxacin-dexamethasone (CIPRODEX) 0.3-0.1 % otic suspension  ICD code (if different than what is on RX):    Previously Tried and Failed:    Rationale:      Insurance Name:    Insurance ID:        Pharmacy Information (if different than what is on RX)  Name:    Phone:

## 2020-10-14 NOTE — TELEPHONE ENCOUNTER
P/A is not needed. I called insurance, spoke to rep Gordon who confirmed the plan's preferred medication is Name Brand Ciprodex.    Medication: ciprofloxacin-dexamethasone (CIPRODEX) 0.3-0.1 % otic suspension  Insurance Company: [employer] - Phone [phone] Fax [phone]  Expected CoPay:      Pharmacy Filling the Rx: [address]  Pharmacy Notified: Yes - pharmacy closed, no voicemail option. Faxed pharmacy to process name brand Ciprodex if they haven't already. My direct contact info is attached if needing anything further.  Patient Notified:

## 2020-10-16 RX ORDER — NEOMYCIN SULFATE, POLYMYXIN B SULFATE, HYDROCORTISONE 3.5; 10000; 1 MG/ML; [USP'U]/ML; MG/ML
4 SOLUTION/ DROPS AURICULAR (OTIC) 2 TIMES DAILY
Qty: 10 ML | Refills: 11 | Status: SHIPPED | OUTPATIENT
Start: 2020-10-16

## 2020-10-22 ENCOUNTER — TELEPHONE (OUTPATIENT)
Dept: PHYSICAL MEDICINE AND REHAB | Facility: CLINIC | Age: 60
End: 2020-10-22

## 2020-10-22 NOTE — TELEPHONE ENCOUNTER
Dr Moya's PMR clinic note dated 10/7/20 was faxed to [facility]  [phone] regarding compression sleeves and stockings

## 2020-10-22 NOTE — TELEPHONE ENCOUNTER
[facility] Call Center    Phone Message    May a detailed message be left on voicemail: yes     Reason for Call: Other: Chastity Haywood calling to request a call back to discuss the questions she has on the order for compression stockings and sleeves for Jc. Please call her at your earliest convenience to discuss.     Action Taken: Message routed to:  [facility] ([facility] PHYS MED & REHAB    Travel Screening: Not Applicable

## 2020-11-03 NOTE — TELEPHONE ENCOUNTER
[facility] Call Center    Phone Message    May a detailed message be left on voicemail: yes     Reason for Call: Other: Chastity Haywood calling in regards to orders for patient - states that they are needed a corrected script and states the same script was sent on 10/22. The updated script needs to have an order for compression stockings not the tubigrip. Chastity Haywood also states that the script order states that patient has a wound but per office notes it states patient does not have a wound and would like that updated on the order as well.     Fax: [phone]     Please advise and call Chastity Haywood with any questions     Action Taken: Other: [facility] PM&R     Travel Screening: Not Applicable

## 2020-11-19 NOTE — PROGRESS NOTES
"   10/02/20 1500   Signing Clinician's Name / Credentials   Signing clinician's name / credentials SARA Mayo/DORA, [facility]   Session Number   Session Number 32   Additional Session Number 20: Occupational Therapy Discharge Addendum:  approved through 10/6/20   Authorization status [employer] [place]: auth is required prior to 21 visit   Progress/Recertification   Recertification Due 10/06/20   Recertification Completed 10/06/20    OT Goal 1   Goal Description Pt will demonstrate use of his [employer] mobile arm support with adjustments to settings or additional elevating proximal arm in order to feed himself at least 25% of meal with set up with AE with R UE.   Target Date 10/06/20    OT Goal 2   Goal Description Pt will be able to power on and off his w/c with R UE and place his R UE onto joystick to operate chair 75% of trials.   Target Date 10/06/20   Subjective Report   Subjective Report I feel so weak. My mom  a month ago and with grieivng I feel like I have gotten weaker.   Electrical Stimulation   E-Stim, Attended Minutes (07946) 75   Skilled Intervention Skilled therapy for [facility] FES UE cycling for adrressing muscle atrophy   Patient Response   33 min;approx 2.91 miles  ; .50 resistance, assist to get hand onto w/c joystick and power on w/c   Treatment Detail FES parameters: resistance .50 Nm : Scapular stabilizers: 200 pulse duration, 40 pps, 2\" x 3.5\" electrodes ; Shoulders: 200 pulse duration, 40 pps, 2' x 2\" electrodes; Bicep: 200 pulse duration, 40 pps, 2\" x 2\" electrodes; Tricep: 200 pulse duration, 40 pps, 2\" x 2\" electrodes;  Pt tolerated well with intensities set to patient tolerance and tonic muscle contraction level; with appearance of skin after electrode removal appearing: none to mild redness. Pt seated in his power W/c wiht chest strap on, bilateral hand wraps used with forearm troughs to prevent hands from slipping off hand pedals. Added back extensors and also " "abdominals at 250 pulse duration, 40 pulse rate  and 3.5 x 2\" electrodes placed to tonic contraction. Increased time to get electrodes on and off today due to wearing fleece long sleeved slip over sweatshirt today   Progress goals progressing   Education   Learner Patient   Readiness Acceptance;Eager   Method Explanation;Demonstration   Response Verbalizes understanding;Demonstrates understanding   Plan   Plan for next session Reassess UE strength; FES UE cycling   Comments   Comments 11/17/20 Occupational Therapy Discharge Addendum: Further authorization from insurance was denied for patient and so patient was called and informed of this. He notes that with Covid crisis currently he does not plan to return to clinic at this time due to high risk for self and so will not pursue insurance auth further at this time. Pt will pursue new orders in the new year when he feels it is safe for him to return. Did suggest he could consider home care therapy to address R UE weakness that he feels has progressed significantly such that he has to keep his R UE on his goal post controller of his w/c at all times to move himself around with power w/c and also suggested contacting [employer] to pursue alternative drive controls/switches at this time or determine when he is due for new power w/c and have assessment for new chair with new drive controls and swithces due to progression of weakness. He reports he will do this. See last recertification for progress at that time. Discharge OT for now.   Total Session Time   Timed Code Treatment Minutes 75   Total Treatment Time (sum of timed and untimed services) 75     "

## 2020-11-23 ENCOUNTER — TELEPHONE (OUTPATIENT)
Dept: FAMILY MEDICINE | Facility: CLINIC | Age: 60
End: 2020-11-23

## 2020-11-23 NOTE — TELEPHONE ENCOUNTER
[facility] now requests orders and shares plan of care/discharge summaries for some patients through [employer].  Please REPLY TO THIS MESSAGE OR ROUTE BACK TO THE AUTHOR in order to give authorization for orders when needed.  This is considered a verbal order, you will still receive a faxed copy of orders for signature.  Thank you for your assistance in improving collaboration for our patients.    ORDER    Patient seen today for 60 day recertification for services with [facility]. Requesting the following Home Care orders to start with new certification period beginning 11/25/2020:    SN 1 month 1        2 month 2        3 PRN    And the following treatment order:    Okay to change SP catheter every two weeks. Catheter is 16 FR with 8 cc syringe. Irrigate with 60 cc normal saline daily and as needed, caregiver can irrigate catheter on non nursing visit days.      Thank you,    VALE Durham Case Manager  [facility]  [phone]  [email or web address]

## 2020-11-25 ENCOUNTER — MEDICAL CORRESPONDENCE (OUTPATIENT)
Dept: HEALTH INFORMATION MANAGEMENT | Facility: CLINIC | Age: 60
End: 2020-11-25

## 2020-11-27 ENCOUNTER — HOSPITAL ENCOUNTER (OUTPATIENT)
Dept: MRI IMAGING | Facility: CLINIC | Age: 60
End: 2020-11-27
Attending: PHYSICAL MEDICINE & REHABILITATION
Payer: COMMERCIAL

## 2020-11-27 DIAGNOSIS — R29.898 UPPER EXTREMITY WEAKNESS: ICD-10-CM

## 2020-11-27 PROCEDURE — A9585 GADOBUTROL INJECTION: HCPCS | Performed by: PHYSICAL MEDICINE & REHABILITATION

## 2020-11-27 PROCEDURE — 72156 MRI NECK SPINE W/O & W/DYE: CPT

## 2020-11-27 PROCEDURE — 255N000002 HC RX 255 OP 636: Performed by: PHYSICAL MEDICINE & REHABILITATION

## 2020-11-27 PROCEDURE — 73221 MRI JOINT UPR EXTREM W/O DYE: CPT | Mod: RT

## 2020-11-27 RX ORDER — GADOBUTROL 604.72 MG/ML
10 INJECTION INTRAVENOUS ONCE
Status: COMPLETED | OUTPATIENT
Start: 2020-11-27 | End: 2020-11-27

## 2020-11-27 RX ADMIN — GADOBUTROL 10 ML: 604.72 INJECTION INTRAVENOUS at 11:02

## 2020-12-01 DIAGNOSIS — G82.50 QUADRIPLEGIA (H): ICD-10-CM

## 2020-12-01 DIAGNOSIS — R25.2 SPASM: ICD-10-CM

## 2020-12-01 DIAGNOSIS — R21 RASH AND OTHER NONSPECIFIC SKIN ERUPTION: ICD-10-CM

## 2020-12-01 DIAGNOSIS — R06.2 WHEEZING: ICD-10-CM

## 2020-12-01 DIAGNOSIS — J20.9 BRONCHITIS WITH BRONCHOSPASM: ICD-10-CM

## 2020-12-01 DIAGNOSIS — R10.30 GROIN PAIN, UNSPECIFIED LATERALITY: ICD-10-CM

## 2020-12-01 DIAGNOSIS — G47.00 INSOMNIA, UNSPECIFIED TYPE: ICD-10-CM

## 2020-12-02 RX ORDER — CHOLECALCIFEROL (VITAMIN D3) 25 MCG
TABLET ORAL
Qty: 90 TABLET | Refills: 0 | Status: SHIPPED | OUTPATIENT
Start: 2020-12-02 | End: 2021-03-03

## 2020-12-02 RX ORDER — DIAZEPAM 10 MG
TABLET ORAL
Qty: 30 TABLET | Refills: 0 | Status: SHIPPED | OUTPATIENT
Start: 2020-12-02 | End: 2020-12-29

## 2020-12-02 NOTE — TELEPHONE ENCOUNTER
Last Clinic Visit: 2/17/2020  [facility]  VITAMIN D3 25 MCG (1000 UT) tablet   RF: 90 day:0

## 2020-12-02 NOTE — TELEPHONE ENCOUNTER
Patient Requested  Valium 10 mg  Last Filled  10/01/2020  Last Office Visit  09/28/2019  Next Office Visit  Nothing Scheduled   Checked  12/02/2020      DX: Insomnia    Pharmacy: [address]    ARPITA PINA CMA at 10:22 AM on 12/2/2020.

## 2020-12-18 ENCOUNTER — MEDICAL CORRESPONDENCE (OUTPATIENT)
Dept: HEALTH INFORMATION MANAGEMENT | Facility: CLINIC | Age: 60
End: 2020-12-18

## 2020-12-23 DIAGNOSIS — R25.2 SPASM: ICD-10-CM

## 2020-12-23 DIAGNOSIS — G82.50 QUADRIPLEGIA (H): ICD-10-CM

## 2020-12-24 RX ORDER — DIAZEPAM 5 MG
5 TABLET ORAL EVERY 6 HOURS PRN
Qty: 30 TABLET | Refills: 3 | Status: SHIPPED | OUTPATIENT
Start: 2020-12-31 | End: 2021-02-08

## 2020-12-24 NOTE — TELEPHONE ENCOUNTER
Patient Requested  Valium 10 mg  Last Filled  12/02/2020  Last Office Visit  09/28/2019  Next Office Visit  Nothing Scheduled   Checked  12/23/2020      DX: Insomnia    Pharmacy: [address]    ARPITA PINA CMA at 10:22 AM on 12/2/2020.

## 2020-12-28 ENCOUNTER — TELEPHONE (OUTPATIENT)
Dept: UROLOGY | Facility: CLINIC | Age: 60
End: 2020-12-28

## 2020-12-28 NOTE — TELEPHONE ENCOUNTER
Called patient and left a message to please call clinic about up coming appointment.     PATIENT NEED TOT BE CONVERT TO A PHONE OR VIDEO

## 2020-12-29 DIAGNOSIS — R06.2 WHEEZING: ICD-10-CM

## 2020-12-29 DIAGNOSIS — R21 RASH AND OTHER NONSPECIFIC SKIN ERUPTION: ICD-10-CM

## 2020-12-29 DIAGNOSIS — G47.00 INSOMNIA, UNSPECIFIED TYPE: ICD-10-CM

## 2020-12-29 DIAGNOSIS — J20.9 BRONCHITIS WITH BRONCHOSPASM: ICD-10-CM

## 2020-12-29 DIAGNOSIS — R25.2 SPASM: ICD-10-CM

## 2020-12-29 DIAGNOSIS — R10.30 GROIN PAIN, UNSPECIFIED LATERALITY: ICD-10-CM

## 2020-12-29 DIAGNOSIS — G82.50 QUADRIPLEGIA (H): ICD-10-CM

## 2020-12-29 RX ORDER — DIAZEPAM 10 MG
10 TABLET ORAL DAILY
Qty: 30 TABLET | Refills: 5 | Status: SHIPPED | OUTPATIENT
Start: 2020-12-29 | End: 2021-01-01

## 2020-12-29 NOTE — TELEPHONE ENCOUNTER
[facility] Call Center    Phone Message    May a detailed message be left on voicemail: yes     Reason for Call: Other: Jc returning a call to convert his appointment to video.  He is concerned that he will not be able to make it back home in time for his video visit as he has labs and imaging prior.  The appointment has been converted to video.  Please call him back to discuss options.     Action Taken: Message routed to:  [facility] ([facility]):  Urology    Travel Screening: Not Applicable

## 2020-12-29 NOTE — TELEPHONE ENCOUNTER
DIAZEPAM 10MG TAB 10 Tablet  Last Written Prescription Date:  12/31/2020  Last Fill Quantity: 30,   # refills: 3  Last Office Visit : 2/17/2020  Future Office visit:  None  Routing refill request to provider for review/approval because:  Drug not on the [facility], [facility] or [facility] refill protocol or controlled substance      Radha Florence RN  Central Triage Red Flags/Med Refills

## 2021-01-01 ENCOUNTER — LAB (OUTPATIENT)
Dept: LAB | Facility: CLINIC | Age: 61
End: 2021-01-01

## 2021-01-01 ENCOUNTER — HOSPITAL ENCOUNTER (OUTPATIENT)
Dept: OCCUPATIONAL THERAPY | Facility: CLINIC | Age: 61
Setting detail: THERAPIES SERIES
End: 2021-12-28
Payer: COMMERCIAL

## 2021-01-01 ENCOUNTER — OFFICE VISIT (OUTPATIENT)
Dept: FAMILY MEDICINE | Facility: CLINIC | Age: 61
End: 2021-01-01
Payer: COMMERCIAL

## 2021-01-01 ENCOUNTER — TELEPHONE (OUTPATIENT)
Dept: FAMILY MEDICINE | Facility: CLINIC | Age: 61
End: 2021-01-01
Payer: COMMERCIAL

## 2021-01-01 VITALS — DIASTOLIC BLOOD PRESSURE: 63 MMHG | SYSTOLIC BLOOD PRESSURE: 97 MMHG | HEART RATE: 65 BPM | OXYGEN SATURATION: 100 %

## 2021-01-01 DIAGNOSIS — Z00.8 ENCOUNTER FOR NUTRITIONAL ASSESSMENT: ICD-10-CM

## 2021-01-01 DIAGNOSIS — G90.4 AUTONOMIC DYSREFLEXIA: ICD-10-CM

## 2021-01-01 DIAGNOSIS — N39.0 COMPLICATED URINARY TRACT INFECTION: ICD-10-CM

## 2021-01-01 DIAGNOSIS — E78.00 HIGH CHOLESTEROL: ICD-10-CM

## 2021-01-01 DIAGNOSIS — J42 CHRONIC BRONCHITIS, UNSPECIFIED CHRONIC BRONCHITIS TYPE (H): ICD-10-CM

## 2021-01-01 DIAGNOSIS — R25.2 SPASM: ICD-10-CM

## 2021-01-01 DIAGNOSIS — G82.50 QUADRIPLEGIA (H): ICD-10-CM

## 2021-01-01 DIAGNOSIS — R21 RASH AND OTHER NONSPECIFIC SKIN ERUPTION: ICD-10-CM

## 2021-01-01 DIAGNOSIS — K59.00 CONSTIPATION, UNSPECIFIED CONSTIPATION TYPE: ICD-10-CM

## 2021-01-01 DIAGNOSIS — E87.70 HYPERVOLEMIA, UNSPECIFIED HYPERVOLEMIA TYPE: ICD-10-CM

## 2021-01-01 DIAGNOSIS — S14.104D: ICD-10-CM

## 2021-01-01 DIAGNOSIS — Z00.00 HEALTH CARE MAINTENANCE: ICD-10-CM

## 2021-01-01 DIAGNOSIS — Z87.442 HISTORY OF KIDNEY STONES: Primary | ICD-10-CM

## 2021-01-01 DIAGNOSIS — Z87.442 HISTORY OF KIDNEY STONES: ICD-10-CM

## 2021-01-01 DIAGNOSIS — N31.9 NEUROGENIC BLADDER: ICD-10-CM

## 2021-01-01 DIAGNOSIS — S14.105D INJURY OF FIFTH CERVICAL SPINAL CORD, SUBSEQUENT ENCOUNTER (H): ICD-10-CM

## 2021-01-01 DIAGNOSIS — R21 RASH: ICD-10-CM

## 2021-01-01 DIAGNOSIS — S14.106D: ICD-10-CM

## 2021-01-01 DIAGNOSIS — J20.9 BRONCHITIS WITH BRONCHOSPASM: ICD-10-CM

## 2021-01-01 DIAGNOSIS — R06.2 WHEEZING: ICD-10-CM

## 2021-01-01 DIAGNOSIS — Z53.9 DIAGNOSIS NOT YET DEFINED: Primary | ICD-10-CM

## 2021-01-01 DIAGNOSIS — G47.00 INSOMNIA, UNSPECIFIED TYPE: ICD-10-CM

## 2021-01-01 DIAGNOSIS — R10.30 GROIN PAIN, UNSPECIFIED LATERALITY: ICD-10-CM

## 2021-01-01 LAB
ALBUMIN SERPL-MCNC: 3.3 G/DL (ref 3.4–5)
ALP SERPL-CCNC: 96 U/L (ref 40–150)
ALT SERPL W P-5'-P-CCNC: 21 U/L (ref 0–70)
ANION GAP SERPL CALCULATED.3IONS-SCNC: 3 MMOL/L (ref 3–14)
AST SERPL W P-5'-P-CCNC: 11 U/L (ref 0–45)
BASOPHILS # BLD AUTO: 0.1 10E3/UL (ref 0–0.2)
BASOPHILS NFR BLD AUTO: 1 %
BILIRUB SERPL-MCNC: 0.7 MG/DL (ref 0.2–1.3)
BUN SERPL-MCNC: 14 MG/DL (ref 7–30)
CALCIUM SERPL-MCNC: 9.1 MG/DL (ref 8.5–10.1)
CHLORIDE BLD-SCNC: 98 MMOL/L (ref 94–109)
CHOLEST SERPL-MCNC: 202 MG/DL
CO2 SERPL-SCNC: 34 MMOL/L (ref 20–32)
CREAT SERPL-MCNC: 0.26 MG/DL (ref 0.66–1.25)
EOSINOPHIL # BLD AUTO: 0.2 10E3/UL (ref 0–0.7)
EOSINOPHIL NFR BLD AUTO: 3 %
ERYTHROCYTE [DISTWIDTH] IN BLOOD BY AUTOMATED COUNT: 13.9 % (ref 10–15)
FASTING STATUS PATIENT QL REPORTED: YES
GFR SERPL CREATININE-BSD FRML MDRD: >90 ML/MIN/1.73M2
GLUCOSE BLD-MCNC: 88 MG/DL (ref 70–99)
HCT VFR BLD AUTO: 42.4 % (ref 40–53)
HDLC SERPL-MCNC: 49 MG/DL
HGB BLD-MCNC: 13.5 G/DL (ref 13.3–17.7)
IMM GRANULOCYTES # BLD: 0 10E3/UL
IMM GRANULOCYTES NFR BLD: 0 %
LDLC SERPL CALC-MCNC: 126 MG/DL
LYMPHOCYTES # BLD AUTO: 1.1 10E3/UL (ref 0.8–5.3)
LYMPHOCYTES NFR BLD AUTO: 14 %
MCH RBC QN AUTO: 30.2 PG (ref 26.5–33)
MCHC RBC AUTO-ENTMCNC: 31.8 G/DL (ref 31.5–36.5)
MCV RBC AUTO: 95 FL (ref 78–100)
MONOCYTES # BLD AUTO: 1.2 10E3/UL (ref 0–1.3)
MONOCYTES NFR BLD AUTO: 15 %
NEUTROPHILS # BLD AUTO: 5.1 10E3/UL (ref 1.6–8.3)
NEUTROPHILS NFR BLD AUTO: 67 %
NONHDLC SERPL-MCNC: 153 MG/DL
NRBC # BLD AUTO: 0 10E3/UL
NRBC BLD AUTO-RTO: 0 /100
PLATELET # BLD AUTO: 185 10E3/UL (ref 150–450)
POTASSIUM BLD-SCNC: 4.8 MMOL/L (ref 3.4–5.3)
PROT SERPL-MCNC: 7.1 G/DL (ref 6.8–8.8)
RBC # BLD AUTO: 4.47 10E6/UL (ref 4.4–5.9)
SODIUM SERPL-SCNC: 135 MMOL/L (ref 133–144)
TRIGL SERPL-MCNC: 133 MG/DL
WBC # BLD AUTO: 7.7 10E3/UL (ref 4–11)

## 2021-01-01 PROCEDURE — 80061 LIPID PANEL: CPT | Performed by: PATHOLOGY

## 2021-01-01 PROCEDURE — 0004A COVID-19,PF,PFIZER (12+ YRS): CPT | Performed by: FAMILY MEDICINE

## 2021-01-01 PROCEDURE — 97032 APPL MODALITY 1+ESTIM EA 15: CPT | Mod: GO | Performed by: OCCUPATIONAL THERAPIST

## 2021-01-01 PROCEDURE — 91300 COVID-19,PF,PFIZER (12+ YRS): CPT | Performed by: FAMILY MEDICINE

## 2021-01-01 PROCEDURE — 80053 COMPREHEN METABOLIC PANEL: CPT | Performed by: PATHOLOGY

## 2021-01-01 PROCEDURE — 90471 IMMUNIZATION ADMIN: CPT | Performed by: FAMILY MEDICINE

## 2021-01-01 PROCEDURE — 99396 PREV VISIT EST AGE 40-64: CPT | Mod: 25 | Performed by: FAMILY MEDICINE

## 2021-01-01 PROCEDURE — 36415 COLL VENOUS BLD VENIPUNCTURE: CPT | Performed by: PATHOLOGY

## 2021-01-01 PROCEDURE — 85025 COMPLETE CBC W/AUTO DIFF WBC: CPT | Performed by: PATHOLOGY

## 2021-01-01 PROCEDURE — 90686 IIV4 VACC NO PRSV 0.5 ML IM: CPT | Performed by: FAMILY MEDICINE

## 2021-01-01 RX ORDER — AMLODIPINE BESYLATE 2.5 MG/1
2.5 TABLET ORAL DAILY PRN
Qty: 30 TABLET | Refills: 1 | Status: SHIPPED | OUTPATIENT
Start: 2021-01-01

## 2021-01-01 RX ORDER — IBUPROFEN 800 MG/1
800 TABLET, FILM COATED ORAL 3 TIMES DAILY PRN
Qty: 60 TABLET | Refills: 11 | Status: SHIPPED | OUTPATIENT
Start: 2021-01-01

## 2021-01-01 RX ORDER — DIAZEPAM 5 MG
TABLET ORAL
Qty: 40 TABLET | Refills: 5 | Status: SHIPPED | OUTPATIENT
Start: 2021-01-01 | End: 2022-01-01

## 2021-01-01 RX ORDER — ALBUTEROL SULFATE 0.83 MG/ML
2.5 SOLUTION RESPIRATORY (INHALATION) EVERY 4 HOURS PRN
Qty: 100 ML | Refills: 3 | Status: SHIPPED | OUTPATIENT
Start: 2021-01-01

## 2021-01-01 RX ORDER — BACLOFEN 20 MG/1
TABLET ORAL
Qty: 540 TABLET | Refills: 3 | Status: SHIPPED | OUTPATIENT
Start: 2021-01-01

## 2021-01-01 RX ORDER — DOCUSATE SODIUM 100 MG/1
CAPSULE, LIQUID FILLED ORAL
Qty: 100 CAPSULE | Refills: 3 | Status: SHIPPED | OUTPATIENT
Start: 2021-01-01

## 2021-01-01 RX ORDER — BENZOCAINE/MENTHOL 6 MG-10 MG
LOZENGE MUCOUS MEMBRANE 2 TIMES DAILY
Qty: 30 G | Refills: 11 | Status: SHIPPED | OUTPATIENT
Start: 2021-01-01

## 2021-01-01 RX ORDER — SENNOSIDES A AND B 8.6 MG/1
1 TABLET, FILM COATED ORAL DAILY
Qty: 90 TABLET | Refills: 3 | Status: SHIPPED | OUTPATIENT
Start: 2021-01-01

## 2021-01-01 RX ORDER — MULTIVIT WITH MINERALS/LUTEIN
1000 TABLET ORAL DAILY
Qty: 90 TABLET | Refills: 3 | Status: SHIPPED | OUTPATIENT
Start: 2021-01-01

## 2021-01-01 RX ORDER — OXYBUTYNIN CHLORIDE 10 MG/1
10 TABLET, EXTENDED RELEASE ORAL DAILY
Qty: 90 TABLET | Refills: 3 | Status: SHIPPED | OUTPATIENT
Start: 2021-01-01

## 2021-01-01 RX ORDER — ZOLPIDEM TARTRATE 10 MG/1
10 TABLET ORAL
Qty: 30 TABLET | Refills: 5 | Status: SHIPPED | OUTPATIENT
Start: 2021-01-01 | End: 2022-01-01

## 2021-01-01 RX ORDER — MAGNESIUM HYDROXIDE 1200 MG/15ML
LIQUID ORAL
Qty: 500 ML | Refills: 11 | Status: SHIPPED | OUTPATIENT
Start: 2021-01-01

## 2021-01-01 RX ORDER — VITAMIN B COMPLEX
1 TABLET ORAL DAILY
Qty: 90 TABLET | Refills: 3 | Status: SHIPPED | OUTPATIENT
Start: 2021-01-01

## 2021-01-01 RX ORDER — ACETAMINOPHEN 325 MG/1
650 TABLET ORAL EVERY 8 HOURS PRN
Qty: 30 TABLET | Refills: 11 | Status: SHIPPED | OUTPATIENT
Start: 2021-01-01

## 2021-01-01 RX ORDER — CLOTRIMAZOLE AND BETAMETHASONE DIPROPIONATE 10; .64 MG/G; MG/G
CREAM TOPICAL 2 TIMES DAILY
Qty: 15 G | Refills: 1 | Status: SHIPPED | OUTPATIENT
Start: 2021-01-01

## 2021-01-01 RX ORDER — MINOCYCLINE HYDROCHLORIDE 50 MG/1
50 CAPSULE ORAL 2 TIMES DAILY
Qty: 60 CAPSULE | Refills: 11 | Status: SHIPPED | OUTPATIENT
Start: 2021-01-01

## 2021-01-01 RX ORDER — MULTIVIT WITH MINERALS/LUTEIN
1 TABLET ORAL DAILY
Qty: 90 TABLET | Refills: 3 | Status: SHIPPED | OUTPATIENT
Start: 2021-01-01

## 2021-01-01 RX ORDER — FUROSEMIDE 20 MG
20 TABLET ORAL DAILY
Qty: 30 TABLET | Refills: 1 | Status: SHIPPED | OUTPATIENT
Start: 2021-01-01

## 2021-01-01 RX ORDER — NYSTATIN 100000 [USP'U]/G
POWDER TOPICAL
Qty: 60 G | Refills: 3 | Status: SHIPPED | OUTPATIENT
Start: 2021-01-01

## 2021-01-01 ASSESSMENT — PAIN SCALES - GENERAL: PAINLEVEL: MODERATE PAIN (4)

## 2021-01-04 ENCOUNTER — MYC MEDICAL ADVICE (OUTPATIENT)
Dept: FAMILY MEDICINE | Facility: CLINIC | Age: 61
End: 2021-01-04

## 2021-01-04 DIAGNOSIS — R06.2 WHEEZING: ICD-10-CM

## 2021-01-04 DIAGNOSIS — R25.2 SPASM: ICD-10-CM

## 2021-01-04 DIAGNOSIS — R21 RASH AND OTHER NONSPECIFIC SKIN ERUPTION: ICD-10-CM

## 2021-01-04 DIAGNOSIS — G82.50 QUADRIPLEGIA (H): ICD-10-CM

## 2021-01-04 DIAGNOSIS — E78.00 HIGH CHOLESTEROL: ICD-10-CM

## 2021-01-04 DIAGNOSIS — J20.9 BRONCHITIS WITH BRONCHOSPASM: ICD-10-CM

## 2021-01-05 ENCOUNTER — TELEPHONE (OUTPATIENT)
Dept: UROLOGY | Facility: CLINIC | Age: 61
End: 2021-01-05

## 2021-01-05 ENCOUNTER — ANCILLARY PROCEDURE (OUTPATIENT)
Dept: ULTRASOUND IMAGING | Facility: CLINIC | Age: 61
End: 2021-01-05
Attending: UROLOGY
Payer: COMMERCIAL

## 2021-01-05 ENCOUNTER — VIRTUAL VISIT (OUTPATIENT)
Dept: UROLOGY | Facility: CLINIC | Age: 61
End: 2021-01-05
Payer: COMMERCIAL

## 2021-01-05 DIAGNOSIS — N31.9 NEUROGENIC BLADDER: ICD-10-CM

## 2021-01-05 DIAGNOSIS — Z93.59 SUPRAPUBIC CATHETER (H): ICD-10-CM

## 2021-01-05 DIAGNOSIS — N31.9 NEUROGENIC BLADDER: Primary | ICD-10-CM

## 2021-01-05 DIAGNOSIS — R33.9 URINARY RETENTION: ICD-10-CM

## 2021-01-05 DIAGNOSIS — S14.105S: ICD-10-CM

## 2021-01-05 LAB
CREAT SERPL-MCNC: 0.3 MG/DL (ref 0.66–1.25)
GFR SERPL CREATININE-BSD FRML MDRD: >90 ML/MIN/{1.73_M2}

## 2021-01-05 PROCEDURE — 82565 ASSAY OF CREATININE: CPT | Performed by: PATHOLOGY

## 2021-01-05 PROCEDURE — 76770 US EXAM ABDO BACK WALL COMP: CPT | Performed by: RADIOLOGY

## 2021-01-05 PROCEDURE — 36415 COLL VENOUS BLD VENIPUNCTURE: CPT | Performed by: PATHOLOGY

## 2021-01-05 PROCEDURE — 99213 OFFICE O/P EST LOW 20 MIN: CPT | Mod: 95 | Performed by: PHYSICIAN ASSISTANT

## 2021-01-05 ASSESSMENT — PAIN SCALES - GENERAL: PAINLEVEL: NO PAIN (0)
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
## 2021-01-05 NOTE — PATIENT INSTRUCTIONS
UROLOGY CLINIC VISIT PATIENT INSTRUCTIONS    Follow up with your primary care provider regarding your edema issues.    Continue suprapubic tube changes every 2 weeks with home health nurse.    Follow up with urology in 1 year with an ultrasound and blood work beforehand, sooner with any issues.    If you have any issues, questions or concerns in the meantime, do not hesitate to contact us at [phone] or via [employer].     It was a pleasure meeting with you today.  Thank you for allowing me and my team the privilege of caring for you today.  YOU are the reason we are here, and I truly hope we provided you with the excellent service you deserve.  Please let us know if there is anything else we can do for you so that we can be sure you are leaving completely satisfied with your care experience.

## 2021-01-06 ENCOUNTER — MYC MEDICAL ADVICE (OUTPATIENT)
Dept: FAMILY MEDICINE | Facility: CLINIC | Age: 61
End: 2021-01-06

## 2021-01-06 RX ORDER — DOCUSATE SODIUM 100 MG/1
CAPSULE, LIQUID FILLED ORAL
Qty: 100 CAPSULE | Refills: 3 | Status: SHIPPED | OUTPATIENT
Start: 2021-01-06 | End: 2021-01-01

## 2021-01-07 ENCOUNTER — MYC MEDICAL ADVICE (OUTPATIENT)
Dept: FAMILY MEDICINE | Facility: CLINIC | Age: 61
End: 2021-01-07

## 2021-01-10 ENCOUNTER — HEALTH MAINTENANCE LETTER (OUTPATIENT)
Age: 61
End: 2021-01-10

## 2021-01-13 ENCOUNTER — VIRTUAL VISIT (OUTPATIENT)
Dept: FAMILY MEDICINE | Facility: CLINIC | Age: 61
End: 2021-01-13
Payer: COMMERCIAL

## 2021-01-13 DIAGNOSIS — R14.0 ABDOMINAL BLOATING: Primary | ICD-10-CM

## 2021-01-13 DIAGNOSIS — H92.11 DRAINAGE FROM RIGHT EAR: ICD-10-CM

## 2021-01-13 DIAGNOSIS — R60.9 FLUID RETENTION: ICD-10-CM

## 2021-01-13 PROCEDURE — 99215 OFFICE O/P EST HI 40 MIN: CPT | Mod: 95 | Performed by: FAMILY MEDICINE
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
## 2021-01-13 NOTE — NURSING NOTE
Chief Complaint   Patient presents with     Orders     pt would like to discuss shyanne lift     Recheck Medication     pt would like to discuss medical issues       Luis Alberto Ho CMA, EMT at 2:34 PM on 1/13/2021.

## 2021-01-15 ENCOUNTER — MYC MEDICAL ADVICE (OUTPATIENT)
Dept: INTERNAL MEDICINE | Facility: CLINIC | Age: 61
End: 2021-01-15

## 2021-01-15 DIAGNOSIS — R21 RASH AND OTHER NONSPECIFIC SKIN ERUPTION: ICD-10-CM

## 2021-01-15 DIAGNOSIS — R06.2 WHEEZING: ICD-10-CM

## 2021-01-15 DIAGNOSIS — G47.00 INSOMNIA: ICD-10-CM

## 2021-01-15 DIAGNOSIS — R25.2 SPASM: ICD-10-CM

## 2021-01-15 DIAGNOSIS — J20.9 BRONCHITIS WITH BRONCHOSPASM: ICD-10-CM

## 2021-01-15 DIAGNOSIS — R10.30 GROIN PAIN, UNSPECIFIED LATERALITY: ICD-10-CM

## 2021-01-15 DIAGNOSIS — G82.50 QUADRIPLEGIA (H): ICD-10-CM

## 2021-01-15 NOTE — TELEPHONE ENCOUNTER
FUTURE VISIT INFORMATION      FUTURE VISIT INFORMATION:    Date: 3/3/21    Time: 10 AM; Audio 9 AM    Location: [facility]  REFERRAL INFORMATION:    Referring provider:  Dr. Jason Long    Referring providers clinic:  [facility] - Primary Care    Reason for visit/diagnosis: Drainage from right ear    RECORDS REQUESTED FROM:       Clinic name Comments Records Status Imaging Status   [facility] 1/13/21 - [facility] VV with Dr. Long Epic

## 2021-01-15 NOTE — TELEPHONE ENCOUNTER
Diazepam 5 mg : refilled on 12/27/20 with 30 day supply per  data. Pt still has several additional refills left.  Diazepam 10 mg : refilled on 12/29/20 with 30 day supply per  data. Pt still has several additional refills left.  Zolpidem : refilled on 12/29/20 with 30 day supply per  data. Next refill date is 1/29/21.      ----------------------------------------------------------------------------------------    ----- Message from Jason Long MD sent at 1/13/2021  3:13 PM CST -----  Can you ask his home health nurse if they can do a flu shot at his home? He does not recall agency name. Thx  Also ask them if they know if they can do same w/ covid vaccine when available

## 2021-01-19 RX ORDER — ZOLPIDEM TARTRATE 10 MG/1
TABLET ORAL
Qty: 30 TABLET | Refills: 5 | Status: ON HOLD | OUTPATIENT
Start: 2021-01-29 | End: 2021-02-19

## 2021-01-19 RX ORDER — BACLOFEN 20 MG/1
TABLET ORAL
Qty: 540 TABLET | Refills: 3 | Status: SHIPPED | OUTPATIENT
Start: 2021-01-19 | End: 2021-01-01

## 2021-01-21 ENCOUNTER — TELEPHONE (OUTPATIENT)
Dept: FAMILY MEDICINE | Facility: CLINIC | Age: 61
End: 2021-01-21

## 2021-01-21 NOTE — TELEPHONE ENCOUNTER
[facility] now requests orders and shares plan of care/discharge summaries for some patients through [employer].  Please REPLY TO THIS MESSAGE OR ROUTE BACK TO THE AUTHOR in order to give authorization for orders when needed.  This is considered a verbal order, you will still receive a faxed copy of orders for signature.  Thank you for your assistance in improving collaboration for our patients.    ORDER    Patient seen today for 60 day recertification for services with [facility]. Requesting the following Home Care orders to start with new certification period beginning 1/24/2021:    SN 1 month 1        2 month 2        3 PRN     And the  Following treatment order for catheter management:    Okay to change SP catheter every two weeks. Catheter is 16 FR with 8 cc syringe.  Irrigate with 60 cc normal saline daily and as needed, caregiver can irrigate catheter on non nursing visit days.    Thank you,    VALE Durham Case Manager  [facility]  [phone]  [email or web address]

## 2021-01-27 ENCOUNTER — TELEPHONE (OUTPATIENT)
Dept: FAMILY MEDICINE | Facility: CLINIC | Age: 61
End: 2021-01-27

## 2021-01-27 DIAGNOSIS — S14.106S: ICD-10-CM

## 2021-01-27 DIAGNOSIS — G82.50 QUADRIPLEGIA (H): Primary | ICD-10-CM

## 2021-01-27 DIAGNOSIS — S14.104S: ICD-10-CM

## 2021-01-27 DIAGNOSIS — S14.105S: ICD-10-CM

## 2021-01-27 NOTE — TELEPHONE ENCOUNTER
Health Call Center    Phone Message    May a detailed message be left on voicemail: yes     Reason for Call: Order(s): Other:   Reason for requested: Crystal from [facility] reports that the patient told them Dr. Long was supposed to send over a prescription for a Garima lift replacement. Patient had a face to face video appointment with Dr. Long on 01/13/2021. Crystal is requesting for the visit notes for 01/13/2021 and a new prescription for a Garima Lift.  Please fax orders to [phone].  Date needed: 01/27/2021  Provider name: Dr. Long      Action Taken: Message routed to:  [facility]    Travel Screening: Not Applicable

## 2021-01-28 NOTE — TELEPHONE ENCOUNTER
Faxed order and visit notes to fax number below.  Celia Dominique, EMT at 12:30 PM on 1/28/2021.

## 2021-01-29 DIAGNOSIS — J20.9 BRONCHITIS WITH BRONCHOSPASM: ICD-10-CM

## 2021-01-29 DIAGNOSIS — G82.50 QUADRIPLEGIA (H): ICD-10-CM

## 2021-01-29 DIAGNOSIS — R25.2 SPASM: ICD-10-CM

## 2021-01-29 DIAGNOSIS — R06.2 WHEEZING: ICD-10-CM

## 2021-02-02 RX ORDER — ALBUTEROL SULFATE 90 UG/1
AEROSOL, METERED RESPIRATORY (INHALATION)
Qty: 18 G | Refills: 3 | OUTPATIENT
Start: 2021-02-02

## 2021-02-02 NOTE — TELEPHONE ENCOUNTER
VENTOLIN HFA 90MCG  (90 BAS Aerosol  albuterol (VENTOLIN HFA) 108 (90 Base) MCG/ACT Inhaler (Discontinued) 3 Inhaler 3 8/9/2018 1/15/2021 No   Sig - Route: Inhale 2 puffs into the lungs every 4 hours as needed - Inhalation   Patient not taking: Reported on 1/5/2021        Sent to pharmacy as: albuterol (VENTOLIN HFA) 108 (90 Base) MCG/ACT Inhaler   Class: E-Prescribe   Reason for Discontinue: Medication Reconciliation Clean Up   Order: [id number]   E-Prescribing Status: Receipt confirmed by pharmacy (8/9/2018 11:18 AM CDT)   Printout Tracking    External Result Report   Medication Notes         Jose Eduardo Berry RN   1/15/2021 10:33 AM   old Rx from 2018

## 2021-02-08 ENCOUNTER — APPOINTMENT (OUTPATIENT)
Dept: GENERAL RADIOLOGY | Facility: CLINIC | Age: 61
End: 2021-02-08
Attending: EMERGENCY MEDICINE
Payer: COMMERCIAL

## 2021-02-08 ENCOUNTER — HOSPITAL ENCOUNTER (INPATIENT)
Facility: CLINIC | Age: 61
LOS: 12 days | Discharge: HOME HEALTH | End: 2021-02-20
Attending: EMERGENCY MEDICINE | Admitting: STUDENT IN AN ORGANIZED HEALTH CARE EDUCATION/TRAINING PROGRAM
Payer: COMMERCIAL

## 2021-02-08 ENCOUNTER — ANCILLARY PROCEDURE (OUTPATIENT)
Dept: ULTRASOUND IMAGING | Facility: CLINIC | Age: 61
End: 2021-02-08
Attending: EMERGENCY MEDICINE
Payer: COMMERCIAL

## 2021-02-08 ENCOUNTER — ANESTHESIA (OUTPATIENT)
Dept: SURGERY | Facility: CLINIC | Age: 61
End: 2021-02-08
Payer: COMMERCIAL

## 2021-02-08 ENCOUNTER — APPOINTMENT (OUTPATIENT)
Dept: CT IMAGING | Facility: CLINIC | Age: 61
End: 2021-02-08
Attending: EMERGENCY MEDICINE
Payer: COMMERCIAL

## 2021-02-08 ENCOUNTER — APPOINTMENT (OUTPATIENT)
Dept: GENERAL RADIOLOGY | Facility: CLINIC | Age: 61
End: 2021-02-08
Attending: STUDENT IN AN ORGANIZED HEALTH CARE EDUCATION/TRAINING PROGRAM
Payer: COMMERCIAL

## 2021-02-08 ENCOUNTER — ANESTHESIA EVENT (OUTPATIENT)
Dept: SURGERY | Facility: CLINIC | Age: 61
End: 2021-02-08
Payer: COMMERCIAL

## 2021-02-08 DIAGNOSIS — N39.0 COMPLICATED URINARY TRACT INFECTION: ICD-10-CM

## 2021-02-08 DIAGNOSIS — R25.2 SPASM: ICD-10-CM

## 2021-02-08 DIAGNOSIS — G82.50 QUADRIPLEGIA (H): ICD-10-CM

## 2021-02-08 DIAGNOSIS — G47.00 INSOMNIA: ICD-10-CM

## 2021-02-08 DIAGNOSIS — G90.4 AUTONOMIC DYSREFLEXIA: ICD-10-CM

## 2021-02-08 DIAGNOSIS — J20.9 BRONCHITIS WITH BRONCHOSPASM: ICD-10-CM

## 2021-02-08 DIAGNOSIS — J42 CHRONIC BRONCHITIS, UNSPECIFIED CHRONIC BRONCHITIS TYPE (H): ICD-10-CM

## 2021-02-08 DIAGNOSIS — R21 RASH AND OTHER NONSPECIFIC SKIN ERUPTION: ICD-10-CM

## 2021-02-08 DIAGNOSIS — N20.0 KIDNEY STONE: ICD-10-CM

## 2021-02-08 DIAGNOSIS — J98.11 ATELECTASIS OF LEFT LUNG: ICD-10-CM

## 2021-02-08 DIAGNOSIS — Z87.891 PERSONAL HISTORY OF TOBACCO USE, PRESENTING HAZARDS TO HEALTH: ICD-10-CM

## 2021-02-08 DIAGNOSIS — R00.1 SINUS BRADYCARDIA: ICD-10-CM

## 2021-02-08 DIAGNOSIS — E87.1 HYPONATREMIA: ICD-10-CM

## 2021-02-08 DIAGNOSIS — N21.0 BLADDER STONES: Primary | ICD-10-CM

## 2021-02-08 DIAGNOSIS — Z20.822 COVID-19 RULED OUT BY LABORATORY TESTING: ICD-10-CM

## 2021-02-08 DIAGNOSIS — J98.6 DIAPHRAGM PARALYSIS: ICD-10-CM

## 2021-02-08 DIAGNOSIS — G47.00 INSOMNIA, UNSPECIFIED TYPE: ICD-10-CM

## 2021-02-08 DIAGNOSIS — N20.0 URINARY TRACT OBSTRUCTION BY KIDNEY STONE: ICD-10-CM

## 2021-02-08 DIAGNOSIS — R06.2 WHEEZING: ICD-10-CM

## 2021-02-08 DIAGNOSIS — R10.30 GROIN PAIN, UNSPECIFIED LATERALITY: ICD-10-CM

## 2021-02-08 DIAGNOSIS — E87.79 OTHER HYPERVOLEMIA: ICD-10-CM

## 2021-02-08 DIAGNOSIS — N13.8 URINARY TRACT OBSTRUCTION BY KIDNEY STONE: ICD-10-CM

## 2021-02-08 DIAGNOSIS — Z46.6 ENCOUNTER FOR REMOVAL OF URETERAL STENT: ICD-10-CM

## 2021-02-08 LAB
ALBUMIN SERPL-MCNC: 3.2 G/DL (ref 3.4–5)
ALBUMIN UR-MCNC: 30 MG/DL
ALP SERPL-CCNC: 106 U/L (ref 40–150)
ALT SERPL W P-5'-P-CCNC: 35 U/L (ref 0–70)
ANION GAP SERPL CALCULATED.3IONS-SCNC: 1 MMOL/L (ref 3–14)
APPEARANCE UR: ABNORMAL
AST SERPL W P-5'-P-CCNC: 19 U/L (ref 0–45)
BASOPHILS # BLD AUTO: 0 10E9/L (ref 0–0.2)
BASOPHILS NFR BLD AUTO: 0.6 %
BILIRUB SERPL-MCNC: 0.5 MG/DL (ref 0.2–1.3)
BILIRUB UR QL STRIP: NEGATIVE
BUN SERPL-MCNC: 17 MG/DL (ref 7–30)
CALCIUM SERPL-MCNC: 9.2 MG/DL (ref 8.5–10.1)
CHLORIDE SERPL-SCNC: 98 MMOL/L (ref 94–109)
CO2 BLDCOV-SCNC: 32 MMOL/L (ref 21–28)
CO2 SERPL-SCNC: 34 MMOL/L (ref 20–32)
COLOR UR AUTO: YELLOW
CREAT SERPL-MCNC: 0.3 MG/DL (ref 0.66–1.25)
D DIMER PPP FEU-MCNC: 0.5 UG/ML FEU (ref 0–0.5)
DIFFERENTIAL METHOD BLD: ABNORMAL
EOSINOPHIL # BLD AUTO: 0 10E9/L (ref 0–0.7)
EOSINOPHIL NFR BLD AUTO: 1.1 %
ERYTHROCYTE [DISTWIDTH] IN BLOOD BY AUTOMATED COUNT: 14.2 % (ref 10–15)
GFR SERPL CREATININE-BSD FRML MDRD: >90 ML/MIN/{1.73_M2}
GLUCOSE BLDC GLUCOMTR-MCNC: 67 MG/DL (ref 70–99)
GLUCOSE BLDC GLUCOMTR-MCNC: 98 MG/DL (ref 70–99)
GLUCOSE SERPL-MCNC: 72 MG/DL (ref 70–99)
GLUCOSE UR STRIP-MCNC: NEGATIVE MG/DL
HCT VFR BLD AUTO: 49.3 % (ref 40–53)
HGB BLD-MCNC: 15.9 G/DL (ref 13.3–17.7)
HGB UR QL STRIP: ABNORMAL
HYALINE CASTS #/AREA URNS LPF: 5 /LPF (ref 0–2)
IMM GRANULOCYTES # BLD: 0 10E9/L (ref 0–0.4)
IMM GRANULOCYTES NFR BLD: 0.3 %
INR PPP: 0.91 (ref 0.86–1.14)
KETONES UR STRIP-MCNC: 40 MG/DL
LABORATORY COMMENT REPORT: NORMAL
LACTATE BLD-SCNC: 0.4 MMOL/L (ref 0.7–2.1)
LEUKOCYTE ESTERASE UR QL STRIP: ABNORMAL
LYMPHOCYTES # BLD AUTO: 0.3 10E9/L (ref 0.8–5.3)
LYMPHOCYTES NFR BLD AUTO: 8.2 %
MCH RBC QN AUTO: 30.6 PG (ref 26.5–33)
MCHC RBC AUTO-ENTMCNC: 32.3 G/DL (ref 31.5–36.5)
MCV RBC AUTO: 95 FL (ref 78–100)
MONOCYTES # BLD AUTO: 0.5 10E9/L (ref 0–1.3)
MONOCYTES NFR BLD AUTO: 13.3 %
MUCOUS THREADS #/AREA URNS LPF: PRESENT /LPF
NEUTROPHILS # BLD AUTO: 2.7 10E9/L (ref 1.6–8.3)
NEUTROPHILS NFR BLD AUTO: 76.5 %
NITRATE UR QL: POSITIVE
NRBC # BLD AUTO: 0 10*3/UL
NRBC BLD AUTO-RTO: 0 /100
NT-PROBNP SERPL-MCNC: 113 PG/ML (ref 0–900)
PCO2 BLDV: 65 MM HG (ref 40–50)
PH BLDV: 7.3 PH (ref 7.32–7.43)
PH UR STRIP: 6.5 PH (ref 5–7)
PLATELET # BLD AUTO: 83 10E9/L (ref 150–450)
PO2 BLDV: 47 MM HG (ref 25–47)
POTASSIUM SERPL-SCNC: 4.5 MMOL/L (ref 3.4–5.3)
PROT SERPL-MCNC: 6.6 G/DL (ref 6.8–8.8)
RBC # BLD AUTO: 5.19 10E12/L (ref 4.4–5.9)
RBC #/AREA URNS AUTO: >182 /HPF (ref 0–2)
SAO2 % BLDV FROM PO2: 76 %
SARS-COV-2 RNA RESP QL NAA+PROBE: NEGATIVE
SODIUM SERPL-SCNC: 132 MMOL/L (ref 133–144)
SOURCE: ABNORMAL
SP GR UR STRIP: 1.02 (ref 1–1.03)
SPECIMEN SOURCE: NORMAL
SQUAMOUS #/AREA URNS AUTO: 1 /HPF (ref 0–1)
TROPONIN I SERPL-MCNC: <0.015 UG/L (ref 0–0.04)
UROBILINOGEN UR STRIP-MCNC: NORMAL MG/DL (ref 0–2)
WBC # BLD AUTO: 3.5 10E9/L (ref 4–11)
WBC #/AREA URNS AUTO: >182 /HPF (ref 0–5)
WBC CLUMPS #/AREA URNS HPF: PRESENT /HPF

## 2021-02-08 PROCEDURE — 0T768DZ DILATION OF RIGHT URETER WITH INTRALUMINAL DEVICE, VIA NATURAL OR ARTIFICIAL OPENING ENDOSCOPIC: ICD-10-PCS | Performed by: STUDENT IN AN ORGANIZED HEALTH CARE EDUCATION/TRAINING PROGRAM

## 2021-02-08 PROCEDURE — 120N000002 HC R&B MED SURG/OB UMMC

## 2021-02-08 PROCEDURE — 999N000179 XR SURGERY CARM FLUORO LESS THAN 5 MIN W STILLS: Mod: TC

## 2021-02-08 PROCEDURE — 87040 BLOOD CULTURE FOR BACTERIA: CPT | Performed by: EMERGENCY MEDICINE

## 2021-02-08 PROCEDURE — 250N000011 HC RX IP 250 OP 636: Performed by: STUDENT IN AN ORGANIZED HEALTH CARE EDUCATION/TRAINING PROGRAM

## 2021-02-08 PROCEDURE — 71046 X-RAY EXAM CHEST 2 VIEWS: CPT

## 2021-02-08 PROCEDURE — 87181 SC STD AGAR DILUTION PER AGT: CPT | Performed by: EMERGENCY MEDICINE

## 2021-02-08 PROCEDURE — 71275 CT ANGIOGRAPHY CHEST: CPT | Mod: 26 | Performed by: RADIOLOGY

## 2021-02-08 PROCEDURE — 710N000011 HC RECOVERY PHASE 1, LEVEL 3, PER MIN: Performed by: STUDENT IN AN ORGANIZED HEALTH CARE EDUCATION/TRAINING PROGRAM

## 2021-02-08 PROCEDURE — 255N000002 HC RX 255 OP 636: Performed by: STUDENT IN AN ORGANIZED HEALTH CARE EDUCATION/TRAINING PROGRAM

## 2021-02-08 PROCEDURE — 258N000003 HC RX IP 258 OP 636: Performed by: STUDENT IN AN ORGANIZED HEALTH CARE EDUCATION/TRAINING PROGRAM

## 2021-02-08 PROCEDURE — 74177 CT ABD & PELVIS W/CONTRAST: CPT

## 2021-02-08 PROCEDURE — 71275 CT ANGIOGRAPHY CHEST: CPT

## 2021-02-08 PROCEDURE — 999N001017 HC STATISTIC GLUCOSE BY METER IP

## 2021-02-08 PROCEDURE — U0005 INFEC AGEN DETEC AMPLI PROBE: HCPCS | Performed by: EMERGENCY MEDICINE

## 2021-02-08 PROCEDURE — 74177 CT ABD & PELVIS W/CONTRAST: CPT | Mod: 26 | Performed by: RADIOLOGY

## 2021-02-08 PROCEDURE — 93005 ELECTROCARDIOGRAM TRACING: CPT | Performed by: EMERGENCY MEDICINE

## 2021-02-08 PROCEDURE — C1769 GUIDE WIRE: HCPCS | Performed by: STUDENT IN AN ORGANIZED HEALTH CARE EDUCATION/TRAINING PROGRAM

## 2021-02-08 PROCEDURE — 82803 BLOOD GASES ANY COMBINATION: CPT

## 2021-02-08 PROCEDURE — 93971 EXTREMITY STUDY: CPT | Mod: RT

## 2021-02-08 PROCEDURE — 85379 FIBRIN DEGRADATION QUANT: CPT | Performed by: EMERGENCY MEDICINE

## 2021-02-08 PROCEDURE — 84484 ASSAY OF TROPONIN QUANT: CPT | Performed by: EMERGENCY MEDICINE

## 2021-02-08 PROCEDURE — 80053 COMPREHEN METABOLIC PANEL: CPT | Performed by: EMERGENCY MEDICINE

## 2021-02-08 PROCEDURE — 87086 URINE CULTURE/COLONY COUNT: CPT | Performed by: EMERGENCY MEDICINE

## 2021-02-08 PROCEDURE — 250N000013 HC RX MED GY IP 250 OP 250 PS 637: Performed by: STUDENT IN AN ORGANIZED HEALTH CARE EDUCATION/TRAINING PROGRAM

## 2021-02-08 PROCEDURE — 83880 ASSAY OF NATRIURETIC PEPTIDE: CPT | Performed by: EMERGENCY MEDICINE

## 2021-02-08 PROCEDURE — 87088 URINE BACTERIA CULTURE: CPT | Performed by: EMERGENCY MEDICINE

## 2021-02-08 PROCEDURE — 272N000001 HC OR GENERAL SUPPLY STERILE: Performed by: STUDENT IN AN ORGANIZED HEALTH CARE EDUCATION/TRAINING PROGRAM

## 2021-02-08 PROCEDURE — 93971 EXTREMITY STUDY: CPT | Mod: 26 | Performed by: STUDENT IN AN ORGANIZED HEALTH CARE EDUCATION/TRAINING PROGRAM

## 2021-02-08 PROCEDURE — 250N000009 HC RX 250: Performed by: NURSE ANESTHETIST, CERTIFIED REGISTERED

## 2021-02-08 PROCEDURE — 81001 URINALYSIS AUTO W/SCOPE: CPT | Performed by: EMERGENCY MEDICINE

## 2021-02-08 PROCEDURE — 370N000017 HC ANESTHESIA TECHNICAL FEE, PER MIN: Performed by: STUDENT IN AN ORGANIZED HEALTH CARE EDUCATION/TRAINING PROGRAM

## 2021-02-08 PROCEDURE — 96365 THER/PROPH/DIAG IV INF INIT: CPT | Performed by: EMERGENCY MEDICINE

## 2021-02-08 PROCEDURE — 250N000024 HC ISOFLURANE, PER MIN: Performed by: STUDENT IN AN ORGANIZED HEALTH CARE EDUCATION/TRAINING PROGRAM

## 2021-02-08 PROCEDURE — 96366 THER/PROPH/DIAG IV INF ADDON: CPT | Performed by: EMERGENCY MEDICINE

## 2021-02-08 PROCEDURE — 85610 PROTHROMBIN TIME: CPT | Performed by: EMERGENCY MEDICINE

## 2021-02-08 PROCEDURE — U0003 INFECTIOUS AGENT DETECTION BY NUCLEIC ACID (DNA OR RNA); SEVERE ACUTE RESPIRATORY SYNDROME CORONAVIRUS 2 (SARS-COV-2) (CORONAVIRUS DISEASE [COVID-19]), AMPLIFIED PROBE TECHNIQUE, MAKING USE OF HIGH THROUGHPUT TECHNOLOGIES AS DESCRIBED BY CMS-2020-01-R: HCPCS | Performed by: EMERGENCY MEDICINE

## 2021-02-08 PROCEDURE — 87186 SC STD MICRODIL/AGAR DIL: CPT | Performed by: EMERGENCY MEDICINE

## 2021-02-08 PROCEDURE — 93308 TTE F-UP OR LMTD: CPT | Performed by: EMERGENCY MEDICINE

## 2021-02-08 PROCEDURE — 99285 EMERGENCY DEPT VISIT HI MDM: CPT | Mod: 25 | Performed by: EMERGENCY MEDICINE

## 2021-02-08 PROCEDURE — 85025 COMPLETE CBC W/AUTO DIFF WBC: CPT | Performed by: EMERGENCY MEDICINE

## 2021-02-08 PROCEDURE — 360N000082 HC SURGERY LEVEL 2 W/ FLUORO, PER MIN: Performed by: STUDENT IN AN ORGANIZED HEALTH CARE EDUCATION/TRAINING PROGRAM

## 2021-02-08 PROCEDURE — 93010 ELECTROCARDIOGRAM REPORT: CPT | Performed by: EMERGENCY MEDICINE

## 2021-02-08 PROCEDURE — 99223 1ST HOSP IP/OBS HIGH 75: CPT | Mod: AI | Performed by: STUDENT IN AN ORGANIZED HEALTH CARE EDUCATION/TRAINING PROGRAM

## 2021-02-08 PROCEDURE — 250N000011 HC RX IP 250 OP 636: Performed by: EMERGENCY MEDICINE

## 2021-02-08 PROCEDURE — 83605 ASSAY OF LACTIC ACID: CPT

## 2021-02-08 PROCEDURE — 82610 CYSTATIN C: CPT | Performed by: EMERGENCY MEDICINE

## 2021-02-08 PROCEDURE — 258N000001 HC RX 258: Performed by: NURSE ANESTHETIST, CERTIFIED REGISTERED

## 2021-02-08 PROCEDURE — C2617 STENT, NON-COR, TEM W/O DEL: HCPCS | Performed by: STUDENT IN AN ORGANIZED HEALTH CARE EDUCATION/TRAINING PROGRAM

## 2021-02-08 PROCEDURE — 96375 TX/PRO/DX INJ NEW DRUG ADDON: CPT | Performed by: EMERGENCY MEDICINE

## 2021-02-08 PROCEDURE — C9803 HOPD COVID-19 SPEC COLLECT: HCPCS | Performed by: EMERGENCY MEDICINE

## 2021-02-08 PROCEDURE — 999N000141 HC STATISTIC PRE-PROCEDURE NURSING ASSESSMENT: Performed by: STUDENT IN AN ORGANIZED HEALTH CARE EDUCATION/TRAINING PROGRAM

## 2021-02-08 PROCEDURE — 71046 X-RAY EXAM CHEST 2 VIEWS: CPT | Mod: 26 | Performed by: RADIOLOGY

## 2021-02-08 PROCEDURE — 250N000011 HC RX IP 250 OP 636: Performed by: NURSE ANESTHETIST, CERTIFIED REGISTERED

## 2021-02-08 DEVICE — STENT URETERAL PERCUFLEX PLUS 6FRX26CM M0061752630
Type: IMPLANTABLE DEVICE | Site: URETER | Status: NON-FUNCTIONAL
Removed: 2021-02-19

## 2021-02-08 RX ORDER — FENTANYL CITRATE 50 UG/ML
25-50 INJECTION, SOLUTION INTRAMUSCULAR; INTRAVENOUS
Status: DISCONTINUED | OUTPATIENT
Start: 2021-02-08 | End: 2021-02-08 | Stop reason: HOSPADM

## 2021-02-08 RX ORDER — FENTANYL CITRATE 50 UG/ML
INJECTION, SOLUTION INTRAMUSCULAR; INTRAVENOUS PRN
Status: DISCONTINUED | OUTPATIENT
Start: 2021-02-08 | End: 2021-02-08

## 2021-02-08 RX ORDER — LIDOCAINE HYDROCHLORIDE 20 MG/ML
INJECTION, SOLUTION INFILTRATION; PERINEURAL PRN
Status: DISCONTINUED | OUTPATIENT
Start: 2021-02-08 | End: 2021-02-08

## 2021-02-08 RX ORDER — NALOXONE HYDROCHLORIDE 0.4 MG/ML
0.2 INJECTION, SOLUTION INTRAMUSCULAR; INTRAVENOUS; SUBCUTANEOUS
Status: DISCONTINUED | OUTPATIENT
Start: 2021-02-08 | End: 2021-02-09

## 2021-02-08 RX ORDER — NEOMYCIN SULFATE, POLYMYXIN B SULFATE AND HYDROCORTISONE 10; 3.5; 1 MG/ML; MG/ML; [USP'U]/ML
4 SUSPENSION/ DROPS AURICULAR (OTIC) 2 TIMES DAILY
Status: DISCONTINUED | OUTPATIENT
Start: 2021-02-08 | End: 2021-02-20 | Stop reason: HOSPADM

## 2021-02-08 RX ORDER — ASCORBIC ACID 500 MG
1000 TABLET ORAL DAILY
Status: DISCONTINUED | OUTPATIENT
Start: 2021-02-09 | End: 2021-02-20 | Stop reason: HOSPADM

## 2021-02-08 RX ORDER — IOPAMIDOL 755 MG/ML
135 INJECTION, SOLUTION INTRAVASCULAR ONCE
Status: COMPLETED | OUTPATIENT
Start: 2021-02-08 | End: 2021-02-08

## 2021-02-08 RX ORDER — DEXTROSE MONOHYDRATE 25 G/50ML
INJECTION, SOLUTION INTRAVENOUS PRN
Status: DISCONTINUED | OUTPATIENT
Start: 2021-02-08 | End: 2021-02-08

## 2021-02-08 RX ORDER — NALOXONE HYDROCHLORIDE 0.4 MG/ML
0.4 INJECTION, SOLUTION INTRAMUSCULAR; INTRAVENOUS; SUBCUTANEOUS
Status: DISCONTINUED | OUTPATIENT
Start: 2021-02-08 | End: 2021-02-09

## 2021-02-08 RX ORDER — PIPERACILLIN SODIUM, TAZOBACTAM SODIUM 3; .375 G/15ML; G/15ML
3.38 INJECTION, POWDER, LYOPHILIZED, FOR SOLUTION INTRAVENOUS ONCE
Status: COMPLETED | OUTPATIENT
Start: 2021-02-08 | End: 2021-02-08

## 2021-02-08 RX ORDER — SODIUM CHLORIDE, SODIUM LACTATE, POTASSIUM CHLORIDE, CALCIUM CHLORIDE 600; 310; 30; 20 MG/100ML; MG/100ML; MG/100ML; MG/100ML
INJECTION, SOLUTION INTRAVENOUS CONTINUOUS
Status: DISCONTINUED | OUTPATIENT
Start: 2021-02-08 | End: 2021-02-08 | Stop reason: HOSPADM

## 2021-02-08 RX ORDER — LIDOCAINE 40 MG/G
CREAM TOPICAL
Status: DISCONTINUED | OUTPATIENT
Start: 2021-02-08 | End: 2021-02-08 | Stop reason: HOSPADM

## 2021-02-08 RX ORDER — NEOMYCIN SULFATE, POLYMYXIN B SULFATE, HYDROCORTISONE 3.5; 10000; 1 MG/ML; [USP'U]/ML; MG/ML
4 SOLUTION/ DROPS AURICULAR (OTIC) 2 TIMES DAILY
Status: DISCONTINUED | OUTPATIENT
Start: 2021-02-08 | End: 2021-02-08 | Stop reason: ALTCHOICE

## 2021-02-08 RX ORDER — FUROSEMIDE 10 MG/ML
20 INJECTION INTRAMUSCULAR; INTRAVENOUS EVERY 6 HOURS
Status: DISCONTINUED | OUTPATIENT
Start: 2021-02-08 | End: 2021-02-09

## 2021-02-08 RX ORDER — ONDANSETRON 2 MG/ML
INJECTION INTRAMUSCULAR; INTRAVENOUS PRN
Status: DISCONTINUED | OUTPATIENT
Start: 2021-02-08 | End: 2021-02-08

## 2021-02-08 RX ORDER — ONDANSETRON 4 MG/1
4 TABLET, ORALLY DISINTEGRATING ORAL EVERY 30 MIN PRN
Status: DISCONTINUED | OUTPATIENT
Start: 2021-02-08 | End: 2021-02-08 | Stop reason: HOSPADM

## 2021-02-08 RX ORDER — ACETAMINOPHEN 325 MG/1
975 TABLET ORAL EVERY 8 HOURS PRN
Status: DISCONTINUED | OUTPATIENT
Start: 2021-02-08 | End: 2021-02-20 | Stop reason: HOSPADM

## 2021-02-08 RX ORDER — BISACODYL 10 MG
20 SUPPOSITORY, RECTAL RECTAL
Status: DISCONTINUED | OUTPATIENT
Start: 2021-02-11 | End: 2021-02-20 | Stop reason: HOSPADM

## 2021-02-08 RX ORDER — NITROGLYCERIN 10 MG/100ML
INJECTION INTRAVENOUS PRN
Status: DISCONTINUED | OUTPATIENT
Start: 2021-02-08 | End: 2021-02-08

## 2021-02-08 RX ORDER — BACLOFEN 10 MG/1
20 TABLET ORAL 4 TIMES DAILY
Status: DISCONTINUED | OUTPATIENT
Start: 2021-02-08 | End: 2021-02-20 | Stop reason: HOSPADM

## 2021-02-08 RX ORDER — OXYBUTYNIN CHLORIDE 5 MG/1
10 TABLET, EXTENDED RELEASE ORAL DAILY
Status: DISCONTINUED | OUTPATIENT
Start: 2021-02-09 | End: 2021-02-20 | Stop reason: HOSPADM

## 2021-02-08 RX ORDER — PROPOFOL 10 MG/ML
INJECTION, EMULSION INTRAVENOUS PRN
Status: DISCONTINUED | OUTPATIENT
Start: 2021-02-08 | End: 2021-02-08

## 2021-02-08 RX ORDER — ONDANSETRON 2 MG/ML
4 INJECTION INTRAMUSCULAR; INTRAVENOUS EVERY 30 MIN PRN
Status: DISCONTINUED | OUTPATIENT
Start: 2021-02-08 | End: 2021-02-08 | Stop reason: HOSPADM

## 2021-02-08 RX ORDER — ZOLPIDEM TARTRATE 5 MG/1
10 TABLET ORAL
Status: DISCONTINUED | OUTPATIENT
Start: 2021-02-08 | End: 2021-02-12

## 2021-02-08 RX ORDER — KETOROLAC TROMETHAMINE 15 MG/ML
15 INJECTION, SOLUTION INTRAMUSCULAR; INTRAVENOUS EVERY 6 HOURS PRN
Status: COMPLETED | OUTPATIENT
Start: 2021-02-08 | End: 2021-02-09

## 2021-02-08 RX ORDER — NALOXONE HYDROCHLORIDE 0.4 MG/ML
0.2 INJECTION, SOLUTION INTRAMUSCULAR; INTRAVENOUS; SUBCUTANEOUS
Status: ACTIVE | OUTPATIENT
Start: 2021-02-08 | End: 2021-02-09

## 2021-02-08 RX ADMIN — PROPOFOL 100 MG: 10 INJECTION, EMULSION INTRAVENOUS at 21:06

## 2021-02-08 RX ADMIN — NITROGLYCERIN 100 MCG: 10 INJECTION INTRAVENOUS at 21:11

## 2021-02-08 RX ADMIN — FENTANYL CITRATE 100 MCG: 50 INJECTION, SOLUTION INTRAMUSCULAR; INTRAVENOUS at 20:41

## 2021-02-08 RX ADMIN — PIPERACILLIN SODIUM AND TAZOBACTAM SODIUM 3.38 G: 3; .375 INJECTION, POWDER, LYOPHILIZED, FOR SOLUTION INTRAVENOUS at 13:46

## 2021-02-08 RX ADMIN — SODIUM CHLORIDE, POTASSIUM CHLORIDE, SODIUM LACTATE AND CALCIUM CHLORIDE: 600; 310; 30; 20 INJECTION, SOLUTION INTRAVENOUS at 20:27

## 2021-02-08 RX ADMIN — ACETAMINOPHEN 975 MG: 325 TABLET, FILM COATED ORAL at 17:57

## 2021-02-08 RX ADMIN — NITROGLYCERIN 100 MCG: 10 INJECTION INTRAVENOUS at 21:17

## 2021-02-08 RX ADMIN — IOPAMIDOL 135 ML: 755 INJECTION, SOLUTION INTRAVENOUS at 14:36

## 2021-02-08 RX ADMIN — SUGAMMADEX 200 MG: 100 INJECTION, SOLUTION INTRAVENOUS at 21:23

## 2021-02-08 RX ADMIN — SUGAMMADEX 200 MG: 100 INJECTION, SOLUTION INTRAVENOUS at 21:41

## 2021-02-08 RX ADMIN — LIDOCAINE HYDROCHLORIDE 100 MG: 20 INJECTION, SOLUTION INFILTRATION; PERINEURAL at 20:41

## 2021-02-08 RX ADMIN — KETOROLAC TROMETHAMINE 15 MG: 15 INJECTION, SOLUTION INTRAMUSCULAR; INTRAVENOUS at 17:15

## 2021-02-08 RX ADMIN — ROCURONIUM BROMIDE 50 MG: 10 INJECTION INTRAVENOUS at 20:41

## 2021-02-08 RX ADMIN — ONDANSETRON 4 MG: 2 INJECTION INTRAMUSCULAR; INTRAVENOUS at 21:23

## 2021-02-08 RX ADMIN — FUROSEMIDE 20 MG: 10 INJECTION, SOLUTION INTRAVENOUS at 18:56

## 2021-02-08 RX ADMIN — DEXTROSE MONOHYDRATE 25 ML: 25 INJECTION, SOLUTION INTRAVENOUS at 20:41

## 2021-02-08 RX ADMIN — PROPOFOL 200 MG: 10 INJECTION, EMULSION INTRAVENOUS at 20:41

## 2021-02-08 RX ADMIN — SODIUM CHLORIDE, POTASSIUM CHLORIDE, SODIUM LACTATE AND CALCIUM CHLORIDE: 600; 310; 30; 20 INJECTION, SOLUTION INTRAVENOUS at 22:15

## 2021-02-08 ASSESSMENT — ENCOUNTER SYMPTOMS
FEVER: 0
VOMITING: 1
HEADACHES: 1
COUGH: 0
CONFUSION: 1
NAUSEA: 1
HEMATURIA: 0
SHORTNESS OF BREATH: 0
WOUND: 0

## 2021-02-08 ASSESSMENT — LIFESTYLE VARIABLES: TOBACCO_USE: 0

## 2021-02-08 ASSESSMENT — MIFFLIN-ST. JEOR: SCORE: 1837.77

## 2021-02-08 NOTE — ED TRIAGE NOTES
"Pt comes in for a week of high blood pressures, 200s/110s. Wife reports pt \"not communicating as well\" and \"driving his wheelchair worse\". Swelling in legs and arms is \"worse\". Hx quadriplegia  "
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
## 2021-02-08 NOTE — ED PROVIDER NOTES
"      [place] EMERGENCY DEPARTMENT ([facility])  February 8, 2021  History   No chief complaint on file.    HPI  Bart Corea is a 60 year old male with a PMH of quadriplegia, spinal cord injury at levels C4, C5, and C6, mild LIZA, asthma, pneumonia, heart murmur, decubitus ulcer of hip, neurogenic bladder, bladder stones, bacteremia, and suprapubic catheter dysfunction who presents to the ED today complaining of hypertension.  Patient reports he has been struggling with increased blood pressures for the past week.  He reports his blood pressure has been as high as 250/190, when he is normally 100/50.  He reports his blood pressure has been spiking every night.  Patient also endorses headache with these episodes, stating that they are \"pounding\".  He also endorses blurred vision, as well as nausea when hypertensive, denies currently.  He also states he has increased lower and upper extremity edema, with his right leg being the worst, which is unusual for him.  Patient states that after taking his amlodipine does not communicate well and significantly drops his BP down to the 80s systolic.  He states he has been on amlodipine as needed but typically takes it once or twice a year.  This week he has had to take nightly.  He states when he takes his amlodipine his blood pressure drops too low. Patient also reports he has a suprapubic catheter, and is noticed blood around the area of insertion at night.   Patient also states that he has noticed decreased urine output in the evenings, even though he has been getting plenty of fluids (2-3 32 oz containers of water a day).  Patient states he does not check his weight at home.   He has been having regular bowel movements and had one today.   Patient denies foul-smelling urine, hematuria, fever, chest pain, cough, shortness of breath, medication changes, skin wounds, or recent falls or trauma.    I have reviewed the Medications, Allergies, Past Medical and " Surgical History, and Social History in the [employer] system.  PAST MEDICAL HISTORY:   Past Medical History:   Diagnosis Date     Asthma      Chronic infection     Bladder     Heart murmur      LIZA (obstructive sleep apnea) 12/3/2014     Quadriplegia, C1-C4 incomplete (H)        PAST SURGICAL HISTORY:   Past Surgical History:   Procedure Laterality Date     Bilateral ureteroscopy with holmium laser lithotripsy and basketing and removal of fragments  Left ureteral stent placement.  02/10/2010     COLONOSCOPY N/A 7/20/2018    Procedure: COMBINED COLONOSCOPY, SINGLE OR MULTIPLE BIOPSY/POLYPECTOMY BY BIOPSY;;  Surgeon: Grace Yang MD;  Location: [facility] GI     CYSTOSCOPY, LITHOLAPAXY, COMBINED N/A 11/15/2019    Procedure: Cystolitholapaxy;  Surgeon: Obi Uriostegui MD;  Location: [facility] OR     INCISION AND DRAINAGE ABDOMEN WASHOUT, COMBINED N/A 1/16/2020    Procedure: Incision and drainage abdomenal Wall with Revision Of Vesicocutaneous Anastomosis;  Surgeon: Obi Uriostegui MD;  Location: [facility] OR     LASER HOLMIUM LITHOTRIPSY BLADDER N/A 1/16/2020    Procedure: LITHOTRIPSY, CALCULUS, BLADDER, USING HOLMIUM LASER;  Surgeon: Obi Uriostegui MD;  Location: [facility] OR     skin flap on bottom       sp tube absess surgery         Past medical history, past surgical history, medications, and allergies were reviewed with the patient. Additional pertinent items: None    FAMILY HISTORY:   Family History   Problem Relation Age of Onset     Cancer Father      Diabetes No family hx of      Glaucoma No family hx of      Hypertension No family hx of      Macular Degeneration No family hx of      Retinal detachment No family hx of      Anesthesia Reaction No family hx of      Deep Vein Thrombosis (DVT) No family hx of        SOCIAL HISTORY:   Social History     Tobacco Use     Smoking status: Current Every Day Smoker     Packs/day: 0.30     Types: Cigarettes     Smokeless tobacco: Never Used     Tobacco comment: since early teens  "  Substance Use Topics     Alcohol use: Yes     Comment: on weekends, 3-4+     Social history was reviewed with the patient. Additional pertinent items: None      Patient's Medications   New Prescriptions    No medications on file   Previous Medications    ALBUTEROL (PROVENTIL) (2.5 MG/3ML) 0.083% NEB SOLUTION    NEBULIZE 1 VIAL (2.5 MG) EVERY 6 HOURS AS NEEDED FOR SHORTNESS OF BREATH / DYSPNEA OR WHEEZING    ALCOHOL SWABS (ALCOHOL PREP PADS)    USE AS DIRECTED    AMCINONIDE (CYCLOCORT) 0.1 % CREAM    Apply topically 2 times daily Prn rash    AMLODIPINE (NORVASC) 2.5 MG TABLET    Take 1 tablet (2.5 mg) by mouth daily as needed (autonomic dysreflexia)    APPLICATORS (COTTON SWABS) SWAB    Please dispense sterile wrapped cotton swabs; use up to five/day for medical needs    ASCORBIC ACID (VITAMIN C) 1000 MG TABS    Take 1 tablet by mouth daily    BACLOFEN (LIORESAL) 20 MG TABLET    TAKE ONE TABLET BY MOUTH UP TO 6 TIMES DAILY    BISACODYL (DULCOLAX) 10 MG SUPPOSITORY    Place 20 mg rectally every 3 days Active ingredient in Magic Bullet (10mg per suppository)    CLINDAMYCIN (CLINDAMAX) 1 % TOPICAL GEL    Apply bid    DIAZEPAM (VALIUM) 10 MG TABLET    Take 1 tablet (10 mg) by mouth daily    DIAZEPAM (VALIUM) 5 MG TABLET    Take 1 tablet (5 mg) by mouth every 6 hours as needed for anxiety     MG CAPSULE    TAKE 1 CAPSULE (100 MG) BY MOUTH DAILY    GAUZE PADS & DRESSINGS (GAUZE BANDAGE 2\") MISC    IV STERILE GUAZE Use up to 4/day Supply 360 for three month supply    GAUZE PADS & DRESSINGS (GAUZE DRESSING) 4\"X4\" PADS    NON STERILE GAUZE Use up to 8/day  Supply 720 for three month supply    IBUPROFEN (ADVIL/MOTRIN) 800 MG TABLET    TAKE 1 TABLET BY MOUTH 3 TIMES DAILY AS NEEDED    INCONTINENCE SUPPLIES (URINARY LEG BAG)    USE AS NEEDED    LIDOCAINE (XYLOCAINE) 2 % JELLY    Apply  topically. APPLY AS DIRECTED    MAGIC BULLETS 10 MG SUPPOSITORY    Place 1 suppository (10 mg) rectally daily as needed for constipation "    MINOCYCLINE (MINOCIN) 50 MG CAPSULE    TAKE 1 CAPSULE (50 MG) BY MOUTH TWO TIMES DAILY    MULTIPLE VITAMINS-MINERALS (MULTIVITAMIN & MINERAL PO)    Take 1 capsule by mouth daily.    NEOMYCIN-POLYMYXIN-HYDROCORTISONE (CORTISPORIN) 3.5-90536-8 OTIC SOLUTION    Place 4 drops in ear(s) 2 times daily    NYSTATIN (MYCOSTATIN) 935530 UNIT/GM EXTERNAL POWDER    APPLY TO AFFECTED AREA THREE TIMES DAILY AS NEEDED    ORDER FOR DME    two leg bag straps and 2 mouth sticks    ORDER FOR DME    Equipment being ordered: Urinary leg bag  Change three times a month  Dispense 3    ORDER FOR DME    Equipment being ordered: Alcohol swabs  Use 4 a day for catheter change    ORDER FOR DME    Equipment being ordered: Catheter 16 Fr, 8 ml balloon. 3 per month    ORDER FOR DME    Equipment being ordered: 60 ml catheter tip syringes (no needle), 30 per month    ORDER FOR DME    Equipment being ordered: Hospital Bed    ORDER FOR DME    Equipment being ordered: Compression stockings, 20-30 mm Hg strength, knee-high, appropriately sized    OSTOMY SUPPLIES (UROSTOMY NIGHT BAG) MISC    Please dispense three night bags/month (three month worth at a time) with 4 refills re: needed for chronic urinary dysfunction due to quadreplegia. Type: Bard 2000 ml.    OXYBUTYNIN ER (DITROPAN-XL) 10 MG 24 HR TABLET    TAKE 1 TABLET (10 MG) BY MOUTH DAILY    SENNOSIDES (SENOKOT) 8.6 MG TABLET    TAKE 1 TABLET BY MOUTH DAILY    SODIUM CHLORIDE 0.9%, BOTTLE, 0.9 % IRRIGATION    USE FOR URINARY CATHETER IRRIGATION    TERBINAFINE (LAMISIL) 1 % CREAM    Apply topically daily as needed     VITAMIN D3 25 MCG (1000 UT) TABLET    TAKE 1 TABLET (1,000 UNITS) BY MOUTH DAILY    ZOLPIDEM (AMBIEN) 10 MG TABLET    TAKE 1/2 TO 1 TABLET BY MOUTH EVERY NIGHT AT BEDTIME AS NEEDED FOR SLEEP   Modified Medications    No medications on file   Discontinued Medications    No medications on file          Allergies   Allergen Reactions     Vancomycin Anaphylaxis        Review of Systems  "  Constitutional: Negative for fever.   Eyes: Positive for visual disturbance (blurring).   Respiratory: Negative for cough and shortness of breath.    Cardiovascular: Negative for chest pain.   Gastrointestinal: Positive for nausea and vomiting.   Genitourinary: Negative for hematuria.   Musculoskeletal:        (Positive for increased extremity edema)   Skin: Negative for wound.   Neurological: Positive for headaches (\"pounding\").   Hematological:        (Positive for HTN)     Psychiatric/Behavioral: Positive for confusion (\"trouble communicating\" after taking Amlodipine).   All other systems reviewed and are negative.    A complete review of systems was performed with pertinent positives and negatives noted in the HPI, and all other systems negative.    Physical Exam      /71   Pulse (!) 40   Resp 15   Ht 1.727 m (5' 8\")   Wt 104.3 kg (230 lb)   SpO2 98%   BMI 34.97 kg/m      Physical Exam   General: patient is alert and oriented and in no acute distress   Head: atraumatic and normocephalic   EENT: moist mucus membranes without tonsillar erythema or exudates, pupils round and reactive   Neck: supple   Cardiovascular: regular rate and rhythm, extremities warm and well perfused, no lower extremity edema  Pulmonary: lungs clear to auscultation bilaterally   Abdomen: soft, non-tender   Musculoskeletal: normal range of motion   Neurological: alert and oriented, moving all extremities symmetrically, gait normal   Skin: warm, dry   ED Course   11:17 AM  The patient was seen and examined by Alanna Vanegas MD in Room ED14.        Procedures             EKG Interpretation:      Interpreted by Alanna Vanegas MD  Time reviewed: 1140  Symptoms at time of EKG: None   Rhythm: sinus bradycardia  Rate: Bradycardia  Axis: Normal  Ectopy: none  Conduction: normal  ST Segments/ T Waves: No acute ischemic changes  Q Waves: none  Comparison to prior: Unchanged    Clinical Impression: sinus bradycardia                    "           No results found. However, due to the size of the patient record, not all encounters were searched. Please check Results Review for a complete set of results.  Medications - No data to display          Assessments & Plan (with Medical Decision Making)   Mr. Corea is a 60 year old male with a PMH of quadriplegia, spinal cord injury at levels C4, C5, and C6, mild LIZA, asthma, pneumonia, heart murmur, decubitus ulcer of hip, neurogenic bladder, bladder stones, bacteremia, and suprapubic catheter dysfunction who presents to the ED today complaining of hypertension.  Currently his blood pressure varies between normotensive and hypotensive.  He is also bradycardic but is mentating well and in no respiratory distress.  Differential diagnosis is broad at this time and includes but is not limited to UTI, electrolyte abnormality, renal dysfunction, liver dysfunction, ACS, DVT/  PE, less consistent with bowel obstruction or impaction.  He denies any skin wounds or new decubitus ulcers.  He has not had any other recent medication changes.  His UA is suggestive of possible UTI his suprapubic catheter was just changed 2 days ago.  He does have leukopenia to 3.5, lactate is within normal limits.  Creatinine is also within normal limits.  He did have a CT of the chest, abdomen and pelvis.  He has no evidence of PE but does have a large obstructing 1 cm renal stone at the right ureteral pelvis with surrounding inflammatory changes.  He was given Zosyn in the ED and has blood cultures pending.  Discussed with urology who has seen and evaluated the patient and will plan to take for stent placement.      I have reviewed the nursing notes.    I have reviewed the findings, diagnosis, plan and need for follow up with the patient.    New Prescriptions    No medications on file       Final diagnoses:   Complicated urinary tract infection   Autonomic dysreflexia   Hyponatremia   Other hypervolemia   Urinary tract obstruction by  kidney stone   I, Chris Gerber, am serving as a trained medical scribe to document services personally performed by Alanna Cisneros MD, based on the provider's statements to me.     I, Alanna Cisneros MD, was physically present and have reviewed and verified the accuracy of this note documented by Chris Gerber.      2/8/2021   [facility] EMERGENCY DEPARTMENT     Alanna Cisneros MD  02/08/21 8822

## 2021-02-08 NOTE — PROGRESS NOTES
[facility]   Patient is currently open to home care services with [facility]. The patient is currently receiving RN services. [facility]  and team have been notified of patient admission. [facility] liaison will continue to follow patient during stay. If appropriate provide orders to resume home care at time of discharge.    Misa Alston RN BSN  [facility] Liaison  [phone]

## 2021-02-08 NOTE — ED NOTES
[facility]   ED Nurse to Floor Handoff     Bart Corea is a 60 year old male who speaks English and lives with a spouse,  in a home  They arrived in the ED by public transportation from home    ED Chief Complaint: Hypertension    ED Dx;   Final diagnoses:   Complicated urinary tract infection   Autonomic dysreflexia   Hyponatremia   Other hypervolemia         Needed?: No    Allergies:   Allergies   Allergen Reactions     Vancomycin Anaphylaxis   .  Past Medical Hx:   Past Medical History:   Diagnosis Date     Asthma      Chronic infection     Bladder     Heart murmur      LIZA (obstructive sleep apnea) 12/3/2014     Quadriplegia, C1-C4 incomplete (H)       Baseline Mental status: WDL  Current Mental Status changes: at basesline    Infection present or suspected this encounter: yes urinary  Sepsis suspected: No  Isolation type: Contact  Patient tested for COVID 19 prior to admission: YES     Activity level - Baseline/Home:  Total Care  Activity Level - Current:   Total Care    Bariatric equipment needed?: No    In the ED these meds were given:   Medications   iopamidol (ISOVUE-370) solution 135 mL (has no administration in time range)   sodium chloride (PF) 0.9% PF flush 90 mL (has no administration in time range)   piperacillin-tazobactam (ZOSYN) 3.375 g vial to attach to  mL bag (3.375 g Intravenous New Bag 2/8/21 1346)       Drips running?  Yes    Home pump  No    Current LDAs  Peripheral IV 02/08/21 Left Upper forearm (Active)   Site Assessment WDL 02/08/21 1139   Line Status Saline locked 02/08/21 1139   Dressing Intervention New dressing  02/08/21 1139   Number of days: 0       Suprapubic Catheter (Active)   Number of days:        Suprapubic Catheter Latex 16 fr (Active)   Number of days: 389       Wound 11/17/15 Right Groin (Active)   Number of days: 1910       Incision/Surgical Site 11/15/19 Bilateral Penis (Active)   Number of days: 451        Incision/Surgical Site 01/16/20 Abdomen (Active)   Number of days: 389       Labs results:   Labs Ordered and Resulted from Time of ED Arrival Up to the Time of Departure from the ED   CBC WITH PLATELETS DIFFERENTIAL - Abnormal; Notable for the following components:       Result Value    WBC 3.5 (*)     Platelet Count 83 (*)     Absolute Lymphocytes 0.3 (*)     All other components within normal limits   COMPREHENSIVE METABOLIC PANEL - Abnormal; Notable for the following components:    Sodium 132 (*)     Carbon Dioxide 34 (*)     Anion Gap 1 (*)     Creatinine 0.30 (*)     Albumin 3.2 (*)     Protein Total 6.6 (*)     All other components within normal limits   ROUTINE UA WITH MICROSCOPIC - Abnormal; Notable for the following components:    Ketones Urine 40 (*)     Blood Urine Large (*)     Protein Albumin Urine 30 (*)     Nitrite Urine Positive (*)     Leukocyte Esterase Urine Large (*)     WBC Urine >182 (*)     RBC Urine >182 (*)     WBC Clumps Present (*)     Mucous Urine Present (*)     Hyaline Casts 5 (*)     All other components within normal limits   ISTAT  GASES LACTATE RAÚL POCT - Abnormal; Notable for the following components:    Ph Venous 7.30 (*)     PCO2 Venous 65 (*)     Bicarbonate Venous 32 (*)     Lactic Acid 0.4 (*)     All other components within normal limits   D DIMER QUANTITATIVE   INR   TROPONIN I   NT PROBNP INPATIENT   SARS-COV-2 (COVID-19) VIRUS RT-PCR   ISTAT CG4 GASES LACTATE RAÚL NURSING POCT   URINE CULTURE AEROBIC BACTERIAL   BLOOD CULTURE   BLOOD CULTURE       Imaging Studies:   Recent Results (from the past 24 hour(s))   XR Chest 2 Views    Narrative    EXAM: XR CHEST 2 VW 2/8/2021    HISTORY: autonomic dysfunction, increased edema, eval for edema vs  infection.    COMPARISON: Radiograph 10/9/2019.    TECHNIQUE: Semiupright frontal and lateral views of the chest.    FINDINGS: Dense opacification of the mid/lower left chest, partially  obscuring the heart which appears enlarged. Hazy  "interstitial  opacities. Streaky right basilar/right retrocardiac opacities.  Remaining aerated lungs are relatively clear. Osteopenic appearance of  the bones.      Impression    IMPRESSION: Findings concerning for worsening volume overload  including left pleural effusion, probable pulmonary alveolar edema and  cardiomegaly.    I have personally reviewed the examination and initial interpretation  and I agree with the findings.    GUSTAVO DUNBAR MD   US Lower Extremity Venous Duplex Right    Narrative    EXAMINATION: DOPPLER VENOUS ULTRASOUND OF THE RIGHT LOWER EXTREMITY,  2/8/2021 1:03 PM     COMPARISON: Venous ultrasound of bilateral legs dated 11/20/2009    HISTORY: Right leg swelling, rule out DVT    TECHNIQUE:  Gray-scale evaluation with compression, spectral flow, and  color Doppler assessment of the deep venous system of the right leg  from groin to knee, and then at the ankle.    FINDINGS:  In the right lower extremity, the common femoral, femoral, popliteal  and posterior tibial veins demonstrate normal compressibility and  blood flow. The left common femoral vein demonstrates normal  compressibility and blood flow for comparison.      Impression    IMPRESSION:  No evidence of right lower extremity deep venous  thrombosis.    I have personally reviewed the examination and initial interpretation  and I agree with the findings.    SHARAD MONIQUE MD       Recent vital signs:   BP (!) 87/44   Pulse (!) 31   Resp 15   Ht 1.727 m (5' 8\")   Wt 104.3 kg (230 lb)   SpO2 91%   BMI 34.97 kg/m      [place] Coma Scale Score: 15 (02/08/21 1255)       Cardiac Rhythm: Normal Sinus  Pt needs tele? No  Skin/wound Issues: None    Code Status: Full Code    Pain control: good    Nausea control: good    Abnormal labs/tests/findings requiring intervention: see epic    Family present during ED course? Yes   Family Comments/Social Situation comments: wife is with    Tasks needing completion: None    Alla Melgoza, " RN    [phone] [facility]

## 2021-02-08 NOTE — PHARMACY-ADMISSION MEDICATION HISTORY
Admission Medication History Completed by Pharmacy    See [facility] Admission Navigator for allergy information, preferred outpatient pharmacy, prior to admission medications and immunization status.     Medication history sources:  SureScripts, patient interview via phone    Changes made to PTA medication list (reason)  Added: NA  Deleted: DME orders, amcinonide, diazepam 5mg, ibuprofen, magic bullet suppositories, clindamycin gel, senna, terbinafine cream  Changed: NA    Additional medication history information:   - Patient denies taking any additional Rx/OTC medications other than the ones listed below.    Prior to Admission medications    Medication Sig Last Dose Taking? Auth Provider   amLODIPine (NORVASC) 2.5 MG tablet Take 1 tablet (2.5 mg) by mouth daily as needed (autonomic dysreflexia) 2/8/2021 at Unknown time Yes Jason Long MD   ascorbic acid (VITAMIN C) 1000 MG TABS Take 1 tablet by mouth daily 2/8/2021 at Unknown time Yes Reported, Patient   baclofen (LIORESAL) 20 MG tablet TAKE ONE TABLET BY MOUTH UP TO 6 TIMES DAILY 2/8/2021 at Unknown time Yes Jason Long MD   bisacodyl (DULCOLAX) 10 MG suppository Place 20 mg rectally every 3 days Active ingredient in Magic Bullet (10mg per suppository) 2/8/2021 at Unknown time Yes Unknown, Entered By History   diazepam (VALIUM) 10 MG tablet Take 1 tablet (10 mg) by mouth daily 2/8/2021 at Unknown time Yes Jason Long MD    MG capsule TAKE 1 CAPSULE (100 MG) BY MOUTH DAILY 2/8/2021 at Unknown time Yes Jason Long MD   lidocaine (XYLOCAINE) 2 % jelly Apply  topically. APPLY AS DIRECTED 2/8/2021 at Unknown time Yes Reported, Patient   minocycline (MINOCIN) 50 MG capsule TAKE 1 CAPSULE (50 MG) BY MOUTH TWO TIMES DAILY 2/8/2021 at Unknown time Yes Jason Long MD   Multiple Vitamins-Minerals (MULTIVITAMIN & MINERAL PO) Take 1 capsule by mouth daily. 2/8/2021 at Unknown time Yes Reported, Patient    neomycin-polymyxin-hydrocortisone (CORTISPORIN) 3.5-12934-9 otic solution Place 4 drops in ear(s) 2 times daily 2/8/2021 at Unknown time Yes Jason Long MD   nystatin (MYCOSTATIN) 071370 UNIT/GM external powder APPLY TO AFFECTED AREA THREE TIMES DAILY AS NEEDED 2/8/2021 at Unknown time Yes Jason Long MD   oxybutynin ER (DITROPAN-XL) 10 MG 24 hr tablet TAKE 1 TABLET (10 MG) BY MOUTH DAILY 2/8/2021 at Unknown time Yes Jason Long MD   VITAMIN D3 25 MCG (1000 UT) tablet TAKE 1 TABLET (1,000 UNITS) BY MOUTH DAILY 2/8/2021 at Unknown time Yes Jason Long MD   zolpidem (AMBIEN) 10 MG tablet TAKE 1/2 TO 1 TABLET BY MOUTH EVERY NIGHT AT BEDTIME AS NEEDED FOR SLEEP 2/7/2021 at Unknown time Yes Jason Long MD   Incontinence Supplies (URINARY LEG BAG) USE AS NEEDED   Reported, Patient     Date completed: 02/08/21    Medication history completed by:   Meaghan Tyson, [place]

## 2021-02-08 NOTE — CONSULTS
Urology Consult    Name: Bart Corea    MRN: [id number]   YOB: 1960               Chief Complaint:     Autonomic dysreflexia, back pain, CT with right UPJ stone    History is obtained from the patient and chart review          History of Present Illness:   Bart Corea is a 60 year old male with C5 SCI, neurogenic bladder managed with SPT, with recent low back pain and today autonomic dysreflexia for which he came to the ER. Workup notable for leukopenia, CT scan with 1 cm right UPJ stone. Denies fevers/chills, nausea or vomiting.    About one year ago he was treated for a suprapubic tract abscess and cystolithotomy    Patient has been NPO since yesterday          Past Medical History:       Past Medical History:   Diagnosis Date     Asthma      Chronic infection     Bladder     Heart murmur      LIZA (obstructive sleep apnea) 12/3/2014     Quadriplegia, C1-C4 incomplete (H)             Past Surgical History:       Past Surgical History:   Procedure Laterality Date     Bilateral ureteroscopy with holmium laser lithotripsy and basketing and removal of fragments  Left ureteral stent placement.  02/10/2010     COLONOSCOPY N/A 7/20/2018    Procedure: COMBINED COLONOSCOPY, SINGLE OR MULTIPLE BIOPSY/POLYPECTOMY BY BIOPSY;;  Surgeon: Grace Yang MD;  Location: [facility]     CYSTOSCOPY, LITHOLAPAXY, COMBINED N/A 11/15/2019    Procedure: Cystolitholapaxy;  Surgeon: Obi Uriostegui MD;  Location: [facility]     INCISION AND DRAINAGE ABDOMEN WASHOUT, COMBINED N/A 1/16/2020    Procedure: Incision and drainage abdomenal Wall with Revision Of Vesicocutaneous Anastomosis;  Surgeon: Obi Uriostegui MD;  Location: [facility]     LASER HOLMIUM LITHOTRIPSY BLADDER N/A 1/16/2020    Procedure: LITHOTRIPSY, CALCULUS, BLADDER, USING HOLMIUM LASER;  Surgeon: Obi Uriostegui MD;  Location: [facility]     skin flap on bottom       sp tube absess surgery              Social History:     Social History      Tobacco Use     Smoking status: Current Every Day Smoker     Packs/day: 0.30     Types: Cigarettes     Smokeless tobacco: Never Used     Tobacco comment: since early teens   Substance Use Topics     Alcohol use: Yes     Comment: on weekends, 3-4+            Family History:       Family History   Problem Relation Age of Onset     Cancer Father      Diabetes No family hx of      Glaucoma No family hx of      Hypertension No family hx of      Macular Degeneration No family hx of      Retinal detachment No family hx of      Anesthesia Reaction No family hx of      Deep Vein Thrombosis (DVT) No family hx of             Allergies:     Allergies   Allergen Reactions     Vancomycin Anaphylaxis            Medications:       No current facility-administered medications for this encounter.      Current Outpatient Medications   Medication Sig     amLODIPine (NORVASC) 2.5 MG tablet Take 1 tablet (2.5 mg) by mouth daily as needed (autonomic dysreflexia)     ascorbic acid (VITAMIN C) 1000 MG TABS Take 1 tablet by mouth daily     baclofen (LIORESAL) 20 MG tablet TAKE ONE TABLET BY MOUTH UP TO 6 TIMES DAILY     bisacodyl (DULCOLAX) 10 MG suppository Place 20 mg rectally every 3 days Active ingredient in Magic Bullet (10mg per suppository)     diazepam (VALIUM) 10 MG tablet Take 1 tablet (10 mg) by mouth daily      MG capsule TAKE 1 CAPSULE (100 MG) BY MOUTH DAILY     lidocaine (XYLOCAINE) 2 % jelly Apply  topically. APPLY AS DIRECTED     minocycline (MINOCIN) 50 MG capsule TAKE 1 CAPSULE (50 MG) BY MOUTH TWO TIMES DAILY     Multiple Vitamins-Minerals (MULTIVITAMIN & MINERAL PO) Take 1 capsule by mouth daily.     neomycin-polymyxin-hydrocortisone (CORTISPORIN) 3.5-00055-2 otic solution Place 4 drops in ear(s) 2 times daily     nystatin (MYCOSTATIN) 564980 UNIT/GM external powder APPLY TO AFFECTED AREA THREE TIMES DAILY AS NEEDED     oxybutynin ER (DITROPAN-XL) 10 MG 24 hr tablet TAKE 1 TABLET (10 MG) BY MOUTH DAILY      "VITAMIN D3 25 MCG (1000 UT) tablet TAKE 1 TABLET (1,000 UNITS) BY MOUTH DAILY     zolpidem (AMBIEN) 10 MG tablet TAKE 1/2 TO 1 TABLET BY MOUTH EVERY NIGHT AT BEDTIME AS NEEDED FOR SLEEP     Incontinence Supplies (URINARY LEG BAG) USE AS NEEDED             Review of Systems:      ROS: 10 point ROS neg other than the symptoms noted above in the HPI           Physical Exam:     /83   Pulse (!) 41   Resp 20   Ht 1.727 m (5' 8\")   Wt 104.3 kg (230 lb)   SpO2 98%   BMI 34.97 kg/m      GEN:  AOx3.  NAD.    LUNGS: Non-labored breathing.   BACK:  No midline or CVA tenderness.  ABD:  Soft.  NT.  ND.  No rebound or guarding.  No masses. Suprapubic tube tract without abnormality  :  circumcised.  Normal penile shaft.  Testicles descended bilaterally, no nodules or tenderness.  No inguinal hernias.  EXT:  Warm, well perfused.    SKIN:  Warm.  Dry.  No rashes.  NEURO:  A&O. CN grossly intact.            Data:   All laboratory data reviewed    CBC noted for leukopenia      UA nitrite positive               Impression and Plan:     Impression:   Bart Corea is a 60 year old male with SCI, obstructing right UPJ stone and autonomic dysreflexia v. Sepsis who would benefit from urgent right stent placement in OR      Plan:     - Agree with medicine admission    - Urology will add for stent    - Agree with Antibiotics now             This patient's exam findings, labs, and imaging discussed with urology staff surgeon, Dr. Barrientos, who developed the treatment plan.               "

## 2021-02-08 NOTE — ANESTHESIA PREPROCEDURE EVALUATION
Anesthesia Pre-Procedure Evaluation    Patient: Bart Corea   MRN: [id number] : 1960        Preoperative Diagnosis: Sepsis (H) [A41.9]   Procedure : Procedure(s):  CYSTOSCOPY, WITH URETERAL STENT INSERTION     Past Medical History:   Diagnosis Date     Asthma      Chronic infection     Bladder     Heart murmur      LIZA (obstructive sleep apnea) 12/3/2014     Quadriplegia, C1-C4 incomplete (H)       Past Surgical History:   Procedure Laterality Date     Bilateral ureteroscopy with holmium laser lithotripsy and basketing and removal of fragments  Left ureteral stent placement.  02/10/2010     COLONOSCOPY N/A 2018    Procedure: COMBINED COLONOSCOPY, SINGLE OR MULTIPLE BIOPSY/POLYPECTOMY BY BIOPSY;;  Surgeon: Grace Yang MD;  Location: [facility]     CYSTOSCOPY, LITHOLAPAXY, COMBINED N/A 11/15/2019    Procedure: Cystolitholapaxy;  Surgeon: Obi Uriostegui MD;  Location: [facility]     INCISION AND DRAINAGE ABDOMEN WASHOUT, COMBINED N/A 2020    Procedure: Incision and drainage abdomenal Wall with Revision Of Vesicocutaneous Anastomosis;  Surgeon: Obi Uriostegui MD;  Location: [facility]     LASER HOLMIUM LITHOTRIPSY BLADDER N/A 2020    Procedure: LITHOTRIPSY, CALCULUS, BLADDER, USING HOLMIUM LASER;  Surgeon: Obi Uriostegui MD;  Location: [facility]     skin flap on bottom       sp tube absess surgery        Allergies   Allergen Reactions     Vancomycin Anaphylaxis      Social History     Tobacco Use     Smoking status: Current Every Day Smoker     Packs/day: 0.30     Types: Cigarettes     Smokeless tobacco: Never Used     Tobacco comment: since early teens   Substance Use Topics     Alcohol use: Yes     Comment: on weekends, 3-4+      Wt Readings from Last 1 Encounters:   21 104.3 kg (230 lb)        Anesthesia Evaluation   Pt has had prior anesthetic. Type: General and MAC.    No history of anesthetic complications       ROS/MED HX  ENT/Pulmonary:     (+) sleep apnea, asthma  (-)  tobacco use   Neurologic:     (+) Spinal cord injury, level of injury: C1-C4 (incomplete), with autonomic hyperflexia symptoms,  (-) no CVA and no TIA   Cardiovascular:     (+) -----Previous cardiac testing   Echo: Date: 7/2017 Results:  Left Ventricle  Left ventricular size is normal. Left ventricular wall thickness is normal.  The Ejection Fraction is estimated at 50-55%. Normal left ventricular filling  for age. No regional wall motion abnormalities are seen.     Right Ventricle  Right ventricular function, chamber size, wall motion, and thickness are  normal.     Atria  Both atria appear normal. The atrial septum is intact as assessed by color  Doppler .     Mitral Valve  The mitral valve is normal. Trace mitral insufficiency is present.        Aortic Valve  Aortic valve is normal in structure and function.     Tricuspid Valve  The tricuspid valve is normal. Trace tricuspid insufficiency is present. The  right ventricular systolic pressure is approximated at 15.7 mmHg plus the  right atrial pressure. Pulmonary artery systolic pressure is normal.     Pulmonic Valve  The pulmonic valve is normal. Trace pulmonic insufficiency is present.     Vessels  The aorta root is normal. The pulmonary artery is normal. The inferior vena  cava is normal.     Pericardium  Trivial pericardial effusion is present.  Stress Test: Date: Results:    ECG Reviewed: Date: 2/8/21 Results:  Sinus bradycardia at 38 BPM  Cath: Date: Results:   (-) hypertension and dyslipidemia   METS/Exercise Tolerance:     Hematologic: Comments: throlmbocytopenia   (-) anemia   Musculoskeletal:   (+) cervical spine instability. C-spine cleared:Yes,    GI/Hepatic: Comment: CT C/A/P (2/8/21)  IMPRESSION:   1. 14 x 9 mm right renal calculus at the pelviureteric junction  causing mild right hydronephrosis.  2. Moderate left pleural effusion with atelectasis and left pleural  thickening and enhancement, suspicious for empyema.  3. Bilateral adrenal nodules,  stable on the right side and slightly  increased on the left side, when compared with 2017 CT, have been  present since at least 2010, suggestive of benign adenomas.  4. Stable hepatic cysts. - neg GI/hepatic ROS     Renal/Genitourinary: Comment: Neurogenic bladder    (+) Nephrolithiasis ,     Endo:    (-) Type II DM and thyroid disease   Psychiatric/Substance Use:  - neg psychiatric ROS     Infectious Disease: Comment: COVID NEGATIVE 2/8/21    ESBL - neg infectious disease ROS   (+) MRSA,     Malignancy:  - neg malignancy ROS     Other:      (+) , other significant disability Wheelchair bound,   (-) Any chance pregnant       Physical Exam    Airway        Mallampati: IV   TM distance: < 3 FB   Neck ROM: limited   Mouth opening: > 3 cm    Respiratory Devices and Support         Dental       (+) missing and chipped      Cardiovascular   cardiovascular exam normal          Pulmonary                   OUTSIDE LABS:  CBC:   Lab Results   Component Value Date    WBC 3.5 (L) 02/08/2021    WBC 7.3 02/17/2020    HGB 15.9 02/08/2021    HGB 14.6 02/17/2020    HCT 49.3 02/08/2021    HCT 47.1 02/17/2020    PLT 83 (L) 02/08/2021     02/17/2020     BMP:   Lab Results   Component Value Date     (L) 02/08/2021     02/17/2020    POTASSIUM 4.5 02/08/2021    POTASSIUM 4.2 02/17/2020    CHLORIDE 98 02/08/2021    CHLORIDE 103 02/17/2020    CO2 34 (H) 02/08/2021    CO2 33 (H) 02/17/2020    BUN 17 02/08/2021    BUN 18 02/17/2020    CR 0.30 (L) 02/08/2021    CR 0.30 (L) 01/05/2021    GLC 72 02/08/2021    GLC 81 02/17/2020     COAGS:   Lab Results   Component Value Date    PTT 33 01/21/2020    INR 0.91 02/08/2021     POC:   Lab Results   Component Value Date    BGM 85 01/16/2020     HEPATIC:   Lab Results   Component Value Date    ALBUMIN 3.2 (L) 02/08/2021    PROTTOTAL 6.6 (L) 02/08/2021    ALT 35 02/08/2021    AST 19 02/08/2021    ALKPHOS 106 02/08/2021    BILITOTAL 0.5 02/08/2021    KIA <9 (L) 10/21/2009     OTHER:    Lab Results   Component Value Date    PH 7.38 12/02/2014    LACT 0.4 (L) 02/08/2021    A1C 5.0 08/14/2019    LIU 9.2 02/08/2021    PHOS 3.3 02/04/2010    MAG 2.1 02/04/2010    LIPASE 187 07/14/2017    TSH 2.91 02/17/2020    CRP 13.4 (H) 03/04/2010    SED 29 (H) 03/04/2010       Anesthesia Plan    ASA Status:  3      Anesthesia Type: General.     - Airway: ETT   Induction: Intravenous.     Maintenance: Balanced.   Techniques and Equipment:     - Airway: Video-Laryngoscope     - Lines/Monitors: BIS, Arterial Line     Consents    Anesthesia Plan(s) and associated risks, benefits, and realistic alternatives discussed. Questions answered and patient/representative(s) expressed understanding.     - Discussed with:  Patient      - Extended Intubation/Ventilatory Support Discussed: no Extended Intubation.      - Patient is DNR/DNI Status: No    Use of blood products discussed: No .     Postoperative Care    Pain management: IV analgesics.   PONV prophylaxis: Dexamethasone or Solumedrol, Ondansetron (or other 5HT-3)     Comments:    60 year old male with C5 SCI, neurogenic bladder managed with SPT, with recent low back pain and today autonomic dysreflexia for which he came to the ER. Patient would be good candidate for neuraxial however, his plts are 83 so will plan for GETA.            Pancho Martin MD

## 2021-02-09 ENCOUNTER — APPOINTMENT (OUTPATIENT)
Dept: CARDIOLOGY | Facility: CLINIC | Age: 61
End: 2021-02-09
Attending: STUDENT IN AN ORGANIZED HEALTH CARE EDUCATION/TRAINING PROGRAM
Payer: COMMERCIAL

## 2021-02-09 LAB
ALBUMIN SERPL-MCNC: 3 G/DL (ref 3.4–5)
ALBUMIN SERPL-MCNC: 3.1 G/DL (ref 3.4–5)
ALP SERPL-CCNC: 96 U/L (ref 40–150)
ALP SERPL-CCNC: 98 U/L (ref 40–150)
ALT SERPL W P-5'-P-CCNC: 35 U/L (ref 0–70)
ALT SERPL W P-5'-P-CCNC: 36 U/L (ref 0–70)
ANION GAP SERPL CALCULATED.3IONS-SCNC: 2 MMOL/L (ref 3–14)
ANION GAP SERPL CALCULATED.3IONS-SCNC: 4 MMOL/L (ref 3–14)
AST SERPL W P-5'-P-CCNC: 15 U/L (ref 0–45)
AST SERPL W P-5'-P-CCNC: 7 U/L (ref 0–45)
BASOPHILS # BLD AUTO: 0 10E9/L (ref 0–0.2)
BASOPHILS NFR BLD AUTO: 0.3 %
BILIRUB SERPL-MCNC: 0.6 MG/DL (ref 0.2–1.3)
BILIRUB SERPL-MCNC: 0.8 MG/DL (ref 0.2–1.3)
BUN SERPL-MCNC: 13 MG/DL (ref 7–30)
BUN SERPL-MCNC: 15 MG/DL (ref 7–30)
CA-I BLD-MCNC: 5 MG/DL (ref 4.4–5.2)
CALCIUM SERPL-MCNC: 8.9 MG/DL (ref 8.5–10.1)
CALCIUM SERPL-MCNC: 9.2 MG/DL (ref 8.5–10.1)
CHLORIDE SERPL-SCNC: 101 MMOL/L (ref 94–109)
CHLORIDE SERPL-SCNC: 99 MMOL/L (ref 94–109)
CO2 SERPL-SCNC: 33 MMOL/L (ref 20–32)
CO2 SERPL-SCNC: 34 MMOL/L (ref 20–32)
CORTIS SERPL-MCNC: 21.2 UG/DL (ref 4–22)
CREAT SERPL-MCNC: 0.35 MG/DL (ref 0.66–1.25)
CREAT SERPL-MCNC: 0.38 MG/DL (ref 0.66–1.25)
CREAT SERPL-MCNC: 0.38 MG/DL (ref 0.66–1.25)
CRP SERPL-MCNC: 15 MG/L (ref 0–8)
DIFFERENTIAL METHOD BLD: ABNORMAL
EOSINOPHIL # BLD AUTO: 0 10E9/L (ref 0–0.7)
EOSINOPHIL NFR BLD AUTO: 0.3 %
ERYTHROCYTE [DISTWIDTH] IN BLOOD BY AUTOMATED COUNT: 14.5 % (ref 10–15)
ERYTHROCYTE [DISTWIDTH] IN BLOOD BY AUTOMATED COUNT: 14.6 % (ref 10–15)
GFR SERPL CREATININE-BSD FRML MDRD: >90 ML/MIN/{1.73_M2}
GLUCOSE BLDC GLUCOMTR-MCNC: 111 MG/DL (ref 70–99)
GLUCOSE SERPL-MCNC: 64 MG/DL (ref 70–99)
GLUCOSE SERPL-MCNC: 79 MG/DL (ref 70–99)
HCT VFR BLD AUTO: 46.6 % (ref 40–53)
HCT VFR BLD AUTO: 49.9 % (ref 40–53)
HGB BLD-MCNC: 14.5 G/DL (ref 13.3–17.7)
HGB BLD-MCNC: 15.6 G/DL (ref 13.3–17.7)
IMM GRANULOCYTES # BLD: 0 10E9/L (ref 0–0.4)
IMM GRANULOCYTES NFR BLD: 0.3 %
INTERPRETATION ECG - MUSE: NORMAL
LAB SCANNED RESULT: ABNORMAL
LACTATE BLD-SCNC: 0.5 MMOL/L (ref 0.7–2)
LYMPHOCYTES # BLD AUTO: 0.1 10E9/L (ref 0.8–5.3)
LYMPHOCYTES NFR BLD AUTO: 3.6 %
MAGNESIUM SERPL-MCNC: 1.7 MG/DL (ref 1.6–2.3)
MCH RBC QN AUTO: 30.2 PG (ref 26.5–33)
MCH RBC QN AUTO: 30.3 PG (ref 26.5–33)
MCHC RBC AUTO-ENTMCNC: 31.1 G/DL (ref 31.5–36.5)
MCHC RBC AUTO-ENTMCNC: 31.3 G/DL (ref 31.5–36.5)
MCV RBC AUTO: 97 FL (ref 78–100)
MCV RBC AUTO: 98 FL (ref 78–100)
MONOCYTES # BLD AUTO: 0 10E9/L (ref 0–1.3)
MONOCYTES NFR BLD AUTO: 1.1 %
NEUTROPHILS # BLD AUTO: 3.4 10E9/L (ref 1.6–8.3)
NEUTROPHILS NFR BLD AUTO: 94.4 %
NRBC # BLD AUTO: 0 10*3/UL
NRBC BLD AUTO-RTO: 0 /100
PLATELET # BLD AUTO: 88 10E9/L (ref 150–450)
PLATELET # BLD AUTO: 92 10E9/L (ref 150–450)
POTASSIUM SERPL-SCNC: 4.1 MMOL/L (ref 3.4–5.3)
POTASSIUM SERPL-SCNC: 4.2 MMOL/L (ref 3.4–5.3)
PROCALCITONIN SERPL-MCNC: 0.22 NG/ML
PROT SERPL-MCNC: 6.3 G/DL (ref 6.8–8.8)
PROT SERPL-MCNC: 6.6 G/DL (ref 6.8–8.8)
RBC # BLD AUTO: 4.78 10E12/L (ref 4.4–5.9)
RBC # BLD AUTO: 5.16 10E12/L (ref 4.4–5.9)
SODIUM SERPL-SCNC: 135 MMOL/L (ref 133–144)
SODIUM SERPL-SCNC: 137 MMOL/L (ref 133–144)
TOBRAMYCIN SERPL-MCNC: 18.5 MG/L
TOBRAMYCIN SERPL-MCNC: 5.4 MG/L
TOBRAMYCIN SERPL-MCNC: <0.3 MG/L
WBC # BLD AUTO: 3.6 10E9/L (ref 4–11)
WBC # BLD AUTO: 6.9 10E9/L (ref 4–11)

## 2021-02-09 PROCEDURE — 36415 COLL VENOUS BLD VENIPUNCTURE: CPT | Performed by: STUDENT IN AN ORGANIZED HEALTH CARE EDUCATION/TRAINING PROGRAM

## 2021-02-09 PROCEDURE — 250N000013 HC RX MED GY IP 250 OP 250 PS 637: Performed by: STUDENT IN AN ORGANIZED HEALTH CARE EDUCATION/TRAINING PROGRAM

## 2021-02-09 PROCEDURE — 3E043XZ INTRODUCTION OF VASOPRESSOR INTO CENTRAL VEIN, PERCUTANEOUS APPROACH: ICD-10-PCS | Performed by: INTERNAL MEDICINE

## 2021-02-09 PROCEDURE — 999N001017 HC STATISTIC GLUCOSE BY METER IP

## 2021-02-09 PROCEDURE — 84145 PROCALCITONIN (PCT): CPT | Performed by: STUDENT IN AN ORGANIZED HEALTH CARE EDUCATION/TRAINING PROGRAM

## 2021-02-09 PROCEDURE — 87800 DETECT AGNT MULT DNA DIREC: CPT | Performed by: STUDENT IN AN ORGANIZED HEALTH CARE EDUCATION/TRAINING PROGRAM

## 2021-02-09 PROCEDURE — 80053 COMPREHEN METABOLIC PANEL: CPT | Performed by: STUDENT IN AN ORGANIZED HEALTH CARE EDUCATION/TRAINING PROGRAM

## 2021-02-09 PROCEDURE — 250N000011 HC RX IP 250 OP 636: Performed by: INTERNAL MEDICINE

## 2021-02-09 PROCEDURE — 36415 COLL VENOUS BLD VENIPUNCTURE: CPT | Performed by: INTERNAL MEDICINE

## 2021-02-09 PROCEDURE — 250N000011 HC RX IP 250 OP 636: Performed by: STUDENT IN AN ORGANIZED HEALTH CARE EDUCATION/TRAINING PROGRAM

## 2021-02-09 PROCEDURE — 87181 SC STD AGAR DILUTION PER AGT: CPT | Performed by: STUDENT IN AN ORGANIZED HEALTH CARE EDUCATION/TRAINING PROGRAM

## 2021-02-09 PROCEDURE — 82533 TOTAL CORTISOL: CPT | Performed by: STUDENT IN AN ORGANIZED HEALTH CARE EDUCATION/TRAINING PROGRAM

## 2021-02-09 PROCEDURE — 99291 CRITICAL CARE FIRST HOUR: CPT | Performed by: INTERNAL MEDICINE

## 2021-02-09 PROCEDURE — 258N000003 HC RX IP 258 OP 636: Performed by: INTERNAL MEDICINE

## 2021-02-09 PROCEDURE — 258N000003 HC RX IP 258 OP 636: Performed by: STUDENT IN AN ORGANIZED HEALTH CARE EDUCATION/TRAINING PROGRAM

## 2021-02-09 PROCEDURE — 82565 ASSAY OF CREATININE: CPT | Performed by: STUDENT IN AN ORGANIZED HEALTH CARE EDUCATION/TRAINING PROGRAM

## 2021-02-09 PROCEDURE — 200N000002 HC R&B ICU UMMC

## 2021-02-09 PROCEDURE — 999N000208 ECHOCARDIOGRAM COMPLETE

## 2021-02-09 PROCEDURE — 83605 ASSAY OF LACTIC ACID: CPT | Performed by: STUDENT IN AN ORGANIZED HEALTH CARE EDUCATION/TRAINING PROGRAM

## 2021-02-09 PROCEDURE — 80200 ASSAY OF TOBRAMYCIN: CPT | Performed by: STUDENT IN AN ORGANIZED HEALTH CARE EDUCATION/TRAINING PROGRAM

## 2021-02-09 PROCEDURE — 83735 ASSAY OF MAGNESIUM: CPT | Performed by: STUDENT IN AN ORGANIZED HEALTH CARE EDUCATION/TRAINING PROGRAM

## 2021-02-09 PROCEDURE — 87076 CULTURE ANAEROBE IDENT EACH: CPT | Performed by: STUDENT IN AN ORGANIZED HEALTH CARE EDUCATION/TRAINING PROGRAM

## 2021-02-09 PROCEDURE — 87040 BLOOD CULTURE FOR BACTERIA: CPT | Performed by: STUDENT IN AN ORGANIZED HEALTH CARE EDUCATION/TRAINING PROGRAM

## 2021-02-09 PROCEDURE — 85025 COMPLETE CBC W/AUTO DIFF WBC: CPT | Performed by: STUDENT IN AN ORGANIZED HEALTH CARE EDUCATION/TRAINING PROGRAM

## 2021-02-09 PROCEDURE — 255N000002 HC RX 255 OP 636: Performed by: STUDENT IN AN ORGANIZED HEALTH CARE EDUCATION/TRAINING PROGRAM

## 2021-02-09 PROCEDURE — 93306 TTE W/DOPPLER COMPLETE: CPT | Mod: 26 | Performed by: STUDENT IN AN ORGANIZED HEALTH CARE EDUCATION/TRAINING PROGRAM

## 2021-02-09 PROCEDURE — 99291 CRITICAL CARE FIRST HOUR: CPT | Mod: GC | Performed by: INTERNAL MEDICINE

## 2021-02-09 PROCEDURE — 80200 ASSAY OF TOBRAMYCIN: CPT | Performed by: INTERNAL MEDICINE

## 2021-02-09 PROCEDURE — 86140 C-REACTIVE PROTEIN: CPT | Performed by: STUDENT IN AN ORGANIZED HEALTH CARE EDUCATION/TRAINING PROGRAM

## 2021-02-09 PROCEDURE — 999N000127 HC STATISTIC PERIPHERAL IV START W US GUIDANCE

## 2021-02-09 PROCEDURE — 85027 COMPLETE CBC AUTOMATED: CPT | Performed by: STUDENT IN AN ORGANIZED HEALTH CARE EDUCATION/TRAINING PROGRAM

## 2021-02-09 PROCEDURE — 82533 TOTAL CORTISOL: CPT | Performed by: INTERNAL MEDICINE

## 2021-02-09 PROCEDURE — 99222 1ST HOSP IP/OBS MODERATE 55: CPT | Performed by: STUDENT IN AN ORGANIZED HEALTH CARE EDUCATION/TRAINING PROGRAM

## 2021-02-09 PROCEDURE — 82330 ASSAY OF CALCIUM: CPT | Performed by: STUDENT IN AN ORGANIZED HEALTH CARE EDUCATION/TRAINING PROGRAM

## 2021-02-09 RX ORDER — DOPAMINE HYDROCHLORIDE 160 MG/100ML
INJECTION, SOLUTION INTRAVENOUS
Status: DISCONTINUED
Start: 2021-02-09 | End: 2021-02-09 | Stop reason: HOSPADM

## 2021-02-09 RX ORDER — NALOXONE HYDROCHLORIDE 0.4 MG/ML
0.2 INJECTION, SOLUTION INTRAMUSCULAR; INTRAVENOUS; SUBCUTANEOUS
Status: ACTIVE | OUTPATIENT
Start: 2021-02-09 | End: 2021-02-10

## 2021-02-09 RX ORDER — DIAZEPAM 5 MG
5 TABLET ORAL DAILY PRN
Status: DISCONTINUED | OUTPATIENT
Start: 2021-02-09 | End: 2021-02-20 | Stop reason: HOSPADM

## 2021-02-09 RX ORDER — DEXTROSE MONOHYDRATE 25 G/50ML
25-50 INJECTION, SOLUTION INTRAVENOUS
Status: DISCONTINUED | OUTPATIENT
Start: 2021-02-09 | End: 2021-02-20 | Stop reason: HOSPADM

## 2021-02-09 RX ORDER — PIPERACILLIN SODIUM, TAZOBACTAM SODIUM 3; .375 G/15ML; G/15ML
3.38 INJECTION, POWDER, LYOPHILIZED, FOR SOLUTION INTRAVENOUS EVERY 6 HOURS
Status: DISCONTINUED | OUTPATIENT
Start: 2021-02-09 | End: 2021-02-12

## 2021-02-09 RX ORDER — LIDOCAINE 40 MG/G
CREAM TOPICAL
Status: DISCONTINUED | OUTPATIENT
Start: 2021-02-09 | End: 2021-02-20 | Stop reason: HOSPADM

## 2021-02-09 RX ORDER — NICOTINE POLACRILEX 4 MG
15-30 LOZENGE BUCCAL
Status: DISCONTINUED | OUTPATIENT
Start: 2021-02-09 | End: 2021-02-20 | Stop reason: HOSPADM

## 2021-02-09 RX ORDER — DOPAMINE HYDROCHLORIDE 160 MG/100ML
2-20 INJECTION, SOLUTION INTRAVENOUS CONTINUOUS
Status: DISCONTINUED | OUTPATIENT
Start: 2021-02-09 | End: 2021-02-11

## 2021-02-09 RX ORDER — PIPERACILLIN SODIUM, TAZOBACTAM SODIUM 3; .375 G/15ML; G/15ML
3.38 INJECTION, POWDER, LYOPHILIZED, FOR SOLUTION INTRAVENOUS ONCE
Status: DISCONTINUED | OUTPATIENT
Start: 2021-02-09 | End: 2021-02-09

## 2021-02-09 RX ORDER — DIAZEPAM 5 MG
5 TABLET ORAL DAILY
Status: DISCONTINUED | OUTPATIENT
Start: 2021-02-09 | End: 2021-02-12

## 2021-02-09 RX ORDER — LINEZOLID 2 MG/ML
600 INJECTION, SOLUTION INTRAVENOUS EVERY 12 HOURS
Status: DISCONTINUED | OUTPATIENT
Start: 2021-02-09 | End: 2021-02-09

## 2021-02-09 RX ORDER — BACLOFEN 10 MG/1
20 TABLET ORAL 2 TIMES DAILY PRN
Status: DISCONTINUED | OUTPATIENT
Start: 2021-02-09 | End: 2021-02-20 | Stop reason: HOSPADM

## 2021-02-09 RX ORDER — PIPERACILLIN SODIUM, TAZOBACTAM SODIUM 3; .375 G/15ML; G/15ML
3.38 INJECTION, POWDER, LYOPHILIZED, FOR SOLUTION INTRAVENOUS EVERY 6 HOURS
Status: DISCONTINUED | OUTPATIENT
Start: 2021-02-09 | End: 2021-02-09

## 2021-02-09 RX ADMIN — PIPERACILLIN AND TAZOBACTAM 3.38 G: 3; .375 INJECTION, POWDER, LYOPHILIZED, FOR SOLUTION INTRAVENOUS at 12:01

## 2021-02-09 RX ADMIN — PIPERACILLIN AND TAZOBACTAM 3.38 G: 3; .375 INJECTION, POWDER, LYOPHILIZED, FOR SOLUTION INTRAVENOUS at 06:54

## 2021-02-09 RX ADMIN — Medication 5 MG: at 22:10

## 2021-02-09 RX ADMIN — ENOXAPARIN SODIUM 40 MG: 40 INJECTION SUBCUTANEOUS at 14:11

## 2021-02-09 RX ADMIN — LINEZOLID 600 MG: 600 INJECTION, SOLUTION INTRAVENOUS at 13:47

## 2021-02-09 RX ADMIN — FUROSEMIDE 20 MG: 10 INJECTION, SOLUTION INTRAVENOUS at 04:34

## 2021-02-09 RX ADMIN — ZOLPIDEM TARTRATE 10 MG: 10 TABLET, FILM COATED ORAL at 22:10

## 2021-02-09 RX ADMIN — BACLOFEN 20 MG: 20 TABLET ORAL at 15:57

## 2021-02-09 RX ADMIN — SODIUM CHLORIDE, POTASSIUM CHLORIDE, SODIUM LACTATE AND CALCIUM CHLORIDE 500 ML: 600; 310; 30; 20 INJECTION, SOLUTION INTRAVENOUS at 01:00

## 2021-02-09 RX ADMIN — PIPERACILLIN AND TAZOBACTAM 3.38 G: 3; .375 INJECTION, POWDER, LYOPHILIZED, FOR SOLUTION INTRAVENOUS at 17:59

## 2021-02-09 RX ADMIN — KETOROLAC TROMETHAMINE 15 MG: 15 INJECTION, SOLUTION INTRAMUSCULAR; INTRAVENOUS at 10:12

## 2021-02-09 RX ADMIN — HUMAN ALBUMIN MICROSPHERES AND PERFLUTREN 5 ML: 10; .22 INJECTION, SOLUTION INTRAVENOUS at 10:00

## 2021-02-09 RX ADMIN — ACETAMINOPHEN 975 MG: 325 TABLET, FILM COATED ORAL at 15:54

## 2021-02-09 RX ADMIN — TOBRAMYCIN 560 MG: 40 INJECTION INTRAMUSCULAR; INTRAVENOUS at 10:05

## 2021-02-09 RX ADMIN — BACLOFEN 20 MG: 20 TABLET ORAL at 12:05

## 2021-02-09 RX ADMIN — BACLOFEN 20 MG: 20 TABLET ORAL at 19:59

## 2021-02-09 RX ADMIN — LINEZOLID 600 MG: 600 INJECTION, SOLUTION INTRAVENOUS at 04:32

## 2021-02-09 RX ADMIN — DOPAMINE HYDROCHLORIDE 2 MCG/KG/MIN: 160 INJECTION, SOLUTION INTRAVENOUS at 02:27

## 2021-02-09 RX ADMIN — OXYBUTYNIN 10 MG: 10 TABLET, FILM COATED, EXTENDED RELEASE ORAL at 07:43

## 2021-02-09 RX ADMIN — OXYCODONE HYDROCHLORIDE AND ACETAMINOPHEN 1000 MG: 500 TABLET ORAL at 07:42

## 2021-02-09 RX ADMIN — BACLOFEN 20 MG: 20 TABLET ORAL at 07:43

## 2021-02-09 ASSESSMENT — ACTIVITIES OF DAILY LIVING (ADL)
ADLS_ACUITY_SCORE: 27
ADLS_ACUITY_SCORE: 31

## 2021-02-09 ASSESSMENT — MIFFLIN-ST. JEOR: SCORE: 1768.5

## 2021-02-09 NOTE — PLAN OF CARE
ICU End of Shift Summary. See flowsheets for vital signs and detailed assessment.    Changes this shift: Chronic pain PRN Tylenol and Toradol.  ECHO done, EF 65-70%. Dopamine drip titrated for MAP >65, currently at 0.5. 02 1L needed. Tolerating Reg diet. Adquate urine output, 800 mls out.    Plan:  Atbx. Wean Dopamine as tolerated.      Problem: UTI (Urinary Tract Infection)  Goal: Improved Infection Symptoms  Outcome: Improving     Problem: Pain Chronic (Persistent) (Comorbidity Management)  Goal: Acceptable Pain Control and Functional Ability  Outcome: No Change  Intervention: Develop Pain Management Plan  Recent Flowsheet Documentation  Taken 2/9/2021 1554 by Aziza Garcia, RN  Pain Management Interventions: medication (see MAR)  Taken 2/9/2021 1012 by Aziza Garcia, RN  Pain Management Interventions: medication (see MAR)

## 2021-02-09 NOTE — OR NURSING
"Text page sent to Dr. Marek Magana, with urology, stating:    \"please call Theodora PACU RN re: Nahomy and telemetry orders. Thank you [phone]\"    @[phone] - Return phone call from Dr. Magana stating it is ok for patient to go to  with soft telemetry.  "
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
## 2021-02-09 NOTE — OP NOTE
OPERATIVE REPORT    PREOPERATIVE DIAGNOSIS:  Right ureteral stone(s), UTI  POSTOPERATIVE DIAGNOSIS: Same    PROCEDURES PERFORMED:   -Cystourethroscopy  -Right retrograde pyelogram with intraoperative interpretation of images  -Right ureteral stent placement      STAFF SURGEON: Javier Barrientos MD  RESIDENT(S): Ze Rosales MD    ANESTHESIA: General  ESTIMATED BLOOD LOSS: 1 ml  COMPLICATIONS: None.   SPECIMEN: none  URETERAL STENT(S):  - Right 6 x 26 cm double-J ureteral stent.  Reason for stenting: Other (infection in the setting of obstructing stone).      SIGNIFICANT FINDINGS:   -Known residual stone burden on the right  -Right RPG notable without hydronephrosis or filling defect      BRIEF OPERATIVE INDICATIONS: Bart Corea is a(n) 60 year old male who presented with upper tract obstruction secondary to right UPJ stone.  Due evidence of urinary tract infection, the decision was made to offer cystoscopy, ureteral stent placement.  The patient was counseled on the possibility of stent discomfort.  After a discussion of all risks, benefits, and alternatives, the patient elected to proceed. The patient understands the potential need for more than one procedure to eliminate all stone burden.      DESCRIPTION OF PROCEDURE:  After informed consent was obtained, the patient was transported to the operating room & placed supine on the table. After adequate anesthesia was induced, the patient was placed in lithotomy and prepped and draped in the usual sterile fashion. A timeout was taken to confirm correct patient, procedure and laterality. Pre-operative IV antibiotics were administered.     A 22-Lithuanian rigid cystoscope was inserted into a well-lubricated urethra. The urethra was unremarkable without stricture. A cystoscopic evaluation demonstration demonstrated a small capacity bladder.  The bilateral ureteral orifices were in orthotopic position.  The right ureteral orifice was identified and cannulated with a sensor  wire with the aid of a 5F open-ended catheter. The wire passed without resistance into the upper pole.  The 5F open-ended catheter was advanced, the wire was removed and a retrograde pyelogram was performed revealing an unremarkable upper tract..  The sensor wire was reintroduced and the 5F catheter was removed.  The sensor wire encountered resistance, but an angle glidewire was advanced easily to the upper tract.   A retrograde pyelogram was performed, which demonstrated no hydronephrosis or filling defect with clearly demarcated calyces.  The wire was replaced and the 5 Fr catheter was backloaded off. A 6 x 26 ureteral stent was advanced with noted proximal and distal curls on fluoroscopy.  The bladder was emptied.    The patient tolerated the procedure well and there were no apparent complications. The patient  was transported to the postanesthesia care unit in stable condition.        POSTOP PLAN:  Plan for definitive stone management to be arranged.  Please contact Urology prior to discharge prior to scheduling  Continue broad spectrum abx until culture sensistivities result    Disposable items prior to timeout:  - 5 Fr and sensor wire

## 2021-02-09 NOTE — OR NURSING
Dr. Pancho Martin, with anesthesia, notified that patient has met criteria and is ready for transfer to the floor. Notified Dr. Martin that patient is still hypothermic but using the warming blanket. Per Dr. Martin, pt's temp is back at baseline and we are ok to transfer to the floor.

## 2021-02-09 NOTE — PROGRESS NOTES
Admitted/transferred from: 5A  Reason for admission/transfer: Severe Hypotension  2 RN skin assessment: completed by Caden CHOI and Real BENZ RN  Result of skin assessment and interventions/actions: findings charted  Height, weight, drug calc weight: Done  Patient belongings (see Flowsheet)  MDRO education added to care planYes    Pt arrived on unit and hypotension was treated with dopamine drip currently at 3/hr. Catheter producing adequate urine, but it is cherry red urine. Pt bradycardic at times, dopamine causing HR to trend up. Will continue to monitor pressures and treat accordingly.   ?

## 2021-02-09 NOTE — PHARMACY-AMINOGLYCOSIDE DOSING SERVICE
Pharmacy Aminoglycoside Follow-Up Note  Date of Service 2021  Patient's  1960   60 year old, male    Weight (Adjusted): 82.8 kg    Indication: Urinary Tract Infection (past UCx w/ PsA only sensitive to aminoglycosides)  Current Tobramycin regimen:  560 mg IV q24h  Day of therapy: 1    Target goals based on extended interval dosing  Goal Peak level: 17-24 mg/L  Goal Trough level: <0.5mg/L    Current estimated CrCl: Estimated Creatinine Clearance: 239.8 mL/min (A) (based on SCr of 0.38 mg/dL (L)).    Creatinine for last 3 days  2021: 11:37 AM Creatinine 0.30 mg/dL  2021:  2:20 AM Creatinine 0.35 mg/dL;  9:07 AM Creatinine 0.38 mg/dL    Nephrotoxins and other renal medications (From now, onward)    Start     Dose/Rate Route Frequency Ordered Stop    21 1000  tobramycin (NEBCIN) 560 mg in sodium chloride 0.9 % intermittent infusion      7 mg/kg × 80 kg (Adjusted)  over 60 Minutes Intravenous EVERY 24 HOURS 21 0942      21 0630  piperacillin-tazobactam (ZOSYN) 3.375 g vial to attach to  mL bag      3.375 g  over 30 Minutes Intravenous EVERY 6 HOURS 21 0625      21 1637  ketorolac (TORADOL) injection 15 mg      15 mg Intravenous EVERY 6 HOURS PRN 21 1705            Contrast Orders - past 72 hours (72h ago, onward)    Start     Dose/Rate Route Frequency Ordered Stop    21 1000  perflutren diluted 1mL to 2mL with saline (OPTISON) diluted injection 5 mL      5 mL Intravenous ONCE 21 0947      21 2120  iothalamate meglumine (CONRAY) 60 % injection  Status:  Discontinued        PRN 21 2133 21 2255    21 1340  iopamidol (ISOVUE-370) solution 135 mL      135 mL Intravenous ONCE 21 1339 21 1436          Aminoglycoside Levels - past 2 days  2021:  9:07 AM Tobramycin Level <0.3 mg/L    Aminoglycosides IV Administrations (past 72 hours)      No aminoglycosides orders with administrations in past 72 hours.                 Interpretation of levels and current regimen:  Aminoglycoside levels are outside of goal range    Has serum creatinine changed greater than 50% in the last 72 hours: No    Urine output:  good urine output    Renal function: Stable    Plan  1. Upon MAR review this morning, discovered that no dose was charted on yesterday evening. Ordered a STAT lab this morning to evaluate if dose had been given. Undetectable tobramycin level suggests that patient did not receive yesterday's dose. Rescheduled Tobramycin 560mg IV q24hr from this evening to now.     2. Pharmacy will continue to follow and check levels  as appropriate in 1-3 Days. Will check two post-dose levels following this morning's dose.    Christopher Wright, DenizD

## 2021-02-09 NOTE — PROGRESS NOTES
Rapid Response Team Note    Assessment   In assessment a rapid response was called on Bart Corea due to hypotension. This presentation is likely due to sepsis vs hypovolemic vs cardiogenic and worsened by quadraplegia.     Patient recently arrived from PACU, history of labile blood pressure, but per wife not as low as current readings. He is also fluid overloaded with plan to diurese tonight and JAVI in AM.     Plan   -  Hold lasix for now  - primary team ordered fluid bolus  - transfer to MICU    Discussed patient with Dr. Prescott who has accepted patient under MICU service      -  The Internal Medicine primary team was able to be reached and they are in agreement with the above plan.  -  Disposition: The patient will be transferred to the ICU.  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]  Hyponatremia [E87.1]  Complicated urinary tract infection [N39.0]  Urinary tract obstruction by kidney stone [N20.0, N13.8]  Other hypervolemia [E87.79]     Physical Exam   Temp: 93.2  F (34  C) Temp  Min: 92.8  F (33.8  C)  Max: 93.8  F (34.3  C)  Resp: 18 Resp  Min: 8  Max: 20  SpO2: 98 % SpO2  Min: 91 %  Max: 100 %  Pulse: (!) 47 Pulse  Min: 31  Max: 74    No data recorded  BP: (!) 69/30 Systolic (24hrs), Av , Min:69 , Max:139   Diastolic (24hrs), Av, Min:28, Max:98     I/Os: I/O last 3 completed shifts:  In: 600 [I.V.:600]  Out: 1525 [Urine:1525]     Exam:   General: acutely ill appearing  Mental Status: baseline mental status.      Significant Results and Procedures   Lactic Acid:   Recent Labs   Lab Test 21  1225 20  1516 16  1605   LACT 0.4* 0.8 0.4*     CBC:   Recent Labs   Lab Test 21  1137 20  1244  01/21/20  1516   WBC 3.5* 7.3 5.8   HGB 15.9 14.6 13.4   HCT 49.3 47.1 42.4   PLT 83* 197 178        Sepsis Evaluation   The patient is not known to have an infection.  Bart Corea meets SIRS criteria but does NOT have a lactate >2.  These vital sign, lab and physical exam findings are more than likely consistent with SEPSIS.

## 2021-02-09 NOTE — BRIEF OP NOTE
[facility]    Brief Operative Note    Pre-operative diagnosis: Sepsis (H) [A41.9]  Post-operative diagnosis Same as pre-operative diagnosis    Procedure: Procedure(s):  CYSTOSCOPY, WITH URETERAL STENT INSERTION  Surgeon: Surgeon(s) and Role:     * Javire Barrientos MD - Primary     * Ze Rosales MD - Resident - Assisting  Anesthesia: Monitor Anesthesia Care   Estimated blood loss: Minimal  Drains: Right 6 Fr x 26 cm double J ureteral stent, 16 Fr SPT (pre-existing)  Specimens: * No specimens in log *  Findings:   unremarkable right retrograde pyelogram, without significant hydronephrosis or filling defect.  Complications: None.  Implants:   Implant Name Type Inv. Item Serial No.  Lot No. LRB No. Used Action   STENT URETERAL PERCUFLEX PLUS [id number] Stent STENT URETERAL PERCUFLEX PLUS [id number]  [employer] [id number] Right 1 Implanted

## 2021-02-09 NOTE — PROGRESS NOTES
Brief crosscover note:    Called about rapid in the setting of hypotension. On my arrival noted patient covered with active warming. Bedside nurse reports acute onset of hypotension shortly after arrival from PACU associated with elevated EtCO2.    S:  Patient reports unspecific abdominal pain.    O:  MAP ~40 HR 48 RR 8-10 SpO2 98% on oxymask 3L.  -General: Alert, NAD, answers to questions slowly but appropriate.  -HEENT: Dry to moist mucous membranes.  -Neck: No JVD.  -CV: Bradycardic regular faint heart sounds, no additional sounds.   -Resp: CTAB.  -Abdomen: Protuberant, nontender.  -Extremities: Warm, capillary refill ~2 secs.  -Neuro: Oriented x3, follows commands.    A/P:    # Hypotension  Periprocedural hypotension in the setting of potential septic shock. MAP <65 despite fluid challenge with 500cc of LR. Physical exam with warm extremities and adequate capillary refill and adequate perfusion to brain/heart with noted no compensatory tachycardia with hypotension due to possibly autonomic dysreflexia.   -Hypovolemic: Unlikely as no cold extremities/delayed refill, total fluid received ~1L since admission.  -Cardiogenic: Unlikely as no cold extremities/delayed capillary refill, no findings on auscultation and no pulmonary edema, last EF 50-55% (4/17).  -Obstructive: No signs of tamponade/PNT.  -Distributive: Presentation concerning for sepsis. Physical exam consistent with distributive shock that may be either of neurogenic/vasoplegic source from dysautonomia, procedural anesthesia, stent placement with septic shock that remains of unclear source (Right RPG wo hydronephrosis or filling defect, but residual stone burden).  -Transfer to MICU.    # Acute respiratory failure type III  In the setting of periprocedural anesthesia with noted hypoventilation (RR 6-10/min) and elevated EtCO2.

## 2021-02-09 NOTE — PROGRESS NOTES
"Urology  Progress Note  02/09/2021    -Emergent right stent placed last night  -Hypotensive and bradycardic after transferring to floor post-procedure therefore transferred to SICU for pressors  -BP and HR improved on dopamine, weaning  -Sitting in bed this AM, comfortable    Exam  /50   Pulse 92   Temp 96  F (35.6  C) (Tympanic)   Resp 16   Ht 1.727 m (5' 8\")   Wt 97.4 kg (214 lb 11.7 oz)   SpO2 96%   BMI 32.65 kg/m    No acute distress  Unlabored breathing  Abdomen soft, nontender, nondistended.  SPT with dwaine urine in tubing    I/O's (last 24/since midnight)  UOP 1525/300    Labs  WBC 3.6  Hgb 14.5  Cr 0.35    Assessment/Plan  60 year old male POD#1 s/p emergent right ureteral stent placement for obstructing right UPJ stone and concern for sepsis. Transferred to SICU overnight due to bradycardia and hypotension, now both improved.    -Continue SPT and ureteral stent  -Continue broad spectrum abx  -Follow urine culture and narrow accordingly  -Urology will continue to follow    Seen and examined with the chief resident. To be discussed with Dr. Barrientos.    Phillip Amos MD  Urology Resident     Contacting the Urology Team     Please use the following job codes to reach the Urology Team. Note that you must use an in house phone and that job codes cannot receive text pages.   On weekdays, dial [phone] (or star-star-star [phone] on the new [employer] telephones) then [phone] to reach the Adult Urology resident or PA on call  On weekdays, dial [phone] (or star-star-star 777 on the new [employer] telephones) then [phone] to reach the Pediatric Urology resident  On weeknights and weekends, dial [phone] (or star-star-star 777 on the new [employer] telephones) then [phone] to reach the Urology resident on call (for both Adult and Pediatrics)              "

## 2021-02-09 NOTE — PHARMACY-AMINOGLYCOSIDE DOSING SERVICE
Pharmacy Aminoglycoside Initial Note  Date of Service 2021  Patient's  1960  60 year old, male    Weight (Adjusted):  82.58 kg    Indication: Urinary Tract Infection (past UCx w/ PsA only sensitive to aminoglycosides)     Current estimated CrCl = Estimated Creatinine Clearance: 314.3 mL/min (A) (based on SCr of 0.3 mg/dL (L)).    Creatinine for last 3 days  2021: 11:37 AM Creatinine 0.30 mg/dL     Nephrotoxins and other renal medications (From now, onward)    Start     Dose/Rate Route Frequency Ordered Stop    21 1835  tobramycin (NEBCIN) 720 mg in sodium chloride 0.9 % intermittent infusion      7 mg/kg × 104.3 kg  over 60 Minutes Intravenous EVERY 24 HOURS 21 1833      21 1830  furosemide (LASIX) injection 20 mg      20 mg  over 1-3 Minutes Intravenous EVERY 6 HOURS 21 1745 21 1229    21 1637  ketorolac (TORADOL) injection 15 mg      15 mg Intravenous EVERY 6 HOURS PRN 21 1705            Contrast Orders - past 72 hours (72h ago, onward)    Start     Dose/Rate Route Frequency Ordered Stop    21 1340  iopamidol (ISOVUE-370) solution 135 mL      135 mL Intravenous ONCE 21 1339 21 1436          Aminoglycoside Levels - past 2 days  No results found for requested labs within last 48 hours.    Aminoglycosides IV Administrations (past 72 hours)      No aminoglycosides orders with administrations in past 72 hours.                    Plan:  1.  Start Tobramycin 560 mg (7 mg/kg) IV q24h.   2.  Target goals based on extended interval dosing  3.  Goal peak level: 17-24 mg/L  4.  Goal trough level: <0.5mg/L  5.  Pharmacy will continue to follow and check levels as appropriate in 1-3 Days      Annie Hickey PharmD

## 2021-02-09 NOTE — ED NOTES
Rectal temp noted to be 92.9. Pt. Skin is warm, pink, dry throughout body. Warm blankets provided while waiting for transport to 3C. LILIANA Solorzano RN notified of findings.

## 2021-02-09 NOTE — PROGRESS NOTES
"ICU Staff Note    Date of Service: February 9, 2021     SUBJECTIVE:   Accepted in transfer overnight with concerns of shock after ureteral stent placement.    OBJECTIVE:  Blood pressure 115/46, pulse 80, temperature 98.7  F (37.1  C), temperature source Oral, resp. rate 18, height 1.727 m (5' 8\"), weight 97.4 kg (214 lb 11.7 oz), SpO2 95 %.  Physical Exam  Constitutional: in bed, no distress  Cardiac: RRR  Respiratory: normal air movement  GI:  Abdomen soft, obese, non-tender. BS present.   Peripheral Vascular: 2+ lower extremity edema.   Musculoskeletal:  ROM intact, decreased muscle bulk    Neuro:  quadriplegic      Labs: reviewed    Imaging: reviewed    ASSESSMENT/PLAN:   60y M with PMHx of C4 Quad, neurogenic bladder, admitted after stent placement to right ureter for obstructing stone and post-procedural hypotension.  - Continues on dopamine 2mcg/kg  - No growth to date from cultures  - May be related to autonomic dysreflexia more so than septic shock    - Notes baseline BP is ~ 110s/70s    RECS:  - Continuing on pressor support (peripheral) at this time  - Remains on ABx.  History of MDR infections  - No indication for volume resuscitation; severe third spacing  - continue on baclofen  - will start DVT prophylaxis    Evaluation and management time exclusive of procedures was 35 minutes critical care time including:    - examination with the ICU team  - Review of clinical course over previous 24 hours  - Review of labs  - Review of imaging  - Review of microbiology  - Discussion of the patient's condition with other physicians and members of the care team  - management of vasopressors  - time utilizing the EMR for documentation of this patient's care.        Thomas M. Leventhal, M.D.   of Medicine  Advanced/Transplant Hepatology & Critical Care Medicine  Date of Service (when I saw the patient): 02/09/21     "

## 2021-02-09 NOTE — ANESTHESIA PROCEDURE NOTES
Airway   Date/Time: 2/8/2021 8:44 PM   Patient location during procedure: OR  Staff -   Anesthesiologist:  Pancho Martin MD  Performed By: anesthesiologist    Consent for Airway   Urgency: elective    Indications and Patient Condition  Indications for airway management: taya-procedural  Induction type:intravenousMask difficulty assessment: 2 - vent by mask + OA or adjuvant +/- NMBA    Final Airway Details  Final airway type: endotracheal airway  Successful airway:ETT - single  Endotracheal Airway Details   ETT size (mm): 7.0  Cuffed: yes  Successful intubation technique: direct laryngoscopy  Grade View of Cords: 3  Adjucts: stylet  Measured from: lips  Secured with: cloth tape  Bite block used: Oral Airway    Post intubation assessment   Placement verified by: capnometry, equal breath sounds and chest rise   Number of attempts at approach: 3  Secured with:cloth tape  Ease of procedure: difficult (cmac malfunctioning. DL with cmac 4 per Dr. Martin.  Epiglottis visualized.  Ett passed below epiglottis.  + end tidal CO2)  Dentition: Intact and UnchangedAdditional Comments  Cmac malfunctioning.  DL with cmac 4 per Dr. Martin. Epiglottis visualized.  ETT able to pass beneath epiglottis.  +end tidal CO2
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
## 2021-02-09 NOTE — ANESTHESIA POSTPROCEDURE EVALUATION
Patient: Bart Corea    Procedure(s):  CYSTOSCOPY, WITH URETERAL STENT INSERTION    Diagnosis:Sepsis (H) [A41.9]  Diagnosis Additional Information: No value filed.    Anesthesia Type:  General    Note:  Disposition: Admission   Postop Pain Control: Uneventful            Sign Out: Well controlled pain   PONV: No   Neuro/Psych: Uneventful            Sign Out: Acceptable/Baseline neuro status   Airway/Respiratory: Uneventful            Sign Out: Acceptable/Baseline resp. status   CV/Hemodynamics: Uneventful            Sign Out: Acceptable CV status   Other NRE: NONE   DID A NON-ROUTINE EVENT OCCUR? No    Event details/Postop Comments:  Patient hypothermic but at his baseline upon transfer to the floor.         Last vitals:  Vitals:    02/08/21 2148 02/08/21 2200 02/08/21 2215   BP: 98/70 102/61 122/80   Pulse: 53 51 50   Resp: 12 10 12   Temp: 33.8  C (92.9  F)     SpO2: 99% 100% 100%       Last vitals prior to Anesthesia Care Transfer:  CRNA VITALS  2/8/2021 2120 - 2/8/2021 2220      2/8/2021             NIBP:  98/70    Ht Rate:  53    SpO2:  100 %    EKG:  NSR          Electronically Signed By: Pancho Martin MD  February 8, 2021  10:30 PM

## 2021-02-09 NOTE — ANESTHESIA CARE TRANSFER NOTE
Patient: Bart Corea    Procedure(s):  CYSTOSCOPY, WITH URETERAL STENT INSERTION    Diagnosis: Sepsis (H) [A41.9]  Diagnosis Additional Information: No value filed.    Anesthesia Type:   General     Note:    Oropharynx: oral airway in place  Level of Consciousness: awake  Oxygen Supplementation: face mask  Level of Supplemental Oxygen (L/min / FiO2): 100  Independent Airway: airway patency satisfactory and stable  Dentition: dentition unchanged  Vital Signs Stable: post-procedure vital signs reviewed and stable  Report to RN Given: handoff report given  Patient transferred to: PACU    Handoff Report: Identifed the Patient, Identified the Reponsible Provider, Reviewed the pertinent medical history, Discussed the surgical course, Reviewed Intra-OP anesthesia mangement and issues during anesthesia, Set expectations for post-procedure period and Allowed opportunity for questions and acknowledgement of understanding      Vitals: (Last set prior to Anesthesia Care Transfer)  CRNA VITALS  2/8/2021 2120 - 2/8/2021 2153      2/8/2021             NIBP:  98/70    Ht Rate:  53    SpO2:  100 %    EKG:  NSR        Electronically Signed By: JC Pat CRNA  February 8, 2021  9:53 PM

## 2021-02-10 ENCOUNTER — APPOINTMENT (OUTPATIENT)
Dept: CARDIOLOGY | Facility: CLINIC | Age: 61
End: 2021-02-10
Payer: COMMERCIAL

## 2021-02-10 LAB
ALBUMIN SERPL-MCNC: 2.6 G/DL (ref 3.4–5)
ALBUMIN SERPL-MCNC: NORMAL G/DL (ref 3.4–5)
ALP SERPL-CCNC: 89 U/L (ref 40–150)
ALP SERPL-CCNC: NORMAL U/L (ref 40–150)
ALT SERPL W P-5'-P-CCNC: 42 U/L (ref 0–70)
ALT SERPL W P-5'-P-CCNC: NORMAL U/L (ref 0–70)
ANION GAP SERPL CALCULATED.3IONS-SCNC: 4 MMOL/L (ref 3–14)
ANION GAP SERPL CALCULATED.3IONS-SCNC: NORMAL MMOL/L (ref 6–17)
AST SERPL W P-5'-P-CCNC: 29 U/L (ref 0–45)
AST SERPL W P-5'-P-CCNC: NORMAL U/L (ref 0–45)
BILIRUB SERPL-MCNC: 1.1 MG/DL (ref 0.2–1.3)
BILIRUB SERPL-MCNC: NORMAL MG/DL (ref 0.2–1.3)
BUN SERPL-MCNC: 12 MG/DL (ref 7–30)
BUN SERPL-MCNC: NORMAL MG/DL (ref 7–30)
CALCIUM SERPL-MCNC: 8.7 MG/DL (ref 8.5–10.1)
CALCIUM SERPL-MCNC: NORMAL MG/DL (ref 8.5–10.1)
CHLORIDE SERPL-SCNC: 99 MMOL/L (ref 94–109)
CHLORIDE SERPL-SCNC: NORMAL MMOL/L (ref 94–109)
CO2 SERPL-SCNC: 33 MMOL/L (ref 20–32)
CO2 SERPL-SCNC: NORMAL MMOL/L (ref 20–32)
CREAT SERPL-MCNC: 0.42 MG/DL (ref 0.66–1.25)
CREAT SERPL-MCNC: NORMAL MG/DL (ref 0.66–1.25)
ERYTHROCYTE [DISTWIDTH] IN BLOOD BY AUTOMATED COUNT: 15 % (ref 10–15)
ERYTHROCYTE [DISTWIDTH] IN BLOOD BY AUTOMATED COUNT: NORMAL % (ref 10–15)
GFR SERPL CREATININE-BSD FRML MDRD: >90 ML/MIN/{1.73_M2}
GFR SERPL CREATININE-BSD FRML MDRD: NORMAL ML/MIN/{1.73_M2}
GLUCOSE SERPL-MCNC: 75 MG/DL (ref 70–99)
GLUCOSE SERPL-MCNC: NORMAL MG/DL (ref 70–99)
HCT VFR BLD AUTO: 46.2 % (ref 40–53)
HCT VFR BLD AUTO: NORMAL % (ref 40–53)
HGB BLD-MCNC: 14.5 G/DL (ref 13.3–17.7)
HGB BLD-MCNC: NORMAL G/DL (ref 13.3–17.7)
MCH RBC QN AUTO: 30.3 PG (ref 26.5–33)
MCH RBC QN AUTO: NORMAL PG (ref 26.5–33)
MCHC RBC AUTO-ENTMCNC: 31.4 G/DL (ref 31.5–36.5)
MCHC RBC AUTO-ENTMCNC: NORMAL G/DL (ref 31.5–36.5)
MCV RBC AUTO: 97 FL (ref 78–100)
MCV RBC AUTO: NORMAL FL (ref 78–100)
PLATELET # BLD AUTO: 82 10E9/L (ref 150–450)
PLATELET # BLD AUTO: NORMAL 10E9/L (ref 150–450)
POTASSIUM SERPL-SCNC: 4.2 MMOL/L (ref 3.4–5.3)
POTASSIUM SERPL-SCNC: NORMAL MMOL/L (ref 3.4–5.3)
PROT SERPL-MCNC: 6 G/DL (ref 6.8–8.8)
PROT SERPL-MCNC: NORMAL G/DL (ref 6.8–8.8)
RBC # BLD AUTO: 4.78 10E12/L (ref 4.4–5.9)
RBC # BLD AUTO: NORMAL 10E12/L (ref 4.4–5.9)
SODIUM SERPL-SCNC: 136 MMOL/L (ref 133–144)
SODIUM SERPL-SCNC: NORMAL MMOL/L (ref 133–144)
TOBRAMYCIN SERPL-MCNC: 7.9 MG/L
TSH SERPL DL<=0.005 MIU/L-ACNC: 1.95 MU/L (ref 0.4–4)
TSH SERPL DL<=0.005 MIU/L-ACNC: NORMAL MU/L (ref 0.4–4)
WBC # BLD AUTO: 4 10E9/L (ref 4–11)
WBC # BLD AUTO: NORMAL 10E9/L (ref 4–11)

## 2021-02-10 PROCEDURE — 250N000011 HC RX IP 250 OP 636: Performed by: STUDENT IN AN ORGANIZED HEALTH CARE EDUCATION/TRAINING PROGRAM

## 2021-02-10 PROCEDURE — 99291 CRITICAL CARE FIRST HOUR: CPT | Mod: GC | Performed by: INTERNAL MEDICINE

## 2021-02-10 PROCEDURE — 258N000003 HC RX IP 258 OP 636: Performed by: INTERNAL MEDICINE

## 2021-02-10 PROCEDURE — 250N000009 HC RX 250: Performed by: STUDENT IN AN ORGANIZED HEALTH CARE EDUCATION/TRAINING PROGRAM

## 2021-02-10 PROCEDURE — 250N000013 HC RX MED GY IP 250 OP 250 PS 637: Performed by: STUDENT IN AN ORGANIZED HEALTH CARE EDUCATION/TRAINING PROGRAM

## 2021-02-10 PROCEDURE — 80200 ASSAY OF TOBRAMYCIN: CPT | Performed by: INTERNAL MEDICINE

## 2021-02-10 PROCEDURE — 93321 DOPPLER ECHO F-UP/LMTD STD: CPT | Mod: 26 | Performed by: INTERNAL MEDICINE

## 2021-02-10 PROCEDURE — 93308 TTE F-UP OR LMTD: CPT | Mod: 26 | Performed by: INTERNAL MEDICINE

## 2021-02-10 PROCEDURE — 999N000157 HC STATISTIC RCP TIME EA 10 MIN

## 2021-02-10 PROCEDURE — 250N000011 HC RX IP 250 OP 636: Performed by: INTERNAL MEDICINE

## 2021-02-10 PROCEDURE — 80053 COMPREHEN METABOLIC PANEL: CPT | Performed by: STUDENT IN AN ORGANIZED HEALTH CARE EDUCATION/TRAINING PROGRAM

## 2021-02-10 PROCEDURE — 250N000011 HC RX IP 250 OP 636: Performed by: NURSE PRACTITIONER

## 2021-02-10 PROCEDURE — 93325 DOPPLER ECHO COLOR FLOW MAPG: CPT | Mod: 26 | Performed by: INTERNAL MEDICINE

## 2021-02-10 PROCEDURE — 36415 COLL VENOUS BLD VENIPUNCTURE: CPT | Performed by: STUDENT IN AN ORGANIZED HEALTH CARE EDUCATION/TRAINING PROGRAM

## 2021-02-10 PROCEDURE — 36415 COLL VENOUS BLD VENIPUNCTURE: CPT | Performed by: INTERNAL MEDICINE

## 2021-02-10 PROCEDURE — 120N000002 HC R&B MED SURG/OB UMMC

## 2021-02-10 PROCEDURE — 85027 COMPLETE CBC AUTOMATED: CPT | Performed by: STUDENT IN AN ORGANIZED HEALTH CARE EDUCATION/TRAINING PROGRAM

## 2021-02-10 PROCEDURE — 99223 1ST HOSP IP/OBS HIGH 75: CPT | Mod: GC | Performed by: FAMILY MEDICINE

## 2021-02-10 PROCEDURE — 99232 SBSQ HOSP IP/OBS MODERATE 35: CPT | Performed by: STUDENT IN AN ORGANIZED HEALTH CARE EDUCATION/TRAINING PROGRAM

## 2021-02-10 PROCEDURE — 84443 ASSAY THYROID STIM HORMONE: CPT | Performed by: STUDENT IN AN ORGANIZED HEALTH CARE EDUCATION/TRAINING PROGRAM

## 2021-02-10 PROCEDURE — 93325 DOPPLER ECHO COLOR FLOW MAPG: CPT

## 2021-02-10 PROCEDURE — 94640 AIRWAY INHALATION TREATMENT: CPT

## 2021-02-10 PROCEDURE — 84443 ASSAY THYROID STIM HORMONE: CPT | Performed by: INTERNAL MEDICINE

## 2021-02-10 RX ORDER — KETOROLAC TROMETHAMINE 15 MG/ML
15 INJECTION, SOLUTION INTRAMUSCULAR; INTRAVENOUS EVERY 6 HOURS PRN
Status: DISCONTINUED | OUTPATIENT
Start: 2021-02-10 | End: 2021-02-13

## 2021-02-10 RX ORDER — SENNOSIDES 8.6 MG
2 TABLET ORAL
Status: DISCONTINUED | OUTPATIENT
Start: 2021-02-10 | End: 2021-02-20 | Stop reason: HOSPADM

## 2021-02-10 RX ORDER — ALBUTEROL SULFATE 0.83 MG/ML
2.5 SOLUTION RESPIRATORY (INHALATION) ONCE
Status: COMPLETED | OUTPATIENT
Start: 2021-02-10 | End: 2021-02-10

## 2021-02-10 RX ORDER — FUROSEMIDE 10 MG/ML
40 INJECTION INTRAMUSCULAR; INTRAVENOUS ONCE
Status: COMPLETED | OUTPATIENT
Start: 2021-02-10 | End: 2021-02-10

## 2021-02-10 RX ORDER — DOCUSATE SODIUM 100 MG/1
100 CAPSULE, LIQUID FILLED ORAL DAILY
Status: DISCONTINUED | OUTPATIENT
Start: 2021-02-10 | End: 2021-02-20 | Stop reason: HOSPADM

## 2021-02-10 RX ADMIN — TOBRAMYCIN 400 MG: 40 INJECTION INTRAMUSCULAR; INTRAVENOUS at 16:19

## 2021-02-10 RX ADMIN — ALBUTEROL SULFATE 2.5 MG: 2.5 SOLUTION RESPIRATORY (INHALATION) at 22:08

## 2021-02-10 RX ADMIN — BACLOFEN 20 MG: 20 TABLET ORAL at 21:47

## 2021-02-10 RX ADMIN — SENNOSIDES 2 TABLET: 8.6 TABLET, FILM COATED ORAL at 18:35

## 2021-02-10 RX ADMIN — KETOROLAC TROMETHAMINE 15 MG: 15 INJECTION, SOLUTION INTRAMUSCULAR; INTRAVENOUS at 18:00

## 2021-02-10 RX ADMIN — OXYBUTYNIN 10 MG: 10 TABLET, FILM COATED, EXTENDED RELEASE ORAL at 10:21

## 2021-02-10 RX ADMIN — PIPERACILLIN AND TAZOBACTAM 3.38 G: 3; .375 INJECTION, POWDER, LYOPHILIZED, FOR SOLUTION INTRAVENOUS at 17:54

## 2021-02-10 RX ADMIN — DOCUSATE SODIUM 100 MG: 100 CAPSULE, LIQUID FILLED ORAL at 17:54

## 2021-02-10 RX ADMIN — ACETAMINOPHEN 975 MG: 325 TABLET, FILM COATED ORAL at 18:35

## 2021-02-10 RX ADMIN — FUROSEMIDE 40 MG: 10 INJECTION, SOLUTION INTRAVENOUS at 14:59

## 2021-02-10 RX ADMIN — ACETAMINOPHEN 975 MG: 325 TABLET, FILM COATED ORAL at 01:04

## 2021-02-10 RX ADMIN — ACETAMINOPHEN 975 MG: 325 TABLET, FILM COATED ORAL at 10:34

## 2021-02-10 RX ADMIN — KETOROLAC TROMETHAMINE 15 MG: 15 INJECTION, SOLUTION INTRAMUSCULAR; INTRAVENOUS at 10:37

## 2021-02-10 RX ADMIN — PIPERACILLIN AND TAZOBACTAM 3.38 G: 3; .375 INJECTION, POWDER, LYOPHILIZED, FOR SOLUTION INTRAVENOUS at 02:39

## 2021-02-10 RX ADMIN — BACLOFEN 20 MG: 20 TABLET ORAL at 16:26

## 2021-02-10 RX ADMIN — PIPERACILLIN AND TAZOBACTAM 3.38 G: 3; .375 INJECTION, POWDER, LYOPHILIZED, FOR SOLUTION INTRAVENOUS at 11:57

## 2021-02-10 RX ADMIN — Medication 5 MG: at 21:48

## 2021-02-10 RX ADMIN — ENOXAPARIN SODIUM 40 MG: 40 INJECTION SUBCUTANEOUS at 12:00

## 2021-02-10 RX ADMIN — BACLOFEN 20 MG: 20 TABLET ORAL at 07:59

## 2021-02-10 RX ADMIN — PIPERACILLIN AND TAZOBACTAM 3.38 G: 3; .375 INJECTION, POWDER, LYOPHILIZED, FOR SOLUTION INTRAVENOUS at 06:17

## 2021-02-10 RX ADMIN — ZOLPIDEM TARTRATE 10 MG: 10 TABLET, FILM COATED ORAL at 21:49

## 2021-02-10 RX ADMIN — OXYCODONE HYDROCHLORIDE AND ACETAMINOPHEN 1000 MG: 500 TABLET ORAL at 07:58

## 2021-02-10 RX ADMIN — BACLOFEN 20 MG: 20 TABLET ORAL at 11:57

## 2021-02-10 ASSESSMENT — ACTIVITIES OF DAILY LIVING (ADL)
ADLS_ACUITY_SCORE: 31

## 2021-02-10 NOTE — PROGRESS NOTES
ICU End of Shift Summary. See flowsheets for vital signs and detailed assessment.    Changes this shift: Remains alert and oriented and able to make needs known. Dopamine has been on and off all night to maintain MAP goal >65, team is aware of need for PICC in order to continue with pressors. Urine has become less red/cherry as night has gone on.    Plan: Potentially place PICC. Continue with current treatment plan.
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
## 2021-02-10 NOTE — PHARMACY-AMINOGLYCOSIDE DOSING SERVICE
Pharmacy Aminoglycoside Follow-Up Note  Date of Service February 10, 2021  Patient's  1960   60 year old, male    Weight (Adjusted): 82.8 kg    Indication: Urinary Tract Infection (past UCx w/ PsA only sensitive to aminoglycosides)   Current Tobramycin regimen:  560 mg IV q24h  Day of therapy: 2    Target goals based on extended interval dosing  Goal Peak level: 17-24 mg/L  Goal Trough level: <0.5mg/L    Current estimated CrCl: Estimated Creatinine Clearance: 216.9 mL/min (A) (based on SCr of 0.42 mg/dL (L)).    Cystatin C calculated eGFR on  = 50 mL/min/1.73m2  This lab was drawn at the same time as a SCr of 0.3. SCr significantly overestimates this patient's renal function.    Creatinine for last 3 days  2021: 11:37 AM Creatinine 0.30 mg/dL  2021:  2:20 AM Creatinine 0.35 mg/dL;  9:07 AM Creatinine 0.38 mg/dL;  2:12 PM Creatinine 0.38 mg/dL  2/10/2021:  4:02 AM Creatinine Canceled, Test credited mg/dL;  5:32 AM Creatinine 0.42 mg/dL    Nephrotoxins and other renal medications (From now, onward)    Start     Dose/Rate Route Frequency Ordered Stop    02/10/21 1600  tobramycin (NEBCIN) 400 mg in sodium chloride 0.9 % intermittent infusion      400 mg  over 60 Minutes Intravenous EVERY 36 HOURS 02/10/21 0645      02/10/21 0908  ketorolac (TORADOL) injection 15 mg      15 mg Intravenous EVERY 6 HOURS PRN 02/10/21 0913 21 0907    21 0630  piperacillin-tazobactam (ZOSYN) 3.375 g vial to attach to  mL bag      3.375 g  over 30 Minutes Intravenous EVERY 6 HOURS 21 0625            Contrast Orders - past 72 hours (72h ago, onward)    Start     Dose/Rate Route Frequency Ordered Stop    21 1000  perflutren diluted 1mL to 2mL with saline (OPTISON) diluted injection 5 mL      5 mL Intravenous ONCE 21 0947 21 1000    21 2120  iothalamate meglumine (CONRAY) 60 % injection  Status:  Discontinued        PRN 21 2133 21 2255    21 1340  iopamidol  (ISOVUE-370) solution 135 mL      135 mL Intravenous ONCE 02/08/21 1339 02/08/21 1436          Aminoglycoside Levels - past 2 days  2/9/2021:  9:07 AM Tobramycin Level <0.3 mg/L; 12:27 PM Tobramycin Level Single Daily Dose 18.5 mg/L;  8:21 PM Tobramycin Level Single Daily Dose 5.4 mg/L    Aminoglycosides IV Administrations (past 72 hours)                   tobramycin (NEBCIN) 560 mg in sodium chloride 0.9 % intermittent infusion (mg) 560 mg New Bag 02/09/21 1005                Pharmacokinetic Analysis            Interpretation of levels and current regimen:  Aminoglycoside levels are outside of goal range. Peak is within goal range, though on high side. Trough is above goal range.     Has serum creatinine changed greater than 50% in the last 72 hours: No    Urine output:  good urine output    Renal function: Worsening SCr, though maintaining good urine output     Plan  1. Decrease dose to 400mg Tobramycin IV q36 hours. Given known WALLY of PsA from previous urine culture of <1 and that tobramycin concentrates well in the urine, will aim for lower end of goal trough to preserve patient's kidneys. Based on calculated Kd, patient is unlikely to completely clear Tobramycin between doses on a 24 hour dosing interval. Will change interal to q36hr to give patient adequate time to clear Tobramycin in between doses.     2.  Method of evaluation: 2 post dose levels    3. Pharmacy will continue to follow and check levels  as appropriate in 1-3 Days    Deniz VelaD

## 2021-02-10 NOTE — PLAN OF CARE
Shift events:    Alert and oriented x 4. Prn tylenol and oxycodone for pain control. Patient c/o of headache (chronic) or . Patient has a history of quadriplegia -- Unable to move LUE & BLE, shows flicker of muscle contraction in RUE; No sensation in all extremities.     SB - NSR 50s-60s. SBP 80s-100s. MAP Goal > or = 55.   Patient weaned off dopamine gtt this morning at 10 AM. MAP goal maintained with no further intervention post discontinuation of dopamine.     Room air. Clear diminished lung sounds.    Suprapubic catheter in place. 40mg IV push lasix given once this shift for diminished urine production. UO post lasix administration = 875ml.    Regular Diet.     q2 hour turns done for frequent offloading. Hourly rounding done. Fall and safety precautions in place.

## 2021-02-10 NOTE — PROGRESS NOTES
"ICU Staff Note    Date of Service: February 10, 2021     SUBJECTIVE:   Attempted wean of the dopamine overnight, had issues with hypo and hypertension - felt related to hyperreflexia.   A&Ox3    OBJECTIVE:  Blood pressure 104/58, pulse 57, temperature 98.1  F (36.7  C), temperature source Oral, resp. rate 11, height 1.727 m (5' 8\"), weight 97.4 kg (214 lb 11.7 oz), SpO2 98 %.  Physical Exam  Constitutional: no overt distress  Cardiac: RRR  Respiratory: normal respiratory excursion  GI:  Abdomen soft, non-distended, non-tender. BS present.   Peripheral Vascular: 2+ lower extremity edema.   Musculoskeletal:  ROM intact, normal muscle bulk    Neuro:  Plegic in extremities     Labs: reviewed    Imaging: reviewed    ASSESSMENT/PLAN:   60y M with PMHx of C4 Quad, neurogenic bladder, admitted after stent placement to right ureter for obstructing stone and post-procedural hypotension.    RECS:  - Wean off of pressors    - If stable will transfer to floor  - Continue on Abx per ID  - continue on home pain meds and anti-spasmotics    Evaluation and management time exclusive of procedures was 30 minutes critical care time including:    - examination with the ICU team  - Review of clinical course over previous 24 hours  - Review of labs  - Review of imaging  - Review of microbiology  - Discussion of the patient's condition with other physicians and members of the care team  - pressor management  - time utilizing the EMR for documentation of this patient's care.        Thomas M. Leventhal, M.D.   of Medicine  [facility]  Date of Service (when I saw the patient): 02/10/21     "

## 2021-02-10 NOTE — PLAN OF CARE
Problem: Adult Inpatient Plan of Care  Goal: Readiness for Transition of Care  Outcome: No Change     Continues to be on and off Dopamine overnight.     Problem: Obstructive Sleep Apnea Risk or Actual (Comorbidity Management)  Goal: Unobstructed Breathing During Sleep  Outcome: No Change
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
## 2021-02-11 ENCOUNTER — APPOINTMENT (OUTPATIENT)
Dept: OCCUPATIONAL THERAPY | Facility: CLINIC | Age: 61
End: 2021-02-11
Payer: COMMERCIAL

## 2021-02-11 LAB
ANION GAP SERPL CALCULATED.3IONS-SCNC: 4 MMOL/L (ref 3–14)
BASOPHILS # BLD AUTO: 0 10E9/L (ref 0–0.2)
BASOPHILS NFR BLD AUTO: 0.6 %
BUN SERPL-MCNC: 13 MG/DL (ref 7–30)
CALCIUM SERPL-MCNC: 8.6 MG/DL (ref 8.5–10.1)
CHLORIDE SERPL-SCNC: 96 MMOL/L (ref 94–109)
CO2 SERPL-SCNC: 31 MMOL/L (ref 20–32)
CREAT SERPL-MCNC: 0.38 MG/DL (ref 0.66–1.25)
CRP SERPL-MCNC: 95 MG/L (ref 0–8)
DIFFERENTIAL METHOD BLD: ABNORMAL
EOSINOPHIL # BLD AUTO: 0.1 10E9/L (ref 0–0.7)
EOSINOPHIL NFR BLD AUTO: 1 %
ERYTHROCYTE [DISTWIDTH] IN BLOOD BY AUTOMATED COUNT: 15.2 % (ref 10–15)
GFR SERPL CREATININE-BSD FRML MDRD: >90 ML/MIN/{1.73_M2}
GLUCOSE BLDC GLUCOMTR-MCNC: 101 MG/DL (ref 70–99)
GLUCOSE BLDC GLUCOMTR-MCNC: 114 MG/DL (ref 70–99)
GLUCOSE BLDC GLUCOMTR-MCNC: 118 MG/DL (ref 70–99)
GLUCOSE BLDC GLUCOMTR-MCNC: 99 MG/DL (ref 70–99)
GLUCOSE SERPL-MCNC: 83 MG/DL (ref 70–99)
HCT VFR BLD AUTO: 41 % (ref 40–53)
HGB BLD-MCNC: 12.8 G/DL (ref 13.3–17.7)
IMM GRANULOCYTES # BLD: 0 10E9/L (ref 0–0.4)
IMM GRANULOCYTES NFR BLD: 0.4 %
LYMPHOCYTES # BLD AUTO: 0.6 10E9/L (ref 0.8–5.3)
LYMPHOCYTES NFR BLD AUTO: 12.7 %
MAGNESIUM SERPL-MCNC: 1.7 MG/DL (ref 1.6–2.3)
MCH RBC QN AUTO: 29.8 PG (ref 26.5–33)
MCHC RBC AUTO-ENTMCNC: 31.2 G/DL (ref 31.5–36.5)
MCV RBC AUTO: 95 FL (ref 78–100)
MONOCYTES # BLD AUTO: 0.9 10E9/L (ref 0–1.3)
MONOCYTES NFR BLD AUTO: 18.9 %
NEUTROPHILS # BLD AUTO: 3.2 10E9/L (ref 1.6–8.3)
NEUTROPHILS NFR BLD AUTO: 66.4 %
NRBC # BLD AUTO: 0 10*3/UL
NRBC BLD AUTO-RTO: 0 /100
PLATELET # BLD AUTO: 79 10E9/L (ref 150–450)
POTASSIUM SERPL-SCNC: 4 MMOL/L (ref 3.4–5.3)
RBC # BLD AUTO: 4.3 10E12/L (ref 4.4–5.9)
SODIUM SERPL-SCNC: 132 MMOL/L (ref 133–144)
TOBRAMYCIN SERPL-MCNC: 4.1 MG/L
WBC # BLD AUTO: 4.8 10E9/L (ref 4–11)

## 2021-02-11 PROCEDURE — 250N000011 HC RX IP 250 OP 636: Performed by: NURSE PRACTITIONER

## 2021-02-11 PROCEDURE — 250N000013 HC RX MED GY IP 250 OP 250 PS 637: Performed by: STUDENT IN AN ORGANIZED HEALTH CARE EDUCATION/TRAINING PROGRAM

## 2021-02-11 PROCEDURE — 99233 SBSQ HOSP IP/OBS HIGH 50: CPT | Mod: GC | Performed by: FAMILY MEDICINE

## 2021-02-11 PROCEDURE — 83735 ASSAY OF MAGNESIUM: CPT | Performed by: STUDENT IN AN ORGANIZED HEALTH CARE EDUCATION/TRAINING PROGRAM

## 2021-02-11 PROCEDURE — 36415 COLL VENOUS BLD VENIPUNCTURE: CPT | Performed by: STUDENT IN AN ORGANIZED HEALTH CARE EDUCATION/TRAINING PROGRAM

## 2021-02-11 PROCEDURE — 97165 OT EVAL LOW COMPLEX 30 MIN: CPT | Mod: GO | Performed by: OCCUPATIONAL THERAPIST

## 2021-02-11 PROCEDURE — 97110 THERAPEUTIC EXERCISES: CPT | Mod: GO | Performed by: OCCUPATIONAL THERAPIST

## 2021-02-11 PROCEDURE — 82565 ASSAY OF CREATININE: CPT | Performed by: FAMILY MEDICINE

## 2021-02-11 PROCEDURE — 93010 ELECTROCARDIOGRAM REPORT: CPT | Performed by: INTERNAL MEDICINE

## 2021-02-11 PROCEDURE — 250N000011 HC RX IP 250 OP 636: Performed by: STUDENT IN AN ORGANIZED HEALTH CARE EDUCATION/TRAINING PROGRAM

## 2021-02-11 PROCEDURE — 99207 PR NO CHARGE LOS: CPT | Performed by: PHYSICIAN ASSISTANT

## 2021-02-11 PROCEDURE — 272N000079 HC NUTRITION PRODUCT RENAL BASIC LITER

## 2021-02-11 PROCEDURE — 120N000003 HC R&B IMCU UMMC

## 2021-02-11 PROCEDURE — 999N001017 HC STATISTIC GLUCOSE BY METER IP

## 2021-02-11 PROCEDURE — 85025 COMPLETE CBC W/AUTO DIFF WBC: CPT | Performed by: STUDENT IN AN ORGANIZED HEALTH CARE EDUCATION/TRAINING PROGRAM

## 2021-02-11 PROCEDURE — 80200 ASSAY OF TOBRAMYCIN: CPT | Performed by: INTERNAL MEDICINE

## 2021-02-11 PROCEDURE — 36415 COLL VENOUS BLD VENIPUNCTURE: CPT | Performed by: PHYSICIAN ASSISTANT

## 2021-02-11 PROCEDURE — 87040 BLOOD CULTURE FOR BACTERIA: CPT | Performed by: PHYSICIAN ASSISTANT

## 2021-02-11 PROCEDURE — 80048 BASIC METABOLIC PNL TOTAL CA: CPT | Performed by: STUDENT IN AN ORGANIZED HEALTH CARE EDUCATION/TRAINING PROGRAM

## 2021-02-11 PROCEDURE — 86140 C-REACTIVE PROTEIN: CPT | Performed by: STUDENT IN AN ORGANIZED HEALTH CARE EDUCATION/TRAINING PROGRAM

## 2021-02-11 PROCEDURE — 99207 PR NO CHARGE LOS: CPT | Performed by: STUDENT IN AN ORGANIZED HEALTH CARE EDUCATION/TRAINING PROGRAM

## 2021-02-11 PROCEDURE — 36415 COLL VENOUS BLD VENIPUNCTURE: CPT | Performed by: INTERNAL MEDICINE

## 2021-02-11 PROCEDURE — 87040 BLOOD CULTURE FOR BACTERIA: CPT | Performed by: STUDENT IN AN ORGANIZED HEALTH CARE EDUCATION/TRAINING PROGRAM

## 2021-02-11 RX ORDER — ONDANSETRON 2 MG/ML
4 INJECTION INTRAMUSCULAR; INTRAVENOUS EVERY 6 HOURS PRN
Status: DISCONTINUED | OUTPATIENT
Start: 2021-02-11 | End: 2021-02-20 | Stop reason: HOSPADM

## 2021-02-11 RX ORDER — ONDANSETRON 4 MG/1
4 TABLET, ORALLY DISINTEGRATING ORAL EVERY 6 HOURS PRN
Status: DISCONTINUED | OUTPATIENT
Start: 2021-02-11 | End: 2021-02-20 | Stop reason: HOSPADM

## 2021-02-11 RX ADMIN — BACLOFEN 20 MG: 20 TABLET ORAL at 07:57

## 2021-02-11 RX ADMIN — PIPERACILLIN AND TAZOBACTAM 3.38 G: 3; .375 INJECTION, POWDER, LYOPHILIZED, FOR SOLUTION INTRAVENOUS at 18:08

## 2021-02-11 RX ADMIN — Medication 5 MG: at 03:43

## 2021-02-11 RX ADMIN — PIPERACILLIN AND TAZOBACTAM 3.38 G: 3; .375 INJECTION, POWDER, LYOPHILIZED, FOR SOLUTION INTRAVENOUS at 23:21

## 2021-02-11 RX ADMIN — PIPERACILLIN AND TAZOBACTAM 3.38 G: 3; .375 INJECTION, POWDER, LYOPHILIZED, FOR SOLUTION INTRAVENOUS at 11:54

## 2021-02-11 RX ADMIN — DOCUSATE SODIUM 100 MG: 100 CAPSULE, LIQUID FILLED ORAL at 07:57

## 2021-02-11 RX ADMIN — BISACODYL 20 MG: 10 SUPPOSITORY RECTAL at 13:50

## 2021-02-11 RX ADMIN — Medication 5 MG: at 19:55

## 2021-02-11 RX ADMIN — OXYCODONE HYDROCHLORIDE AND ACETAMINOPHEN 1000 MG: 500 TABLET ORAL at 07:57

## 2021-02-11 RX ADMIN — ACETAMINOPHEN 975 MG: 325 TABLET, FILM COATED ORAL at 03:43

## 2021-02-11 RX ADMIN — BACLOFEN 20 MG: 20 TABLET ORAL at 19:56

## 2021-02-11 RX ADMIN — KETOROLAC TROMETHAMINE 15 MG: 15 INJECTION, SOLUTION INTRAMUSCULAR; INTRAVENOUS at 06:08

## 2021-02-11 RX ADMIN — PIPERACILLIN AND TAZOBACTAM 3.38 G: 3; .375 INJECTION, POWDER, LYOPHILIZED, FOR SOLUTION INTRAVENOUS at 06:08

## 2021-02-11 RX ADMIN — OXYBUTYNIN 10 MG: 10 TABLET, FILM COATED, EXTENDED RELEASE ORAL at 07:57

## 2021-02-11 RX ADMIN — BACLOFEN 20 MG: 20 TABLET ORAL at 15:35

## 2021-02-11 RX ADMIN — PIPERACILLIN AND TAZOBACTAM 3.38 G: 3; .375 INJECTION, POWDER, LYOPHILIZED, FOR SOLUTION INTRAVENOUS at 00:54

## 2021-02-11 RX ADMIN — ENOXAPARIN SODIUM 40 MG: 40 INJECTION SUBCUTANEOUS at 11:55

## 2021-02-11 RX ADMIN — BACLOFEN 20 MG: 20 TABLET ORAL at 11:54

## 2021-02-11 RX ADMIN — ZOLPIDEM TARTRATE 10 MG: 10 TABLET, FILM COATED ORAL at 23:20

## 2021-02-11 ASSESSMENT — ACTIVITIES OF DAILY LIVING (ADL)
ADLS_ACUITY_SCORE: 31

## 2021-02-11 ASSESSMENT — MIFFLIN-ST. JEOR: SCORE: 1790.5

## 2021-02-11 NOTE — PROGRESS NOTES
Brief Urology Note    60 year old with SCI admitted with vitals signs abnormalities concerning for autonomic dysreflexia and sepsis urgently stented 2/8 for large partially obstructing right UPJ stone now clinically improving, transferred to intermediate care from ICU. He is being managed by medicine and ID.      - Cares per primary  - Urology will arrange outpatient stone treatment in the coming weeks.   - Urology will sign off    Discussed with Dr. Barrientos who agrees      Rosemary Gay MD  Urology Resident
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
## 2021-02-11 NOTE — PROGRESS NOTES
Time of notification: 1:39 PM  Provider notified:MD Dixon  Patient status:HR 42-46.  Temp:  [97.5  F (36.4  C)-98.1  F (36.7  C)] 97.5  F (36.4  C)  Pulse:  [48-62] 49  Resp:  [9-22] 14  BP: ()/(38-92) 90/52  SpO2:  [91 %-98 %] 93 %  Orders received: Notify MD if HR is less than 40

## 2021-02-11 NOTE — PROGRESS NOTES
Transferred to: 6B @1230  Status at time of transfer: VSS on RA, A&O x4.  Belongings: wheelchair, phone, braces  Amanda removed? (if no, why?): No, suprapubic cath  Chart and medications: Sent with patient  Family notified: Jodi (wife) present
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
## 2021-02-11 NOTE — PHARMACY-AMINOGLYCOSIDE DOSING SERVICE
Pharmacy Aminoglycoside Follow-Up Note  Date of Service 2021  Patient's  1960   60 year old, male    Weight (Adjusted): 82.8 kg    Indication: Urinary Tract Infection (Past UCx w/ PsA only sensitive to aminoglycosides)   Current Tobramycin regimen:  400 mg IV q36h  Day of therapy: 3    Target goals based on extended interval dosing  Goal Peak level: 17-24 mg/L  Goal Trough level: <0.5mg/L    Current estimated CrCl: Estimated Creatinine Clearance: 219.4 mL/min (A) (based on SCr of 0.42 mg/dL (L)).    Cystatin C calculated eGFR on  = 50 mL/min/1.73m2  This lab was drawn at the same time as a SCr of 0.3. SCr significantly overestimates this patient's renal function.     Creatinine for last 3 days  2021: 11:37 AM Creatinine 0.30 mg/dL  2021:  2:20 AM Creatinine 0.35 mg/dL;  9:07 AM Creatinine 0.38 mg/dL;  2:12 PM Creatinine 0.38 mg/dL  2/10/2021:  4:02 AM Creatinine Canceled, Test credited mg/dL;  5:32 AM Creatinine 0.42 mg/dL    Nephrotoxins and other renal medications (From now, onward)    Start     Dose/Rate Route Frequency Ordered Stop    02/10/21 1600  tobramycin (NEBCIN) 400 mg in sodium chloride 0.9 % intermittent infusion      400 mg  over 60 Minutes Intravenous EVERY 36 HOURS 02/10/21 0645      02/10/21 0908  ketorolac (TORADOL) injection 15 mg      15 mg Intravenous EVERY 6 HOURS PRN 02/10/21 0913 21 0907    21 0630  piperacillin-tazobactam (ZOSYN) 3.375 g vial to attach to  mL bag      3.375 g  over 30 Minutes Intravenous EVERY 6 HOURS 21 0625            Contrast Orders - past 72 hours (72h ago, onward)    Start     Dose/Rate Route Frequency Ordered Stop    21 1000  perflutren diluted 1mL to 2mL with saline (OPTISON) diluted injection 5 mL      5 mL Intravenous ONCE 21 0947 21 1000    21 2120  iothalamate meglumine (CONRAY) 60 % injection  Status:  Discontinued        PRN 21 2133 21 2255    21 1340  iopamidol  (ISOVUE-370) solution 135 mL      135 mL Intravenous ONCE 02/08/21 1339 02/08/21 1436          Aminoglycoside Levels - past 2 days  2/9/2021:  9:07 AM Tobramycin Level <0.3 mg/L; 12:27 PM Tobramycin Level Single Daily Dose 18.5 mg/L;  8:21 PM Tobramycin Level Single Daily Dose 5.4 mg/L  2/10/2021:  9:05 PM Tobramycin Level Single Daily Dose 7.9 mg/L  2/11/2021:  3:33 AM Tobramycin Level Single Daily Dose 4.1 mg/L    Aminoglycosides IV Administrations (past 72 hours)                   tobramycin (NEBCIN) 400 mg in sodium chloride 0.9 % intermittent infusion (mg) 400 mg New Bag 02/10/21 1619    tobramycin (NEBCIN) 560 mg in sodium chloride 0.9 % intermittent infusion (mg) 560 mg New Bag 02/09/21 1005                Pharmacokinetic Analysis        Interpretation of levels and current regimen:  Aminoglycoside levels are outside of goal range. Peak is below goal range. Trough is at goal range.     Has serum creatinine changed greater than 50% in the last 72 hours: No    Urine output:  good urine output    Renal function: Stable    Plan  1. Increase dose to 480mg IV q36hr. This regimen should yield a peak of ~17 and a 36hr trough ~0.3. Patient will reach <0.5 trough threshold ~30 hours after dose, which would give the patient >4 hours with undetectable levels between doses.     2.  Method of evaluation: 2 post dose levels    3. Pharmacy will continue to follow and check levels  as appropriate in 1-3 Days    Christopher Wright, DenizD
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
## 2021-02-11 NOTE — PLAN OF CARE
Major Shift Events:  Pt complaining of pain overnight in his back. Toradol given X1. Pt also had acute episode of delirium that was very temporary. Pt was reoriented and maintained orientation the rest of the night. Delirium possibly related to Ambien dose. Pt blood pressures remain labile, soft at times, no intervention ordered. Urine output remains adequate.   Plan: Transfer orders placed, awaiting bed. Likely will transfer out of ICU today.   For vital signs and complete assessments, please see documentation flowsheets.    Problem: Adult Inpatient Plan of Care  Goal: Plan of Care Review  Outcome: No Change

## 2021-02-12 ENCOUNTER — APPOINTMENT (OUTPATIENT)
Dept: CT IMAGING | Facility: CLINIC | Age: 61
End: 2021-02-12
Payer: COMMERCIAL

## 2021-02-12 ENCOUNTER — APPOINTMENT (OUTPATIENT)
Dept: OCCUPATIONAL THERAPY | Facility: CLINIC | Age: 61
End: 2021-02-12
Payer: COMMERCIAL

## 2021-02-12 LAB
ANION GAP SERPL CALCULATED.3IONS-SCNC: 4 MMOL/L (ref 3–14)
BUN SERPL-MCNC: 8 MG/DL (ref 7–30)
CALCIUM SERPL-MCNC: 8.8 MG/DL (ref 8.5–10.1)
CHLORIDE SERPL-SCNC: 97 MMOL/L (ref 94–109)
CO2 SERPL-SCNC: 34 MMOL/L (ref 20–32)
CREAT SERPL-MCNC: 0.37 MG/DL (ref 0.66–1.25)
ERYTHROCYTE [DISTWIDTH] IN BLOOD BY AUTOMATED COUNT: 15.1 % (ref 10–15)
GFR SERPL CREATININE-BSD FRML MDRD: >90 ML/MIN/{1.73_M2}
GLUCOSE BLDC GLUCOMTR-MCNC: 77 MG/DL (ref 70–99)
GLUCOSE BLDC GLUCOMTR-MCNC: 85 MG/DL (ref 70–99)
GLUCOSE SERPL-MCNC: 77 MG/DL (ref 70–99)
HCT VFR BLD AUTO: 42.2 % (ref 40–53)
HGB BLD-MCNC: 13.5 G/DL (ref 13.3–17.7)
INTERPRETATION ECG - MUSE: NORMAL
MCH RBC QN AUTO: 30.8 PG (ref 26.5–33)
MCHC RBC AUTO-ENTMCNC: 32 G/DL (ref 31.5–36.5)
MCV RBC AUTO: 96 FL (ref 78–100)
PLATELET # BLD AUTO: 84 10E9/L (ref 150–450)
POTASSIUM SERPL-SCNC: 4 MMOL/L (ref 3.4–5.3)
RBC # BLD AUTO: 4.39 10E12/L (ref 4.4–5.9)
SODIUM SERPL-SCNC: 135 MMOL/L (ref 133–144)
TOBRAMYCIN SERPL-MCNC: 10 MG/L
TOBRAMYCIN SERPL-MCNC: 3.7 MG/L
WBC # BLD AUTO: 3.8 10E9/L (ref 4–11)

## 2021-02-12 PROCEDURE — 120N000003 HC R&B IMCU UMMC

## 2021-02-12 PROCEDURE — 99232 SBSQ HOSP IP/OBS MODERATE 35: CPT | Performed by: STUDENT IN AN ORGANIZED HEALTH CARE EDUCATION/TRAINING PROGRAM

## 2021-02-12 PROCEDURE — 999N000157 HC STATISTIC RCP TIME EA 10 MIN

## 2021-02-12 PROCEDURE — 71250 CT THORAX DX C-: CPT

## 2021-02-12 PROCEDURE — 85027 COMPLETE CBC AUTOMATED: CPT | Performed by: STUDENT IN AN ORGANIZED HEALTH CARE EDUCATION/TRAINING PROGRAM

## 2021-02-12 PROCEDURE — 250N000009 HC RX 250: Performed by: STUDENT IN AN ORGANIZED HEALTH CARE EDUCATION/TRAINING PROGRAM

## 2021-02-12 PROCEDURE — 250N000013 HC RX MED GY IP 250 OP 250 PS 637: Performed by: STUDENT IN AN ORGANIZED HEALTH CARE EDUCATION/TRAINING PROGRAM

## 2021-02-12 PROCEDURE — 80200 ASSAY OF TOBRAMYCIN: CPT | Performed by: FAMILY MEDICINE

## 2021-02-12 PROCEDURE — 999N001017 HC STATISTIC GLUCOSE BY METER IP

## 2021-02-12 PROCEDURE — 250N000011 HC RX IP 250 OP 636: Performed by: STUDENT IN AN ORGANIZED HEALTH CARE EDUCATION/TRAINING PROGRAM

## 2021-02-12 PROCEDURE — 97110 THERAPEUTIC EXERCISES: CPT | Mod: GO

## 2021-02-12 PROCEDURE — 71250 CT THORAX DX C-: CPT | Mod: 26 | Performed by: RADIOLOGY

## 2021-02-12 PROCEDURE — 258N000003 HC RX IP 258 OP 636: Performed by: FAMILY MEDICINE

## 2021-02-12 PROCEDURE — 82565 ASSAY OF CREATININE: CPT | Performed by: STUDENT IN AN ORGANIZED HEALTH CARE EDUCATION/TRAINING PROGRAM

## 2021-02-12 PROCEDURE — 250N000011 HC RX IP 250 OP 636: Performed by: FAMILY MEDICINE

## 2021-02-12 PROCEDURE — 99233 SBSQ HOSP IP/OBS HIGH 50: CPT | Mod: GC | Performed by: FAMILY MEDICINE

## 2021-02-12 PROCEDURE — 80048 BASIC METABOLIC PNL TOTAL CA: CPT | Performed by: STUDENT IN AN ORGANIZED HEALTH CARE EDUCATION/TRAINING PROGRAM

## 2021-02-12 PROCEDURE — 36415 COLL VENOUS BLD VENIPUNCTURE: CPT | Performed by: FAMILY MEDICINE

## 2021-02-12 PROCEDURE — 94640 AIRWAY INHALATION TREATMENT: CPT

## 2021-02-12 PROCEDURE — 93010 ELECTROCARDIOGRAM REPORT: CPT | Performed by: INTERNAL MEDICINE

## 2021-02-12 PROCEDURE — 82565 ASSAY OF CREATININE: CPT | Performed by: FAMILY MEDICINE

## 2021-02-12 PROCEDURE — 93005 ELECTROCARDIOGRAM TRACING: CPT

## 2021-02-12 RX ORDER — METRONIDAZOLE 500 MG/1
500 TABLET ORAL EVERY 8 HOURS
Status: DISCONTINUED | OUTPATIENT
Start: 2021-02-12 | End: 2021-02-20 | Stop reason: HOSPADM

## 2021-02-12 RX ORDER — ALBUTEROL SULFATE 0.83 MG/ML
2.5 SOLUTION RESPIRATORY (INHALATION) ONCE
Status: COMPLETED | OUTPATIENT
Start: 2021-02-12 | End: 2021-02-12

## 2021-02-12 RX ORDER — ALBUTEROL SULFATE 0.83 MG/ML
2.5 SOLUTION RESPIRATORY (INHALATION)
Status: DISCONTINUED | OUTPATIENT
Start: 2021-02-12 | End: 2021-02-12

## 2021-02-12 RX ORDER — ALBUTEROL SULFATE 0.83 MG/ML
2.5 SOLUTION RESPIRATORY (INHALATION) EVERY 4 HOURS PRN
Status: DISCONTINUED | OUTPATIENT
Start: 2021-02-12 | End: 2021-02-20 | Stop reason: HOSPADM

## 2021-02-12 RX ORDER — ZOLPIDEM TARTRATE 5 MG/1
5 TABLET ORAL
Status: DISCONTINUED | OUTPATIENT
Start: 2021-02-12 | End: 2021-02-20 | Stop reason: HOSPADM

## 2021-02-12 RX ORDER — DIAZEPAM 5 MG
5-10 TABLET ORAL DAILY
Status: DISCONTINUED | OUTPATIENT
Start: 2021-02-12 | End: 2021-02-20 | Stop reason: HOSPADM

## 2021-02-12 RX ORDER — DIAZEPAM 5 MG
10 TABLET ORAL DAILY
Status: DISCONTINUED | OUTPATIENT
Start: 2021-02-12 | End: 2021-02-12

## 2021-02-12 RX ADMIN — Medication 1 MG: at 21:28

## 2021-02-12 RX ADMIN — METRONIDAZOLE 500 MG: 500 TABLET ORAL at 16:41

## 2021-02-12 RX ADMIN — BACLOFEN 20 MG: 20 TABLET ORAL at 21:29

## 2021-02-12 RX ADMIN — PIPERACILLIN AND TAZOBACTAM 3.38 G: 3; .375 INJECTION, POWDER, LYOPHILIZED, FOR SOLUTION INTRAVENOUS at 12:15

## 2021-02-12 RX ADMIN — DOCUSATE SODIUM 100 MG: 100 CAPSULE, LIQUID FILLED ORAL at 07:58

## 2021-02-12 RX ADMIN — ALBUTEROL SULFATE 2.5 MG: 2.5 SOLUTION RESPIRATORY (INHALATION) at 10:38

## 2021-02-12 RX ADMIN — ENOXAPARIN SODIUM 40 MG: 40 INJECTION SUBCUTANEOUS at 12:15

## 2021-02-12 RX ADMIN — OXYBUTYNIN 10 MG: 10 TABLET, FILM COATED, EXTENDED RELEASE ORAL at 07:58

## 2021-02-12 RX ADMIN — DIAZEPAM 10 MG: 5 TABLET ORAL at 21:29

## 2021-02-12 RX ADMIN — OXYCODONE HYDROCHLORIDE AND ACETAMINOPHEN 1000 MG: 500 TABLET ORAL at 07:58

## 2021-02-12 RX ADMIN — BACLOFEN 20 MG: 20 TABLET ORAL at 12:15

## 2021-02-12 RX ADMIN — METRONIDAZOLE 500 MG: 500 TABLET ORAL at 23:07

## 2021-02-12 RX ADMIN — PIPERACILLIN AND TAZOBACTAM 3.38 G: 3; .375 INJECTION, POWDER, LYOPHILIZED, FOR SOLUTION INTRAVENOUS at 06:12

## 2021-02-12 RX ADMIN — TOBRAMYCIN 480 MG: 40 INJECTION INTRAMUSCULAR; INTRAVENOUS at 03:24

## 2021-02-12 RX ADMIN — BACLOFEN 20 MG: 20 TABLET ORAL at 16:34

## 2021-02-12 RX ADMIN — BACLOFEN 20 MG: 20 TABLET ORAL at 07:58

## 2021-02-12 ASSESSMENT — ACTIVITIES OF DAILY LIVING (ADL)
ADLS_ACUITY_SCORE: 32
ADLS_ACUITY_SCORE: 32
ADLS_ACUITY_SCORE: 31
ADLS_ACUITY_SCORE: 31
ADLS_ACUITY_SCORE: 32
ADLS_ACUITY_SCORE: 32

## 2021-02-12 NOTE — PROVIDER NOTIFICATION
Paged adi at [phone]: Pt BP 80s/40s-50s, HR 50s. Pt appears more confused than earlier. Disoriented to place, time and situation. MD came to bedside to assess pt. BP improved with repositioning. RN to notify MD if pt SBP <80s or MAP <55.

## 2021-02-12 NOTE — PLAN OF CARE
Neuro: A&Ox4. Quadriplegic, slight contraction in RUE.   Cardiac: SB/SR - 50's to 60's. BP soft 's systolic, patient endorses some chest pain that is worse when his chest is palpated - MD aware. EKG ordered, only showed sinus bradycardia.   Respiratory: Sating 95% on 1-2 liters via NC. Lungs clear/diminished. Pt states he feels more short of breath today, despite 1x dose of albuterol neb. MD aware, ordered chest CT.   GI/: Adequate urine output via suprapubic. Plan to exchange suprapubic cath. No BM this shift.   Diet/appetite: Tolerating regular diet. Eating well.  Activity:  Lift assist, turn and reposition while in bed.   Pain: At acceptable level on current regimen.   Skin: No new deficits noted.  LDA's: PIV TKO, suprapubic.     Plan: Continue with POC. Notify primary team with changes.    Tissue Cultured Epidermal Autograft Text: The defect edges were debeveled with a #15 scalpel blade.  Given the location of the defect, shape of the defect and the proximity to free margins a tissue cultured epidermal autograft was deemed most appropriate.  The graft was then trimmed to fit the size of the defect.  The graft was then placed in the primary defect and oriented appropriately.

## 2021-02-12 NOTE — PLAN OF CARE
[id number]:   Neuro: initially AOx4, throughout night pt became more lethargic, only oriented to self, see provider notification note and provider update note. Improved towards end of shift.   Cardiac: SB with HR 40s-60s. AVSS, BP soft overnight 80s-100s/40s-50s.   Respiratory: O2 sats stable on 2L NC   GI/: suprapubic cath with adequate output. BMx2   Diet/appetite: Regular diet, total feed.   Activity:  Ax2  Pain: At acceptable level on current regimen.   Skin: No new deficits noted.  LDA's: PIV infusing TKO between abx.     Plan: Continue with POC. Notify primary team with changes.

## 2021-02-12 NOTE — PHARMACY-CONSULT NOTE
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 the inferior left upper lobe and near complete lobar atelectasis of the left lower lobe. I have personally reviewed the examination and initial interpretation and I agree with the findings. GUSTAVO DUNBAR MD    Ct Chest W/o Contrast    Result Date: 2/12/2021  Exam: CT Chest without contrast 2/12/2021 11:53 AM History: Pleural effusion, malignancy suspected Comparison: Chest CT 2/8/2021, 11/16/2015 and 11/2/2009 TECHNIQUE: Helical acquisition of CT images from the lung apices to the kidneys without IV contrast. Coronal images and axial MIP images were reconstructed from the source data. FINDINGS: Chest: Thyroid is unremarkable. Normal branching pattern of the thoracic great vessels. Main pulmonary artery is dilated measuring 3.4 cm. Heart size is normal. Small pericardial effusion. No axillary, mediastinal or hilar lymphadenopathy. Patulous esophagus. Lungs: The central tracheobronchial tree is patent. Small bilateral pleural effusions and adjacent bibasilar consolidations, left greater than right with near complete collapse of the left lower lobe. No pneumothorax. Few small pulmonary nodules were not present on the comparison from 2/8/2021 for example a 4  mm subpleural nodule in the right lower lobe (series 6 image 181, 3 mm nodule in the right middle lobe  (series 6 image 173) 6 mm solid pulmonary nodule in the right upper lobe series 6 image 150). Upper abdomen: Limited evaluation of the upper abdomen. Stable nodular thickening of the left adrenal gland consistent with benign adenomatous changes. Hepatic cyst in the left lobe of the liver, additional subcentimeter hypodensity in the right lobe of the liver too small to accurately characterize but also likely represents a cyst. Bones: No acute osseus abnormality or suspicious bony lesion. Degenerative changes of the spine.     IMPRESSION: 1. Small bilateral pleural effusions and adjacent bibasilar consolidations, left greater than right with near complete atelectasis of the left lower lobe. Findings likely represent a component of compressive atelectasis, and super imposed infection cannot entirely be excluded. 2. Several several small scattered nodules in the right lung are new from the comparison examination on 2/8/2021 suggestive of an acute infectious or inflammatory etiology. I have personally reviewed the examination and initial interpretation and I agree with the findings. GUSTAVO DUNBAR MD    Poc Us Echo Limited    Limited Bedside Cardiac Ultrasound, performed and interpreted by me. Indication: Hypotension/shock. Parasternal long axis, parasternal short axis, apical 4 chamber and subcostal views were acquired. Image quality was satisfactory. Findings:  Global left ventricular function appears intact. Chambers do not appear dilated. There is no evidence of free fluid within the pericardium. IMPRESSION: Grossly normal left ventricular function and chamber size.  No pericardial effusion..    Us Lower Extremity Venous Duplex Right    Result Date: 2/8/2021  EXAMINATION: DOPPLER VENOUS ULTRASOUND OF THE RIGHT LOWER EXTREMITY, 2/8/2021 1:03 PM COMPARISON: Venous ultrasound of bilateral legs dated 11/20/2009  HISTORY: Right leg swelling, rule out DVT TECHNIQUE:  Gray-scale evaluation with compression, spectral flow, and color Doppler assessment of the deep venous system of the right leg from groin to knee, and then at the ankle. FINDINGS: In the right lower extremity, the common femoral, femoral, popliteal and posterior tibial veins demonstrate normal compressibility and blood flow. The left common femoral vein demonstrates normal compressibility and blood flow for comparison.     IMPRESSION:  No evidence of right lower extremity deep venous thrombosis. I have personally reviewed the examination and initial interpretation and I agree with the findings. SHARAD MONIQUE MD

## 2021-02-12 NOTE — PROGRESS NOTES
[facility]   [facility]    SUBJECTIVE  Was called to patient's room at 0320 by patient's nurse, blood pressures in the 80s systolic over 40s to 50s diastolic, map 53-64, heart rate 50s with patient more confused than daytime baseline.  When up to see patient who was disoriented to situation, place, time ([place] was [place] [employer] [place], did not recall he was paralyzed or know that he was ill, was able to deduce that we were in a medical setting), slight slurring of speech but slurring consistent with when I saw him sleepy yesterday.  He is aware he is confused but is not agitated by this circumstance.      OBJECTIVE:  BP 98/50   Pulse 56   Temp 98.3  F (36.8  C) (Oral)   Resp 18   Ht 1.727 m (5' 8\")   Wt 99.6 kg (219 lb 9.3 oz)   SpO2 95%   BMI 33.39 kg/m      Exam:   General: Disheveled gentleman lying in bed, disoriented to time, place, situation  Skin: Warm and dry  Eyes: pupils equal and reactive.  ENT: Speech slightly slurred but consistent with yesterday, mucous membranes moist and light pink  CV: No cyanosis or pallor, warm and well perfused, regular rhythm on monitor  Respiratory: No respiratory distress, speaking in full sentences on room air  Neuro: Disoriented as above, baseline paralyzed in bilateral lower extremities and left upper extremity, minimal movement in right upper extremity.  Patient not agitated, is redirectable.  No new gross neuro findings, facial movement symmetric and consistent with yesterday's exam.      ASSESSMENT/PLAN:    #Delirium  Patient afebrile, blood pressures responded to repositioning and most likely related more to his dysautonomia, patient is on Ambien at night and was noted to be confused and disoriented in the middle of last night as well.  He is at moderate risk for developing hospital associated delirium, will need to be reexamined for orientation in the morning.  If possible tonight would discontinue his Ambien and being " mindful of BEERS medications, would consider placing him on hospital associated delirium precautions.  Given his gram-negative bridget growth from his left hand 2 days ago, this could be a manifestation of an acutely worsening infection although this is less likely than delirium associated with Ambien use and hospital setting.  As he remains afebrile and hemodynamically responsive to position adjustment, did not call infectious disease or urgently redraw lactate, blood cultures.  He is due to have blood cultures redrawn in the morning.  Would reevaluate for orientation in the morning.    #Hypotension  #Dysautonomia  Patient hypotensive with in his acceptable parameters, had nurse lift his feet to level of heart and blood pressure is improved to 90s / 50s.  Heart rate in the 50s, consistent with the remainder of his stay.    1. Map goal greater than 55  2. Nurse will reach out to overnight team if systolics drop below 80s    Please see daily rounding note for full A/P.  Debi Cates MD  3:52 AM

## 2021-02-12 NOTE — PLAN OF CARE
Neuro: A&Ox4.   Cardiac: Bradycardia 42-54. Soft BP intermittent 90s/50s   Respiratory: Sating 98%+  on RA, 1-2 L NCwhen sleeping .  GI/: Adequate urine output via suprapubic. Bowel regime performed Suppository and up to bedside commode.  Diet/appetite: Tolerating Regular diet. Eating well.  Activity:  Assist of 2 with lift  Pain: At acceptable level on current regimen.   Skin: No new deficits noted.  LDA's: PIV SL    Plan: Continue with POC. Notify primary team with changes. Transfer  Transfer  Transferred from:   Via:bed  Reason for transfer: Pt appropriate for 6B- improved patient condition  Family: Aware of transfer  Belongings: Received with pt  Chart: Received with pt  Medications: Meds received from old unit with pt  Code Status verified on armband: yes  2 RN Skin Assessment Completed By: Dee Ramirez rec completed: yes  Bed surface reassessed with algorithm and charted: yes Pulsate ordered  New bed surface ordered:yes Pulsate Mattress ordered    Report received from:  nurse  Pt status: A&O Bradycardic, soft BP. MD beside  Accompanied by family.
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
## 2021-02-13 LAB
ANION GAP SERPL CALCULATED.3IONS-SCNC: 3 MMOL/L (ref 3–14)
BACTERIA SPEC CULT: ABNORMAL
BUN SERPL-MCNC: 6 MG/DL (ref 7–30)
CALCIUM SERPL-MCNC: 9 MG/DL (ref 8.5–10.1)
CHLORIDE SERPL-SCNC: 101 MMOL/L (ref 94–109)
CO2 SERPL-SCNC: 35 MMOL/L (ref 20–32)
CREAT SERPL-MCNC: 0.31 MG/DL (ref 0.66–1.25)
CRP SERPL-MCNC: 37 MG/L (ref 0–8)
ERYTHROCYTE [DISTWIDTH] IN BLOOD BY AUTOMATED COUNT: 14.9 % (ref 10–15)
GFR SERPL CREATININE-BSD FRML MDRD: >90 ML/MIN/{1.73_M2}
GLUCOSE SERPL-MCNC: 107 MG/DL (ref 70–99)
HCT VFR BLD AUTO: 40.5 % (ref 40–53)
HGB BLD-MCNC: 12.4 G/DL (ref 13.3–17.7)
Lab: ABNORMAL
MCH RBC QN AUTO: 29.8 PG (ref 26.5–33)
MCHC RBC AUTO-ENTMCNC: 30.6 G/DL (ref 31.5–36.5)
MCV RBC AUTO: 97 FL (ref 78–100)
PLATELET # BLD AUTO: 106 10E9/L (ref 150–450)
POTASSIUM SERPL-SCNC: 3.9 MMOL/L (ref 3.4–5.3)
RBC # BLD AUTO: 4.16 10E12/L (ref 4.4–5.9)
SODIUM SERPL-SCNC: 139 MMOL/L (ref 133–144)
SPECIMEN SOURCE: ABNORMAL
WBC # BLD AUTO: 3.4 10E9/L (ref 4–11)

## 2021-02-13 PROCEDURE — 120N000003 HC R&B IMCU UMMC

## 2021-02-13 PROCEDURE — 250N000013 HC RX MED GY IP 250 OP 250 PS 637: Performed by: STUDENT IN AN ORGANIZED HEALTH CARE EDUCATION/TRAINING PROGRAM

## 2021-02-13 PROCEDURE — 85027 COMPLETE CBC AUTOMATED: CPT | Performed by: STUDENT IN AN ORGANIZED HEALTH CARE EDUCATION/TRAINING PROGRAM

## 2021-02-13 PROCEDURE — 80048 BASIC METABOLIC PNL TOTAL CA: CPT | Performed by: STUDENT IN AN ORGANIZED HEALTH CARE EDUCATION/TRAINING PROGRAM

## 2021-02-13 PROCEDURE — 36415 COLL VENOUS BLD VENIPUNCTURE: CPT | Performed by: STUDENT IN AN ORGANIZED HEALTH CARE EDUCATION/TRAINING PROGRAM

## 2021-02-13 PROCEDURE — 99223 1ST HOSP IP/OBS HIGH 75: CPT | Mod: GC | Performed by: INTERNAL MEDICINE

## 2021-02-13 PROCEDURE — 999N000157 HC STATISTIC RCP TIME EA 10 MIN

## 2021-02-13 PROCEDURE — 99233 SBSQ HOSP IP/OBS HIGH 50: CPT | Mod: GC | Performed by: FAMILY MEDICINE

## 2021-02-13 PROCEDURE — 94640 AIRWAY INHALATION TREATMENT: CPT

## 2021-02-13 PROCEDURE — 86140 C-REACTIVE PROTEIN: CPT | Performed by: STUDENT IN AN ORGANIZED HEALTH CARE EDUCATION/TRAINING PROGRAM

## 2021-02-13 PROCEDURE — 250N000009 HC RX 250: Performed by: STUDENT IN AN ORGANIZED HEALTH CARE EDUCATION/TRAINING PROGRAM

## 2021-02-13 PROCEDURE — 999N000147 HC STATISTIC PT IP EVAL DEFER

## 2021-02-13 PROCEDURE — 93010 ELECTROCARDIOGRAM REPORT: CPT | Performed by: INTERNAL MEDICINE

## 2021-02-13 PROCEDURE — 93005 ELECTROCARDIOGRAM TRACING: CPT

## 2021-02-13 PROCEDURE — 999N000078 HC STATISTIC INTRAPULMONARY PERCUSSIVE VENT

## 2021-02-13 PROCEDURE — 250N000011 HC RX IP 250 OP 636: Performed by: STUDENT IN AN ORGANIZED HEALTH CARE EDUCATION/TRAINING PROGRAM

## 2021-02-13 RX ORDER — ATROPINE SULFATE 0.4 MG/ML
0.4 AMPUL (ML) INJECTION
Status: DISCONTINUED | OUTPATIENT
Start: 2021-02-13 | End: 2021-02-20 | Stop reason: HOSPADM

## 2021-02-13 RX ORDER — LIDOCAINE 40 MG/G
CREAM TOPICAL
Status: DISCONTINUED | OUTPATIENT
Start: 2021-02-13 | End: 2021-02-16

## 2021-02-13 RX ORDER — HEPARIN SODIUM,PORCINE 10 UNIT/ML
2-5 VIAL (ML) INTRAVENOUS
Status: DISCONTINUED | OUTPATIENT
Start: 2021-02-13 | End: 2021-02-16

## 2021-02-13 RX ORDER — ATROPINE SULFATE 0.1 MG/ML
INJECTION INTRAVENOUS
Status: DISCONTINUED
Start: 2021-02-13 | End: 2021-02-14 | Stop reason: HOSPADM

## 2021-02-13 RX ORDER — IPRATROPIUM BROMIDE AND ALBUTEROL SULFATE 2.5; .5 MG/3ML; MG/3ML
3 SOLUTION RESPIRATORY (INHALATION) ONCE
Status: COMPLETED | OUTPATIENT
Start: 2021-02-13 | End: 2021-02-13

## 2021-02-13 RX ADMIN — METRONIDAZOLE 500 MG: 500 TABLET ORAL at 08:49

## 2021-02-13 RX ADMIN — OXYCODONE HYDROCHLORIDE AND ACETAMINOPHEN 1000 MG: 500 TABLET ORAL at 08:50

## 2021-02-13 RX ADMIN — DOCUSATE SODIUM 100 MG: 100 CAPSULE, LIQUID FILLED ORAL at 08:50

## 2021-02-13 RX ADMIN — ENOXAPARIN SODIUM 40 MG: 40 INJECTION SUBCUTANEOUS at 12:29

## 2021-02-13 RX ADMIN — Medication 1 MG: at 21:12

## 2021-02-13 RX ADMIN — DIAZEPAM 10 MG: 5 TABLET ORAL at 19:53

## 2021-02-13 RX ADMIN — BACLOFEN 20 MG: 20 TABLET ORAL at 19:53

## 2021-02-13 RX ADMIN — IPRATROPIUM BROMIDE AND ALBUTEROL SULFATE 3 ML: .5; 3 SOLUTION RESPIRATORY (INHALATION) at 17:03

## 2021-02-13 RX ADMIN — BACLOFEN 20 MG: 20 TABLET ORAL at 17:18

## 2021-02-13 RX ADMIN — SENNOSIDES 2 TABLET: 8.6 TABLET, FILM COATED ORAL at 08:50

## 2021-02-13 RX ADMIN — BACLOFEN 20 MG: 20 TABLET ORAL at 12:29

## 2021-02-13 RX ADMIN — METRONIDAZOLE 500 MG: 500 TABLET ORAL at 17:17

## 2021-02-13 RX ADMIN — OXYBUTYNIN 10 MG: 10 TABLET, FILM COATED, EXTENDED RELEASE ORAL at 08:50

## 2021-02-13 RX ADMIN — ZOLPIDEM TARTRATE 5 MG: 5 TABLET ORAL at 00:37

## 2021-02-13 RX ADMIN — BACLOFEN 20 MG: 20 TABLET ORAL at 08:49

## 2021-02-13 RX ADMIN — ACETAMINOPHEN 975 MG: 325 TABLET, FILM COATED ORAL at 08:56

## 2021-02-13 ASSESSMENT — ACTIVITIES OF DAILY LIVING (ADL)
ADLS_ACUITY_SCORE: 32

## 2021-02-13 ASSESSMENT — MIFFLIN-ST. JEOR: SCORE: 1743.5

## 2021-02-13 NOTE — PROGRESS NOTES
[facility]    Progress Note - [facility] Service        Date of Admission:  2/8/2021    Assessment & Plan       Bart Corea is a 60 year old male with PMH quadriplegia due to C4-C6 SCI, neurogenic bladder s/p suprapubic catheter, hx of nephrolithiasis who was admitted on 2/8/2021 autonomic dysreflexia in the setting of right renal calculus and UTI. Initially hypertensive at home, admitted for UTI, s/p right uretral stent placement, then post procedure hypotensive and meeting septic shock criteria requiring pressors. He was transferred from ICU to [facility] on 2/10.        # Sepsis 2/2 complicated UTI  # Right renal calculus, obstructive  # S/p right ureter stent placement  # Bacteroides fragilis in blood culture (growth on 2nd day in 1/2 bcx)  # Suprapubic catheter, chronic  CT AP showed right renal calculus and hydronephrosis, UA indicitve of infection, back/abd pain c/w past UTIs/stones. He underwent right ureteral stent placement 2/8/2021, transferred initially to the ICU with postoperative hypotension with c/f sepsis. Urine culture 2/08 growing Pseudomonas. He does have a history of MDR Pseudomonas with intermediate resistance to pip/tazo. Initial ID recommendation was ceftolozane/ tazobactam, however this is unavailable at this time due to recall. Decision was made to continue Zosyn and tobramycin while awaiting expanded sensitivities. The patient has now remained afebrile, off pressors since 2/10. Suprapubic cath changed 2/12.  - Urology following, appreciate recs  - ID consulted, appreciate recs   - Continue IV tobramycin x2 weeks (2/08-2/22), pharmacy to assist with dosing, will need weekly CBC with diff and CMP    - PO Flagyl 500 mg q8h for bacteremia (2/12-2/26)  - PICC placement for long term antibiotics  - PTA oxybutynin 10 mg daily    Antibiotics  - PO Flagyl (2/12- )  - IV Tobramycin (2/9 - )  - IV Zosyn (2/8 -2/12)  - Linezolid (2/9 -2/10) - for  sepsis     # Acute hypoxic respiratory failure  # Left-sided pleural effusion, chronic  # Hypervolemia   # Hypercapnia (likely chronic)   # Edema in feet  Initially on room air, post procedure requiring 3-6 L and weaned to room air over the course of ICU stay. Patient has apneic events while sleeping but does not require oxygen when awake at baseline- evidence of LIZA on prior sleep study 2014, does not have CPAP. CT chest on admission revealed left pleural effusion with pleural thickening, initially concerning for empyema however this was also seen on prior CT in 2018. Looking further back, it does appear he required thoracentesis in ~2009 at which time fluid was consistent with parapneumonic effusion. Has had improved respiratory status, no cough, fever, chills. Previously, patient's shortness of breath followed fluid bolus and improved with Lasix, hesitant to redose Lasix in setting of hypotension. Does have pre-existing asthma and chronic hypercapnia. Echocardiogram 2/08 with EF 55-60%. New O2 need again 2/12. Repeat CT chest 2/12 showed small bilateral pleural effusions, adjacent bibasilar consolidations and near complete atelectasis of the LLL.   - Pulmonology consult given findings of LLL atelectasis, new O2 requirement, prior recommendation for pulmonology evaluation   - Incentive spirometry   - Consider further Lasix if worsening hypoxia  - Lymphedema consult      # Hx insomnia  # Delirium   Notes indicate a history of insomnia, for which he started Ambien within the past year. More recent PCP notes mention decreasing the Ambien 10 to 5 mg, and wife mentions that his vivid dreams may have increased since starting this medication about a year ago. He also uses diazepam as an outpatient. Also mention of alcohol and tobacco use.   - PTA diazepam 10 mg daily   - melatonin at bedtime     # Autonomic dysreflexia  # Hypotension  # Quadriplegia 2/2 C4-C6 SCI   Frequent bradycardia and fluctuating blood pressure at  home (baseline ~110/70) per patient report. Patient initially hypertensive at home over the past week, lower blood pressures after using his home amlodipine which he uses PRN, typically a few times a year Initially admitted to ICU 2/9 with symptomatic hypotension-shock, requiring pressor support. He continues to have intermittent hypotension, though with MAPs>50 and off dopamine since 2/10 AM. Being cautious with IVF given his overall hypervolemia and requiring Lasix x1. TTE on 2/10 with small pericardial effusion, normal EF and cardiac motion.   - Telemetry, q4h vitals  - Attempt elevation of legs if persistently hypotensive/MAPs <50    # Leukopenia, resolved  # Thrombocytopenia, stable  Likely related to sepsis. No history of similar thrombocytopenia on chart review, however, when septic in the past did have leukopenia down to 3.6 (2017).   - trend CBC      # Spacticity, chronic  - PTA Baclofen 20 mg 4 times daily scheduled, additional PRN 2 times daily  - PTA diazepam 10 daily, additional PRN 5 mg every day        Diet: Regular Diet Adult    Fluids: PO  Lines: PIV x2  DVT Prophylaxis: Enoxaparin (Lovenox) SQ  Amanda Catheter: not present  Code Status: Full Code         Disposition Plan   Expected discharge: 2 - 3 days, recommended to prior living arrangement once adequate pain management/ tolerating PO medications, antibiotic plan established and SIRS/Sepsis treated.  Entered: Maxine Chowdhury MD 02/13/2021, 7:38 AM     The patient's care was discussed with the Attending Physician, Dr. Alexander.    Maxine Chowdhury MD  [facility]  Please see sign in/sign out for up to date coverage information  ______________________________________________________________________    Interval History   No acute events overnight. Requested neb for comfort- does not appear that he received this. Continues to report some mild shortness of breath and chest  discomfort, unchanged from yesterday. Remains on 1L O2 via NC, no prior home supplemental O2 needs. No fevers/chills. Tolerating regular diet. UOP 2.11 mL/kg/hr.     Data reviewed today: I reviewed all medications, new labs and imaging results over the last 24 hours.     Physical Exam   Vital Signs: Temp: 99.2  F (37.3  C) Temp src: Axillary BP: 115/57 Pulse: 59   Resp: 16 SpO2: 96 % O2 Device: Nasal cannula Oxygen Delivery: 1 LPM  Weight: 209 lbs 3.46 oz     Physical Exam  Constitutional:       General: He is not in acute distress.     Appearance: He is well-developed. He is not diaphoretic.   Cardiovascular:      Rate and Rhythm: Normal rate.      Comments: Distant heart sounds, HR 60s  Pulmonary:      Effort: Pulmonary effort is normal. No respiratory distress.      Comments: Breathing comfortably on room air  Abdominal:      Tenderness: Tenderness: minimal sensation at baseline.   Musculoskeletal:      Right lower leg: Edema (1+ pitting) present.      Left lower leg: Edema (1+ pitting) present.   Skin:     General: Skin is warm.      Coloration: Skin is pale (bilateral feet). Skin is not jaundiced.      Findings: Bruising (involving the bilateral feet and multiple toes. patient's wife present and states feet look at baseline) present. No rash.   Neurological:      Mental Status: He is alert.      Comments: Quadriplegia at baseline   Psychiatric:         Mood and Affect: Mood normal.        Data   Recent Labs   Lab 02/13/21  0540 02/12/21  0820 02/11/21  0656 02/10/21  0532 02/10/21  0402 02/08/21  1137 02/08/21  1137   WBC 3.4* 3.8* 4.8 4.0 Canceled, Test credited   < > 3.5*   HGB 12.4* 13.5 12.8* 14.5 Canceled, Test credited   < > 15.9   MCV 97 96 95 97 Canceled, Test credited   < > 95   * 84* 79* 82* Canceled, Test credited   < > 83*   INR  --   --   --   --   --   --  0.91    135 132* 136 Canceled, Test credited   < > 132*   POTASSIUM 3.9 4.0 4.0 4.2 Canceled, Test credited   < > 4.5   CHLORIDE  101 97 96 99 Canceled, Test credited   < > 98   CO2 35* 34* 31 33* Canceled, Test credited   < > 34*   BUN 6* 8 13 12 Canceled, Test credited   < > 17   CR 0.31* 0.37* 0.38* 0.42* Canceled, Test credited   < > 0.30*   ANIONGAP 3 4 4 4 Canceled, Test credited   < > 1*   LIU 9.0 8.8 8.6 8.7 Canceled, Test credited   < > 9.2   * 77 83 75 Canceled, Test credited   < > 72   ALBUMIN  --   --   --  2.6* Canceled, Test credited   < > 3.2*   PROTTOTAL  --   --   --  6.0* Canceled, Test credited   < > 6.6*   BILITOTAL  --   --   --  1.1 Canceled, Test credited   < > 0.5   ALKPHOS  --   --   --  89 Canceled, Test credited   < > 106   ALT  --   --   --  42 Canceled, Test credited   < > 35   AST  --   --   --  29 Canceled, Test credited   < > 19   TROPI  --   --   --   --   --   --  <0.015    < > = values in this interval not displayed.     Recent Results (from the past 24 hour(s))   CT Chest w/o Contrast    Narrative    Exam: CT Chest without contrast 2/12/2021 11:53 AM     History: Pleural effusion, malignancy suspected    Comparison: Chest CT 2/8/2021, 11/16/2015 and 11/2/2009    TECHNIQUE: Helical acquisition of CT images from the lung apices to  the kidneys without IV contrast. Coronal images and axial MIP images  were reconstructed from the source data.    FINDINGS:     Chest:   Thyroid is unremarkable. Normal branching pattern of the thoracic  great vessels. Main pulmonary artery is dilated measuring 3.4 cm.  Heart size is normal. Small pericardial effusion. No axillary,  mediastinal or hilar lymphadenopathy. Patulous esophagus.    Lungs:  The central tracheobronchial tree is patent. Small bilateral pleural  effusions and adjacent bibasilar consolidations, left greater than  right with near complete collapse of the left lower lobe. No  pneumothorax.    Few small pulmonary nodules were not present on the comparison from  2/8/2021 for example a 4 mm subpleural nodule in the right lower lobe  (series 6 image 181, 3 mm  nodule in the right middle lobe  (series 6  image 173) 6 mm solid pulmonary nodule in the right upper lobe series  6 image 150).    Upper abdomen:  Limited evaluation of the upper abdomen. Stable nodular thickening of  the left adrenal gland consistent with benign adenomatous changes.  Hepatic cyst in the left lobe of the liver, additional subcentimeter  hypodensity in the right lobe of the liver too small to accurately  characterize but also likely represents a cyst.    Bones:  No acute osseus abnormality or suspicious bony lesion. Degenerative  changes of the spine.      Impression    IMPRESSION:   1. Small bilateral pleural effusions and adjacent bibasilar  consolidations, left greater than right with near complete atelectasis  of the left lower lobe. Findings likely represent a component of  compressive atelectasis, and super imposed infection cannot entirely  be excluded.  2. Several several small scattered nodules in the right lung are new  from the comparison examination on 2/8/2021 suggestive of an acute  infectious or inflammatory etiology.    I have personally reviewed the examination and initial interpretation  and I agree with the findings.    GUSTAVO DUNBAR MD

## 2021-02-13 NOTE — PLAN OF CARE
Neuro: A&Ox4. Quadriplegic, slight contraction in upper extremities.   Cardiac: SB 30's to 50's. Otherwise VSS. EKG shows marked sinus bradycardia. See provider notification note.   Respiratory: Sating > 94%% on RA. Lungs clear/diminished. Pulm consulted today, plan to begin vest therapy, flutter valve, and ABG in AM. Received duoneb x 1 this evening.   GI/: Adequate urine output via suprapubic. BM x 1.   Diet/appetite: Tolerating regular diet. Eating well.  Activity:  Lift assist, turn and reposition while in bed. Up to chair for a few hours this evening.   Pain: At acceptable level on current regimen. Gave PRN tylenol x 1 for headache.   Skin: No new deficits noted.  LDA's: PIV, suprapubic.       Plan: Continue with POC. Notify primary team with changes.

## 2021-02-13 NOTE — PROVIDER NOTIFICATION
Time of notification: 5:34 PM  Provider notified: Dr. Chowdhury  Patient status: Heart rate dropped to high 30's, other VSS. Pt states he feels a bit off, but denies chest pain or dizziness.   Temp:  [97.4  F (36.3  C)-99.2  F (37.3  C)] 97.7  F (36.5  C)  Pulse:  [38-60] 38  Resp:  [16-18] 16  BP: (107-143)/(57-89) 143/89  SpO2:  [93 %-99 %] 99 %  Orders received: Stat EKG, PRN atropine for the bedside, and frequent blood pressure checks.
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
## 2021-02-13 NOTE — PHARMACY-AMINOGLYCOSIDE DOSING SERVICE
Pharmacy Aminoglycoside Follow-Up Note  Date of Service 2021  Patient's  1960   60 year old, male    Weight (Adjusted): 82.8 kg    Indication: Urinary Tract Infection  Current Tobramycin regimen:  480 mg IV q36h  Day of therapy: 4    Target goals based on extended interval dosing     Goal Peak level: 10-15 mg/L  Goal Trough level: <0.5mg/L    Current estimated CrCl: Estimated Creatinine Clearance: 249 mL/min (A) (based on SCr of 0.37 mg/dL (L)).     Cystatin C calculated eGFR on  = 50 mL/min/1.73m2  This lab was drawn at the same time as a SCr of 0.3. SCr significantly overestimates this patient's renal function.    Creatinine for last 3 days  2/10/2021:  4:02 AM Creatinine Canceled, Test credited mg/dL;  5:32 AM Creatinine 0.42 mg/dL  2021:  6:56 AM Creatinine 0.38 mg/dL  2021:  8:20 AM Creatinine 0.37 mg/dL    Nephrotoxins and other renal medications (From now, onward)    Start     Dose/Rate Route Frequency Ordered Stop    21 0400  tobramycin (NEBCIN) 480 mg in sodium chloride 0.9 % intermittent infusion      480 mg  over 60 Minutes Intravenous EVERY 36 HOURS 21 1211 21 2359    02/10/21 0908  ketorolac (TORADOL) injection 15 mg      15 mg Intravenous EVERY 6 HOURS PRN 02/10/21 0913 21 0907          Contrast Orders - past 72 hours (72h ago, onward)    None          Aminoglycoside Levels - past 2 days  2/10/2021:  9:05 PM Tobramycin Level Single Daily Dose 7.9 mg/L  2021:  3:33 AM Tobramycin Level Single Daily Dose 4.1 mg/L  2021:  8:20 AM Tobramycin Level Single Daily Dose 10.0 mg/L;  4:28 PM Tobramycin Level Single Daily Dose 3.7 mg/L    Aminoglycosides IV Administrations (past 72 hours)                   tobramycin (NEBCIN) 480 mg in sodium chloride 0.9 % intermittent infusion (mg) 480 mg New Bag 21 0324    tobramycin (NEBCIN) 400 mg in sodium chloride 0.9 % intermittent infusion (mg) 400 mg New Bag 02/10/21 9152                 Pharmacokinetic Analysis  Calculated Peak level: 16.4 mg/L  Calculated Trough level: 0.21 mg/L  Volume of distribution: 0.34 L/kg  Half-life: 5.6 hours        Interpretation of levels and current regimen:  Aminoglycoside levels are on higher end of therapeutic range.     Has serum creatinine changed greater than 50% in the last 72 hours: No    Urine output:  good urine output    Renal function: Stable    Plan  1. Spoke with AMT team today.  Please see new peak/trough goals above.  Decision was made to change tobramycin to 480 mg IV q48h in anticipation of discharge.  Based on kinetic anaylsis, this will yield a peak of 16.3 mcg/ml.  This is on higher end of therapeutic range but lowering the dose does not give an adequate trough/coverage throughout the dosing interval. Plan is to treat through 2/22.    2.  Method of evaluation: 2 post dose levels    3. Pharmacy will continue to follow and check levels  as appropriate in 3-4 days.    Ele Anderson, PharmD, pager [phone]

## 2021-02-13 NOTE — PLAN OF CARE
PT / 6B - New PT orders received for 'ROM'. OT currently following for ROM/strengthening and functional training. Pt will only require one rehab discipline to address inpatient therapy needs. PT to complete orders.

## 2021-02-13 NOTE — PLAN OF CARE
Neuro: A&Ox4, quadraplegic, able to shrug shoulders, minimal movement of right arm  Cardiac: SR/SB. VSS.   Respiratory: Sating 99% on 1L per NC  GI/: Adequate urine output per suprapubic catheter, urine pink with some sediment. BM X1, incontinent  Diet/appetite: Tolerating regular diet. Eating well with assistance  Activity:  Assist of 2 for bed mobility, lift for OOB  Pain: At acceptable level on current regimen.   Skin: No new deficits noted.  LDA's: LUE PIV, suprapubic cath    Plan: Continue with POC. Notify primary team with changes.

## 2021-02-14 ENCOUNTER — APPOINTMENT (OUTPATIENT)
Dept: GENERAL RADIOLOGY | Facility: CLINIC | Age: 61
End: 2021-02-14
Attending: STUDENT IN AN ORGANIZED HEALTH CARE EDUCATION/TRAINING PROGRAM
Payer: COMMERCIAL

## 2021-02-14 LAB
ANION GAP SERPL CALCULATED.3IONS-SCNC: 3 MMOL/L (ref 3–14)
BACTERIA SPEC CULT: ABNORMAL
BACTERIA SPEC CULT: NO GROWTH
BACTERIA SPEC CULT: NO GROWTH
BASE EXCESS BLDA CALC-SCNC: 7 MMOL/L
BUN SERPL-MCNC: 7 MG/DL (ref 7–30)
CALCIUM SERPL-MCNC: 9.1 MG/DL (ref 8.5–10.1)
CHLORIDE SERPL-SCNC: 97 MMOL/L (ref 94–109)
CO2 SERPL-SCNC: 34 MMOL/L (ref 20–32)
CREAT SERPL-MCNC: 0.31 MG/DL (ref 0.66–1.25)
ERYTHROCYTE [DISTWIDTH] IN BLOOD BY AUTOMATED COUNT: 14.6 % (ref 10–15)
GFR SERPL CREATININE-BSD FRML MDRD: >90 ML/MIN/{1.73_M2}
GLUCOSE SERPL-MCNC: 97 MG/DL (ref 70–99)
HCO3 BLD-SCNC: 35 MMOL/L (ref 21–28)
HCT VFR BLD AUTO: 41.3 % (ref 40–53)
HGB BLD-MCNC: 12.7 G/DL (ref 13.3–17.7)
LACTATE BLD-SCNC: 1.2 MMOL/L (ref 0.7–2)
MCH RBC QN AUTO: 30 PG (ref 26.5–33)
MCHC RBC AUTO-ENTMCNC: 30.8 G/DL (ref 31.5–36.5)
MCV RBC AUTO: 97 FL (ref 78–100)
O2/TOTAL GAS SETTING VFR VENT: ABNORMAL %
PCO2 BLD: 67 MM HG (ref 35–45)
PH BLD: 7.33 PH (ref 7.35–7.45)
PLATELET # BLD AUTO: 102 10E9/L (ref 150–450)
PO2 BLD: 62 MM HG (ref 80–105)
POTASSIUM SERPL-SCNC: 4.3 MMOL/L (ref 3.4–5.3)
RBC # BLD AUTO: 4.24 10E12/L (ref 4.4–5.9)
SODIUM SERPL-SCNC: 134 MMOL/L (ref 133–144)
SPECIMEN SOURCE: ABNORMAL
SPECIMEN SOURCE: NORMAL
SPECIMEN SOURCE: NORMAL
WBC # BLD AUTO: 3.7 10E9/L (ref 4–11)

## 2021-02-14 PROCEDURE — 94640 AIRWAY INHALATION TREATMENT: CPT | Mod: 76

## 2021-02-14 PROCEDURE — 36569 INSJ PICC 5 YR+ W/O IMAGING: CPT

## 2021-02-14 PROCEDURE — 99233 SBSQ HOSP IP/OBS HIGH 50: CPT | Mod: GC | Performed by: FAMILY MEDICINE

## 2021-02-14 PROCEDURE — 258N000003 HC RX IP 258 OP 636: Performed by: FAMILY MEDICINE

## 2021-02-14 PROCEDURE — 250N000009 HC RX 250: Performed by: STUDENT IN AN ORGANIZED HEALTH CARE EDUCATION/TRAINING PROGRAM

## 2021-02-14 PROCEDURE — 80048 BASIC METABOLIC PNL TOTAL CA: CPT | Performed by: STUDENT IN AN ORGANIZED HEALTH CARE EDUCATION/TRAINING PROGRAM

## 2021-02-14 PROCEDURE — 999N000078 HC STATISTIC INTRAPULMONARY PERCUSSIVE VENT

## 2021-02-14 PROCEDURE — 71045 X-RAY EXAM CHEST 1 VIEW: CPT | Mod: 26 | Performed by: RADIOLOGY

## 2021-02-14 PROCEDURE — 250N000013 HC RX MED GY IP 250 OP 250 PS 637: Performed by: STUDENT IN AN ORGANIZED HEALTH CARE EDUCATION/TRAINING PROGRAM

## 2021-02-14 PROCEDURE — 36415 COLL VENOUS BLD VENIPUNCTURE: CPT | Performed by: STUDENT IN AN ORGANIZED HEALTH CARE EDUCATION/TRAINING PROGRAM

## 2021-02-14 PROCEDURE — 99207 PR NO CHARGE LOS: CPT | Performed by: INTERNAL MEDICINE

## 2021-02-14 PROCEDURE — 94640 AIRWAY INHALATION TREATMENT: CPT

## 2021-02-14 PROCEDURE — 250N000011 HC RX IP 250 OP 636: Performed by: STUDENT IN AN ORGANIZED HEALTH CARE EDUCATION/TRAINING PROGRAM

## 2021-02-14 PROCEDURE — 82803 BLOOD GASES ANY COMBINATION: CPT | Performed by: STUDENT IN AN ORGANIZED HEALTH CARE EDUCATION/TRAINING PROGRAM

## 2021-02-14 PROCEDURE — 120N000003 HC R&B IMCU UMMC

## 2021-02-14 PROCEDURE — 272N000201 ZZ HC ADHESIVE SKIN CLOSURE, DERMABOND

## 2021-02-14 PROCEDURE — 999N000157 HC STATISTIC RCP TIME EA 10 MIN

## 2021-02-14 PROCEDURE — 250N000011 HC RX IP 250 OP 636: Performed by: FAMILY MEDICINE

## 2021-02-14 PROCEDURE — 999N000065 XR CHEST PORT 1 VW

## 2021-02-14 PROCEDURE — 36600 WITHDRAWAL OF ARTERIAL BLOOD: CPT

## 2021-02-14 PROCEDURE — 272N000456 ZZ HC KIT, 4 FR SL BIOFLO OPEN ENDED PICC

## 2021-02-14 PROCEDURE — 36415 COLL VENOUS BLD VENIPUNCTURE: CPT | Performed by: FAMILY MEDICINE

## 2021-02-14 PROCEDURE — 272N000098

## 2021-02-14 PROCEDURE — 85027 COMPLETE CBC AUTOMATED: CPT | Performed by: STUDENT IN AN ORGANIZED HEALTH CARE EDUCATION/TRAINING PROGRAM

## 2021-02-14 PROCEDURE — 83605 ASSAY OF LACTIC ACID: CPT | Performed by: FAMILY MEDICINE

## 2021-02-14 RX ORDER — HEPARIN SODIUM,PORCINE 10 UNIT/ML
5-10 VIAL (ML) INTRAVENOUS
Status: DISCONTINUED | OUTPATIENT
Start: 2021-02-14 | End: 2021-02-20 | Stop reason: HOSPADM

## 2021-02-14 RX ORDER — HEPARIN SODIUM,PORCINE 10 UNIT/ML
5-10 VIAL (ML) INTRAVENOUS EVERY 24 HOURS
Status: DISCONTINUED | OUTPATIENT
Start: 2021-02-14 | End: 2021-02-20 | Stop reason: HOSPADM

## 2021-02-14 RX ADMIN — LIDOCAINE HYDROCHLORIDE 1 ML: 10 INJECTION, SOLUTION EPIDURAL; INFILTRATION; INTRACAUDAL; PERINEURAL at 15:38

## 2021-02-14 RX ADMIN — ZOLPIDEM TARTRATE 5 MG: 5 TABLET ORAL at 00:46

## 2021-02-14 RX ADMIN — ACETAMINOPHEN 975 MG: 325 TABLET, FILM COATED ORAL at 08:08

## 2021-02-14 RX ADMIN — TOBRAMYCIN 480 MG: 40 INJECTION INTRAMUSCULAR; INTRAVENOUS at 04:13

## 2021-02-14 RX ADMIN — METRONIDAZOLE 500 MG: 500 TABLET ORAL at 00:46

## 2021-02-14 RX ADMIN — BACLOFEN 20 MG: 20 TABLET ORAL at 08:07

## 2021-02-14 RX ADMIN — ENOXAPARIN SODIUM 40 MG: 40 INJECTION SUBCUTANEOUS at 12:20

## 2021-02-14 RX ADMIN — METRONIDAZOLE 500 MG: 500 TABLET ORAL at 08:07

## 2021-02-14 RX ADMIN — OXYCODONE HYDROCHLORIDE AND ACETAMINOPHEN 1000 MG: 500 TABLET ORAL at 08:08

## 2021-02-14 RX ADMIN — DOCUSATE SODIUM 100 MG: 100 CAPSULE, LIQUID FILLED ORAL at 08:08

## 2021-02-14 RX ADMIN — ALBUTEROL SULFATE 2.5 MG: 2.5 SOLUTION RESPIRATORY (INHALATION) at 16:20

## 2021-02-14 RX ADMIN — DIAZEPAM 10 MG: 5 TABLET ORAL at 22:45

## 2021-02-14 RX ADMIN — BISACODYL 20 MG: 10 SUPPOSITORY RECTAL at 12:20

## 2021-02-14 RX ADMIN — ALBUTEROL SULFATE 2.5 MG: 2.5 SOLUTION RESPIRATORY (INHALATION) at 09:12

## 2021-02-14 RX ADMIN — METRONIDAZOLE 500 MG: 500 TABLET ORAL at 16:58

## 2021-02-14 RX ADMIN — BACLOFEN 20 MG: 20 TABLET ORAL at 12:20

## 2021-02-14 RX ADMIN — SODIUM CHLORIDE, PRESERVATIVE FREE 5 ML: 5 INJECTION INTRAVENOUS at 16:58

## 2021-02-14 RX ADMIN — Medication 1 MG: at 22:44

## 2021-02-14 RX ADMIN — BACLOFEN 20 MG: 20 TABLET ORAL at 20:18

## 2021-02-14 RX ADMIN — OXYBUTYNIN 10 MG: 10 TABLET, FILM COATED, EXTENDED RELEASE ORAL at 08:07

## 2021-02-14 RX ADMIN — BACLOFEN 20 MG: 20 TABLET ORAL at 16:58

## 2021-02-14 ASSESSMENT — ACTIVITIES OF DAILY LIVING (ADL)
ADLS_ACUITY_SCORE: 32

## 2021-02-14 NOTE — PROGRESS NOTES
"SPIRITUAL HEALTH SERVICES  SPIRITUAL ASSESSMENT Progress Note  [facility] ([place]) 6B   ON-CALL VISIT    REFERRAL SOURCE: [facility] consult for emotional support.     Pt Jc identifies as Mosque. He was accompanied on his unit with his wife of 16 years (Jodi). They have a daughter together who is 28.     Jc shared that he broke his neck in 1981 \"diving\" and was given 20 years to live. Jodi stated, \"He's my superhero and he just about doubled that\".     Jc request the on-call  conduct the Sacrament of Anointing as he will be getting a peg-tube late this morning and has \"a possibility of getting a pacemaker\".     PLAN:  will be notified for Jc's request. Unit  will be notified for follow up.    Beverly Benavidez  Lead [employer]   Pager [phone]    [facility] remains available 24/7 for emergent requests/referrals, either by having the switchboard page the on-call  or by entering an ASAP/STAT consult in Epic (this will also page the on-call ).    "

## 2021-02-14 NOTE — PROGRESS NOTES
Patient started on metaneb with a mask due to having LLL atelectasis on his current ct of his chest. Talked to patient about his home vest usage and stated he hasn't used it for at least a few months. He is currently on 1L NC saturating 100% with breath sounds being clear and diminished in his bases. Patient tolerating metaneb well and will continue to monitor.    Chevy Butts RRT

## 2021-02-14 NOTE — SIGNIFICANT EVENT
SPIRITUAL HEALTH SERVICES Significant Event  [facility] Sacrament of ANOINTING  [facility] ([place]) 6b    Pt anointed by Father Gonsalo Solis   Pager [phone]

## 2021-02-14 NOTE — CONSULTS
"    Cardiac Electrophysiology consultation      Reason for consultation:  Sinus bradycardia    HPI:  61 yo male quadriplegic hospitalized for septic shock and respiratory failure following recent ureteral stent placement.  He was found to have a pseudomonal UTI and Bacteroides fragilis bacteremia. Yesterday, after [place]\" [place] during which [place] he felt well, as he was placed back in bed he reported feeling off.  Per nursing documentation HR was between high 30s and low 50s; BP was stable, 100-140s/50-80s.  I reviewed telemetry: through most of the hospitalization he has been in sinus bradycardia, rate 40s, apart from the two days when he was in the ICU on pressors for shock, when his HR was 60-80 bpm.  Reviewing his vitals flowsheets from prior encounters between 9/2019 and 1/2020, his HR even then ran in the 40s and low 50s, suggesting that he has chronically had lower HRs, as we commonly see in patients with quadriplegia due to altered autonomic regulation.  Telemetry this hospitalization has not shown any AV block.  ECGs show a narrow QRS and MT of 140 ms.  He reports otherwise having generally felt well up to his hospitalization, without noticing any increased lightheadedness, chest discomfort, or worsening dyspnea in recent weeks or months.  He is employed and has been working.    No current facility-administered medications on file prior to encounter.        amLODIPine (NORVASC) 2.5 MG tablet, Take 1 tablet (2.5 mg) by mouth daily as needed (autonomic dysreflexia)       ascorbic acid (VITAMIN C) 1000 MG TABS, Take 1 tablet by mouth daily       baclofen (LIORESAL) 20 MG tablet, TAKE ONE TABLET BY MOUTH UP TO 6 TIMES DAILY       bisacodyl (DULCOLAX) 10 MG suppository, Place 20 mg rectally every 3 days Active ingredient in Magic Bullet (10mg per suppository)       diazepam (VALIUM) 10 MG tablet, Take 1 tablet (10 mg) by mouth daily        MG capsule, TAKE 1 CAPSULE " (100 MG) BY MOUTH DAILY       lidocaine (XYLOCAINE) 2 % jelly, Apply  topically. APPLY AS DIRECTED       minocycline (MINOCIN) 50 MG capsule, TAKE 1 CAPSULE (50 MG) BY MOUTH TWO TIMES DAILY       Multiple Vitamins-Minerals (MULTIVITAMIN & MINERAL PO), Take 1 capsule by mouth daily.       neomycin-polymyxin-hydrocortisone (CORTISPORIN) 3.5-60945-7 otic solution, Place 4 drops in ear(s) 2 times daily       nystatin (MYCOSTATIN) 520785 UNIT/GM external powder, APPLY TO AFFECTED AREA THREE TIMES DAILY AS NEEDED       oxybutynin ER (DITROPAN-XL) 10 MG 24 hr tablet, TAKE 1 TABLET (10 MG) BY MOUTH DAILY       VITAMIN D3 25 MCG (1000 UT) tablet, TAKE 1 TABLET (1,000 UNITS) BY MOUTH DAILY       zolpidem (AMBIEN) 10 MG tablet, TAKE 1/2 TO 1 TABLET BY MOUTH EVERY NIGHT AT BEDTIME AS NEEDED FOR SLEEP       Incontinence Supplies (URINARY LEG BAG), USE AS NEEDED      Allergies   Allergen Reactions     Vancomycin Anaphylaxis       Past Medical History:   Diagnosis Date     Asthma      Chronic infection     Bladder     Heart murmur      LIZA (obstructive sleep apnea) 12/3/2014     Quadriplegia, C1-C4 incomplete (H)      Past Surgical History:   Procedure Laterality Date     Bilateral ureteroscopy with holmium laser lithotripsy and basketing and removal of fragments  Left ureteral stent placement.  02/10/2010     COLONOSCOPY N/A 7/20/2018    Procedure: COMBINED COLONOSCOPY, SINGLE OR MULTIPLE BIOPSY/POLYPECTOMY BY BIOPSY;;  Surgeon: Grace Yang MD;  Location: [facility]     COMBINED CYSTOSCOPY, INSERT STENT URETER(S) Right 2/8/2021    Procedure: CYSTOSCOPY, WITH URETERAL STENT INSERTION;  Surgeon: Javier Barrientos MD;  Location: [facility] OR     CYSTOSCOPY, LITHOLAPAXY, COMBINED N/A 11/15/2019    Procedure: Cystolitholapaxy;  Surgeon: Obi Uriostegui MD;  Location: [facility] OR     INCISION AND DRAINAGE ABDOMEN WASHOUT, COMBINED N/A 1/16/2020    Procedure: Incision and drainage abdomenal Wall with Revision Of Vesicocutaneous Anastomosis;   Surgeon: Obi Uriostegui MD;  Location: [facility] OR     LASER HOLMIUM LITHOTRIPSY BLADDER N/A 1/16/2020    Procedure: LITHOTRIPSY, CALCULUS, BLADDER, USING HOLMIUM LASER;  Surgeon: Obi Uriostegui MD;  Location: [facility] OR     skin flap on bottom       sp tube absess surgery       Family History   Problem Relation Age of Onset     Cancer Father      Diabetes No family hx of      Glaucoma No family hx of      Hypertension No family hx of      Macular Degeneration No family hx of      Retinal detachment No family hx of      Anesthesia Reaction No family hx of      Deep Vein Thrombosis (DVT) No family hx of      Social History     Socioeconomic History     Marital status:      Spouse name: Not on file     Number of children: Not on file     Years of education: Not on file     Highest education level: Not on file   Occupational History     Not on file   Social Needs     Financial resource strain: Not on file     Food insecurity     Worry: Not on file     Inability: Not on file     Transportation needs     Medical: Not on file     Non-medical: Not on file   Tobacco Use     Smoking status: Current Every Day Smoker     Packs/day: 0.30     Types: Cigarettes     Smokeless tobacco: Never Used     Tobacco comment: since early teens   Substance and Sexual Activity     Alcohol use: Yes     Comment: on weekends, 3-4+     Drug use: Not Currently     Types: Marijuana     Comment: occ marijuana for spasms     Sexual activity: Never   Lifestyle     Physical activity     Days per week: Not on file     Minutes per session: Not on file     Stress: Not on file   Relationships     Social connections     Talks on phone: Not on file     Gets together: Not on file     Attends Orthodox service: Not on file     Active member of club or organization: Not on file     Attends meetings of clubs or organizations: Not on file     Relationship status: Not on file     Intimate partner violence     Fear of current or ex partner: Not on file  "    Emotionally abused: Not on file     Physically abused: Not on file     Forced sexual activity: Not on file   Other Topics Concern     Parent/sibling w/ CABG, MI or angioplasty before 65F 55M? Not Asked   Social History Narrative     Not on file     A complete review of systems is negative except as noted above in the HPI.    Physical Examination:  Vitals: BP (!) 82/47 (BP Location: Left arm)   Pulse (!) 41   Temp 97.5  F (36.4  C) (Oral)   Resp 20   Ht 1.727 m (5' 8\")   Wt 94.9 kg (209 lb 3.5 oz)   SpO2 91%   BMI 31.81 kg/m    GENERAL APPEARANCE:  Reclining in bed, seemingly in no acute distress.  HEENT: no scleral icterus, no xanthelasmas  NECK:  JVP is not elevated.  CHEST: equal chest rise, seemingly unlabored breathing at rest.  CARDIOVASCULAR:  Regular rhythm, sinus ronald on tele.  Warm extremities.  ABDOMEN: soft, not tender  NEURO: alert and fully oriented, fluent speech  SKIN: not jaundiced      CMP  Recent Labs   Lab 02/14/21  0523 02/13/21  0540 02/12/21  0820 02/11/21  0656 02/10/21  0532 02/10/21  0402 02/09/21  0907 02/09/21  0907 02/09/21  0220 02/09/21  0045    139 135 132* 136 Canceled, Test credited  --  137 135  --    POTASSIUM 4.3 3.9 4.0 4.0 4.2 Canceled, Test credited  --  4.2 4.1  --    CHLORIDE 97 101 97 96 99 Canceled, Test credited  --  99 101  --    CO2 34* 35* 34* 31 33* Canceled, Test credited  --  34* 33*  --    ANIONGAP 3 3 4 4 4 Canceled, Test credited  --  4 2*  --    GLC 97 107* 77 83 75 Canceled, Test credited  --  79 64*  --    BUN 7 6* 8 13 12 Canceled, Test credited  --  13 15  --    CR 0.31* 0.31* 0.37* 0.38* 0.42* Canceled, Test credited   < > 0.38* 0.35*  --    GFRESTIMATED >90 >90 >90 >90 >90 Canceled, Test credited   < > >90 >90  --    GFRESTBLACK >90 >90 >90 >90 >90 Canceled, Test credited   < > >90 >90  --    LIU 9.1 9.0 8.8 8.6 8.7 Canceled, Test credited  --  9.2 8.9  --    MAG  --   --   --  1.7  --   --   --   --   --  1.7   PROTTOTAL  --   --   --   " "--  6.0* Canceled, Test credited  --  6.6* 6.3*  --    ALBUMIN  --   --   --   --  2.6* Canceled, Test credited  --  3.1* 3.0*  --    BILITOTAL  --   --   --   --  1.1 Canceled, Test credited  --  0.8 0.6  --    ALKPHOS  --   --   --   --  89 Canceled, Test credited  --  96 98  --    AST  --   --   --   --  29 Canceled, Test credited  --  15 7  --    ALT  --   --   --   --  42 Canceled, Test credited  --  36 35  --     < > = values in this interval not displayed.     CBC  Recent Labs   Lab 02/14/21  0523 02/13/21  0540 02/12/21  0820 02/11/21  0656   WBC 3.7* 3.4* 3.8* 4.8   RBC 4.24* 4.16* 4.39* 4.30*   HGB 12.7* 12.4* 13.5 12.8*   HCT 41.3 40.5 42.2 41.0   MCV 97 97 96 95   MCH 30.0 29.8 30.8 29.8   MCHC 30.8* 30.6* 32.0 31.2*   RDW 14.6 14.9 15.1* 15.2*   * 106* 84* 79*     Arterial Blood Gas  Recent Labs   Lab 02/14/21  0615   PH 7.33*   PCO2 67*   PO2 62*   HCO3 35*   O2PER 1L PM     Echocardiography 2/10/21 showed normal biventricular size and systolic function and no significant valvular dysfunction.      Assessment and Recommendations:  61 yo male with chronic sinus bradycardia as is common in quadriplegic patients due to altered autonomic regulation, who yesterday experienced feeling \"off\" in the setting of slightly lower usual HRs, upper 30s instead of low 40s, but with stable BP.  He had just been as active as he is able, however, during which time he felt well, and he has otherwise, during or prior to this hospitalization, experienced new symptoms attributable to bradycardia.  While it is possible that yesterday his HR dipping just below the level of his chronic bradycardia was sufficient to cause his symptoms but it is as or more plausible that he either had a vagal episode or some other cause of his symptoms in coincidence with a lower HR reading.  We do not feel confident that implanting a permanent pacemaker is likely to improve his quality of life or longevity on the basis of that one unclear " "episode alone.  If he has recurrent episodes of \"feeling off\" associated with lower HRs than his chronic bradycardia, we can re-evaluate whether he would benefit from permanent pacing.  I discussed the above with the patient and his wife.  Both are in agreement that they would just as well defer a procedure, with its attendant risks, without more compelling evidence that it would improve his quality of life.    I discussed the patient with Dr. Jefe Maddox.  Please page [phone] with any questions.    -Ernie Rene MD  Cardiac electrophysiology fellow    Physician Attestation   I agree with the information in this note.    Jefe Maddox    "

## 2021-02-14 NOTE — PROGRESS NOTES
[facility]    Progress Note - [facility]        Date of Admission:  2/8/2021    Main Plans for Today  - PICC placement  - respiratory cares (duonebs, metanebs, vest therapy)  - electrophysiology consult for possible pacemaker  - sniff test if able during weekend    Assessment & Plan       Bart Corea is a 60 year old male with PMH quadriplegia due to C4-C6 SCI, neurogenic bladder s/p suprapubic catheter, hx of nephrolithiasis who was admitted on 2/8/2021 autonomic dysreflexia in the setting of right renal calculus and UTI. Initially hypertensive at home, admitted for UTI, s/p right uretral stent placement, then post procedure hypotensive and meeting septic shock criteria requiring pressors. He was transferred from ICU to [facility] on 2/10.        # Sepsis 2/2 complicated UTI, improved  # Right renal calculus, obstructive  # S/p right ureter stent placement  # Bacteroides fragilis in blood culture (growth on 2nd day in 1/2 bcx)  # Suprapubic catheter, chronic  CT AP showed right renal calculus and hydronephrosis, UA indicitve of infection, back/abd pain c/w past UTIs/stones. He underwent right ureteral stent placement 2/8/2021, transferred initially to the ICU with postoperative hypotension with c/f sepsis. Urine culture 2/08 growing Pseudomonas. He does have a history of MDR Pseudomonas with intermediate resistance to pip/tazo. Initial ID recommendation was ceftolozane/ tazobactam, however this is unavailable at this time due to recall. Decision was made to continue Zosyn and tobramycin while awaiting expanded sensitivities. The patient has now remained afebrile, off pressors since 2/10. Suprapubic cath changed 2/12.  - Urology following, appreciate recs  - ID consulted, appreciate recs   - Continue IV tobramycin x2 weeks (2/08-2/22), pharmacy to assist with dosing, will need weekly CBC with diff and CMP    - PO Flagyl 500 mg q8h for bacteremia (2/12-2/26)  -  PICC placement for long term antibiotics  - PTA oxybutynin 10 mg daily    Antibiotics  - PO Flagyl (2/12- )  - IV Tobramycin (2/9 - )  - IV Zosyn (2/8 -2/12)  - Linezolid (2/9 -2/10) - for sepsis     # Acute hypercapnic and hypoxic respiratory failure  # L hemidiaphragm elevation, concerning for L hemidiaphragm paralysis  # L lower lobe atelectasis  # Left-sided pleural effusion, chronic  # High pretest probability for sleep apnea  # Tobacco dependence  Initially on room air, post procedure requiring 3-6 L and weaned to room air over the course of ICU stay. Patient has apneic events while sleeping but does not require oxygen when awake at baseline- evidence of LIZA on prior sleep study 2014, does not have CPAP. CT chest on admission revealed left pleural effusion with pleural thickening, initially concerning for empyema however this was also seen on prior CT in 2018. Looking further back, it does appear he required thoracentesis in ~2009 at which time fluid was consistent with parapneumonic effusion. Has had improved respiratory status, no cough, fever, chills. Previously, patient's shortness of breath followed fluid bolus and improved with Lasix, hesitant to redose Lasix in setting of hypotension. Does have pre-existing asthma and chronic hypercapnia. Echocardiogram 2/08 with EF 55-60%. New O2 need again 2/12. Repeat CT chest 2/12 showed small bilateral pleural effusions, adjacent bibasilar consolidations and near complete atelectasis of the LLL.   AM ABG shows hypercapnia. Per pulm recommendation to consider BiPAP overnight treatment.  - pulm consulted, recs:   - bronchial drainage regimen: chest physiotherapy with metaneb, vest, flutter valve   - duoneb scheduled QID   - sniff test with fluoroscopy or ultrasound to work up possible L hemidiaphragm paralysis   - patient declines overnight bipap, would like to see if other resp treatments help enough   - outpatient sleep study   - repeat CXR in 1-2 days   - repeat  chest CT in 6-8 weeks due to smoking hx. If continued atelectasis/lobar collapse, consider bronchoscopy     # Hx insomnia  # Delirium   Notes indicate a history of insomnia, for which he started Ambien within the past year. More recent PCP notes mention decreasing the Ambien 10 to 5 mg, and wife mentions that his vivid dreams may have increased since starting this medication about a year ago. He also uses diazepam as an outpatient. Also mention of alcohol and tobacco use.   - PTA diazepam 10 mg daily   - melatonin at bedtime     # Autonomic dysreflexia  # Hypotension  # Quadriplegia 2/2 C4-C6 SCI   Frequent bradycardia and fluctuating blood pressure at home (baseline ~110/70) per patient report. Patient initially hypertensive at home over the past week, lower blood pressures after using his home amlodipine which he uses PRN, typically a few times a year Initially admitted to ICU 2/9 with symptomatic hypotension-shock, requiring pressor support. He continues to have intermittent hypotension, though with MAPs>50 and off dopamine since 2/10 AM. Being cautious with IVF given his overall hypervolemia and requiring Lasix x1. TTE on 2/10 with small pericardial effusion, normal EF and cardiac motion.   - Telemetry, q4h vitals  - Attempt elevation of legs if persistently hypotensive/MAPs <50    # Leukopenia, resolved  # Thrombocytopenia, stable  Likely related to sepsis. No history of similar thrombocytopenia on chart review, however, when septic in the past did have leukopenia down to 3.6 (2017).   - trend CBC      # Spacticity, chronic  - PTA Baclofen 20 mg 4 times daily scheduled, additional PRN 2 times daily  - PTA diazepam 10 daily, additional PRN 5 mg every day        Diet: Regular Diet Adult    Fluids: PO  Lines: PIV x2  DVT Prophylaxis: Enoxaparin (Lovenox) SQ  Amanda Catheter: not present  Code Status: Full Code         Disposition Plan   Expected discharge: 1-2 days, recommended to prior living arrangement once  adequate pain management/ tolerating PO medications, antibiotic plan established and SIRS/Sepsis treated.  Entered: Chio Rosas DO 02/14/2021, 9:17 AM     The patient's care was discussed with the Attending Physician, Dr. Alexander.    Chio Rosas DO  [facility]  [facility]  Please see sign in/sign out for up to date coverage information  ______________________________________________________________________    Interval History   No further symptoms of dizziness. No chest pain or dyspnea right now, but has been having some intermittent dyspnea.  Jc was not pleased to hear about the possibility of a pacemaker or for bipap or any kind of overnight mask. Due to his quadriplegia, his only way to signal for his needs is to verbally call out and a face mask would prevent that. He states a had call button/sensor is not an option at home.    Data reviewed today: I reviewed all medications, new labs and imaging results over the last 24 hours.     Physical Exam   Vital Signs: Temp: 97.5  F (36.4  C) Temp src: Oral BP: 131/80 Pulse: (!) 40   Resp: 18 SpO2: 100 % O2 Device: Nasal cannula Oxygen Delivery: 1 LPM  Weight: 209 lbs 3.46 oz     Physical Exam  Constitutional:       General: He is not in acute distress.     Appearance: He is well-developed. He is not diaphoretic.   Cardiovascular:      Comments: Distant heart sounds, HR 40-50s  Pulmonary:      Effort: Pulmonary effort is normal. No respiratory distress.      Comments: Breathing comfortably on room air  Abdominal:      Tenderness: Tenderness: minimal sensation at baseline.   Musculoskeletal:      Right lower leg: Edema (1+ pitting) present.      Left lower leg: Edema (1+ pitting) present.   Skin:     General: Skin is warm.      Coloration: Skin is pale (bilateral feet). Skin is not jaundiced.      Findings: Bruising (involving the bilateral feet and multiple toes. patient's wife present and states  feet look at baseline) present. No rash.   Neurological:      Mental Status: He is alert.      Comments: Quadriplegia at baseline   Psychiatric:         Mood and Affect: Mood normal.        Data   Recent Labs   Lab 02/14/21  0523 02/13/21  0540 02/12/21  0820 02/10/21  0532 02/10/21  0532 02/10/21  0402 02/08/21  1137 02/08/21  1137   WBC 3.7* 3.4* 3.8*   < > 4.0 Canceled, Test credited   < > 3.5*   HGB 12.7* 12.4* 13.5   < > 14.5 Canceled, Test credited   < > 15.9   MCV 97 97 96   < > 97 Canceled, Test credited   < > 95   * 106* 84*   < > 82* Canceled, Test credited   < > 83*   INR  --   --   --   --   --   --   --  0.91    139 135   < > 136 Canceled, Test credited   < > 132*   POTASSIUM 4.3 3.9 4.0   < > 4.2 Canceled, Test credited   < > 4.5   CHLORIDE 97 101 97   < > 99 Canceled, Test credited   < > 98   CO2 34* 35* 34*   < > 33* Canceled, Test credited   < > 34*   BUN 7 6* 8   < > 12 Canceled, Test credited   < > 17   CR 0.31* 0.31* 0.37*   < > 0.42* Canceled, Test credited   < > 0.30*   ANIONGAP 3 3 4   < > 4 Canceled, Test credited   < > 1*   LIU 9.1 9.0 8.8   < > 8.7 Canceled, Test credited   < > 9.2   GLC 97 107* 77   < > 75 Canceled, Test credited   < > 72   ALBUMIN  --   --   --   --  2.6* Canceled, Test credited   < > 3.2*   PROTTOTAL  --   --   --   --  6.0* Canceled, Test credited   < > 6.6*   BILITOTAL  --   --   --   --  1.1 Canceled, Test credited   < > 0.5   ALKPHOS  --   --   --   --  89 Canceled, Test credited   < > 106   ALT  --   --   --   --  42 Canceled, Test credited   < > 35   AST  --   --   --   --  29 Canceled, Test credited   < > 19   TROPI  --   --   --   --   --   --   --  <0.015    < > = values in this interval not displayed.     No results found for this or any previous visit (from the past 24 hour(s)).

## 2021-02-14 NOTE — PLAN OF CARE
Neuro: A&Ox4. Pleasant. Quadriplegic. Minimal contraction BUE. Able to make needs known. Slept for most of night.   Cardiac: Sinus ronald 40-50's. VSS. Afebrile. +3/+4 edema in BLE.   Respiratory: Sating 90's on 1-2L NC while asleep, RA during the day. Dyspneic w/ conversation. ABG to be drawn this AM.   GI/: Adequate urine output via suprapubic. Very large BM x1.   Diet/appetite: Tolerating regular diet. Eating well. Excellent PO fluid intake.   Activity:  Assist of 2 w/ mechanical lift. Pt able to request repositioning himself.   Pain: Denies.   Skin: No new deficits noted.  LDA's: PIV. Suprapubic.     Plan: Single lumen PICC to be placed today for long-term IV abx. ABG scheduled for this AM. Chest/fluoro x-ray today. Continue with POC. Notify primary team with changes.

## 2021-02-14 NOTE — PROVIDER NOTIFICATION
02/14/21 1535   PICC Single Lumen 02/14/21 Left Brachial vein lateral   Placement Date/Time: 02/14/21 (c) 1535   Catheter Brand: Bard  Size (Fr): 4 Fr  Lot #: [id number]  Full barrier precautions done: Yes, hand hygiene, sterile gown, sterile gloves, mask, cap, full body drape, chlorhexidine scrub  Consent Signed: Yes  Time...   Site Assessment WDL   Line Status Blood return noted;Saline locked   External Cath Length (cm) 2 cm   Extremity Circumference (cm) 28 cm   Extravasation? No   Dressing Intervention Chlorhexidine patch;Transparent;Securing device;New dressing   Dressing Change Due 02/21/21   Lumen A - Cap Change Due 02/18/21   PICC Comment PICC inserted   Line Necessity Yes, meets criteria

## 2021-02-14 NOTE — PROCEDURES
[facility]    Single Lumen PICC Placement    Date/Time: 2/14/2021 3:35 PM  Performed by: Gary Monge RN  Authorized by: Maxine Chowdhury MD   Indications: Antibiotic.    UNIVERSAL PROTOCOL   Site Marked: Yes  Prior Images Obtained and Reviewed:  Yes  Required items: Required blood products, implants, devices and special equipment available    Patient identity confirmed:  Verbally with patient, arm band, hospital-assigned identification number and provided demographic data  NA - No sedation, light sedation, or local anesthesia  Confirmation Checklist:  Patient's identity using two indicators, relevant allergies, procedure was appropriate and matched the consent or emergent situation and correct equipment/implants were available  Time out: Immediately prior to the procedure a time out was called    Universal Protocol: the Joint Commission Universal Protocol was followed    Preparation: Patient was prepped and draped in usual sterile fashion           ANESTHESIA    Anesthesia: See MAR for details  Local Anesthetic:  Lidocaine 1% without epinephrine  Anesthetic Total (mL):  1      SEDATION    Patient Sedated: No        Preparation: skin prepped with ChloraPrep  Skin prep agent: skin prep agent completely dried prior to procedure  Sterile barriers: maximum sterile barriers were used: cap, mask, sterile gown, sterile gloves, and large sterile sheet  Hand hygiene: hand hygiene performed prior to central venous catheter insertion  Type of line used: Power PICC  Catheter type: single lumen  Lumen type: non-valved  Catheter size: 4 Fr  Brand: Bard  Lot number: [id number]  Placement method: venipuncture, MST, ultrasound and tip confirmation system  Number of attempts: 1  Successful placement: yes  Orientation: left  Location: brachial vein (lateral) (vein diameter - 0.30 cm)  Site rationale: Patient preference  Arm circumference: adults 10 cm  Extremity circumference:  28  Visible catheter length: 2  Total catheter length: 47  Dressing and securement: chlorhexidine disc applied, glue, site cleaned, statlock and sterile dressing applied  Post procedure assessment: blood return through all ports, free fluid flow and placement verified by x-ray  PROCEDURE   Patient Tolerance:  Patient tolerated the procedure well with no immediate complications  Describe Procedure: PICC is OK to use.

## 2021-02-14 NOTE — PROGRESS NOTES
Vascular Access Services Notes:    Patient was NOT available for PICC placement. 6B RN (Anneliese) aware.      Gary Monge, FAMN, RN [facility]

## 2021-02-15 ENCOUNTER — APPOINTMENT (OUTPATIENT)
Dept: GENERAL RADIOLOGY | Facility: CLINIC | Age: 61
End: 2021-02-15
Attending: STUDENT IN AN ORGANIZED HEALTH CARE EDUCATION/TRAINING PROGRAM
Payer: COMMERCIAL

## 2021-02-15 ENCOUNTER — HOME INFUSION (PRE-WILLOW HOME INFUSION) (OUTPATIENT)
Dept: PHARMACY | Facility: CLINIC | Age: 61
End: 2021-02-15

## 2021-02-15 LAB
ANION GAP SERPL CALCULATED.3IONS-SCNC: 3 MMOL/L (ref 3–14)
BACTERIA SPEC CULT: NO GROWTH
BUN SERPL-MCNC: 5 MG/DL (ref 7–30)
CALCIUM SERPL-MCNC: 9.2 MG/DL (ref 8.5–10.1)
CHLORIDE SERPL-SCNC: 99 MMOL/L (ref 94–109)
CO2 SERPL-SCNC: 36 MMOL/L (ref 20–32)
CREAT SERPL-MCNC: 0.26 MG/DL (ref 0.66–1.25)
CRP SERPL-MCNC: 13 MG/L (ref 0–8)
ERYTHROCYTE [DISTWIDTH] IN BLOOD BY AUTOMATED COUNT: 14.3 % (ref 10–15)
GFR SERPL CREATININE-BSD FRML MDRD: >90 ML/MIN/{1.73_M2}
GLUCOSE SERPL-MCNC: 94 MG/DL (ref 70–99)
HCT VFR BLD AUTO: 40.7 % (ref 40–53)
HGB BLD-MCNC: 12.6 G/DL (ref 13.3–17.7)
INTERPRETATION ECG - MUSE: NORMAL
INTERPRETATION ECG - MUSE: NORMAL
LABORATORY COMMENT REPORT: NORMAL
MCH RBC QN AUTO: 30.1 PG (ref 26.5–33)
MCHC RBC AUTO-ENTMCNC: 31 G/DL (ref 31.5–36.5)
MCV RBC AUTO: 97 FL (ref 78–100)
PLATELET # BLD AUTO: 126 10E9/L (ref 150–450)
POTASSIUM SERPL-SCNC: 4.1 MMOL/L (ref 3.4–5.3)
RBC # BLD AUTO: 4.19 10E12/L (ref 4.4–5.9)
SARS-COV-2 RNA RESP QL NAA+PROBE: NEGATIVE
SODIUM SERPL-SCNC: 138 MMOL/L (ref 133–144)
SPECIMEN SOURCE: NORMAL
SPECIMEN SOURCE: NORMAL
WBC # BLD AUTO: 5.4 10E9/L (ref 4–11)

## 2021-02-15 PROCEDURE — 250N000013 HC RX MED GY IP 250 OP 250 PS 637: Performed by: STUDENT IN AN ORGANIZED HEALTH CARE EDUCATION/TRAINING PROGRAM

## 2021-02-15 PROCEDURE — 71045 X-RAY EXAM CHEST 1 VIEW: CPT | Mod: 26 | Performed by: RADIOLOGY

## 2021-02-15 PROCEDURE — 94640 AIRWAY INHALATION TREATMENT: CPT | Mod: 76

## 2021-02-15 PROCEDURE — 120N000003 HC R&B IMCU UMMC

## 2021-02-15 PROCEDURE — 85027 COMPLETE CBC AUTOMATED: CPT | Performed by: STUDENT IN AN ORGANIZED HEALTH CARE EDUCATION/TRAINING PROGRAM

## 2021-02-15 PROCEDURE — 120N000005 HC R&B MS OVERFLOW UMMC

## 2021-02-15 PROCEDURE — 250N000011 HC RX IP 250 OP 636: Performed by: STUDENT IN AN ORGANIZED HEALTH CARE EDUCATION/TRAINING PROGRAM

## 2021-02-15 PROCEDURE — 999N000157 HC STATISTIC RCP TIME EA 10 MIN

## 2021-02-15 PROCEDURE — 94640 AIRWAY INHALATION TREATMENT: CPT

## 2021-02-15 PROCEDURE — U0003 INFECTIOUS AGENT DETECTION BY NUCLEIC ACID (DNA OR RNA); SEVERE ACUTE RESPIRATORY SYNDROME CORONAVIRUS 2 (SARS-COV-2) (CORONAVIRUS DISEASE [COVID-19]), AMPLIFIED PROBE TECHNIQUE, MAKING USE OF HIGH THROUGHPUT TECHNOLOGIES AS DESCRIBED BY CMS-2020-01-R: HCPCS | Performed by: FAMILY MEDICINE

## 2021-02-15 PROCEDURE — 80048 BASIC METABOLIC PNL TOTAL CA: CPT | Performed by: STUDENT IN AN ORGANIZED HEALTH CARE EDUCATION/TRAINING PROGRAM

## 2021-02-15 PROCEDURE — 36592 COLLECT BLOOD FROM PICC: CPT | Performed by: STUDENT IN AN ORGANIZED HEALTH CARE EDUCATION/TRAINING PROGRAM

## 2021-02-15 PROCEDURE — 86140 C-REACTIVE PROTEIN: CPT | Performed by: STUDENT IN AN ORGANIZED HEALTH CARE EDUCATION/TRAINING PROGRAM

## 2021-02-15 PROCEDURE — U0005 INFEC AGEN DETEC AMPLI PROBE: HCPCS | Performed by: FAMILY MEDICINE

## 2021-02-15 PROCEDURE — 99232 SBSQ HOSP IP/OBS MODERATE 35: CPT | Mod: GC | Performed by: FAMILY MEDICINE

## 2021-02-15 PROCEDURE — 272N000098

## 2021-02-15 PROCEDURE — 76000 FLUOROSCOPY <1 HR PHYS/QHP: CPT

## 2021-02-15 PROCEDURE — 250N000009 HC RX 250: Performed by: STUDENT IN AN ORGANIZED HEALTH CARE EDUCATION/TRAINING PROGRAM

## 2021-02-15 RX ORDER — CARBOXYMETHYLCELLULOSE SODIUM 5 MG/ML
1 SOLUTION/ DROPS OPHTHALMIC
Status: DISCONTINUED | OUTPATIENT
Start: 2021-02-15 | End: 2021-02-20 | Stop reason: HOSPADM

## 2021-02-15 RX ADMIN — ZOLPIDEM TARTRATE 5 MG: 5 TABLET ORAL at 00:09

## 2021-02-15 RX ADMIN — OXYCODONE HYDROCHLORIDE AND ACETAMINOPHEN 1000 MG: 500 TABLET ORAL at 09:32

## 2021-02-15 RX ADMIN — BACLOFEN 20 MG: 20 TABLET ORAL at 15:59

## 2021-02-15 RX ADMIN — DOCUSATE SODIUM 100 MG: 100 CAPSULE, LIQUID FILLED ORAL at 09:31

## 2021-02-15 RX ADMIN — Medication 5 ML: at 07:41

## 2021-02-15 RX ADMIN — ENOXAPARIN SODIUM 40 MG: 40 INJECTION SUBCUTANEOUS at 11:28

## 2021-02-15 RX ADMIN — METRONIDAZOLE 500 MG: 500 TABLET ORAL at 23:06

## 2021-02-15 RX ADMIN — BACLOFEN 20 MG: 20 TABLET ORAL at 09:31

## 2021-02-15 RX ADMIN — Medication 1 MG: at 23:06

## 2021-02-15 RX ADMIN — METRONIDAZOLE 500 MG: 500 TABLET ORAL at 15:58

## 2021-02-15 RX ADMIN — BACLOFEN 20 MG: 20 TABLET ORAL at 19:59

## 2021-02-15 RX ADMIN — BACLOFEN 20 MG: 20 TABLET ORAL at 11:28

## 2021-02-15 RX ADMIN — DIAZEPAM 5 MG: 5 TABLET ORAL at 23:07

## 2021-02-15 RX ADMIN — METRONIDAZOLE 500 MG: 500 TABLET ORAL at 09:32

## 2021-02-15 RX ADMIN — OXYBUTYNIN 10 MG: 10 TABLET, FILM COATED, EXTENDED RELEASE ORAL at 09:31

## 2021-02-15 RX ADMIN — ALBUTEROL SULFATE 2.5 MG: 2.5 SOLUTION RESPIRATORY (INHALATION) at 20:35

## 2021-02-15 RX ADMIN — ALBUTEROL SULFATE 2.5 MG: 2.5 SOLUTION RESPIRATORY (INHALATION) at 08:45

## 2021-02-15 RX ADMIN — ALBUTEROL SULFATE 2.5 MG: 2.5 SOLUTION RESPIRATORY (INHALATION) at 16:30

## 2021-02-15 RX ADMIN — METRONIDAZOLE 500 MG: 500 TABLET ORAL at 00:09

## 2021-02-15 RX ADMIN — SODIUM CHLORIDE, PRESERVATIVE FREE 5 ML: 5 INJECTION INTRAVENOUS at 16:00

## 2021-02-15 RX ADMIN — ZOLPIDEM TARTRATE 5 MG: 5 TABLET ORAL at 23:07

## 2021-02-15 ASSESSMENT — ACTIVITIES OF DAILY LIVING (ADL)
ADLS_ACUITY_SCORE: 31

## 2021-02-15 ASSESSMENT — MIFFLIN-ST. JEOR: SCORE: 1718.5

## 2021-02-15 NOTE — PLAN OF CARE
Temp: 98  F (36.7  C) Temp src: Oral BP: (!) 154/85 Pulse: 51   Resp: 16 SpO2: 99 % O2 Device: Nasal cannula Oxygen Delivery: 1 LPM     Neuro: A&Ox4. C4-C6 Quadraplegia.   Cardiac: Sinus ronald rates 40s-50s. Asymptomatic. BPs stable, slightly hypertensive, 150 systolic. +4 pedal edema.  Respiratory: Utilizing 1L NC while asleep, bipap ordered for tonight.   GI/: Adequate urine output via SPC- red in color, team aware.. LBM 2/14  Diet/appetite: Tolerating regular diet. Eating well.  Activity:  Assist of 2+ lift, Q2 repo, up to wheelchair.   Pain: At acceptable level on current regimen.   Skin: No new deficits noted.   LDA's: Left SL PICC, Left PIV. SPC.     Plan: Continue with POC. Notify primary team with changes.

## 2021-02-15 NOTE — PROGRESS NOTES
Care Management Follow Up    Length of Stay (days): 7    Expected Discharge Date: 02/17/21     Concerns to be Addressed:  Home Infusion     Patient plan of care discussed at interdisciplinary rounds: Yes    Anticipated Discharge Disposition:  Home     Anticipated Discharge Services:  [facility]: RN  [facility] #[phone]: IV Tobramycin and supplies  Anticipated Discharge DME:  Pt has powered w/c    Patient/family educated on Medicare website which has current facility and service quality ratings:  yes  Education Provided on the Discharge Plan:  Patient and Wife  Patient/Family in Agreement with the Plan:  Yes    Referrals Placed by CM/SW:  [facility]  [facility] resumption orders placed.  Private pay costs discussed: N/A    Additional Information:  Per Medical Team potential discharge to home in 1-2 days. I have met with Pt and Wife to assist with discharge planning. Prior to admission [facility] was providing intermittent skilled nursing visits.  Pt had a PICC placed yesterday, for ongoing IV antibiotics. A referral was made to [facility].      Amber Schreiber RN   6B care coordinator #[phone]

## 2021-02-15 NOTE — PLAN OF CARE
Neuro: A&Ox4. Pleasant. Quadriplegic. Minimal contraction BUE. Able to make needs known. Slept for most of night.   Cardiac: Sinus ronald 40-50's. VSS. Afebrile. +3/+4 edema in BLE.              Respiratory: Sating 90's on 1L NC while asleep, RA during the day. Dyspneic w/ conversation.  GI/: Adequate urine output via suprapubic. No BM overnight.   Diet/appetite: Tolerating regular diet. Eating well. Excellent PO fluid intake.   Activity:  Assist of 2 w/ mechanical lift. Pt able to request repositioning himself.   Pain: Denies.   Skin: No new deficits noted.  LDA's: PIV. Suprapubic. Single lumen PICC.      Plan: Chest/fluoro x-ray today. Continue with POC. Notify primary team with changes.

## 2021-02-15 NOTE — TELEPHONE ENCOUNTER
MEDICAL RECORDS REQUEST   [facility] Clinic  [address] [place] [address]  PHONE: [phone]  Fax: [phone]        FUTURE VISIT INFORMATION                                                   Bart Corea : 1960 scheduled for future visit at [facility]    APPOINTMENT INFORMATION:    Date: 2021 4:15PM    Provider:  MD Anup    Reason for Visit/Diagnosis: Bladder stones     REFERRAL INFORMATION:    Referring provider:  N/A    Specialty: N/A    Referring providers clinic:  [facility]    Clinic contact number:  N/A    RECORDS REQUESTED FOR VISIT                                                     NOTES  STATUS/DETAILS   OFFICE NOTE from referring provider  yes   OFFICE NOTE from other specialist  yes   DISCHARGE SUMMARY from hospital  yes   DISCHARGE REPORT from the ER  yes   OPERATIVE REPORT  yes   MEDICATION LIST  yes   BLADDER STONES     CT STONE  yes-21     PRE-VISIT CHECKLIST      Record collection complete Yes   Appointment appropriately scheduled           (right time/right provider) Yes   MyChart activation Yes   Questionnaire complete If no, please explain: In process      Completed by: Marge Cano

## 2021-02-15 NOTE — PLAN OF CARE
Neuro: A&Ox4. Quadriplegic, slight contraction in upper extremities.   Cardiac: SB high 30's to 50's. Otherwise VSS.  Respiratory: Sating > 94%% on RA. Lungs clear/diminished. Tolerating metaneb well, needs to be given aerobika and vest treatments by RT, discussed this with RT this evening.   GI/: Adequate urine output via suprapubic. BM x 2.   Diet/appetite: Tolerating regular diet. No appetite today as patient feels bloated. Abdomen is distended but soft.  Activity:  Lift assist, turn and reposition while in bed. Up to commode chair today.   Pain: At acceptable level on current regimen. Gave PRN tylenol x 1 for headache.   Skin: No new deficits noted.  LDA's: PIV, new right SL PICC, suprapubic.       Plan: Continue with POC. Notify primary team with changes.

## 2021-02-16 ENCOUNTER — APPOINTMENT (OUTPATIENT)
Dept: GENERAL RADIOLOGY | Facility: CLINIC | Age: 61
End: 2021-02-16
Attending: ORTHOPAEDIC SURGERY
Payer: COMMERCIAL

## 2021-02-16 ENCOUNTER — APPOINTMENT (OUTPATIENT)
Dept: OCCUPATIONAL THERAPY | Facility: CLINIC | Age: 61
End: 2021-02-16
Payer: COMMERCIAL

## 2021-02-16 ENCOUNTER — PRE VISIT (OUTPATIENT)
Dept: UROLOGY | Facility: CLINIC | Age: 61
End: 2021-02-16

## 2021-02-16 ENCOUNTER — VIRTUAL VISIT (OUTPATIENT)
Dept: UROLOGY | Facility: CLINIC | Age: 61
End: 2021-02-16
Payer: COMMERCIAL

## 2021-02-16 DIAGNOSIS — N39.0 URINARY TRACT INFECTION WITHOUT HEMATURIA, SITE UNSPECIFIED: Primary | ICD-10-CM

## 2021-02-16 PROBLEM — J98.6 DIAPHRAGM PARALYSIS: Status: ACTIVE | Noted: 2021-02-16

## 2021-02-16 PROBLEM — N13.8 URINARY TRACT OBSTRUCTION BY KIDNEY STONE: Status: ACTIVE | Noted: 2021-02-16

## 2021-02-16 PROBLEM — N20.0 URINARY TRACT OBSTRUCTION BY KIDNEY STONE: Status: ACTIVE | Noted: 2021-02-16

## 2021-02-16 PROBLEM — J98.11 ATELECTASIS OF LEFT LUNG: Status: ACTIVE | Noted: 2021-02-16

## 2021-02-16 LAB
AMYLASE FLD-CCNC: 30 U/L
ANION GAP SERPL CALCULATED.3IONS-SCNC: 2 MMOL/L (ref 3–14)
APPEARANCE FLD: NORMAL
BUN SERPL-MCNC: 8 MG/DL (ref 7–30)
CALCIUM SERPL-MCNC: 8.9 MG/DL (ref 8.5–10.1)
CHLORIDE SERPL-SCNC: 99 MMOL/L (ref 94–109)
CHOLEST FLD-MCNC: <50 MG/DL
CO2 SERPL-SCNC: 34 MMOL/L (ref 20–32)
COLOR FLD: NORMAL
CREAT SERPL-MCNC: 0.35 MG/DL (ref 0.66–1.25)
EOSINOPHIL NFR FLD MANUAL: 1 %
ERYTHROCYTE [DISTWIDTH] IN BLOOD BY AUTOMATED COUNT: 14.3 % (ref 10–15)
GFR SERPL CREATININE-BSD FRML MDRD: >90 ML/MIN/{1.73_M2}
GLUCOSE FLD-MCNC: 114 MG/DL
GLUCOSE SERPL-MCNC: 107 MG/DL (ref 70–99)
GRAM STN SPEC: NORMAL
HCT VFR BLD AUTO: 39.1 % (ref 40–53)
HGB BLD-MCNC: 12.1 G/DL (ref 13.3–17.7)
INR PPP: 1.06 (ref 0.86–1.14)
LDH FLD L TO P-CCNC: 60 U/L
LDH SERPL L TO P-CCNC: 130 U/L (ref 85–227)
LYMPHOCYTES NFR FLD MANUAL: 70 %
MCH RBC QN AUTO: 30 PG (ref 26.5–33)
MCHC RBC AUTO-ENTMCNC: 30.9 G/DL (ref 31.5–36.5)
MCV RBC AUTO: 97 FL (ref 78–100)
NEUTS BAND NFR FLD MANUAL: 16 %
OTHER CELLS FLD MANUAL: 13 %
PH FLD: 7.6 PH
PLATELET # BLD AUTO: 143 10E9/L (ref 150–450)
POTASSIUM SERPL-SCNC: 4 MMOL/L (ref 3.4–5.3)
PROT FLD-MCNC: 2.8 G/DL
PROT SERPL-MCNC: 6 G/DL (ref 6.8–8.8)
RBC # BLD AUTO: 4.04 10E12/L (ref 4.4–5.9)
SODIUM SERPL-SCNC: 136 MMOL/L (ref 133–144)
SPECIMEN SOURCE FLD: NORMAL
SPECIMEN SOURCE: NORMAL
TOBRAMYCIN SERPL-MCNC: 11.2 MG/L
TOBRAMYCIN SERPL-MCNC: 3.5 MG/L
TRIGL FLD-MCNC: 7 MG/DL
WBC # BLD AUTO: 5.6 10E9/L (ref 4–11)
WBC # FLD AUTO: 1384 /UL

## 2021-02-16 PROCEDURE — 36592 COLLECT BLOOD FROM PICC: CPT | Performed by: FAMILY MEDICINE

## 2021-02-16 PROCEDURE — 87075 CULTR BACTERIA EXCEPT BLOOD: CPT | Performed by: STUDENT IN AN ORGANIZED HEALTH CARE EDUCATION/TRAINING PROGRAM

## 2021-02-16 PROCEDURE — 80048 BASIC METABOLIC PNL TOTAL CA: CPT | Performed by: STUDENT IN AN ORGANIZED HEALTH CARE EDUCATION/TRAINING PROGRAM

## 2021-02-16 PROCEDURE — 250N000013 HC RX MED GY IP 250 OP 250 PS 637: Performed by: STUDENT IN AN ORGANIZED HEALTH CARE EDUCATION/TRAINING PROGRAM

## 2021-02-16 PROCEDURE — 82150 ASSAY OF AMYLASE: CPT | Performed by: STUDENT IN AN ORGANIZED HEALTH CARE EDUCATION/TRAINING PROGRAM

## 2021-02-16 PROCEDURE — 97535 SELF CARE MNGMENT TRAINING: CPT | Mod: GO | Performed by: OCCUPATIONAL THERAPIST

## 2021-02-16 PROCEDURE — 87076 CULTURE ANAEROBE IDENT EACH: CPT | Performed by: STUDENT IN AN ORGANIZED HEALTH CARE EDUCATION/TRAINING PROGRAM

## 2021-02-16 PROCEDURE — 83615 LACTATE (LD) (LDH) ENZYME: CPT | Performed by: STUDENT IN AN ORGANIZED HEALTH CARE EDUCATION/TRAINING PROGRAM

## 2021-02-16 PROCEDURE — 87181 SC STD AGAR DILUTION PER AGT: CPT | Performed by: STUDENT IN AN ORGANIZED HEALTH CARE EDUCATION/TRAINING PROGRAM

## 2021-02-16 PROCEDURE — 94669 MECHANICAL CHEST WALL OSCILL: CPT

## 2021-02-16 PROCEDURE — 99213 OFFICE O/P EST LOW 20 MIN: CPT | Mod: 95 | Performed by: UROLOGY

## 2021-02-16 PROCEDURE — 84155 ASSAY OF PROTEIN SERUM: CPT | Performed by: STUDENT IN AN ORGANIZED HEALTH CARE EDUCATION/TRAINING PROGRAM

## 2021-02-16 PROCEDURE — 84157 ASSAY OF PROTEIN OTHER: CPT | Performed by: STUDENT IN AN ORGANIZED HEALTH CARE EDUCATION/TRAINING PROGRAM

## 2021-02-16 PROCEDURE — 999N001018 HC STATISTIC H-CELL BLOCK W/STAIN: Performed by: STUDENT IN AN ORGANIZED HEALTH CARE EDUCATION/TRAINING PROGRAM

## 2021-02-16 PROCEDURE — 250N000011 HC RX IP 250 OP 636: Performed by: FAMILY MEDICINE

## 2021-02-16 PROCEDURE — 85027 COMPLETE CBC AUTOMATED: CPT | Performed by: STUDENT IN AN ORGANIZED HEALTH CARE EDUCATION/TRAINING PROGRAM

## 2021-02-16 PROCEDURE — 87205 SMEAR GRAM STAIN: CPT | Performed by: STUDENT IN AN ORGANIZED HEALTH CARE EDUCATION/TRAINING PROGRAM

## 2021-02-16 PROCEDURE — 88305 TISSUE EXAM BY PATHOLOGIST: CPT | Mod: TC | Performed by: STUDENT IN AN ORGANIZED HEALTH CARE EDUCATION/TRAINING PROGRAM

## 2021-02-16 PROCEDURE — 32555 ASPIRATE PLEURA W/ IMAGING: CPT | Performed by: ORTHOPAEDIC SURGERY

## 2021-02-16 PROCEDURE — 99232 SBSQ HOSP IP/OBS MODERATE 35: CPT | Mod: GC | Performed by: FAMILY MEDICINE

## 2021-02-16 PROCEDURE — 83986 ASSAY PH BODY FLUID NOS: CPT | Performed by: STUDENT IN AN ORGANIZED HEALTH CARE EDUCATION/TRAINING PROGRAM

## 2021-02-16 PROCEDURE — 94640 AIRWAY INHALATION TREATMENT: CPT

## 2021-02-16 PROCEDURE — 87015 SPECIMEN INFECT AGNT CONCNTJ: CPT | Performed by: STUDENT IN AN ORGANIZED HEALTH CARE EDUCATION/TRAINING PROGRAM

## 2021-02-16 PROCEDURE — 82465 ASSAY BLD/SERUM CHOLESTEROL: CPT | Performed by: STUDENT IN AN ORGANIZED HEALTH CARE EDUCATION/TRAINING PROGRAM

## 2021-02-16 PROCEDURE — 84478 ASSAY OF TRIGLYCERIDES: CPT | Performed by: STUDENT IN AN ORGANIZED HEALTH CARE EDUCATION/TRAINING PROGRAM

## 2021-02-16 PROCEDURE — 999N000157 HC STATISTIC RCP TIME EA 10 MIN

## 2021-02-16 PROCEDURE — 82945 GLUCOSE OTHER FLUID: CPT | Performed by: STUDENT IN AN ORGANIZED HEALTH CARE EDUCATION/TRAINING PROGRAM

## 2021-02-16 PROCEDURE — 88112 CYTOPATH CELL ENHANCE TECH: CPT | Mod: 26 | Performed by: PATHOLOGY

## 2021-02-16 PROCEDURE — 71045 X-RAY EXAM CHEST 1 VIEW: CPT

## 2021-02-16 PROCEDURE — 88112 CYTOPATH CELL ENHANCE TECH: CPT | Mod: TC | Performed by: STUDENT IN AN ORGANIZED HEALTH CARE EDUCATION/TRAINING PROGRAM

## 2021-02-16 PROCEDURE — 250N000011 HC RX IP 250 OP 636: Performed by: STUDENT IN AN ORGANIZED HEALTH CARE EDUCATION/TRAINING PROGRAM

## 2021-02-16 PROCEDURE — 999N001014 HC STATISTIC CYTO WRIGHT STAIN TC: Performed by: STUDENT IN AN ORGANIZED HEALTH CARE EDUCATION/TRAINING PROGRAM

## 2021-02-16 PROCEDURE — 85610 PROTHROMBIN TIME: CPT | Performed by: STUDENT IN AN ORGANIZED HEALTH CARE EDUCATION/TRAINING PROGRAM

## 2021-02-16 PROCEDURE — 120N000002 HC R&B MED SURG/OB UMMC

## 2021-02-16 PROCEDURE — 71045 X-RAY EXAM CHEST 1 VIEW: CPT | Mod: 26 | Performed by: RADIOLOGY

## 2021-02-16 PROCEDURE — 88305 TISSUE EXAM BY PATHOLOGIST: CPT | Mod: 26 | Performed by: PATHOLOGY

## 2021-02-16 PROCEDURE — 80200 ASSAY OF TOBRAMYCIN: CPT | Performed by: FAMILY MEDICINE

## 2021-02-16 PROCEDURE — 87070 CULTURE OTHR SPECIMN AEROBIC: CPT | Performed by: STUDENT IN AN ORGANIZED HEALTH CARE EDUCATION/TRAINING PROGRAM

## 2021-02-16 PROCEDURE — 0W9B3ZZ DRAINAGE OF LEFT PLEURAL CAVITY, PERCUTANEOUS APPROACH: ICD-10-PCS | Performed by: ORTHOPAEDIC SURGERY

## 2021-02-16 PROCEDURE — 258N000003 HC RX IP 258 OP 636: Performed by: FAMILY MEDICINE

## 2021-02-16 PROCEDURE — 89051 BODY FLUID CELL COUNT: CPT | Performed by: STUDENT IN AN ORGANIZED HEALTH CARE EDUCATION/TRAINING PROGRAM

## 2021-02-16 PROCEDURE — 250N000009 HC RX 250: Performed by: STUDENT IN AN ORGANIZED HEALTH CARE EDUCATION/TRAINING PROGRAM

## 2021-02-16 PROCEDURE — 999N000078 HC STATISTIC INTRAPULMONARY PERCUSSIVE VENT

## 2021-02-16 PROCEDURE — 94640 AIRWAY INHALATION TREATMENT: CPT | Mod: 76

## 2021-02-16 RX ADMIN — BACLOFEN 20 MG: 20 TABLET ORAL at 22:19

## 2021-02-16 RX ADMIN — BACLOFEN 20 MG: 20 TABLET ORAL at 08:12

## 2021-02-16 RX ADMIN — OXYBUTYNIN 10 MG: 10 TABLET, FILM COATED, EXTENDED RELEASE ORAL at 08:12

## 2021-02-16 RX ADMIN — BACLOFEN 20 MG: 20 TABLET ORAL at 12:16

## 2021-02-16 RX ADMIN — ALBUTEROL SULFATE 2.5 MG: 2.5 SOLUTION RESPIRATORY (INHALATION) at 08:43

## 2021-02-16 RX ADMIN — ACETAMINOPHEN 975 MG: 325 TABLET, FILM COATED ORAL at 03:20

## 2021-02-16 RX ADMIN — Medication 1 MG: at 22:15

## 2021-02-16 RX ADMIN — ALBUTEROL SULFATE 2.5 MG: 2.5 SOLUTION RESPIRATORY (INHALATION) at 20:29

## 2021-02-16 RX ADMIN — OXYCODONE HYDROCHLORIDE AND ACETAMINOPHEN 1000 MG: 500 TABLET ORAL at 08:12

## 2021-02-16 RX ADMIN — SENNOSIDES 2 TABLET: 8.6 TABLET, FILM COATED ORAL at 08:12

## 2021-02-16 RX ADMIN — DIAZEPAM 10 MG: 5 TABLET ORAL at 22:15

## 2021-02-16 RX ADMIN — SODIUM CHLORIDE, PRESERVATIVE FREE 5 ML: 5 INJECTION INTRAVENOUS at 17:53

## 2021-02-16 RX ADMIN — DOCUSATE SODIUM 100 MG: 100 CAPSULE, LIQUID FILLED ORAL at 08:12

## 2021-02-16 RX ADMIN — ALBUTEROL SULFATE 2.5 MG: 2.5 SOLUTION RESPIRATORY (INHALATION) at 12:56

## 2021-02-16 RX ADMIN — METRONIDAZOLE 500 MG: 500 TABLET ORAL at 18:55

## 2021-02-16 RX ADMIN — TOBRAMYCIN 480 MG: 40 INJECTION INTRAMUSCULAR; INTRAVENOUS at 03:25

## 2021-02-16 RX ADMIN — METRONIDAZOLE 500 MG: 500 TABLET ORAL at 08:12

## 2021-02-16 RX ADMIN — METRONIDAZOLE 500 MG: 500 TABLET ORAL at 23:56

## 2021-02-16 RX ADMIN — BACLOFEN 20 MG: 20 TABLET ORAL at 17:57

## 2021-02-16 ASSESSMENT — ACTIVITIES OF DAILY LIVING (ADL)
ADLS_ACUITY_SCORE: 31

## 2021-02-16 ASSESSMENT — MIFFLIN-ST. JEOR: SCORE: 1728.5

## 2021-02-16 NOTE — PROGRESS NOTES
Jc is a 60 year old who is being evaluated via a billable telephone visit.    Jc Smith's wife will be facilitating the call with him today.     He is currently and inpatient in the hospital, was supposed to be discharged but still hospitalized for multiple medical issues.    Briefly he is a 61 yo M with hx of C5 SCI, neurogenic bladder managed with SPT, with recent low back pain and autonomic dysreflexia found to be secondary to a large right proximal obstructing ureteral stone.  He was taken for emergency stent placement 2/8 which was successful.  Post-op course c/b sepsis for which he was admitted to ICU until 2/16, since moved to floor status.  While hospitalized also had left pleural effusion which was treated with thoracentesis.  He is also being followed by ID and is being treated with tobramycin for pseudomonas UTI.        Labs and Pathology:    I personally reviewed all applicable laboratory data and went over findings with patient  Significant for:        UA RESULTS:   Recent Labs   Lab Test 02/08/21  1146 01/21/20  1120 11/11/19  1230   SG 1.019 1.004 1.015   URINEPH 6.5 6.0 6.5   NITRITE Positive* Negative Negative   RBCU >182* 3* 4*   WBCU >182* 14* 49*             Imaging:    I personally reviewed all applicable imaging and went over the below findings with patient.    CT Abdomen Pelvis w Contrast    Narrative    EXAMINATION: CT ABDOMEN PELVIS W CONTRAST, 2/8/2021 2:47 PM    INDICATION: Flank pain.    COMPARISON STUDY: Renal ultrasound dated 1/5/2021, 9/16/2019, CT of  the abdomen and pelvis dated 7/14/2017 chest x-ray dated 2/8/2021,    TECHNIQUE: CT scan of the abdomen and pelvis was performed on  multidetector CT scanner using volumetric acquisition technique and  images were reconstructed in multiple planes with variable thickness  and reviewed on dedicated workstations.     CONTRAST: iopamidol (ISOVUE-370) solution 135 mL.   Without oral  contrast.    CT scan radiation dose is optimized to  minimum requisite dose using  automated dose modulation techniques.    FINDINGS:    Lower thorax: Right basilar dependent atelectasis. Chronic appearing  left pleural effusion with near complete atelectasis of the left lower  lobe. Pleura is thickened and enhancing, suspicious for empyema..   Large epicardiac fat pad.    Liver : Multiple hypoattenuating well-circumscribed lesions, stable  from exam dated 7/14/2017.No intrahepatic biliary ductal dilation.    Biliary System: Normal gallbladder. No extrahepatic biliary ductal  dilation.    Pancreas: No mass or pancreatic ductal dilation.    Adrenal glands: Heterogeneously enhancing left adrenal mass measuring  2.0 x 1.8 cm previously measuring 1.6 x 1.4 cm. Two right adrenal  nodules measuring 2.3 x 1.2 cm and 1.3 x 1.2 cm.    Spleen: Normal.    Kidneys: There is a new right renal calculus measuring 14 x 9 mm at  the pelviureteric junction is seen. This causes mild right  hydronephrosis. Punctate left renal calculi. No left hydronephrosis.  Small simple cysts of the bilateral kidneys.    Gastrointestinal tract: Normal appendix. Normal caliber small bowel  and large bowel.    Mesentery/peritoneum/retroperitoneum: No mass. No free fluid or air.    Lymph nodes: No significant lymphadenopathy.    Vasculature: Patent major abdominal vasculature.  Atherosclerosis at  the origin of the celiac artery.    Pelvis: Urinary bladder is normal.  Suprapubic catheter in place.      Osseous structures: No aggressive or acute osseous lesion.  Osteopenic  appearance of the bones. Degenerative changes of bilateral hips. Grade  1 retrolisthesis of L5 on S1. Degenerative changes of the  thoracolumbar spine. No acute osseous process.      Soft tissues: Fatty replacement of muscular tissues..      Impression    IMPRESSION:   1. 14 x 9 mm right renal calculus at the pelviureteric junction  causing mild right hydronephrosis.  2. Moderate left pleural effusion with atelectasis and left  pleural  thickening and enhancement, suspicious for empyema.  3. Bilateral adrenal nodules, stable on the right side and slightly  increased on the left side, when compared with 2017 CT, have been  present since at least 2010, suggestive of benign adenomas.  4. Stable hepatic cysts.    I have personally reviewed the examination and initial interpretation  and I agree with the findings.    DILLON CORMIER MD   CT Chest Pulmonary Embolism w Contrast    Narrative    EXAM: CTA pulmonary angiogram, 2/8/2021 2:47 PM    HISTORY: PE suspected, low/intermediate prob, positive D-dimer    COMPARISON: 2/8/2021 chest x-ray and 11/16/2015 chest CT.    TECHNIQUE: Volumetric CT images obtained through the chest with  contrast. Coronal and axial MIP reformatted images obtained.  Three-dimensional (3D) post-processed angiographic images were  reconstructed, archived to PACS and used in interpretation of this  study.     CONTRAST: iopamidol (ISOVUE-370) solution 135 mL ml isovue 370 IV.    FINDINGS:     Vascular: There is good contrast opacification of the pulmonary  arterial vasculature. No pulmonary embolus.  Proximal pulmonary artery  is mildly enlarged measuring 3.3 cm. No septal bowing. Trace IVC  contrast reflux. Aortic root is normal caliber.    The central tracheobronchial tree is patent. Patulous esophagus.  Left-sided pleural effusion with overlying compressive atelectasis in  the inferior left upper lobe, fissural thickening on the left oblique  fissure and near complete lobar atelectasis of the left lower lobe.  Trace dependent atelectasis in the right lower lobe. No pneumothorax.  No axillary or mediastinal adenopathy. Thyroid lobes are normal.  Normal three-vessel aortic arch.    Visualized upper abdomen is unremarkable. Left adrenal nodule 2 cm  probably a lipid rich adenoma. Age dependent degenerative changes in  the thoracic and upper lumbar spine. No suspicious osseous lesions.  Muscle atrophy diffuse.      Impression     IMPRESSION:  1. Exam is negative for acute pulmonary embolism. No paradoxical  septal bowing. Slightly increased diameter of proximal pulmonary  artery is stable dating to 2015 and can be seen with pulmonary  arterial hypertension.  2. Left-sided pleural effusion with compressive atelectasis of the  inferior left upper lobe and near complete lobar atelectasis of the  left lower lobe.    I have personally reviewed the examination and initial interpretation  and I agree with the findings.    GUSTAVO DUNBAR MD   POC US ECHO LIMITED    Impression    Limited Bedside Cardiac Ultrasound, performed and interpreted by me.   Indication: Hypotension/shock.  Parasternal long axis, parasternal short axis, apical 4 chamber and subcostal views were acquired.   Image quality was satisfactory.    Findings:    Global left ventricular function appears intact.  Chambers do not appear dilated.  There is no evidence of free fluid within the pericardium.    IMPRESSION: Grossly normal left ventricular function and chamber size.  No pericardial effusion..   XR Surgery STACEY Fluoro L/T 5 Min w Stills    Narrative    This exam was marked as non-reportable because it will not be read by a   radiologist or a [facility] non-radiologist provider.         CT Chest w/o Contrast    Narrative    Exam: CT Chest without contrast 2/12/2021 11:53 AM     History: Pleural effusion, malignancy suspected    Comparison: Chest CT 2/8/2021, 11/16/2015 and 11/2/2009    TECHNIQUE: Helical acquisition of CT images from the lung apices to  the kidneys without IV contrast. Coronal images and axial MIP images  were reconstructed from the source data.    FINDINGS:     Chest:   Thyroid is unremarkable. Normal branching pattern of the thoracic  great vessels. Main pulmonary artery is dilated measuring 3.4 cm.  Heart size is normal. Small pericardial effusion. No axillary,  mediastinal or hilar lymphadenopathy. Patulous esophagus.    Lungs:  The central tracheobronchial  tree is patent. Small bilateral pleural  effusions and adjacent bibasilar consolidations, left greater than  right with near complete collapse of the left lower lobe. No  pneumothorax.    Few small pulmonary nodules were not present on the comparison from  2/8/2021 for example a 4 mm subpleural nodule in the right lower lobe  (series 6 image 181, 3 mm nodule in the right middle lobe  (series 6  image 173) 6 mm solid pulmonary nodule in the right upper lobe series  6 image 150).    Upper abdomen:  Limited evaluation of the upper abdomen. Stable nodular thickening of  the left adrenal gland consistent with benign adenomatous changes.  Hepatic cyst in the left lobe of the liver, additional subcentimeter  hypodensity in the right lobe of the liver too small to accurately  characterize but also likely represents a cyst.    Bones:  No acute osseus abnormality or suspicious bony lesion. Degenerative  changes of the spine.      Impression    IMPRESSION:   1. Small bilateral pleural effusions and adjacent bibasilar  consolidations, left greater than right with near complete atelectasis  of the left lower lobe. Findings likely represent a component of  compressive atelectasis, and super imposed infection cannot entirely  be excluded.  2. Several several small scattered nodules in the right lung are new  from the comparison examination on 2/8/2021 suggestive of an acute  infectious or inflammatory etiology.    I have personally reviewed the examination and initial interpretation  and I agree with the findings.    GUSTAVO DUNBAR MD   XR Chest/Heart Fluoro    Narrative    EXAM:  XR CHEST/HEART FLUORO 2/15/2021 3:20 PM    INDICATION: Possible left hemidiaphragm paralysis    COMPARISON:  Chest x-ray dated 2/14/2021, CT of the chest dated  2/12/2021    FLUOROSCOPY TIME: 1.1 minutes    FINDINGS: There is decreased excursion of the left hemidiaphragm with  normal and deep breathing.  During sniff test there is paradoxical superior  excursion of the left  hemidiaphragm. There is normal excursion of the right hemidiaphragm.    The remaining visualized lung bases and abdomen are fluoroscopically  unremarkable.      Impression    IMPRESSION:  Left hemidiaphragm paralysis. Normal physiologic function of the right  hemidiaphragm.    I, DILLON CORMIER MD, attest that I was immediately available to  provide guidance and assistance during the entirety of the procedure.    I have personally reviewed the examination and initial interpretation  and I agree with the findings.    DILLON CORMIER MD   XR Chest Port 1 View    Narrative    Exam: XR CHEST PORT 1 VW, 2/14/2021 3:50 PM    Indication: PICC placement    Comparison: 2/8/2021    Findings:   Semiupright AP view of the chest. Left upper extremity PICC tip  projecting over the low SVC.  Midline trachea. Stable enlarged cardiac silhouette. Pulmonary  vasculature is engorged and indistinct. Hazy perihilar interstitial  opacities. Relatively unchanged left lower lobe atelectasis.  Additional aerated lungs are relatively clear. Bilateral left greater  than right pleural effusions with associated atelectasis. No  appreciable pneumothorax. Visualized upper abdomen is unremarkable.  Osteopenic appearance of the bones.      Impression    Impression:   1. Left upper extremity PICC tip projecting over the low SVC.  2. Relatively unchanged cardiomegaly and small bilateral pleural  effusions and associated bilateral left greater than right lower lobe  atelectasis.    I have personally reviewed the examination and initial interpretation  and I agree with the findings.    MARK VALLES MD   POC US Guide for Thorcentesis    Impression    Moderate smple right pleural effusion. 500 ml removed. See consult note for details. +  sliding scale after procedure. Chest xray pending   XR Chest Port 1 View    Narrative    EXAM: XR CHEST PORT 1 VW  2/16/2021 2:48 PM     HISTORY:  Follow-up left sided thoracentesis        COMPARISON:  Chest x-ray 2020    FINDINGS:   Upright portable AP view of the chest. Left upper extremity PICC tip  projects over the lower SVC. Cardiac silhouette appears enlarged.  Trachea is midline.    No significant change in hazy perihilar interstitial opacities. No  significant change in left lower lobe atelectasis. Small left pleural  effusion. No appreciable varus. Visualized upper abdomen is  unremarkable.      Impression    IMPRESSION:     1. Small residual pleural effusion on the left status post  thoracentesis. No appreciable pneumothorax.  2. No significant change in bilateral perihilar interstitial opacities  are bibasilar left greater than right atelectasis.    I have personally reviewed the examination and initial interpretation  and I agree with the findings.    RICO GALLEGO MD   Echo Complete    Narrative    [id number]  [id number]  [id number]  [id number]^ARTIS^BHUPINDER           [facility],[place]  Echocardiography Laboratory  [address] [place] [id number]     Name: MICHOACANO WHITE  MRN: [id number]  : 1960  Study Date: 2021 09:27 AM  Age: 60 yrs  Gender: Male  Patient Location: [facility] [place]  Reason For Study: Shock  Ordering Physician: BHUPINDER WISDOM  Performed By: KING Hidalgo     BSA: 2.1 m2  Height: 68 in  Weight: 214 lb  HR: 81  BP: 100/53 mmHg  _____________________________________________________________________________  __        Procedure  Complete Portable Echo Adult. Contrast Optison. Patient was given 5 ml mixture  of 3 ml Optison and 6 ml saline. 4 ml wasted.  _____________________________________________________________________________  __        Interpretation Summary  Global and regional left ventricular function is hyperkinetic with an EF of  65-70%.  Global right ventricular function is normal. The right ventricle is normal  size.  A left pleural effusion is present. No pericardial effusion is present.  This study  was compared with the study from 04/06/2017. The LV function is now  hyperkinetic. The left-sided pleural effusion is newly noted.  _____________________________________________________________________________  __        Left Ventricle  Global and regional left ventricular function is hyperkinetic with an EF of  65-70%. Left ventricular size is normal. Relative wall thickness is increased  consistent with concentric remodeling. Left ventricular diastolic function is  normal.     Right Ventricle  Global right ventricular function is normal. The right ventricle is normal  size.     Atria  Both atria appear normal.     Mitral Valve  The mitral valve is normal. Trace mitral insufficiency is present.        Aortic Valve  The valve leaflets are not well visualized. On Doppler interrogation, there is  no significant stenosis or regurgitation.     Tricuspid Valve  The tricuspid valve is normal. Trace tricuspid insufficiency is present. The  right ventricular systolic pressure is approximated at 30.0 mmHg plus the  right atrial pressure.     Pulmonic Valve  The valve leaflets are not well visualized. Trace pulmonic insufficiency is  present. Shortened PA acceleration time with notching of wave consistent with  elevated mean PA pressure.     Vessels  Sinuses of Valsalva 2.4 cm. Ascending aorta 3.0 cm. IVC diameter >2.1 cm  collapsing <50% with sniff suggests a high RA pressure estimated at 15 mmHg or  greater.     Pericardium  No pericardial effusion is present.        Miscellaneous  A left pleural effusion is present.     Compared to Previous Study  This study was compared with the study from 04/06/2017 . The LV function is  now hyperkinetic. The left-sided pleural effusion is newly noted.  _____________________________________________________________________________  __     MMode/2D Measurements & Calculations  IVSd: 1.1 cm  LVIDd: 4.8 cm  LVIDs: 3.1 cm  LVPWd: 1.0 cm  FS: 36.5 %  LV mass(C)d: 184.3 grams  LV mass(C)dI:  87.6 grams/m2  Ao root diam: 2.4 cm  asc Aorta Diam: 3.0 cm  LVOT diam: 2.0 cm  LVOT area: 3.1 cm2     EF(MOD-bp): 84.3 %  LA Volume (BP): 65.3 ml     LA Volume Index (BP): 31.1 ml/m2  RWT: 0.44        Doppler Measurements & Calculations  MV E max monroe: 88.3 cm/sec  MV A max monroe: 72.4 cm/sec  MV E/A: 1.2  MV dec slope: 570.0 cm/sec2  MV dec time: 0.16 sec  Ao V2 max: 235.0 cm/sec  Ao max P.0 mmHg  Ao V2 mean: 152.0 cm/sec  Ao mean P.0 mmHg  Ao V2 VTI: 41.0 cm  PA acc time: 0.06 sec  TR max monroe: 274.0 cm/sec  TR max P.0 mmHg  E/E' av.3  Lateral E/e': 8.5  Medial E/e': 10.1        _____________________________________________________________________________  __        Report approved by: Lucie Starks 2021 10:15 AM      Echo Limited    Narrative    [id number]  [id number]  [id number]  [id number]^SAEED^GILMA^BHUPINDER           [facility],[place]  Echocardiography Laboratory  [address] [place] [id number]     Name: MICHOACANO WHITE  MRN: [id number]  : 1960  Study Date: 02/10/2021 09:03 AM  Age: 60 yrs  Gender: Male  [place] [facility] [place]  Reason For Study: Pericardial Effusion  Ordering Physician: GILMA TIPTON  Performed By: Mechelle Adkins [facility]     BSA: 2.1 m2  Height: 68 in  Weight: 214 lb  HR: 65  BP: 103/55 mmHg  _____________________________________________________________________________  __        Procedure  Limited Portable Echo Adult.  _____________________________________________________________________________  __        Interpretation Summary  Trivial pericardial effusion is present.  Dilation of the inferior vena cava is present with abnormal respiratory  variation in diameter.  A left pleural effusion is present.  _____________________________________________________________________________  __        Left Ventricle  Global and regional left ventricular function is normal with an EF of 55-60%.     Right Ventricle  Right ventricular  function, chamber size, wall motion, and thickness are  normal.     Vessels  Dilation of the inferior vena cava is present with abnormal respiratory  variation in diameter.     Pericardium  Trivial pericardial effusion is present.        Miscellaneous  A left pleural effusion is present.  _____________________________________________________________________________  __           Doppler Measurements & Calculations  TR max monroe: 257.7 cm/sec  TR max P.6 mmHg     _____________________________________________________________________________  __           Report approved by: Lucie Khan 02/10/2021 09:50 AM        *Note: Due to a large number of results and/or encounters for the requested time period, some results have not been displayed. A complete set of results can be found in Results Review.              Assessment/Plan   60 year old male with right renal stone, stented for pyelonephritis  We discussed the surgical treatment options for renal stones including shock wave lithotripsy, ureteroscopy, and percutaneous nephrolithotomy.  We discussed the risks and benefits of each approach in the context of the patient's current stone burden.  After consideration of each the decision was made to proceed with ureteroscopy.    We discussed the risks of bleeding, infection, incomplete stone removal, damage to adjacent organs, and need for staged procedures.      We discussed that due to his multiple medical issues we would seek clearance from his medical mariah, anesthesia team and ID team.  Discussed higher than typical risk of complications as a result of his medical issues and in particular infection              Past Medical History:     Past Medical History:   Diagnosis Date     Asthma      Chronic infection     Bladder     Heart murmur      LIZA (obstructive sleep apnea) 12/3/2014     Quadriplegia, C1-C4 incomplete (H)             Past Surgical History:     Past Surgical History:   Procedure Laterality Date      Bilateral ureteroscopy with holmium laser lithotripsy and basketing and removal of fragments  Left ureteral stent placement.  02/10/2010     COLONOSCOPY N/A 07/20/2018    Procedure: COMBINED COLONOSCOPY, SINGLE OR MULTIPLE BIOPSY/POLYPECTOMY BY BIOPSY;;  Surgeon: Grace Yang MD;  Location: [facility] GI     COMBINED CYSTOSCOPY, INSERT STENT URETER(S) Right 02/08/2021    Procedure: CYSTOSCOPY, WITH URETERAL STENT INSERTION;  Surgeon: Javier Barrientos MD;  Location: [facility] OR     CYSTOSCOPY, LITHOLAPAXY, COMBINED N/A 11/15/2019    Procedure: Cystolitholapaxy;  Surgeon: Obi Uriostegui MD;  Location: [facility] OR     INCISION AND DRAINAGE ABDOMEN WASHOUT, COMBINED N/A 01/16/2020    Procedure: Incision and drainage abdomenal Wall with Revision Of Vesicocutaneous Anastomosis;  Surgeon: Obi Uriostegui MD;  Location: [facility] OR     LASER HOLMIUM LITHOTRIPSY BLADDER N/A 01/16/2020    Procedure: LITHOTRIPSY, CALCULUS, BLADDER, USING HOLMIUM LASER;  Surgeon: Obi Uriostegui MD;  Location: [facility] OR     PICC SINGLE LUMEN PLACEMENT Left 02/14/2021    4Fr - 47cm (2cm external), Lateral brachial vein, SVC RA junction     skin flap on bottom       sp tube absess surgery              Medications     No current facility-administered medications for this visit.      No current outpatient medications on file.     Facility-Administered Medications Ordered in Other Visits   Medication     acetaminophen (TYLENOL) tablet 975 mg     albuterol (PROVENTIL) neb solution 2.5 mg     atropine injection 0.4 mg     baclofen (LIORESAL) tablet 20 mg     baclofen (LIORESAL) tablet 20 mg     benzocaine-menthol (CEPACOL) 15-3.6 MG lozenge 1 lozenge     bisacodyl (DULCOLAX) Suppository 20 mg     carboxymethylcellulose PF (REFRESH PLUS) 0.5 % ophthalmic solution 1 drop     glucose gel 15-30 g    Or     dextrose 50 % injection 25-50 mL    Or     glucagon injection 1 mg     diazepam (VALIUM) half-tab 5 mg     diazepam (VALIUM) tablet 5-10 mg     docusate  sodium (COLACE) capsule 100 mg     enoxaparin ANTICOAGULANT (LOVENOX) injection 40 mg     heparin lock flush 10 UNIT/ML injection 5-10 mL     heparin lock flush 10 UNIT/ML injection 5-10 mL     lidocaine (LMX4) cream     melatonin tablet 1 mg     metroNIDAZOLE (FLAGYL) tablet 500 mg     neomycin-polymyxin-hydrocortisone (CORTISPORIN) otic suspension 4 drop     No lozenges or gum should be given while patient on BIPAP/AVAPS/AVAPS AE     olive oil external oil     ondansetron (ZOFRAN-ODT) ODT tab 4 mg    Or     ondansetron (ZOFRAN) injection 4 mg     oxybutynin ER (DITROPAN-XL) 24 hr tablet 10 mg     Patient may continue current oral medications     sennosides (SENOKOT) tablet 2 tablet     sodium chloride (PF) 0.9% PF flush 10-20 mL     sodium chloride (PF) 0.9% PF flush 3 mL     sodium chloride (PF) 0.9% PF flush 3 mL     tobramycin (NEBCIN) 480 mg in sodium chloride 0.9 % intermittent infusion     vitamin C (ASCORBIC ACID) tablet 1,000 mg     zolpidem (AMBIEN) tablet 5 mg            Family History:     Family History   Problem Relation Age of Onset     Cancer Father      Diabetes No family hx of      Glaucoma No family hx of      Hypertension No family hx of      Macular Degeneration No family hx of      Retinal detachment No family hx of      Anesthesia Reaction No family hx of      Deep Vein Thrombosis (DVT) No family hx of             Social History:     Social History     Socioeconomic History     Marital status:      Spouse name: Not on file     Number of children: Not on file     Years of education: Not on file     Highest education level: Not on file   Occupational History     Not on file   Social Needs     Financial resource strain: Not on file     Food insecurity     Worry: Not on file     Inability: Not on file     Transportation needs     Medical: Not on file     Non-medical: Not on file   Tobacco Use     Smoking status: Current Every Day Smoker     Packs/day: 0.30     Types: Cigarettes      Smokeless tobacco: Never Used     Tobacco comment: since early teens   Substance and Sexual Activity     Alcohol use: Yes     Comment: on weekends, 3-4+     Drug use: Not Currently     Types: Marijuana     Comment: occ marijuana for spasms     Sexual activity: Never   Lifestyle     Physical activity     Days per week: Not on file     Minutes per session: Not on file     Stress: Not on file   Relationships     Social connections     Talks on phone: Not on file     Gets together: Not on file     Attends [employer] service: Not on file     Active member of club or organization: Not on file     Attends meetings of clubs or organizations: Not on file     Relationship status: Not on file     Intimate partner violence     Fear of current or ex partner: Not on file     Emotionally abused: Not on file     Physically abused: Not on file     Forced sexual activity: Not on file   Other Topics Concern     Parent/sibling w/ CABG, MI or angioplasty before 65F 55M? Not Asked   Social History Narrative     Not on file            Allergies:   Vancomycin         Review of Systems:  From intake questionnaire   Negative 14 system review except as noted on HPI, nurse's note.        CC:  Jason Long          What phone number would you like to be contacted at? [phone]  How would you like to obtain your AVS? MyChart  Phone call duration: 16 minutes  Total time spent on clinical encounter on encounter date - 24 minutes including chart review, review of labs and imaging, discussion with patient and spouse, coordination of care and surgical order placement

## 2021-02-16 NOTE — CONSULTS
Procedure Note    Attending: Gamaliel Diaz MD  Procedure: Left Thoracentesis  Indication: Diagnostic and therapeutic, mild hypoxi resp failure  Risk Assessment: low  Pre-procedure diagnosis: Left pleural effusion  Post-procedure diagnosis: Same    The risks and benefits of the procedure were explained to Bart Corea and his wife who expressed understanding and opted to proceed.  Consent was obtained and placed in the chart and the patient was placed on pulse oximetry.  A time out was performed.    An ultrasound probe was used to identify an area of pleural fluid in the LEFT hemithorax after positioning the patient in the righe lateral decubitus position.  This area was prepped and draped in the usual sterile fashion.  3 ml of 1% lidocaine was instilled and fluid located using ultrasound guidance. There was an initial flash of blood. Ultrasound confirmed superior to expected rib.  A small incision was made with an 11 blade.  The thoracentesis catheter and needle were inserted above the rib until fluid obtained then the needle removed.    Using a one-way valve, 550 ml of translucent straw colored fluid was removed. A specimen was sent for analysis.    The catheter was removed with the patient exhaling and an occlusive dressing placed.       Ultrasound revealed normal lung sliding after the procedure and a chest radiograph is pending.    The tolerated the procedure well.  Please contact the Consult and Procedure Service if any concerns or complications arise.       Gamaliel Diaz MD    DOS:  2/16/2021

## 2021-02-16 NOTE — PLAN OF CARE
"Neuro: A&Ox4. Quadriplegic at baseline.   Cardiac: SB VSS. Severe edema in lower extremities   Respiratory: Sating >92 on 0-1L. Refused CPAP overnight.  GI/: Adequate urine output via suprapubic catheter. Hematuria resolving, now dwaine in color. Last BM 2/14.  Diet/appetite: Tolerating regular diet. Total feed.  Activity:  Assist of 2 and mechanical lift. Repositioned Q2h and upon request.  Pain: At acceptable level on current regimen. Prn tylenol given one time per MAR.  Skin: No new deficits noted.  LDA's: PICC, suprapubic catheter    /56 (BP Location: Right arm)   Pulse (!) 44   Temp 97.8  F (36.6  C) (Oral)   Resp 18   Ht 1.727 m (5' 8\")   Wt 93.4 kg (205 lb 14.6 oz)   SpO2 98%   BMI 31.31 kg/m      Plan: Continue with POC. Notify primary team with changes.    "

## 2021-02-16 NOTE — PROGRESS NOTES
Home Infusion  Bart is expected to discharge in a day or two and will be going home on IV tobramycin currently q 48 hrs.  He  has been on home IV therapy before but it has been several years.   Bart is a quadriplegic and not able to do his own administration so his wife will be managing his therapy at home one taught.   Benefits for home IV abx therapy have been checked and pt will have 100% through his [facility]  policy for the drug, supplies and home nursing.  Prior to admission Bart was receiving home nursing services from [facility] and they will resume services upon discharge.    Met with Jc and his wife Sarah at bedside to discuss discharge plan, inform them of the insurance coverage and offer choice of agency for their home infusion services.   Jc and Sarah stated they would like to use [facility].  Provided them with information about IV abx therapy with [facility] services.  Explained about administration method which will be via the hp ball and explained that a home nurse can provide teaching needed.   Informed them about supplies and delivery of supplies, storage of medication, dosing schedule, plan for SNV and 24/7 support of [facility] and [facility] staff while on IV therapy.       Jc and Sarah verbalized understanding of all information given.   They are willing and able to learn and manage home IV therapy and are agreeable with the plan.  Questions answered.  Will continue to follow and update pt/spouse with additional details as needed.    Genet VACA  [facility] Liaison  [phone] GALILEA [email or web address]  [facility] main: [phone]

## 2021-02-16 NOTE — PROGRESS NOTES
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  Given patient's quadriplegia and lack of respiratory symptoms this would likely not warrant a significantly invasive surgery such as fundoplication.  However further explains why patient has chronic nighttime hypoventilation, with all respiratory function depends on diaphragm and accessory muscles are not used.    Follow-up with pulmonary as needed  Recommend sleep consultation outpatient for complicated hypoventilation  Consider thoracentesis if possible    Miguel Maguire MD   Pulmonary Fellow   Pager #[phone]

## 2021-02-16 NOTE — CONSULTS
"BRIEF SOCIAL WORK NOTE    SW consulted for the following:    [facility]  Complex Medical  Financial/Insurance Issues  Home Care  Pt and wife anxious about financial stress, losing assets, losing PCA that has been with him for 20 years and cannot find a new one for the amount he can afford to pay.      SW spoke to pt and pt's wife via phone to address these issues.     Community Resources  Pt and wife declined any assistance with [facility]    Complex Medical  N/A    Financial/Insurance Issues  Pt has [employer] [place], which should cover upcoming home cares as needed. SW inquired if pt/wife had any current issues re: insurance; pt and wife denied.    Home Care  6B [facility] currently assisting pt w/ home cares.    Pt and wife anxious about financial stress, losing assets, losing PCA that has been with him for 20 years and cannot find a new one for the amount he can afford to pay.  Pt and wife discussed that their current PCA is working 8 hours aside from his assistance for pt. They report PCA is \"getting older\", and will no longer be able to work with pt. SW explained his role and offered assistance with providing information for [facility] related PCA services. Pt and wife already aware of contacting [facility] for PCA related services. Pt and wife declined any further assistance from a hospital SW.     SW will close SW consult; please re-consult SW as needed.       JAROCHO Olivares, SW  Acute Care Float   [facility]       "

## 2021-02-16 NOTE — NURSING NOTE
Chief Complaint   Patient presents with     Consult For     kidney stone removal      - Gretchen Coleman,   EMT Clinic Support

## 2021-02-16 NOTE — LETTER
2/16/2021       RE: Bart Corea  [address]  [facility] [address]     Dear Colleague,    Thank you for referring your patient, Bart Corea, to the [facility] UROLOGY [facility] [place] at [facility]. Please see a copy of my visit note below.    Jc is a 60 year old who is being evaluated via a billable telephone visit.    Sarah, Jc's wife will be facilitating the call with him today.     He is currently and inpatient in the hospital, was supposed to be discharged but still hospitalized for multiple medical issues.    Briefly he is a 59 yo M with hx of C5 SCI, neurogenic bladder managed with SPT, with recent low back pain and autonomic dysreflexia found to be secondary to a large right proximal obstructing ureteral stone.  He was taken for emergency stent placement 2/8 which was successful.  Post-op course c/b sepsis for which he was admitted to ICU until 2/16, since moved to floor status.  While hospitalized also had left pleural effusion which was treated with thoracentesis.  He is also being followed by ID and is being treated with tobramycin for pseudomonas UTI.        Labs and Pathology:    I personally reviewed all applicable laboratory data and went over findings with patient  Significant for:        UA RESULTS:   Recent Labs   Lab Test 02/08/21  1146 01/21/20  1120 11/11/19  1230   SG 1.019 1.004 1.015   URINEPH 6.5 6.0 6.5   NITRITE Positive* Negative Negative   RBCU >182* 3* 4*   WBCU >182* 14* 49*             Imaging:    I personally reviewed all applicable imaging and went over the below findings with patient.    CT Abdomen Pelvis w Contrast    Narrative    EXAMINATION: CT ABDOMEN PELVIS W CONTRAST, 2/8/2021 2:47 PM    INDICATION: Flank pain.    COMPARISON STUDY: Renal ultrasound dated 1/5/2021, 9/16/2019, CT of  the abdomen and pelvis dated 7/14/2017 chest x-ray dated 2/8/2021,    TECHNIQUE: CT scan of the abdomen and pelvis  was performed on  multidetector CT scanner using volumetric acquisition technique and  images were reconstructed in multiple planes with variable thickness  and reviewed on dedicated workstations.     CONTRAST: iopamidol (ISOVUE-370) solution 135 mL.   Without oral  contrast.    CT scan radiation dose is optimized to minimum requisite dose using  automated dose modulation techniques.    FINDINGS:    Lower thorax: Right basilar dependent atelectasis. Chronic appearing  left pleural effusion with near complete atelectasis of the left lower  lobe. Pleura is thickened and enhancing, suspicious for empyema..   Large epicardiac fat pad.    Liver : Multiple hypoattenuating well-circumscribed lesions, stable  from exam dated 7/14/2017.No intrahepatic biliary ductal dilation.    Biliary System: Normal gallbladder. No extrahepatic biliary ductal  dilation.    Pancreas: No mass or pancreatic ductal dilation.    Adrenal glands: Heterogeneously enhancing left adrenal mass measuring  2.0 x 1.8 cm previously measuring 1.6 x 1.4 cm. Two right adrenal  nodules measuring 2.3 x 1.2 cm and 1.3 x 1.2 cm.    Spleen: Normal.    Kidneys: There is a new right renal calculus measuring 14 x 9 mm at  the pelviureteric junction is seen. This causes mild right  hydronephrosis. Punctate left renal calculi. No left hydronephrosis.  Small simple cysts of the bilateral kidneys.    Gastrointestinal tract: Normal appendix. Normal caliber small bowel  and large bowel.    Mesentery/peritoneum/retroperitoneum: No mass. No free fluid or air.    Lymph nodes: No significant lymphadenopathy.    Vasculature: Patent major abdominal vasculature.  Atherosclerosis at  the origin of the celiac artery.    Pelvis: Urinary bladder is normal.  Suprapubic catheter in place.      Osseous structures: No aggressive or acute osseous lesion.  Osteopenic  appearance of the bones. Degenerative changes of bilateral hips. Grade  1 retrolisthesis of L5 on S1. Degenerative  changes of the  thoracolumbar spine. No acute osseous process.      Soft tissues: Fatty replacement of muscular tissues..      Impression    IMPRESSION:   1. 14 x 9 mm right renal calculus at the pelviureteric junction  causing mild right hydronephrosis.  2. Moderate left pleural effusion with atelectasis and left pleural  thickening and enhancement, suspicious for empyema.  3. Bilateral adrenal nodules, stable on the right side and slightly  increased on the left side, when compared with 2017 CT, have been  present since at least 2010, suggestive of benign adenomas.  4. Stable hepatic cysts.    I have personally reviewed the examination and initial interpretation  and I agree with the findings.    DILLON CORMIER MD   CT Chest Pulmonary Embolism w Contrast    Narrative    EXAM: CTA pulmonary angiogram, 2/8/2021 2:47 PM    HISTORY: PE suspected, low/intermediate prob, positive D-dimer    COMPARISON: 2/8/2021 chest x-ray and 11/16/2015 chest CT.    TECHNIQUE: Volumetric CT images obtained through the chest with  contrast. Coronal and axial MIP reformatted images obtained.  Three-dimensional (3D) post-processed angiographic images were  reconstructed, archived to PACS and used in interpretation of this  study.     CONTRAST: iopamidol (ISOVUE-370) solution 135 mL ml isovue 370 IV.    FINDINGS:     Vascular: There is good contrast opacification of the pulmonary  arterial vasculature. No pulmonary embolus.  Proximal pulmonary artery  is mildly enlarged measuring 3.3 cm. No septal bowing. Trace IVC  contrast reflux. Aortic root is normal caliber.    The central tracheobronchial tree is patent. Patulous esophagus.  Left-sided pleural effusion with overlying compressive atelectasis in  the inferior left upper lobe, fissural thickening on the left oblique  fissure and near complete lobar atelectasis of the left lower lobe.  Trace dependent atelectasis in the right lower lobe. No pneumothorax.  No axillary or mediastinal  adenopathy. Thyroid lobes are normal.  Normal three-vessel aortic arch.    Visualized upper abdomen is unremarkable. Left adrenal nodule 2 cm  probably a lipid rich adenoma. Age dependent degenerative changes in  the thoracic and upper lumbar spine. No suspicious osseous lesions.  Muscle atrophy diffuse.      Impression    IMPRESSION:  1. Exam is negative for acute pulmonary embolism. No paradoxical  septal bowing. Slightly increased diameter of proximal pulmonary  artery is stable dating to 2015 and can be seen with pulmonary  arterial hypertension.  2. Left-sided pleural effusion with compressive atelectasis of the  inferior left upper lobe and near complete lobar atelectasis of the  left lower lobe.    I have personally reviewed the examination and initial interpretation  and I agree with the findings.    GUSTAVO DUNBAR MD   POC US ECHO LIMITED    Impression    Limited Bedside Cardiac Ultrasound, performed and interpreted by me.   Indication: Hypotension/shock.  Parasternal long axis, parasternal short axis, apical 4 chamber and subcostal views were acquired.   Image quality was satisfactory.    Findings:    Global left ventricular function appears intact.  Chambers do not appear dilated.  There is no evidence of free fluid within the pericardium.    IMPRESSION: Grossly normal left ventricular function and chamber size.  No pericardial effusion..   XR Surgery STACEY Fluoro L/T 5 Min w Stills    Narrative    This exam was marked as non-reportable because it will not be read by a   radiologist or a [place] non-radiologist provider.         CT Chest w/o Contrast    Narrative    Exam: CT Chest without contrast 2/12/2021 11:53 AM     History: Pleural effusion, malignancy suspected    Comparison: Chest CT 2/8/2021, 11/16/2015 and 11/2/2009    TECHNIQUE: Helical acquisition of CT images from the lung apices to  the kidneys without IV contrast. Coronal images and axial MIP images  were reconstructed from the source  data.    FINDINGS:     Chest:   Thyroid is unremarkable. Normal branching pattern of the thoracic  great vessels. Main pulmonary artery is dilated measuring 3.4 cm.  Heart size is normal. Small pericardial effusion. No axillary,  mediastinal or hilar lymphadenopathy. Patulous esophagus.    Lungs:  The central tracheobronchial tree is patent. Small bilateral pleural  effusions and adjacent bibasilar consolidations, left greater than  right with near complete collapse of the left lower lobe. No  pneumothorax.    Few small pulmonary nodules were not present on the comparison from  2/8/2021 for example a 4 mm subpleural nodule in the right lower lobe  (series 6 image 181, 3 mm nodule in the right middle lobe  (series 6  image 173) 6 mm solid pulmonary nodule in the right upper lobe series  6 image 150).    Upper abdomen:  Limited evaluation of the upper abdomen. Stable nodular thickening of  the left adrenal gland consistent with benign adenomatous changes.  Hepatic cyst in the left lobe of the liver, additional subcentimeter  hypodensity in the right lobe of the liver too small to accurately  characterize but also likely represents a cyst.    Bones:  No acute osseus abnormality or suspicious bony lesion. Degenerative  changes of the spine.      Impression    IMPRESSION:   1. Small bilateral pleural effusions and adjacent bibasilar  consolidations, left greater than right with near complete atelectasis  of the left lower lobe. Findings likely represent a component of  compressive atelectasis, and super imposed infection cannot entirely  be excluded.  2. Several several small scattered nodules in the right lung are new  from the comparison examination on 2/8/2021 suggestive of an acute  infectious or inflammatory etiology.    I have personally reviewed the examination and initial interpretation  and I agree with the findings.    GUSTAVO DUNBAR MD   XR Chest/Heart Fluoro    Narrative    EXAM:  XR CHEST/HEART FLUORO  2/15/2021 3:20 PM    INDICATION: Possible left hemidiaphragm paralysis    COMPARISON:  Chest x-ray dated 2/14/2021, CT of the chest dated  2/12/2021    FLUOROSCOPY TIME: 1.1 minutes    FINDINGS: There is decreased excursion of the left hemidiaphragm with  normal and deep breathing.  During sniff test there is paradoxical superior excursion of the left  hemidiaphragm. There is normal excursion of the right hemidiaphragm.    The remaining visualized lung bases and abdomen are fluoroscopically  unremarkable.      Impression    IMPRESSION:  Left hemidiaphragm paralysis. Normal physiologic function of the right  hemidiaphragm.    I, DILLON CORMIER MD, attest that I was immediately available to  provide guidance and assistance during the entirety of the procedure.    I have personally reviewed the examination and initial interpretation  and I agree with the findings.    DILLON CORMIER MD   XR Chest Port 1 View    Narrative    Exam: XR CHEST PORT 1 VW, 2/14/2021 3:50 PM    Indication: PICC placement    Comparison: 2/8/2021    Findings:   Semiupright AP view of the chest. Left upper extremity PICC tip  projecting over the low SVC.  Midline trachea. Stable enlarged cardiac silhouette. Pulmonary  vasculature is engorged and indistinct. Hazy perihilar interstitial  opacities. Relatively unchanged left lower lobe atelectasis.  Additional aerated lungs are relatively clear. Bilateral left greater  than right pleural effusions with associated atelectasis. No  appreciable pneumothorax. Visualized upper abdomen is unremarkable.  Osteopenic appearance of the bones.      Impression    Impression:   1. Left upper extremity PICC tip projecting over the low SVC.  2. Relatively unchanged cardiomegaly and small bilateral pleural  effusions and associated bilateral left greater than right lower lobe  atelectasis.    I have personally reviewed the examination and initial interpretation  and I agree with the findings.    MARK VALLES,  MD   POC US Guide for Thorcentesis    Impression    Moderate smple right pleural effusion. 500 ml removed. See consult note for details. +  sliding scale after procedure. Chest xray pending   XR Chest Port 1 View    Narrative    EXAM: XR CHEST PORT 1 VW  2021 2:48 PM     HISTORY:  Follow-up left sided thoracentesis       COMPARISON:  Chest x-ray 2020    FINDINGS:   Upright portable AP view of the chest. Left upper extremity PICC tip  projects over the lower SVC. Cardiac silhouette appears enlarged.  Trachea is midline.    No significant change in hazy perihilar interstitial opacities. No  significant change in left lower lobe atelectasis. Small left pleural  effusion. No appreciable varus. Visualized upper abdomen is  unremarkable.      Impression    IMPRESSION:     1. Small residual pleural effusion on the left status post  thoracentesis. No appreciable pneumothorax.  2. No significant change in bilateral perihilar interstitial opacities  are bibasilar left greater than right atelectasis.    I have personally reviewed the examination and initial interpretation  and I agree with the findings.    RICO GALLEGO MD   Echo Complete    Narrative    [id number]  [id number]  [id number]  [id number]^ARTIS^BHUPINDER           [facility],[place]  Echocardiography Laboratory  [address] [place] [id number]     Name: MICHOACANO WHITE  MRN: [id number]  : 1960  Study Date: 2021 09:27 AM  Age: 60 yrs  Gender: Male  [place] [facility] [place] Study: Shock  Ordering Physician: BHUPINDER WISDOM  Performed By: KING Hidalgo     BSA: 2.1 m2  Height: 68 in  Weight: 214 lb  HR: 81  BP: 100/53 mmHg  _____________________________________________________________________________  __        Procedure  Complete Portable Echo Adult. Contrast Optison. Patient was given 5 ml mixture  of 3 ml Optison and 6 ml saline. 4 ml  wasted.  _____________________________________________________________________________  __        Interpretation Summary  Global and regional left ventricular function is hyperkinetic with an EF of  65-70%.  Global right ventricular function is normal. The right ventricle is normal  size.  A left pleural effusion is present. No pericardial effusion is present.  This study was compared with the study from 04/06/2017. The LV function is now  hyperkinetic. The left-sided pleural effusion is newly noted.  _____________________________________________________________________________  __        Left Ventricle  Global and regional left ventricular function is hyperkinetic with an EF of  65-70%. Left ventricular size is normal. Relative wall thickness is increased  consistent with concentric remodeling. Left ventricular diastolic function is  normal.     Right Ventricle  Global right ventricular function is normal. The right ventricle is normal  size.     Atria  Both atria appear normal.     Mitral Valve  The mitral valve is normal. Trace mitral insufficiency is present.        Aortic Valve  The valve leaflets are not well visualized. On Doppler interrogation, there is  no significant stenosis or regurgitation.     Tricuspid Valve  The tricuspid valve is normal. Trace tricuspid insufficiency is present. The  right ventricular systolic pressure is approximated at 30.0 mmHg plus the  right atrial pressure.     Pulmonic Valve  The valve leaflets are not well visualized. Trace pulmonic insufficiency is  present. Shortened PA acceleration time with notching of wave consistent with  elevated mean PA pressure.     Vessels  Sinuses of Valsalva 2.4 cm. Ascending aorta 3.0 cm. IVC diameter >2.1 cm  collapsing <50% with sniff suggests a high RA pressure estimated at 15 mmHg or  greater.     Pericardium  No pericardial effusion is present.        Miscellaneous  A left pleural effusion is present.     Compared to Previous Study  This  study was compared with the study from 2017 . The LV function is  now hyperkinetic. The left-sided pleural effusion is newly noted.  _____________________________________________________________________________  __     MMode/2D Measurements & Calculations  IVSd: 1.1 cm  LVIDd: 4.8 cm  LVIDs: 3.1 cm  LVPWd: 1.0 cm  FS: 36.5 %  LV mass(C)d: 184.3 grams  LV mass(C)dI: 87.6 grams/m2  Ao root diam: 2.4 cm  asc Aorta Diam: 3.0 cm  LVOT diam: 2.0 cm  LVOT area: 3.1 cm2     EF(MOD-bp): 84.3 %  LA Volume (BP): 65.3 ml     LA Volume Index (BP): 31.1 ml/m2  RWT: 0.44        Doppler Measurements & Calculations  MV E max monroe: 88.3 cm/sec  MV A max monroe: 72.4 cm/sec  MV E/A: 1.2  MV dec slope: 570.0 cm/sec2  MV dec time: 0.16 sec  Ao V2 max: 235.0 cm/sec  Ao max P.0 mmHg  Ao V2 mean: 152.0 cm/sec  Ao mean P.0 mmHg  Ao V2 VTI: 41.0 cm  PA acc time: 0.06 sec  TR max monroe: 274.0 cm/sec  TR max P.0 mmHg  E/E' av.3  Lateral E/e': 8.5  Medial E/e': 10.1        _____________________________________________________________________________  __        Report approved by: Lucie Starks 2021 10:15 AM      Echo Limited    Narrative    [id number]  [id number]  [id number]  [id number]^SAEED^GILMA^BHUPINDER           [facility],[place]  Echocardiography Laboratory  [address] [place] [id number]     Name: MICHOACANO WHITE  MRN: [id number]  : 1960  Study Date: 02/10/2021 09:03 AM  Age: 60 yrs  Gender: Male  Patient Location: [facility] [place]  Reason For Study: Pericardial Effusion  Ordering Physician: GILMA TIPTON  Performed By: Mechelle Adkins RDCS     BSA: 2.1 m2  Height: 68 in  Weight: 214 lb  HR: 65  BP: 103/55 mmHg  _____________________________________________________________________________  __        Procedure  Limited Portable Echo Adult.  _____________________________________________________________________________  __        Interpretation Summary  Trivial pericardial  effusion is present.  Dilation of the inferior vena cava is present with abnormal respiratory  variation in diameter.  A left pleural effusion is present.  _____________________________________________________________________________  __        Left Ventricle  Global and regional left ventricular function is normal with an EF of 55-60%.     Right Ventricle  Right ventricular function, chamber size, wall motion, and thickness are  normal.     Vessels  Dilation of the inferior vena cava is present with abnormal respiratory  variation in diameter.     Pericardium  Trivial pericardial effusion is present.        Miscellaneous  A left pleural effusion is present.  _____________________________________________________________________________  __           Doppler Measurements & Calculations  TR max monroe: 257.7 cm/sec  TR max P.6 mmHg     _____________________________________________________________________________  __           Report approved by: Lucie Khan 02/10/2021 09:50 AM        *Note: Due to a large number of results and/or encounters for the requested time period, some results have not been displayed. A complete set of results can be found in Results Review.              Assessment/Plan   60 year old male with right renal stone, stented for pyelonephritis  We discussed the surgical treatment options for renal stones including shock wave lithotripsy, ureteroscopy, and percutaneous nephrolithotomy.  We discussed the risks and benefits of each approach in the context of the patient's current stone burden.  After consideration of each the decision was made to proceed with ureteroscopy.    We discussed the risks of bleeding, infection, incomplete stone removal, damage to adjacent organs, and need for staged procedures.      We discussed that due to his multiple medical issues we would seek clearance from his medical mariah, anesthesia team and ID team.  Discussed higher than typical risk of complications as a  result of his medical issues and in particular infection              Past Medical History:     Past Medical History:   Diagnosis Date     Asthma      Chronic infection     Bladder     Heart murmur      LIZA (obstructive sleep apnea) 12/3/2014     Quadriplegia, C1-C4 incomplete (H)             Past Surgical History:     Past Surgical History:   Procedure Laterality Date     Bilateral ureteroscopy with holmium laser lithotripsy and basketing and removal of fragments  Left ureteral stent placement.  02/10/2010     COLONOSCOPY N/A 07/20/2018    Procedure: COMBINED COLONOSCOPY, SINGLE OR MULTIPLE BIOPSY/POLYPECTOMY BY BIOPSY;;  Surgeon: Grace Yang MD;  Location: [facility] GI     COMBINED CYSTOSCOPY, INSERT STENT URETER(S) Right 02/08/2021    Procedure: CYSTOSCOPY, WITH URETERAL STENT INSERTION;  Surgeon: Javier Barrientos MD;  Location: [facility] OR     CYSTOSCOPY, LITHOLAPAXY, COMBINED N/A 11/15/2019    Procedure: Cystolitholapaxy;  Surgeon: Obi Uriostegui MD;  Location: [facility] OR     INCISION AND DRAINAGE ABDOMEN WASHOUT, COMBINED N/A 01/16/2020    Procedure: Incision and drainage abdomenal Wall with Revision Of Vesicocutaneous Anastomosis;  Surgeon: Obi Uriostegui MD;  Location: [facility] OR     LASER HOLMIUM LITHOTRIPSY BLADDER N/A 01/16/2020    Procedure: LITHOTRIPSY, CALCULUS, BLADDER, USING HOLMIUM LASER;  Surgeon: Obi Uriostegui MD;  Location: [facility] OR     PICC SINGLE LUMEN PLACEMENT Left 02/14/2021    4Fr - 47cm (2cm external), Lateral brachial vein, SVC RA junction     skin flap on bottom       sp tube absess surgery              Medications     No current facility-administered medications for this visit.      No current outpatient medications on file.     Facility-Administered Medications Ordered in Other Visits   Medication     acetaminophen (TYLENOL) tablet 975 mg     albuterol (PROVENTIL) neb solution 2.5 mg     atropine injection 0.4 mg     baclofen (LIORESAL) tablet 20 mg     baclofen (LIORESAL) tablet  20 mg     benzocaine-menthol (CEPACOL) 15-3.6 MG lozenge 1 lozenge     bisacodyl (DULCOLAX) Suppository 20 mg     carboxymethylcellulose PF (REFRESH PLUS) 0.5 % ophthalmic solution 1 drop     glucose gel 15-30 g    Or     dextrose 50 % injection 25-50 mL    Or     glucagon injection 1 mg     diazepam (VALIUM) half-tab 5 mg     diazepam (VALIUM) tablet 5-10 mg     docusate sodium (COLACE) capsule 100 mg     enoxaparin ANTICOAGULANT (LOVENOX) injection 40 mg     heparin lock flush 10 UNIT/ML injection 5-10 mL     heparin lock flush 10 UNIT/ML injection 5-10 mL     lidocaine (LMX4) cream     melatonin tablet 1 mg     metroNIDAZOLE (FLAGYL) tablet 500 mg     neomycin-polymyxin-hydrocortisone (CORTISPORIN) otic suspension 4 drop     No lozenges or gum should be given while patient on BIPAP/AVAPS/AVAPS AE     olive oil external oil     ondansetron (ZOFRAN-ODT) ODT tab 4 mg    Or     ondansetron (ZOFRAN) injection 4 mg     oxybutynin ER (DITROPAN-XL) 24 hr tablet 10 mg     Patient may continue current oral medications     sennosides (SENOKOT) tablet 2 tablet     sodium chloride (PF) 0.9% PF flush 10-20 mL     sodium chloride (PF) 0.9% PF flush 3 mL     sodium chloride (PF) 0.9% PF flush 3 mL     tobramycin (NEBCIN) 480 mg in sodium chloride 0.9 % intermittent infusion     vitamin C (ASCORBIC ACID) tablet 1,000 mg     zolpidem (AMBIEN) tablet 5 mg            Family History:     Family History   Problem Relation Age of Onset     Cancer Father      Diabetes No family hx of      Glaucoma No family hx of      Hypertension No family hx of      Macular Degeneration No family hx of      Retinal detachment No family hx of      Anesthesia Reaction No family hx of      Deep Vein Thrombosis (DVT) No family hx of             Social History:     Social History     Socioeconomic History     Marital status:      Spouse name: Not on file     Number of children: Not on file     Years of education: Not on file     Highest education  level: Not on file   Occupational History     Not on file   Social Needs     Financial resource strain: Not on file     Food insecurity     Worry: Not on file     Inability: Not on file     Transportation needs     Medical: Not on file     Non-medical: Not on file   Tobacco Use     Smoking status: Current Every Day Smoker     Packs/day: 0.30     Types: Cigarettes     Smokeless tobacco: Never Used     Tobacco comment: since early teens   Substance and Sexual Activity     Alcohol use: Yes     Comment: on weekends, 3-4+     Drug use: Not Currently     Types: Marijuana     Comment: occ marijuana for spasms     Sexual activity: Never   Lifestyle     Physical activity     Days per week: Not on file     Minutes per session: Not on file     Stress: Not on file   Relationships     Social connections     Talks on phone: Not on file     Gets together: Not on file     Attends Holiness service: Not on file     Active member of club or organization: Not on file     Attends meetings of clubs or organizations: Not on file     Relationship status: Not on file     Intimate partner violence     Fear of current or ex partner: Not on file     Emotionally abused: Not on file     Physically abused: Not on file     Forced sexual activity: Not on file   Other Topics Concern     Parent/sibling w/ CABG, MI or angioplasty before 65F 55M? Not Asked   Social History Narrative     Not on file            Allergies:   Vancomycin         Review of Systems:  From intake questionnaire   Negative 14 system review except as noted on HPI, nurse's note.        CC:  Jason Long          What phone number would you like to be contacted at? [phone]  How would you like to obtain your AVS? Christine  Phone call duration: 16 minutes  Total time spent on clinical encounter on encounter date - 24 minutes including chart review, review of labs and imaging, discussion with patient and spouse, coordination of care and surgical order placement

## 2021-02-16 NOTE — PLAN OF CARE
Assumed cares [id number]:    A: A&Ox4, calm and cooperative. Wife at bedside, supportive. VS unchanged, on room air throughout shift. Sinus Robert 40-50's. Afebrile. Sore throat, managed with sips of water, prn lozenge requested but pt did not need them. Denies nausea. Regular diet, total feed. Supra pubic cath, AUO. 1 smear of BM. No new skin deficits noted. Thora done at bedside, see provider notes. Report given to 5A nurse, family aware (wife with), belongings sent with pt.     I/O this shift:  In: 60 [P.O.:60]  Out: 1350 [Urine:1350]    Temp:  [97.6  F (36.4  C)-98  F (36.7  C)] 97.9  F (36.6  C)  Pulse:  [43-58] 52  Resp:  [16-19] 18  BP: ()/(54-85) 124/76  SpO2:  [96 %-100 %] 96 %     R: Continue with POC. Notify primary team with changes.

## 2021-02-16 NOTE — PROGRESS NOTES
[facility]    Progress Note - [facility]        Date of Admission:  2/8/2021    Main Plans for Today  - respiratory cares (duonebs, metanebs, vest therapy)  - thoracentesis   - PICC/IV abx teaching     Assessment & Plan       Bart Corea is a 60 year old male with PMH quadriplegia due to C4-C6 SCI, neurogenic bladder s/p suprapubic catheter, hx of nephrolithiasis who was admitted on 2/8/2021 autonomic dysreflexia in the setting of right renal calculus and UTI. Initially hypertensive at home, admitted for UTI, s/p right uretral stent placement, then post procedure hypotensive and meeting septic shock criteria requiring pressors. He was transferred from ICU to [facility]MC on 2/10 and to the floor 2/16.       # Acute hypercapnic and hypoxic respiratory failure  # L hemidiaphragm elevation, concerning for L hemidiaphragm paralysis  # L lower lobe atelectasis  # Left-sided pleural effusion, chronic  # High pretest probability for sleep apnea  # Tobacco dependence  Patient with no prior baseline supplemental oxygen needs. He has apneic events while sleeping, with evidence of LIZA on prior sleep study 2014. Has had improved respiratory status, no cough, fever, chills, though with persistent oxygen need while sleeping (desaturates to mid to low 80s when sleeping on room air). AM VBG showed hypercarbia consistent with LIZA. Repeating an outpatient sleep study is extremely challenging given his quadriplegia and the current pandemic. CT chest on admission revealed left pleural effusion with pleural thickening, initially concerning for empyema however this was also seen on prior CT in 2018. Looking further back, it does appear he required thoracentesis in ~2009 at which time fluid was consistent with parapneumonic effusion. Repeat CT chest 2/12 showed small bilateral pleural effusions, adjacent bibasilar consolidations and near complete atelectasis of the LLL and sniff test  confirmed diagnosis of left diaphragm hemiparalysis. Echocardiogram 2/08 with EF 55-60%.    - Pulmonology consulted, appreciate recommendations   - bronchial drainage regimen: chest physiotherapy with metaneb, vest, flutter valve   - thoracentesis 2/16, post thora CXR   - duoneb scheduled QID   - bipap, will request nasal cannula as mask prevents his ability to call for help given his baseline quadriplegia    - outpatient sleep study, though inpatient work up has confirmed LIZA as discussed above,   - repeat chest CT in 6-8 weeks due to smoking hx. If continued atelectasis/lobar collapse, consider bronchoscopy to evaluate for malignancy    # Sepsis 2/2 complicated UTI, resolved  # Right renal calculus, obstructive s/p right ureter stent placement  # Bacteroides fragilis in blood culture (growth on 2nd day in 1/2 bcx)  # Suprapubic catheter, chronic  On admission CT AP showed right renal calculus and hydronephrosis, UA indicitve of infection, back/abd pain c/w past UTIs/stones. He underwent right ureteral stent placement 2/8, transferred initially to the ICU with postoperative hypotension with c/f sepsis. Urine culture 2/08 growing Pseudomonas. The patient has now remained afebrile, off pressors since 2/10. Suprapubic cath changed 2/12. PICC placed 2/14 for long term antibiotics.   - Urology following, appreciate recs   - Tentative plan for OR for definitive stone management 2/19, will request Anesthesia evaluation prior given new diagnosis of left diaphragm paralysis  - Continue IV tobramycin x2 weeks (2/08-2/22), pharmacy to assist with dosing, will need weekly CBC with diff and CMP   - PO Flagyl 500 mg q8h for bacteremia (2/12-2/26)  - [facility] consult for PICC and IV antibiotic teaching (place 2/13) - attempted to call today, no answer. Wife has also been unable to get ahold of [facility].  - PTA oxybutynin 10 mg daily    Antibiotics  - PO Flagyl (2/12- 2/26)  - IV Tobramycin (2/9 - 2/22)  - IV Zosyn (2/8  "-2/12)  - Linezolid (2/9 -2/10) - for sepsis     # Hx insomnia  # Delirium   Notes indicate a history of insomnia, for which he started Ambien within the past year. More recent PCP notes mention decreasing the Ambien 10 to 5 mg, and wife mentions that his vivid dreams may have increased since starting this medication about a year ago. He also uses diazepam as an outpatient. Also mention of alcohol and tobacco use.   - PTA zolpidem 5 mg at bedtime (decreased from PTA dose of 10 mg)  - PTA diazepam 10 mg daily   - melatonin at bedtime     # Autonomic dysreflexia  # Hypotension  # Quadriplegia 2/2 C4-C6 SCI   Frequent bradycardia and fluctuating blood pressure at home (baseline ~110/70) per patient report. Patient initially hypertensive at home over the past week, lower blood pressures after using his home amlodipine which he uses PRN, typically a few times a year Initially admitted to ICU 2/9 with symptomatic hypotension-shock, requiring pressor support. He continues to have intermittent hypotension, though with MAPs>50 and off dopamine since 2/10 AM. Being cautious with IVF given his overall hypervolemia and requiring Lasix x1. TTE on 2/10 with small pericardial effusion, normal EF and cardiac motion. He has chronic sinus bradycardia in the 40s, episode of more persistently in 30s and feeling \"off \" on 2/13. Cardiac Electrophysiology consulted for sinus bradycardia, no recommendations for permanent pacemaker placement at this time as it is unlikely to improve his quality of life or longevity unless he becomes more/consistently symptomatic.   - Telemetry, q4h vitals  - Attempt elevation of legs if persistently hypotensive/MAPs <50  - Leg wraps     # Leukopenia, resolved  # Thrombocytopenia, stable  Likely related to sepsis. No history of similar thrombocytopenia on chart review, however, when septic in the past did have leukopenia down to 3.6 (2017).   - trend CBC      # Spacticity, chronic  - PTA Baclofen 20 mg 4 " times daily scheduled, additional PRN 2 times daily  - PTA diazepam 10 daily, additional PRN 5 mg every day     #Psych/Social Concerns   Apprehensive when discussing discharge, concerned about his wife having to take over all of the cares he has been receiving.   - Social work following      Diet: Regular Diet Adult  NPO for Medical/Clinical Reasons Except for: Meds  - when on BiPAP  Fluids: PO  Lines: PIV x2  DVT Prophylaxis: Enoxaparin (Lovenox) subcutaneous - hold for thoracentesis   Amanda Catheter: not present suprapubic cath   Code Status: Full Code         Disposition Plan   Expected discharge: 1-2 days, recommended to prior living arrangement once adequate pain management/ tolerating PO medications, antibiotic plan established and SIRS/Sepsis treated.  Entered: Maxine Chowdhury MD 02/16/2021, 7:57 PM     The patient's care was discussed with the Attending Physician, Dr. Lawson.    Maxine Chowdhury MD  [facility]  Please see sign in/sign out for up to date coverage information  ______________________________________________________________________    Interval History   No acute events overnight. No further symptoms of symptomatic bradycardia (no lightheadedness). No chest pain, continues to have intermittent dyspnea. No fevers/chills. Slept better last night.     Data reviewed today: I reviewed all medications, new labs and imaging results over the last 24 hours.     Physical Exam   Vital Signs: Temp: 96  F (35.6  C) Temp src: Oral BP: (!) 145/79 Pulse: 56   Resp: 16 SpO2: 97 % O2 Device: Nasal cannula Oxygen Delivery: 1 LPM  Weight: 205 lbs 14.55 oz     Physical Exam  Constitutional:       Appearance: He is obese.   HENT:      Ears:      Comments: Bilateral ear canals with flaking dry skin, which was removed using a currette     Mouth/Throat:      Mouth: Mucous membranes are moist.      Pharynx: Oropharynx is clear.   Eyes:       Conjunctiva/sclera: Conjunctivae normal.   Cardiovascular:      Rate and Rhythm: Regular rhythm. Bradycardia present.      Heart sounds: Normal heart sounds.   Pulmonary:      Effort: Pulmonary effort is normal.   Abdominal:      General: Bowel sounds are normal. There is distension.   Musculoskeletal:      Right lower leg: Edema present.      Left lower leg: Edema present.   Skin:     General: Skin is warm and dry.      Comments: Some slight bruising around the pt's bilateral toes and feet (Normal per pt's wife).   Neurological:      Mental Status: He is alert. Mental status is at baseline.   Psychiatric:         Mood and Affect: Mood normal.          Data   Recent Labs   Lab 02/16/21  0702 02/15/21  0741 02/14/21  0523 02/10/21  0532 02/10/21  0532 02/10/21  0402   WBC 5.6 5.4 3.7*   < > 4.0 Canceled, Test credited   HGB 12.1* 12.6* 12.7*   < > 14.5 Canceled, Test credited   MCV 97 97 97   < > 97 Canceled, Test credited   * 126* 102*   < > 82* Canceled, Test credited   INR 1.06  --   --   --   --   --     138 134   < > 136 Canceled, Test credited   POTASSIUM 4.0 4.1 4.3   < > 4.2 Canceled, Test credited   CHLORIDE 99 99 97   < > 99 Canceled, Test credited   CO2 34* 36* 34*   < > 33* Canceled, Test credited   BUN 8 5* 7   < > 12 Canceled, Test credited   CR 0.35* 0.26* 0.31*   < > 0.42* Canceled, Test credited   ANIONGAP 2* 3 3   < > 4 Canceled, Test credited   LIU 8.9 9.2 9.1   < > 8.7 Canceled, Test credited   * 94 97   < > 75 Canceled, Test credited   ALBUMIN  --   --   --   --  2.6* Canceled, Test credited   PROTTOTAL 6.0*  --   --   --  6.0* Canceled, Test credited   BILITOTAL  --   --   --   --  1.1 Canceled, Test credited   ALKPHOS  --   --   --   --  89 Canceled, Test credited   ALT  --   --   --   --  42 Canceled, Test credited   AST  --   --   --   --  29 Canceled, Test credited    < > = values in this interval not displayed.     Recent Results (from the past 24 hour(s))   POC US Guide  for Thorcentesis    Impression    Moderate smple right pleural effusion. 500 ml removed. See consult note for details. +  sliding scale after procedure. Chest xray pending   XR Chest Port 1 View    Narrative    EXAM: XR CHEST PORT 1 VW  2/16/2021 2:48 PM     HISTORY:  Follow-up left sided thoracentesis       COMPARISON:  Chest x-ray 2/14/2020    FINDINGS:   Upright portable AP view of the chest. Left upper extremity PICC tip  projects over the lower SVC. Cardiac silhouette appears enlarged.  Trachea is midline.    No significant change in hazy perihilar interstitial opacities. No  significant change in left lower lobe atelectasis. Small left pleural  effusion. No appreciable varus. Visualized upper abdomen is  unremarkable.      Impression    IMPRESSION:     1. Small residual pleural effusion on the left status post  thoracentesis. No appreciable pneumothorax.  2. No significant change in bilateral perihilar interstitial opacities  are bibasilar left greater than right atelectasis.    I have personally reviewed the examination and initial interpretation  and I agree with the findings.    RICO GALLEGO MD

## 2021-02-16 NOTE — PROGRESS NOTES
"CLINICAL NUTRITION SERVICES - ASSESSMENT NOTE     Nutrition Prescription    RECOMMENDATIONS FOR MDs/PROVIDERS TO ORDER:  Continue regular diet     Malnutrition Status:    Unable to determine due inability to complete all parameters     Recommendations already ordered by Registered Dietitian (RD):  None at this time     Future/Additional Recommendations:  Monitor appetite, oral intake, need for additional snacks/supplements      REASON FOR ASSESSMENT  Bart Corea is a/an 60 year old male assessed by the dietitian for LOS    CLINICAL HISTORY   PMH quadriplegia due to C4-C6 SCI, neurogenic bladder s/p suprapubic catheter, hx of nephrolithiasis who was admitted on 2/8/2021 autonomic dysreflexia in the setting of right renal calculus and UTI. Initially hypertensive at home, admitted for UTI, s/p right uretral stent placement, then post procedure hypotensive and meeting septic shock criteria requiring pressors.    NUTRITION HISTORY  Not able to obtain from patient, working with other providers and then out of room, passed patient and spouse in hallway.     CURRENT NUTRITION ORDERS  Diet: Regular  --Intermittent NPO 2/8, 2/12, 2/15   Intake/Tolerance: %     LABS  Labs reviewed    MEDICATIONS  Ascorbic acid 1000 mg daily     ANTHROPOMETRICS  Height: 172.7 cm (5' 8\")  Most Recent Weight: 93.4 kg (205 lb 14.6 oz)    IBW: 59.5-63 kg (adjusted 10-15%)   BMI: Obesity Grade I BMI 30-34.9  Weight History:   Wt Readings from Last 15 Encounters:   02/16/21 93.4 kg (205 lb 14.6 oz)   01/17/20 90.3 kg (199 lb)   01/16/20 90.7 kg (199 lb 15.3 oz)   11/15/19 90.7 kg (200 lb)   10/16/19 90.7 kg (200 lb)   08/17/19 90.7 kg (200 lb)   08/12/19 95.3 kg (210 lb)   05/17/18 90.7 kg (200 lb)   07/14/17 81.8 kg (180 lb 6.4 oz)   08/28/16 77.1 kg (170 lb)   06/20/16 81.6 kg (180 lb)   06/14/16 81.6 kg (180 lb)   06/14/16 81.6 kg (180 lb)   11/19/15 77.2 kg (170 lb 3.1 oz)   11/15/15 77.1 kg (170 lb)   to note admission weight 104 kg " on 2/8, diuresing and -12 L from admission    Dosing Weight: 68 kg (adjusted)     ASSESSED NUTRITION NEEDS  Estimated Energy Needs: 7003-5039 kcals/day (25 - 30 kcals/kg)  Justification: Maintenance  Estimated Protein Needs:  grams protein/day (1.2 - 1.5 grams of pro/kg)  Justification: Increased needs  Estimated Fluid Needs: 8708-7515 mL/day (1 mL/kcal)   Justification: Maintenance or per provider     PHYSICAL FINDINGS  See malnutrition section below.    MALNUTRITION  % Intake: Unable to assess  % Weight Loss: Unable to assess due to fluid, suspect dry weight stable  Subcutaneous Fat Loss: None observed  Muscle Loss: Unable to assess  Fluid Accumulation/Edema: Mild- severe  Malnutrition Diagnosis: Unable to determine due to inability to complete all parameters of malnutrition     NUTRITION DIAGNOSIS  Predicted inadequate nutrient intake (energy/protien) related to current hospitalization and intermittent NPO days.       INTERVENTIONS  Implementation  Nutrition Education: Unable to complete due to inability to reach patient    Collaboration with other providers - 6B rounds    Goals  Patient to consume % of nutritionally adequate meal trays TID, or the equivalent with supplements/snacks.     Monitoring/Evaluation  Progress toward goals will be monitored and evaluated per protocol.    Sharmila Dale RD, LD  6B pager: [phone]

## 2021-02-17 ENCOUNTER — ANESTHESIA EVENT (OUTPATIENT)
Dept: SURGERY | Facility: CLINIC | Age: 61
End: 2021-02-17
Payer: COMMERCIAL

## 2021-02-17 ENCOUNTER — TELEPHONE (OUTPATIENT)
Dept: UROLOGY | Facility: CLINIC | Age: 61
End: 2021-02-17

## 2021-02-17 ENCOUNTER — APPOINTMENT (OUTPATIENT)
Dept: OCCUPATIONAL THERAPY | Facility: CLINIC | Age: 61
End: 2021-02-17
Payer: COMMERCIAL

## 2021-02-17 DIAGNOSIS — N20.0 KIDNEY STONE: Primary | ICD-10-CM

## 2021-02-17 PROBLEM — J96.01 ACUTE RESPIRATORY FAILURE WITH HYPOXIA (H): Status: ACTIVE | Noted: 2021-02-17

## 2021-02-17 LAB
ALBUMIN SERPL-MCNC: 2.7 G/DL (ref 3.4–5)
ALP SERPL-CCNC: 81 U/L (ref 40–150)
ALT SERPL W P-5'-P-CCNC: 94 U/L (ref 0–70)
ANION GAP SERPL CALCULATED.3IONS-SCNC: 1 MMOL/L (ref 3–14)
AST SERPL W P-5'-P-CCNC: 75 U/L (ref 0–45)
BACTERIA SPEC CULT: NO GROWTH
BACTERIA SPEC CULT: NO GROWTH
BILIRUB DIRECT SERPL-MCNC: 0.1 MG/DL (ref 0–0.2)
BILIRUB SERPL-MCNC: 0.4 MG/DL (ref 0.2–1.3)
BUN SERPL-MCNC: 9 MG/DL (ref 7–30)
CALCIUM SERPL-MCNC: 8.9 MG/DL (ref 8.5–10.1)
CHLORIDE SERPL-SCNC: 101 MMOL/L (ref 94–109)
CO2 SERPL-SCNC: 35 MMOL/L (ref 20–32)
CREAT SERPL-MCNC: 0.28 MG/DL (ref 0.66–1.25)
CRP SERPL-MCNC: 9.7 MG/L (ref 0–8)
ERYTHROCYTE [DISTWIDTH] IN BLOOD BY AUTOMATED COUNT: 14.4 % (ref 10–15)
GFR SERPL CREATININE-BSD FRML MDRD: >90 ML/MIN/{1.73_M2}
GLUCOSE SERPL-MCNC: 114 MG/DL (ref 70–99)
HCT VFR BLD AUTO: 38.9 % (ref 40–53)
HGB BLD-MCNC: 12 G/DL (ref 13.3–17.7)
MCH RBC QN AUTO: 29.8 PG (ref 26.5–33)
MCHC RBC AUTO-ENTMCNC: 30.8 G/DL (ref 31.5–36.5)
MCV RBC AUTO: 97 FL (ref 78–100)
PLATELET # BLD AUTO: 157 10E9/L (ref 150–450)
POTASSIUM SERPL-SCNC: 4.2 MMOL/L (ref 3.4–5.3)
PROT SERPL-MCNC: 6.1 G/DL (ref 6.8–8.8)
RBC # BLD AUTO: 4.03 10E12/L (ref 4.4–5.9)
SODIUM SERPL-SCNC: 137 MMOL/L (ref 133–144)
SPECIMEN SOURCE: NORMAL
SPECIMEN SOURCE: NORMAL
WBC # BLD AUTO: 6.3 10E9/L (ref 4–11)

## 2021-02-17 PROCEDURE — 250N000011 HC RX IP 250 OP 636: Performed by: STUDENT IN AN ORGANIZED HEALTH CARE EDUCATION/TRAINING PROGRAM

## 2021-02-17 PROCEDURE — 86140 C-REACTIVE PROTEIN: CPT | Performed by: STUDENT IN AN ORGANIZED HEALTH CARE EDUCATION/TRAINING PROGRAM

## 2021-02-17 PROCEDURE — 97110 THERAPEUTIC EXERCISES: CPT | Mod: GO

## 2021-02-17 PROCEDURE — 250N000009 HC RX 250: Performed by: STUDENT IN AN ORGANIZED HEALTH CARE EDUCATION/TRAINING PROGRAM

## 2021-02-17 PROCEDURE — 94640 AIRWAY INHALATION TREATMENT: CPT

## 2021-02-17 PROCEDURE — 250N000013 HC RX MED GY IP 250 OP 250 PS 637: Performed by: STUDENT IN AN ORGANIZED HEALTH CARE EDUCATION/TRAINING PROGRAM

## 2021-02-17 PROCEDURE — 99232 SBSQ HOSP IP/OBS MODERATE 35: CPT | Mod: GC | Performed by: FAMILY MEDICINE

## 2021-02-17 PROCEDURE — 999N000078 HC STATISTIC INTRAPULMONARY PERCUSSIVE VENT

## 2021-02-17 PROCEDURE — 94640 AIRWAY INHALATION TREATMENT: CPT | Mod: 76

## 2021-02-17 PROCEDURE — 999N000157 HC STATISTIC RCP TIME EA 10 MIN

## 2021-02-17 PROCEDURE — 80076 HEPATIC FUNCTION PANEL: CPT | Performed by: STUDENT IN AN ORGANIZED HEALTH CARE EDUCATION/TRAINING PROGRAM

## 2021-02-17 PROCEDURE — 80048 BASIC METABOLIC PNL TOTAL CA: CPT | Performed by: STUDENT IN AN ORGANIZED HEALTH CARE EDUCATION/TRAINING PROGRAM

## 2021-02-17 PROCEDURE — 999N000105 HC STATISTIC NO DOCUMENTATION TO SUPPORT CHARGE: Mod: 76

## 2021-02-17 PROCEDURE — 120N000002 HC R&B MED SURG/OB UMMC

## 2021-02-17 PROCEDURE — 85027 COMPLETE CBC AUTOMATED: CPT | Performed by: STUDENT IN AN ORGANIZED HEALTH CARE EDUCATION/TRAINING PROGRAM

## 2021-02-17 PROCEDURE — 36592 COLLECT BLOOD FROM PICC: CPT | Performed by: STUDENT IN AN ORGANIZED HEALTH CARE EDUCATION/TRAINING PROGRAM

## 2021-02-17 RX ORDER — OLIVE OIL
OIL (ML) MISCELLANEOUS AT BEDTIME
Status: DISCONTINUED | OUTPATIENT
Start: 2021-02-17 | End: 2021-02-20 | Stop reason: HOSPADM

## 2021-02-17 RX ORDER — FUROSEMIDE 10 MG/ML
20 INJECTION INTRAMUSCULAR; INTRAVENOUS ONCE
Status: COMPLETED | OUTPATIENT
Start: 2021-02-17 | End: 2021-02-17

## 2021-02-17 RX ADMIN — METRONIDAZOLE 500 MG: 500 TABLET ORAL at 23:20

## 2021-02-17 RX ADMIN — BISACODYL 20 MG: 10 SUPPOSITORY RECTAL at 09:48

## 2021-02-17 RX ADMIN — METRONIDAZOLE 500 MG: 500 TABLET ORAL at 09:47

## 2021-02-17 RX ADMIN — METRONIDAZOLE 500 MG: 500 TABLET ORAL at 16:21

## 2021-02-17 RX ADMIN — FUROSEMIDE 20 MG: 10 INJECTION, SOLUTION INTRAVENOUS at 12:41

## 2021-02-17 RX ADMIN — BACLOFEN 20 MG: 20 TABLET ORAL at 19:59

## 2021-02-17 RX ADMIN — OXYBUTYNIN 10 MG: 10 TABLET, FILM COATED, EXTENDED RELEASE ORAL at 09:47

## 2021-02-17 RX ADMIN — Medication 5 ML: at 05:37

## 2021-02-17 RX ADMIN — BACLOFEN 20 MG: 20 TABLET ORAL at 04:31

## 2021-02-17 RX ADMIN — OXYCODONE HYDROCHLORIDE AND ACETAMINOPHEN 1000 MG: 500 TABLET ORAL at 09:47

## 2021-02-17 RX ADMIN — DOCUSATE SODIUM 100 MG: 100 CAPSULE, LIQUID FILLED ORAL at 09:47

## 2021-02-17 RX ADMIN — ENOXAPARIN SODIUM 40 MG: 40 INJECTION SUBCUTANEOUS at 12:41

## 2021-02-17 RX ADMIN — Medication 1 MG: at 22:02

## 2021-02-17 RX ADMIN — BACLOFEN 20 MG: 20 TABLET ORAL at 09:47

## 2021-02-17 RX ADMIN — ACETAMINOPHEN 975 MG: 325 TABLET, FILM COATED ORAL at 23:36

## 2021-02-17 RX ADMIN — ALBUTEROL SULFATE 2.5 MG: 2.5 SOLUTION RESPIRATORY (INHALATION) at 09:10

## 2021-02-17 RX ADMIN — DIAZEPAM 10 MG: 5 TABLET ORAL at 22:02

## 2021-02-17 RX ADMIN — BACLOFEN 20 MG: 20 TABLET ORAL at 16:21

## 2021-02-17 RX ADMIN — BACLOFEN 20 MG: 20 TABLET ORAL at 12:41

## 2021-02-17 ASSESSMENT — ACTIVITIES OF DAILY LIVING (ADL)
ADLS_ACUITY_SCORE: 31

## 2021-02-17 NOTE — ANESTHESIA PREPROCEDURE EVALUATION
"Anesthesia Pre-Procedure Evaluation    Patient: Bart Corea   MRN: [id number] : 1960        Preoperative Diagnosis: Kidney stone [N20.0]   Procedure : Procedure(s):  CYSTOURETEROSCOPY, WITH RETROGRADE PYELOGRAM, HOLMIUM LASER LITHOTRIPSY AND STENT INSERTION     HPI:  From 21 progress note:  \"Bart Corea is a 60 year old male with PMH quadriplegia due to C4-C6 SCI, neurogenic bladder s/p suprapubic catheter, hx of nephrolithiasis who was admitted on 2021 autonomic dysreflexia in the setting of right renal calculus and UTI. Initially hypertensive at home, admitted for UTI, s/p right uretral stent placement, then post procedure hypotensive and meeting septic shock criteria requiring pressors. He was transferred from ICU to [facility] on 2/10 and to the floor . \"    Anesthesia pre-op consult due to elevated left hemidiaphragm. Patient had left pleural effusion drained 2 days ago and reports significant improving in respiratory function since that time.       Past Medical History:   Diagnosis Date     Asthma      Chronic infection     Bladder     Heart murmur      LIZA (obstructive sleep apnea) 12/3/2014     Quadriplegia, C1-C4 incomplete (H)       Past Surgical History:   Procedure Laterality Date     Bilateral ureteroscopy with holmium laser lithotripsy and basketing and removal of fragments  Left ureteral stent placement.  02/10/2010     COLONOSCOPY N/A 2018    Procedure: COMBINED COLONOSCOPY, SINGLE OR MULTIPLE BIOPSY/POLYPECTOMY BY BIOPSY;;  Surgeon: Grace Yang MD;  Location: [facility]     COMBINED CYSTOSCOPY, INSERT STENT URETER(S) Right 2021    Procedure: CYSTOSCOPY, WITH URETERAL STENT INSERTION;  Surgeon: Javier Barrientos MD;  [facility]     CYSTOSCOPY, LITHOLAPAXY, COMBINED N/A 11/15/2019    Procedure: Cystolitholapaxy;  Surgeon: Obi Uriostegui MD;  [facility]     INCISION AND DRAINAGE ABDOMEN WASHOUT, COMBINED N/A 2020    Procedure: Incision " and drainage abdomenal Wall with Revision Of Vesicocutaneous Anastomosis;  Surgeon: Obi Uriostegui MD;  Location: [facility]     LASER HOLMIUM LITHOTRIPSY BLADDER N/A 01/16/2020    Procedure: LITHOTRIPSY, CALCULUS, BLADDER, USING HOLMIUM LASER;  Surgeon: Obi Uriostegui MD;  Location: [facility]     PICC SINGLE LUMEN PLACEMENT Left 02/14/2021    4Fr - 47cm (2cm external), Lateral brachial vein, SVC RA junction     skin flap on bottom       sp tube absess surgery        Allergies   Allergen Reactions     Vancomycin Anaphylaxis      Social History     Tobacco Use     Smoking status: Current Every Day Smoker     Packs/day: 0.30     Types: Cigarettes     Smokeless tobacco: Never Used     Tobacco comment: since early teens   Substance Use Topics     Alcohol use: Yes     Comment: on weekends, 3-4+      Wt Readings from Last 1 Encounters:   02/16/21 93.4 kg (205 lb 14.6 oz)        Anesthesia Evaluation   Pt has had prior anesthetic. Type: General and MAC (Placement Date: 02/08/21; Placement Time: 2044 (created via procedure documentation); Mask Ventilation: 2; Induction Type: Intravenous; Ease of Intubation: Difficult (abnormal) (cmac malfunctioning. DL with cmac 4 per Dr. Martin.  Epiglottis visualized.  ).    No history of anesthetic complications       ROS/MED HX  ENT/Pulmonary: Comment: Left hemidiaphragm paralysis     (+) sleep apnea, asthma     Neurologic:     (+) Spinal cord injury, level of injury: C1-C4 (incomplete), with autonomic hyperflexia symptoms,     Cardiovascular:     (+) -----Previous cardiac testing   Echo: Date: 7/2017 Results:  Left Ventricle  Left ventricular size is normal. Left ventricular wall thickness is normal.  The Ejection Fraction is estimated at 50-55%. Normal left ventricular filling  for age. No regional wall motion abnormalities are seen.     Right Ventricle  Right ventricular function, chamber size, wall motion, and thickness are  normal.     Atria  Both atria appear normal. The  atrial septum is intact as assessed by color  Doppler .     Mitral Valve  The mitral valve is normal. Trace mitral insufficiency is present.        Aortic Valve  Aortic valve is normal in structure and function.     Tricuspid Valve  The tricuspid valve is normal. Trace tricuspid insufficiency is present. The  right ventricular systolic pressure is approximated at 15.7 mmHg plus the  right atrial pressure. Pulmonary artery systolic pressure is normal.     Pulmonic Valve  The pulmonic valve is normal. Trace pulmonic insufficiency is present.     Vessels  The aorta root is normal. The pulmonary artery is normal. The inferior vena  cava is normal.     Pericardium  Trivial pericardial effusion is present.  Stress Test: Date: Results:    ECG Reviewed: Date: 2/8/21 Results:  Sinus bradycardia at 38 BPM  Cath: Date: Results:      METS/Exercise Tolerance:     Hematologic: Comments: throlmbocytopenia      Musculoskeletal:   (+) cervical spine instability. C-spine cleared:Yes,    GI/Hepatic: Comment: CT C/A/P (2/8/21)  IMPRESSION:   1. 14 x 9 mm right renal calculus at the pelviureteric junction  causing mild right hydronephrosis.  2. Moderate left pleural effusion with atelectasis and left pleural  thickening and enhancement, suspicious for empyema.  3. Bilateral adrenal nodules, stable on the right side and slightly  increased on the left side, when compared with 2017 CT, have been  present since at least 2010, suggestive of benign adenomas.  4. Stable hepatic cysts. - neg GI/hepatic ROS     Renal/Genitourinary: Comment: Neurogenic bladder    (+) Nephrolithiasis ,     Endo:       Psychiatric/Substance Use:  - neg psychiatric ROS     Infectious Disease: Comment: COVID NEGATIVE 2/8/21    ESBL - neg infectious disease ROS   (+) MRSA,     Malignancy:  - neg malignancy ROS     Other:      (+) , other significant disability Wheelchair bound,          Physical Exam    Airway        Mallampati: IV   TM distance: < 3 FB   Neck ROM:  limited   Mouth opening: > 3 cm    Respiratory Devices and Support  Comment: Room air       Dental  no notable dental history         Cardiovascular   cardiovascular exam normal          Pulmonary       Comment: Breathing comfortable on room air        Other findings: quadriplegic sitting in wheelchair.     OUTSIDE LABS:  CBC:   Lab Results   Component Value Date    WBC 6.3 02/17/2021    WBC 5.6 02/16/2021    HGB 12.0 (L) 02/17/2021    HGB 12.1 (L) 02/16/2021    HCT 38.9 (L) 02/17/2021    HCT 39.1 (L) 02/16/2021     02/17/2021     (L) 02/16/2021     BMP:   Lab Results   Component Value Date     02/17/2021     02/16/2021    POTASSIUM 4.2 02/17/2021    POTASSIUM 4.0 02/16/2021    CHLORIDE 101 02/17/2021    CHLORIDE 99 02/16/2021    CO2 35 (H) 02/17/2021    CO2 34 (H) 02/16/2021    BUN 9 02/17/2021    BUN 8 02/16/2021    CR 0.28 (L) 02/17/2021    CR 0.35 (L) 02/16/2021     (H) 02/17/2021     (H) 02/16/2021     COAGS:   Lab Results   Component Value Date    PTT 33 01/21/2020    INR 1.06 02/16/2021     POC:   Lab Results   Component Value Date    BGM 77 02/12/2021     HEPATIC:   Lab Results   Component Value Date    ALBUMIN 2.7 (L) 02/17/2021    PROTTOTAL 6.1 (L) 02/17/2021    ALT 94 (H) 02/17/2021    AST 75 (H) 02/17/2021    ALKPHOS 81 02/17/2021    BILITOTAL 0.4 02/17/2021    KIA <9 (L) 10/21/2009     OTHER:   Lab Results   Component Value Date    PH 7.33 (L) 02/14/2021    LACT 0.4 (L) 02/08/2021    A1C 5.0 08/14/2019    LIU 8.9 02/17/2021    PHOS 3.3 02/04/2010    MAG 1.7 02/11/2021    LIPASE 187 07/14/2017    TSH 1.95 02/10/2021    CRP 9.7 (H) 02/17/2021    SED 29 (H) 03/04/2010       Anesthesia Plan    ASA Status:  3   NPO Status:  NPO Appropriate    Anesthesia Type: General.     - Airway: ETT   Induction: Intravenous.   Maintenance: Inhalation.        Consents    Anesthesia Plan(s) and associated risks, benefits, and realistic alternatives discussed. Questions answered and  patient/representative(s) expressed understanding.     - Discussed with:  Patient         Postoperative Care    Pain management: IV analgesics.   PONV prophylaxis: Ondansetron (or other 5HT-3)     Comments:                Ron Moreno III, MD     I have seen and evaluated the patient myself and agree with the above assessment and plan.  I have explained the risks/benefits of anesthesia to the patient who understands and agrees to proceed.    Ale Rogers MD  Staff Anesthesiologist  Pager [phone]

## 2021-02-17 NOTE — PLAN OF CARE
Time: 0785-5242    Reason for admission: Autonomic dysreflexia  Vitals: VSS on RA.  Activity: Ax2 with lift. Up in power wheelchair this afternoon.  Pain: Denies  Neuro:  A&Ox4. Quadriplegia. Slight movement of arm, no contraction/sensation of BLE.   Cardiac:     Respiratory: LS diminished. Denies SOB. Denies cough.   GI/: Suppository given, large loose BM today. Suprapubic catheter removed and replaced today. Sediment noted in urine output  Diet: Regular, tolerating well, denies nausea.   Lines:  L PIV SL. Single lumen PICC SL.   Skin/Wounds: Preventative sacral mepilex changed today, minimal blanchable redness. [place], dry. Bruising on BLE, warm to touch. 3/4+ BLE edema.   Labs/imaging:      New changes this shift: PT/OT following, pt up to power wheelchair, went around hospital with wife. Anesthesia consulted today d/t c/f L hemidiaphragm paralysis.     Plan: Anesthesiology consulting to assess and plan for stone removal on Friday.

## 2021-02-17 NOTE — CONSULTS
"Anesthesia Pre-Procedure Evaluation    Patient: Bart Corea   MRN: [id number] : 1960        Preoperative Diagnosis: Kidney stone [N20.0]   Procedure : Procedure(s):  CYSTOURETEROSCOPY, WITH RETROGRADE PYELOGRAM, HOLMIUM LASER LITHOTRIPSY AND STENT INSERTION     Assessment and plan:  60 year old male with quadriplegia and recent autonomic dysreflexia secondary to nephrolithiasis who is presenting for laser lithotripsy and cystoscopy.  Had right ureteral stent placed upon admission.  He did have a challenging airway and his airway exam remains suggestive of a difficult airway.  The prior anesthesiologist documented consideration of spinal anesthesia, but deferred due to thrombocytopenia which is now resolved.  He has had optimization of his pulmonary function with work-up and drainage of pleural effusion.     Plan:    Do not suspect any delays or recommend any perioperative optimization at this time    MAC vs. GA vs. Neuraxial to be determined day of surgery by anesthesiologist     Pulmonary function improved since prior procedure this hospitalization with drainage of effusion, do not suspect any new pulmonary risks with anesthesia    Suspect difficult airway, recommend planning accordingly and reviewing prior airway documentation.     Assessment and plan shared with primary team, Family Medicine, via telephone 21 at 5 PM.     HPI:  From 21 progress note:  \"Bart Corea is a 60 year old male with PMH quadriplegia due to C4-C6 SCI, neurogenic bladder s/p suprapubic catheter, hx of nephrolithiasis who was admitted on 2021 autonomic dysreflexia in the setting of right renal calculus and UTI. Initially hypertensive at home, admitted for UTI, s/p right uretral stent placement, then post procedure hypotensive and meeting septic shock criteria requiring pressors. He was transferred from ICU to [facility] on 2/10 and to the floor . \"    Anesthesia pre-op consult due to elevated left " hemidiaphragm. Patient had left pleural effusion drained 2 days ago and reports significant improving in respiratory function since that time.       Past Medical History:   Diagnosis Date     Asthma      Chronic infection     Bladder     Heart murmur      LIZA (obstructive sleep apnea) 12/3/2014     Quadriplegia, C1-C4 incomplete (H)       Past Surgical History:   Procedure Laterality Date     Bilateral ureteroscopy with holmium laser lithotripsy and basketing and removal of fragments  Left ureteral stent placement.  02/10/2010     COLONOSCOPY N/A 07/20/2018    Procedure: COMBINED COLONOSCOPY, SINGLE OR MULTIPLE BIOPSY/POLYPECTOMY BY BIOPSY;;  Surgeon: Grace Yang MD;  Location: [facility]     COMBINED CYSTOSCOPY, INSERT STENT URETER(S) Right 02/08/2021    Procedure: CYSTOSCOPY, WITH URETERAL STENT INSERTION;  Surgeon: Javier Barrientos MD;  [facility]     CYSTOSCOPY, LITHOLAPAXY, COMBINED N/A 11/15/2019    Procedure: Cystolitholapaxy;  Surgeon: Obi Uriostegui MD;  [facility]     INCISION AND DRAINAGE ABDOMEN WASHOUT, COMBINED N/A 01/16/2020    Procedure: Incision and drainage abdomenal Wall with Revision Of Vesicocutaneous Anastomosis;  Surgeon: Obi Uriostegui MD;  Location: [facility]     LASER HOLMIUM LITHOTRIPSY BLADDER N/A 01/16/2020    Procedure: LITHOTRIPSY, CALCULUS, BLADDER, USING HOLMIUM LASER;  Surgeon: Obi Uriostegui MD;  Location: [facility]     PICC SINGLE LUMEN PLACEMENT Left 02/14/2021    4Fr - 47cm (2cm external), Lateral brachial vein, SVC RA junction     skin flap on bottom       sp tube absess surgery        Allergies   Allergen Reactions     Vancomycin Anaphylaxis      Social History     Tobacco Use     Smoking status: Current Every Day Smoker     Packs/day: 0.30     Types: Cigarettes     Smokeless tobacco: Never Used     Tobacco comment: since early teens   Substance Use Topics     Alcohol use: Yes     Comment: on weekends, 3-4+      Wt Readings from Last 1 Encounters:    02/16/21 93.4 kg (205 lb 14.6 oz)        Anesthesia Evaluation   Pt has had prior anesthetic. Type: General and MAC (Placement Date: 02/08/21; Placement Time: 2044 (created via procedure documentation); Mask Ventilation: 2; Induction Type: Intravenous; Ease of Intubation: Difficult (abnormal) (cmac malfunctioning. DL with cmac 4 per Dr. Martin.  Epiglottis visualized.  ).    No history of anesthetic complications       ROS/MED HX  ENT/Pulmonary: Comment: Left hemidiaphragm paralysis     (+) sleep apnea, asthma     Neurologic:     (+) Spinal cord injury, level of injury: C1-C4 (incomplete), with autonomic hyperflexia symptoms,     Cardiovascular:     (+) -----Previous cardiac testing   Echo: Date: 7/2017 Results:  Left Ventricle  Left ventricular size is normal. Left ventricular wall thickness is normal.  The Ejection Fraction is estimated at 50-55%. Normal left ventricular filling  for age. No regional wall motion abnormalities are seen.     Right Ventricle  Right ventricular function, chamber size, wall motion, and thickness are  normal.     Atria  Both atria appear normal. The atrial septum is intact as assessed by color  Doppler .     Mitral Valve  The mitral valve is normal. Trace mitral insufficiency is present.        Aortic Valve  Aortic valve is normal in structure and function.     Tricuspid Valve  The tricuspid valve is normal. Trace tricuspid insufficiency is present. The  right ventricular systolic pressure is approximated at 15.7 mmHg plus the  right atrial pressure. Pulmonary artery systolic pressure is normal.     Pulmonic Valve  The pulmonic valve is normal. Trace pulmonic insufficiency is present.     Vessels  The aorta root is normal. The pulmonary artery is normal. The inferior vena  cava is normal.     Pericardium  Trivial pericardial effusion is present.  Stress Test: Date: Results:    ECG Reviewed: Date: 2/8/21 Results:  Sinus bradycardia at 38 BPM  Cath: Date: Results:      METS/Exercise  Tolerance:     Hematologic: Comments: throlmbocytopenia      Musculoskeletal:   (+) cervical spine instability. C-spine cleared:Yes,    GI/Hepatic: Comment: CT C/A/P (2/8/21)  IMPRESSION:   1. 14 x 9 mm right renal calculus at the pelviureteric junction  causing mild right hydronephrosis.  2. Moderate left pleural effusion with atelectasis and left pleural  thickening and enhancement, suspicious for empyema.  3. Bilateral adrenal nodules, stable on the right side and slightly  increased on the left side, when compared with 2017 CT, have been  present since at least 2010, suggestive of benign adenomas.  4. Stable hepatic cysts. - neg GI/hepatic ROS     Renal/Genitourinary: Comment: Neurogenic bladder    (+) Nephrolithiasis ,     Endo:       Psychiatric/Substance Use:  - neg psychiatric ROS     Infectious Disease: Comment: COVID NEGATIVE 2/8/21    ESBL - neg infectious disease ROS   (+) MRSA,     Malignancy:  - neg malignancy ROS     Other:      (+) , other significant disability Wheelchair bound,          Physical Exam    Airway        Mallampati: IV   TM distance: < 3 FB   Neck ROM: limited   Mouth opening: > 3 cm    Respiratory Devices and Support  Comment: Room air       Dental  no notable dental history         Cardiovascular   cardiovascular exam normal          Pulmonary       Comment: Breathing comfortable on room air        Other findings: quadriplegic sitting in wheelchair.     OUTSIDE LABS:  CBC:   Lab Results   Component Value Date    WBC 6.3 02/17/2021    WBC 5.6 02/16/2021    HGB 12.0 (L) 02/17/2021    HGB 12.1 (L) 02/16/2021    HCT 38.9 (L) 02/17/2021    HCT 39.1 (L) 02/16/2021     02/17/2021     (L) 02/16/2021     BMP:   Lab Results   Component Value Date     02/17/2021     02/16/2021    POTASSIUM 4.2 02/17/2021    POTASSIUM 4.0 02/16/2021    CHLORIDE 101 02/17/2021    CHLORIDE 99 02/16/2021    CO2 35 (H) 02/17/2021    CO2 34 (H) 02/16/2021    BUN 9 02/17/2021    BUN 8  02/16/2021    CR 0.28 (L) 02/17/2021    CR 0.35 (L) 02/16/2021     (H) 02/17/2021     (H) 02/16/2021     COAGS:   Lab Results   Component Value Date    PTT 33 01/21/2020    INR 1.06 02/16/2021     POC:   Lab Results   Component Value Date    BGM 77 02/12/2021     HEPATIC:   Lab Results   Component Value Date    ALBUMIN 2.7 (L) 02/17/2021    PROTTOTAL 6.1 (L) 02/17/2021    ALT 94 (H) 02/17/2021    AST 75 (H) 02/17/2021    ALKPHOS 81 02/17/2021    BILITOTAL 0.4 02/17/2021    KIA <9 (L) 10/21/2009     OTHER:   Lab Results   Component Value Date    PH 7.33 (L) 02/14/2021    LACT 0.4 (L) 02/08/2021    A1C 5.0 08/14/2019    LIU 8.9 02/17/2021    PHOS 3.3 02/04/2010    MAG 1.7 02/11/2021    LIPASE 187 07/14/2017    TSH 1.95 02/10/2021    CRP 9.7 (H) 02/17/2021    SED 29 (H) 03/04/2010             Ron Moreno III, MD  Resident

## 2021-02-17 NOTE — PLAN OF CARE
"Time: 5084-2812    Reason for admission: Autonomic dysreflexia [G90.4]  Hyponatremia [E87.1]  Complicated urinary tract infection [N39.0]  Urinary tract obstruction by kidney stone [N20.0, N13.8]  Other hypervolemia [E87.79]    Vitals: /64 (BP Location: Left arm)   Pulse 52   Temp 96.8  F (36  C) (Oral)   Resp 16   Ht 1.727 m (5' 8\")   Wt 93.4 kg (205 lb 14.6 oz)   SpO2 99%   BMI 31.31 kg/m      Activity: Lift  Diet: Regular - good intake  Pain: Denies  Respiratory: 1L NC, denies SOB, LS diminished   Cardiovascular: Tele - sinus bradycardia, severe edema in BLE   GI: LBM this AM   : Suprapubic catheter - good output   Skin: Bruising  Neurologic: A&O  Lines: L PIV, L PICC     New changes this shift: Pt arrived to unit from 6B, pt refusing Bipap overnight, anesthesia consult placed for stent management with diaphragm paralysis, pt dipped to low 80's on room air - back to high 90's on 1L.     Plan: IV abx, potential discharge 1-2 days     "

## 2021-02-17 NOTE — PHARMACY-AMINOGLYCOSIDE DOSING SERVICE
Pharmacy Aminoglycoside Follow-Up Note  Date of Service 2021  Patient's  1960   60 year old, male    Weight (Adjusted): 82.8 kg    Indication: Urinary Tract Infection  Current Tobramycin regimen:  480 mg IV q48h  Day of therapy: 9    Target goals based on extended interval dosing  Goal Peak level: 10-15 mg/L ; lower due to low WALLY and urinary source  Goal Trough level: <0.5mg/L     Current estimated CrCl: Estimated Creatinine Clearance: 318.9 mL/min (A) (based on SCr of 0.28 mg/dL (L)).     Cystatin C calculated eGFR on  = 50 mL/min/1.73m2  This lab was drawn at the same time as a SCr of 0.3. SCr significantly overestimates this patient's renal function.    Creatinine for last 3 days  2/15/2021:  7:41 AM Creatinine 0.26 mg/dL  2021:  7:02 AM Creatinine 0.35 mg/dL  2021:  5:41 AM Creatinine 0.28 mg/dL    Nephrotoxins and other renal medications (From now, onward)    Start     Dose/Rate Route Frequency Ordered Stop    21 0930  furosemide (LASIX) injection 20 mg      20 mg  over 1-3 Minutes Intravenous ONCE 21 0927      21 0400  tobramycin (NEBCIN) 480 mg in sodium chloride 0.9 % intermittent infusion      480 mg  over 60 Minutes Intravenous EVERY 48 HOURS 21 1821 21 0359          Contrast Orders - past 72 hours (72h ago, onward)    None          Aminoglycoside Levels - past 2 days  2021:  7:02 AM Tobramycin Level 11.2 mg/L;  4:20 PM Tobramycin Level 3.5 mg/L    Aminoglycosides IV Administrations (past 72 hours)                   tobramycin (NEBCIN) 480 mg in sodium chloride 0.9 % intermittent infusion (mg) 480 mg New Bag 21 0325                Pharmacokinetic Analysis  Calculated Peak level: 17.6 mg/L  Calculated Trough level: 0.05 mg/L  Volume of distribution: 0.31 L/kg  Half-life: 5.5 hours      Interpretation of levels and current regimen:  Aminoglycoside levels are are on the higher end of therapeutic range.    Has serum creatinine changed  greater than 50% in the last 72 hours: No    Urine output:  good urine output    Renal function: Stable    Plan  1. Continue current dose. Decision was made to change tobramycin to 480 mg IV q48h in anticipation of discharge.  Based on kinetic anaylsis, this will yield a peak of 17.6 mcg/ml.  This is on higher end of therapeutic range but lowering the dose does not give an adequate trough/coverage throughout the dosing interval. Plan is to treat through 2/22.    2.  Method of evaluation: 2 post dose levels    3. Pharmacy will continue to follow and check levels  as appropriate in 3-5 Days    Completed by Ignacia Donato, Deniz[employer] Candidate

## 2021-02-17 NOTE — PROGRESS NOTES
Upon interview with the patient he stated that he has a vest at home with which he bought himself and is non compliant in using and was never was prescribed a physicians order for vest therapy at home. Vest therapy order was discontinued due to the vest not providing any benefit to the patient, will instead continue with 20 minute Metaneb therapy QID.    Patient also refuses BiPAP because he was nasal pillows, refuses to wear an under the nose mask or a full face mask. RT does not have any nasal pillows.

## 2021-02-17 NOTE — TELEPHONE ENCOUNTER
Patient is scheduled for surgery with Dr. Hebert      Spoke with Dr. Hebert - Patient is currently inpatient and was notified about surgery and date by the hospital    Date of Surgery: 2/19/2021    Location: [place]    Informed patient they will need an adult  yes    Pre-op with surgeon (if applicable): n/a    H&P: inpatient    Additional imaging/appointments: n/a    Surgery packet: n/a     Additional comments: COVID test done in the hospital and patient is currently inpatient

## 2021-02-17 NOTE — PROGRESS NOTES
This is a recent snapshot of the patient's [facility] medical record.  For current drug dose and complete information and questions, call [phone] or In Basket pool, [facility] ([id number])  CSN Number:  [id number]

## 2021-02-17 NOTE — PROGRESS NOTES
[facility]    Progress Note - [facility]        Date of Admission:  2/8/2021    Main Plans for Today  - Diuresis   - Preop assessment with Anesthesia  - Discuss urologic procedure with Infectious Disease  - respiratory cares (duonebs, metanebs, vest therapy)  - PICC/IV abx teaching     Assessment & Plan       Bart Corea is a 60 year old male with PMH quadriplegia due to C4-C6 SCI, neurogenic bladder s/p suprapubic catheter, hx of nephrolithiasis who was admitted on 2/8/2021 autonomic dysreflexia in the setting of right renal calculus and UTI. Initially hypertensive at home, admitted for UTI, s/p right uretral stent placement, then post procedure hypotensive and meeting septic shock criteria requiring pressors. He was transferred from ICU to C on 2/10 and to the floor 2/16.       # Acute hypercapnic and hypoxic respiratory failure  # L hemidiaphragm elevation, concerning for L hemidiaphragm paralysis  # L lower lobe atelectasis  # Left-sided pleural effusion, chronic  # High pretest probability for sleep apnea  # Tobacco dependence  Patient with no prior baseline supplemental oxygen needs. Echocardiogram 2/08 with EF 55-60%. He has apneic events while sleeping, with evidence of LIZA on prior sleep study 2014. Has had improved respiratory status, no cough, fever, chills, though with persistent oxygen need while sleeping (desaturates to low 80s when sleeping on room air). AM VBG showed hypercarbia consistent with LIZA. Repeating an outpatient sleep study is extremely challenging given his quadriplegia and the current pandemic. CT chest on admission revealed left pleural effusion with pleural thickening, initially concerning for empyema however this was also seen on prior CT in 2018. Looking further back, it does appear he required thoracentesis in ~2009 at which time fluid was consistent with parapneumonic effusion. Repeat CT chest 2/12 showed small  bilateral pleural effusions, adjacent bibasilar consolidations and near complete atelectasis of the LLL and sniff test confirmed diagnosis of left diaphragm hemiparalysis.  He underwent thoracentesis 2/16 with 550 mL transudative fluid based on lites criteria.  Differential includes hypoalbuminemia vs atelectasis, obstructive uropathy.   - Pulmonology consulted, appreciate recommendations   - bronchial drainage regimen: chest physiotherapy with metaneb, vest, flutter valve   - duoneb scheduled QID   - Bipap recommended, needs nasal pillow as mask prevents his ability to call for help given his baseline quadriplegia. Per RT, nasal pillow not available.     - Recommend outpatient sleep study, though inpatient work up has confirmed LIZA as discussed above   - Repeat chest CT in 6-8 weeks (~3/22) due to smoking hx. If continued atelectasis/lobar collapse, consider bronchoscopy to evaluate for malignancy    # Autonomic dysreflexia  # Quadriplegia 2/2 C4-C6 SCI   # Hypervolemia  # Hypoalbuminemia  Fluctuating blood pressure at home (baseline ~110/70). Uses amlodipine PRN at home, typically a few times a year. Initially admitted to ICU 2/9 with symptomatic hypotension-shock, requiring pressor support. He continues to have intermittent hypotension, though with MAPs>50 and off dopamine since 2/10.   Pressures were consistently elevated to normal range over the past 1-2 days. Have been cautious with IVF given his overall hypervolemia and requiring Lasix x1 while in the ICU. TTE on 2/10 with small pericardial effusion, normal EF and cardiac motion.  He does not have a history of heart failure. He does have clinically significant hypervolemia 2/2 hypoalbuminemia.   - Telemetry, q4h vitals  - Attempt elevation of legs if persistently hypotensive/MAPs <50  - Leg wraps   - Lasix 20 mg x1, monitor UOP     # Sinus bradycardia - baseline 40s  He has chronic sinus bradycardia in the 40s, episode of more persistently in 30s and feeling  "\"off \" on 2/13. Cardiac Electrophysiology consulted for sinus bradycardia, no recommendations for permanent pacemaker placement at this time as it is unlikely to improve his quality of life or longevity unless he becomes more/consistently symptomatic.    # Sepsis 2/2 complicated UTI, resolved  # Right renal calculus, obstructive s/p right ureter stent placement  # Bacteroides fragilis in blood culture (growth on 2nd day in 1/2 bcx)  # Suprapubic catheter, chronic  On admission CT AP showed right renal calculus and hydronephrosis, UA indicitve of infection, back/abd pain c/w past UTIs/stones. He underwent right ureteral stent placement 2/8, transferred initially to the ICU with postoperative hypotension with c/f sepsis. Urine culture 2/08 growing Pseudomonas. The patient has now remained afebrile, off pressors since 2/10. Suprapubic cath changed 2/12. PICC placed 2/14 for long term antibiotics.   - Urology following, appreciate recs   - Tentative plan for OR for definitive stone management 2/19, will request Anesthesia evaluation prior given new diagnosis of left diaphragm paralysis  - Continue IV tobramycin x2 weeks (2/08-2/22)   - PO Flagyl 500 mg q8h for bacteremia (2/12-2/26)  - PTA oxybutynin 10 mg daily    Antibiotics  - PO Flagyl (2/12- 2/26)  - IV Tobramycin (2/9 - 2/22)  - IV Zosyn (2/8 -2/12)  - Linezolid (2/9 -2/10) - for sepsis     # Hx insomnia  # Delirium   Notes indicate a history of insomnia, for which he started Ambien within the past year. More recent PCP notes mention decreasing the Ambien 10 to 5 mg, and wife mentions that his vivid dreams may have increased since starting this medication about a year ago. He also uses diazepam as an outpatient. Also mention of alcohol and tobacco use.   - PTA zolpidem 5 mg at bedtime (decreased from PTA dose of 10 mg)  - PTA diazepam 10 mg daily   - melatonin at bedtime     # Leukopenia, resolved  # Thrombocytopenia, stable  Likely related to sepsis. No history " of similar thrombocytopenia on chart review, however, when septic in the past did have leukopenia down to 3.6 (2017).   - trend CBC      # Spacticity, chronic  - PTA Baclofen 20 mg 4 times daily scheduled, additional PRN 2 times daily  - PTA diazepam 10 daily, additional PRN 5 mg every day     #Psych/Social Concerns   Apprehensive when discussing discharge, concerned about his wife having to take over all of the cares he has been receiving.   - Social work following      Diet: Regular Diet Adult  NPO for Medical/Clinical Reasons Except for: Meds  - when on BiPAP  Fluids: PO  Lines: PIV x2  DVT Prophylaxis: Enoxaparin (Lovenox) subcutaneous - hold for thoracentesis   Amanda Catheter: not present suprapubic cath   Code Status: Full Code         Disposition Plan   Expected discharge: 2 - 3 days, recommended to prior living arrangement once adequate pain management/ tolerating PO medications, antibiotic plan established and SIRS/Sepsis treated.  Entered: Maxine Chowdhury MD 02/17/2021, 6:31 AM     The patient's care was discussed with the Attending Physician, Dr. Lawson.    Lai Lyle    I was present with the medical student who participated in the service and in the documentation of this note. I have verified the history and personally performed the physical exam and medical decision making, and have verified the content of the note, which accurately reflects my assessment of the patient and the plan of care.  Maxine Chowdhury MD  [facility]  [facility]  Please see sign in/sign out for up to date coverage information  ______________________________________________________________________    Interval History   No acute events overnight. No episodes of symptomatic bradycardia.  Denies chest pain.  Reports his breathing feels better since his thoracentesis yesterday.  No fever/chills.  He had a bowel movement earlier today.  Has had good urine  output.  Was hypoxic last night requiring 1 L via nasal cannula to maintain oxygen saturations greater than 88%.    Data reviewed today: I reviewed all medications, new labs and imaging results over the last 24 hours.     Physical Exam   Vital Signs: Temp: 99  F (37.2  C) Temp src: Oral BP: 108/46 Pulse: 59   Resp: 18 SpO2: 98 % O2 Device: Nasal cannula Oxygen Delivery: 1 LPM  Weight: 205 lbs 14.55 oz     Physical Exam  Constitutional:       Appearance: He is obese.   HENT:      Mouth/Throat:      Mouth: Mucous membranes are moist.   Cardiovascular:      Rate and Rhythm: Regular rhythm. Bradycardia present.   Pulmonary:      Effort: Pulmonary effort is normal.   Abdominal:      General: Bowel sounds are normal. There is distension.   Musculoskeletal:      Right lower leg: Edema present.      Left lower leg: Edema present.      Comments: Wraps/compression stockings in place   Skin:     General: Skin is warm and dry.      Comments: Some slight bruising around the pt's bilateral toes and feet (Normal per pt's wife).   Neurological:      Mental Status: He is alert. Mental status is at baseline.   Psychiatric:         Mood and Affect: Mood normal.          Data   Recent Labs   Lab 02/17/21  0541 02/16/21  0702 02/15/21  0741   WBC 6.3 5.6 5.4   HGB 12.0* 12.1* 12.6*   MCV 97 97 97    143* 126*   INR  --  1.06  --     136 138   POTASSIUM 4.2 4.0 4.1   CHLORIDE 101 99 99   CO2 35* 34* 36*   BUN 9 8 5*   CR 0.28* 0.35* 0.26*   ANIONGAP 1* 2* 3   LIU 8.9 8.9 9.2   * 107* 94   PROTTOTAL  --  6.0*  --      Recent Results (from the past 24 hour(s))   POC US Guide for Thorcentesis    Impression    Moderate smple right pleural effusion. 500 ml removed. See consult note for details. +  sliding scale after procedure. Chest xray pending   XR Chest Port 1 View    Narrative    EXAM: XR CHEST PORT 1 VW  2/16/2021 2:48 PM     HISTORY:  Follow-up left sided thoracentesis       COMPARISON:  Chest x-ray  2/14/2020    FINDINGS:   Upright portable AP view of the chest. Left upper extremity PICC tip  projects over the lower SVC. Cardiac silhouette appears enlarged.  Trachea is midline.    No significant change in hazy perihilar interstitial opacities. No  significant change in left lower lobe atelectasis. Small left pleural  effusion. No appreciable varus. Visualized upper abdomen is  unremarkable.      Impression    IMPRESSION:     1. Small residual pleural effusion on the left status post  thoracentesis. No appreciable pneumothorax.  2. No significant change in bilateral perihilar interstitial opacities  are bibasilar left greater than right atelectasis.    I have personally reviewed the examination and initial interpretation  and I agree with the findings.    RICO GALLEGO MD

## 2021-02-17 NOTE — PLAN OF CARE
Shift time: 2577-3876.    Neuro: alert and oriented x4   VS: soft BP, 92/28- recheck 90/65. MD REYES pagefaizan, pt asymptomatic. Otherwise VSS on 1 L.   Pain: denies  Diet: regular  Act: repo q2, lift team assistance   Lines: PICC SL PIV SL, cap changed. Suprapubic cath WNL.   Skin: bruising   Cardiovascular: bradycardic, tele reading sinus bradycardia.   Resp: Denies SOB. 1 L NC. Refusing Bipap at night.   GI/: suprapubic cath WNL- good ouput. LBM 2/16.     New this shift: PRN baclofen given this am.  Anesthesia consult in place. Will monitor and follow plan of care.

## 2021-02-18 ENCOUNTER — APPOINTMENT (OUTPATIENT)
Dept: OCCUPATIONAL THERAPY | Facility: CLINIC | Age: 61
End: 2021-02-18
Payer: COMMERCIAL

## 2021-02-18 LAB
ANION GAP SERPL CALCULATED.3IONS-SCNC: 4 MMOL/L (ref 3–14)
BUN SERPL-MCNC: 8 MG/DL (ref 7–30)
CALCIUM SERPL-MCNC: 8.8 MG/DL (ref 8.5–10.1)
CHLORIDE SERPL-SCNC: 100 MMOL/L (ref 94–109)
CO2 SERPL-SCNC: 35 MMOL/L (ref 20–32)
COPATH REPORT: NORMAL
CREAT SERPL-MCNC: 0.26 MG/DL (ref 0.66–1.25)
ERYTHROCYTE [DISTWIDTH] IN BLOOD BY AUTOMATED COUNT: 14.6 % (ref 10–15)
GFR SERPL CREATININE-BSD FRML MDRD: >90 ML/MIN/{1.73_M2}
GLUCOSE SERPL-MCNC: 94 MG/DL (ref 70–99)
HCT VFR BLD AUTO: 38.1 % (ref 40–53)
HGB BLD-MCNC: 12.2 G/DL (ref 13.3–17.7)
LABORATORY COMMENT REPORT: NORMAL
MCH RBC QN AUTO: 30.8 PG (ref 26.5–33)
MCHC RBC AUTO-ENTMCNC: 32 G/DL (ref 31.5–36.5)
MCV RBC AUTO: 96 FL (ref 78–100)
PLATELET # BLD AUTO: 203 10E9/L (ref 150–450)
POTASSIUM SERPL-SCNC: 3.8 MMOL/L (ref 3.4–5.3)
RBC # BLD AUTO: 3.96 10E12/L (ref 4.4–5.9)
SARS-COV-2 RNA RESP QL NAA+PROBE: NEGATIVE
SODIUM SERPL-SCNC: 139 MMOL/L (ref 133–144)
SPECIMEN SOURCE: NORMAL
WBC # BLD AUTO: 5.5 10E9/L (ref 4–11)

## 2021-02-18 PROCEDURE — 999N000033 HC STATISTIC CHRONIC PULMONARY DISEASE SPECIALIST

## 2021-02-18 PROCEDURE — 999N000078 HC STATISTIC INTRAPULMONARY PERCUSSIVE VENT

## 2021-02-18 PROCEDURE — 97110 THERAPEUTIC EXERCISES: CPT | Mod: GO

## 2021-02-18 PROCEDURE — 250N000013 HC RX MED GY IP 250 OP 250 PS 637: Performed by: STUDENT IN AN ORGANIZED HEALTH CARE EDUCATION/TRAINING PROGRAM

## 2021-02-18 PROCEDURE — 36592 COLLECT BLOOD FROM PICC: CPT | Performed by: STUDENT IN AN ORGANIZED HEALTH CARE EDUCATION/TRAINING PROGRAM

## 2021-02-18 PROCEDURE — 250N000011 HC RX IP 250 OP 636: Performed by: STUDENT IN AN ORGANIZED HEALTH CARE EDUCATION/TRAINING PROGRAM

## 2021-02-18 PROCEDURE — 250N000011 HC RX IP 250 OP 636: Performed by: FAMILY MEDICINE

## 2021-02-18 PROCEDURE — 999N000157 HC STATISTIC RCP TIME EA 10 MIN

## 2021-02-18 PROCEDURE — 85027 COMPLETE CBC AUTOMATED: CPT | Performed by: STUDENT IN AN ORGANIZED HEALTH CARE EDUCATION/TRAINING PROGRAM

## 2021-02-18 PROCEDURE — 94640 AIRWAY INHALATION TREATMENT: CPT | Mod: 76

## 2021-02-18 PROCEDURE — 94762 N-INVAS EAR/PLS OXIMTRY CONT: CPT

## 2021-02-18 PROCEDURE — 999N000105 HC STATISTIC NO DOCUMENTATION TO SUPPORT CHARGE: Mod: 76

## 2021-02-18 PROCEDURE — 94640 AIRWAY INHALATION TREATMENT: CPT

## 2021-02-18 PROCEDURE — 97535 SELF CARE MNGMENT TRAINING: CPT | Mod: GO

## 2021-02-18 PROCEDURE — 99407 BEHAV CHNG SMOKING > 10 MIN: CPT

## 2021-02-18 PROCEDURE — U0005 INFEC AGEN DETEC AMPLI PROBE: HCPCS | Performed by: STUDENT IN AN ORGANIZED HEALTH CARE EDUCATION/TRAINING PROGRAM

## 2021-02-18 PROCEDURE — 120N000002 HC R&B MED SURG/OB UMMC

## 2021-02-18 PROCEDURE — U0003 INFECTIOUS AGENT DETECTION BY NUCLEIC ACID (DNA OR RNA); SEVERE ACUTE RESPIRATORY SYNDROME CORONAVIRUS 2 (SARS-COV-2) (CORONAVIRUS DISEASE [COVID-19]), AMPLIFIED PROBE TECHNIQUE, MAKING USE OF HIGH THROUGHPUT TECHNOLOGIES AS DESCRIBED BY CMS-2020-01-R: HCPCS | Performed by: STUDENT IN AN ORGANIZED HEALTH CARE EDUCATION/TRAINING PROGRAM

## 2021-02-18 PROCEDURE — 80048 BASIC METABOLIC PNL TOTAL CA: CPT | Performed by: STUDENT IN AN ORGANIZED HEALTH CARE EDUCATION/TRAINING PROGRAM

## 2021-02-18 PROCEDURE — 258N000003 HC RX IP 258 OP 636: Performed by: FAMILY MEDICINE

## 2021-02-18 PROCEDURE — 99232 SBSQ HOSP IP/OBS MODERATE 35: CPT | Mod: GC | Performed by: FAMILY MEDICINE

## 2021-02-18 RX ADMIN — METRONIDAZOLE 500 MG: 500 TABLET ORAL at 23:51

## 2021-02-18 RX ADMIN — DIAZEPAM 10 MG: 5 TABLET ORAL at 21:44

## 2021-02-18 RX ADMIN — METRONIDAZOLE 500 MG: 500 TABLET ORAL at 16:57

## 2021-02-18 RX ADMIN — TOBRAMYCIN 480 MG: 40 INJECTION INTRAMUSCULAR; INTRAVENOUS at 03:27

## 2021-02-18 RX ADMIN — ZOLPIDEM TARTRATE 5 MG: 5 TABLET ORAL at 22:15

## 2021-02-18 RX ADMIN — BACLOFEN 20 MG: 20 TABLET ORAL at 03:27

## 2021-02-18 RX ADMIN — SODIUM CHLORIDE, PRESERVATIVE FREE 5 ML: 5 INJECTION INTRAVENOUS at 17:58

## 2021-02-18 RX ADMIN — ENOXAPARIN SODIUM 40 MG: 40 INJECTION SUBCUTANEOUS at 12:03

## 2021-02-18 RX ADMIN — BACLOFEN 20 MG: 20 TABLET ORAL at 09:05

## 2021-02-18 RX ADMIN — BACLOFEN 20 MG: 20 TABLET ORAL at 12:03

## 2021-02-18 RX ADMIN — DOCUSATE SODIUM 100 MG: 100 CAPSULE, LIQUID FILLED ORAL at 09:05

## 2021-02-18 RX ADMIN — METRONIDAZOLE 500 MG: 500 TABLET ORAL at 09:06

## 2021-02-18 RX ADMIN — OXYCODONE HYDROCHLORIDE AND ACETAMINOPHEN 1000 MG: 500 TABLET ORAL at 09:05

## 2021-02-18 RX ADMIN — Medication 1 MG: at 21:44

## 2021-02-18 RX ADMIN — OXYBUTYNIN 10 MG: 10 TABLET, FILM COATED, EXTENDED RELEASE ORAL at 09:05

## 2021-02-18 RX ADMIN — BACLOFEN 20 MG: 20 TABLET ORAL at 16:57

## 2021-02-18 RX ADMIN — BACLOFEN 20 MG: 20 TABLET ORAL at 21:18

## 2021-02-18 ASSESSMENT — ACTIVITIES OF DAILY LIVING (ADL)
ADLS_ACUITY_SCORE: 31

## 2021-02-18 NOTE — PLAN OF CARE
Shift time: 5330-6205.    Neuro: alert and oriented x4. Quadriplegia. No contraction/sensation in BLE.   VS: VSS on 1 L NC.   Pain: c/o mild HA given tylenol with relief.  Diet: regular  Act: lift, repo q2.  Lines: PICC infusing IV tobramycin. PIV SL.   Skin: mepliex on sacrum, blanchable redness. [place], dry, bruising. Edema in BLE + BUE.   Cardiovascular: bradycardic  Resp: 1L NC. LS clear/diminished. Denies SOB.   GI/: suprapubic cath in place with good UO. LBM 2/17. Denies nausea.     New this shift: Possible renal stone removal 2/19- NPO @ 0000. PRN dose of baclofen given overnight. Will monitor and follow plan of care.

## 2021-02-18 NOTE — PLAN OF CARE
Time: 9239-6517    Reason for admission: Autonomic dysreflexia 2/2 nephrolithiasis and UTI  Vitals: VSS on RA.  Activity: Ax2 with lift. Up in power wheelchair this afternoon.  Pain: Denies  Neuro:  A&Ox4. Quadriplegia. Slight movement of arm, no contraction/sensation of BLE.   Cardiac: Tele sinus ronald. Denies chest pain/dizziness.   Respiratory: LS diminished. Denies SOB. Denies cough.   GI/: Suprapubic catheter, WDL. Good UOP. LBM 2/17.   Diet: Regular, tolerating well, denies nausea.   Lines:  L PIV SL. Single lumen PICC hep locked.   Skin/Wounds: [place], dry. Bruising on BLE, warm to touch. 3/4+ BLE edema. Preventative sacral mepilex CDI.   Labs/imaging: Asymptomatic COVID swab sent this afternoon to r/o before procedure tomorrow.      New changes this shift: PT/OT following, pt up to power wheelchair, went around hospital with wife. Pleasant, cooperative with cares.     Plan: Anesthesiology consulting to assess and plan for stone removal on Friday. Continue PO bactrim and flaygl.
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
## 2021-02-18 NOTE — PROGRESS NOTES
[facility]    Progress Note - Camryn's Service        Date of Admission:  2/8/2021    Main Plans for Today  - NPO at midnight for urologic procedure tomorrow   - Hold Lovenox  - Repeat COVID19 test   - respiratory cares (duonebs, metanebs, vest therapy)  - PICC/IV abx teaching     Assessment & Plan       Bart Corea is a 60 year old male with PMH quadriplegia due to C4-C6 SCI, neurogenic bladder s/p suprapubic catheter, hx of nephrolithiasis who was admitted on 2/8/2021 autonomic dysreflexia in the setting of right renal calculus and UTI. Initially hypertensive at home, admitted for UTI, s/p right uretral stent placement, then post procedure hypotensive and meeting septic shock criteria requiring pressors. He was transferred from ICU to C on 2/10 and to the floor 2/16.       # Sepsis 2/2 complicated UTI, resolved  # Right renal calculus, obstructive s/p right ureter stent placement  # Bacteroides fragilis in blood culture (growth on 2nd day in 1/2 bcx), resolved  # Suprapubic catheter, chronic  On admission CT AP showed right renal calculus and hydronephrosis, UA indicitve of infection, back/abd pain c/w past UTIs/stones. He underwent right ureteral stent placement 2/8, transferred initially to the ICU with postoperative hypotension with c/f sepsis. Urine culture 2/08 growing Pseudomonas. The patient has now remained afebrile, off pressors since 2/10. Suprapubic cath changed 2/12. PICC placed 2/14 for long term antibiotics.   - Urology following, buddy recs   - Scheduled for definitive stone management 2/19  - IV tobramycin q48h x2 weeks (2/08-2/22) - final dose to be given 2/22 therefore still requires PICC teaching as expected discharge 2/20 or 2/21  - PO Flagyl 500 mg q8h for bacteremia (2/12-2/26)  - PTA oxybutynin 10 mg daily    Antibiotics  - PO Flagyl (2/12- 2/26)  - IV Tobramycin (2/9 - 2/22)  - IV Zosyn (2/8 -2/12)  - Linezolid (2/9 -2/10) -  for sepsis     # Acute hypercapnic and hypoxic respiratory failure, new O2 need, stable  # L hemidiaphragm paralysis, chronic, new diagnosis this admission   # L lower lobe atelectasis, chronic, worsening  # Left-sided pleural effusion, chronic s/p thoracentesis 2/16  # High pretest probability for sleep apnea  # Tobacco dependence  Patient with no prior baseline supplemental oxygen needs. Echocardiogram 2/08 with EF 55-60%. He has apneic events while sleeping, with evidence of LIZA on prior sleep study 2014. Has had improved respiratory status, no cough, fever, chills, though with persistent oxygen need while sleeping (desaturates to low 80s when sleeping on room air). AM VBG showed hypercarbia consistent with LIZA. Repeating an outpatient sleep study is extremely challenging given his quadriplegia and the current pandemic. CT chest on admission revealed left pleural effusion with pleural thickening, initially concerning for empyema however this was also seen on prior CT in 2018. Looking further back, it does appear he required thoracentesis in ~2009 at which time fluid was consistent with parapneumonic effusion. Repeat CT chest 2/12 showed small bilateral pleural effusions, adjacent bibasilar consolidations and near complete atelectasis of the LLL and sniff test confirmed diagnosis of left diaphragm hemiparalysis.  He underwent thoracentesis 2/16 with 550 mL transudative fluid based on lites criteria.  Differential includes hypoalbuminemia vs atelectasis, obstructive uropathy.   - Pulmonology consulted, appreciate recommendations   - bronchial drainage regimen: chest physiotherapy with metaneb, vest, flutter valve   - duoneb scheduled QID   - Bipap recommended, needs nasal pillow as mask prevents his ability to call for help given his baseline quadriplegia. Per RT, nasal pillow not available.     - Recommend outpatient sleep study, though inpatient work up has confirmed LIZA as discussed above   - Repeat chest CT in  "6-8 weeks (~3/22) due to smoking hx. If continued atelectasis/lobar collapse, consider bronchoscopy to evaluate for malignancy  - Overnight oximetry study ordered for tonight     # Autonomic dysreflexia  # Quadriplegia 2/2 C4-C6 SCI   # Hypervolemia  # Hypoalbuminemia  Fluctuating blood pressure at home (baseline ~110/70). Uses amlodipine PRN at home, typically a few times a year. Initially admitted to ICU 2/9 with symptomatic hypotension-shock, requiring pressor support. He continues to have intermittent hypotension, though with MAPs>50 and off dopamine since 2/10.  Pressures were consistently elevated to normal range over the past 1-2 days. Have been cautious with IVF given his overall hypervolemia and requiring Lasix x1 while in the ICU. TTE on 2/10 with small pericardial effusion, normal EF and cardiac motion.  He does not have a history of heart failure. He does have clinically significant hypervolemia 2/2 hypoalbuminemia and received a dose of IV Lasix 2/17 with generous response (5L UOP).   - Telemetry, q4h vitals  - Attempt elevation of legs if persistently hypotensive/MAPs <50  - Leg wraps   - No plans for further diuresis     # Sinus bradycardia - baseline 40s  He has chronic sinus bradycardia in the 40s, episode of more persistently in 30s and feeling \"off \" on 2/13. Cardiac Electrophysiology consulted for sinus bradycardia, no recommendations for permanent pacemaker placement at this time as it is unlikely to improve his quality of life or longevity unless he becomes more/consistently symptomatic.    # Hx insomnia  # Delirium   Notes indicate a history of insomnia, for which he started Ambien within the past year. More recent PCP notes mention decreasing the Ambien 10 to 5 mg, and wife mentions that his vivid dreams may have increased since starting this medication about a year ago. He also uses diazepam as an outpatient. Also mention of alcohol and tobacco use.   - PTA zolpidem 5 mg at bedtime " (decreased from PTA dose of 10 mg)  - PTA diazepam 10 mg daily   - melatonin at bedtime     # Leukopenia, resolved  # Thrombocytopenia, stable  Likely related to sepsis. No history of similar thrombocytopenia on chart review, however, when septic in the past did have leukopenia down to 3.6 (2017).   - trend CBC      # Spacticity, chronic  - PTA Baclofen 20 mg 4 times daily scheduled, additional PRN 2 times daily  - PTA diazepam 10 daily, additional PRN 5 mg every day     #Psych/Social Concerns   Apprehensive when discussing discharge, concerned about his wife having to take over all of the cares he has been receiving.   - Social work following      Diet: Regular Diet Adult  NPO for Medical/Clinical Reasons Except for: Meds  NPO per Anesthesia Guidelines for Procedure/Surgery Except for: Meds  - when on BiPAP  Fluids: PO  Lines: PIV x2  DVT Prophylaxis: Enoxaparin (Lovenox) subcutaneous - hold for urologic procedure    Amanda Catheter: not present suprapubic cath   Code Status: Full Code         Disposition Plan   Expected discharge: 2 - 3 days, recommended to prior living arrangement once adequate pain management/ tolerating PO medications and PICC teaching completed, recovered postoperatively, anticipate Saturday or Sunday.  Entered: Maxine Chowdhury MD 02/18/2021, 9:40 AM     The patient's care was discussed with the Attending Physician, Dr. Lawson.    Maxine Chowdhury MD  [facility]  Please see sign in/sign out for up to date coverage information  ______________________________________________________________________    Interval History   No acute events overnight. He received a dose of IV Lasix yesterday and had 5.5 L UOP for a net -4.9 L (admission net -19 L). No symptomatic bradycardia or hypotension. No fevers/chills. C/o headache which responded well to Tylenol.     Data reviewed today: I reviewed all medications, new labs and  imaging results over the last 24 hours.     Physical Exam   Vital Signs: Temp: 97.5  F (36.4  C) Temp src: Oral BP: 113/56 Pulse: 55   Resp: 20 SpO2: 96 % O2 Device: Nasal cannula Oxygen Delivery: 1 LPM  Weight: 205 lbs 14.55 oz     Physical Exam  Constitutional:       Appearance: He is obese.   HENT:      Mouth/Throat:      Mouth: Mucous membranes are moist.   Cardiovascular:      Rate and Rhythm: Regular rhythm. Bradycardia present.   Pulmonary:      Effort: Pulmonary effort is normal.   Abdominal:      General: Bowel sounds are normal. There is distension.   Musculoskeletal:      Right lower leg: Edema present.      Left lower leg: Edema present.      Comments: Wraps/compression stockings in place   Skin:     General: Skin is warm and dry.      Comments: Some slight bruising around the pt's bilateral toes and feet (Normal per pt's wife).   Neurological:      Mental Status: He is alert. Mental status is at baseline.   Psychiatric:         Mood and Affect: Mood normal.          Data   Recent Labs   Lab 02/18/21  0711 02/17/21  0541 02/16/21  0702   WBC 5.5 6.3 5.6   HGB 12.2* 12.0* 12.1*   MCV 96 97 97    157 143*   INR  --   --  1.06    137 136   POTASSIUM 3.8 4.2 4.0   CHLORIDE 100 101 99   CO2 35* 35* 34*   BUN 8 9 8   CR 0.26* 0.28* 0.35*   ANIONGAP 4 1* 2*   LIU 8.8 8.9 8.9   GLC 94 114* 107*   ALBUMIN  --  2.7*  --    PROTTOTAL  --  6.1* 6.0*   BILITOTAL  --  0.4  --    ALKPHOS  --  81  --    ALT  --  94*  --    AST  --  75*  --      No results found for this or any previous visit (from the past 24 hour(s)).

## 2021-02-18 NOTE — CONSULTS
Met with patient and wife Jodi at bedside for PICC teaching and IV tobramycin via home pump.    Jodi remembers doing this years ago and was happy to refresh IV flushing, home pump use, end cap changing and PICC cares. She demonstrated her understanding of all of these skills.    No further questions at this time    Literature given: Handwashing and Skin Care, Getting Ready for Your PICC, Caring for Your PICC at Home, Changing the End Cap, Flushing the Line with Heparin, Saline or Citrate,  Instructions for IV Medicine Ball,

## 2021-02-19 ENCOUNTER — APPOINTMENT (OUTPATIENT)
Dept: GENERAL RADIOLOGY | Facility: CLINIC | Age: 61
End: 2021-02-19
Attending: FAMILY MEDICINE
Payer: COMMERCIAL

## 2021-02-19 ENCOUNTER — ANESTHESIA (OUTPATIENT)
Dept: SURGERY | Facility: CLINIC | Age: 61
End: 2021-02-19
Payer: COMMERCIAL

## 2021-02-19 LAB
ANION GAP SERPL CALCULATED.3IONS-SCNC: 3 MMOL/L (ref 3–14)
BUN SERPL-MCNC: 8 MG/DL (ref 7–30)
CALCIUM SERPL-MCNC: 9.1 MG/DL (ref 8.5–10.1)
CHLORIDE SERPL-SCNC: 101 MMOL/L (ref 94–109)
CO2 SERPL-SCNC: 34 MMOL/L (ref 20–32)
CREAT SERPL-MCNC: 0.31 MG/DL (ref 0.66–1.25)
CRP SERPL-MCNC: 10 MG/L (ref 0–8)
ERYTHROCYTE [DISTWIDTH] IN BLOOD BY AUTOMATED COUNT: 14.5 % (ref 10–15)
GFR SERPL CREATININE-BSD FRML MDRD: >90 ML/MIN/{1.73_M2}
GLUCOSE BLDC GLUCOMTR-MCNC: 109 MG/DL (ref 70–99)
GLUCOSE SERPL-MCNC: 107 MG/DL (ref 70–99)
HCT VFR BLD AUTO: 41.1 % (ref 40–53)
HGB BLD-MCNC: 12.8 G/DL (ref 13.3–17.7)
MCH RBC QN AUTO: 30.8 PG (ref 26.5–33)
MCHC RBC AUTO-ENTMCNC: 31.1 G/DL (ref 31.5–36.5)
MCV RBC AUTO: 99 FL (ref 78–100)
PLATELET # BLD AUTO: 218 10E9/L (ref 150–450)
POTASSIUM SERPL-SCNC: 4 MMOL/L (ref 3.4–5.3)
RBC # BLD AUTO: 4.15 10E12/L (ref 4.4–5.9)
SODIUM SERPL-SCNC: 139 MMOL/L (ref 133–144)
WBC # BLD AUTO: 6.6 10E9/L (ref 4–11)

## 2021-02-19 PROCEDURE — 99232 SBSQ HOSP IP/OBS MODERATE 35: CPT | Mod: GC | Performed by: FAMILY MEDICINE

## 2021-02-19 PROCEDURE — 87070 CULTURE OTHR SPECIMN AEROBIC: CPT | Performed by: UROLOGY

## 2021-02-19 PROCEDURE — 999N000157 HC STATISTIC RCP TIME EA 10 MIN

## 2021-02-19 PROCEDURE — C1894 INTRO/SHEATH, NON-LASER: HCPCS | Performed by: UROLOGY

## 2021-02-19 PROCEDURE — 999N001017 HC STATISTIC GLUCOSE BY METER IP

## 2021-02-19 PROCEDURE — 255N000002 HC RX 255 OP 636: Performed by: UROLOGY

## 2021-02-19 PROCEDURE — 0TP98DZ REMOVAL OF INTRALUMINAL DEVICE FROM URETER, VIA NATURAL OR ARTIFICIAL OPENING ENDOSCOPIC: ICD-10-PCS | Performed by: UROLOGY

## 2021-02-19 PROCEDURE — 120N000002 HC R&B MED SURG/OB UMMC

## 2021-02-19 PROCEDURE — 85027 COMPLETE CBC AUTOMATED: CPT | Performed by: STUDENT IN AN ORGANIZED HEALTH CARE EDUCATION/TRAINING PROGRAM

## 2021-02-19 PROCEDURE — 710N000011 HC RECOVERY PHASE 1, LEVEL 3, PER MIN: Performed by: UROLOGY

## 2021-02-19 PROCEDURE — 250N000009 HC RX 250

## 2021-02-19 PROCEDURE — 999N000141 HC STATISTIC PRE-PROCEDURE NURSING ASSESSMENT: Performed by: UROLOGY

## 2021-02-19 PROCEDURE — 250N000009 HC RX 250: Performed by: STUDENT IN AN ORGANIZED HEALTH CARE EDUCATION/TRAINING PROGRAM

## 2021-02-19 PROCEDURE — 86140 C-REACTIVE PROTEIN: CPT | Performed by: STUDENT IN AN ORGANIZED HEALTH CARE EDUCATION/TRAINING PROGRAM

## 2021-02-19 PROCEDURE — 258N000003 HC RX IP 258 OP 636: Performed by: ANESTHESIOLOGY

## 2021-02-19 PROCEDURE — 250N000025 HC SEVOFLURANE, PER MIN: Performed by: UROLOGY

## 2021-02-19 PROCEDURE — C1769 GUIDE WIRE: HCPCS | Performed by: UROLOGY

## 2021-02-19 PROCEDURE — 0TC38ZZ EXTIRPATION OF MATTER FROM RIGHT KIDNEY PELVIS, VIA NATURAL OR ARTIFICIAL OPENING ENDOSCOPIC: ICD-10-PCS | Performed by: UROLOGY

## 2021-02-19 PROCEDURE — 250N000011 HC RX IP 250 OP 636: Performed by: STUDENT IN AN ORGANIZED HEALTH CARE EDUCATION/TRAINING PROGRAM

## 2021-02-19 PROCEDURE — 250N000013 HC RX MED GY IP 250 OP 250 PS 637: Performed by: STUDENT IN AN ORGANIZED HEALTH CARE EDUCATION/TRAINING PROGRAM

## 2021-02-19 PROCEDURE — 36592 COLLECT BLOOD FROM PICC: CPT | Performed by: STUDENT IN AN ORGANIZED HEALTH CARE EDUCATION/TRAINING PROGRAM

## 2021-02-19 PROCEDURE — 999N000179 XR SURGERY CARM FLUORO LESS THAN 5 MIN W STILLS: Mod: TC

## 2021-02-19 PROCEDURE — 94640 AIRWAY INHALATION TREATMENT: CPT

## 2021-02-19 PROCEDURE — 999N000105 HC STATISTIC NO DOCUMENTATION TO SUPPORT CHARGE

## 2021-02-19 PROCEDURE — 370N000017 HC ANESTHESIA TECHNICAL FEE, PER MIN: Performed by: UROLOGY

## 2021-02-19 PROCEDURE — C2617 STENT, NON-COR, TEM W/O DEL: HCPCS | Performed by: UROLOGY

## 2021-02-19 PROCEDURE — 250N000011 HC RX IP 250 OP 636: Performed by: ANESTHESIOLOGY

## 2021-02-19 PROCEDURE — 0T768DZ DILATION OF RIGHT URETER WITH INTRALUMINAL DEVICE, VIA NATURAL OR ARTIFICIAL OPENING ENDOSCOPIC: ICD-10-PCS | Performed by: UROLOGY

## 2021-02-19 PROCEDURE — 94640 AIRWAY INHALATION TREATMENT: CPT | Mod: 76

## 2021-02-19 PROCEDURE — 80048 BASIC METABOLIC PNL TOTAL CA: CPT | Performed by: STUDENT IN AN ORGANIZED HEALTH CARE EDUCATION/TRAINING PROGRAM

## 2021-02-19 PROCEDURE — 82365 CALCULUS SPECTROSCOPY: CPT | Performed by: UROLOGY

## 2021-02-19 PROCEDURE — 250N000011 HC RX IP 250 OP 636

## 2021-02-19 PROCEDURE — 87102 FUNGUS ISOLATION CULTURE: CPT | Performed by: UROLOGY

## 2021-02-19 PROCEDURE — 360N000083 HC SURGERY LEVEL 3 W/ FLUORO, PER MIN: Performed by: UROLOGY

## 2021-02-19 PROCEDURE — 272N000001 HC OR GENERAL SUPPLY STERILE: Performed by: UROLOGY

## 2021-02-19 PROCEDURE — 87106 FUNGI IDENTIFICATION YEAST: CPT | Performed by: UROLOGY

## 2021-02-19 DEVICE — STENT URETERAL PERCUFLEX PLUS 7FRX26CM M0061752730: Type: IMPLANTABLE DEVICE | Site: URETER | Status: FUNCTIONAL

## 2021-02-19 RX ORDER — LIDOCAINE 40 MG/G
CREAM TOPICAL
Status: DISCONTINUED | OUTPATIENT
Start: 2021-02-19 | End: 2021-02-20 | Stop reason: HOSPADM

## 2021-02-19 RX ORDER — NALOXONE HYDROCHLORIDE 0.4 MG/ML
0.4 INJECTION, SOLUTION INTRAMUSCULAR; INTRAVENOUS; SUBCUTANEOUS
Status: ACTIVE | OUTPATIENT
Start: 2021-02-19 | End: 2021-02-20

## 2021-02-19 RX ORDER — ONDANSETRON 2 MG/ML
4 INJECTION INTRAMUSCULAR; INTRAVENOUS EVERY 30 MIN PRN
Status: DISCONTINUED | OUTPATIENT
Start: 2021-02-19 | End: 2021-02-19 | Stop reason: HOSPADM

## 2021-02-19 RX ORDER — FUROSEMIDE 10 MG/ML
20 INJECTION INTRAMUSCULAR; INTRAVENOUS ONCE
Status: COMPLETED | OUTPATIENT
Start: 2021-02-19 | End: 2021-02-19

## 2021-02-19 RX ORDER — NITROGLYCERIN 10 MG/100ML
INJECTION INTRAVENOUS PRN
Status: DISCONTINUED | OUTPATIENT
Start: 2021-02-19 | End: 2021-02-19

## 2021-02-19 RX ORDER — SODIUM CHLORIDE, SODIUM LACTATE, POTASSIUM CHLORIDE, CALCIUM CHLORIDE 600; 310; 30; 20 MG/100ML; MG/100ML; MG/100ML; MG/100ML
INJECTION, SOLUTION INTRAVENOUS CONTINUOUS
Status: DISCONTINUED | OUTPATIENT
Start: 2021-02-19 | End: 2021-02-20 | Stop reason: HOSPADM

## 2021-02-19 RX ORDER — ONDANSETRON 4 MG/1
4 TABLET, ORALLY DISINTEGRATING ORAL EVERY 30 MIN PRN
Status: DISCONTINUED | OUTPATIENT
Start: 2021-02-19 | End: 2021-02-19 | Stop reason: HOSPADM

## 2021-02-19 RX ORDER — NICOTINE 21 MG/24HR
1 PATCH, TRANSDERMAL 24 HOURS TRANSDERMAL DAILY
Qty: 14 PATCH | Refills: 0 | Status: SHIPPED | OUTPATIENT
Start: 2021-02-20 | End: 2021-01-01

## 2021-02-19 RX ORDER — OLIVE OIL
OIL (ML) MISCELLANEOUS AT BEDTIME
Start: 2021-02-19 | End: 2021-01-01

## 2021-02-19 RX ORDER — NICOTINE 21 MG/24HR
1 PATCH, TRANSDERMAL 24 HOURS TRANSDERMAL DAILY
Status: DISCONTINUED | OUTPATIENT
Start: 2021-02-19 | End: 2021-02-20 | Stop reason: HOSPADM

## 2021-02-19 RX ORDER — ACETAMINOPHEN 325 MG/1
975 TABLET ORAL EVERY 8 HOURS PRN
Start: 2021-02-19 | End: 2021-01-01

## 2021-02-19 RX ORDER — POLYETHYLENE GLYCOL 3350 17 G
2 POWDER IN PACKET (EA) ORAL
Qty: 27 LOZENGE | Refills: 0 | Status: SHIPPED | OUTPATIENT
Start: 2021-02-19 | End: 2021-01-01

## 2021-02-19 RX ORDER — ALBUTEROL SULFATE 0.83 MG/ML
2.5 SOLUTION RESPIRATORY (INHALATION) EVERY 4 HOURS PRN
Qty: 100 ML | Refills: 0 | Status: SHIPPED | OUTPATIENT
Start: 2021-02-19 | End: 2021-01-01

## 2021-02-19 RX ORDER — FENTANYL CITRATE 50 UG/ML
INJECTION, SOLUTION INTRAMUSCULAR; INTRAVENOUS PRN
Status: DISCONTINUED | OUTPATIENT
Start: 2021-02-19 | End: 2021-02-19

## 2021-02-19 RX ORDER — HYDRALAZINE HYDROCHLORIDE 20 MG/ML
2.5-5 INJECTION INTRAMUSCULAR; INTRAVENOUS EVERY 10 MIN PRN
Status: DISCONTINUED | OUTPATIENT
Start: 2021-02-19 | End: 2021-02-19 | Stop reason: HOSPADM

## 2021-02-19 RX ORDER — POLYETHYLENE GLYCOL 3350 17 G
2 POWDER IN PACKET (EA) ORAL
Status: DISCONTINUED | OUTPATIENT
Start: 2021-02-19 | End: 2021-02-20 | Stop reason: HOSPADM

## 2021-02-19 RX ORDER — DEXAMETHASONE SODIUM PHOSPHATE 4 MG/ML
INJECTION, SOLUTION INTRA-ARTICULAR; INTRALESIONAL; INTRAMUSCULAR; INTRAVENOUS; SOFT TISSUE PRN
Status: DISCONTINUED | OUTPATIENT
Start: 2021-02-19 | End: 2021-02-19

## 2021-02-19 RX ORDER — HYDROMORPHONE HYDROCHLORIDE 1 MG/ML
.3-.5 INJECTION, SOLUTION INTRAMUSCULAR; INTRAVENOUS; SUBCUTANEOUS EVERY 5 MIN PRN
Status: DISCONTINUED | OUTPATIENT
Start: 2021-02-19 | End: 2021-02-19 | Stop reason: HOSPADM

## 2021-02-19 RX ORDER — METRONIDAZOLE 500 MG/1
500 TABLET ORAL EVERY 8 HOURS
Qty: 18 TABLET | Refills: 0 | Status: SHIPPED | OUTPATIENT
Start: 2021-02-20 | End: 2021-02-26

## 2021-02-19 RX ORDER — FENTANYL CITRATE 50 UG/ML
25-50 INJECTION, SOLUTION INTRAMUSCULAR; INTRAVENOUS
Status: DISCONTINUED | OUTPATIENT
Start: 2021-02-19 | End: 2021-02-19 | Stop reason: HOSPADM

## 2021-02-19 RX ORDER — NALOXONE HYDROCHLORIDE 0.4 MG/ML
0.2 INJECTION, SOLUTION INTRAMUSCULAR; INTRAVENOUS; SUBCUTANEOUS
Status: ACTIVE | OUTPATIENT
Start: 2021-02-19 | End: 2021-02-20

## 2021-02-19 RX ORDER — PROPOFOL 10 MG/ML
INJECTION, EMULSION INTRAVENOUS PRN
Status: DISCONTINUED | OUTPATIENT
Start: 2021-02-19 | End: 2021-02-19

## 2021-02-19 RX ORDER — ONDANSETRON 2 MG/ML
INJECTION INTRAMUSCULAR; INTRAVENOUS PRN
Status: DISCONTINUED | OUTPATIENT
Start: 2021-02-19 | End: 2021-02-19

## 2021-02-19 RX ORDER — SODIUM CHLORIDE, SODIUM LACTATE, POTASSIUM CHLORIDE, CALCIUM CHLORIDE 600; 310; 30; 20 MG/100ML; MG/100ML; MG/100ML; MG/100ML
INJECTION, SOLUTION INTRAVENOUS CONTINUOUS
Status: DISCONTINUED | OUTPATIENT
Start: 2021-02-19 | End: 2021-02-19 | Stop reason: HOSPADM

## 2021-02-19 RX ORDER — ZOLPIDEM TARTRATE 10 MG/1
TABLET ORAL
Qty: 30 TABLET | Refills: 5
Start: 2021-02-19 | End: 2021-08-09

## 2021-02-19 RX ORDER — GLYCOPYRROLATE 0.2 MG/ML
INJECTION, SOLUTION INTRAMUSCULAR; INTRAVENOUS PRN
Status: DISCONTINUED | OUTPATIENT
Start: 2021-02-19 | End: 2021-02-19

## 2021-02-19 RX ADMIN — NITROGLYCERIN 50 MCG: 10 INJECTION INTRAVENOUS at 08:46

## 2021-02-19 RX ADMIN — HYDROMORPHONE HYDROCHLORIDE 0.5 MG: 1 INJECTION, SOLUTION INTRAMUSCULAR; INTRAVENOUS; SUBCUTANEOUS at 10:40

## 2021-02-19 RX ADMIN — Medication 1 MG: at 22:41

## 2021-02-19 RX ADMIN — NITROGLYCERIN 100 MCG: 10 INJECTION INTRAVENOUS at 08:48

## 2021-02-19 RX ADMIN — FUROSEMIDE 20 MG: 10 INJECTION, SOLUTION INTRAMUSCULAR; INTRAVENOUS at 17:01

## 2021-02-19 RX ADMIN — METRONIDAZOLE 500 MG: 500 TABLET ORAL at 16:13

## 2021-02-19 RX ADMIN — ONDANSETRON 4 MG: 2 INJECTION INTRAMUSCULAR; INTRAVENOUS at 09:46

## 2021-02-19 RX ADMIN — ALBUTEROL SULFATE 2.5 MG: 2.5 SOLUTION RESPIRATORY (INHALATION) at 17:29

## 2021-02-19 RX ADMIN — DIAZEPAM 10 MG: 5 TABLET ORAL at 22:41

## 2021-02-19 RX ADMIN — DOCUSATE SODIUM 100 MG: 100 CAPSULE, LIQUID FILLED ORAL at 12:31

## 2021-02-19 RX ADMIN — SODIUM CHLORIDE, POTASSIUM CHLORIDE, SODIUM LACTATE AND CALCIUM CHLORIDE: 600; 310; 30; 20 INJECTION, SOLUTION INTRAVENOUS at 10:30

## 2021-02-19 RX ADMIN — PROPOFOL 60 MG: 10 INJECTION, EMULSION INTRAVENOUS at 09:18

## 2021-02-19 RX ADMIN — NITROGLYCERIN 100 MCG: 10 INJECTION INTRAVENOUS at 09:40

## 2021-02-19 RX ADMIN — ROCURONIUM BROMIDE 50 MG: 10 INJECTION INTRAVENOUS at 07:51

## 2021-02-19 RX ADMIN — ROCURONIUM BROMIDE 10 MG: 10 INJECTION INTRAVENOUS at 08:59

## 2021-02-19 RX ADMIN — NITROGLYCERIN 50 MCG: 10 INJECTION INTRAVENOUS at 09:23

## 2021-02-19 RX ADMIN — FENTANYL CITRATE 50 MCG: 50 INJECTION, SOLUTION INTRAMUSCULAR; INTRAVENOUS at 07:46

## 2021-02-19 RX ADMIN — SENNOSIDES 2 TABLET: 8.6 TABLET, FILM COATED ORAL at 13:52

## 2021-02-19 RX ADMIN — ALBUTEROL SULFATE 2.5 MG: 2.5 SOLUTION RESPIRATORY (INHALATION) at 20:29

## 2021-02-19 RX ADMIN — SODIUM CHLORIDE, POTASSIUM CHLORIDE, SODIUM LACTATE AND CALCIUM CHLORIDE: 600; 310; 30; 20 INJECTION, SOLUTION INTRAVENOUS at 10:00

## 2021-02-19 RX ADMIN — PROPOFOL 40 MG: 10 INJECTION, EMULSION INTRAVENOUS at 08:22

## 2021-02-19 RX ADMIN — PROPOFOL 30 MG: 10 INJECTION, EMULSION INTRAVENOUS at 08:33

## 2021-02-19 RX ADMIN — BACLOFEN 20 MG: 20 TABLET ORAL at 20:16

## 2021-02-19 RX ADMIN — NITROGLYCERIN 50 MCG: 10 INJECTION INTRAVENOUS at 08:56

## 2021-02-19 RX ADMIN — SUGAMMADEX 200 MG: 100 INJECTION, SOLUTION INTRAVENOUS at 10:02

## 2021-02-19 RX ADMIN — DEXAMETHASONE SODIUM PHOSPHATE 10 MG: 4 INJECTION, SOLUTION INTRA-ARTICULAR; INTRALESIONAL; INTRAMUSCULAR; INTRAVENOUS; SOFT TISSUE at 08:06

## 2021-02-19 RX ADMIN — OXYCODONE HYDROCHLORIDE AND ACETAMINOPHEN 1000 MG: 500 TABLET ORAL at 13:52

## 2021-02-19 RX ADMIN — PROPOFOL 130 MG: 10 INJECTION, EMULSION INTRAVENOUS at 07:50

## 2021-02-19 RX ADMIN — MIDAZOLAM 1 MG: 1 INJECTION INTRAMUSCULAR; INTRAVENOUS at 07:33

## 2021-02-19 RX ADMIN — PROPOFOL 50 MG: 10 INJECTION, EMULSION INTRAVENOUS at 08:42

## 2021-02-19 RX ADMIN — NITROGLYCERIN 100 MCG: 10 INJECTION INTRAVENOUS at 09:30

## 2021-02-19 RX ADMIN — PROPOFOL 40 MG: 10 INJECTION, EMULSION INTRAVENOUS at 08:39

## 2021-02-19 RX ADMIN — SODIUM CHLORIDE, PRESERVATIVE FREE 5 ML: 5 INJECTION INTRAVENOUS at 16:14

## 2021-02-19 RX ADMIN — GLYCOPYRROLATE 0.2 MG: 0.2 INJECTION, SOLUTION INTRAMUSCULAR; INTRAVENOUS at 08:24

## 2021-02-19 RX ADMIN — ROCURONIUM BROMIDE 10 MG: 10 INJECTION INTRAVENOUS at 08:27

## 2021-02-19 RX ADMIN — BACLOFEN 20 MG: 20 TABLET ORAL at 16:13

## 2021-02-19 RX ADMIN — OXYBUTYNIN 10 MG: 10 TABLET, FILM COATED, EXTENDED RELEASE ORAL at 13:52

## 2021-02-19 RX ADMIN — SODIUM CHLORIDE, POTASSIUM CHLORIDE, SODIUM LACTATE AND CALCIUM CHLORIDE: 600; 310; 30; 20 INJECTION, SOLUTION INTRAVENOUS at 07:46

## 2021-02-19 RX ADMIN — FENTANYL CITRATE 50 MCG: 50 INJECTION, SOLUTION INTRAMUSCULAR; INTRAVENOUS at 08:33

## 2021-02-19 RX ADMIN — BACLOFEN 20 MG: 20 TABLET ORAL at 12:28

## 2021-02-19 ASSESSMENT — ACTIVITIES OF DAILY LIVING (ADL)
ADLS_ACUITY_SCORE: 31

## 2021-02-19 NOTE — PROGRESS NOTES
Care Management Discharge Note    Length of Stay (days): 11    Expected Discharge Date: 02/20/21     Concerns to be Addressed:  None    Patient plan of care discussed at interdisciplinary rounds: Yes    Discharge Disposition:  Home w/spouse     Discharge Services:  [facility]: RN    Discharge DME:  New nighttime oxygen [facility] (patient does not need oxygen during the daytime or for transport home)    Patient/family educated on Medicare website which has current facility and service quality ratings:  yes  Education Provided on the Discharge Plan:  Patient and Wife  Patient/Family in Agreement with the Plan:  Yes  Referrals Placed by CM:  [facility] resumption orders placed.  Private pay costs discussed: N/A    Additional Information:  [facility] no longer needed, referral cancelled. PICC to be removed after last dose of tobramycin infused tomorrow 2/20.       Melva Muñoz RN, BSN, PHN  Care Coordinator  [facility]  Direct phone: [phone]  Pager: [phone]    To contact the on-call Weekend Care Coordination Team please page [phone]

## 2021-02-19 NOTE — PLAN OF CARE
RT note:  Patient remains on 2 liters NC tolerating well. B.S diminished , clear upper anterior lobes. SPO2 95-97%. RR 16-20 Albuterol neb with MetaNeb was given x1. Patient was not available for treatment x2. He was in the OR for a procedure.   P: Will continue to follow and monitor.

## 2021-02-19 NOTE — PROGRESS NOTES
[facility]    Progress Note - Camryn's Service        Date of Admission:  2/8/2021    Main Plans for Today  - NPO for urologic procedure today  - Hold Lovenox  - respiratory cares (duonebs, metanebs, vest therapy)    Assessment & Plan       Bart Corea is a 60 year old male with PMH quadriplegia due to C4-C6 SCI, neurogenic bladder s/p suprapubic catheter, hx of nephrolithiasis who was admitted on 2/8/2021 autonomic dysreflexia in the setting of right renal calculus and UTI. Initially hypertensive at home, admitted for UTI, s/p right uretral stent placement, then post procedure hypotensive and meeting septic shock criteria requiring pressors. He was transferred from ICU to [facility] on 2/10 and to the floor 2/16.       # Sepsis 2/2 complicated UTI, resolved  # Right renal calculus, obstructive s/p right ureter stent placement  # Bacteroides fragilis in blood culture (growth on 2nd day in 1/2 bcx), resolved  # Suprapubic catheter, chronic  On admission CT AP showed right renal calculus and hydronephrosis, UA indicitve of infection, back/abd pain c/w past UTIs/stones. He underwent right ureteral stent placement 2/8, transferred initially to the ICU with postoperative hypotension with c/f sepsis. Urine culture 2/08 growing Pseudomonas. The patient has now remained afebrile, off pressors since 2/10. Suprapubic cath changed 2/12. PICC placed 2/14 for long term antibiotics.   - OR with Urology today for definitive stone management  - IV tobramycin q48h x2 weeks (2/08-2/22) - update from [facility] that final dose to be given 2/20, plan to remove PICC tomorrow after dose and discharge home  - PO Flagyl 500 mg q8h for bacteremia (2/12-2/26)  - PTA oxybutynin 10 mg daily    Antibiotics  - PO Flagyl (2/12- 2/26)  - IV Tobramycin (2/9 - 2/20)  - IV Zosyn (2/8 -2/12)  - Linezolid (2/9 -2/10) - for sepsis     # Acute hypercapnic and hypoxic respiratory failure, new O2 need,  stable  # L hemidiaphragm paralysis, chronic, new diagnosis this admission   # L lower lobe atelectasis, chronic, worsening  # Left-sided pleural effusion, chronic s/p thoracentesis 2/16  # Tobacco dependence  Patient with no prior baseline supplemental oxygen needs. Echocardiogram 2/08 with EF 55-60%. He has apneic events while sleeping, with evidence of LIZA on prior sleep study 2014. Has had improved respiratory status, no cough, fever, chills, though with persistent oxygen need while sleeping (based on overnight oximetry study). CT chest on admission revealed left pleural effusion with pleural thickening, initially concerning for empyema however this was also seen on prior CT in 2018. Looking further back, it does appear he required thoracentesis in ~2009 at which time fluid was consistent with parapneumonic effusion. Repeat CT chest 2/12 showed small bilateral pleural effusions, adjacent bibasilar consolidations and near complete atelectasis of the LLL and sniff test confirmed diagnosis of left diaphragm hemiparalysis.  He underwent thoracentesis 2/16 with 550 mL transudative fluid based on Light's criteria.  Differential includes hypoalbuminemia vs atelectasis.   - bronchial drainage regimen: chest physiotherapy with metaneb, vest, flutter valve  - duoneb scheduled QID  - Repeat chest CT in 6-8 weeks (~3/22) due to smoking hx. If continued atelectasis/lobar collapse, consider bronchoscopy to evaluate for malignancy    Oxygen Documentation:   I certify that this patient, Bart Corea has been under my care (or a nurse practitioner or physican's assistant working with me). This is the face-to-face encounter for oxygen medical necessity.      Bart Corea is now in a chronic stable state and continues to require supplemental oxygen. Patient has continued oxygen desaturation due to Bronchiectasis J47.9  Chronic Respiratory Failure with Hypoxia J93.11    Alternative treatment(s) tried or considered and  deemed clinically infective for treatment of Bronchiectasis J47.9  Chronic Respiratory Failure with Hypoxia J93.11  include nebulizers, inhalers, steroids and pulmonary toileting.  If portability is ordered, is the patient mobile within the home? Yes - in wheelchair    **Patients who qualify for home O2 coverage under the CMS guidelines require ABG tests or O2 sat readings obtained closest to, but no earlier than 2 days prior to the discharge, as evidence of the need for home oxygen therapy. Testing must be performed while patient is in the chronic stable state. See notes for O2 sats.**      # High pretest probability for sleep apnea  Witnessed apneic events with hypoxia noted while sleeping. AM VBG showed hypercarbia consistent with LIZA. Repeating an outpatient sleep study is extremely challenging given his quadriplegia and the current pandemic. It is the recommendation of his primary team, Pulmonology and RT that he have BiPAP at night.  - Bipap recommended, needs nasal pillow as mask prevents his ability to call for help given his baseline quadriplegia. Per RT, nasal pillow not available.    - Recommend outpatient sleep study, though inpatient work up has confirmed LIZA as discussed above    # Autonomic dysreflexia  # Quadriplegia 2/2 C4-C6 SCI   # Hypervolemia  # Hypoalbuminemia  Fluctuating blood pressure at home (baseline ~110/70). Uses amlodipine PRN at home, typically a few times a year. Initially admitted to ICU 2/9 with symptomatic hypotension-shock, requiring pressor support. He continues to have intermittent hypotension, though with MAPs>50 and off dopamine since 2/10.  Pressures were consistently elevated to normal range over the past 1-2 days. Have been cautious with IVF given his overall hypervolemia and requiring Lasix x1 while in the ICU. TTE on 2/10 with small pericardial effusion, normal EF and cardiac motion.  He does not have a history of heart failure. He does have clinically significant  "hypervolemia 2/2 hypoalbuminemia and received a dose of IV Lasix 2/17 with generous response (5L UOP).   - Telemetry, q4h vitals  - Attempt elevation of legs if persistently hypotensive/MAPs <50  - Leg wraps   - Consider additional dose of IV Lasix later today if blood pressures remain elevated/stable postoperatively     # Sinus bradycardia - baseline 40s  He has chronic sinus bradycardia in the 40s, episode of more persistently in 30s and feeling \"off \" on 2/13. Cardiac Electrophysiology consulted for sinus bradycardia, no recommendations for permanent pacemaker placement at this time as it is unlikely to improve his quality of life or longevity unless he becomes more/consistently symptomatic.    # Hx insomnia  # Delirium   Notes indicate a history of insomnia, for which he started Ambien within the past year. More recent PCP notes mention decreasing the Ambien 10 to 5 mg, and wife mentions that his vivid dreams may have increased since starting this medication about a year ago. He also uses diazepam as an outpatient. Also mention of alcohol and tobacco use.   - PTA zolpidem 5 mg at bedtime (decreased from PTA dose of 10 mg)  - PTA diazepam 10 mg daily   - melatonin at bedtime     # Leukopenia, resolved  # Thrombocytopenia, stable  Likely related to sepsis. No history of similar thrombocytopenia on chart review, however, when septic in the past did have leukopenia down to 3.6 (2017).   - trend CBC      # Spacticity, chronic  - PTA Baclofen 20 mg 4 times daily scheduled, additional PRN 2 times daily  - PTA diazepam 10 daily, additional PRN 5 mg every day        Diet: NPO per Anesthesia Guidelines for Procedure/Surgery Except for: Meds  - when on BiPAP  Fluids: PO  Lines: PIV x2  DVT Prophylaxis: Enoxaparin (Lovenox) subcutaneous - hold for urologic procedure    Amanda Catheter: not present suprapubic cath   Code Status: Full Code         Disposition Plan   Expected discharge: 2 - 3 days, recommended to prior living " arrangement once adequate pain management/ tolerating PO medications and PICC teaching completed, recovered postoperatively, anticipate Saturday or Sunday.  Entered: Maxine Chowdhury MD 02/19/2021, 7:01 AM     The patient's care was discussed with the Attending Physician, Dr. Lawson.    Maxine Chowdhury MD  [facility]  Please see sign in/sign out for up to date coverage information  ______________________________________________________________________    Interval History   No acute events overnight. He remained NPO after midnight in preparation for OR today. Patient seen postoperatively, reports feeling well, denies significant pain. No chest pain or shortness of breath. Is annoyed by the capno nasal cannula. Still feeling like his feet and belly are quite swollen.     Data reviewed today: I reviewed all medications, new labs and imaging results over the last 24 hours.     Physical Exam   Vital Signs: Temp: 97.6  F (36.4  C) Temp src: Oral BP: 101/46 Pulse: (!) 48   Resp: 16 SpO2: 97 % O2 Device: Nasal cannula Oxygen Delivery: 1 LPM  Weight: 205 lbs 14.55 oz     Physical Exam  Constitutional:       Appearance: He is obese.   Cardiovascular:      Rate and Rhythm: Regular rhythm. Bradycardia present.   Pulmonary:      Effort: Pulmonary effort is normal.   Abdominal:      General: There is distension.   Musculoskeletal:      Right lower leg: Edema present.      Left lower leg: Edema present.      Comments: Left foot swelling greater than right   Skin:     Comments: Some slight bruising around the pt's bilateral toes and feet (baseline per pt's wife).   Neurological:      Mental Status: He is alert. Mental status is at baseline.   Psychiatric:         Mood and Affect: Mood normal.          Data   Recent Labs   Lab 02/19/21  0639 02/18/21  0711 02/17/21  0541 02/16/21  0702   WBC 6.6 5.5 6.3 5.6   HGB 12.8* 12.2* 12.0* 12.1*   MCV 99 96 97  97    203 157 143*   INR  --   --   --  1.06    139 137 136   POTASSIUM 4.0 3.8 4.2 4.0   CHLORIDE 101 100 101 99   CO2 34* 35* 35* 34*   BUN 8 8 9 8   CR 0.31* 0.26* 0.28* 0.35*   ANIONGAP 3 4 1* 2*   LIU 9.1 8.8 8.9 8.9   * 94 114* 107*   ALBUMIN  --   --  2.7*  --    PROTTOTAL  --   --  6.1* 6.0*   BILITOTAL  --   --  0.4  --    ALKPHOS  --   --  81  --    ALT  --   --  94*  --    AST  --   --  75*  --      No results found for this or any previous visit (from the past 24 hour(s)).

## 2021-02-19 NOTE — OP NOTE
OPERATIVE REPORT    PREOPERATIVE DIAGNOSIS:  Right-sided kidney stone    POSTOPERATIVE DIAGNOSIS:  Same    PROCEDURES PERFORMED:   1. Cystourethroscopy  2. Flexible ureteroscopy  3. Right  retrograde pyelogram with interpretation of intraoperative fluoroscopic imaging  4. Holmium laser lithotripsy with basket stone extraction  5. Right ureteral stent exchange  6. SPT exchange     STAFF SURGEON: Dr. Anup MD  RESIDENT(S): Jason Brown MD  ANESTHESIA: General    ESTIMATED BLOOD LOSS: 1 cc  DRAINS/TUBES: 7 Tamazight x 26cm double-J ureteral stent    IV FLUIDS: Please see dictated anesthesia record  COMPLICATIONS: None.   SPECIMEN: Stones for analysis and culture   SIGNIFICANT FINDINGS:   1. Patient is stone free in right upper tract  2. Proximal curl of the ureteral stent seen in the renal pelvis under fluoroscopy and distal curl seen in the bladder under direct vision.   3. Tortuous proximal ureter which appeared very edematous and inflamed, stone identified and was shiny in appearance (consisent with Brushite stones) with overlying infectious debris    BRIEF OPERATIVE INDICATIONS:  Bart Corea is a 60 year old male with PMH quadriplegia due to C4-C6 SCI, neurogenic bladder s/p suprapubic catheter, hx of nephrolithiasis who was admitted on 2/8/2021 autonomic dysreflexia in the setting of right renal calculus and UTI. He subsequently underwent stent placement with post-operative course complicated by sepsis with ICU admission.     After a discussion of all risks, benefits, and alternatives, the patient elected to proceed with definitive stone management. The patient understands the potential need for more than one procedure to eliminate all stone burden.     DESCRIPTION OF PROCEDURE:  After informed consent was obtained, the patient was transported to the operating room & placed supine on the table. After adequate anesthesia was induced, the patient was placed in lithotomy and prepped and draped in the usual  sterile fashion. A timeout was taken to confirm correct patient, procedure and laterality. Pre-operative IV antibiotics were administered.     A 22-Setswana rigid cystoscope was inserted into a well-lubricated urethra. The urethra was unremarkable without stricture. The previous indwelling ureteral stent was identified and removed with the flexible stent grasper to the level of the urethral meatus. A sensor wire was advanced through the stent up to the renal pelvis under fluoroscopic guidance and previous stent discarded. We then advanced a flexible ureteroscope over the wire into the renal pelvis and performed pull back ureteroscopy with no identification of additional stone.     We then attempted to advance the ureteroscope back up the ureter however we were unable to clearly identify the orifice. We subsequently proceeded with the rigid cystoscope and cannulated the ureter with a 5-Fr open ended catheter and attempted to advance a sensor wire into the renal pelvis. This was difficult due to the wire being unable to smoothly pass the the renal pelvis, presumably 2/2 tortuosity of the ureter. We were eventually able to advance a glide wire to the renal pelvis and the 5-Fr was advanced over this to the renal pelvis. We then performed a retrograde pyelogram which did not demonstrate any hydronephrosis, filling defects or extravasation.     We then replaced a sensor wire, withdrew the catheter and advanced an 11/13 ureteral access sheath over the wire to the proximal ureter. We then proceeded with flexible ureteroscopy and advanced the ureteroscope into the renal pelvis with some difficulty due to the tortuosity of the proximal ureter. Additionally, the ureter also appeared very edematous and inflammed. We identified the stone in the renal pelvis covered by what appeared to be an infectious debris. The stone itself was shiny in appearance, consistent with probable brushite stone. A 200 micron laser fiber was used at a  setting of 0.8 J and 8 Hz and lithotripsy was performed. A Halo basket was used to remove all fragments greater than 1 mm. One stone fragment was sent off for analysis and another for culture. Pullback ureteroscopy was performed and showed no retained stone fragments or significant ureteral injury    A 7Fr x 26-cm double-J stent was advanced over the Sensor wire, and a good proximal curl was seen in the renal pelvis on fluoroscopy and the distal curl was seen in the bladder. The bladder was drained. We subsequently exchanged his 16-Fr SPT.    The patient tolerated the procedure well and there were no apparent complications. The patient  was transported to the postanesthesia care unit in stable condition.     POSTOP PLAN:  -May transfer from PACU back to medicine once meeting criteria   -Stent attached on a string and can be pulled in 10 days   -Continue antibiotics per primary     I, Bart Hebert, was present and participatory for the entirety of the procedure

## 2021-02-19 NOTE — PLAN OF CARE
Pt A&O. VSS except for bradycardia, HR 40s-60s. Desatted to 80% on RA. Now on 1 L O2 NC satting in the mid 90s. Oximetry study overnight. Lift/A2. Repo q2h. Quadriplegic. Denies pain. Tele- sinus ronald. L PICC-SL. Suprapubic catheter, good output. 1 smear like BM. Sacrum mepilex on for prevention. Shampoo hair, chlorhexidine wipes done x2. Pre-op checklist done. NPO since MN for stone removal this AM at 0730. Pt went to OR. Report given to PACU RN.

## 2021-02-19 NOTE — DISCHARGE SUMMARY
Attestation:  This patient has been seen and evaluated by me, Chio Lawson DO on 2/20/21.  I saw and discussed the case with the primary resident and the care team. I agree with the findings and plan in this note. I have reviewed today's vital signs, medications, laboratory results.     Chio Lawson DO   [facility]  Discharge Summary - Medicine & Pediatrics       Date of Admission:  2/8/2021  Date of Discharge:  2/20/2021  Discharging Provider: Renny  Discharge Service: [facility]    Discharge Diagnoses   # Sepsis 2/2 complicated catheter associated UTI, resolved  # Right renal calculus, obstructive s/p right ureter stent placement  # Bacteroides fragilis in blood culture (growth on 2nd day in 1/2 bcx), resolved  # Suprapubic catheter  # Acute hypercapnic and hypoxic respiratory failure, new O2 need, stable  # L hemidiaphragm paralysis, chronic, new diagnosis this admission   # L lower lobe atelectasis, chronic, worsening  # Left-sided pleural effusion, chronic s/p thoracentesis 2/16  # Tobacco dependence  # High pretest probability for sleep apnea  # Autonomic dysreflexia  # Quadriplegia 2/2 C4-C6 SCI   # Hypervolemia  # Hypoalbuminemia  # Sinus bradycardia  # Leukopenia, resolved  # Thrombocytopenia, stable    Follow-ups Needed After Discharge   - PCP within 1 week for hospital follow up  - Need repeat CT in 6-8 weeks (~3/22)  - Recommend outpatient sleep study, would benefit from BiPAP with nasal pillow  - HHRN to remove stent POD10 (3/1)  - urology follow-up, they will arrange    Unresulted Labs Ordered in the Past 30 Days of this Admission     Date and Time Order Name Status Description    2/19/2021 0914 Stone analysis In process     2/19/2021 0911 Stone analysis In process     2/19/2021 0911 Miscellaneous Culture Aerobic Bacterial Preliminary     2/19/2021 0911 Fungus Culture, non-blood Preliminary     2/15/2021 1421 Anaerobic bacterial  culture Preliminary     2/15/2021 1421 Fluid Culture Aerobic Bacterial Preliminary       These results will be followed up by Urology    Discharge Disposition   Discharged to home  Condition at discharge: Stable    Hospital Course   Bart Corea is a 60 year old male with PMH quadriplegia due to C4-C6 SCI, neurogenic bladder s/p suprapubic catheter, hx of nephrolithiasis who was admitted on 2/8/2021 autonomic dysreflexia in the setting of right renal calculus and UTI. Initially hypertensive at home, admitted for UTI, s/p right uretral stent placement, then post procedure hypotensive and meeting septic shock criteria requiring pressors. He was transferred from ICU to [facility] on 2/10 and to the floor 2/16.       The following problems were addressed during his hospitalization:     # Sepsis 2/2 complicated UTI, resolved  #  Pseudomonas UTI, urinary catheter associated  # Right renal calculus, obstructive s/p right ureter stent placement  # Bacteroides fragilis in blood culture (growth on 2nd day in 1/2 bcx), resolved  # Suprapubic catheter, chronic  On admission CT A/P showed right renal calculus and hydronephrosis, UA indicitve of infection, back/abdominal pain c/w past UTIs/stones. He underwent right ureteral stent placement 2/8, transferred initially to the ICU with postoperative hypotension with c/f sepsis. Urine culture 2/08 growing Pseudomonas. He has now remained afebrile, off pressors since 2/10. He went back to the OR 2/19/2021 for definitive stone management with cystoureteroscopy, stent exchange, suprapubic catheter exchange with uncomplicated postoperative course. PICC placed 2/14 for long term antibiotics, removed following final dose of IV tobramycin (2/08-2/20/2021).   - PO Flagyl 500 mg q8h for bacteremia (2/12-2/26)  - PTA oxybutynin 10 mg daily  - HHRN to remove stent POD10 (3/1)    Antibiotics  - PO Flagyl (2/12- 2/26)  - IV Tobramycin (2/9 - 2/20)  - IV Zosyn (2/8 -2/12)  - Linezolid (2/9 -2/10) -  for sepsis    # Acute hypercapnic and hypoxic respiratory failure, new O2 need, stable  # L hemidiaphragm paralysis, chronic, new diagnosis this admission   # L lower lobe atelectasis, chronic, worsening  # Left-sided pleural effusion, chronic s/p thoracentesis 2/16  # Tobacco dependence  Patient with no prior baseline supplemental oxygen needs. Echocardiogram 2/08 with EF 55-60%. Has had improved respiratory status, no cough, fever, chills, though with persistent oxygen need while sleeping (based on overnight oximetry study). CT chest on admission revealed left pleural effusion with pleural thickening, initially concerning for empyema however this was also seen on prior CT in 2018. Looking further back, it does appear he required thoracentesis in ~2009 at which time fluid was consistent with parapneumonic effusion. Repeat CT chest 2/12 showed small bilateral pleural effusions, adjacent bibasilar consolidations and near complete atelectasis of the LLL and sniff test confirmed diagnosis of left diaphragm hemiparalysis. He underwent thoracentesis 2/16 with 550 mL transudative fluid based on Light's criteria. Differential includes hypoalbuminemia vs atelectasis.  - Home O2   - Bronchial drainage regimen: chest physiotherapy with metaneb, vest, flutter valve  - Albuterol nebs q4h PRN   - Repeat chest CT in 6-8 weeks (~3/22) due to smoking hx. If continued atelectasis/lobar collapse, consider bronchoscopy to evaluate for malignancy     Oxygen Documentation:   I certify that this patient, Bart Corea has been under my care (or a nurse practitioner or physican's assistant working with me). This is the face-to-face encounter for oxygen medical necessity.       Bart Corea is now in a chronic stable state and continues to require supplemental oxygen. Patient has continued oxygen desaturation due to Bronchiectasis J47.9  Chronic Respiratory Failure with Hypoxia J93.11     Alternative treatment(s) tried or  considered and deemed clinically infective for treatment of Bronchiectasis J47.9  Chronic Respiratory Failure with Hypoxia J93.11  include nebulizers, inhalers, steroids and pulmonary toileting.  If portability is ordered, is the patient mobile within the home? Yes - in wheelchair     **Patients who qualify for home O2 coverage under the CMS guidelines require ABG tests or O2 sat readings obtained closest to, but no earlier than 2 days prior to the discharge, as evidence of the need for home oxygen therapy. Testing must be performed while patient is in the chronic stable state. See notes for O2 sats.**       # High pretest probability for sleep apnea  He has apneic events while sleeping, with evidence of LIZA on prior sleep study 2014. Witnessed apneic events with hypoxia noted while sleeping. AM VBG showed hypercarbia consistent with LIZA. Repeating an outpatient sleep study is extremely challenging given his quadriplegia and the current pandemic. It is the recommendation of his primary team, Pulmonology and RT that he have BiPAP at night. He needs nasal pillow as mask prevents his ability to call for help given his baseline quadriplegia.   - Recommend outpatient sleep study, though inpatient work up has confirmed LIZA as discussed above    # Autonomic dysreflexia  # Quadriplegia 2/2 C4-C6 SCI   # Hypervolemia  # Hypoalbuminemia  Fluctuating blood pressure at home (baseline ~110/70). Uses amlodipine PRN at home, typically a few times a year. Initially admitted to ICU 2/9 with symptomatic hypotension-shock, requiring pressor support until 2/10. TTE on 2/10 with small pericardial effusion, normal EF and cardiac motion. He does not have a history of heart failure. He does have clinically significant hypervolemia 2/2 hypoalbuminemia and received responded well to IV Lasix. Blood pressures stable over the last several days.     # Sinus bradycardia - baseline 40s  He has chronic sinus bradycardia in the 40s, episode of more  "persistently in 30s and feeling \"off \" on 2/13. Cardiac Electrophysiology consulted for sinus bradycardia, no recommendations for permanent pacemaker placement at this time as it is unlikely to improve his quality of life or longevity unless he becomes more/consistently symptomatic.     # Hx insomnia  # Delirium   Notes indicate a history of insomnia, for which he started Ambien within the past year. More recent PCP notes mention decreasing the Ambien 10 to 5 mg, and wife mentions that his vivid dreams may have increased since starting this medication about a year ago (patient very much enjoys these dreams). He continued his home diazepam. Also mention of alcohol and tobacco use which may contribute to his insomnia.      # Leukopenia, resolved  # Thrombocytopenia, resolved  Likely related to sepsis. No history of similar thrombocytopenia on chart review, however, when septic in the past did have leukopenia down to 3.6 (2017). On discharge, WBC 6.6, platelets 218.    # Spacticity, chronic  - PTA Baclofen 20 mg 4 times daily scheduled, additional PRN 2 times daily  - PTA diazepam 10 daily, additional PRN 5 mg every day       Consultations This Hospital Stay   MEDICATION HISTORY IP PHARMACY CONSULT  PHARMACY TO DOSE TOBRAMYCIN  PHARMACY TO DOSE VANCO  INFECTIOUS DISEASE GENERAL ADULT IP CONSULT  UROLOGY IP CONSULT  PHARMACY TO DOSE TOBRAMYCIN  VASCULAR ACCESS CARE ADULT IP CONSULT  PHYSICAL THERAPY ADULT IP CONSULT  OCCUPATIONAL THERAPY ADULT IP CONSULT  LYMPHEDEMA THERAPY IP CONSULT  PHYSICAL THERAPY ADULT IP CONSULT  PULMONARY GENERAL ADULT IP CONSULT  VASCULAR ACCESS FOR PICC PLACEMENT ADULT IP CONSULT  CARDIOLOGY GENERAL ADULT IP CONSULT  SPIRITUAL HEALTH SERVICES IP CONSULT  SPIRITUAL HEALTH SERVICES IP CONSULT  CARDIOLOGY ELECTROPHYSIOLOGY (EP) IP CONSULT  PATIENT LEARNING CENTER IP CONSULT  CARE MANAGEMENT / SOCIAL WORK IP CONSULT  INTERNAL MEDICINE PROCEDURE TEAM ADULT IP CONSULT [facility] - " THORACENTESIS  ANESTHESIOLOGY IP CONSULT  SMOKING CESSATION PROGRAM IP CONSULT    Code Status   Full Code       The patient was discussed with MD [facility]  [facility] [address] [address]  Phone: [phone]  ______________________________________________________________________    Physical Exam   Vital Signs: Temp: 98.2  F (36.8  C) Temp src: Oral BP: 106/52 Pulse: 58   Resp: 18 SpO2: 95 % O2 Device: Nasal cannula Oxygen Delivery: 1 LPM  Weight: 205 lbs 14.55 oz  Physical Exam  Constitutional:       Appearance: He is obese.   Cardiovascular:      Rate and Rhythm: Regular rhythm, regular rate  Pulmonary:      Effort: Pulmonary effort is normal.   Abdominal:      General: There is distension.   Musculoskeletal:      Right lower leg: Edema present.      Left lower leg: Edema present.      Comments: Left foot swelling greater than right   Skin:     Comments: Some slight bruising around the pt's bilateral toes and feet (baseline per pt's wife).   Neurological:      Mental Status: He is alert. Mental status is at baseline.   Psychiatric:         Mood and Affect: Mood normal.       Primary Care Physician   Jason Long    Discharge Orders      Home care nursing referral      Home care nursing referral      Flutter Valve    Continue to use flutter valve 4 times a day Until deep breathing is completely pain free without the need to splint     Reason for your hospital stay    You were hospitalized due to a urinary infection with an obstructing kidney stone. You had a ureteral stent placed 2/08 and your infection was treated with IV antibiotics with improvement in your symptoms and vital signs. You underwent another procedure on 2/19 during which the kidney stone was removed and another stent was placed. This second stent should be removed by home nurse on 3/01/2021. You will follow up with Urology in clinic.    During your hospitalization, we  also noticed you required oxygen while you sleep. Further work up showed that your left lower lung was closed off with the left half of your diaphragm paralyzed. Our Pulmonary team recommended albuterol nebs, vest treatments and flutter valve to help keep this area as open as possible. You had a thoracentesis which was done to remove fluid around your left lower lung as well. We suspect part of your oxygen need is due to obstructive sleep apnea, though unfortunately, we were unable to complete a sleep study during your hospitalization. You should have a sleep study done to evaluate and see if CPAP vs BiPAP would be helpful (we think it would!).     Adult [facility] Follow-up and recommended labs and tests    Follow up with primary care provider, Jason Long, within 7 days for hospital follow- up.  The following labs/tests are recommended: coordinate outpatient sleep study.      Appointments on [place] and/or [facility] (with [facility] or [facility] provider or service). Call [phone] if you haven't heard regarding these appointments within 7 days of discharge.     Activity    Your activity upon discharge: activity as tolerated     Discharge Instructions    If unable to schedule an appointment with your doctor, schedule with someone else on their clinic team.     ?     Full Code     Oxygen Adult/Peds    Oxygen Documentation:   I certify that this patient, Bart Corea has been under my care (or a nurse practitioner or physican's assistant working with me). This is the face-to-face encounter for oxygen medical necessity.      Bart Corea is now in a chronic stable state and continues to require supplemental oxygen. Patient has continued oxygen desaturation due to Bronchiectasis J47.9  Chronic Respiratory Failure with Hypoxia J93.11    Alternative treatment(s) tried or considered and deemed clinically infective for treatment of Bronchiectasis J47.9  Chronic Respiratory Failure with Hypoxia  J93.11  include nebulizers, inhalers, steroids and pulmonary toileting.  If portability is ordered, is the patient mobile within the home? Yes - in wheelchair    **Patients who qualify for home O2 coverage under the CMS guidelines require ABG tests or O2 sat readings obtained closest to, but no earlier than 2 days prior to the discharge, as evidence of the need for home oxygen therapy. Testing must be performed while patient is in the chronic stable state. See notes for O2 sats.**     Diet    Follow this diet upon discharge: Regular Diet Adult       Significant Results and Procedures   Most Recent 3 CBC's:  Recent Labs   Lab Test 02/20/21  0759 02/19/21  0639 02/18/21  0711   WBC 8.3 6.6 5.5   HGB 12.4* 12.8* 12.2*   MCV 95 99 96    218 203     Most Recent 3 BMP's:  Recent Labs   Lab Test 02/20/21  0759 02/19/21  0639 02/18/21  0711    139 139   POTASSIUM 4.1 4.0 3.8   CHLORIDE 98 101 100   CO2 33* 34* 35*   BUN 6* 8 8   CR 0.25* 0.31* 0.26*   ANIONGAP 4 3 4   LIU 8.9 9.1 8.8   * 107* 94     Most Recent 6 Bacteria Isolates From Any Culture (See EPIC Reports for Culture Details):  Recent Labs   Lab Test 02/19/21  0910 02/16/21  1152 02/11/21  1318 02/11/21  0848 02/09/21  0219 02/09/21  0159   CULT Moderate growth  Yeast  *  Culture in progress  PENDING Culture negative monitoring continues  Culture negative monitoring continues No growth No growth No growth Cultured on the 2nd day of incubation:  Bacteroides fragilis  *  Critical Value/Significant Value, preliminary result only, called to and read back by  Caden Yanez RN at 0336 on 2.11.21 by SS.    (Note)  NEGATIVE for the following organisms and resistance markers:  Acinetobacter sp., Citrobacter sp., Enterobacter sp., Proteus sp., E.  coli, K. pneumoniae/oxytoca, P. aeruginosa, CTX-M, KPC, NDM, VIM, IMP  and OXA by [employer] multiplex nucleic acid test. Final identification  and antimicrobial susceptibility testing will be verified by  standard  methods.    Critical Value/Significant Value called to and read back by Caden Yanez RN at 0545 2.11.21. amd       Most Recent Urinalysis:  Recent Labs   Lab Test 02/08/21  1146   COLOR Yellow   APPEARANCE Slightly Cloudy   URINEGLC Negative   URINEBILI Negative   URINEKETONE 40*   SG 1.019   UBLD Large*   URINEPH 6.5   PROTEIN 30*   NITRITE Positive*   LEUKEST Large*   RBCU >182*   WBCU >182*   ,   Results for orders placed or performed during the hospital encounter of 02/08/21   XR Chest 2 Views    Narrative    EXAM: XR CHEST 2 VW 2/8/2021    HISTORY: autonomic dysfunction, increased edema, eval for edema vs  infection.    COMPARISON: Radiograph 10/9/2019.    TECHNIQUE: Semiupright frontal and lateral views of the chest.    FINDINGS: Dense opacification of the mid/lower left chest, partially  obscuring the heart which appears enlarged. Hazy interstitial  opacities. Streaky right basilar/right retrocardiac opacities.  Remaining aerated lungs are relatively clear. Osteopenic appearance of  the bones.      Impression    IMPRESSION: Findings concerning for worsening volume overload  including left pleural effusion, probable pulmonary alveolar edema and  cardiomegaly.    I have personally reviewed the examination and initial interpretation  and I agree with the findings.    GUSTAVO DUNBAR MD   US Lower Extremity Venous Duplex Right    Narrative    EXAMINATION: DOPPLER VENOUS ULTRASOUND OF THE RIGHT LOWER EXTREMITY,  2/8/2021 1:03 PM     COMPARISON: Venous ultrasound of bilateral legs dated 11/20/2009    HISTORY: Right leg swelling, rule out DVT    TECHNIQUE:  Gray-scale evaluation with compression, spectral flow, and  color Doppler assessment of the deep venous system of the right leg  from groin to knee, and then at the ankle.    FINDINGS:  In the right lower extremity, the common femoral, femoral, popliteal  and posterior tibial veins demonstrate normal compressibility and  blood flow. The left common  femoral vein demonstrates normal  compressibility and blood flow for comparison.      Impression    IMPRESSION:  No evidence of right lower extremity deep venous  thrombosis.    I have personally reviewed the examination and initial interpretation  and I agree with the findings.    SHARAD MONIQUE MD   CT Abdomen Pelvis w Contrast    Narrative    EXAMINATION: CT ABDOMEN PELVIS W CONTRAST, 2/8/2021 2:47 PM    INDICATION: Flank pain.    COMPARISON STUDY: Renal ultrasound dated 1/5/2021, 9/16/2019, CT of  the abdomen and pelvis dated 7/14/2017 chest x-ray dated 2/8/2021,    TECHNIQUE: CT scan of the abdomen and pelvis was performed on  multidetector CT scanner using volumetric acquisition technique and  images were reconstructed in multiple planes with variable thickness  and reviewed on dedicated workstations.     CONTRAST: iopamidol (ISOVUE-370) solution 135 mL.   Without oral  contrast.    CT scan radiation dose is optimized to minimum requisite dose using  automated dose modulation techniques.    FINDINGS:    Lower thorax: Right basilar dependent atelectasis. Chronic appearing  left pleural effusion with near complete atelectasis of the left lower  lobe. Pleura is thickened and enhancing, suspicious for empyema..   Large epicardiac fat pad.    Liver : Multiple hypoattenuating well-circumscribed lesions, stable  from exam dated 7/14/2017.No intrahepatic biliary ductal dilation.    Biliary System: Normal gallbladder. No extrahepatic biliary ductal  dilation.    Pancreas: No mass or pancreatic ductal dilation.    Adrenal glands: Heterogeneously enhancing left adrenal mass measuring  2.0 x 1.8 cm previously measuring 1.6 x 1.4 cm. Two right adrenal  nodules measuring 2.3 x 1.2 cm and 1.3 x 1.2 cm.    Spleen: Normal.    Kidneys: There is a new right renal calculus measuring 14 x 9 mm at  the pelviureteric junction is seen. This causes mild right  hydronephrosis. Punctate left renal calculi. No left hydronephrosis.  Small  simple cysts of the bilateral kidneys.    Gastrointestinal tract: Normal appendix. Normal caliber small bowel  and large bowel.    Mesentery/peritoneum/retroperitoneum: No mass. No free fluid or air.    Lymph nodes: No significant lymphadenopathy.    Vasculature: Patent major abdominal vasculature.  Atherosclerosis at  the origin of the celiac artery.    Pelvis: Urinary bladder is normal.  Suprapubic catheter in place.      Osseous structures: No aggressive or acute osseous lesion.  Osteopenic  appearance of the bones. Degenerative changes of bilateral hips. Grade  1 retrolisthesis of L5 on S1. Degenerative changes of the  thoracolumbar spine. No acute osseous process.      Soft tissues: Fatty replacement of muscular tissues..      Impression    IMPRESSION:   1. 14 x 9 mm right renal calculus at the pelviureteric junction  causing mild right hydronephrosis.  2. Moderate left pleural effusion with atelectasis and left pleural  thickening and enhancement, suspicious for empyema.  3. Bilateral adrenal nodules, stable on the right side and slightly  increased on the left side, when compared with 2017 CT, have been  present since at least 2010, suggestive of benign adenomas.  4. Stable hepatic cysts.    I have personally reviewed the examination and initial interpretation  and I agree with the findings.    DILLON CORMIER MD   CT Chest Pulmonary Embolism w Contrast    Narrative    EXAM: CTA pulmonary angiogram, 2/8/2021 2:47 PM    HISTORY: PE suspected, low/intermediate prob, positive D-dimer    COMPARISON: 2/8/2021 chest x-ray and 11/16/2015 chest CT.    TECHNIQUE: Volumetric CT images obtained through the chest with  contrast. Coronal and axial MIP reformatted images obtained.  Three-dimensional (3D) post-processed angiographic images were  reconstructed, archived to PACS and used in interpretation of this  study.     CONTRAST: iopamidol (ISOVUE-370) solution 135 mL ml isovue 370 IV.    FINDINGS:     Vascular: There is  good contrast opacification of the pulmonary  arterial vasculature. No pulmonary embolus.  Proximal pulmonary artery  is mildly enlarged measuring 3.3 cm. No septal bowing. Trace IVC  contrast reflux. Aortic root is normal caliber.    The central tracheobronchial tree is patent. Patulous esophagus.  Left-sided pleural effusion with overlying compressive atelectasis in  the inferior left upper lobe, fissural thickening on the left oblique  fissure and near complete lobar atelectasis of the left lower lobe.  Trace dependent atelectasis in the right lower lobe. No pneumothorax.  No axillary or mediastinal adenopathy. Thyroid lobes are normal.  Normal three-vessel aortic arch.    Visualized upper abdomen is unremarkable. Left adrenal nodule 2 cm  probably a lipid rich adenoma. Age dependent degenerative changes in  the thoracic and upper lumbar spine. No suspicious osseous lesions.  Muscle atrophy diffuse.      Impression    IMPRESSION:  1. Exam is negative for acute pulmonary embolism. No paradoxical  septal bowing. Slightly increased diameter of proximal pulmonary  artery is stable dating to 2015 and can be seen with pulmonary  arterial hypertension.  2. Left-sided pleural effusion with compressive atelectasis of the  inferior left upper lobe and near complete lobar atelectasis of the  left lower lobe.    I have personally reviewed the examination and initial interpretation  and I agree with the findings.    GUSTAVO DUNBAR MD   POC US ECHO LIMITED    Impression    Limited Bedside Cardiac Ultrasound, performed and interpreted by me.   Indication: Hypotension/shock.  Parasternal long axis, parasternal short axis, apical 4 chamber and subcostal views were acquired.   Image quality was satisfactory.    Findings:    Global left ventricular function appears intact.  Chambers do not appear dilated.  There is no evidence of free fluid within the pericardium.    IMPRESSION: Grossly normal left ventricular function and chamber  size.  No pericardial effusion..   XR Surgery STACEY Fluoro L/T 5 Min w Stills    Narrative    This exam was marked as non-reportable because it will not be read by a   radiologist or a [place] non-radiologist provider.         CT Chest w/o Contrast    Narrative    Exam: CT Chest without contrast 2/12/2021 11:53 AM     History: Pleural effusion, malignancy suspected    Comparison: Chest CT 2/8/2021, 11/16/2015 and 11/2/2009    TECHNIQUE: Helical acquisition of CT images from the lung apices to  the kidneys without IV contrast. Coronal images and axial MIP images  were reconstructed from the source data.    FINDINGS:     Chest:   Thyroid is unremarkable. Normal branching pattern of the thoracic  great vessels. Main pulmonary artery is dilated measuring 3.4 cm.  Heart size is normal. Small pericardial effusion. No axillary,  mediastinal or hilar lymphadenopathy. Patulous esophagus.    Lungs:  The central tracheobronchial tree is patent. Small bilateral pleural  effusions and adjacent bibasilar consolidations, left greater than  right with near complete collapse of the left lower lobe. No  pneumothorax.    Few small pulmonary nodules were not present on the comparison from  2/8/2021 for example a 4 mm subpleural nodule in the right lower lobe  (series 6 image 181, 3 mm nodule in the right middle lobe  (series 6  image 173) 6 mm solid pulmonary nodule in the right upper lobe series  6 image 150).    Upper abdomen:  Limited evaluation of the upper abdomen. Stable nodular thickening of  the left adrenal gland consistent with benign adenomatous changes.  Hepatic cyst in the left lobe of the liver, additional subcentimeter  hypodensity in the right lobe of the liver too small to accurately  characterize but also likely represents a cyst.    Bones:  No acute osseus abnormality or suspicious bony lesion. Degenerative  changes of the spine.      Impression    IMPRESSION:   1. Small bilateral pleural effusions and adjacent  bibasilar  consolidations, left greater than right with near complete atelectasis  of the left lower lobe. Findings likely represent a component of  compressive atelectasis, and super imposed infection cannot entirely  be excluded.  2. Several several small scattered nodules in the right lung are new  from the comparison examination on 2/8/2021 suggestive of an acute  infectious or inflammatory etiology.    I have personally reviewed the examination and initial interpretation  and I agree with the findings.    GUSTAVO DUNBAR MD   XR Chest/Heart Fluoro    Narrative    EXAM:  XR CHEST/HEART FLUORO 2/15/2021 3:20 PM    INDICATION: Possible left hemidiaphragm paralysis    COMPARISON:  Chest x-ray dated 2/14/2021, CT of the chest dated  2/12/2021    FLUOROSCOPY TIME: 1.1 minutes    FINDINGS: There is decreased excursion of the left hemidiaphragm with  normal and deep breathing.  During sniff test there is paradoxical superior excursion of the left  hemidiaphragm. There is normal excursion of the right hemidiaphragm.    The remaining visualized lung bases and abdomen are fluoroscopically  unremarkable.      Impression    IMPRESSION:  Left hemidiaphragm paralysis. Normal physiologic function of the right  hemidiaphragm.    I, DILLON CORMIER MD, attest that I was immediately available to  provide guidance and assistance during the entirety of the procedure.    I have personally reviewed the examination and initial interpretation  and I agree with the findings.    DILLON CORMIER MD   XR Chest Port 1 View    Narrative    Exam: XR CHEST PORT 1 VW, 2/14/2021 3:50 PM    Indication: PICC placement    Comparison: 2/8/2021    Findings:   Semiupright AP view of the chest. Left upper extremity PICC tip  projecting over the low SVC.  Midline trachea. Stable enlarged cardiac silhouette. Pulmonary  vasculature is engorged and indistinct. Hazy perihilar interstitial  opacities. Relatively unchanged left lower lobe  atelectasis.  Additional aerated lungs are relatively clear. Bilateral left greater  than right pleural effusions with associated atelectasis. No  appreciable pneumothorax. Visualized upper abdomen is unremarkable.  Osteopenic appearance of the bones.      Impression    Impression:   1. Left upper extremity PICC tip projecting over the low SVC.  2. Relatively unchanged cardiomegaly and small bilateral pleural  effusions and associated bilateral left greater than right lower lobe  atelectasis.    I have personally reviewed the examination and initial interpretation  and I agree with the findings.    MARK VALLES MD   POC US Guide for Thorcentesis    Impression    Moderate smple right pleural effusion. 500 ml removed. See consult note for details. +  sliding scale after procedure. Chest xray pending   XR Chest Port 1 View    Narrative    EXAM: XR CHEST PORT 1 VW  2/16/2021 2:48 PM     HISTORY:  Follow-up left sided thoracentesis       COMPARISON:  Chest x-ray 2/14/2020    FINDINGS:   Upright portable AP view of the chest. Left upper extremity PICC tip  projects over the lower SVC. Cardiac silhouette appears enlarged.  Trachea is midline.    No significant change in hazy perihilar interstitial opacities. No  significant change in left lower lobe atelectasis. Small left pleural  effusion. No appreciable varus. Visualized upper abdomen is  unremarkable.      Impression    IMPRESSION:     1. Small residual pleural effusion on the left status post  thoracentesis. No appreciable pneumothorax.  2. No significant change in bilateral perihilar interstitial opacities  are bibasilar left greater than right atelectasis.    I have personally reviewed the examination and initial interpretation  and I agree with the findings.    RICO GALLEGO MD   XR Surgery STACEY Fluoro L/T 5 Min w Stills    Narrative    This exam was marked as non-reportable because it will not be read by a   radiologist or a [place] non-radiologist  provider.         Echo Complete    Narrative    [id number]  [id number]  [id number]  [id number]^DAVIDMARTA^BHUPINDER           [facility],[place]  Echocardiography Laboratory  [address] [place] [id number]     Name: MICHOACANO WHITE  MRN: [id number]  : 1960  Study Date: 2021 09:27 AM  Age: 60 yrs  Gender: Male  [place]: [facility] [place]  Reason For Study: Shock  Ordering Physician: BHUPINDER WISDOM  Performed By: KING Hidalgo     BSA: 2.1 m2  Height: 68 in  Weight: 214 lb  HR: 81  BP: 100/53 mmHg  _____________________________________________________________________________  __        Procedure  Complete Portable Echo Adult. Contrast Optison. Patient was given 5 ml mixture  of 3 ml Optison and 6 ml saline. 4 ml wasted.  _____________________________________________________________________________  __        Interpretation Summary  Global and regional left ventricular function is hyperkinetic with an EF of  65-70%.  Global right ventricular function is normal. The right ventricle is normal  size.  A left pleural effusion is present. No pericardial effusion is present.  This study was compared with the study from 2017. The LV function is now  hyperkinetic. The left-sided pleural effusion is newly noted.  _____________________________________________________________________________  __        Left Ventricle  Global and regional left ventricular function is hyperkinetic with an EF of  65-70%. Left ventricular size is normal. Relative wall thickness is increased  consistent with concentric remodeling. Left ventricular diastolic function is  normal.     Right Ventricle  Global right ventricular function is normal. The right ventricle is normal  size.     Atria  Both atria appear normal.     Mitral Valve  The mitral valve is normal. Trace mitral insufficiency is present.        Aortic Valve  The valve leaflets are not well visualized. On Doppler interrogation, there  is  no significant stenosis or regurgitation.     Tricuspid Valve  The tricuspid valve is normal. Trace tricuspid insufficiency is present. The  right ventricular systolic pressure is approximated at 30.0 mmHg plus the  right atrial pressure.     Pulmonic Valve  The valve leaflets are not well visualized. Trace pulmonic insufficiency is  present. Shortened PA acceleration time with notching of wave consistent with  elevated mean PA pressure.     Vessels  Sinuses of Valsalva 2.4 cm. Ascending aorta 3.0 cm. IVC diameter >2.1 cm  collapsing <50% with sniff suggests a high RA pressure estimated at 15 mmHg or  greater.     Pericardium  No pericardial effusion is present.        Miscellaneous  A left pleural effusion is present.     Compared to Previous Study  This study was compared with the study from 2017 . The LV function is  now hyperkinetic. The left-sided pleural effusion is newly noted.  _____________________________________________________________________________  __     MMode/2D Measurements & Calculations  IVSd: 1.1 cm  LVIDd: 4.8 cm  LVIDs: 3.1 cm  LVPWd: 1.0 cm  FS: 36.5 %  LV mass(C)d: 184.3 grams  LV mass(C)dI: 87.6 grams/m2  Ao root diam: 2.4 cm  asc Aorta Diam: 3.0 cm  LVOT diam: 2.0 cm  LVOT area: 3.1 cm2     EF(MOD-bp): 84.3 %  LA Volume (BP): 65.3 ml     LA Volume Index (BP): 31.1 ml/m2  RWT: 0.44        Doppler Measurements & Calculations  MV E max monroe: 88.3 cm/sec  MV A max monroe: 72.4 cm/sec  MV E/A: 1.2  MV dec slope: 570.0 cm/sec2  MV dec time: 0.16 sec  Ao V2 max: 235.0 cm/sec  Ao max P.0 mmHg  Ao V2 mean: 152.0 cm/sec  Ao mean P.0 mmHg  Ao V2 VTI: 41.0 cm  PA acc time: 0.06 sec  TR max monroe: 274.0 cm/sec  TR max P.0 mmHg  E/E' av.3  Lateral E/e': 8.5  Medial E/e': 10.1        _____________________________________________________________________________  __        Report approved by: Lucie Starks 2021 10:15 AM      Echo Limited    Narrative     [id number]  [id number]  [id number]  [id number]^SAEED^GILMA^BHUPINDER           [facility],[place]  Echocardiography Laboratory  [address] [place] [id number]     Name: MICHOACANO WHITE  MRN: [id number]  : 1960  Study Date: 02/10/2021 09:03 AM  Age: 60 yrs  Gender: Male  Patient Location: [facility] [place]  Reason For Study: Pericardial Effusion  Ordering Physician: GILMA TIPTON  Performed By: Mechelle Adkins RDCS     BSA: 2.1 m2  Height: 68 in  Weight: 214 lb  HR: 65  BP: 103/55 mmHg  _____________________________________________________________________________  __        Procedure  Limited Portable Echo Adult.  _____________________________________________________________________________  __        Interpretation Summary  Trivial pericardial effusion is present.  Dilation of the inferior vena cava is present with abnormal respiratory  variation in diameter.  A left pleural effusion is present.  _____________________________________________________________________________  __        Left Ventricle  Global and regional left ventricular function is normal with an EF of 55-60%.     Right Ventricle  Right ventricular function, chamber size, wall motion, and thickness are  normal.     Vessels  Dilation of the inferior vena cava is present with abnormal respiratory  variation in diameter.     Pericardium  Trivial pericardial effusion is present.        Miscellaneous  A left pleural effusion is present.  _____________________________________________________________________________  __           Doppler Measurements & Calculations  TR max monroe: 257.7 cm/sec  TR max P.6 mmHg     _____________________________________________________________________________  __           Report approved by: Lucie Khan 02/10/2021 09:50 AM        *Note: Due to a large number of results and/or encounters for the requested time period, some results have not been displayed. A complete set of results can be  found in Results Review.       Discharge Medications   Current Discharge Medication List      START taking these medications    Details   acetaminophen (TYLENOL) 325 MG tablet Take 3 tablets (975 mg) by mouth every 8 hours as needed for mild pain or fever  Qty:      Associated Diagnoses: Complicated urinary tract infection      albuterol (PROVENTIL) (2.5 MG/3ML) 0.083% neb solution Take 1 vial (2.5 mg) by nebulization every 4 hours as needed for wheezing  Qty: 100 mL, Refills: 0    Associated Diagnoses: Chronic bronchitis, unspecified chronic bronchitis type (H)      metroNIDAZOLE (FLAGYL) 500 MG tablet Take 1 tablet (500 mg) by mouth every 8 hours for 6 days  Qty: 18 tablet, Refills: 0    Associated Diagnoses: Complicated urinary tract infection      nicotine (COMMIT) 2 MG lozenge Place 1 lozenge (2 mg) inside cheek every hour as needed for smoking cessation  Qty: 27 lozenge, Refills: 0    Associated Diagnoses: Personal history of tobacco use, presenting hazards to health      nicotine (NICODERM CQ) 14 MG/24HR 24 hr patch Place 1 patch onto the skin daily  Qty: 14 patch, Refills: 0    Associated Diagnoses: Personal history of tobacco use, presenting hazards to health      olive oil external oil Apply topically At Bedtime  Qty:      Associated Diagnoses: Quadriplegia (H)         CONTINUE these medications which have CHANGED    Details   zolpidem (AMBIEN) 10 MG tablet TAKE 1/2 TABLET BY MOUTH EVERY NIGHT AT BEDTIME AS NEEDED FOR SLEEP  Qty: 30 tablet, Refills: 5    Associated Diagnoses: Insomnia, unspecified type         CONTINUE these medications which have NOT CHANGED    Details   amLODIPine (NORVASC) 2.5 MG tablet Take 1 tablet (2.5 mg) by mouth daily as needed (autonomic dysreflexia)  Qty: 30 tablet, Refills: 1    Associated Diagnoses: Autonomic dysreflexia      ascorbic acid (VITAMIN C) 1000 MG TABS Take 1 tablet by mouth daily      baclofen (LIORESAL) 20 MG tablet TAKE ONE TABLET BY MOUTH UP TO 6 TIMES  DAILY  Qty: 540 tablet, Refills: 3    Associated Diagnoses: Quadriplegia (H); Spasm; Groin pain, unspecified laterality      bisacodyl (DULCOLAX) 10 MG suppository Place 20 mg rectally every 3 days Active ingredient in Magic Bullet (10mg per suppository)      diazepam (VALIUM) 10 MG tablet Take 1 tablet (10 mg) by mouth daily  Qty: 30 tablet, Refills: 5    Associated Diagnoses: Bronchitis with bronchospasm; Insomnia, unspecified type; Rash and other nonspecific skin eruption; Wheezing; Groin pain, unspecified laterality; Quadriplegia (H); Spasm       MG capsule TAKE 1 CAPSULE (100 MG) BY MOUTH DAILY  Qty: 100 capsule, Refills: 3    Associated Diagnoses: Bronchitis with bronchospasm; Rash and other nonspecific skin eruption; Wheezing; Quadriplegia (H); Spasm; High cholesterol      lidocaine (XYLOCAINE) 2 % jelly Apply  topically. APPLY AS DIRECTED      minocycline (MINOCIN) 50 MG capsule TAKE 1 CAPSULE (50 MG) BY MOUTH TWO TIMES DAILY  Qty: 180 capsule, Refills: 3    Associated Diagnoses: Rash and other nonspecific skin eruption; Quadriplegia (H)      Multiple Vitamins-Minerals (MULTIVITAMIN & MINERAL PO) Take 1 capsule by mouth daily.      neomycin-polymyxin-hydrocortisone (CORTISPORIN) 3.5-06717-7 otic solution Place 4 drops in ear(s) 2 times daily  Qty: 10 mL, Refills: 11    Comments: Replacing ciprofloxacin-dexamethasone due to the coverage  Associated Diagnoses: Ear infection      nystatin (MYCOSTATIN) 059657 UNIT/GM external powder APPLY TO AFFECTED AREA THREE TIMES DAILY AS NEEDED  Qty: 60 g, Refills: 3    Associated Diagnoses: Rash and other nonspecific skin eruption; Quadriplegia (H); Spasm; High cholesterol      oxybutynin ER (DITROPAN-XL) 10 MG 24 hr tablet TAKE 1 TABLET (10 MG) BY MOUTH DAILY  Qty: 90 tablet, Refills: 3    Associated Diagnoses: Quadriplegia (H); Spasm      VITAMIN D3 25 MCG (1000 UT) tablet TAKE 1 TABLET (1,000 UNITS) BY MOUTH DAILY  Qty: 90 tablet, Refills: 0    Associated  Diagnoses: Quadriplegia (H)      Incontinence Supplies (URINARY LEG BAG) USE AS NEEDED           Allergies   Allergies   Allergen Reactions     Vancomycin Anaphylaxis

## 2021-02-19 NOTE — ANESTHESIA POSTPROCEDURE EVALUATION
Patient: Bart Corea    Procedure(s):  CYSTOURETEROSCOPY, WITH RETROGRADE PYELOGRAM, HOLMIUM LASER LITHOTRIPSY AND STENT EXCHANGE, SUPRAPUBIC CATHETER EXCHANGE    Diagnosis:Kidney stone [N20.0]  Diagnosis Additional Information: No value filed.    Anesthesia Type:  General    Note:  Disposition: Inpatient   Postop Pain Control: Uneventful            Sign Out: Well controlled pain   PONV: No   Neuro/Psych: Uneventful            Sign Out: Acceptable/Baseline neuro status   Airway/Respiratory: Uneventful            Sign Out: Acceptable/Baseline resp. status   CV/Hemodynamics: Uneventful            Sign Out: Acceptable CV status   Other NRE: NONE   DID A NON-ROUTINE EVENT OCCUR? No         Last vitals:  Vitals:    02/18/21 2148 02/19/21 0428 02/19/21 1020   BP: 116/56 101/46 (!) 153/74   Pulse: 58 (!) 48 55   Resp: 20 16 22   Temp: 36.2  C (97.2  F) 36.4  C (97.6  F) 36.3  C (97.3  F)   SpO2: 96% 97%        Last vitals prior to Anesthesia Care Transfer:  CRNA VITALS  2/19/2021 0944 - 2/19/2021 1044      2/19/2021             Resp Rate (observed):  15    EKG:  Sinus bradycardia          Electronically Signed By: Ale Rogers MD  February 19, 2021  10:49 AM

## 2021-02-19 NOTE — BRIEF OP NOTE
[facility]    Brief Operative Note    Pre-operative diagnosis: Kidney stone [N20.0]  Post-operative diagnosis Same as pre-operative diagnosis    Procedure: Procedure(s):  CYSTOURETEROSCOPY, WITH RETROGRADE PYELOGRAM, HOLMIUM LASER LITHOTRIPSY AND STENT EXCHANGE, SUPRAPUBIC CATHETER EXCHANGE  Surgeon: Surgeon(s) and Role:     * Bart Hebert MD - Primary     * Jason Brown MD - Resident - Assisting  Anesthesia: General   Estimated blood loss: Less than 10 ml  Drains: 7 Fr x 26 cm JJ stent, right; 16-Fr SPT exchanged   Specimens:   ID Type Source Tests Collected by Time Destination   1 : RIGHT KIDNEY STONE Calculus/Stone Other FUNGUS CULTURE, MISCELLANEOUS CULTURE AEROBIC BACTERIAL, STONE ANALYSIS Bart Hebert MD 2/19/2021  9:10 AM    2 : RIGHT KIDNEY STONE Calculus/Stone Other STONE ANALYSIS Bart Hebert MD 2/19/2021  9:13 AM      Findings:   Stone debris in bladder which passed, clear ureter, stone identified in kidney  (shiny in appearance, covered with infectious debris, edematous inflammed ureter, all fragments > 2 mm cleared) .  Complications: None.  Implants:   Implant Name Type Inv. Item Serial No.  Lot No. LRB No. Used Action   STENT URETERAL PERCUFLEX PLUS [id number] Stent STENT URETERAL PERCUFLEX PLUS [id number]  [employer] [id number] Right 1 Explanted   STENT URETERAL PERCUFLEX PLUS [id number] Stent STENT URETERAL PERCUFLEX PLUS [id number]  [employer] [id number] Right 1 Implanted

## 2021-02-19 NOTE — ANESTHESIA CARE TRANSFER NOTE
Patient: Bart Corea    Procedure(s):  CYSTOURETEROSCOPY, WITH RETROGRADE PYELOGRAM, HOLMIUM LASER LITHOTRIPSY AND STENT EXCHANGE, SUPRAPUBIC CATHETER EXCHANGE    Diagnosis: Kidney stone [N20.0]  Diagnosis Additional Information: No value filed.    Anesthesia Type:   General     Note:    Oropharynx: oropharynx clear of all foreign objects and spontaneously breathing  Level of Consciousness: awake  Oxygen Supplementation: face mask  Level of Supplemental Oxygen (L/min / FiO2): 6  Independent Airway: airway patency satisfactory and stable  Dentition: dentition unchanged  Vital Signs Stable: post-procedure vital signs reviewed and stable  Report to RN Given: handoff report given  Patient transferred to: PACU  Comments: Patient following commands, opening eyes, breathing spontaneously, and suctioned prior to extubation. Extubated to 4LNC. Transferred to PACU. Report to RN. Vital signs stable. Awake and talking in PACU.  Handoff Report: Identifed the Patient, Identified the Reponsible Provider, Reviewed the pertinent medical history, Discussed the surgical course, Reviewed Intra-OP anesthesia mangement and issues during anesthesia, Set expectations for post-procedure period and Allowed opportunity for questions and acknowledgement of understanding      Vitals: (Last set prior to Anesthesia Care Transfer)  CRNA VITALS  2/19/2021 0944 - 2/19/2021 1023      2/19/2021             Resp Rate (observed):  15    EKG:  SpO2:  Sinus bradycardia  97%        Electronically Signed By: JC Jurado CRNA  February 19, 2021  10:23 AM

## 2021-02-19 NOTE — ANESTHESIA PROCEDURE NOTES
Airway   Date/Time: 2/19/2021 7:54 AM   Patient location during procedure: OR  Staff -   Anesthesiologist:  Ale Rogers MD  CRNA: Irlanda Rojas APRN CRNA  Other Anesthesia Staff: Chivo William  Performed By: SRNA and anesthesiologist    Consent for Airway   Urgency: elective    Indications and Patient Condition  Indications for airway management: taya-procedural  Induction type:intravenousMask difficulty assessment: 2 - vent by mask + OA or adjuvant +/- NMBA    Final Airway Details  Final airway type: endotracheal airway  Successful airway:ETT - single and Oral  Endotracheal Airway Details   ETT size (mm): 7.5  Cuffed: yes  Successful intubation technique: video laryngoscopy  Grade View of Cords: 1  Adjucts: stylet and bougie  Measured from: gums/teeth  Secured at (cm): 24  Secured with: silk tape and plastic tape  Bite block used: None    Post intubation assessment   Placement verified by: capnometry, equal breath sounds and chest rise   Number of attempts at approach: 2  Number of other approaches attempted: 1  Secured with:silk tape and plastic tape  Ease of procedure: difficult  Dentition: Intact and UnchangedAdditional Comments  Grade 1: view grade one. Difficult tube insertion due to anterior airway, swollen cords, and cartilages. Used a bougie to get between cords due to tube not advancing. Tube passed over bougie with difficulty

## 2021-02-19 NOTE — PLAN OF CARE
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.4]  Hyponatremia [E87.1]  Complicated urinary tract infection [N39.0]  Urinary tract obstruction by kidney stone [N20.0, N13.8]  Other hypervolemia [E87.79]    Vitals: BP (!) 170/76 (BP Location: Right arm)   Pulse 55   Temp 97.1  F (36.2  C) (Oral)   Resp 20   Ht 1.727 m (5' 8\")   Wt 93.4 kg (205 lb 14.6 oz)   SpO2 96%   BMI 31.31 kg/m      Activity: Lift  Diet: Regular - fair intake  Pain: Denies  Respiratory: 3L NC - can be weaned, LS diminished   Cardiovascular: Bradycardia, severe edema in BLE   GI: No BM this shift - senna/colace administered   : Suprapubic catheter   Skin: Bruising  Neurologic: A&O  Lines: L PIV, L PICC     New changes this shift: Pt had catheter exchange + stone removal/stent exchange. Wife in room.       "

## 2021-02-20 ENCOUNTER — PATIENT OUTREACH (OUTPATIENT)
Dept: CARE COORDINATION | Facility: CLINIC | Age: 61
End: 2021-02-20

## 2021-02-20 VITALS
DIASTOLIC BLOOD PRESSURE: 52 MMHG | TEMPERATURE: 98.2 F | SYSTOLIC BLOOD PRESSURE: 106 MMHG | OXYGEN SATURATION: 95 % | HEIGHT: 68 IN | HEART RATE: 58 BPM | BODY MASS INDEX: 31.21 KG/M2 | WEIGHT: 205.91 LBS | RESPIRATION RATE: 18 BRPM

## 2021-02-20 LAB
ANION GAP SERPL CALCULATED.3IONS-SCNC: 4 MMOL/L (ref 3–14)
BUN SERPL-MCNC: 6 MG/DL (ref 7–30)
CALCIUM SERPL-MCNC: 8.9 MG/DL (ref 8.5–10.1)
CHLORIDE SERPL-SCNC: 98 MMOL/L (ref 94–109)
CO2 SERPL-SCNC: 33 MMOL/L (ref 20–32)
CREAT SERPL-MCNC: 0.25 MG/DL (ref 0.66–1.25)
ERYTHROCYTE [DISTWIDTH] IN BLOOD BY AUTOMATED COUNT: 14.3 % (ref 10–15)
GFR SERPL CREATININE-BSD FRML MDRD: >90 ML/MIN/{1.73_M2}
GLUCOSE SERPL-MCNC: 109 MG/DL (ref 70–99)
HCT VFR BLD AUTO: 39.2 % (ref 40–53)
HGB BLD-MCNC: 12.4 G/DL (ref 13.3–17.7)
MCH RBC QN AUTO: 30.1 PG (ref 26.5–33)
MCHC RBC AUTO-ENTMCNC: 31.6 G/DL (ref 31.5–36.5)
MCV RBC AUTO: 95 FL (ref 78–100)
PLATELET # BLD AUTO: 278 10E9/L (ref 150–450)
POTASSIUM SERPL-SCNC: 4.1 MMOL/L (ref 3.4–5.3)
RBC # BLD AUTO: 4.12 10E12/L (ref 4.4–5.9)
SODIUM SERPL-SCNC: 136 MMOL/L (ref 133–144)
WBC # BLD AUTO: 8.3 10E9/L (ref 4–11)

## 2021-02-20 PROCEDURE — 250N000013 HC RX MED GY IP 250 OP 250 PS 637: Performed by: STUDENT IN AN ORGANIZED HEALTH CARE EDUCATION/TRAINING PROGRAM

## 2021-02-20 PROCEDURE — 94640 AIRWAY INHALATION TREATMENT: CPT | Mod: 76

## 2021-02-20 PROCEDURE — 99238 HOSP IP/OBS DSCHRG MGMT 30/<: CPT | Mod: GC | Performed by: FAMILY MEDICINE

## 2021-02-20 PROCEDURE — 250N000009 HC RX 250: Performed by: STUDENT IN AN ORGANIZED HEALTH CARE EDUCATION/TRAINING PROGRAM

## 2021-02-20 PROCEDURE — 250N000011 HC RX IP 250 OP 636: Performed by: FAMILY MEDICINE

## 2021-02-20 PROCEDURE — 36592 COLLECT BLOOD FROM PICC: CPT | Performed by: STUDENT IN AN ORGANIZED HEALTH CARE EDUCATION/TRAINING PROGRAM

## 2021-02-20 PROCEDURE — 999N000157 HC STATISTIC RCP TIME EA 10 MIN

## 2021-02-20 PROCEDURE — 94640 AIRWAY INHALATION TREATMENT: CPT

## 2021-02-20 PROCEDURE — 80048 BASIC METABOLIC PNL TOTAL CA: CPT | Performed by: STUDENT IN AN ORGANIZED HEALTH CARE EDUCATION/TRAINING PROGRAM

## 2021-02-20 PROCEDURE — 999N000078 HC STATISTIC INTRAPULMONARY PERCUSSIVE VENT

## 2021-02-20 PROCEDURE — 258N000003 HC RX IP 258 OP 636: Performed by: FAMILY MEDICINE

## 2021-02-20 PROCEDURE — 999N000105 HC STATISTIC NO DOCUMENTATION TO SUPPORT CHARGE: Mod: 76

## 2021-02-20 PROCEDURE — 85027 COMPLETE CBC AUTOMATED: CPT | Performed by: STUDENT IN AN ORGANIZED HEALTH CARE EDUCATION/TRAINING PROGRAM

## 2021-02-20 RX ADMIN — OXYBUTYNIN 10 MG: 10 TABLET, FILM COATED, EXTENDED RELEASE ORAL at 08:33

## 2021-02-20 RX ADMIN — TOBRAMYCIN 480 MG: 40 INJECTION INTRAMUSCULAR; INTRAVENOUS at 04:48

## 2021-02-20 RX ADMIN — BACLOFEN 20 MG: 20 TABLET ORAL at 04:55

## 2021-02-20 RX ADMIN — BACLOFEN 20 MG: 20 TABLET ORAL at 12:36

## 2021-02-20 RX ADMIN — METRONIDAZOLE 500 MG: 500 TABLET ORAL at 00:12

## 2021-02-20 RX ADMIN — DOCUSATE SODIUM 100 MG: 100 CAPSULE, LIQUID FILLED ORAL at 08:32

## 2021-02-20 RX ADMIN — BACLOFEN 20 MG: 20 TABLET ORAL at 08:32

## 2021-02-20 RX ADMIN — ALBUTEROL SULFATE 2.5 MG: 2.5 SOLUTION RESPIRATORY (INHALATION) at 09:19

## 2021-02-20 RX ADMIN — OXYCODONE HYDROCHLORIDE AND ACETAMINOPHEN 1000 MG: 500 TABLET ORAL at 08:32

## 2021-02-20 RX ADMIN — ZOLPIDEM TARTRATE 5 MG: 5 TABLET ORAL at 00:12

## 2021-02-20 RX ADMIN — METRONIDAZOLE 500 MG: 500 TABLET ORAL at 08:33

## 2021-02-20 RX ADMIN — BISACODYL 20 MG: 10 SUPPOSITORY RECTAL at 10:50

## 2021-02-20 ASSESSMENT — ACTIVITIES OF DAILY LIVING (ADL)
ADLS_ACUITY_SCORE: 31

## 2021-02-20 NOTE — DISCHARGE SUMMARY
Patient discharging home this afternoon. Went over discharge paperwork at bedside together with patient and wife. Questions answered. Picked up patient's medication from discharge pharmacy. Helped get dressed and packed belongings. Patient's daughter will provide him with a ride home.    Venessa Chauhan RN on 2/20/2021 at 2:21 PM
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
## 2021-02-20 NOTE — PROGRESS NOTES
"Brief  Urology Note    60 year old male with C5 SCI, neurogenic bladder with suprapubic tube, and admitted with autonomic dysreflexia and sepsis with obstructing left renal pelvis stone on 2/8. Underwent left stent on 2/9 and is now POD 0 left ureteroscopy, laser lithotripsy, stent exchange (stent on a string)    BP (!) 144/67 (BP Location: Right arm)   Pulse 50   Temp 95.6  F (35.3  C) (Oral)   Resp 20   Ht 1.727 m (5' 8\")   Wt 93.4 kg (205 lb 14.6 oz)   SpO2 97%   BMI 31.31 kg/m    NAD  NLB on RA  Soft, nontender  SP site healthy appearing, urine pink  WWP    A/P  60 year old with neurogenic bladder and kidney stones POD 0 left stone treatment  - Stent is on a string taped to penis. Stent can be detached and removed Friday morning  - Hematuria expected as long as stent in place. Encourage good hydration  - Patient with incomplete injury, sensation reliable and had discomfort with stone. Can consider Flomax 0.4 mg daily while stent is in place. Can schedule Tylenol if any discomfort. Decision on NSAIDs per primary. This can be useful for stent discomfort if no contraindication.  - IV antibiotics per ID  - We will arrange outpatient follow up for stone prevention discussion at patient's request  - Urology will follow peripherally.      Rosemary Gay MD  Urology Resident  "

## 2021-02-20 NOTE — DISCHARGE INSTRUCTIONS
If unable to schedule an appointment with your doctor, schedule with someone else on their clinic team.     [facility]  [address], [place], [place] [address] [phone]

## 2021-02-20 NOTE — PLAN OF CARE
Pt. A/Ox4, VSS on RA except for bradycardia. Pt has hx tetriplegia at baseline. Utilizing breath call light for assistance. Denies pain/discomfort.  Pt had tegderm dressing over penis, saturated w/ blood. Dressing replaced w/ another tegaderm to hold line for stent in place. Suprapubic catheter draining w/ good urine output. Output watermelon pink.  Pt turned Q2hrs. Oral cares completed tonight. Plan for possible discharge tomorrow vs Sunday.

## 2021-02-20 NOTE — PROGRESS NOTES
2/20/21- Received call from resident that patient is ready for discharge home today. She explained that pt's urologic stent will need to be removed on 3/1/21, and wanted to know if home nursing would be able to. Resident stated it is connected to a string, and that nursing would simply just need to pull the string and stent would come out, no further care would be needed.     Orders are already in place, however I called [facility] to verify if this could be done. Spoke with the manager there who was unsure as they have not gotten this request before. She said since they have 10 days prior until stent needs to be removed, they will plan on assessing it in the home and then make a decision, if they have any hesitancies they will contact pt's Primary Care Provider to have it pulled.     Plan passed on to resident, who verbalized understanding and agreement.     No further needs at this time.     Trang Montenegro RN   Weekend Care Coordinator ([phone])       Care Management Discharge Note    Length of Stay (days): 12    Expected Discharge Date: 02/20/21     Concerns to be Addressed:  None    Patient plan of care discussed at interdisciplinary rounds: Yes    Discharge Disposition:  Home w/spouse     Discharge Services:  [facility]: RN    Discharge DME:  New nighttime oxygen [facility] (patient does not need oxygen during the daytime or for transport home)    Patient/family educated on Medicare website which has current facility and service quality ratings:  yes  Education Provided on the Discharge Plan:  Patient and Wife  Patient/Family in Agreement with the Plan:  Yes  Referrals Placed by CM:  [facility] resumption orders placed.  Private pay costs discussed: N/A    Additional Information:  [facility] no longer needed, referral cancelled. PICC to be removed after last dose of tobramycin infused tomorrow 2/20.       Melva Muñoz RN, BSN, PHN  Care Coordinator  [facility]   [facility]  Direct phone: [phone]  Pager: [phone]    To contact the on-call Weekend Care Coordination Team please page [phone]

## 2021-02-21 LAB
BACTERIA SPEC CULT: ABNORMAL
SPECIMEN SOURCE: ABNORMAL

## 2021-02-22 NOTE — PLAN OF CARE
Occupational Therapy Discharge Summary    Reason for therapy discharge:    Discharged to home with home therapy.    Progress towards therapy goal(s). See goals on Care Plan in [facility] electronic health record for goal details.  Goals partially met.  Barriers to achieving goals:   discharge from facility.    Therapy recommendation(s):    Continued therapy is recommended.  Rationale/Recommendations:  To maximize ADL independence and safety in the home environment.

## 2021-02-23 DIAGNOSIS — N20.0 CALCULUS OF KIDNEY: Primary | ICD-10-CM

## 2021-02-23 LAB
APPEARANCE STONE: NORMAL
APPEARANCE STONE: NORMAL
COMPN STONE: NORMAL
COMPN STONE: NORMAL
NUMBER STONE: 1
NUMBER STONE: 4
SIZE STONE: NORMAL MM
SIZE STONE: NORMAL MM
WT STONE: 14 MG
WT STONE: 4 MG

## 2021-02-25 LAB
BACTERIA SPEC CULT: ABNORMAL
Lab: ABNORMAL
SPECIMEN SOURCE: ABNORMAL
SPECIMEN SOURCE: ABNORMAL

## 2021-03-01 DIAGNOSIS — R21 RASH AND OTHER NONSPECIFIC SKIN ERUPTION: ICD-10-CM

## 2021-03-01 DIAGNOSIS — G82.50 QUADRIPLEGIA (H): ICD-10-CM

## 2021-03-01 DIAGNOSIS — N31.9 NEUROGENIC BLADDER: ICD-10-CM

## 2021-03-01 DIAGNOSIS — E78.00 HIGH CHOLESTEROL: ICD-10-CM

## 2021-03-01 DIAGNOSIS — R06.2 WHEEZING: ICD-10-CM

## 2021-03-01 DIAGNOSIS — S14.106D: ICD-10-CM

## 2021-03-01 DIAGNOSIS — S14.104D: ICD-10-CM

## 2021-03-01 DIAGNOSIS — S14.105D INJURY OF FIFTH CERVICAL SPINAL CORD, SUBSEQUENT ENCOUNTER (H): ICD-10-CM

## 2021-03-01 DIAGNOSIS — J20.9 BRONCHITIS WITH BRONCHOSPASM: ICD-10-CM

## 2021-03-01 DIAGNOSIS — R25.2 SPASM: ICD-10-CM

## 2021-03-02 ENCOUNTER — MEDICAL CORRESPONDENCE (OUTPATIENT)
Dept: HEALTH INFORMATION MANAGEMENT | Facility: CLINIC | Age: 61
End: 2021-03-02

## 2021-03-03 ENCOUNTER — PRE VISIT (OUTPATIENT)
Dept: OTOLARYNGOLOGY | Facility: CLINIC | Age: 61
End: 2021-03-03

## 2021-03-03 RX ORDER — VITAMIN B COMPLEX
1 TABLET ORAL DAILY
Qty: 90 TABLET | Refills: 3 | Status: SHIPPED | OUTPATIENT
Start: 2021-03-03 | End: 2021-01-01

## 2021-03-03 RX ORDER — IBUPROFEN 800 MG/1
TABLET, FILM COATED ORAL
Qty: 60 TABLET | Refills: 11 | OUTPATIENT
Start: 2021-03-03

## 2021-03-03 RX ORDER — MAGNESIUM HYDROXIDE 1200 MG/15ML
LIQUID ORAL
Qty: 500 ML | Refills: 11 | OUTPATIENT
Start: 2021-03-03

## 2021-03-03 NOTE — TELEPHONE ENCOUNTER
VITAMIN D3 1000 IU TAB 25 MCG Tablet    Last Written Prescription Date:  12/2/2020  Last Fill Quantity: 90,   # refills: 0  Last Office Visit : 1/13/2021  Future Office visit:  3/12/2021  90 Tabs, 3 Refills sent to pharm 3/3/2021    Radha Florence RN  Central Triage Red Flags/Med Refills    IBUPROFEN 800 MG TABS 800 Tablet  ibuprofen (ADVIL/MOTRIN) 800 MG tablet (Discontinued) 60 tablet 11 7/3/2019 2/8/2021 No   Sig: TAKE 1 TABLET BY MOUTH 3 TIMES DAILY AS NEEDED   Patient not taking: Reported on 1/5/2021        Sent to pharmacy as: ibuprofen (ADVIL/MOTRIN) 800 MG tablet   Class: E-Prescribe   Reason for Discontinue: Therapy completed   Order: [id number]   E-Prescribing Status: Receipt confirmed by pharmacy (7/3/2019  1:05 PM CDT)   Printout Tracking    External Result Report   Pharmacy    [address]   This Order Has Been Discontinued    Order Status Reason By On   Discontinued Therapy completed Meaghan Tyson RPH 2/8/21 1504         SOD CHLORIDE IRR 0.9% SOL 0.9 Solution  The original prescription was discontinued on 2/8/2021 by Meaghan Tyson RPH for the following reason: Medication Reconciliation Clean Up. Renewing this prescription may not be appropriate.

## 2021-03-17 DIAGNOSIS — R21 RASH AND OTHER NONSPECIFIC SKIN ERUPTION: ICD-10-CM

## 2021-03-17 DIAGNOSIS — R06.2 WHEEZING: ICD-10-CM

## 2021-03-17 DIAGNOSIS — N31.9 NEUROGENIC BLADDER: ICD-10-CM

## 2021-03-17 DIAGNOSIS — R25.2 SPASM: ICD-10-CM

## 2021-03-17 DIAGNOSIS — E78.00 HIGH CHOLESTEROL: ICD-10-CM

## 2021-03-17 DIAGNOSIS — S14.106D: ICD-10-CM

## 2021-03-17 DIAGNOSIS — S14.105D INJURY OF FIFTH CERVICAL SPINAL CORD, SUBSEQUENT ENCOUNTER (H): ICD-10-CM

## 2021-03-17 DIAGNOSIS — S14.104D: ICD-10-CM

## 2021-03-17 DIAGNOSIS — G82.50 QUADRIPLEGIA (H): ICD-10-CM

## 2021-03-17 DIAGNOSIS — J20.9 BRONCHITIS WITH BRONCHOSPASM: ICD-10-CM

## 2021-03-18 DIAGNOSIS — R06.2 WHEEZING: ICD-10-CM

## 2021-03-18 DIAGNOSIS — R21 RASH AND OTHER NONSPECIFIC SKIN ERUPTION: ICD-10-CM

## 2021-03-18 DIAGNOSIS — E78.00 HIGH CHOLESTEROL: ICD-10-CM

## 2021-03-18 DIAGNOSIS — J20.9 BRONCHITIS WITH BRONCHOSPASM: ICD-10-CM

## 2021-03-18 DIAGNOSIS — G82.50 QUADRIPLEGIA (H): ICD-10-CM

## 2021-03-18 DIAGNOSIS — R25.2 SPASM: ICD-10-CM

## 2021-03-19 ENCOUNTER — OFFICE VISIT (OUTPATIENT)
Dept: FAMILY MEDICINE | Facility: CLINIC | Age: 61
End: 2021-03-19
Payer: COMMERCIAL

## 2021-03-19 ENCOUNTER — ANCILLARY PROCEDURE (OUTPATIENT)
Dept: CT IMAGING | Facility: CLINIC | Age: 61
End: 2021-03-19
Attending: FAMILY MEDICINE
Payer: COMMERCIAL

## 2021-03-19 ENCOUNTER — ANCILLARY PROCEDURE (OUTPATIENT)
Dept: ULTRASOUND IMAGING | Facility: CLINIC | Age: 61
End: 2021-03-19
Attending: UROLOGY
Payer: COMMERCIAL

## 2021-03-19 VITALS — DIASTOLIC BLOOD PRESSURE: 57 MMHG | SYSTOLIC BLOOD PRESSURE: 92 MMHG | HEART RATE: 53 BPM | OXYGEN SATURATION: 99 %

## 2021-03-19 DIAGNOSIS — E87.70 HYPERVOLEMIA, UNSPECIFIED HYPERVOLEMIA TYPE: ICD-10-CM

## 2021-03-19 DIAGNOSIS — R21 RASH: ICD-10-CM

## 2021-03-19 DIAGNOSIS — Z09 HOSPITAL DISCHARGE FOLLOW-UP: ICD-10-CM

## 2021-03-19 DIAGNOSIS — N20.0 CALCULUS OF KIDNEY: ICD-10-CM

## 2021-03-19 DIAGNOSIS — R60.9 FLUID RETENTION: ICD-10-CM

## 2021-03-19 DIAGNOSIS — J90 PLEURAL EFFUSION: ICD-10-CM

## 2021-03-19 DIAGNOSIS — G82.50 QUADRIPLEGIA (H): ICD-10-CM

## 2021-03-19 DIAGNOSIS — E87.70 HYPERVOLEMIA, UNSPECIFIED HYPERVOLEMIA TYPE: Primary | ICD-10-CM

## 2021-03-19 LAB
ALBUMIN SERPL-MCNC: 3.1 G/DL (ref 3.4–5)
ALP SERPL-CCNC: 94 U/L (ref 40–150)
ALT SERPL W P-5'-P-CCNC: 20 U/L (ref 0–70)
ANION GAP SERPL CALCULATED.3IONS-SCNC: 2 MMOL/L (ref 3–14)
AST SERPL W P-5'-P-CCNC: 9 U/L (ref 0–45)
BASOPHILS # BLD AUTO: 0.1 10E9/L (ref 0–0.2)
BASOPHILS NFR BLD AUTO: 0.7 %
BILIRUB SERPL-MCNC: 0.3 MG/DL (ref 0.2–1.3)
BUN SERPL-MCNC: 14 MG/DL (ref 7–30)
CALCIUM SERPL-MCNC: 8.9 MG/DL (ref 8.5–10.1)
CHLORIDE SERPL-SCNC: 104 MMOL/L (ref 94–109)
CO2 SERPL-SCNC: 33 MMOL/L (ref 20–32)
CORTIS SERPL-MCNC: 5.4 UG/DL (ref 4–22)
CREAT SERPL-MCNC: 0.29 MG/DL (ref 0.66–1.25)
DIFFERENTIAL METHOD BLD: ABNORMAL
EOSINOPHIL # BLD AUTO: 0.1 10E9/L (ref 0–0.7)
EOSINOPHIL NFR BLD AUTO: 1.2 %
ERYTHROCYTE [DISTWIDTH] IN BLOOD BY AUTOMATED COUNT: 14.4 % (ref 10–15)
FUNGUS SPEC CULT: ABNORMAL
FUNGUS SPEC CULT: ABNORMAL
GFR SERPL CREATININE-BSD FRML MDRD: >90 ML/MIN/{1.73_M2}
GLUCOSE SERPL-MCNC: 94 MG/DL (ref 70–99)
HCT VFR BLD AUTO: 42.8 % (ref 40–53)
HGB BLD-MCNC: 13.3 G/DL (ref 13.3–17.7)
IMM GRANULOCYTES # BLD: 0 10E9/L (ref 0–0.4)
IMM GRANULOCYTES NFR BLD: 0.4 %
LYMPHOCYTES # BLD AUTO: 1.1 10E9/L (ref 0.8–5.3)
LYMPHOCYTES NFR BLD AUTO: 16.4 %
MCH RBC QN AUTO: 30 PG (ref 26.5–33)
MCHC RBC AUTO-ENTMCNC: 31.1 G/DL (ref 31.5–36.5)
MCV RBC AUTO: 97 FL (ref 78–100)
MONOCYTES # BLD AUTO: 0.6 10E9/L (ref 0–1.3)
MONOCYTES NFR BLD AUTO: 8.8 %
NEUTROPHILS # BLD AUTO: 4.9 10E9/L (ref 1.6–8.3)
NEUTROPHILS NFR BLD AUTO: 72.5 %
NRBC # BLD AUTO: 0 10*3/UL
NRBC BLD AUTO-RTO: 0 /100
NT-PROBNP SERPL-MCNC: 153 PG/ML (ref 0–125)
PLATELET # BLD AUTO: 152 10E9/L (ref 150–450)
POTASSIUM SERPL-SCNC: 4.5 MMOL/L (ref 3.4–5.3)
PROT SERPL-MCNC: 7.1 G/DL (ref 6.8–8.8)
RBC # BLD AUTO: 4.43 10E12/L (ref 4.4–5.9)
SODIUM SERPL-SCNC: 139 MMOL/L (ref 133–144)
SPECIMEN SOURCE: ABNORMAL
TSH SERPL DL<=0.005 MIU/L-ACNC: 2.11 MU/L (ref 0.4–4)
WBC # BLD AUTO: 6.8 10E9/L (ref 4–11)

## 2021-03-19 PROCEDURE — 99215 OFFICE O/P EST HI 40 MIN: CPT | Performed by: FAMILY MEDICINE

## 2021-03-19 PROCEDURE — 36415 COLL VENOUS BLD VENIPUNCTURE: CPT | Performed by: PATHOLOGY

## 2021-03-19 PROCEDURE — 82533 TOTAL CORTISOL: CPT | Mod: 90 | Performed by: PATHOLOGY

## 2021-03-19 PROCEDURE — 83880 ASSAY OF NATRIURETIC PEPTIDE: CPT | Performed by: PATHOLOGY

## 2021-03-19 PROCEDURE — 76770 US EXAM ABDO BACK WALL COMP: CPT | Performed by: RADIOLOGY

## 2021-03-19 PROCEDURE — 99000 SPECIMEN HANDLING OFFICE-LAB: CPT | Performed by: PATHOLOGY

## 2021-03-19 PROCEDURE — 71250 CT THORAX DX C-: CPT | Mod: GC | Performed by: RADIOLOGY

## 2021-03-19 PROCEDURE — 80050 GENERAL HEALTH PANEL: CPT | Performed by: PATHOLOGY

## 2021-03-19 RX ORDER — FUROSEMIDE 20 MG
20 TABLET ORAL DAILY
Qty: 30 TABLET | Refills: 1 | Status: SHIPPED | OUTPATIENT
Start: 2021-03-19 | End: 2021-01-01

## 2021-03-19 RX ORDER — BENZOCAINE/MENTHOL 6 MG-10 MG
LOZENGE MUCOUS MEMBRANE 2 TIMES DAILY
Qty: 30 G | Refills: 1 | Status: SHIPPED | OUTPATIENT
Start: 2021-03-19 | End: 2021-01-01

## 2021-03-19 ASSESSMENT — PAIN SCALES - GENERAL: PAINLEVEL: NO PAIN (0)

## 2021-03-19 NOTE — PROGRESS NOTES
"    Assessment & Plan     Hypervolemia, unspecified hypervolemia type  Labs today. Start Lasix, will see if HHN can check BMP at home.   - furosemide (LASIX) 20 MG tablet; Take 1 tablet (20 mg) by mouth daily  - CBC with platelets differential; Future  - Comprehensive metabolic panel; Future  - TSH with free T4 reflex; Future  - Cortisol; Future    Quadriplegia (H)    - PHYSICAL THERAPY REFERRAL; Future    Pleural effusion  Done, rvwd, will redo in a year given smoker status, improved  - CT Chest w/o contrast; Future    Rash    - hydrocortisone (CORTAID) 1 % external cream; Apply topically 2 times daily To itchy earlobe    Kidney stones: rvwd, contacted Uro to get 24 hr urine if needed    He'll see sleep MD in meantime do oxygen as is      47 minutes spent on the date of the encounter doing chart review, history and exam, documentation and further activities as noted above    Tobacco Cessation:   reports that he has been smoking cigarettes. He has a 10.50 pack-year smoking history. He has never used smokeless tobacco.    BMI:   Estimated body mass index is 31.31 kg/m  as calculated from the following:    Height as of 2/8/21: 1.727 m (5' 8\").    Weight as of 2/16/21: 93.4 kg (205 lb 14.6 oz).     Return in about 1 month (around 4/19/2021).    Jason Long MD  [facility] [place]    Maliha Greene is a 60 year old who presents for the following health issues  accompanied by his spouse:    HPI Here in f/u. Wife here too. Was inpt, rvwd notes, very ill, kidney stones discussed (sees Uro soon, he understands to have 24 urine but not ordered, I'll contact Uro for him), also had urosepsis. He'll do renal us ordered by Uro today.     Has had edema problems in recent past, better inpt (on IV lasix he states), now worse somewhat again, abnl chest imaging noted, needs repeat including ct chest w/ smoking history, effusion unclear cause. Not on steroids.  Some sob. Wraps are very " difficult to do practically. Discussed if try lasix needs to watch bmp, will see if HHN can do that.    Overall losing strength he'd like to try PT at [address] location    Itchy inside right ear lobe, not deep in ear, chronic, watery drg    Has [facility] coming out for bladder     LIZA: rvwd inpt notes. At home now on oxygen concentrator, uses nc, finds sleeps much more restfully. Cannot use arms well, he is afraid if uses bipap could not communicate as can't remove it himself.    Muscle spasms due to quadriplegia: never fully controlled, he'd like to try med cannabis, rvwd, certified    Past Medical History:   Diagnosis Date     Asthma      Chronic infection     Bladder     Heart murmur      LIZA (obstructive sleep apnea) 12/3/2014     Quadriplegia, C1-C4 incomplete (H)      Past Surgical History:   Procedure Laterality Date     Bilateral ureteroscopy with holmium laser lithotripsy and basketing and removal of fragments  Left ureteral stent placement.  02/10/2010     COLONOSCOPY N/A 07/20/2018    Procedure: COMBINED COLONOSCOPY, SINGLE OR MULTIPLE BIOPSY/POLYPECTOMY BY BIOPSY;;  Surgeon: Grace Yang MD;  Location: [facility]     COMBINED CYSTOSCOPY, INSERT STENT URETER(S) Right 02/08/2021    Procedure: CYSTOSCOPY, WITH URETERAL STENT INSERTION;  Surgeon: Javier Barrientos MD;  Location: [facility]     COMBINED CYSTOSCOPY, RETROGRADES, URETEROSCOPY, LASER HOLMIUM LITHOTRIPSY URETER(S), INSERT STENT Right 2/19/2021    Procedure: CYSTOURETEROSCOPY, WITH RETROGRADE PYELOGRAM, HOLMIUM LASER LITHOTRIPSY AND STENT EXCHANGE, SUPRAPUBIC CATHETER EXCHANGE;  Surgeon: Bart Hebert MD;  Location: [facility]     CYSTOSCOPY, LITHOLAPAXY, COMBINED N/A 11/15/2019    Procedure: Cystolitholapaxy;  Surgeon: Obi Uriostegui MD;  [facility]     INCISION AND DRAINAGE ABDOMEN WASHOUT, COMBINED N/A 01/16/2020    Procedure: Incision and drainage abdomenal Wall with Revision Of Vesicocutaneous Anastomosis;  Surgeon: Obi Uriostegui  MD Shawn;  Location: [facility]     LASER HOLMIUM LITHOTRIPSY BLADDER N/A 01/16/2020    Procedure: LITHOTRIPSY, CALCULUS, BLADDER, USING HOLMIUM LASER;  Surgeon: Obi Uriostegui MD;  Location: [facility]     PICC SINGLE LUMEN PLACEMENT Left 02/14/2021    4Fr - 47cm (2cm external), Lateral brachial vein, SVC RA junction     skin flap on bottom       sp tube absess surgery       Current Outpatient Medications   Medication     acetaminophen (TYLENOL) 325 MG tablet     albuterol (PROVENTIL) (2.5 MG/3ML) 0.083% neb solution     amLODIPine (NORVASC) 2.5 MG tablet     ascorbic acid (VITAMIN C) 1000 MG TABS     baclofen (LIORESAL) 20 MG tablet     bisacodyl (DULCOLAX) 10 MG suppository     diazepam (VALIUM) 10 MG tablet      MG capsule     furosemide (LASIX) 20 MG tablet     hydrocortisone (CORTAID) 1 % external cream     Incontinence Supplies (URINARY LEG BAG)     lidocaine (XYLOCAINE) 2 % jelly     minocycline (MINOCIN) 50 MG capsule     Multiple Vitamins-Minerals (MULTIVITAMIN & MINERAL PO)     neomycin-polymyxin-hydrocortisone (CORTISPORIN) 3.5-52375-6 otic solution     nicotine (COMMIT) 2 MG lozenge     nicotine (NICODERM CQ) 14 MG/24HR 24 hr patch     nystatin (MYCOSTATIN) 268605 UNIT/GM external powder     olive oil external oil     oxybutynin ER (DITROPAN-XL) 10 MG 24 hr tablet     Vitamin D3 (CHOLECALCIFEROL) 25 mcg (1000 units) tablet     zolpidem (AMBIEN) 10 MG tablet     No current facility-administered medications for this visit.      Allergies   Allergen Reactions     Vancomycin Anaphylaxis                     Review of Systems         Objective    BP 92/57 (BP Location: Right arm, Patient Position: Sitting, Cuff Size: Adult Regular)   Pulse 53   SpO2 99%   There is no height or weight on file to calculate BMI.  Physical Exam   GENERAL: healthy, alert and no distress  Right earlobe inner area anterior mild red patch  NECK: no adenopathy, no asymmetry, masses, or scars and thyroid normal to  palpation    MS: one plus limb edema

## 2021-03-19 NOTE — NURSING NOTE
Chief Complaint   Patient presents with     ER F/U     pt would like to discuss ED follow up      Shala Sanders EMT at 9:20 AM sign on 3/19/2021

## 2021-03-22 ENCOUNTER — TELEPHONE (OUTPATIENT)
Dept: FAMILY MEDICINE | Facility: CLINIC | Age: 61
End: 2021-03-22

## 2021-03-22 ENCOUNTER — TELEPHONE (OUTPATIENT)
Dept: INTERNAL MEDICINE | Facility: CLINIC | Age: 61
End: 2021-03-22

## 2021-03-22 DIAGNOSIS — E87.70 HYPERVOLEMIA: Primary | ICD-10-CM

## 2021-03-22 RX ORDER — IBUPROFEN 800 MG/1
TABLET, FILM COATED ORAL
Qty: 60 TABLET | Refills: 11
Start: 2021-03-22

## 2021-03-22 NOTE — TELEPHONE ENCOUNTER
Lab order placed and informed home care nurse through [facility].    [facility]  ----------------------------------------------------------      ----- Message from Jason Long MD sent at 3/19/2021 10:19 AM CDT -----  He has [facility] coming out every other week. I'm starting Lasix, see if they can do BMP next time out.

## 2021-03-22 NOTE — TELEPHONE ENCOUNTER
Alanna,      I agree with this plan.  Jc had an appt & labs done on 3/19/21. He started lasix 20 mg/day.   Please have the BMP lab drawn during the next visit.  Thank you.    Jose Eduardo Berry RN Care Coordinator  Primary Care Clinic  Phone [phone]  Fax     [phone]

## 2021-03-22 NOTE — TELEPHONE ENCOUNTER
IBUPROFEN 800 MG TABS 800 Tablet      Not on active med list  Last Office Visit : 1/13/21  Future Office visit:  None scheduled    Routing refill request to provider for review/approval because:  Drug not active on patient's medication list    SOD CHLORIDE IRR 0.9% SOL 0.9 Solution      Not on active med list  Last Office Visit : 1/13/21  Future Office visit:  None scheduled    Routing refill request to provider for review/approval because:  Drug not active on patient's medication list  Both meds appear to have been discontinued 2/8/21 per pharmacy

## 2021-03-22 NOTE — TELEPHONE ENCOUNTER
[facility] now requests orders and shares plan of care/discharge summaries for some patients through [facility].  Please REPLY TO THIS MESSAGE OR ROUTE BACK TO THE AUTHOR in order to give authorization for orders when needed.  This is considered a verbal order, you will still receive a faxed copy of orders for signature.  Thank you for your assistance in improving collaboration for our patients.      Patient has perscriptions for the following medications that are not on the [facility] list  Montelukast 10mg 1 tab at bedtime  Oxygen 1 LPM at bedtime  Senna 8.6mg 1 tab daily      Patient is not taking the following medications  Neomycin-pplymyxin-hydrocortisone  Nicotine 2mg lozenge      ORDER  SN 1x/month for 1 month; 2x/month for 2 months; 3 PRN  OT to eval and treat    Mr. Corea mentioned you wanted labs drawn. Which labs, they can be drawn at the next visit on 3/28/21.      Alanna Orellana RN [facility]  [phone]  [email or web address]

## 2021-03-23 ENCOUNTER — MYC MEDICAL ADVICE (OUTPATIENT)
Dept: FAMILY MEDICINE | Facility: CLINIC | Age: 61
End: 2021-03-23

## 2021-03-23 RX ORDER — MAGNESIUM HYDROXIDE 1200 MG/15ML
LIQUID ORAL
Qty: 500 ML | Refills: 11 | Status: SHIPPED | OUTPATIENT
Start: 2021-03-23 | End: 2021-01-01

## 2021-03-23 RX ORDER — IBUPROFEN 800 MG/1
800 TABLET, FILM COATED ORAL 3 TIMES DAILY PRN
Qty: 60 TABLET | Refills: 11 | Status: SHIPPED | OUTPATIENT
Start: 2021-03-23 | End: 2021-01-01

## 2021-03-26 ENCOUNTER — TELEPHONE (OUTPATIENT)
Dept: INTERNAL MEDICINE | Facility: CLINIC | Age: 61
End: 2021-03-26

## 2021-03-26 NOTE — TELEPHONE ENCOUNTER
Pt had outpatient PT referral, but needs to have home care PT instead.  I left a detailed message to home care nurse, Alanna for home PT eval & treat.

## 2021-03-28 ENCOUNTER — DOCUMENTATION ONLY (OUTPATIENT)
Dept: CARE COORDINATION | Facility: CLINIC | Age: 61
End: 2021-03-28
Payer: COMMERCIAL

## 2021-03-29 ENCOUNTER — MYC MEDICAL ADVICE (OUTPATIENT)
Dept: FAMILY MEDICINE | Facility: CLINIC | Age: 61
End: 2021-03-29

## 2021-03-29 NOTE — PROGRESS NOTES
[facility] now requests orders and shares plan of care/discharge summaries for some patients through [email or web address].  Please REPLY TO THIS MESSAGE OR ROUTE BACK TO THE AUTHOR in order to give authorization for orders when needed.  This is considered a verbal order, you will still receive a faxed copy of orders for signature.  Thank you for your assistance in improving collaboration for our patients.    Hi Dr. Long,     I was unable to get BMP. Patient is a hard stick. He reports that he only took Lasix for two days because his BP was running low and he had a fever. Today temp was 97.7 and BP was 106/70. Per patient, will go to clinic to get lab draw this week.     Thank you for your time.

## 2021-03-31 DIAGNOSIS — Z53.9 DIAGNOSIS NOT YET DEFINED: Primary | ICD-10-CM

## 2021-04-01 ENCOUNTER — PRE VISIT (OUTPATIENT)
Dept: UROLOGY | Facility: CLINIC | Age: 61
End: 2021-04-01

## 2021-04-02 ENCOUNTER — MYC MEDICAL ADVICE (OUTPATIENT)
Dept: FAMILY MEDICINE | Facility: CLINIC | Age: 61
End: 2021-04-02

## 2021-04-02 DIAGNOSIS — G82.50 QUADRIPLEGIA (H): Primary | ICD-10-CM

## 2021-04-07 ENCOUNTER — MYC MEDICAL ADVICE (OUTPATIENT)
Dept: FAMILY MEDICINE | Facility: CLINIC | Age: 61
End: 2021-04-07

## 2021-04-07 ENCOUNTER — VIRTUAL VISIT (OUTPATIENT)
Dept: PHYSICAL MEDICINE AND REHAB | Facility: CLINIC | Age: 61
End: 2021-04-07
Payer: COMMERCIAL

## 2021-04-07 DIAGNOSIS — G47.30 SLEEP APNEA: Primary | ICD-10-CM

## 2021-04-07 DIAGNOSIS — Z99.3 USES POWERED WHEELCHAIR: ICD-10-CM

## 2021-04-07 DIAGNOSIS — G82.50 QUADRIPLEGIA (H): ICD-10-CM

## 2021-04-07 DIAGNOSIS — R29.898 UPPER EXTREMITY WEAKNESS: ICD-10-CM

## 2021-04-07 PROCEDURE — 99215 OFFICE O/P EST HI 40 MIN: CPT | Mod: 95 | Performed by: PHYSICAL MEDICINE & REHABILITATION

## 2021-04-07 NOTE — PROGRESS NOTES
Jc is a 61 year old who is being evaluated via a billable video visit.      How would you like to obtain your AVS? MyChart  If the video visit is dropped, the invitation should be resent by: Text to cell phone: [phone]  Will anyone else be joining your video visit? No      Video Start Time: 12:55  Video-Visit Details    Type of service:  Video Visit    Video End Time:1:17    Originating Location (pt. Location): Home    Distant Location (provider location):  [facility] MEDIC[facility]ILIT[facility] CL[facility] [place]     Platform used for Video Visit[facility]      Chief Complaint   Patient presents with     RECHECK     VIDEO VISIT RETURN      Chivo Daly
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
## 2021-04-08 NOTE — TELEPHONE ENCOUNTER
He should have sleep apnea consult even if no changes in current therapy-they could probably do a video visit w/ him and just go over everything    Let's order the new nasal cannulas

## 2021-04-12 ENCOUNTER — VIRTUAL VISIT (OUTPATIENT)
Dept: UROLOGY | Facility: CLINIC | Age: 61
End: 2021-04-12
Payer: COMMERCIAL

## 2021-04-12 DIAGNOSIS — N20.0 KIDNEY STONE: Primary | ICD-10-CM

## 2021-04-12 DIAGNOSIS — N31.9 NEUROGENIC BLADDER: ICD-10-CM

## 2021-04-12 PROBLEM — Z91.81 AT HIGH RISK FOR FALLS: Status: ACTIVE | Noted: 2021-04-12

## 2021-04-12 PROCEDURE — 99213 OFFICE O/P EST LOW 20 MIN: CPT | Mod: 95 | Performed by: NURSE PRACTITIONER

## 2021-04-12 RX ORDER — DIAZEPAM 5 MG
TABLET ORAL
COMMUNITY
Start: 2021-04-05 | End: 2021-04-14

## 2021-04-12 ASSESSMENT — PATIENT HEALTH QUESTIONNAIRE - PHQ9: SUM OF ALL RESPONSES TO PHQ QUESTIONS 1-9: 5

## 2021-04-12 NOTE — NURSING NOTE
Chief Complaint   Patient presents with     Follow Up     Stone follow up with imaging         Patient Active Problem List   Diagnosis     Abdominal pain     Neurogenic bladder      Mild LIZA with hypoventilation     Sleep related hypoventilation/hypoxemia in other disease     Cataracts, bilateral     Myopic astigmatism of both eyes     Presbyopia     Injury at C4 level of cervical spinal cord (H)     Injury at C5 level of cervical spinal cord (H)     Injury at C6 level of cervical spinal cord (H)     Pneumonia     Bacteremia     Quadriplegia (H)     Scrotal swelling     Autonomic dysreflexia     Decubitus ulcer of hip     Bladder stones     Suprapubic catheter dysfunction, initial encounter (H)     Hyponatremia     Complicated urinary tract infection     Other hypervolemia     Diaphragm paralysis     Atelectasis of left lung     Urinary tract obstruction by kidney stone     Acute respiratory failure with hypoxia (H)     Kidney stone     At high risk for falls       Allergies   Allergen Reactions     Vancomycin Anaphylaxis       Current Outpatient Medications   Medication Sig Dispense Refill     acetaminophen (TYLENOL) 325 MG tablet Take 3 tablets (975 mg) by mouth every 8 hours as needed for mild pain or fever       albuterol (PROVENTIL) (2.5 MG/3ML) 0.083% neb solution Take 1 vial (2.5 mg) by nebulization every 4 hours as needed for wheezing 100 mL 0     amLODIPine (NORVASC) 2.5 MG tablet Take 1 tablet (2.5 mg) by mouth daily as needed (autonomic dysreflexia) 30 tablet 1     ascorbic acid (VITAMIN C) 1000 MG TABS Take 1 tablet by mouth daily       baclofen (LIORESAL) 20 MG tablet TAKE ONE TABLET BY MOUTH UP TO 6 TIMES DAILY 540 tablet 3     bisacodyl (DULCOLAX) 10 MG suppository Place 20 mg rectally every 3 days Active ingredient in Magic Bullet (10mg per suppository)       diazepam (VALIUM) 10 MG tablet Take 1 tablet (10 mg) by mouth daily 30 tablet 5     diazepam (VALIUM) 5 MG tablet TAKE 1 TABLET (5 MG) BY MOUTH  EVERY 6 HOURS AS NEEDED FOR ANXIETY        MG capsule TAKE 1 CAPSULE (100 MG) BY MOUTH DAILY 100 capsule 3     furosemide (LASIX) 20 MG tablet Take 1 tablet (20 mg) by mouth daily 30 tablet 1     hydrocortisone (CORTAID) 1 % external cream Apply topically 2 times daily To itchy earlobe 30 g 1     ibuprofen (ADVIL/MOTRIN) 800 MG tablet Take 1 tablet (800 mg) by mouth 3 times daily as needed for moderate pain 60 tablet 11     Incontinence Supplies (URINARY LEG BAG) USE AS NEEDED       lidocaine (XYLOCAINE) 2 % jelly Apply  topically. APPLY AS DIRECTED       minocycline (MINOCIN) 50 MG capsule TAKE 1 CAPSULE (50 MG) BY MOUTH TWO TIMES DAILY 180 capsule 3     Multiple Vitamins-Minerals (MULTIVITAMIN & MINERAL PO) Take 1 capsule by mouth daily.       neomycin-polymyxin-hydrocortisone (CORTISPORIN) 3.5-11165-8 otic solution Place 4 drops in ear(s) 2 times daily 10 mL 11     nicotine (COMMIT) 2 MG lozenge Place 1 lozenge (2 mg) inside cheek every hour as needed for smoking cessation 27 lozenge 0     nicotine (NICODERM CQ) 14 MG/24HR 24 hr patch Place 1 patch onto the skin daily 14 patch 0     nystatin (MYCOSTATIN) 688887 UNIT/GM external powder APPLY TO AFFECTED AREA THREE TIMES DAILY AS NEEDED 60 g 3     olive oil external oil Apply topically At Bedtime       oxybutynin ER (DITROPAN-XL) 10 MG 24 hr tablet TAKE 1 TABLET (10 MG) BY MOUTH DAILY 90 tablet 3     sodium chloride 0.9%, bottle, 0.9 % irrigation USE FOR URINARY CATHETER IRRIGATION 500 mL 11     Vitamin D3 (CHOLECALCIFEROL) 25 mcg (1000 units) tablet Take 1 tablet (25 mcg) by mouth daily 90 tablet 3     zolpidem (AMBIEN) 10 MG tablet TAKE 1/2 TABLET BY MOUTH EVERY NIGHT AT BEDTIME AS NEEDED FOR SLEEP 30 tablet 5       Social History     Tobacco Use     Smoking status: Current Every Day Smoker     Packs/day: 0.30     Years: 35.00     Pack years: 10.50     Types: Cigarettes     Smokeless tobacco: Never Used     Tobacco comment: 2/18/21 Did not complete full  session. Would only answer some questions. 14mg patch and 2mg lozenges ordered   Substance Use Topics     Alcohol use: Yes     Comment: on weekends, 3-4+     Drug use: Not Currently     Types: Marijuana     Comment: occ marijuana for spasms       Alla Guillory LPN  4/12/2021  10:30 AM
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
## 2021-04-12 NOTE — PATIENT INSTRUCTIONS
UROLOGY CLINIC VISIT PATIENT INSTRUCTIONS    -Complete the Litholink kit that you have x2 days.  -Follow up with me to review the results, once completed.    If you have any issues, questions or concerns in the meantime, do not hesitate to contact us at [phone] or via [facility].     It was a pleasure meeting with you today.  Thank you for allowing me and my team the privilege of caring for you today.  YOU are the reason we are here, and I truly hope we provided you with the excellent service you deserve.  Please let us know if there is anything else we can do for you so that we can be sure you are leaving completely satisfied with your care experience.    Inga Hernandez, CNP

## 2021-04-13 ENCOUNTER — HOSPITAL ENCOUNTER (OUTPATIENT)
Dept: OCCUPATIONAL THERAPY | Facility: CLINIC | Age: 61
Setting detail: THERAPIES SERIES
End: 2021-04-13
Attending: PHYSICAL MEDICINE & REHABILITATION
Payer: COMMERCIAL

## 2021-04-13 DIAGNOSIS — R29.898 UPPER EXTREMITY WEAKNESS: ICD-10-CM

## 2021-04-13 DIAGNOSIS — R25.2 SPASM: Primary | ICD-10-CM

## 2021-04-13 PROCEDURE — 97110 THERAPEUTIC EXERCISES: CPT | Mod: GO | Performed by: OCCUPATIONAL THERAPIST

## 2021-04-13 PROCEDURE — 97166 OT EVAL MOD COMPLEX 45 MIN: CPT | Mod: GO | Performed by: OCCUPATIONAL THERAPIST

## 2021-04-14 RX ORDER — DIAZEPAM 5 MG
TABLET ORAL
Qty: 30 TABLET | Refills: 3 | Status: SHIPPED | OUTPATIENT
Start: 2021-04-14 | End: 2021-07-02

## 2021-04-14 NOTE — TELEPHONE ENCOUNTER
Patient Requested  Valium 10 mg  Last Filled  12/29/2020  Last Office Visit  01/13/2021  Next Office Visit  Nothing Scheduled   Checked  04/14/2021      DX: Insomnia    Pharmacy: [address]    ARPIAT PINA CMA at 10:11 AM on 4/14/2021.
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
## 2021-04-16 NOTE — PROGRESS NOTES
04/13/21 1000   Quick Adds   Type of Visit Initial Outpatient Occupational Therapy Evaluation   General Information   Start Of Care Date 04/13/21   Referring Physician Vince Moya MD   Orders Evaluate and treat as indicated   Orders Date 04/07/21   Medical Diagnosis Injury at C1-C4 level with complete lesion of spinal cord / UE Weakness   Surgical/Medical History Reviewed Yes   Additional Occupational Profile Info/Pertinent History of Current Problem Pt is a 60 yo man who lives with quadraplegia from C5-6 SCI s/p cervical fusion (1981) and resides with his wife and works FT as a . He was unfortunately hospitalized from 2/8-2/20/21 for large right proximal obstructing ureteral stone with emergent stent placement on 2/8, complicated by urosepsis. Underwent URS with Dr. Hebert on 2/19/21. Is now on diuretic. Notes he was wearing compression stockings on arms in winter due to edema in arms this winter. Cannot get his R hand onto joystick or near it and cannot power off or on power w/c any longer, cannot drive greater than one mile as hand control fatigues once hand is placed (walking dog). To operate the w/c during his work day as he works from home he has to leave his R hand on the goal post joystick throughout the day. He is noting considerable increase in his UE and trunkal weakness from his baseline since working from home due to Covid and also with recent hospitalization.   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   Patient role/Employment history Employed  (, [employer])   Community/Avocational Activities taking  his dog for walks in his power w/c, spending  time with his wife and dtr   Current Living Environment House   Number of Stairs to Enter Home ramp   Primary Bathroom Location/Comments main   Additional Bathroom Location/Comments roll-in shower   Additional Bathroom Set Up/Equipment   (wheeled shower commode chair)   Home/Community Accessibility Comments power w/c accessible   Prior Level - Transfers Assistive equipment and person;Completely dependent   Prior Level - Ambulation Completely dependent   Prior Level - ADLS Assitive equipment and person;Completely dependent   Prior Responsibilities - IADL Work   Current Assistive Devices - Mobility   (Mechanical lift;Power wheelchair)   Current Assistive Devices - ADL   ((Jaeco Mobile ARm Support R and new elevation style also))   Patient/family Goals Statement I want to get my arms back   Pain   Patient currently in pain Yes   Pain location shoulders   Pain rating 3/10   Pain comments muscle aches from working out   Fall Risk Screen   Fall screen completed by OT   Have you fallen 2 or more times in the past year? No   Have you fallen and had an injury in the past year? No   Timed Up and Go score (seconds) unable   Is patient a fall risk? Yes;Department fall risk interventions implemented   Fall screen comments uses thoracic strap on his power w/c for trunk support, dependent transfer   Abuse Screen (yes response referral indicated)   Feels Unsafe at Home or Work/School no   Feels Threatened by Someone no   Does Anyone Try to Keep You From Having Contact with Others or Doing Things Outside Your Home? no   Physical Signs of Abuse Present no   Cognitive Status Examination   Cognitive Comment WFL   Visual Perception   Visual Perception Comments glasses   Sensation   Sensation Comments limitations with SCI   Posture   Posture Comments forward head and neck, impaired postural control, currently dependent with trunk control to lean forward from back rest for positiona; and clothing adjustments and to hold posture statically  away from backrest on power w/c, previously able to assist with min-mod assist to lean forward and hold self up with in assist to SBA for brief periods with ADL   Range of Motion (ROM)   ROM Comments UE's PROM on R is WFL with forearm supination limated past neutral on R and clawing in fingers; L shoulder more limited with PROM approx 150 shoulder flexion and supination at neutral and clawing   Strength   Strength Comments shoulder flexion L 1/5; R 1+   MMT: Scapular   Scapular Muscle Testing Results scapular elevation 4/5, retraction 3-/5 ROM but takes resistance to 5/5 and protraction with arms held 5/5 resistance   MMT: Shoulder   Shoulder Extension - Left Side (1+/5) trace plus, left   Shoulder ABduction - Left Side (1+/5) trace plus, left   Shoulder External Rotation - Left Side (1/5) trace, left   Shoulder Extension - Right Side (2+/5) poor plus, right   Shoulder ABduction - Right Side (2-/5) poor minus, right   Shoulder External Rotation - Right Side (1+/5) trace plus, right   MMT: Elbow/Forearm   Elbow Flexion - Left Side (1+/5) trace plus, left   Elbow Extension - Left Side (1+/5) trace plus, left   Elbow Flexion - Right Side (2+/5) poor plus, right   Elbow Extension - Right Side (2/5) poor, right   Hand Strength   Hand Dominance Right   Hand Strength Comments impaired   Coordination   Coordination Comments impaired   Functional Mobility   Ambulation unable   Wheelchair dependent to power w/c on and off and place hand on w/c goal post joystick and adjust speed in order to use   Transfer Skill   Level of [place]: Transfers dependent (less than 25% patients effort)   Bathing   Level of [place] - Bathing dependent (less than 25% patients effort)   Upper Body Dressing   Level of [place]: Dress Upper Body dependent (less than 25% patients effort)   Lower Body Dressing   Level of [place]: Dress Lower Body dependent (less than 25% patients effort)   Toileting   Level of [place]: Toilet  "dependent (less than 25% patients effort)   Toileting Comments suprapubic   Grooming   Level of [place]: Grooming dependent (less than 25% patients effort)   Eating/Self-Feeding   Level of [place]: Eating dependent (less than 25% patients effort)   Eating/Self Feeding Comments in past able to feed self portion of meals with universal cuff and long handled custom swivel spoon and mobile arm support   Activity Tolerance   Activity Tolerance impaired   Planned Therapy Interventions   Planned Therapy Interventions Strengthening;Stretching;Manual therapy;Neuromuscular re-education;ADL training;ROM;Self care/Home management;Therapeutic activities   Planned Modalities Electrical stimulation  (Functional Electrical Stimulation upper body Cycle)    OT Goal 1   Goal Description Pt will demonstrate understanding of options for new powered mobility and alternative drive control options and seating and positioning   Target Date 07/06/21    OT Goal 2   Goal Description Pt will demonstrate improved endurance and trunk control for ADL as measured by ability to hold position of trunk away from back rest during clothing adjustments for at least 15\" with no greater than min assist.   Target Date 07/06/21    OT Goal 3   Goal Description Pt will demonstrate improved UE functional strength and endurance to allow for use of R UE to safely operate his power w/c (turn on/off, drive distances and control safely) with new equipment as needed and as measured by improvements in R UE strength to shoulder abd 2+, elbow ext 2+, and elbow flexion 3-/5.   Target Date 07/06/21   Clinical Impression   Criteria for Skilled Therapeutic Interventions Met Yes, treatment indicated   OT Diagnosis Impaired ADL/IADL and functional mobility   Influenced by the following impairments Impaired R UE AROM/PROM, strength and coordination, fatigue, reduced endurance, impaired sensation, impaired balance and trunk control   Assessment of Occupational " Performance 3-5 Performance Deficits   Identified Performance Deficits feeding self, functional mobility, ADL, work   Clinical Decision Making (Complexity) Moderate complexity   Therapy Frequency 2x/week   Predicted Duration of Therapy Intervention (days/wks) 2x/week + 1 session for wheelchair assessment in 12 weeks and taper to 1x/week for 12 weeks-see for 24 weeks   Risks and Benefits of Treatment have been explained. Yes   Patient, Family & other staff in agreement with plan of care Yes   Education Assessment   Barriers To Learning No Barriers   Preferred Learning Style Listening;Demonstration   Total Evaluation Time   OT Brown, Moderate Complexity Minutes (37964) 30

## 2021-04-20 ENCOUNTER — HOSPITAL ENCOUNTER (OUTPATIENT)
Dept: OCCUPATIONAL THERAPY | Facility: CLINIC | Age: 61
Setting detail: THERAPIES SERIES
End: 2021-04-20
Attending: PHYSICAL MEDICINE & REHABILITATION
Payer: COMMERCIAL

## 2021-04-20 PROCEDURE — 97032 APPL MODALITY 1+ESTIM EA 15: CPT | Mod: GO | Performed by: OCCUPATIONAL THERAPIST

## 2021-04-23 ENCOUNTER — MYC MEDICAL ADVICE (OUTPATIENT)
Dept: FAMILY MEDICINE | Facility: CLINIC | Age: 61
End: 2021-04-23

## 2021-04-23 NOTE — LETTER
Bart Corea  [address] [place]  [facility] [address]  : 1960  MRN:  [id number]      2021          To whom it may concern,    Bart Corea is a patient of mine. He is legally handicapped due to the medical condition.  If you have questions, please do not hesitate to contact our clinic.            Jason Long MD

## 2021-04-26 NOTE — TELEPHONE ENCOUNTER
Alanna Juarez    We placed the DME order for nasal canula.  You may see this order under 'other orders' in Epic.  Thank you.    Soon-Mi

## 2021-04-27 ENCOUNTER — MEDICAL CORRESPONDENCE (OUTPATIENT)
Dept: HEALTH INFORMATION MANAGEMENT | Facility: CLINIC | Age: 61
End: 2021-04-27

## 2021-04-29 ENCOUNTER — HOSPITAL ENCOUNTER (OUTPATIENT)
Dept: OCCUPATIONAL THERAPY | Facility: CLINIC | Age: 61
Setting detail: THERAPIES SERIES
End: 2021-04-29
Attending: PHYSICAL MEDICINE & REHABILITATION
Payer: COMMERCIAL

## 2021-04-29 PROCEDURE — 97542 WHEELCHAIR MNGMENT TRAINING: CPT | Mod: GO,GT | Performed by: OCCUPATIONAL THERAPIST

## 2021-04-29 NOTE — PROGRESS NOTES
Bart Corea is a 61 year old male who is being seen via a billable video visit.      Patient has given verbal consent for Video visit? Yes    Video Start Time: 930    Telehealth Visit Details    Type of Service:  Telehealth    Video End Time (time video stopped): 1000    Originating Location (pt. location): Home    Additional Participants in Telehealth Visit: Roshan Mcrae from reliable    Distant Location (provider location):  [facility]     Mode of Communication (Audio Visual or Audio Only):  audio visual    Karen Wells, CANDELARIO  April 29, 2021

## 2021-05-11 ENCOUNTER — HOSPITAL ENCOUNTER (OUTPATIENT)
Dept: OCCUPATIONAL THERAPY | Facility: CLINIC | Age: 61
Setting detail: THERAPIES SERIES
End: 2021-05-11
Attending: PHYSICAL MEDICINE & REHABILITATION
Payer: COMMERCIAL

## 2021-05-11 PROCEDURE — 97032 APPL MODALITY 1+ESTIM EA 15: CPT | Mod: GO | Performed by: OCCUPATIONAL THERAPIST

## 2021-05-13 ENCOUNTER — HOSPITAL ENCOUNTER (OUTPATIENT)
Dept: OCCUPATIONAL THERAPY | Facility: CLINIC | Age: 61
Setting detail: THERAPIES SERIES
End: 2021-05-13
Attending: PHYSICAL MEDICINE & REHABILITATION
Payer: COMMERCIAL

## 2021-05-13 PROCEDURE — 97032 APPL MODALITY 1+ESTIM EA 15: CPT | Mod: GO | Performed by: OCCUPATIONAL THERAPIST

## 2021-05-20 ENCOUNTER — MEDICAL CORRESPONDENCE (OUTPATIENT)
Dept: HEALTH INFORMATION MANAGEMENT | Facility: CLINIC | Age: 61
End: 2021-05-20

## 2021-05-20 ENCOUNTER — TELEPHONE (OUTPATIENT)
Dept: FAMILY MEDICINE | Facility: CLINIC | Age: 61
End: 2021-05-20

## 2021-05-20 NOTE — TELEPHONE ENCOUNTER
Routing to PCP Dr. Long: Pt is not seen at [facility], unsure which RN pool to route to.    Please route to appropriate pools.    Pina Franklin RN  [facility]

## 2021-05-20 NOTE — TELEPHONE ENCOUNTER
[facility] now requests orders and shares plan of care/discharge summaries for some patients through [employer].  Please REPLY TO THIS MESSAGE OR ROUTE BACK TO THE AUTHOR in order to give authorization for orders when needed.  This is considered a verbal order, you will still receive a faxed copy of orders for signature.  Thank you for your assistance in improving collaboration for our patients.    ORDER  2x/month for 2 months; 3PRN  For suprapubic cath management

## 2021-05-28 ENCOUNTER — VIRTUAL VISIT (OUTPATIENT)
Dept: SLEEP MEDICINE | Facility: CLINIC | Age: 61
End: 2021-05-28
Attending: FAMILY MEDICINE
Payer: COMMERCIAL

## 2021-05-28 DIAGNOSIS — J96.11 CHRONIC RESPIRATORY FAILURE WITH HYPOXIA AND HYPERCAPNIA (H): Primary | ICD-10-CM

## 2021-05-28 DIAGNOSIS — G47.33 OSA (OBSTRUCTIVE SLEEP APNEA): ICD-10-CM

## 2021-05-28 DIAGNOSIS — G82.50 QUADRIPLEGIA (H): ICD-10-CM

## 2021-05-28 DIAGNOSIS — J96.12 CHRONIC RESPIRATORY FAILURE WITH HYPOXIA AND HYPERCAPNIA (H): Primary | ICD-10-CM

## 2021-05-28 PROCEDURE — 99205 OFFICE O/P NEW HI 60 MIN: CPT | Mod: 95 | Performed by: INTERNAL MEDICINE

## 2021-05-31 NOTE — PROGRESS NOTES
Bart is a 61 year old who is being evaluated via a billable video visit.       How would you like to obtain your AVS? MyChart  If the video visit is dropped, the invitation should be resent by: Text to cell phone:    Will anyone else be joining your video visit? No       Video-Visit Details     Type of service:  Video Visit  Video Start: 05/28/2021  12:04 pm  Video Stop: 05/28/2021 12:42 pm       Originating Location (pt. Location): Home     Distant Location (provider location):  [facility]      Platform used for Video Visit: [facility]  Sleep Consultation Note     Date on this visit: 5/28/2021    Bart Corea is sent by Jason Long for a sleep consultation regarding evaluation and management of previously diagnosed sleep-related breathing disorder.  He was hospitalized in February 2021 with sepsis due to UTI and was noted to have acute hypoxemic hypercapnic respiratory failure.    Primary Physician: Jason Long     Chief complaint: Previously diagnosed sleep apnea.  Using oxygen at 1 L/min during sleep since he was discharged from hospital in February 2021.    Bart Corea 61 year old with PMH quadriplegia due to C4-C6 SCI, neurogenic bladder s/p suprapubic catheter, hx of nephrolithiasis.  He was hospitalized on 2/10/2021 in the setting of right renal calculus and UTI. He was [place] [facility] [place] due to Sepsis 2/2 complicated UTI (Pseudomonas UTI, urinary catheter associated).  He was in acute hypercapnic and hypoxic respiratory failure, was prescribed O2 at 1 L/min following his discharge from hospital, for use during sleep. He also was noted to have  left hemidiaphragm paralysis( chronic, new diagnosis during the recent hospitalization).    Since he has been discharged from hospital he reports using oxygen at 1 L/min during sleep regularly.  He reports with the oxygen that he has been sleeping much better. During  the recent hospitalization, there were reports of witnessed apneic events with hypoxia noted while sleeping. AM VBG showed hypercarbia consistent with LIZA and he was recommended repeating an outpatient sleep study (challenging given his quadriplegia and the current pandemic). The recommendation of his primary team, Pulmonology and RT was that he have BiPAP at night and that he needs nasal pillow as mask prevents his ability to call for help given his baseline quadriplegia.  However, he prefers to continue oxygen and rather not be on BiPAP treatment.  One of his fears is that if he needs to yell to get someone's attention, he is afraid  because of the dexterity issues that he cannot take the mask off.     Previous sleep study report:  Location: [facility]  Date of study: December 4, 2014  Weight at the time of sleep study: 160 pounds  All sleep stages were observed.  AHI was 8.4 events per hour.  REM AHI was 17.1 events per hour.  RDI was 9.4 events per hour.  Mean O2 saturation was 96.6% and the lowest O2 saturation was 68.8%.  Total time with oxygen saturations at or below 88% was 10.8 minutes.  There was also evidence of sleep associated hypoventilation.  The recommendation was for him to pursue an all-night titration study using the bilevel AVAPS which was never completed and he has not had any follow-up at the sleep clinic since then.    He has gained weight since the last PSG his current weight is 220 pounds.    He usually sleeps on his sides, has a hospital bed, and there is no concerns about snoring if he sleeps on his sides.  He reports occasional snort arousals.  There have not been any  reports of awakenings gasping for air or choking since he has been using oxygen following the hospital discharge in February 2021.  He denies morning headaches.     During workdays, Bart goes to bed at 10 PM, usually watches TV for some time and falls asleep between 11:30 PM to 12 midnight.  and  wakes up at 7-7 30 AM.  On non-work days, He falls asleep at 11:30 PM to 12 midnight and gets between 9-10 AM.   He does not always feel rested upon awakening from sleep.  He was also having concerns of fatigue and excessive daytime sleepiness prior to the hospitalization but reports that his energy levels have improved at least by 95% and he is no longer as sleepy as he used to be since he has been discharged from the hospital.  He does not drive.  Bart does not nap during the day.    Allergies:    Allergies   Allergen Reactions     Vancomycin Anaphylaxis       Medications:    Current Outpatient Medications   Medication Sig Dispense Refill     acetaminophen (TYLENOL) 325 MG tablet Take 3 tablets (975 mg) by mouth every 8 hours as needed for mild pain or fever       albuterol (PROVENTIL) (2.5 MG/3ML) 0.083% neb solution Take 1 vial (2.5 mg) by nebulization every 4 hours as needed for wheezing 100 mL 0     amLODIPine (NORVASC) 2.5 MG tablet Take 1 tablet (2.5 mg) by mouth daily as needed (autonomic dysreflexia) 30 tablet 1     ascorbic acid (VITAMIN C) 1000 MG TABS Take 1 tablet by mouth daily       baclofen (LIORESAL) 20 MG tablet TAKE ONE TABLET BY MOUTH UP TO 6 TIMES DAILY 540 tablet 3     bisacodyl (DULCOLAX) 10 MG suppository Place 20 mg rectally every 3 days Active ingredient in Magic Bullet (10mg per suppository)       diazepam (VALIUM) 10 MG tablet Take 1 tablet (10 mg) by mouth daily 30 tablet 5     diazepam (VALIUM) 5 MG tablet TAKE 1 TABLET (5 MG) BY MOUTH EVERY 6 HOURS AS NEEDED FOR ANXIETY 30 tablet 3      MG capsule TAKE 1 CAPSULE (100 MG) BY MOUTH DAILY 100 capsule 3     furosemide (LASIX) 20 MG tablet Take 1 tablet (20 mg) by mouth daily 30 tablet 1     hydrocortisone (CORTAID) 1 % external cream Apply topically 2 times daily To itchy earlobe 30 g 1     ibuprofen (ADVIL/MOTRIN) 800 MG tablet Take 1 tablet (800 mg) by mouth 3 times daily as needed for moderate pain 60 tablet 11      Incontinence Supplies (URINARY LEG BAG) USE AS NEEDED       lidocaine (XYLOCAINE) 2 % jelly Apply  topically. APPLY AS DIRECTED       minocycline (MINOCIN) 50 MG capsule TAKE 1 CAPSULE (50 MG) BY MOUTH TWO TIMES DAILY 180 capsule 3     Multiple Vitamins-Minerals (MULTIVITAMIN & MINERAL PO) Take 1 capsule by mouth daily.       neomycin-polymyxin-hydrocortisone (CORTISPORIN) 3.5-13717-5 otic solution Place 4 drops in ear(s) 2 times daily 10 mL 11     nicotine (COMMIT) 2 MG lozenge Place 1 lozenge (2 mg) inside cheek every hour as needed for smoking cessation 27 lozenge 0     nicotine (NICODERM CQ) 14 MG/24HR 24 hr patch Place 1 patch onto the skin daily 14 patch 0     nystatin (MYCOSTATIN) 428372 UNIT/GM external powder APPLY TO AFFECTED AREA THREE TIMES DAILY AS NEEDED 60 g 3     olive oil external oil Apply topically At Bedtime       oxybutynin ER (DITROPAN-XL) 10 MG 24 hr tablet TAKE 1 TABLET (10 MG) BY MOUTH DAILY 90 tablet 3     sodium chloride 0.9%, bottle, 0.9 % irrigation USE FOR URINARY CATHETER IRRIGATION 500 mL 11     Vitamin D3 (CHOLECALCIFEROL) 25 mcg (1000 units) tablet Take 1 tablet (25 mcg) by mouth daily 90 tablet 3     zolpidem (AMBIEN) 10 MG tablet TAKE 1/2 TABLET BY MOUTH EVERY NIGHT AT BEDTIME AS NEEDED FOR SLEEP 30 tablet 5       Problem List:  Patient Active Problem List    Diagnosis Date Noted     At high risk for falls 04/12/2021     Priority: Medium     Acute respiratory failure with hypoxia (H) 02/17/2021     Priority: Medium     Diaphragm paralysis 02/16/2021     Priority: Medium     Left       Atelectasis of left lung 02/16/2021     Priority: Medium     Urinary tract obstruction by kidney stone 02/16/2021     Priority: Medium     Hyponatremia 02/08/2021     Priority: Medium     Complicated urinary tract infection 02/08/2021     Priority: Medium     Other hypervolemia 02/08/2021     Priority: Medium     Kidney stone 02/08/2021     Priority: Medium     Added automatically from request for  surgery [id number]       Bladder stones 12/02/2019     Priority: Medium     Added automatically from request for surgery [id number]       Suprapubic catheter dysfunction, initial encounter (H) 12/02/2019     Priority: Medium     Added automatically from request for surgery [id number]       Autonomic dysreflexia 08/01/2017     Priority: Medium     Scrotal swelling 06/14/2016     Priority: Medium     Quadriplegia (H) 01/04/2016     Priority: Medium     Bacteremia 11/17/2015     Priority: Medium     Pneumonia 11/15/2015     Priority: Medium     Injury at C4 level of cervical spinal cord (H) 09/15/2015     Priority: Medium     Injury at C5 level of cervical spinal cord (H) 09/15/2015     Priority: Medium     Injury at C6 level of cervical spinal cord (H) 09/15/2015     Priority: Medium     Cataracts, bilateral 01/14/2015     Priority: Medium     Myopic astigmatism of both eyes 01/14/2015     Priority: Medium     Presbyopia 01/14/2015     Priority: Medium      Mild LIZA with hypoventilation 12/03/2014     Priority: Medium     [facility] psg 12/2/14       Sleep related hypoventilation/hypoxemia in other disease 12/03/2014     Priority: Medium     Problem list name updated by automated process. Provider to review       Neurogenic bladder 12/16/2013     Priority: Medium     Abdominal pain 12/13/2011     Priority: Medium     Problem list name updated by automated process. Provider to review       Decubitus ulcer of hip 03/14/2008     Priority: Medium        Past Medical/Surgical History:  Past Medical History:   Diagnosis Date     Asthma      Chronic infection     Bladder     Heart murmur      LIZA (obstructive sleep apnea) 12/3/2014     Quadriplegia, C1-C4 incomplete (H)      Past Surgical History:   Procedure Laterality Date     Bilateral ureteroscopy with holmium laser lithotripsy and basketing and removal of fragments  Left ureteral stent placement.  02/10/2010     COLONOSCOPY N/A 07/20/2018    Procedure: COMBINED COLONOSCOPY, SINGLE OR  MULTIPLE BIOPSY/POLYPECTOMY BY BIOPSY;;  Surgeon: Grace Yang MD;  Location: [facility]     COMBINED CYSTOSCOPY, INSERT STENT URETER(S) Right 02/08/2021    Procedure: CYSTOSCOPY, WITH URETERAL STENT INSERTION;  Surgeon: Javier Barrientos MD;  Location: [facility]     COMBINED CYSTOSCOPY, RETROGRADES, URETEROSCOPY, LASER HOLMIUM LITHOTRIPSY URETER(S), INSERT STENT Right 2/19/2021    Procedure: CYSTOURETEROSCOPY, WITH RETROGRADE PYELOGRAM, HOLMIUM LASER LITHOTRIPSY AND STENT EXCHANGE, SUPRAPUBIC CATHETER EXCHANGE;  Surgeon: Bart Hebert MD;  Location: [facility]     CYSTOSCOPY, LITHOLAPAXY, COMBINED N/A 11/15/2019    Procedure: Cystolitholapaxy;  Surgeon: Obi Uriostegui MD;  Location: [facility]     INCISION AND DRAINAGE ABDOMEN WASHOUT, COMBINED N/A 01/16/2020    Procedure: Incision and drainage abdomenal Wall with Revision Of Vesicocutaneous Anastomosis;  Surgeon: Obi Uriostegui MD;  Location: [facility]     LASER HOLMIUM LITHOTRIPSY BLADDER N/A 01/16/2020    Procedure: LITHOTRIPSY, CALCULUS, BLADDER, USING HOLMIUM LASER;  Surgeon: Obi Uriostegui MD;  Location: [facility]     PICC SINGLE LUMEN PLACEMENT Left 02/14/2021    4Fr - 47cm (2cm external), Lateral brachial vein, SVC RA junction     skin flap on bottom       sp tube absess surgery         Social History:    He is a  for [employer]. He lives with his wife and an attendant. He works 5 days a week from 9 AM to 5:30 PM    Social History     Socioeconomic History     Marital status:      Spouse name: Not on file     Number of children: Not on file     Years of education: Not on file     Highest education level: Not on file   Occupational History     Not on file   Social Needs     Financial resource strain: Not on file     Food insecurity     Worry: Not on file     Inability: Not on file     Transportation needs     Medical: Not on file     Non-medical: Not on file   Tobacco Use     Smoking status: Current Every Day Smoker      Packs/day: 0.30     Years: 35.00     Pack years: 10.50     Types: Cigarettes     Smokeless tobacco: Never Used     Tobacco comment: 2/18/21 Did not complete full session. Would only answer some questions. 14mg patch and 2mg lozenges ordered   Substance and Sexual Activity     Alcohol use: Yes     Comment: on weekends, 3-4+     Drug use: Not Currently     Types: Marijuana     Comment: occ marijuana for spasms     Sexual activity: Never   Lifestyle     Physical activity     Days per week: Not on file     Minutes per session: Not on file     Stress: Not on file   Relationships     Social connections     Talks on phone: Not on file     Gets together: Not on file     Attends [employer] service: Not on file     Active member of club or organization: Not on file     Attends meetings of clubs or organizations: Not on file     Relationship status: Not on file     Intimate partner violence     Fear of current or ex partner: Not on file     Emotionally abused: Not on file     Physically abused: Not on file     Forced sexual activity: Not on file   Other Topics Concern     Parent/sibling w/ CABG, MI or angioplasty before 65F 55M? Not Asked   Social History Narrative     Not on file       Family History:  Family History   Problem Relation Age of Onset     Cancer Father      Diabetes No family hx of      Glaucoma No family hx of      Hypertension No family hx of      Macular Degeneration No family hx of      Retinal detachment No family hx of      Anesthesia Reaction No family hx of      Deep Vein Thrombosis (DVT) No family hx of        Physical Examination:  Vitals: There were no vitals taken for this visit.  BMI= There is no height or weight on file to calculate BMI.         Armour Total Score 10/9/2014   Total score - Armour 4     General: No apparent distress  Head: Normocephalic, atraumatic  Chest: No cough, no audible wheezing, able to talk in full sentences  Skin: no rash  Psych: coherent speech, normal rate and  volume, able to articulate logical thoughts, able   to abstract reason, no tangential thoughts, no hallucinations   or delusions  His  affect is normal  Neuro:  Mental status: Alert and  Oriented X 3      OTHER TESTS/LABS:   I have reviewed the lab and made my comment in the assessment and plan.  CT chest on recent hospital admission 2/12/21: Small bilateral pleural effusions and adjacent bibasilar  consolidations, left greater than right with near complete atelectasis of the left lower lobe. Findings likely represent a component of compressive atelectasis, and super imposed infection cannot entirely be excluded.  Sniff test confirmed diagnosis of left diaphragm hemiparalysis.   He underwent thoracentesis 2/16 with 550 mL transudative fluid based on Light's criteria. Differential includes hypoalbuminemia vs atelectasis).    Repeat chest CT 3/19/21:  Decrease in size of the left pleural effusion with small residual.  Associated basilar atelectasis. Trace right pleural effusion and  atelectasis.  Scattered sub-6 mm pulmonary nodules are unchanged from 2/12/2021  however are new from 2/8/2021.   Suspect infectious/inflammatory  etiology. Recommend optional twelve-month follow-up CT if patient is  considered high risk.    Comprehensive metabolic panel:   Ref range 3/19/2021   Sodium 133 - 144 mmol/L 139    Potassium 3.4 - 5.3 mmol/L 4.5    Chloride 94 - 109 mmol/L 104    Carbon Dioxide 20 - 32 mmol/L 33High     Anion Gap 3 - 14 mmol/L 2Low     Glucose 70 - 99 mg/dL 94    Urea Nitrogen 7 - 30 mg/dL 14    Creatinine 0.66 - 1.25 mg/dL 0.29Low     GFR Estimate >60 mL/min  >90      Calcium 8.5 - 10.1 mg/dL 8.9    Bilirubin Total 0.2 - 1.3 mg/dL 0.3    Albumin 3.4 - 5.0 g/dL 3.1Low     Protein Total 6.8 - 8.8 g/dL 7.1    Alkaline Phosphatase 40 - 150 U/L 94    ALT 0 - 70 U/L 20    AST 0 - 45 U/L 9    Resulting Agency           Impression/Plan:  Previously diagnosed mild obstructive sleep apnea, pronounced during REM sleep  "with sleep associated hypoxemia and hypoventilation.  Patient was hospitalized in February 2021 due to sepsis secondary to UTI and was noted to have acute hypercapnic and hypoxemic respiratory failure.  He was discharged home on O2 at 1 L/min during sleep.  We discussed the consequences of untreated sleep-related breathing disorder.  We also discussed the fact that oxygen may not overcome upper airway obstruction and we discussed about the benefits from using the noninvasive ventilator in the management of the sleep-related breathing disorder.  We discussed the options of : a) repeating her overnight polysomnogram with all-night titration using bilevel AVAPS device to establish the optimal treatment to control the sleep-related breathing disorder.  He will need PCA services during the sleep study or   b) the option of initiating treatment with noninvasive ventilation through [facility] with follow up. He prefers the latter option.  I discussed with the [facility] medical RT, Fred Liao, who is working on trying to get him set up with a noninvasive ventilator.  He will be set on the noninvasive ventilator at the following settings:     Mode: PS-SV/AVAPS-AE   Backup Rate 12   VT (ml) 500   EPAP (Min) / PEEP 5   EPAP Max (AVAPS-AE) 10   PS Min 5   PS Max 15   Rise Time 3   Max Pressure (AVAPS-AE) 25   Ti Min 0.5   Note:  Inspiratory time minimum   Usage Time: During Sleep       Will work closely with RT, monitor the compliance download, obtain oximetry and check end-tidal CO2 after initiating the treatment.      We discussed weight management with healthy diet, and exercise.      Plan is to f/u in 4-5 weeks after initiating treatment with non invasive ventilator.    CC: Jason Long    The above note was dictated using voice recognition software. Although reviewed after completion, some word and grammatical error may remain . Please contact the author for any clarifications.    \"I spent a total of  " "60 minutes with Bart Corea during today's  Video visit sleep-related breathing disorder, PSG vs. initiation of treatment with noninvasive ventilator for the management of SRBD,   weight management , going over sleep study and chart review., including documentation and further activities as noted above.\"      Rony De Anda MD  [facility]   Floor 1, [address]   [address] [place], [place] [address] [address]   Appointments: [phone]         "

## 2021-06-08 ENCOUNTER — DOCUMENTATION ONLY (OUTPATIENT)
Dept: SLEEP MEDICINE | Facility: CLINIC | Age: 61
End: 2021-06-08

## 2021-06-08 DIAGNOSIS — Z53.9 DIAGNOSIS NOT YET DEFINED: Primary | ICD-10-CM

## 2021-06-08 NOTE — PROGRESS NOTES
NON-INVASIVE VENTILATOR SETUP  TREATMENT PLAN AND GOALS:  PATIENT INSTRUCTED ON USE AND CARE OF EQUIPMENT.    Patient was met in home for set up on non-invasive ventilator (NIV) device for treatment of chronic respiratory condition on date 6/1/2021.  Patient was accompanied by his wife.  Patient is on home oxygen therapy serviced through [employer].  Patient and wife were instructed on use and care of equipment.  They have been instructed on process to page RT staff after business hours.  RT will monitor and offer home visits PRN.  Patient will follow up and establish care with Pulmonary/Sleep Clinic with provider name.    Vitals:  BS clear and decreased SpO2 96%% on RZ  HR 64 RR 16 Patient was Alert and Oriented    SETUP DATE: 6/1/2021  DEVICE TYPE: Trilogy Nick  SETTINGS: AVAPS AE RR 12, , EPAP 5-10, PS 5-15, MAX PRESSURE 25  INTERFACE: NASAL MASK  CIRCUIT/HUMIDITY: HEATED CIRCUIT AND HUMIDITY    MIKAELA Z-RRT  [phone]  [facility]

## 2021-06-10 DIAGNOSIS — Z53.9 DIAGNOSIS NOT YET DEFINED: Primary | ICD-10-CM

## 2021-06-15 ENCOUNTER — HOSPITAL ENCOUNTER (OUTPATIENT)
Dept: OCCUPATIONAL THERAPY | Facility: CLINIC | Age: 61
Setting detail: THERAPIES SERIES
End: 2021-06-15
Payer: COMMERCIAL

## 2021-06-15 ENCOUNTER — DOCUMENTATION ONLY (OUTPATIENT)
Dept: SLEEP MEDICINE | Facility: CLINIC | Age: 61
End: 2021-06-15

## 2021-06-15 PROCEDURE — 97110 THERAPEUTIC EXERCISES: CPT | Mod: GO | Performed by: OCCUPATIONAL THERAPIST

## 2021-06-15 NOTE — PROGRESS NOTES
6/14/2021    PATIENT ASKED ME TO COME TO HIS HOUSE BECAUSE HE WAS FEELING LIKE HE WAS NOT ABLE TO GET A LONG ENOUGH BREATH WHEN HE WANTED TO INHALE. HE FELT LIKE THE NIV WAS CUTTING HIM OFF TOO SOON WHILE INHALING. I WENT THERE AND WE MOVED SOME SETTINGS AROUND, MOSTLY COMFORT SETTINGS. HE STATED HE WAS GOING TO TRY THIS FOR A FEW NIGHTS AND LET ME KNOW HOW IT GOES.    DIAGNOSIS: QUADRIPLEGIA G82.50, CRF WITH HYPERCAPNIA J96.12  VITALS: BS CLEAR AND DIMINISHED SpO2 94% ON RA HR 68 RR 18 A/O ALERT AND ORIENTED    SETUP DATE: 6/1/2021  DEVICE TYPE: TRILOGY ANGELIC  SETTINGS (include mode): AVAPS AE, , MAX PRESSURE 25, EPAP 5/10, PS 5/20, RR 10  VENT CHECK: PIP 15.1, , RR 11, MINVENT 8.1  COMFORT SETTINGS: IT 1.5S, TRIGGER AUTO. RISE 2, AVAPS SPEED 5  INTERFACE: NASAL MASK OR NASAL PILLOWS  CIRCUIT/HUMIDITY: HEATED CIRCUIT AND HEATED HUMIDITY  ALARMS: DIS 60S  O2 BLEED IN (YES/NO): YES 1L/M    NOTES: PATIENT WOULD LIKE TO TRY A FFM MOST LIKELY ON NEXT VISIT    CARE TEAM: DR. LUIS AL Z-RRT  [phone]

## 2021-06-16 DIAGNOSIS — Z53.9 DIAGNOSIS NOT YET DEFINED: Primary | ICD-10-CM

## 2021-06-18 ENCOUNTER — HOSPITAL ENCOUNTER (OUTPATIENT)
Dept: OCCUPATIONAL THERAPY | Facility: CLINIC | Age: 61
Setting detail: THERAPIES SERIES
End: 2021-06-18
Payer: COMMERCIAL

## 2021-06-18 PROCEDURE — 97032 APPL MODALITY 1+ESTIM EA 15: CPT | Mod: GO | Performed by: OCCUPATIONAL THERAPIST

## 2021-06-21 DIAGNOSIS — Z53.9 DIAGNOSIS NOT YET DEFINED: Primary | ICD-10-CM

## 2021-06-29 ENCOUNTER — HOSPITAL ENCOUNTER (OUTPATIENT)
Dept: OCCUPATIONAL THERAPY | Facility: CLINIC | Age: 61
Setting detail: THERAPIES SERIES
End: 2021-06-29
Payer: COMMERCIAL

## 2021-06-29 PROCEDURE — 97032 APPL MODALITY 1+ESTIM EA 15: CPT | Mod: GO | Performed by: OCCUPATIONAL THERAPIST

## 2021-07-01 DIAGNOSIS — R25.2 SPASM: ICD-10-CM

## 2021-07-02 ENCOUNTER — HOSPITAL ENCOUNTER (OUTPATIENT)
Dept: OCCUPATIONAL THERAPY | Facility: CLINIC | Age: 61
Setting detail: THERAPIES SERIES
End: 2021-07-02
Payer: COMMERCIAL

## 2021-07-02 PROCEDURE — 97032 APPL MODALITY 1+ESTIM EA 15: CPT | Mod: GO | Performed by: OCCUPATIONAL THERAPIST

## 2021-07-02 RX ORDER — DIAZEPAM 5 MG
TABLET ORAL
Qty: 30 TABLET | Refills: 3 | Status: SHIPPED | OUTPATIENT
Start: 2021-07-02 | End: 2021-09-27

## 2021-07-02 NOTE — TELEPHONE ENCOUNTER
Patient Requested  Valium 10 mg  Last Filled  04/14/2021  Last Office Visit  01/13/2021  Next Office Visit  Nothing Scheduled   Checked  07/02/2021      DX: Insomnia    Pharmacy: [address]  ARPITA PINA CMA at 7:01 AM on 7/2/2021.

## 2021-07-06 ENCOUNTER — HOSPITAL ENCOUNTER (OUTPATIENT)
Dept: OCCUPATIONAL THERAPY | Facility: CLINIC | Age: 61
Setting detail: THERAPIES SERIES
End: 2021-07-06
Payer: COMMERCIAL

## 2021-07-06 PROCEDURE — 97535 SELF CARE MNGMENT TRAINING: CPT | Mod: GO | Performed by: OCCUPATIONAL THERAPIST

## 2021-07-06 PROCEDURE — 97032 APPL MODALITY 1+ESTIM EA 15: CPT | Mod: GO | Performed by: OCCUPATIONAL THERAPIST

## 2021-07-06 NOTE — PROGRESS NOTES
"                                                                                [facility]      OUTPATIENT OCCUPATIONAL THERAPY  PLAN OF TREATMENT FOR OUTPATIENT REHABILITATION    Patient's Last Name, First Name, M.I.                YOB: 1960  YulianaBart hopson  WILLI                        Provider's Name  [facility] Medical Record No.  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 / UE Weakness                           OT Diagnosis: Impaired ADL/IADL and functional mobility      _________________________________________________________________________________  Objective Measures:  R UE strength:  Scapular elevation: 5/5 ; retraction 3/5 shoulder abd: 50 degrees, 2/5 , flex 2/5, ext rot 1+/5, shoulder ext 3-/5, elbow flex 90(anti gravity) 3-/5; elbow ext full (gravity reduced) 2/5;    Plan of Treatment:    L UE strength:  Scapular elevation: 5/5;  retraction: 3/5 ; shoulder abd 20 degrees 2-/5 ,, shoulder ext 2-, ext rot 1/5; elbow flex 2-  and elbow ext 2-/5    Frequency/Duration: Plan: see pt 2x/week for 8 weeks and taper to 1 time per week     Goals:    Goal Identifier     Goal Description Pt will demonstrate improved endurance and trunk control for ADL as measured by ability to hold position of trunk away from back rest during clothing adjustments for at least 15\" with no greater than min assist.   Target Date 07/06/21   Date Met   7/6/21   Progress:Pt has made gains with trunk control since onset of OT. He is able to hold himself at times with SBA and min assist other times for up to 15 \" during clothing adjustments seated in w/c when moved away from back of w/c backrest and reports he does the same at home with caregivers.     Goal Identifier     Goal Description Pt will demonstrate improved UE " "functional strength and endurance to allow for use of R UE to safely operate his power w/c (turn on/off, drive distances and control safely) with new equipment as needed and as measured by improvements in R UE strength to shoulder abd 2+, elbow ext 2+, and elbow flexion 3-/5.   Target Date 07/06/21; extend to 9/29/21   Date Met      Progress:Is progressing with increased UE strength and ability now to turn on power w/c x 3 during session and get his UE/hand onto goal post joystick 2/2 times consecutive in session and additional times and 65-75% at home.     Goal Identifier  new goal   Goal Description    Pt will demonstrate improved endurance and trunk control for ADL as measured by ability to hold position of trunk away from back rest during clothing adjustments for at least 30\" with no greater than min assist.      Target Date  9/29/21   Date Met      Progress:       Progress Toward Goals:   Progress this reporting period: Pt has made gains in Orvlile UE, scapular and core/trunk strength since onset of OT 4/13/21 being seen x 10 total OT sessions thus far. Pt has made functional improvements in ability to operate his power w/c getting his R UE onto his goal post joystick 65-75% of the time at home per report and has observed in OT more times each session as compared to unable at onset of OT due to deconditioning. He has been working on follow through with his A2B HEP and also working hard in his [employer] on UE exer in gravity reduced planes as well with caregiver set up.Pt has not been able to attend at frequency recommended due to his inconsistent transportation related to Covid Pandemic. Plan to continue OT POC as pt is making gains and is highly motivated to continue to improve his UE function, trunk control and ability to operate his power w/c to his baseline Ind level.    Certification date from 7/7/21 to 9/29/21.    Gomez Bravo OTR/L          I CERTIFY THE NEED FOR THESE SERVICES FURNISHED " UNDER        THIS PLAN OF TREATMENT AND WHILE UNDER MY CARE     (Physician co-signature of this document indicates review and certification of the therapy plan).                Referring Provider: Vince Moya MD

## 2021-07-09 ENCOUNTER — MYC MEDICAL ADVICE (OUTPATIENT)
Dept: FAMILY MEDICINE | Facility: CLINIC | Age: 61
End: 2021-07-09

## 2021-07-09 ENCOUNTER — HOSPITAL ENCOUNTER (OUTPATIENT)
Dept: OCCUPATIONAL THERAPY | Facility: CLINIC | Age: 61
Setting detail: THERAPIES SERIES
End: 2021-07-09
Payer: COMMERCIAL

## 2021-07-09 DIAGNOSIS — R22.43 LOCALIZED SWELLING OF BOTH LOWER LEGS: Primary | ICD-10-CM

## 2021-07-09 PROCEDURE — 97032 APPL MODALITY 1+ESTIM EA 15: CPT | Mod: GO | Performed by: OCCUPATIONAL THERAPIST

## 2021-07-13 NOTE — TELEPHONE ENCOUNTER
Signed  Jobst can be measured by whoever supplies them, places that sell them have staff that can measure

## 2021-07-21 ENCOUNTER — TELEPHONE (OUTPATIENT)
Dept: FAMILY MEDICINE | Facility: CLINIC | Age: 61
End: 2021-07-21

## 2021-07-21 NOTE — TELEPHONE ENCOUNTER
[facility] Call Center    Phone Message    May a detailed message be left on voicemail: yes     Reason for Call: Order(s): Home Care Orders: Skilled Nursing:  Sn: 1x a month for 1 month, 2x a month for 2 months, for catheter changes please call with verbal order thank you.      Action Taken: Message routed to:  [facility]    Travel Screening: Not Applicable

## 2021-07-22 ENCOUNTER — TRANSFERRED RECORDS (OUTPATIENT)
Dept: HEALTH INFORMATION MANAGEMENT | Facility: CLINIC | Age: 61
End: 2021-07-22

## 2021-07-22 NOTE — TELEPHONE ENCOUNTER
I called and left message approving orders below.  Celia Dominique, EMT at 12:13 PM on 7/22/2021.  [facility]  [facility]  [place]  [phone]

## 2021-08-06 ENCOUNTER — HOSPITAL ENCOUNTER (OUTPATIENT)
Dept: OCCUPATIONAL THERAPY | Facility: CLINIC | Age: 61
Setting detail: THERAPIES SERIES
End: 2021-08-06
Payer: COMMERCIAL

## 2021-08-06 DIAGNOSIS — Z53.9 DIAGNOSIS NOT YET DEFINED: Primary | ICD-10-CM

## 2021-08-06 PROCEDURE — 97032 APPL MODALITY 1+ESTIM EA 15: CPT | Mod: GO | Performed by: OCCUPATIONAL THERAPIST

## 2021-08-09 DIAGNOSIS — G47.00 INSOMNIA, UNSPECIFIED TYPE: ICD-10-CM

## 2021-08-09 RX ORDER — ZOLPIDEM TARTRATE 10 MG/1
5 TABLET ORAL
Qty: 30 TABLET | Refills: 5 | Status: SHIPPED | OUTPATIENT
Start: 2021-08-09 | End: 2021-08-26

## 2021-08-09 NOTE — TELEPHONE ENCOUNTER
ZOLPIDEM 10MG TAB 10 Tablet  Last Written Prescription Date:  2/19/2021  Last Fill Quantity: 30,   # refills: 5  Last Office Visit :  3/19/2021  Future Office visit:  None    Routing refill request to provider for review/approval because:  Drug not on the [facility], P or [facility] refill protocol or controlled substance      Radha Florence RN  Central Triage Red Flags/Med Refills

## 2021-08-11 ENCOUNTER — MYC MEDICAL ADVICE (OUTPATIENT)
Dept: FAMILY MEDICINE | Facility: CLINIC | Age: 61
End: 2021-08-11

## 2021-08-13 ENCOUNTER — HOSPITAL ENCOUNTER (OUTPATIENT)
Dept: OCCUPATIONAL THERAPY | Facility: CLINIC | Age: 61
Setting detail: THERAPIES SERIES
End: 2021-08-13
Payer: COMMERCIAL

## 2021-08-13 PROCEDURE — 97032 APPL MODALITY 1+ESTIM EA 15: CPT | Mod: GO | Performed by: OCCUPATIONAL THERAPIST

## 2021-08-18 ENCOUNTER — HOSPITAL ENCOUNTER (OUTPATIENT)
Dept: OCCUPATIONAL THERAPY | Facility: CLINIC | Age: 61
Setting detail: THERAPIES SERIES
End: 2021-08-18
Payer: COMMERCIAL

## 2021-08-18 PROCEDURE — 97032 APPL MODALITY 1+ESTIM EA 15: CPT | Mod: GO | Performed by: OCCUPATIONAL THERAPIST

## 2021-08-20 ENCOUNTER — HOSPITAL ENCOUNTER (OUTPATIENT)
Dept: OCCUPATIONAL THERAPY | Facility: CLINIC | Age: 61
Setting detail: THERAPIES SERIES
End: 2021-08-20
Payer: COMMERCIAL

## 2021-08-20 PROCEDURE — 97032 APPL MODALITY 1+ESTIM EA 15: CPT | Mod: GO | Performed by: OCCUPATIONAL THERAPIST

## 2021-08-21 DIAGNOSIS — G82.50 QUADRIPLEGIA (H): ICD-10-CM

## 2021-08-21 DIAGNOSIS — G47.00 INSOMNIA, UNSPECIFIED TYPE: ICD-10-CM

## 2021-08-21 DIAGNOSIS — R25.2 SPASM: ICD-10-CM

## 2021-08-21 NOTE — PROGRESS NOTES
[facility]    Progress Note - [facility]        Date of Admission:  2/8/2021    Main Plans for Today  - respiratory cares (duonebs, metanebs, vest therapy)  - sniff test  - PICC/IV abx teaching   - transfer off 6B    Assessment & Plan       Bart Corea is a 60 year old male with PMH quadriplegia due to C4-C6 SCI, neurogenic bladder s/p suprapubic catheter, hx of nephrolithiasis who was admitted on 2/8/2021 autonomic dysreflexia in the setting of right renal calculus and UTI. Initially hypertensive at home, admitted for UTI, s/p right uretral stent placement, then post procedure hypotensive and meeting septic shock criteria requiring pressors. [place] [facility] [place]       # Acute hypercapnic and hypoxic respiratory failure  # L hemidiaphragm elevation, concerning for L hemidiaphragm paralysis  # L lower lobe atelectasis  # Left-sided pleural effusion, chronic  # High pretest probability for sleep apnea  # Tobacco dependence  Initially on room air, post procedure requiring 3-6 L and weaned to room air over the course of ICU stay. Patient has apneic events while sleeping but does not require oxygen when awake at baseline- evidence of LIZA on prior sleep study 2014, does not have CPAP. CT chest on admission revealed left pleural effusion with pleural thickening, initially concerning for empyema however this was also seen on prior CT in 2018. Looking further back, it does appear he required thoracentesis in ~2009 at which time fluid was consistent with parapneumonic effusion. Has had improved respiratory status, no cough, fever, chills. Does have pre-existing asthma and chronic hypercapnia. Continues to smoke (with the assistance of wife). Echocardiogram 2/08 with EF 55-60%. Repeat CT chest 2/12 showed small bilateral pleural effusions, adjacent bibasilar consolidations and near complete atelectasis of the LLL. AM ABG showed hypercapnia.    - Pulmonology consulted, appreciate recommendations   - bronchial drainage regimen: chest physiotherapy with metaneb, vest, flutter valve   - Procedure team consult for thoracentesis    - duoneb scheduled QID   - sniff test with fluoroscopy to work up possible L hemidiaphragm paralysis   - bipap, will try to arrange nasal cannula as mask prevents his ability to call for help given his baseline quadriplegia    - outpatient sleep study   - repeat CXR in 1-2 days   - repeat chest CT in 6-8 weeks due to smoking hx. If continued atelectasis/lobar collapse, consider bronchoscopy       # Sepsis 2/2 complicated UTI, resolved  # Right renal calculus, obstructive s/p right ureter stent placement  # Bacteroides fragilis in blood culture (growth on 2nd day in 1/2 bcx)  # Suprapubic catheter, chronic  CT AP showed right renal calculus and hydronephrosis, UA indicitve of infection, back/abd pain c/w past UTIs/stones. He underwent right ureteral stent placement 2/8, transferred initially to the ICU with postoperative hypotension with c/f sepsis. Urine culture 2/08 growing Pseudomonas. The patient has now remained afebrile, off pressors since 2/10. Suprapubic cath changed 2/12. PICC placed 2/14 for long term antibiotics.   - Urology following, appreciate recs  - ID consulted, appreciate recs   - Continue IV tobramycin x2 weeks (2/08-2/22), pharmacy to assist with dosing, will need weekly CBC with diff and CMP    - PO Flagyl 500 mg q8h for bacteremia (2/12-2/26)  - [facility] consult for PICC and IV antibiotic teaching  - PTA oxybutynin 10 mg daily    Antibiotics  - PO Flagyl (2/12- 2/26)  - IV Tobramycin (2/9 - 2/22)  - IV Zosyn (2/8 -2/12)  - Linezolid (2/9 -2/10) - for sepsis     # Hx insomnia  # Delirium   Notes indicate a history of insomnia, for which he started Ambien within the past year. More recent PCP notes mention decreasing the Ambien 10 to 5 mg, and wife mentions that his vivid dreams may have increased  "since starting this medication about a year ago. He also uses diazepam as an outpatient. Also mention of alcohol and tobacco use.   - PTA zolpidem 5 mg at bedtime (decreased from PTA dose of 10 mg)  - PTA diazepam 10 mg daily   - melatonin at bedtime     # Autonomic dysreflexia  # Hypotension  # Quadriplegia 2/2 C4-C6 SCI   Frequent bradycardia and fluctuating blood pressure at home (baseline ~110/70) per patient report. Patient initially hypertensive at home over the past week, lower blood pressures after using his home amlodipine which he uses PRN, typically a few times a year Initially admitted to ICU 2/9 with symptomatic hypotension-shock, requiring pressor support. He continues to have intermittent hypotension, though with MAPs>50 and off dopamine since 2/10 AM. Being cautious with IVF given his overall hypervolemia and requiring Lasix x1. TTE on 2/10 with small pericardial effusion, normal EF and cardiac motion. He has chronic sinus bradycardia in the 40s, episode of more persistently in 30s and feeling \"off \" on 2/13. Cardiac Electrophysiology consulted for sinus bradycardia, no recommendations for permanent pacemaker placement at this time as it is unlikely to improve his quality of life or longevity unless he becomes more/consistently symptomatic.   - Telemetry, q4h vitals  - Attempt elevation of legs if persistently hypotensive/MAPs <50    # Leukopenia, resolved  # Thrombocytopenia, stable  Likely related to sepsis. No history of similar thrombocytopenia on chart review, however, when septic in the past did have leukopenia down to 3.6 (2017).   - trend CBC      # Spacticity, chronic  - PTA Baclofen 20 mg 4 times daily scheduled, additional PRN 2 times daily  - PTA diazepam 10 daily, additional PRN 5 mg every day        Diet: Regular Diet Adult    Fluids: PO  Lines: PIV x2  DVT Prophylaxis: Enoxaparin (Lovenox) subcutaneous - hold for thoracentesis tomorrow  Amanda Catheter: not present  Code Status: Full " Code         Disposition Plan   Expected discharge: 1-2 days, recommended to prior living arrangement once adequate pain management/ tolerating PO medications, antibiotic plan established and SIRS/Sepsis treated.  Entered: Maxine Chowdhury MD 02/14/2021, 7:52 PM     The patient's care was discussed with the Attending Physician, Dr. Alexander.    Maxine Chowdhury MD  [facility]  Please see sign in/sign out for up to date coverage information  ______________________________________________________________________    Interval History   No acute events overnight. PICC placed yesterday. No further symptoms of symptomatic bradycardia (no lightheadedness). No chest pain, continues to have intermittent dyspnea. No fevers/chills. Did have a bowel movement with suppository yesterday. Apprehensive when discussing discharge, concerned about his wife having to take over all of the cares he has been receiving.     Data reviewed today: I reviewed all medications, new labs and imaging results over the last 24 hours.     Physical Exam   Vital Signs: Temp: 97.7  F (36.5  C) Temp src: Oral BP: (!) 170/97 Pulse: (!) 42   Resp: 16 SpO2: 100 % O2 Device: None (Room air) Oxygen Delivery: 1 LPM  Weight: 209 lbs 3.46 oz     Physical Exam  Constitutional:       General: He is not in acute distress.     Appearance: He is well-developed. He is not diaphoretic.   Cardiovascular:      Comments: Distant heart sounds, HR 40-50s  Pulmonary:      Effort: Pulmonary effort is normal. No respiratory distress.      Comments: Breathing comfortably on room air  Abdominal:      General: There is distension.      Tenderness: Tenderness: minimal sensation at baseline.   Musculoskeletal:      Right lower leg: Edema (1+ pitting) present.      Left lower leg: Edema (1+ pitting) present.   Skin:     General: Skin is warm.      Coloration: Skin is pale (bilateral feet). Skin is not  jaundiced.      Findings: Bruising (involving the bilateral feet and multiple toes. patient's wife present and states feet look at baseline) present. No rash.   Neurological:      Mental Status: He is alert.      Comments: Quadriplegia at baseline   Psychiatric:         Mood and Affect: Mood normal.        Data   Recent Labs   Lab 02/14/21  0523 02/13/21  0540 02/12/21  0820 02/10/21  0532 02/10/21  0532 02/10/21  0402 02/08/21  1137 02/08/21  1137   WBC 3.7* 3.4* 3.8*   < > 4.0 Canceled, Test credited   < > 3.5*   HGB 12.7* 12.4* 13.5   < > 14.5 Canceled, Test credited   < > 15.9   MCV 97 97 96   < > 97 Canceled, Test credited   < > 95   * 106* 84*   < > 82* Canceled, Test credited   < > 83*   INR  --   --   --   --   --   --   --  0.91    139 135   < > 136 Canceled, Test credited   < > 132*   POTASSIUM 4.3 3.9 4.0   < > 4.2 Canceled, Test credited   < > 4.5   CHLORIDE 97 101 97   < > 99 Canceled, Test credited   < > 98   CO2 34* 35* 34*   < > 33* Canceled, Test credited   < > 34*   BUN 7 6* 8   < > 12 Canceled, Test credited   < > 17   CR 0.31* 0.31* 0.37*   < > 0.42* Canceled, Test credited   < > 0.30*   ANIONGAP 3 3 4   < > 4 Canceled, Test credited   < > 1*   LIU 9.1 9.0 8.8   < > 8.7 Canceled, Test credited   < > 9.2   GLC 97 107* 77   < > 75 Canceled, Test credited   < > 72   ALBUMIN  --   --   --   --  2.6* Canceled, Test credited   < > 3.2*   PROTTOTAL  --   --   --   --  6.0* Canceled, Test credited   < > 6.6*   BILITOTAL  --   --   --   --  1.1 Canceled, Test credited   < > 0.5   ALKPHOS  --   --   --   --  89 Canceled, Test credited   < > 106   ALT  --   --   --   --  42 Canceled, Test credited   < > 35   AST  --   --   --   --  29 Canceled, Test credited   < > 19   TROPI  --   --   --   --   --   --   --  <0.015    < > = values in this interval not displayed.     Recent Results (from the past 24 hour(s))   XR Chest Port 1 View    Narrative    Exam: XR CHEST PORT 1 VW, 2/14/2021 3:50  PM    Indication: PICC placement    Comparison: 2/8/2021    Findings:   Semiupright AP view of the chest. Left upper extremity PICC tip  projecting over the low SVC.  Midline trachea. Stable enlarged cardiac silhouette. Pulmonary  vasculature is engorged and indistinct. Hazy perihilar interstitial  opacities. Relatively unchanged left lower lobe atelectasis.  Additional aerated lungs are relatively clear. Bilateral left greater  than right pleural effusions with associated atelectasis. No  appreciable pneumothorax. Visualized upper abdomen is unremarkable.  Osteopenic appearance of the bones.      Impression    Impression:   1. Left upper extremity PICC tip projecting over the low SVC.  2. Relatively unchanged cardiomegaly and small bilateral pleural  effusions and associated bilateral left greater than right lower lobe  atelectasis.    I have personally reviewed the examination and initial interpretation  and I agree with the findings.    MARK VALLES MD      independent

## 2021-08-23 ENCOUNTER — MYC MEDICAL ADVICE (OUTPATIENT)
Dept: FAMILY MEDICINE | Facility: CLINIC | Age: 61
End: 2021-08-23

## 2021-08-24 RX ORDER — ZOLPIDEM TARTRATE 10 MG/1
TABLET ORAL
Qty: 30 TABLET | Refills: 5 | OUTPATIENT
Start: 2021-08-24

## 2021-08-24 RX ORDER — OXYBUTYNIN CHLORIDE 10 MG/1
10 TABLET, EXTENDED RELEASE ORAL DAILY
Qty: 90 TABLET | Refills: 3 | Status: SHIPPED | OUTPATIENT
Start: 2021-08-24 | End: 2021-01-01

## 2021-08-25 ENCOUNTER — TELEPHONE (OUTPATIENT)
Dept: FAMILY MEDICINE | Facility: CLINIC | Age: 61
End: 2021-08-25

## 2021-08-25 DIAGNOSIS — G47.00 INSOMNIA, UNSPECIFIED TYPE: ICD-10-CM

## 2021-08-25 NOTE — TELEPHONE ENCOUNTER
[facility] Call Center    Phone Message    May a detailed message be left on voicemail: yes     Reason for Call: Medication Refill Request    Has the patient contacted the pharmacy for the refill? Yes   Name of medication being requested: zolpidem (AMBIEN) 10 MG tablet   Provider who prescribed the medication: Dr. Long  [facility]  Date medication is needed: Today     Pt is calling in stating he needs a refill and it needs to be done today because he is out and he cannot sleep with out it.    Please call pt when this has been done      Action Taken: Message routed to:  [facility]    Travel Screening: Not Applicable

## 2021-08-26 ENCOUNTER — MYC MEDICAL ADVICE (OUTPATIENT)
Dept: FAMILY MEDICINE | Facility: CLINIC | Age: 61
End: 2021-08-26

## 2021-08-26 ENCOUNTER — TELEPHONE (OUTPATIENT)
Dept: FAMILY MEDICINE | Facility: CLINIC | Age: 61
End: 2021-08-26

## 2021-08-26 DIAGNOSIS — G47.00 INSOMNIA, UNSPECIFIED TYPE: ICD-10-CM

## 2021-08-26 RX ORDER — ZOLPIDEM TARTRATE 10 MG/1
5-10 TABLET ORAL
Qty: 30 TABLET | Refills: 5 | Status: SHIPPED | OUTPATIENT
Start: 2021-08-26 | End: 2021-01-01

## 2021-08-26 RX ORDER — ZOLPIDEM TARTRATE 10 MG/1
TABLET ORAL
Qty: 15 TABLET | Refills: 5 | Status: SHIPPED | OUTPATIENT
Start: 2021-08-26 | End: 2021-08-26

## 2021-08-26 NOTE — TELEPHONE ENCOUNTER
Dr. Long    Direction of Zolpidem 10 mg : TAKE 1/2 TO 1 TABLET BY MOUTH EVERY NIGHT AT BEDTIME AS NEEDED FOR SLEEP    But for some reason or discharge reason, it was switched to 5 mg/day.    Pt has been taking 10 mg/day as usual.  Do you agree to switch back to 10 mg/day ?    If so, please sign pended Rx.   Thank you.

## 2021-08-26 NOTE — TELEPHONE ENCOUNTER
Patient Requested  Ambien  Last Filled  08/09/2021  Last Office Visit  01/13/2021  Next Office Visit  Nothing Scheduled   Checked  08/26/2021      DX: Insomnia    Pharmacy: [address]    ARPITA Duque RN at 7:00 AM on 8/26/2021.

## 2021-08-26 NOTE — TELEPHONE ENCOUNTER
[facility] Call Center    Phone Message    May a detailed message be left on voicemail: yes     Reason for Call: Medication Question or concern regarding medication   Prescription Clarification  Name of Medication: Zolpidem 10mg  Prescribing Provider: Dr Long   Pharmacy [facility] - [place], [place] - [address] [place] [address]    What on the order needs clarification? Pharmacy calling about early refill due to the patient taking 1 tablet vs a 1/2 tab per day. Please call to verify with the pharmacy.           Action Taken: Message routed to:  [facility]    Travel Screening: Not Applicable

## 2021-08-31 ENCOUNTER — HOSPITAL ENCOUNTER (OUTPATIENT)
Dept: OCCUPATIONAL THERAPY | Facility: CLINIC | Age: 61
Setting detail: THERAPIES SERIES
End: 2021-08-31
Payer: COMMERCIAL

## 2021-08-31 PROCEDURE — 97032 APPL MODALITY 1+ESTIM EA 15: CPT | Mod: GO | Performed by: OCCUPATIONAL THERAPIST

## 2021-09-03 ENCOUNTER — HOSPITAL ENCOUNTER (OUTPATIENT)
Dept: OCCUPATIONAL THERAPY | Facility: CLINIC | Age: 61
Setting detail: THERAPIES SERIES
End: 2021-09-03
Payer: COMMERCIAL

## 2021-09-03 PROCEDURE — 97032 APPL MODALITY 1+ESTIM EA 15: CPT | Mod: GO | Performed by: OCCUPATIONAL THERAPIST

## 2021-09-07 DIAGNOSIS — R21 RASH AND OTHER NONSPECIFIC SKIN ERUPTION: Primary | ICD-10-CM

## 2021-09-07 DIAGNOSIS — G82.50 QUADRIPLEGIA (H): ICD-10-CM

## 2021-09-08 ENCOUNTER — HOSPITAL ENCOUNTER (OUTPATIENT)
Dept: OCCUPATIONAL THERAPY | Facility: CLINIC | Age: 61
Setting detail: THERAPIES SERIES
End: 2021-09-08
Payer: COMMERCIAL

## 2021-09-08 PROCEDURE — 97032 APPL MODALITY 1+ESTIM EA 15: CPT | Mod: GO | Performed by: OCCUPATIONAL THERAPIST

## 2021-09-10 ENCOUNTER — HOSPITAL ENCOUNTER (OUTPATIENT)
Dept: OCCUPATIONAL THERAPY | Facility: CLINIC | Age: 61
Setting detail: THERAPIES SERIES
End: 2021-09-10
Payer: COMMERCIAL

## 2021-09-10 PROCEDURE — 97032 APPL MODALITY 1+ESTIM EA 15: CPT | Mod: GO | Performed by: OCCUPATIONAL THERAPIST

## 2021-09-10 RX ORDER — MINOCYCLINE HYDROCHLORIDE 50 MG/1
50 CAPSULE ORAL 2 TIMES DAILY
Qty: 60 CAPSULE | Refills: 7 | Status: SHIPPED | OUTPATIENT
Start: 2021-09-10 | End: 2021-01-01

## 2021-09-20 DIAGNOSIS — R25.2 SPASM: ICD-10-CM

## 2021-09-21 ENCOUNTER — TELEPHONE (OUTPATIENT)
Dept: FAMILY MEDICINE | Facility: CLINIC | Age: 61
End: 2021-09-21

## 2021-09-21 NOTE — TELEPHONE ENCOUNTER
[facility] Call Center    Phone Message    May a detailed message be left on voicemail: yes     Reason for Call: Order(s): Home Care Orders: Skilled Nursing:  every other week for 9 weeks, ok to continue suprapubic catheter changes every other week and as needed and ok to flush catheter as needed with 16ml of normal saline.      Action Taken: Message routed to:  [facility]    Travel Screening: Not Applicable

## 2021-09-23 NOTE — TELEPHONE ENCOUNTER
Called and left a secure VM.  Gave verbal orders per Dr. Long for Order(s): Home Care Orders: Skilled Nursing:  every other week for 9 weeks, ok to continue suprapubic catheter changes every other week and as needed and ok to flush catheter as needed with 16ml of normal saline      Fco Cano CMA (AAMA) at 11:07 AM on 9/23/2021

## 2021-09-27 RX ORDER — DIAZEPAM 5 MG
TABLET ORAL
Qty: 30 TABLET | Refills: 3 | Status: SHIPPED | OUTPATIENT
Start: 2021-09-27 | End: 2021-01-01

## 2021-09-27 NOTE — TELEPHONE ENCOUNTER
Patient Requested  Valium 10 mg  Last Filled  07/02/2021  Last Office Visit  01/13/2021  Next Office Visit  Nothing Scheduled   Checked  09/27/2021      DX: Insomnia    Pharmacy: [address]    ARPITA Duque RN at 9:05 AM on 9/27/2021.

## 2021-09-28 ENCOUNTER — MYC MEDICAL ADVICE (OUTPATIENT)
Dept: FAMILY MEDICINE | Facility: CLINIC | Age: 61
End: 2021-09-28

## 2021-09-29 ENCOUNTER — TELEPHONE (OUTPATIENT)
Dept: PHYSICAL MEDICINE AND REHAB | Facility: CLINIC | Age: 61
End: 2021-09-29

## 2021-09-29 NOTE — TELEPHONE ENCOUNTER
[facility] Call Center    Phone Message    May a detailed message be left on voicemail: yes     Reason for Call: Other: pt had to cancel today's appt due to illness and pt needs to get in sooner with Dr. Moya  sooner than the first available in Jan 2022. Please call pt. Thank you.    Action Taken: Message routed to:  [facility]    Travel Screening: Not Applicable

## 2021-10-05 DIAGNOSIS — Z53.9 DIAGNOSIS NOT YET DEFINED: Primary | ICD-10-CM

## 2021-10-06 DIAGNOSIS — R29.898 UPPER EXTREMITY WEAKNESS: Primary | ICD-10-CM

## 2021-10-11 DIAGNOSIS — K59.00 CONSTIPATION, UNSPECIFIED CONSTIPATION TYPE: ICD-10-CM

## 2021-10-12 ENCOUNTER — HOSPITAL ENCOUNTER (OUTPATIENT)
Dept: OCCUPATIONAL THERAPY | Facility: CLINIC | Age: 61
Setting detail: THERAPIES SERIES
End: 2021-10-12
Payer: COMMERCIAL

## 2021-10-12 PROCEDURE — 97032 APPL MODALITY 1+ESTIM EA 15: CPT | Mod: GO | Performed by: OCCUPATIONAL THERAPIST

## 2021-10-14 ENCOUNTER — HOSPITAL ENCOUNTER (OUTPATIENT)
Dept: OCCUPATIONAL THERAPY | Facility: CLINIC | Age: 61
Setting detail: THERAPIES SERIES
End: 2021-10-14
Payer: COMMERCIAL

## 2021-10-14 PROCEDURE — 97032 APPL MODALITY 1+ESTIM EA 15: CPT | Mod: GO | Performed by: OCCUPATIONAL THERAPIST

## 2021-10-15 RX ORDER — SENNOSIDES 8.6 MG/1
TABLET ORAL
Qty: 90 TABLET | Refills: 1 | Status: SHIPPED | OUTPATIENT
Start: 2021-10-15 | End: 2021-01-01

## 2021-10-19 ENCOUNTER — HOSPITAL ENCOUNTER (OUTPATIENT)
Dept: OCCUPATIONAL THERAPY | Facility: CLINIC | Age: 61
Setting detail: THERAPIES SERIES
End: 2021-10-19
Payer: COMMERCIAL

## 2021-10-19 PROCEDURE — 97032 APPL MODALITY 1+ESTIM EA 15: CPT | Mod: GO | Performed by: OCCUPATIONAL THERAPIST

## 2021-10-19 NOTE — PROVIDER NOTIFICATION
Pagefaizan Cowan's at 6:35 AM notifying MD that ABG resulted w/ pH 7.33, O2 62, CO2 67, and HCO3 35. No new orders.    Procedure: R EVD  Blood loss: minimal   Physician: JOSEPHINE Beyer MD   Complications: None  Opening pressure: ~20cm H20    Procedure Description: The patients head was placed on a towel and positioned looking upward. The entry site in the right frontal area was shaved of hair and cleaned with rubbing alcohol then marked with a sterile skin marker. Kocher's point was measured and marked in the right frontal area for the catheter entry site. The frontal area was prepped and draped in the usual sterile fashion. The incision brian was infiltrated with 1% lidocaine.  A 1 inch incision was made with a #10 blade at Kocher's point. A small self-retainer was placed in the wound to retract the skin edges.  A twist drill sivan hole was drilled at Kocher's point. A ventricular catheter was inserted into the right frontal horn of the lateral ventricle to a depth of 7cm. The catheter was tunneled out through a separate stab incision anterior and lateral to the wound. The incision was  then closed with 3-0 nylon in a running fashion. The catheter was secured to the scalp at 3 points with 3-0 nylon suture. It was then connected to a sterile drainage system.     The wound was dressed with a Tegaderm. There were no immediate complications. The drapes were removed.

## 2021-10-21 ENCOUNTER — HOSPITAL ENCOUNTER (OUTPATIENT)
Dept: OCCUPATIONAL THERAPY | Facility: CLINIC | Age: 61
Setting detail: THERAPIES SERIES
End: 2021-10-21
Payer: COMMERCIAL

## 2021-10-21 PROCEDURE — 97032 APPL MODALITY 1+ESTIM EA 15: CPT | Mod: GO | Performed by: OCCUPATIONAL THERAPIST

## 2021-10-23 ENCOUNTER — HEALTH MAINTENANCE LETTER (OUTPATIENT)
Age: 61
End: 2021-10-23

## 2021-10-26 ENCOUNTER — HOSPITAL ENCOUNTER (OUTPATIENT)
Dept: OCCUPATIONAL THERAPY | Facility: CLINIC | Age: 61
Setting detail: THERAPIES SERIES
End: 2021-10-26
Payer: COMMERCIAL

## 2021-10-26 PROCEDURE — 97032 APPL MODALITY 1+ESTIM EA 15: CPT | Mod: GO | Performed by: OCCUPATIONAL THERAPIST

## 2021-11-02 ENCOUNTER — HOSPITAL ENCOUNTER (OUTPATIENT)
Dept: OCCUPATIONAL THERAPY | Facility: CLINIC | Age: 61
Setting detail: THERAPIES SERIES
End: 2021-11-02
Payer: COMMERCIAL

## 2021-11-02 PROCEDURE — 97032 APPL MODALITY 1+ESTIM EA 15: CPT | Mod: GO | Performed by: OCCUPATIONAL THERAPIST

## 2021-11-03 NOTE — PROGRESS NOTES
REQUISITION AND JUSTIFICATION FOR DURABLE MEDICAL EQUIPMENT    Patient Name:  Bart Corea  MR #:  [id number]  :  1960  Age/Gender:  61 year old male  Visit Date:  Bart Corea seen for seating and wheeled mobility evaluation by Karen Wells OTIRLANDA/L,ATP on 21 and OT eval by Gomez Bravo on 21.    CLINICAL CRITERIA FOR MOBILITY ASSISTIVE EQUIPMENT  Coverage Criteria Per Local Coverage Determination  A) Bart has mobility limitations due to Quadriplegia that significantly impairs his ability to participate in all of his mobility-related activities of daily living (MRADL). Specifically affected are toileting (being able to get there in time to prevent accidents), dressing, and bathing (getting into the bathroom of designated place). Current equipment used is [employer] Storm Torque RWD chair >5 years old with significant wear and tear due to heavy duty full time use.  This chair is no longer made and thus needs replacement and due to continued progression of weakness, additional power seat functions are needed. This patient needs the new equipment requested to be able to continue to be independent with all ADLS and IADLS including full time work. Please see additional documentation in the seating and wheeled mobility report for details.   Bart had a successful clinical trial here, and also a successful trial at home with the recommended equipment. Bart is very willing and physically / cognitively able to use the recommended equipment to assist his with mobility-related activities of daily living and general mobility. A group 3 power wheelchair is being requested because it has better suspension for a smooth ride and has the capabilities of expandable electronics to operate the  power seat functions Bart needs for independence with his activities of daily living. A Group 2 power wheelchair does not have sufficient electronics to support this patient's progressive neurological deficits  due to SCI.  B) Bart's mobility limitation cannot be sufficiently and safely resolved by the use of an appropriately fitted cane or walker because he is non ambulatory since injury secondary to paralysis. Strength of legs is 0/5 for one maximal repetition. Fatigue also impacts this patient's ability to ambulate, regardless of the gait aid.    C) Bart does not have sufficient upper extremity function to self-propel an optimally-configured manual wheelchair in his home to perform MRADLs during a typical day due to limitations in strength, endurance, range of motion, and coordination. Distance and time to propel a light weight manual wheelchair Nt as he is a full time power chair user for 40 years.  Strength of arms is R UE Scapular elevation: 5/5 ; retraction 3/5 shoulder abd: 50 degrees, 2/5 , flex 2/5, ext rot 1+/5, shoulder ext 3-/5, elbow flex 90(anti gravity) 3-/5; elbow ext full (gravity reduced) 2/5;  L Ue Scapular elevation: 5/5;  retraction: 3/5 ; shoulder abd 20 degrees 2-/5 ,, shoulder ext 2-, ext rot 1/5; elbow flex 2-  and elbow ext 2-/5.  D)  Bart is not able to use a POV/scooter because it will not fit in his home environment. Bart is unable to safely transfer to and from a POV, unable to operate the tiller steering system, and unable to maintain postural stability and position while operating the POV. Bart needs more appropriate seating and positioning than any scooter seat provides.  E) The need for this equipment is LIFETIME.     RECOMMENDATIONS FOR MOBILITY BASE, SEATING SYSTEM AND COMPONENTS  [employer] Storm RX- this rear wheel drive power wheelchair is needed for this patient to continue to have independent mobility and to be able to allow him to complete or assist in all of his mobility related activities of daily living (MRADLs). This wheelchair will also have the seating and positioning system and seat function he needs to be able to use and tolerate the wheelchair full time, and  have functional and comfortable positioning for a full day's activities. Jc has Quadriplegia which impairs his ability to move in his home without the use of the requested wheelchair. He lives in an accessible home with level access and uses adapted van or transport services for transportation.    High speed motors- Jc is a highly experienced wheelchair user and is very active with his chair.  He uses a fast speed in order to safely cross streets and prevent any accidents.    Expandable controller -  Needed for operation of the joystick for mobility and seat functions    Harness for expandable controller - needs to be inline for communication between the controller and the joystick    Color Joystick - this is the joystick needed to be used by this patient for moving this wheelchair and also controlling the movement of the seat functions.    Accu trac / G-Trac module - this driving module will provide Jc with improved tracking and control capabilities of the requested power wheelchair, jolene at very slow speeds and on variable, not smooth terrain. This will improve efficiency for him to drive in a straight fashion on the intended path with fewer veer corrections, regardless of the terrain or slope of the surface that may cause the chair to turn toward one side or the other. The less often he has to make driving corrections for mobility, the less likely he will become fatigued from extraneous movements to control the joystick. This will help the wheelchair track in a straight fashion regardless of the terrain. This will also be helpful if alternative driving controls, such as switches are needed.    Toggle kit- switches to allow for independence with use of seat functions.    Height adjustable swing away joystick mount - needed to be able to move the joystick out of the way during transfers, or when at the desk using the computer, or at the table eating, so as not to inadvertently hit the joystick, thus moving  the wheelchair or turning the power on or off without his knowledge.    BP U shaped handle- joystick knob needed to safely drive chair, this is his current method for driving which works to allow independence with movement of joystick.    Ultra Low Dante CG Power Tilt and Recline  - This seating function combination is needed for this patient to be able to perform independent weight shifts and also to be able to change his seated position without the request of a care giver. Jc requires a powered seating system with both tilt and recline to optimize pressure distribution and postural repositioning.   The power tilt seat allows him to change his position by rotating rearward without changing the angle of his hips or knees. Due to atrophy of his buttocks he is at high risk of developing pressure ulcers if not able to change his position. Due to compromised ability to exert himself for weight shifting, the power tilt seat allows him to do this by operating it through the joystick. He can also use a slight degree of tilt for assisting in his seating and positioning for normal functions during the day a to assist keeping him in a midline, erect and upright position by using the effect of gravity.   The power reclining back feature also reduces pressures by allowing Jc to change the angle of his hips and knees into a more open and supine / recumbent position. This increases his tolerance and be able to remain in the requested wheelchair for a complete day and not be dependent on care givers to change him position or transfer him to another surface for comfort or resting. The recline feature can be used to access the perineal area necessary for toileting assistance. The power tilt seat and power recline back are necessary features that allow tasks to be done by the care giver without the need for transferring back to bed for these activities.  The medical justification for these seat functions is consistent with the  RESNA Position Papers regarding these seat function interventions (Javon et al., 2009). These seat functions will also reduce upper extremity strain per the Paralyzed 's of Ginette Guidelines for upper limb preservation (Boninger, et al., 2005).    Power elevating seat - Vertical movement is necessary to allow Jc to function and participate in a three-dimensional world. This seat feature will increase his ability to reach by bringing him closer for better access with weak arms while reaching into cupboards or closets.  During the home trial he was able to use this feature to access counters with weak UE. Power seat elevation also allows the user to have eye contact with others and reduces cervical strain and pain (including headaches from poor positioning). Vertical rise also provides psycho-social benefits of being on peer level and speaking eye-to-eye. Additionally, seat elevation allows certain medications to be kept out of reach of children but remain accessible to the user.    Matrx Jarred standard headrest pad - needed to keep Jc's head in an erect, midline and upright position whether he is in the upright, tilted or reclined position. His head and neck need to be supported as well as the rest of his body.    Vertical post mounting hardware - needed for mounting the requested headrest in the most appropriate position on the back of the wheelchair for optimal head and neck support and to be able to be removed for transfers.     Recessed planer interface plate- allows backrest to be mounted to chair    BP monoflex chest strap- allows for increaed truck support due to high level of injury limiting trunkal righting strength.    Matrx Elite E2 Solid curved and padded back support - firm and contoured back support is needed to support Jc's thoracolumbar area in an upright and midline position, with appropriate support pads as needed. This will provide support whether he is in the upright or his  position. This back support is essential to provide sufficient lateral contour to maximize his postural alignment and minimize his tendency to develop scoliosis and other secondary complications.    2 outback, cantilever, flip back,  full length armrests - needed for arm support at appropriate height and to allow them to be moved out of the way for safe transfers, not available with standard armrests that allow for integration with seat functions.    R waterfall arm pad and L full tray modular arm pad- for proper UE support with weak arms with chair use.    Elbow blocks Left- prevents rearward movement of LE with tilt back feature due to weakness limiting independence with forward motion.    Matrx ateral supports with adj/removable hardware- needed to provide support for weak trunk muscles in order to provide upright posture for optimal environmental engagement. Hardware needed to be able to remove for safety during transfers.      Knee support with removable hardware - to hold thighs straight and not splay out to the side and hardware to be able to remove the pads for transfers     Power elevating foot legrest- Allows for elevation and extension of lower extremities while elevating. This can improve circulation and prevent/reduce edema (with recline/tilt combination).  The lower legs of this wheelchair user act as a reservoir for fluid accumulation due to lack of movement. Elevation of this patient's legs above the level of the heart (left atrium) is recommended as part of the management of edema in conjunction with other measures such as support garments  This patient has lower extremity dependent edema while sitting in his wheelchair, which resolves with elevation of his legs. This feature, when used with the power recline back or tilt seat, can increase Jc's sitting tolerance while positioning him in a more natural position. This can also be helpful if he fatigues and requires rests throughout the day, without  transferring him back to bed.  Due to inability to perform a functional weight shifting, he is at risk for developing pressure ulcers. With the use of this feature it will allow for optimal weight shifting in conjunction with tilt and recline.  Per RESNA white paper on elevating legrests, using elevating legrests and tilting more than 30 degrees in combination with full recline significantly improves lower leg hemodynamics status as measured by near-infrared spectroscopy (Kade et al., 2010)  Additionally, these legrests can improve ground clearance to navigate thresholds and slopes and still allowing the legs to achieve a tight 90 degree position for typical driving conditions. This position shortens the overall functional wheelbase for improved maneuverability    2pc medium footplates- Required with individual adjustability  (angle and height) to accommodate bilateral leg length and asymmetries present.  The flip-up features allows for them to fold completely out of the way for safe transfers.      Power Positioning electronics to be operated through the M610 drive controller - this is needed to allow Jc to use the joystick of the wheelchair for control of mobility and operation of the power seat function. This is important for this patient with limited strength and coordination to increase ease of for operation of the seat functions.    2 Egg switches- to be programed for on/off and one for mode, mounted at shoulder for independence with w/c control features.    2 Batteries and  - gel sealed, and two are necessary to power the wheelchair. They are maintenance free and are safe for travel on the road or in the air. They are necessary to provide reliable use of the power wheelchair on a single charge.    ROHO quadtro seat cushion - this pressure distribution and positioning seat cushion will optimally  distribute seating pressures to prevent pressure ulcers, but also provide a stable base of support for  him to use during MRADLs.    [place]oseneck clamp and mounting plate- needed to mount egg switches at shoulder.    Electric Leg bag opener- allows Jc to be able to independently empty leg bag when caregivers are not available.    This equipment is reasonable and necessary with reference to accepted standards of medical practice and treatment of this patient's condition and is not being recommended as a convenience item. Without this recommended equipment, he is highly likely to sustain injuries from falls, develop pressure sores or postural compensation, and/or be bed confined, which those costs far exceed the cost of the requested equipment.  Electronically signed by:  Karen ARROYO, ATP      Occupational Therapist, Assistive   [phone]      fax: [phone]      [email or web address] [address]    November 3, 2021    I have read and concur with the above recommendations.    Physician Printed Name __________________________________________    Physician SIgnature  _____________________________________________    Date of SIgnature ______________________________    Physician Phone  ______________________________

## 2021-11-11 NOTE — PROGRESS NOTES
"OUTPATIENT OCCUPATIONAL THERAPY  PLAN OF TREATMENT FOR OUTPATIENT REHABILITATION AND PROGRESS NOTE    Patient's Last Name, First Name, Bart Garcia Date of Birth  1960   Provider's Name  [facility] Medical Record No.  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   Target Date 09/29/21;    Date Met   9/10/21   Progress (detail required for progress note):       Goal Identifier     Goal Description Pt will demonstrate improved UE functional strength and endurance to allow for use of R UE to safely operate his power w/c (turn on/off, drive distances and control safely) with new equipment as needed and as measured by improvements in R UE strength to shoulder abd 2+, elbow ext 2+, and elbow flexion 3-/5.   Target Date 09/29/21; extend to 12/23/21   Date Met      Progress (detail required for progress note):   See below         Beginning/End Dates of Progress Note Reporting Period:  7/7/21 to 9/29/21    Progress Toward Goals:   Progress this reporting period: Pt has been seen for 9 OT visits since last progress note 12 weeks ago. He is noting improved strength in his R UE with ability to more consistently get his R UE on and off his joystick  More often than initially but it remains inconsistent with fatigue and to power on his w/c Ind. He needs assist at times to power w/c off. He has been able to drive his " power w/c up the ramp of van which he was not able to do at onset of therapy. He has demonstrated improvements in trunk control during sessions and as reported with ADL at home with his wife. Mendoza UE Functional Electrical Stimulation cycling for up to 41 min. Pt. works on his HEP for R UE using his ADVIZE Mobile Arm Support for gravity assisted ROM and AAROM with his PCA as well.    Client Self (Subjective) Report for Progress Note Reporting Period: Able to get R hand on joystick x 2 at start of session on 9/10/21      Objective Measurements: Strength not able to be reassessed in UE's as pt missed appt prior to scheduled cert.           I CERTIFY THE NEED FOR THESE SERVICES FURNISHED UNDER        THIS PLAN OF TREATMENT AND WHILE UNDER MY CARE     (Physician co-signature of this document indicates review and certification of the therapy plan).                Referring Provider: Vince Moya MD Robin Samuel, OTR/L

## 2021-11-12 ENCOUNTER — MYC MEDICAL ADVICE (OUTPATIENT)
Dept: FAMILY MEDICINE | Facility: CLINIC | Age: 61
End: 2021-11-12
Payer: COMMERCIAL

## 2021-11-16 ENCOUNTER — HOSPITAL ENCOUNTER (OUTPATIENT)
Dept: OCCUPATIONAL THERAPY | Facility: CLINIC | Age: 61
Setting detail: THERAPIES SERIES
End: 2021-11-16
Payer: COMMERCIAL

## 2021-11-16 PROCEDURE — 97032 APPL MODALITY 1+ESTIM EA 15: CPT | Mod: GO | Performed by: OCCUPATIONAL THERAPIST

## 2021-11-17 ENCOUNTER — MYC MEDICAL ADVICE (OUTPATIENT)
Dept: FAMILY MEDICINE | Facility: CLINIC | Age: 61
End: 2021-11-17
Payer: COMMERCIAL

## 2021-11-17 ENCOUNTER — MEDICAL CORRESPONDENCE (OUTPATIENT)
Dept: HEALTH INFORMATION MANAGEMENT | Facility: CLINIC | Age: 61
End: 2021-11-17
Payer: COMMERCIAL

## 2021-11-18 ENCOUNTER — MEDICAL CORRESPONDENCE (OUTPATIENT)
Dept: HEALTH INFORMATION MANAGEMENT | Facility: CLINIC | Age: 61
End: 2021-11-18
Payer: COMMERCIAL

## 2021-11-19 ENCOUNTER — TELEPHONE (OUTPATIENT)
Dept: FAMILY MEDICINE | Facility: CLINIC | Age: 61
End: 2021-11-19
Payer: COMMERCIAL

## 2021-11-19 NOTE — TELEPHONE ENCOUNTER
[facility] Call Center    Phone Message    May a detailed message be left on voicemail: yes     Reason for Call: Order(s): Home Care Orders: Skilled Nursing:   sn: every 2 week for 8 week (for super pubic catheter change) Please call with verbal orders thank you.    Action Taken: Message routed to:  [facility]    Travel Screening: Not Applicable

## 2021-11-23 ENCOUNTER — HOSPITAL ENCOUNTER (OUTPATIENT)
Dept: OCCUPATIONAL THERAPY | Facility: CLINIC | Age: 61
Setting detail: THERAPIES SERIES
End: 2021-11-23
Payer: COMMERCIAL

## 2021-11-23 PROCEDURE — 97032 APPL MODALITY 1+ESTIM EA 15: CPT | Mod: GO | Performed by: OCCUPATIONAL THERAPIST

## 2021-12-23 NOTE — TELEPHONE ENCOUNTER
Last office visit was 3/19/21. Office notes were faxed over to [phone]. Renetta Verde LPN 12/23/2021 1:38 PM

## 2021-12-23 NOTE — TELEPHONE ENCOUNTER
[facility] Call Center    Phone Message    May a detailed message be left on voicemail: yes     Reason for Call: Form or Letter   Type or form/letter needing completion: face to face chart notes from visit last week/recent visit  Provider: Jason Long MD  Date form needed: asap  Once completed: Fax form to: [facility]     Amanda from [facility] calling to request a copy of the face to face chart notes from last week regarding a wheel chair. Amanda wanted to confirm if the visit occurred or not.    Please call back to discuss.      Action Taken: Message routed to:  [facility]    Travel Screening: Not Applicable

## 2021-12-29 NOTE — NURSING NOTE
Chief Complaint   Patient presents with     Consult     Needs letters     Physical     Imm/Inj     Flu and booster     Recheck Medication     Ear drops per pt       HANY Perez at 9:02 AM on 12/29/2021.

## 2021-12-29 NOTE — PROGRESS NOTES
Assessment & Plan     Complicated urinary tract infection  Uses prn for pain  - acetaminophen (TYLENOL) 325 MG tablet; Take 2 tablets (650 mg) by mouth every 8 hours as needed for mild pain or fever    Chronic bronchitis, unspecified chronic bronchitis type (H)  Helps if cough  - albuterol (PROVENTIL) (2.5 MG/3ML) 0.083% neb solution; Take 1 vial (2.5 mg) by nebulization every 4 hours as needed for wheezing    Autonomic dysreflexia  Prn use  - amLODIPine (NORVASC) 2.5 MG tablet; Take 1 tablet (2.5 mg) by mouth daily as needed (autonomic dysreflexia)    Quadriplegia (H)  Helps, tolerated  - baclofen (LIORESAL) 20 MG tablet; TAKE ONE TABLET BY MOUTH UP TO 6 TIMES DAILY  - docusate sodium (DOK) 100 MG capsule; TAKE 1 CAPSULE (100 MG) BY MOUTH DAILY  - ibuprofen (ADVIL/MOTRIN) 800 MG tablet; Take 1 tablet (800 mg) by mouth 3 times daily as needed for moderate pain  - minocycline (MINOCIN) 50 MG capsule; Take 1 capsule (50 mg) by mouth 2 times daily  - nystatin (MYCOSTATIN) 900182 UNIT/GM external powder; APPLY TO AFFECTED AREA THREE TIMES DAILY AS NEEDED  - oxybutynin ER (DITROPAN-XL) 10 MG 24 hr tablet; Take 1 tablet (10 mg) by mouth daily  - Vitamin D3 (CHOLECALCIFEROL) 25 mcg (1000 units) tablet; Take 1 tablet (25 mcg) by mouth daily    Spasm  Same  - baclofen (LIORESAL) 20 MG tablet; TAKE ONE TABLET BY MOUTH UP TO 6 TIMES DAILY  - diazepam (VALIUM) 5 MG tablet; TAKE 1 TABLET (5 MG) BY MOUTH EVERY 6 HOURS AS NEEDED FOR ANXIETY  - docusate sodium (DOK) 100 MG capsule; TAKE 1 CAPSULE (100 MG) BY MOUTH DAILY  - ibuprofen (ADVIL/MOTRIN) 800 MG tablet; Take 1 tablet (800 mg) by mouth 3 times daily as needed for moderate pain  - nystatin (MYCOSTATIN) 598526 UNIT/GM external powder; APPLY TO AFFECTED AREA THREE TIMES DAILY AS NEEDED  - oxybutynin ER (DITROPAN-XL) 10 MG 24 hr tablet; Take 1 tablet (10 mg) by mouth daily    Groin pain, unspecified laterality  Helps w/ pain  - baclofen (LIORESAL) 20 MG tablet; TAKE ONE  TABLET BY MOUTH UP TO 6 TIMES DAILY      Useful for constiaption and pain  - docusate sodium (DOK) 100 MG capsule; TAKE 1 CAPSULE (100 MG) BY MOUTH DAILY  - ibuprofen (ADVIL/MOTRIN) 800 MG tablet; Take 1 tablet (800 mg) by mouth 3 times daily as needed for moderate pain    Same plus rash    - docusate sodium (DOK) 100 MG capsule; TAKE 1 CAPSULE (100 MG) BY MOUTH DAILY  - ibuprofen (ADVIL/MOTRIN) 800 MG tablet; Take 1 tablet (800 mg) by mouth 3 times daily as needed for moderate pain  - minocycline (MINOCIN) 50 MG capsule; Take 1 capsule (50 mg) by mouth 2 times daily  - nystatin (MYCOSTATIN) 328047 UNIT/GM external powder; APPLY TO AFFECTED AREA THREE TIMES DAILY AS NEEDED    Wheezing    - docusate sodium (DOK) 100 MG capsule; TAKE 1 CAPSULE (100 MG) BY MOUTH DAILY  - ibuprofen (ADVIL/MOTRIN) 800 MG tablet; Take 1 tablet (800 mg) by mouth 3 times daily as needed for moderate pain    High cholesterol    - docusate sodium (DOK) 100 MG capsule; TAKE 1 CAPSULE (100 MG) BY MOUTH DAILY  - ibuprofen (ADVIL/MOTRIN) 800 MG tablet; Take 1 tablet (800 mg) by mouth 3 times daily as needed for moderate pain  - nystatin (MYCOSTATIN) 437045 UNIT/GM external powder; APPLY TO AFFECTED AREA THREE TIMES DAILY AS NEEDED  - Lipid panel reflex to direct LDL Fasting; Future  - Comprehensive metabolic panel; Future    Hypervolemia, unspecified hypervolemia type  Uses prn edema  - furosemide (LASIX) 20 MG tablet; Take 1 tablet (20 mg) by mouth daily    Rash    - hydrocortisone (CORTAID) 1 % external cream; Apply topically 2 times daily To itchy earlobe  - clotrimazole-betamethasone (LOTRISONE) 1-0.05 % external cream; Apply topically 2 times daily Apply to itchy earlobe rash till clear    Constipation, unspecified constipation type  Helps  - senna (SM SENNA LAXATIVE) 8.6 MG tablet; Take 1 tablet by mouth daily    Injury at C4 level of cervical spinal cord, subsequent encounter (H)  Needs supplies  - sodium chloride 0.9%, bottle, 0.9 %  "irrigation; USE FOR URINARY CATHETER IRRIGATION  - MISCELLANEOUS DME SUPPLY OR ACCESSORY, NOT OTHERWISE SPECIFIED    Injury of fifth cervical spinal cord, subsequent encounter (H)  Same  - sodium chloride 0.9%, bottle, 0.9 % irrigation; USE FOR URINARY CATHETER IRRIGATION    Injury at C6 level of cervical spinal cord, subsequent encounter (H)  Same  - sodium chloride 0.9%, bottle, 0.9 % irrigation; USE FOR URINARY CATHETER IRRIGATION    Neurogenic bladder    - sodium chloride 0.9%, bottle, 0.9 % irrigation; USE FOR URINARY CATHETER IRRIGATION    Insomnia, unspecified type  Helps, tolerated  - zolpidem (AMBIEN) 10 MG tablet; Take 1 tablet (10 mg) by mouth nightly as needed for sleep    History of kidney stones  Due  - vitamin C (ASCORBIC ACID) 1000 MG TABS; Take 1 tablet (1,000 mg) by mouth daily  - US Renal Complete; Future  - CBC with platelets differential; Future  - Adult Urology Referral    Encounter for nutritional assessment  Due  - multivitamin (CENTRUM SILVER) tablet; Take 1 tablet by mouth daily    Health care maintenance  Due  - COVID-19,PF,PFIZER (12+ Yrs PURPLE LABEL)      62 minutes spent on the date of the encounter doing chart review, history and exam, documentation and further activities per the note    Tobacco Cessation:   reports that he has been smoking cigarettes. He has a 10.50 pack-year smoking history. He has never used smokeless tobacco.    BMI:   Estimated body mass index is 31.31 kg/m  as calculated from the following:    Height as of 2/8/21: 1.727 m (5' 8\").    Weight as of 2/16/21: 93.4 kg (205 lb 14.6 oz).     Jason Long MD  [facility] [place]    Maliha Greene is a 61 year old who presents for the following health issues     HPI 1-pt reminds me a year ago had sepsis and kidney stones, main sx were dysreflexia; should have f/u imaging and see Uro, proactively  2-very itchy spot one ear inner ear lobe lower portion  3-rvwd meds one by one, " indications, if helping or needed,  refills as needed  4-still works  5-still smokes, not up for quit at this point, discussed  Past Medical History:   Diagnosis Date     Asthma      Chronic infection     Bladder     Heart murmur      LIZA (obstructive sleep apnea) 12/3/2014     Quadriplegia, C1-C4 incomplete (H)      Past Surgical History:   Procedure Laterality Date     Bilateral ureteroscopy with holmium laser lithotripsy and basketing and removal of fragments  Left ureteral stent placement.  02/10/2010     COLONOSCOPY N/A 07/20/2018    Procedure: COMBINED COLONOSCOPY, SINGLE OR MULTIPLE BIOPSY/POLYPECTOMY BY BIOPSY;;  Surgeon: Grace Yang MD;  Location: [facility]     COMBINED CYSTOSCOPY, INSERT STENT URETER(S) Right 02/08/2021    Procedure: CYSTOSCOPY, WITH URETERAL STENT INSERTION;  Surgeon: Javier Barrientos MD;  Location: [facility]     COMBINED CYSTOSCOPY, RETROGRADES, URETEROSCOPY, LASER HOLMIUM LITHOTRIPSY URETER(S), INSERT STENT Right 2/19/2021    Procedure: CYSTOURETEROSCOPY, WITH RETROGRADE PYELOGRAM, HOLMIUM LASER LITHOTRIPSY AND STENT EXCHANGE, SUPRAPUBIC CATHETER EXCHANGE;  Surgeon: Bart Hebert MD;  Location: [facility]     CYSTOSCOPY, LITHOLAPAXY, COMBINED N/A 11/15/2019    Procedure: Cystolitholapaxy;  Surgeon: Obi Uriostegui MD;  Location: [facility]     INCISION AND DRAINAGE ABDOMEN WASHOUT, COMBINED N/A 01/16/2020    Procedure: Incision and drainage abdomenal Wall with Revision Of Vesicocutaneous Anastomosis;  Surgeon: Obi Uriostegui MD;  Location: [facility]     LASER HOLMIUM LITHOTRIPSY BLADDER N/A 01/16/2020    Procedure: LITHOTRIPSY, CALCULUS, BLADDER, USING HOLMIUM LASER;  Surgeon: Obi Uriostegui MD;  Location: [facility]     PICC SINGLE LUMEN PLACEMENT Left 02/14/2021    4Fr - 47cm (2cm external), Lateral brachial vein, SVC RA junction     skin flap on bottom       sp tube absess surgery       Current Outpatient Medications   Medication     acetaminophen (TYLENOL) 325 MG tablet      albuterol (PROVENTIL) (2.5 MG/3ML) 0.083% neb solution     amLODIPine (NORVASC) 2.5 MG tablet     baclofen (LIORESAL) 20 MG tablet     bisacodyl (DULCOLAX) 10 MG suppository     clotrimazole-betamethasone (LOTRISONE) 1-0.05 % external cream     diazepam (VALIUM) 5 MG tablet     docusate sodium (DOK) 100 MG capsule     furosemide (LASIX) 20 MG tablet     hydrocortisone (CORTAID) 1 % external cream     ibuprofen (ADVIL/MOTRIN) 800 MG tablet     Incontinence Supplies (URINARY LEG BAG)     minocycline (MINOCIN) 50 MG capsule     Multiple Vitamins-Minerals (MULTIVITAMIN & MINERAL PO)     multivitamin (CENTRUM SILVER) tablet     neomycin-polymyxin-hydrocortisone (CORTISPORIN) 3.5-49428-6 otic solution     nystatin (MYCOSTATIN) 432670 UNIT/GM external powder     oxybutynin ER (DITROPAN-XL) 10 MG 24 hr tablet     senna (SM SENNA LAXATIVE) 8.6 MG tablet     sodium chloride 0.9%, bottle, 0.9 % irrigation     vitamin C (ASCORBIC ACID) 1000 MG TABS     Vitamin D3 (CHOLECALCIFEROL) 25 mcg (1000 units) tablet     zolpidem (AMBIEN) 10 MG tablet     No current facility-administered medications for this visit.     Allergies   Allergen Reactions     Vancomycin Anaphylaxis     Family History   Problem Relation Age of Onset     Cancer Father      Diabetes No family hx of      Glaucoma No family hx of      Hypertension No family hx of      Macular Degeneration No family hx of      Retinal detachment No family hx of      Anesthesia Reaction No family hx of      Deep Vein Thrombosis (DVT) No family hx of      Social History     Socioeconomic History     Marital status:      Spouse name: Not on file     Number of children: Not on file     Years of education: Not on file     Highest education level: Not on file   Occupational History     Not on file   Tobacco Use     Smoking status: Current Every Day Smoker     Packs/day: 0.30     Years: 35.00     Pack years: 10.50     Types: Cigarettes     Smokeless tobacco: Never Used      Tobacco comment: 2/18/21 Did not complete full session. Would only answer some questions. 14mg patch and 2mg lozenges ordered   Substance and Sexual Activity     Alcohol use: Yes     Comment: on weekends, 3-4+     Drug use: Not Currently     Types: Marijuana     Comment: occ marijuana for spasms     Sexual activity: Never   Other Topics Concern     Parent/sibling w/ CABG, MI or angioplasty before 65F 55M? Not Asked   Social History Narrative     Not on file     Social Determinants of Health     Financial Resource Strain: Not on file   Food Insecurity: Not on file   Transportation Needs: Not on file   Physical Activity: Not on file   Stress: Not on file   Social Connections: Not on file   Intimate Partner Violence: Not on file   Housing Stability: Not on file             Review of Systems   Ten point ROS otherwise negative for acute or interval problems      Objective    BP 97/63 (BP Location: Left arm, Patient Position: Sitting, Cuff Size: Adult Regular)   Pulse 65   SpO2 100%   There is no height or weight on file to calculate BMI.  Physical Exam   GENERAL: healthy, alert and no distress  EYES: Eyes grossly normal to inspection, PERRL and conjunctivae and sclerae normal  HENT: ear canals and TM's normal, left ear canal base inner lobe macerated w/ white drg, nose and mouth without ulcers or lesions  NECK: no adenopathy, no asymmetry, masses, or scars and thyroid normal to palpation  RESP: lungs clear to auscultation - no rales, rhonchi or wheezes  CV: regular rate and rhythm, normal S1 S2, no S3 or S4, no murmur, click or rub, no peripheral edema and peripheral pulses strong  ABDOMEN: soft, nontender, no hepatosplenomegaly, no masses and bowel sounds normal  MS: in wheelchair per chronic state  Neuro  No strength legs, one plus strength arms    Pt and I discuss face to face his need for power wheelchair daily for mobility due to chronic spinal cord injury w/ resulting weakness, inability to use legs, very limited  arm movement and strength    Patient and I discuss face to face his dailiy need for commode chair due to weakness from spinal cord injury, inability to use legs result in need for commode chair to toilet safely.    Jason Long MD

## 2022-01-01 ENCOUNTER — APPOINTMENT (OUTPATIENT)
Dept: ULTRASOUND IMAGING | Facility: CLINIC | Age: 62
End: 2022-01-01
Payer: COMMERCIAL

## 2022-01-01 ENCOUNTER — HOSPITAL ENCOUNTER (OUTPATIENT)
Dept: OCCUPATIONAL THERAPY | Facility: CLINIC | Age: 62
Setting detail: THERAPIES SERIES
End: 2022-02-09
Attending: FAMILY MEDICINE
Payer: COMMERCIAL

## 2022-01-01 ENCOUNTER — APPOINTMENT (OUTPATIENT)
Dept: GENERAL RADIOLOGY | Facility: CLINIC | Age: 62
End: 2022-01-01
Payer: COMMERCIAL

## 2022-01-01 ENCOUNTER — DOCUMENTATION ONLY (OUTPATIENT)
Dept: FAMILY MEDICINE | Facility: CLINIC | Age: 62
End: 2022-01-01

## 2022-01-01 ENCOUNTER — TELEPHONE (OUTPATIENT)
Dept: FAMILY MEDICINE | Facility: CLINIC | Age: 62
End: 2022-01-01
Payer: COMMERCIAL

## 2022-01-01 ENCOUNTER — HOSPITAL ENCOUNTER (OUTPATIENT)
Dept: OCCUPATIONAL THERAPY | Facility: CLINIC | Age: 62
Setting detail: THERAPIES SERIES
Discharge: HOME | End: 2022-04-13
Payer: COMMERCIAL

## 2022-01-01 ENCOUNTER — MEDICAL CORRESPONDENCE (OUTPATIENT)
Dept: HEALTH INFORMATION MANAGEMENT | Facility: CLINIC | Age: 62
End: 2022-01-01
Payer: COMMERCIAL

## 2022-01-01 ENCOUNTER — MYC MEDICAL ADVICE (OUTPATIENT)
Dept: PHYSICAL MEDICINE AND REHAB | Facility: CLINIC | Age: 62
End: 2022-01-01
Payer: COMMERCIAL

## 2022-01-01 ENCOUNTER — ANESTHESIA (OUTPATIENT)
Dept: INTENSIVE CARE | Facility: CLINIC | Age: 62
End: 2022-01-01
Payer: COMMERCIAL

## 2022-01-01 ENCOUNTER — MEDICAL CORRESPONDENCE (OUTPATIENT)
Dept: HEALTH INFORMATION MANAGEMENT | Facility: CLINIC | Age: 62
End: 2022-01-01

## 2022-01-01 ENCOUNTER — MEDICAL CORRESPONDENCE (OUTPATIENT)
Dept: FAMILY MEDICINE | Facility: CLINIC | Age: 62
End: 2022-01-01

## 2022-01-01 ENCOUNTER — TELEPHONE (OUTPATIENT)
Dept: FAMILY MEDICINE | Facility: CLINIC | Age: 62
End: 2022-01-01

## 2022-01-01 ENCOUNTER — HOSPITAL ENCOUNTER (OUTPATIENT)
Dept: OCCUPATIONAL THERAPY | Facility: CLINIC | Age: 62
Setting detail: THERAPIES SERIES
End: 2022-01-25
Attending: PHYSICAL MEDICINE & REHABILITATION
Payer: COMMERCIAL

## 2022-01-01 ENCOUNTER — CARE COORDINATION (OUTPATIENT)
Dept: SLEEP MEDICINE | Facility: CLINIC | Age: 62
End: 2022-01-01
Payer: COMMERCIAL

## 2022-01-01 ENCOUNTER — DOCUMENTATION ONLY (OUTPATIENT)
Dept: OTHER | Facility: CLINIC | Age: 62
End: 2022-01-01
Payer: COMMERCIAL

## 2022-01-01 ENCOUNTER — MYC MEDICAL ADVICE (OUTPATIENT)
Dept: CARDIOLOGY | Facility: CLINIC | Age: 62
End: 2022-01-01

## 2022-01-01 ENCOUNTER — HOSPITAL ENCOUNTER (OUTPATIENT)
Dept: PHYSICAL THERAPY | Facility: CLINIC | Age: 62
Setting detail: THERAPIES SERIES
Discharge: HOME | End: 2022-04-13
Payer: COMMERCIAL

## 2022-01-01 ENCOUNTER — APPOINTMENT (OUTPATIENT)
Dept: GENERAL RADIOLOGY | Facility: CLINIC | Age: 62
End: 2022-01-01
Attending: NURSE PRACTITIONER
Payer: COMMERCIAL

## 2022-01-01 ENCOUNTER — MYC MEDICAL ADVICE (OUTPATIENT)
Dept: FAMILY MEDICINE | Facility: CLINIC | Age: 62
End: 2022-01-01

## 2022-01-01 ENCOUNTER — APPOINTMENT (OUTPATIENT)
Dept: GENERAL RADIOLOGY | Facility: CLINIC | Age: 62
End: 2022-01-01
Attending: EMERGENCY MEDICINE
Payer: COMMERCIAL

## 2022-01-01 ENCOUNTER — APPOINTMENT (OUTPATIENT)
Dept: GENERAL RADIOLOGY | Facility: CLINIC | Age: 62
End: 2022-01-01
Attending: INTERNAL MEDICINE
Payer: COMMERCIAL

## 2022-01-01 ENCOUNTER — HOSPITAL ENCOUNTER (OUTPATIENT)
Dept: CT IMAGING | Facility: CLINIC | Age: 62
Discharge: HOME | End: 2022-08-05
Attending: NURSE PRACTITIONER | Admitting: NURSE PRACTITIONER
Payer: COMMERCIAL

## 2022-01-01 ENCOUNTER — HOSPITAL ENCOUNTER (OUTPATIENT)
Dept: PHYSICAL THERAPY | Facility: CLINIC | Age: 62
Setting detail: THERAPIES SERIES
End: 2022-02-09
Attending: FAMILY MEDICINE
Payer: COMMERCIAL

## 2022-01-01 ENCOUNTER — APPOINTMENT (OUTPATIENT)
Dept: INTERVENTIONAL RADIOLOGY/VASCULAR | Facility: CLINIC | Age: 62
End: 2022-01-01
Payer: COMMERCIAL

## 2022-01-01 ENCOUNTER — APPOINTMENT (OUTPATIENT)
Dept: NEUROLOGY | Facility: CLINIC | Age: 62
End: 2022-01-01
Attending: NURSE PRACTITIONER
Payer: COMMERCIAL

## 2022-01-01 ENCOUNTER — APPOINTMENT (OUTPATIENT)
Dept: CARDIOLOGY | Facility: CLINIC | Age: 62
End: 2022-01-01
Attending: NURSE PRACTITIONER
Payer: COMMERCIAL

## 2022-01-01 ENCOUNTER — APPOINTMENT (OUTPATIENT)
Dept: ULTRASOUND IMAGING | Facility: CLINIC | Age: 62
End: 2022-01-01
Attending: STUDENT IN AN ORGANIZED HEALTH CARE EDUCATION/TRAINING PROGRAM
Payer: COMMERCIAL

## 2022-01-01 ENCOUNTER — HOSPITAL ENCOUNTER (OUTPATIENT)
Dept: ULTRASOUND IMAGING | Facility: CLINIC | Age: 62
Discharge: HOME | End: 2022-01-06
Attending: FAMILY MEDICINE | Admitting: FAMILY MEDICINE
Payer: COMMERCIAL

## 2022-01-01 ENCOUNTER — HOSPITAL ENCOUNTER (OUTPATIENT)
Dept: PHYSICAL THERAPY | Facility: CLINIC | Age: 62
Setting detail: THERAPIES SERIES
End: 2022-01-25
Attending: PHYSICAL MEDICINE & REHABILITATION
Payer: COMMERCIAL

## 2022-01-01 ENCOUNTER — APPOINTMENT (OUTPATIENT)
Dept: CT IMAGING | Facility: CLINIC | Age: 62
End: 2022-01-01
Attending: EMERGENCY MEDICINE
Payer: COMMERCIAL

## 2022-01-01 ENCOUNTER — MYC MEDICAL ADVICE (OUTPATIENT)
Dept: FAMILY MEDICINE | Facility: CLINIC | Age: 62
End: 2022-01-01
Payer: COMMERCIAL

## 2022-01-01 ENCOUNTER — HOSPITAL ENCOUNTER (OUTPATIENT)
Dept: OCCUPATIONAL THERAPY | Facility: CLINIC | Age: 62
Setting detail: THERAPIES SERIES
End: 2022-03-03
Attending: FAMILY MEDICINE
Payer: COMMERCIAL

## 2022-01-01 ENCOUNTER — HOSPITAL ENCOUNTER (OUTPATIENT)
Dept: OCCUPATIONAL THERAPY | Facility: CLINIC | Age: 62
Setting detail: THERAPIES SERIES
End: 2022-01-11
Payer: COMMERCIAL

## 2022-01-01 ENCOUNTER — HOSPITAL ENCOUNTER (INPATIENT)
Facility: CLINIC | Age: 62
LOS: 6 days | End: 2022-11-29
Attending: EMERGENCY MEDICINE | Admitting: HOSPITALIST
Payer: COMMERCIAL

## 2022-01-01 ENCOUNTER — ANESTHESIA EVENT (OUTPATIENT)
Dept: INTENSIVE CARE | Facility: CLINIC | Age: 62
End: 2022-01-01
Payer: COMMERCIAL

## 2022-01-01 ENCOUNTER — VIRTUAL VISIT (OUTPATIENT)
Dept: SLEEP MEDICINE | Facility: CLINIC | Age: 62
End: 2022-01-01
Payer: COMMERCIAL

## 2022-01-01 ENCOUNTER — HOSPITAL ENCOUNTER (OUTPATIENT)
Dept: OCCUPATIONAL THERAPY | Facility: CLINIC | Age: 62
Setting detail: THERAPIES SERIES
End: 2022-01-04
Payer: COMMERCIAL

## 2022-01-01 ENCOUNTER — APPOINTMENT (OUTPATIENT)
Dept: GENERAL RADIOLOGY | Facility: CLINIC | Age: 62
End: 2022-01-01
Attending: STUDENT IN AN ORGANIZED HEALTH CARE EDUCATION/TRAINING PROGRAM
Payer: COMMERCIAL

## 2022-01-01 ENCOUNTER — HEALTH MAINTENANCE LETTER (OUTPATIENT)
Age: 62
End: 2022-01-01

## 2022-01-01 ENCOUNTER — DOCUMENTATION ONLY (OUTPATIENT)
Dept: SLEEP MEDICINE | Facility: CLINIC | Age: 62
End: 2022-01-01

## 2022-01-01 ENCOUNTER — APPOINTMENT (OUTPATIENT)
Dept: OCCUPATIONAL THERAPY | Facility: CLINIC | Age: 62
End: 2022-01-01
Payer: COMMERCIAL

## 2022-01-01 VITALS — BODY MASS INDEX: 34.21 KG/M2 | WEIGHT: 225 LBS

## 2022-01-01 VITALS
SYSTOLIC BLOOD PRESSURE: 118 MMHG | DIASTOLIC BLOOD PRESSURE: 61 MMHG | RESPIRATION RATE: 14 BRPM | BODY MASS INDEX: 34.25 KG/M2 | WEIGHT: 225.97 LBS | HEART RATE: 66 BPM | TEMPERATURE: 96.8 F | HEIGHT: 68 IN | OXYGEN SATURATION: 99 %

## 2022-01-01 DIAGNOSIS — S14.104A: Primary | ICD-10-CM

## 2022-01-01 DIAGNOSIS — J98.6 DIAPHRAGM PARALYSIS: ICD-10-CM

## 2022-01-01 DIAGNOSIS — J96.01 ACUTE RESPIRATORY FAILURE WITH HYPOXIA (H): ICD-10-CM

## 2022-01-01 DIAGNOSIS — S14.106A: ICD-10-CM

## 2022-01-01 DIAGNOSIS — S14.105A: ICD-10-CM

## 2022-01-01 DIAGNOSIS — Z87.442 HISTORY OF KIDNEY STONES: ICD-10-CM

## 2022-01-01 DIAGNOSIS — R25.2 SPASM: ICD-10-CM

## 2022-01-01 DIAGNOSIS — Z53.9 DIAGNOSIS NOT YET DEFINED: Primary | ICD-10-CM

## 2022-01-01 DIAGNOSIS — J90 PLEURAL EFFUSION ON LEFT: ICD-10-CM

## 2022-01-01 DIAGNOSIS — N20.0 KIDNEY STONE: ICD-10-CM

## 2022-01-01 DIAGNOSIS — F17.210 CIGARETTE SMOKER: ICD-10-CM

## 2022-01-01 DIAGNOSIS — N31.9 NEUROGENIC BLADDER: ICD-10-CM

## 2022-01-01 DIAGNOSIS — G47.36 SLEEP-RELATED HYPOVENTILATION DUE TO CHEST WALL DISORDER: ICD-10-CM

## 2022-01-01 DIAGNOSIS — T88.4XXA HARD TO INTUBATE, INITIAL ENCOUNTER: Primary | ICD-10-CM

## 2022-01-01 DIAGNOSIS — R09.02 HYPOXIA: ICD-10-CM

## 2022-01-01 DIAGNOSIS — G47.00 INSOMNIA, UNSPECIFIED TYPE: ICD-10-CM

## 2022-01-01 DIAGNOSIS — J90 EXUDATIVE PLEURISY: ICD-10-CM

## 2022-01-01 DIAGNOSIS — G47.33 OSA (OBSTRUCTIVE SLEEP APNEA): ICD-10-CM

## 2022-01-01 DIAGNOSIS — J98.9 SLEEP-RELATED HYPOVENTILATION DUE TO CHEST WALL DISORDER: ICD-10-CM

## 2022-01-01 DIAGNOSIS — S14.106D: ICD-10-CM

## 2022-01-01 DIAGNOSIS — Z11.52 ENCOUNTER FOR SCREENING LABORATORY TESTING FOR SEVERE ACUTE RESPIRATORY SYNDROME CORONAVIRUS 2 (SARS-COV-2): ICD-10-CM

## 2022-01-01 DIAGNOSIS — G82.50 QUADRIPLEGIA (H): Primary | ICD-10-CM

## 2022-01-01 LAB
ALBUMIN SERPL BCG-MCNC: 3 G/DL (ref 3.5–5.2)
ALBUMIN SERPL BCG-MCNC: 3 G/DL (ref 3.5–5.2)
ALBUMIN SERPL BCG-MCNC: 3.1 G/DL (ref 3.5–5.2)
ALBUMIN SERPL BCG-MCNC: 3.1 G/DL (ref 3.5–5.2)
ALBUMIN SERPL BCG-MCNC: 3.3 G/DL (ref 3.5–5.2)
ALBUMIN SERPL BCG-MCNC: 3.5 G/DL (ref 3.5–5.2)
ALBUMIN UR-MCNC: 30 MG/DL
ALBUMIN UR-MCNC: NEGATIVE MG/DL
ALLEN'S TEST: ABNORMAL
ALLEN'S TEST: YES
ALP SERPL-CCNC: 65 U/L (ref 40–129)
ALP SERPL-CCNC: 70 U/L (ref 40–129)
ALP SERPL-CCNC: 76 U/L (ref 40–129)
ALP SERPL-CCNC: 81 U/L (ref 40–129)
ALP SERPL-CCNC: 83 U/L (ref 40–129)
ALP SERPL-CCNC: 89 U/L (ref 40–129)
ALT SERPL W P-5'-P-CCNC: 13 U/L (ref 10–50)
ALT SERPL W P-5'-P-CCNC: 14 U/L (ref 10–50)
ALT SERPL W P-5'-P-CCNC: 21 U/L (ref 10–50)
ALT SERPL W P-5'-P-CCNC: 21 U/L (ref 10–50)
ALT SERPL W P-5'-P-CCNC: 22 U/L (ref 10–50)
ALT SERPL W P-5'-P-CCNC: 7 U/L (ref 10–50)
ANION GAP BLD CALC-SCNC: 10 MMOL/L (ref 5–18)
ANION GAP SERPL CALCULATED.3IONS-SCNC: 10 MMOL/L (ref 7–15)
ANION GAP SERPL CALCULATED.3IONS-SCNC: 12 MMOL/L (ref 7–15)
ANION GAP SERPL CALCULATED.3IONS-SCNC: 12 MMOL/L (ref 7–15)
ANION GAP SERPL CALCULATED.3IONS-SCNC: 13 MMOL/L (ref 7–15)
ANION GAP SERPL CALCULATED.3IONS-SCNC: 13 MMOL/L (ref 7–15)
ANION GAP SERPL CALCULATED.3IONS-SCNC: 14 MMOL/L (ref 7–15)
ANION GAP SERPL CALCULATED.3IONS-SCNC: 15 MMOL/L (ref 7–15)
ANION GAP SERPL CALCULATED.3IONS-SCNC: 17 MMOL/L (ref 7–15)
ANION GAP SERPL CALCULATED.3IONS-SCNC: 20 MMOL/L (ref 7–15)
ANION GAP SERPL CALCULATED.3IONS-SCNC: 24 MMOL/L (ref 7–15)
ANION GAP SERPL CALCULATED.3IONS-SCNC: 9 MMOL/L (ref 7–15)
ANION GAP SERPL CALCULATED.3IONS-SCNC: 9 MMOL/L (ref 7–15)
APPEARANCE FLD: ABNORMAL
APPEARANCE UR: ABNORMAL
APPEARANCE UR: CLEAR
APTT PPP: 32 SECONDS (ref 22–38)
APTT PPP: 32 SECONDS (ref 22–38)
APTT PPP: 33 SECONDS (ref 22–38)
AST SERPL W P-5'-P-CCNC: 17 U/L (ref 10–50)
AST SERPL W P-5'-P-CCNC: 24 U/L (ref 10–50)
AST SERPL W P-5'-P-CCNC: 26 U/L (ref 10–50)
AST SERPL W P-5'-P-CCNC: 27 U/L (ref 10–50)
AST SERPL W P-5'-P-CCNC: 29 U/L (ref 10–50)
AST SERPL W P-5'-P-CCNC: 30 U/L (ref 10–50)
ATRIAL RATE - MUSE: 45 BPM
ATRIAL RATE - MUSE: 46 BPM
ATRIAL RATE - MUSE: 53 BPM
ATRIAL RATE - MUSE: 59 BPM
BACTERIA BLD CULT: NO GROWTH
BACTERIA BLD CULT: NO GROWTH
BACTERIA SPT CULT: NORMAL
BACTERIA UR CULT: ABNORMAL
BACTERIA UR CULT: ABNORMAL
BASE EXCESS BLDA CALC-SCNC: 10.1 MMOL/L (ref -9–1.8)
BASE EXCESS BLDA CALC-SCNC: 6.3 MMOL/L (ref -9–1.8)
BASE EXCESS BLDA CALC-SCNC: 6.4 MMOL/L (ref -9–1.8)
BASE EXCESS BLDA CALC-SCNC: 6.9 MMOL/L (ref -9–1.8)
BASE EXCESS BLDA CALC-SCNC: 7.6 MMOL/L (ref -9–1.8)
BASE EXCESS BLDA CALC-SCNC: 7.8 MMOL/L (ref -9–1.8)
BASE EXCESS BLDA CALC-SCNC: 8.3 MMOL/L (ref -9–1.8)
BASE EXCESS BLDA CALC-SCNC: 9.3 MMOL/L (ref -9–1.8)
BASE EXCESS BLDA CALC-SCNC: 9.4 MMOL/L (ref -9–1.8)
BASE EXCESS BLDA CALC-SCNC: 9.6 MMOL/L (ref -9–1.8)
BASE EXCESS BLDV CALC-SCNC: -0.9 MMOL/L (ref -7.7–1.9)
BASE EXCESS BLDV CALC-SCNC: 10 MMOL/L (ref -7.7–1.9)
BASE EXCESS BLDV CALC-SCNC: 11 MMOL/L (ref -7.7–1.9)
BASE EXCESS BLDV CALC-SCNC: 3.6 MMOL/L (ref -7.7–1.9)
BASE EXCESS BLDV CALC-SCNC: 7.2 MMOL/L (ref -7.7–1.9)
BASE EXCESS BLDV CALC-SCNC: 7.4 MMOL/L (ref -7.7–1.9)
BASE EXCESS BLDV CALC-SCNC: 8.1 MMOL/L (ref -7.7–1.9)
BASE EXCESS BLDV CALC-SCNC: 8.2 MMOL/L (ref -7.7–1.9)
BASE EXCESS BLDV CALC-SCNC: 8.3 MMOL/L (ref -7.7–1.9)
BASE EXCESS BLDV CALC-SCNC: 8.4 MMOL/L (ref -7.7–1.9)
BASE EXCESS BLDV CALC-SCNC: 9.3 MMOL/L (ref -7.7–1.9)
BASE EXCESS BLDV CALC-SCNC: 9.3 MMOL/L (ref -7.7–1.9)
BASOPHILS # BLD AUTO: 0 10E3/UL (ref 0–0.2)
BASOPHILS # BLD MANUAL: 0 10E3/UL (ref 0–0.2)
BASOPHILS NFR BLD AUTO: 0 %
BASOPHILS NFR BLD AUTO: 0 %
BASOPHILS NFR BLD AUTO: 1 %
BASOPHILS NFR BLD AUTO: 1 %
BASOPHILS NFR BLD MANUAL: 0 %
BILIRUB SERPL-MCNC: 0.2 MG/DL
BILIRUB SERPL-MCNC: 0.3 MG/DL
BILIRUB SERPL-MCNC: 0.4 MG/DL
BILIRUB SERPL-MCNC: 0.5 MG/DL
BILIRUB SERPL-MCNC: 0.6 MG/DL
BILIRUB SERPL-MCNC: 0.6 MG/DL
BILIRUB UR QL STRIP: NEGATIVE
BILIRUB UR QL STRIP: NEGATIVE
BUN SERPL-MCNC: 11.2 MG/DL (ref 8–23)
BUN SERPL-MCNC: 6.5 MG/DL (ref 8–23)
BUN SERPL-MCNC: 6.7 MG/DL (ref 8–23)
BUN SERPL-MCNC: 6.7 MG/DL (ref 8–23)
BUN SERPL-MCNC: 7.7 MG/DL (ref 8–23)
BUN SERPL-MCNC: 7.7 MG/DL (ref 8–23)
BUN SERPL-MCNC: 8.1 MG/DL (ref 8–23)
BUN SERPL-MCNC: 8.5 MG/DL (ref 8–23)
BUN SERPL-MCNC: 8.7 MG/DL (ref 8–23)
BUN SERPL-MCNC: 9.2 MG/DL (ref 8–23)
BUN SERPL-MCNC: 9.2 MG/DL (ref 8–23)
BUN SERPL-MCNC: 9.5 MG/DL (ref 8–23)
BUN SERPL-MCNC: 9.7 MG/DL (ref 8–23)
BUN SERPL-MCNC: 9.9 MG/DL (ref 8–23)
BURR CELLS BLD QL SMEAR: SLIGHT
CA-I BLD-MCNC: 4.2 MG/DL (ref 4.4–5.2)
CA-I BLD-MCNC: 5 MG/DL (ref 4.4–5.2)
CA-I BLD-MCNC: 5.3 MG/DL (ref 4.4–5.2)
CALCIUM SERPL-MCNC: 8.5 MG/DL (ref 8.8–10.2)
CALCIUM SERPL-MCNC: 8.5 MG/DL (ref 8.8–10.2)
CALCIUM SERPL-MCNC: 8.6 MG/DL (ref 8.8–10.2)
CALCIUM SERPL-MCNC: 8.6 MG/DL (ref 8.8–10.2)
CALCIUM SERPL-MCNC: 8.7 MG/DL (ref 8.8–10.2)
CALCIUM SERPL-MCNC: 8.8 MG/DL (ref 8.8–10.2)
CALCIUM SERPL-MCNC: 8.9 MG/DL (ref 8.8–10.2)
CALCIUM SERPL-MCNC: 8.9 MG/DL (ref 8.8–10.2)
CALCIUM SERPL-MCNC: 9 MG/DL (ref 8.8–10.2)
CALCIUM SERPL-MCNC: 9 MG/DL (ref 8.8–10.2)
CALCIUM SERPL-MCNC: 9.2 MG/DL (ref 8.8–10.2)
CALCIUM SERPL-MCNC: 9.3 MG/DL (ref 8.8–10.2)
CAOX CRY #/AREA URNS HPF: ABNORMAL /HPF
CELL COUNT BODY FLUID SOURCE: ABNORMAL
CHLORIDE BLD-SCNC: 91 MMOL/L (ref 94–109)
CHLORIDE SERPL-SCNC: 101 MMOL/L (ref 98–107)
CHLORIDE SERPL-SCNC: 90 MMOL/L (ref 98–107)
CHLORIDE SERPL-SCNC: 93 MMOL/L (ref 98–107)
CHLORIDE SERPL-SCNC: 94 MMOL/L (ref 98–107)
CHLORIDE SERPL-SCNC: 94 MMOL/L (ref 98–107)
CHLORIDE SERPL-SCNC: 95 MMOL/L (ref 98–107)
CHLORIDE SERPL-SCNC: 95 MMOL/L (ref 98–107)
CHLORIDE SERPL-SCNC: 96 MMOL/L (ref 98–107)
CHLORIDE SERPL-SCNC: 97 MMOL/L (ref 98–107)
CHLORIDE SERPL-SCNC: 97 MMOL/L (ref 98–107)
CO2 SERPL-SCNC: 35 MMOL/L (ref 20–32)
COLOR FLD: YELLOW
COLOR UR AUTO: ABNORMAL
COLOR UR AUTO: YELLOW
CPB POCT: NO
CREAT SERPL-MCNC: 0.18 MG/DL (ref 0.67–1.17)
CREAT SERPL-MCNC: 0.19 MG/DL (ref 0.67–1.17)
CREAT SERPL-MCNC: 0.2 MG/DL (ref 0.67–1.17)
CREAT SERPL-MCNC: 0.21 MG/DL (ref 0.67–1.17)
CREAT SERPL-MCNC: 0.22 MG/DL (ref 0.67–1.17)
CREAT SERPL-MCNC: 0.23 MG/DL (ref 0.67–1.17)
CREAT SERPL-MCNC: 0.23 MG/DL (ref 0.67–1.17)
CREAT SERPL-MCNC: 0.25 MG/DL (ref 0.67–1.17)
CRP SERPL-MCNC: 7.95 MG/L
CYSTATIN C (ROCHE): 1.2 MG/L (ref 0.6–1)
DEPRECATED HCO3 PLAS-SCNC: 20 MMOL/L (ref 22–29)
DEPRECATED HCO3 PLAS-SCNC: 22 MMOL/L (ref 22–29)
DEPRECATED HCO3 PLAS-SCNC: 22 MMOL/L (ref 22–29)
DEPRECATED HCO3 PLAS-SCNC: 25 MMOL/L (ref 22–29)
DEPRECATED HCO3 PLAS-SCNC: 26 MMOL/L (ref 22–29)
DEPRECATED HCO3 PLAS-SCNC: 27 MMOL/L (ref 22–29)
DEPRECATED HCO3 PLAS-SCNC: 28 MMOL/L (ref 22–29)
DEPRECATED HCO3 PLAS-SCNC: 29 MMOL/L (ref 22–29)
DEPRECATED HCO3 PLAS-SCNC: 30 MMOL/L (ref 22–29)
DEPRECATED HCO3 PLAS-SCNC: 31 MMOL/L (ref 22–29)
DEPRECATED HCO3 PLAS-SCNC: 31 MMOL/L (ref 22–29)
DIASTOLIC BLOOD PRESSURE - MUSE: NORMAL MMHG
EOSINOPHIL # BLD AUTO: 0 10E3/UL (ref 0–0.7)
EOSINOPHIL # BLD AUTO: 0.1 10E3/UL (ref 0–0.7)
EOSINOPHIL # BLD MANUAL: 0.3 10E3/UL (ref 0–0.7)
EOSINOPHIL NFR BLD AUTO: 0 %
EOSINOPHIL NFR BLD AUTO: 0 %
EOSINOPHIL NFR BLD AUTO: 1 %
EOSINOPHIL NFR BLD AUTO: 2 %
EOSINOPHIL NFR BLD MANUAL: 3 %
ERYTHROCYTE [DISTWIDTH] IN BLOOD BY AUTOMATED COUNT: 13.7 % (ref 10–15)
ERYTHROCYTE [DISTWIDTH] IN BLOOD BY AUTOMATED COUNT: 13.8 % (ref 10–15)
ERYTHROCYTE [DISTWIDTH] IN BLOOD BY AUTOMATED COUNT: 13.9 % (ref 10–15)
ERYTHROCYTE [DISTWIDTH] IN BLOOD BY AUTOMATED COUNT: 14 % (ref 10–15)
ERYTHROCYTE [DISTWIDTH] IN BLOOD BY AUTOMATED COUNT: 14.3 % (ref 10–15)
ERYTHROCYTE [DISTWIDTH] IN BLOOD BY AUTOMATED COUNT: 14.4 % (ref 10–15)
ERYTHROCYTE [DISTWIDTH] IN BLOOD BY AUTOMATED COUNT: 14.6 % (ref 10–15)
FIBRINOGEN PPP-MCNC: 560 MG/DL (ref 170–490)
FIBRINOGEN PPP-MCNC: 579 MG/DL (ref 170–490)
FIBRINOGEN PPP-MCNC: 621 MG/DL (ref 170–490)
FLUAV RNA SPEC QL NAA+PROBE: NEGATIVE
FLUBV RNA RESP QL NAA+PROBE: NEGATIVE
GFR SERPL CREATININE-BSD FRML MDRD: 61 ML/MIN/1.73M2
GFR SERPL CREATININE-BSD FRML MDRD: >90 ML/MIN/1.73M2
GLUCOSE BLD-MCNC: 166 MG/DL (ref 70–99)
GLUCOSE BLDC GLUCOMTR-MCNC: 101 MG/DL (ref 70–99)
GLUCOSE BLDC GLUCOMTR-MCNC: 104 MG/DL (ref 70–99)
GLUCOSE BLDC GLUCOMTR-MCNC: 106 MG/DL (ref 70–99)
GLUCOSE BLDC GLUCOMTR-MCNC: 106 MG/DL (ref 70–99)
GLUCOSE BLDC GLUCOMTR-MCNC: 107 MG/DL (ref 70–99)
GLUCOSE BLDC GLUCOMTR-MCNC: 111 MG/DL (ref 70–99)
GLUCOSE BLDC GLUCOMTR-MCNC: 116 MG/DL (ref 70–99)
GLUCOSE BLDC GLUCOMTR-MCNC: 117 MG/DL (ref 70–99)
GLUCOSE BLDC GLUCOMTR-MCNC: 118 MG/DL (ref 70–99)
GLUCOSE BLDC GLUCOMTR-MCNC: 118 MG/DL (ref 70–99)
GLUCOSE BLDC GLUCOMTR-MCNC: 123 MG/DL (ref 70–99)
GLUCOSE BLDC GLUCOMTR-MCNC: 124 MG/DL (ref 70–99)
GLUCOSE BLDC GLUCOMTR-MCNC: 124 MG/DL (ref 70–99)
GLUCOSE BLDC GLUCOMTR-MCNC: 127 MG/DL (ref 70–99)
GLUCOSE BLDC GLUCOMTR-MCNC: 127 MG/DL (ref 70–99)
GLUCOSE BLDC GLUCOMTR-MCNC: 129 MG/DL (ref 70–99)
GLUCOSE BLDC GLUCOMTR-MCNC: 136 MG/DL (ref 70–99)
GLUCOSE BLDC GLUCOMTR-MCNC: 137 MG/DL (ref 70–99)
GLUCOSE BLDC GLUCOMTR-MCNC: 142 MG/DL (ref 70–99)
GLUCOSE BLDC GLUCOMTR-MCNC: 161 MG/DL (ref 70–99)
GLUCOSE BLDC GLUCOMTR-MCNC: 185 MG/DL (ref 70–99)
GLUCOSE BLDC GLUCOMTR-MCNC: 74 MG/DL (ref 70–99)
GLUCOSE BLDC GLUCOMTR-MCNC: 74 MG/DL (ref 70–99)
GLUCOSE BLDC GLUCOMTR-MCNC: 76 MG/DL (ref 70–99)
GLUCOSE BLDC GLUCOMTR-MCNC: 78 MG/DL (ref 70–99)
GLUCOSE BLDC GLUCOMTR-MCNC: 80 MG/DL (ref 70–99)
GLUCOSE BLDC GLUCOMTR-MCNC: 81 MG/DL (ref 70–99)
GLUCOSE BLDC GLUCOMTR-MCNC: 87 MG/DL (ref 70–99)
GLUCOSE BLDC GLUCOMTR-MCNC: 89 MG/DL (ref 70–99)
GLUCOSE BLDC GLUCOMTR-MCNC: 90 MG/DL (ref 70–99)
GLUCOSE BLDC GLUCOMTR-MCNC: 90 MG/DL (ref 70–99)
GLUCOSE BLDC GLUCOMTR-MCNC: 92 MG/DL (ref 70–99)
GLUCOSE BLDC GLUCOMTR-MCNC: 95 MG/DL (ref 70–99)
GLUCOSE BLDC GLUCOMTR-MCNC: 96 MG/DL (ref 70–99)
GLUCOSE BLDC GLUCOMTR-MCNC: 98 MG/DL (ref 70–99)
GLUCOSE BLDC GLUCOMTR-MCNC: 99 MG/DL (ref 70–99)
GLUCOSE BODY FLUID SOURCE: NORMAL
GLUCOSE FLD-MCNC: 102 MG/DL
GLUCOSE SERPL-MCNC: 106 MG/DL (ref 70–99)
GLUCOSE SERPL-MCNC: 107 MG/DL (ref 70–99)
GLUCOSE SERPL-MCNC: 116 MG/DL (ref 70–99)
GLUCOSE SERPL-MCNC: 119 MG/DL (ref 70–99)
GLUCOSE SERPL-MCNC: 163 MG/DL (ref 70–99)
GLUCOSE SERPL-MCNC: 175 MG/DL (ref 70–99)
GLUCOSE SERPL-MCNC: 200 MG/DL (ref 70–99)
GLUCOSE SERPL-MCNC: 81 MG/DL (ref 70–99)
GLUCOSE SERPL-MCNC: 82 MG/DL (ref 70–99)
GLUCOSE SERPL-MCNC: 92 MG/DL (ref 70–99)
GLUCOSE SERPL-MCNC: 94 MG/DL (ref 70–99)
GLUCOSE SERPL-MCNC: 96 MG/DL (ref 70–99)
GLUCOSE UR STRIP-MCNC: NEGATIVE MG/DL
GLUCOSE UR STRIP-MCNC: NEGATIVE MG/DL
GRAM STAIN RESULT: NORMAL
GRAM STAIN RESULT: NORMAL
HCO3 BLD-SCNC: 30 MMOL/L (ref 21–28)
HCO3 BLD-SCNC: 30 MMOL/L (ref 21–28)
HCO3 BLD-SCNC: 31 MMOL/L (ref 21–28)
HCO3 BLD-SCNC: 31 MMOL/L (ref 21–28)
HCO3 BLD-SCNC: 32 MMOL/L (ref 21–28)
HCO3 BLD-SCNC: 32 MMOL/L (ref 21–28)
HCO3 BLD-SCNC: 33 MMOL/L (ref 21–28)
HCO3 BLD-SCNC: 35 MMOL/L (ref 21–28)
HCO3 BLDV-SCNC: 29 MMOL/L (ref 21–28)
HCO3 BLDV-SCNC: 32 MMOL/L (ref 21–28)
HCO3 BLDV-SCNC: 33 MMOL/L (ref 21–28)
HCO3 BLDV-SCNC: 34 MMOL/L (ref 21–28)
HCO3 BLDV-SCNC: 35 MMOL/L (ref 21–28)
HCO3 BLDV-SCNC: 35 MMOL/L (ref 21–28)
HCO3 BLDV-SCNC: 37 MMOL/L (ref 21–28)
HCO3 BLDV-SCNC: 37 MMOL/L (ref 21–28)
HCO3 BLDV-SCNC: 38 MMOL/L (ref 21–28)
HCO3 BLDV-SCNC: 38 MMOL/L (ref 21–28)
HCT VFR BLD AUTO: 34.6 % (ref 40–53)
HCT VFR BLD AUTO: 35.9 % (ref 40–53)
HCT VFR BLD AUTO: 36.9 % (ref 40–53)
HCT VFR BLD AUTO: 37 % (ref 40–53)
HCT VFR BLD AUTO: 37.4 % (ref 40–53)
HCT VFR BLD AUTO: 37.4 % (ref 40–53)
HCT VFR BLD AUTO: 37.8 % (ref 40–53)
HCT VFR BLD AUTO: 41.4 % (ref 40–53)
HCT VFR BLD AUTO: 41.4 % (ref 40–53)
HCT VFR BLD AUTO: 41.6 % (ref 40–53)
HCT VFR BLD CALC: 40 % (ref 40–53)
HGB BLD-MCNC: 11.1 G/DL (ref 13.3–17.7)
HGB BLD-MCNC: 11.4 G/DL (ref 13.3–17.7)
HGB BLD-MCNC: 11.5 G/DL (ref 13.3–17.7)
HGB BLD-MCNC: 11.6 G/DL (ref 13.3–17.7)
HGB BLD-MCNC: 11.6 G/DL (ref 13.3–17.7)
HGB BLD-MCNC: 12 G/DL (ref 13.3–17.7)
HGB BLD-MCNC: 12.7 G/DL (ref 13.3–17.7)
HGB BLD-MCNC: 13.2 G/DL (ref 13.3–17.7)
HGB BLD-MCNC: 13.2 G/DL (ref 13.3–17.7)
HGB BLD-MCNC: 13.6 G/DL (ref 13.3–17.7)
HGB UR QL STRIP: ABNORMAL
HGB UR QL STRIP: ABNORMAL
HOLD SPECIMEN: NORMAL
IMM GRANULOCYTES # BLD: 0 10E3/UL
IMM GRANULOCYTES # BLD: 0.1 10E3/UL
IMM GRANULOCYTES NFR BLD: 0 %
IMM GRANULOCYTES NFR BLD: 1 %
INR PPP: 1.06 (ref 0.85–1.15)
INR PPP: 1.17 (ref 0.85–1.15)
INR PPP: 1.19 (ref 0.85–1.15)
INR PPP: 1.21 (ref 0.85–1.15)
INTERPRETATION ECG - MUSE: NORMAL
KETONES UR STRIP-MCNC: 10 MG/DL
KETONES UR STRIP-MCNC: 40 MG/DL
L PNEUMO1 AG UR QL IA: NEGATIVE
LACTATE SERPL-SCNC: 0.6 MMOL/L (ref 0.7–2)
LACTATE SERPL-SCNC: 0.8 MMOL/L (ref 0.7–2)
LACTATE SERPL-SCNC: 1.4 MMOL/L (ref 0.7–2)
LACTATE SERPL-SCNC: 1.7 MMOL/L (ref 0.7–2)
LACTATE SERPL-SCNC: 3.8 MMOL/L (ref 0.7–2)
LACTATE SERPL-SCNC: 9.6 MMOL/L (ref 0.7–2)
LD BODY BODY FLUID SOURCE: NORMAL
LDH FLD L TO P-CCNC: 73 U/L
LDH SERPL L TO P-CCNC: 139 U/L (ref 0–250)
LDH SERPL L TO P-CCNC: 151 U/L (ref 0–250)
LEUKOCYTE ESTERASE UR QL STRIP: ABNORMAL
LEUKOCYTE ESTERASE UR QL STRIP: ABNORMAL
LVEF ECHO: NORMAL
LYMPHOCYTES # BLD AUTO: 0.5 10E3/UL (ref 0.8–5.3)
LYMPHOCYTES # BLD AUTO: 0.5 10E3/UL (ref 0.8–5.3)
LYMPHOCYTES # BLD AUTO: 0.6 10E3/UL (ref 0.8–5.3)
LYMPHOCYTES # BLD AUTO: 0.8 10E3/UL (ref 0.8–5.3)
LYMPHOCYTES # BLD MANUAL: 2.4 10E3/UL (ref 0.8–5.3)
LYMPHOCYTES NFR BLD AUTO: 10 %
LYMPHOCYTES NFR BLD AUTO: 13 %
LYMPHOCYTES NFR BLD AUTO: 4 %
LYMPHOCYTES NFR BLD AUTO: 8 %
LYMPHOCYTES NFR BLD MANUAL: 23 %
LYMPHOCYTES NFR FLD MANUAL: 63 %
MAGNESIUM SERPL-MCNC: 1.7 MG/DL (ref 1.7–2.3)
MAGNESIUM SERPL-MCNC: 1.8 MG/DL (ref 1.7–2.3)
MAGNESIUM SERPL-MCNC: 1.9 MG/DL (ref 1.7–2.3)
MAGNESIUM SERPL-MCNC: 2.1 MG/DL (ref 1.7–2.3)
MAGNESIUM SERPL-MCNC: 2.2 MG/DL (ref 1.7–2.3)
MCH RBC QN AUTO: 30.1 PG (ref 26.5–33)
MCH RBC QN AUTO: 30.2 PG (ref 26.5–33)
MCH RBC QN AUTO: 30.3 PG (ref 26.5–33)
MCH RBC QN AUTO: 30.3 PG (ref 26.5–33)
MCH RBC QN AUTO: 30.4 PG (ref 26.5–33)
MCH RBC QN AUTO: 30.5 PG (ref 26.5–33)
MCH RBC QN AUTO: 30.5 PG (ref 26.5–33)
MCH RBC QN AUTO: 30.6 PG (ref 26.5–33)
MCH RBC QN AUTO: 30.8 PG (ref 26.5–33)
MCH RBC QN AUTO: 31.1 PG (ref 26.5–33)
MCHC RBC AUTO-ENTMCNC: 30.5 G/DL (ref 31.5–36.5)
MCHC RBC AUTO-ENTMCNC: 30.7 G/DL (ref 31.5–36.5)
MCHC RBC AUTO-ENTMCNC: 31 G/DL (ref 31.5–36.5)
MCHC RBC AUTO-ENTMCNC: 31.7 G/DL (ref 31.5–36.5)
MCHC RBC AUTO-ENTMCNC: 31.7 G/DL (ref 31.5–36.5)
MCHC RBC AUTO-ENTMCNC: 31.9 G/DL (ref 31.5–36.5)
MCHC RBC AUTO-ENTMCNC: 32.3 G/DL (ref 31.5–36.5)
MCHC RBC AUTO-ENTMCNC: 32.4 G/DL (ref 31.5–36.5)
MCHC RBC AUTO-ENTMCNC: 32.5 G/DL (ref 31.5–36.5)
MCHC RBC AUTO-ENTMCNC: 33.2 G/DL (ref 31.5–36.5)
MCV RBC AUTO: 100 FL (ref 78–100)
MCV RBC AUTO: 101 FL (ref 78–100)
MCV RBC AUTO: 92 FL (ref 78–100)
MCV RBC AUTO: 93 FL (ref 78–100)
MCV RBC AUTO: 93 FL (ref 78–100)
MCV RBC AUTO: 94 FL (ref 78–100)
MCV RBC AUTO: 96 FL (ref 78–100)
MCV RBC AUTO: 96 FL (ref 78–100)
MCV RBC AUTO: 97 FL (ref 78–100)
MCV RBC AUTO: 97 FL (ref 78–100)
MONOCYTES # BLD AUTO: 0.7 10E3/UL (ref 0–1.3)
MONOCYTES # BLD AUTO: 0.7 10E3/UL (ref 0–1.3)
MONOCYTES # BLD AUTO: 0.8 10E3/UL (ref 0–1.3)
MONOCYTES # BLD AUTO: 1 10E3/UL (ref 0–1.3)
MONOCYTES # BLD MANUAL: 0.8 10E3/UL (ref 0–1.3)
MONOCYTES NFR BLD AUTO: 12 %
MONOCYTES NFR BLD AUTO: 14 %
MONOCYTES NFR BLD AUTO: 14 %
MONOCYTES NFR BLD AUTO: 7 %
MONOCYTES NFR BLD MANUAL: 8 %
MONOS+MACROS NFR FLD MANUAL: NORMAL %
MRSA DNA SPEC QL NAA+PROBE: NEGATIVE
MUCOUS THREADS #/AREA URNS LPF: PRESENT /LPF
MUCOUS THREADS #/AREA URNS LPF: PRESENT /LPF
NEUTROPHILS # BLD AUTO: 12.5 10E3/UL (ref 1.6–8.3)
NEUTROPHILS # BLD AUTO: 4.1 10E3/UL (ref 1.6–8.3)
NEUTROPHILS # BLD AUTO: 4.3 10E3/UL (ref 1.6–8.3)
NEUTROPHILS # BLD AUTO: 4.6 10E3/UL (ref 1.6–8.3)
NEUTROPHILS # BLD MANUAL: 7 10E3/UL (ref 1.6–8.3)
NEUTROPHILS NFR BLD AUTO: 72 %
NEUTROPHILS NFR BLD AUTO: 75 %
NEUTROPHILS NFR BLD AUTO: 77 %
NEUTROPHILS NFR BLD AUTO: 88 %
NEUTROPHILS NFR BLD MANUAL: 66 %
NEUTS BAND NFR FLD MANUAL: 5 %
NITRATE UR QL: NEGATIVE
NITRATE UR QL: POSITIVE
NRBC # BLD AUTO: 0 10E3/UL
NRBC # BLD AUTO: 0.1 10E3/UL
NRBC BLD AUTO-RTO: 0 /100
NRBC BLD MANUAL-RTO: 1 %
NT-PROBNP SERPL-MCNC: 182 PG/ML (ref 0–900)
NT-PROBNP SERPL-MCNC: 196 PG/ML (ref 0–900)
O2/TOTAL GAS SETTING VFR VENT: 100 %
O2/TOTAL GAS SETTING VFR VENT: 21 %
O2/TOTAL GAS SETTING VFR VENT: 30 %
O2/TOTAL GAS SETTING VFR VENT: 35 %
O2/TOTAL GAS SETTING VFR VENT: 40 %
O2/TOTAL GAS SETTING VFR VENT: 45 %
O2/TOTAL GAS SETTING VFR VENT: 45 %
O2/TOTAL GAS SETTING VFR VENT: 50 %
O2/TOTAL GAS SETTING VFR VENT: 50 %
O2/TOTAL GAS SETTING VFR VENT: 60 %
O2/TOTAL GAS SETTING VFR VENT: 80 %
O2/TOTAL GAS SETTING VFR VENT: 90 %
O2/TOTAL GAS SETTING VFR VENT: 90 %
OTHER CELLS FLD MANUAL: 32 %
OXYHGB MFR BLDV: 68 % (ref 70–75)
P AXIS - MUSE: 47 DEGREES
P AXIS - MUSE: 56 DEGREES
P AXIS - MUSE: 57 DEGREES
P AXIS - MUSE: 59 DEGREES
PATH REPORT.COMMENTS IMP SPEC: NORMAL
PATH REPORT.FINAL DX SPEC: NORMAL
PATH REPORT.GROSS SPEC: NORMAL
PATH REPORT.MICROSCOPIC SPEC OTHER STN: NORMAL
PATH REPORT.RELEVANT HX SPEC: NORMAL
PCO2 BLD: 32 MM HG (ref 35–45)
PCO2 BLD: 37 MM HG (ref 35–45)
PCO2 BLD: 38 MM HG (ref 35–45)
PCO2 BLD: 40 MM HG (ref 35–45)
PCO2 BLD: 41 MM HG (ref 35–45)
PCO2 BLD: 42 MM HG (ref 35–45)
PCO2 BLD: 46 MM HG (ref 35–45)
PCO2 BLD: 60 MM HG (ref 35–45)
PCO2 BLDV: 104 MM HG (ref 40–50)
PCO2 BLDV: 108 MM HG (ref 40–50)
PCO2 BLDV: 43 MM HG (ref 40–50)
PCO2 BLDV: 45 MM HG (ref 40–50)
PCO2 BLDV: 45 MM HG (ref 40–50)
PCO2 BLDV: 47 MM HG (ref 40–50)
PCO2 BLDV: 47 MM HG (ref 40–50)
PCO2 BLDV: 48 MM HG (ref 40–50)
PCO2 BLDV: 49 MM HG (ref 40–50)
PCO2 BLDV: 51 MM HG (ref 40–50)
PCO2 BLDV: 54 MM HG (ref 40–50)
PCO2 BLDV: 62 MM HG (ref 40–50)
PCO2 BLDV: 63 MM HG (ref 40–50)
PCO2 BLDV: 69 MM HG (ref 40–50)
PCO2 BLDV: 86 MM HG (ref 40–50)
PH BLD: 7.37 [PH] (ref 7.35–7.45)
PH BLD: 7.46 [PH] (ref 7.35–7.45)
PH BLD: 7.48 [PH] (ref 7.35–7.45)
PH BLD: 7.49 [PH] (ref 7.35–7.45)
PH BLD: 7.49 [PH] (ref 7.35–7.45)
PH BLD: 7.5 [PH] (ref 7.35–7.45)
PH BLD: 7.52 [PH] (ref 7.35–7.45)
PH BLD: 7.55 [PH] (ref 7.35–7.45)
PH BLD: 7.55 [PH] (ref 7.35–7.45)
PH BLD: 7.6 [PH] (ref 7.35–7.45)
PH BLDV: 7.09 [PH] (ref 7.32–7.43)
PH BLDV: 7.1 [PH] (ref 7.32–7.43)
PH BLDV: 7.25 [PH] (ref 7.32–7.43)
PH BLDV: 7.34 [PH] (ref 7.32–7.43)
PH BLDV: 7.38 [PH] (ref 7.32–7.43)
PH BLDV: 7.39 [PH] (ref 7.32–7.43)
PH BLDV: 7.4 [PH] (ref 7.32–7.43)
PH BLDV: 7.43 [PH] (ref 7.32–7.43)
PH BLDV: 7.43 [PH] (ref 7.32–7.43)
PH BLDV: 7.45 [PH] (ref 7.32–7.43)
PH BLDV: 7.46 [PH] (ref 7.32–7.43)
PH BLDV: 7.48 [PH] (ref 7.32–7.43)
PH BLDV: 7.49 [PH] (ref 7.32–7.43)
PH UR STRIP: 7 [PH] (ref 5–7)
PH UR STRIP: 8 [PH] (ref 5–7)
PHOSPHATE SERPL-MCNC: 3.2 MG/DL (ref 2.5–4.5)
PHOSPHATE SERPL-MCNC: 3.5 MG/DL (ref 2.5–4.5)
PHOSPHATE SERPL-MCNC: 3.7 MG/DL (ref 2.5–4.5)
PHOSPHATE SERPL-MCNC: 4.1 MG/DL (ref 2.5–4.5)
PHOSPHATE SERPL-MCNC: 4.7 MG/DL (ref 2.5–4.5)
PLAT MORPH BLD: ABNORMAL
PLATELET # BLD AUTO: 126 10E3/UL (ref 150–450)
PLATELET # BLD AUTO: 134 10E3/UL (ref 150–450)
PLATELET # BLD AUTO: 135 10E3/UL (ref 150–450)
PLATELET # BLD AUTO: 136 10E3/UL (ref 150–450)
PLATELET # BLD AUTO: 137 10E3/UL (ref 150–450)
PLATELET # BLD AUTO: 141 10E3/UL (ref 150–450)
PLATELET # BLD AUTO: 149 10E3/UL (ref 150–450)
PLATELET # BLD AUTO: 150 10E3/UL (ref 150–450)
PLATELET # BLD AUTO: 152 10E3/UL (ref 150–450)
PLATELET # BLD AUTO: 152 10E3/UL (ref 150–450)
PLATELET # BLD AUTO: 165 10E3/UL (ref 150–450)
PO2 BLD: 113 MM HG (ref 80–105)
PO2 BLD: 141 MM HG (ref 80–105)
PO2 BLD: 143 MM HG (ref 80–105)
PO2 BLD: 168 MM HG (ref 80–105)
PO2 BLD: 58 MM HG (ref 80–105)
PO2 BLD: 65 MM HG (ref 80–105)
PO2 BLD: 74 MM HG (ref 80–105)
PO2 BLD: 77 MM HG (ref 80–105)
PO2 BLD: 85 MM HG (ref 80–105)
PO2 BLD: 87 MM HG (ref 80–105)
PO2 BLDV: 26 MM HG (ref 25–47)
PO2 BLDV: 35 MM HG (ref 25–47)
PO2 BLDV: 40 MM HG (ref 25–47)
PO2 BLDV: 48 MM HG (ref 25–47)
PO2 BLDV: 48 MM HG (ref 25–47)
PO2 BLDV: 51 MM HG (ref 25–47)
PO2 BLDV: 53 MM HG (ref 25–47)
PO2 BLDV: 55 MM HG (ref 25–47)
PO2 BLDV: 56 MM HG (ref 25–47)
PO2 BLDV: 60 MM HG (ref 25–47)
PO2 BLDV: 61 MM HG (ref 25–47)
PO2 BLDV: 62 MM HG (ref 25–47)
PO2 BLDV: 62 MM HG (ref 25–47)
PO2 BLDV: 66 MM HG (ref 25–47)
PO2 BLDV: 83 MM HG (ref 25–47)
POTASSIUM BLD-SCNC: 5.3 MMOL/L (ref 3.4–5.3)
POTASSIUM BLD-SCNC: 5.7 MMOL/L (ref 3.4–5.3)
POTASSIUM SERPL-SCNC: 3 MMOL/L (ref 3.4–5.3)
POTASSIUM SERPL-SCNC: 3.1 MMOL/L (ref 3.4–5.3)
POTASSIUM SERPL-SCNC: 3.5 MMOL/L (ref 3.4–5.3)
POTASSIUM SERPL-SCNC: 3.5 MMOL/L (ref 3.4–5.3)
POTASSIUM SERPL-SCNC: 3.9 MMOL/L (ref 3.4–5.3)
POTASSIUM SERPL-SCNC: 4 MMOL/L (ref 3.4–5.3)
POTASSIUM SERPL-SCNC: 4 MMOL/L (ref 3.4–5.3)
POTASSIUM SERPL-SCNC: 4.1 MMOL/L (ref 3.4–5.3)
POTASSIUM SERPL-SCNC: 4.2 MMOL/L (ref 3.4–5.3)
POTASSIUM SERPL-SCNC: 4.3 MMOL/L (ref 3.4–5.3)
POTASSIUM SERPL-SCNC: 4.4 MMOL/L (ref 3.4–5.3)
POTASSIUM SERPL-SCNC: 4.5 MMOL/L (ref 3.4–5.3)
POTASSIUM SERPL-SCNC: 4.6 MMOL/L (ref 3.4–5.3)
POTASSIUM SERPL-SCNC: 5.7 MMOL/L (ref 3.4–5.3)
POTASSIUM SERPL-SCNC: 6.1 MMOL/L (ref 3.4–5.3)
PR INTERVAL - MUSE: 146 MS
PR INTERVAL - MUSE: 152 MS
PROCALCITONIN SERPL IA-MCNC: 0.09 NG/ML
PROCALCITONIN SERPL IA-MCNC: 0.09 NG/ML
PROT FLD-MCNC: 3.1 G/DL
PROT SERPL-MCNC: 5.8 G/DL (ref 6.4–8.3)
PROT SERPL-MCNC: 6 G/DL (ref 6.4–8.3)
PROT SERPL-MCNC: 6.1 G/DL (ref 6.4–8.3)
PROT SERPL-MCNC: 6.2 G/DL (ref 6.4–8.3)
PROT SERPL-MCNC: 6.2 G/DL (ref 6.4–8.3)
PROT SERPL-MCNC: 6.3 G/DL (ref 6.4–8.3)
PROT SERPL-MCNC: 6.5 G/DL (ref 6.4–8.3)
PROT SERPL-MCNC: 6.8 G/DL (ref 6.4–8.3)
PROTEIN BODY FLUID SOURCE: NORMAL
QRS DURATION - MUSE: 100 MS
QRS DURATION - MUSE: 94 MS
QRS DURATION - MUSE: 96 MS
QRS DURATION - MUSE: 96 MS
QT - MUSE: 390 MS
QT - MUSE: 428 MS
QT - MUSE: 432 MS
QT - MUSE: 434 MS
QTC - MUSE: 374 MS
QTC - MUSE: 375 MS
QTC - MUSE: 386 MS
QTC - MUSE: 405 MS
R AXIS - MUSE: 28 DEGREES
R AXIS - MUSE: 47 DEGREES
R AXIS - MUSE: 54 DEGREES
R AXIS - MUSE: 55 DEGREES
RADIOLOGIST FLAGS: ABNORMAL
RBC # BLD AUTO: 3.76 10E6/UL (ref 4.4–5.9)
RBC # BLD AUTO: 3.77 10E6/UL (ref 4.4–5.9)
RBC # BLD AUTO: 3.84 10E6/UL (ref 4.4–5.9)
RBC # BLD AUTO: 3.85 10E6/UL (ref 4.4–5.9)
RBC # BLD AUTO: 3.94 10E6/UL (ref 4.4–5.9)
RBC # BLD AUTO: 3.95 10E6/UL (ref 4.4–5.9)
RBC # BLD AUTO: 3.96 10E6/UL (ref 4.4–5.9)
RBC # BLD AUTO: 4.09 10E6/UL (ref 4.4–5.9)
RBC # BLD AUTO: 4.28 10E6/UL (ref 4.4–5.9)
RBC # BLD AUTO: 4.32 10E6/UL (ref 4.4–5.9)
RBC MORPH BLD: ABNORMAL
RBC URINE: 8 /HPF
RBC URINE: 8 /HPF
RSV RNA SPEC NAA+PROBE: NEGATIVE
S PNEUM AG SPEC QL: NEGATIVE
SA TARGET DNA: NEGATIVE
SAO2 % BLDV: 28 % (ref 94–100)
SARS-COV-2 RNA RESP QL NAA+PROBE: NEGATIVE
SODIUM BLD-SCNC: 131 MMOL/L (ref 133–144)
SODIUM SERPL-SCNC: 132 MMOL/L (ref 136–145)
SODIUM SERPL-SCNC: 133 MMOL/L (ref 136–145)
SODIUM SERPL-SCNC: 133 MMOL/L (ref 136–145)
SODIUM SERPL-SCNC: 134 MMOL/L (ref 136–145)
SODIUM SERPL-SCNC: 134 MMOL/L (ref 136–145)
SODIUM SERPL-SCNC: 135 MMOL/L (ref 136–145)
SODIUM SERPL-SCNC: 136 MMOL/L (ref 133–144)
SODIUM SERPL-SCNC: 136 MMOL/L (ref 136–145)
SODIUM SERPL-SCNC: 138 MMOL/L (ref 136–145)
SODIUM SERPL-SCNC: 140 MMOL/L (ref 136–145)
SODIUM SERPL-SCNC: 141 MMOL/L (ref 136–145)
SP GR UR STRIP: 1 (ref 1–1.03)
SP GR UR STRIP: 1.02 (ref 1–1.03)
SYSTOLIC BLOOD PRESSURE - MUSE: NORMAL MMHG
T AXIS - MUSE: 44 DEGREES
T AXIS - MUSE: 55 DEGREES
T AXIS - MUSE: 57 DEGREES
T AXIS - MUSE: 59 DEGREES
TRANSITIONAL EPI: <1 /HPF
TROPONIN T SERPL HS-MCNC: 17 NG/L
TROPONIN T SERPL HS-MCNC: 19 NG/L
TROPONIN T SERPL HS-MCNC: 20 NG/L
TSH SERPL DL<=0.005 MIU/L-ACNC: 3.17 UIU/ML (ref 0.3–4.2)
UROBILINOGEN UR STRIP-MCNC: NORMAL MG/DL
UROBILINOGEN UR STRIP-MCNC: NORMAL MG/DL
VENTRICULAR RATE- MUSE: 45 BPM
VENTRICULAR RATE- MUSE: 46 BPM
VENTRICULAR RATE- MUSE: 53 BPM
VENTRICULAR RATE- MUSE: 59 BPM
WBC # BLD AUTO: 10.6 10E3/UL (ref 4–11)
WBC # BLD AUTO: 14.1 10E3/UL (ref 4–11)
WBC # BLD AUTO: 5.5 10E3/UL (ref 4–11)
WBC # BLD AUTO: 5.6 10E3/UL (ref 4–11)
WBC # BLD AUTO: 5.8 10E3/UL (ref 4–11)
WBC # BLD AUTO: 6 10E3/UL (ref 4–11)
WBC # BLD AUTO: 6 10E3/UL (ref 4–11)
WBC # BLD AUTO: 6.1 10E3/UL (ref 4–11)
WBC # BLD AUTO: 6.3 10E3/UL (ref 4–11)
WBC # BLD AUTO: 9 10E3/UL (ref 4–11)
WBC # FLD AUTO: 1363 /UL
WBC CLUMPS #/AREA URNS HPF: PRESENT /HPF
WBC URINE: 44 /HPF
WBC URINE: 8 /HPF

## 2022-01-01 PROCEDURE — 82310 ASSAY OF CALCIUM: CPT

## 2022-01-01 PROCEDURE — 250N000011 HC RX IP 250 OP 636

## 2022-01-01 PROCEDURE — 250N000011 HC RX IP 250 OP 636: Performed by: NURSE PRACTITIONER

## 2022-01-01 PROCEDURE — 84450 TRANSFERASE (AST) (SGOT): CPT | Performed by: NURSE PRACTITIONER

## 2022-01-01 PROCEDURE — 85610 PROTHROMBIN TIME: CPT

## 2022-01-01 PROCEDURE — 87015 SPECIMEN INFECT AGNT CONCNTJ: CPT

## 2022-01-01 PROCEDURE — 36415 COLL VENOUS BLD VENIPUNCTURE: CPT

## 2022-01-01 PROCEDURE — 71045 X-RAY EXAM CHEST 1 VIEW: CPT

## 2022-01-01 PROCEDURE — 999N000034 HC STATISTIC CODE BLUE ACCESS REQUIRED

## 2022-01-01 PROCEDURE — 0T2BX0Z CHANGE DRAINAGE DEVICE IN BLADDER, EXTERNAL APPROACH: ICD-10-PCS | Performed by: STUDENT IN AN ORGANIZED HEALTH CARE EDUCATION/TRAINING PROGRAM

## 2022-01-01 PROCEDURE — 95718 EEG PHYS/QHP 2-12 HR W/VEEG: CPT | Performed by: PSYCHIATRY & NEUROLOGY

## 2022-01-01 PROCEDURE — 36600 WITHDRAWAL OF ARTERIAL BLOOD: CPT

## 2022-01-01 PROCEDURE — 999N000127 HC STATISTIC PERIPHERAL IV START W US GUIDANCE

## 2022-01-01 PROCEDURE — 88305 TISSUE EXAM BY PATHOLOGIST: CPT | Mod: TC

## 2022-01-01 PROCEDURE — 250N000013 HC RX MED GY IP 250 OP 250 PS 637

## 2022-01-01 PROCEDURE — 84100 ASSAY OF PHOSPHORUS: CPT

## 2022-01-01 PROCEDURE — 94660 CPAP INITIATION&MGMT: CPT

## 2022-01-01 PROCEDURE — 87637 SARSCOV2&INF A&B&RSV AMP PRB: CPT | Performed by: EMERGENCY MEDICINE

## 2022-01-01 PROCEDURE — 272N000500 HC NEEDLE CR2

## 2022-01-01 PROCEDURE — 999N000157 HC STATISTIC RCP TIME EA 10 MIN

## 2022-01-01 PROCEDURE — 82803 BLOOD GASES ANY COMBINATION: CPT | Performed by: NURSE PRACTITIONER

## 2022-01-01 PROCEDURE — 82947 ASSAY GLUCOSE BLOOD QUANT: CPT

## 2022-01-01 PROCEDURE — 250N000009 HC RX 250

## 2022-01-01 PROCEDURE — 76770 US EXAM ABDO BACK WALL COMP: CPT | Mod: 26 | Performed by: STUDENT IN AN ORGANIZED HEALTH CARE EDUCATION/TRAINING PROGRAM

## 2022-01-01 PROCEDURE — 999N000035 HC STATISTIC CODE BLUE NO ACCESS REQUIRED

## 2022-01-01 PROCEDURE — 87086 URINE CULTURE/COLONY COUNT: CPT

## 2022-01-01 PROCEDURE — 84484 ASSAY OF TROPONIN QUANT: CPT

## 2022-01-01 PROCEDURE — 250N000013 HC RX MED GY IP 250 OP 250 PS 637: Performed by: EMERGENCY MEDICINE

## 2022-01-01 PROCEDURE — 82803 BLOOD GASES ANY COMBINATION: CPT

## 2022-01-01 PROCEDURE — 250N000011 HC RX IP 250 OP 636: Performed by: STUDENT IN AN ORGANIZED HEALTH CARE EDUCATION/TRAINING PROGRAM

## 2022-01-01 PROCEDURE — 250N000013 HC RX MED GY IP 250 OP 250 PS 637: Performed by: SURGERY

## 2022-01-01 PROCEDURE — 82330 ASSAY OF CALCIUM: CPT

## 2022-01-01 PROCEDURE — 85027 COMPLETE CBC AUTOMATED: CPT

## 2022-01-01 PROCEDURE — 99223 1ST HOSP IP/OBS HIGH 75: CPT | Mod: AI | Performed by: STUDENT IN AN ORGANIZED HEALTH CARE EDUCATION/TRAINING PROGRAM

## 2022-01-01 PROCEDURE — 74176 CT ABD & PELVIS W/O CONTRAST: CPT | Mod: 26 | Performed by: RADIOLOGY

## 2022-01-01 PROCEDURE — 999N000208 ECHOCARDIOGRAM COMPLETE

## 2022-01-01 PROCEDURE — 0W9B30Z DRAINAGE OF LEFT PLEURAL CAVITY WITH DRAINAGE DEVICE, PERCUTANEOUS APPROACH: ICD-10-PCS | Performed by: SURGERY

## 2022-01-01 PROCEDURE — 250N000013 HC RX MED GY IP 250 OP 250 PS 637: Performed by: STUDENT IN AN ORGANIZED HEALTH CARE EDUCATION/TRAINING PROGRAM

## 2022-01-01 PROCEDURE — 97032 APPL MODALITY 1+ESTIM EA 15: CPT | Mod: GO | Performed by: OCCUPATIONAL THERAPIST

## 2022-01-01 PROCEDURE — 36415 COLL VENOUS BLD VENIPUNCTURE: CPT | Performed by: SURGERY

## 2022-01-01 PROCEDURE — 83880 ASSAY OF NATRIURETIC PEPTIDE: CPT | Performed by: EMERGENCY MEDICINE

## 2022-01-01 PROCEDURE — 99207 PR SC NO CHARGE VISIT: CPT | Performed by: PSYCHIATRY & NEUROLOGY

## 2022-01-01 PROCEDURE — 99291 CRITICAL CARE FIRST HOUR: CPT | Mod: GC | Performed by: INTERNAL MEDICINE

## 2022-01-01 PROCEDURE — 84132 ASSAY OF SERUM POTASSIUM: CPT | Performed by: NURSE PRACTITIONER

## 2022-01-01 PROCEDURE — 83735 ASSAY OF MAGNESIUM: CPT | Performed by: SURGERY

## 2022-01-01 PROCEDURE — 94640 AIRWAY INHALATION TREATMENT: CPT | Mod: 76

## 2022-01-01 PROCEDURE — 999N000185 HC STATISTIC TRANSPORT TIME EA 15 MIN

## 2022-01-01 PROCEDURE — 83735 ASSAY OF MAGNESIUM: CPT | Performed by: STUDENT IN AN ORGANIZED HEALTH CARE EDUCATION/TRAINING PROGRAM

## 2022-01-01 PROCEDURE — 0BCK8ZZ EXTIRPATION OF MATTER FROM RIGHT LUNG, VIA NATURAL OR ARTIFICIAL OPENING ENDOSCOPIC: ICD-10-PCS | Performed by: INTERNAL MEDICINE

## 2022-01-01 PROCEDURE — 74176 CT ABD & PELVIS W/O CONTRAST: CPT

## 2022-01-01 PROCEDURE — 87205 SMEAR GRAM STAIN: CPT

## 2022-01-01 PROCEDURE — 85027 COMPLETE CBC AUTOMATED: CPT | Performed by: NURSE PRACTITIONER

## 2022-01-01 PROCEDURE — 95714 VEEG EA 12-26 HR UNMNTR: CPT

## 2022-01-01 PROCEDURE — 32555 ASPIRATE PLEURA W/ IMAGING: CPT

## 2022-01-01 PROCEDURE — 84443 ASSAY THYROID STIM HORMONE: CPT | Performed by: EMERGENCY MEDICINE

## 2022-01-01 PROCEDURE — 93010 ELECTROCARDIOGRAM REPORT: CPT | Performed by: INTERNAL MEDICINE

## 2022-01-01 PROCEDURE — 272N000735 HC KIT, CIRCUIT WITH NEB, VOLERA

## 2022-01-01 PROCEDURE — 999N000065 XR CHEST PORT 1 VIEW

## 2022-01-01 PROCEDURE — 999N000253 HC STATISTIC WEANING TRIALS

## 2022-01-01 PROCEDURE — 36415 COLL VENOUS BLD VENIPUNCTURE: CPT | Performed by: EMERGENCY MEDICINE

## 2022-01-01 PROCEDURE — 94003 VENT MGMT INPAT SUBQ DAY: CPT

## 2022-01-01 PROCEDURE — 71045 X-RAY EXAM CHEST 1 VIEW: CPT | Mod: 77

## 2022-01-01 PROCEDURE — 250N000013 HC RX MED GY IP 250 OP 250 PS 637: Performed by: INTERNAL MEDICINE

## 2022-01-01 PROCEDURE — 87040 BLOOD CULTURE FOR BACTERIA: CPT | Performed by: STUDENT IN AN ORGANIZED HEALTH CARE EDUCATION/TRAINING PROGRAM

## 2022-01-01 PROCEDURE — 370N000003 HC ANESTHESIA WARD SERVICE

## 2022-01-01 PROCEDURE — 74018 RADEX ABDOMEN 1 VIEW: CPT | Mod: 26 | Performed by: RADIOLOGY

## 2022-01-01 PROCEDURE — 258N000001 HC RX 258

## 2022-01-01 PROCEDURE — 93005 ELECTROCARDIOGRAM TRACING: CPT

## 2022-01-01 PROCEDURE — C9113 INJ PANTOPRAZOLE SODIUM, VIA: HCPCS

## 2022-01-01 PROCEDURE — 94640 AIRWAY INHALATION TREATMENT: CPT

## 2022-01-01 PROCEDURE — 83615 LACTATE (LD) (LDH) ENZYME: CPT

## 2022-01-01 PROCEDURE — 86316 IMMUNOASSAY TUMOR OTHER: CPT | Performed by: NURSE PRACTITIONER

## 2022-01-01 PROCEDURE — 258N000003 HC RX IP 258 OP 636: Performed by: SURGERY

## 2022-01-01 PROCEDURE — 250N000013 HC RX MED GY IP 250 OP 250 PS 637: Performed by: NURSE PRACTITIONER

## 2022-01-01 PROCEDURE — 5A1945Z RESPIRATORY VENTILATION, 24-96 CONSECUTIVE HOURS: ICD-10-PCS | Performed by: STUDENT IN AN ORGANIZED HEALTH CARE EDUCATION/TRAINING PROGRAM

## 2022-01-01 PROCEDURE — G0179 MD RECERTIFICATION HHA PT: HCPCS | Performed by: FAMILY MEDICINE

## 2022-01-01 PROCEDURE — 89051 BODY FLUID CELL COUNT: CPT

## 2022-01-01 PROCEDURE — 85025 COMPLETE CBC W/AUTO DIFF WBC: CPT | Performed by: EMERGENCY MEDICINE

## 2022-01-01 PROCEDURE — 82374 ASSAY BLOOD CARBON DIOXIDE: CPT

## 2022-01-01 PROCEDURE — 87899 AGENT NOS ASSAY W/OPTIC: CPT

## 2022-01-01 PROCEDURE — 82565 ASSAY OF CREATININE: CPT

## 2022-01-01 PROCEDURE — 258N000003 HC RX IP 258 OP 636: Performed by: NURSE PRACTITIONER

## 2022-01-01 PROCEDURE — 99291 CRITICAL CARE FIRST HOUR: CPT | Mod: 25 | Performed by: INTERNAL MEDICINE

## 2022-01-01 PROCEDURE — 82330 ASSAY OF CALCIUM: CPT | Performed by: SURGERY

## 2022-01-01 PROCEDURE — 999N000065 XR ABDOMEN PORT 1 VIEW

## 2022-01-01 PROCEDURE — 250N000009 HC RX 250: Performed by: NURSE PRACTITIONER

## 2022-01-01 PROCEDURE — 85027 COMPLETE CBC AUTOMATED: CPT | Performed by: SURGERY

## 2022-01-01 PROCEDURE — C1769 GUIDE WIRE: HCPCS

## 2022-01-01 PROCEDURE — 99291 CRITICAL CARE FIRST HOUR: CPT | Mod: CS | Performed by: EMERGENCY MEDICINE

## 2022-01-01 PROCEDURE — 31500 INSERT EMERGENCY AIRWAY: CPT | Performed by: EMERGENCY MEDICINE

## 2022-01-01 PROCEDURE — 83735 ASSAY OF MAGNESIUM: CPT

## 2022-01-01 PROCEDURE — 80051 ELECTROLYTE PANEL: CPT | Performed by: NURSE PRACTITIONER

## 2022-01-01 PROCEDURE — 87040 BLOOD CULTURE FOR BACTERIA: CPT | Performed by: EMERGENCY MEDICINE

## 2022-01-01 PROCEDURE — 83605 ASSAY OF LACTIC ACID: CPT | Performed by: STUDENT IN AN ORGANIZED HEALTH CARE EDUCATION/TRAINING PROGRAM

## 2022-01-01 PROCEDURE — 93971 EXTREMITY STUDY: CPT | Mod: RT

## 2022-01-01 PROCEDURE — 95720 EEG PHY/QHP EA INCR W/VEEG: CPT | Performed by: PSYCHIATRY & NEUROLOGY

## 2022-01-01 PROCEDURE — 99215 OFFICE O/P EST HI 40 MIN: CPT | Mod: 95 | Performed by: INTERNAL MEDICINE

## 2022-01-01 PROCEDURE — 95711 VEEG 2-12 HR UNMONITORED: CPT

## 2022-01-01 PROCEDURE — 94002 VENT MGMT INPAT INIT DAY: CPT

## 2022-01-01 PROCEDURE — 83605 ASSAY OF LACTIC ACID: CPT

## 2022-01-01 PROCEDURE — 99233 SBSQ HOSP IP/OBS HIGH 50: CPT | Performed by: INTERNAL MEDICINE

## 2022-01-01 PROCEDURE — 84155 ASSAY OF PROTEIN SERUM: CPT | Performed by: NURSE PRACTITIONER

## 2022-01-01 PROCEDURE — 71045 X-RAY EXAM CHEST 1 VIEW: CPT | Mod: 26 | Performed by: RADIOLOGY

## 2022-01-01 PROCEDURE — 258N000003 HC RX IP 258 OP 636

## 2022-01-01 PROCEDURE — 85049 AUTOMATED PLATELET COUNT: CPT

## 2022-01-01 PROCEDURE — 93010 ELECTROCARDIOGRAM REPORT: CPT | Mod: 59 | Performed by: EMERGENCY MEDICINE

## 2022-01-01 PROCEDURE — 92950 HEART/LUNG RESUSCITATION CPR: CPT | Mod: GC | Performed by: INTERNAL MEDICINE

## 2022-01-01 PROCEDURE — 99207 PR APP CREDIT; MD BILLING SHARED VISIT: CPT | Performed by: STUDENT IN AN ORGANIZED HEALTH CARE EDUCATION/TRAINING PROGRAM

## 2022-01-01 PROCEDURE — 36415 COLL VENOUS BLD VENIPUNCTURE: CPT | Performed by: STUDENT IN AN ORGANIZED HEALTH CARE EDUCATION/TRAINING PROGRAM

## 2022-01-01 PROCEDURE — 85730 THROMBOPLASTIN TIME PARTIAL: CPT

## 2022-01-01 PROCEDURE — 74018 RADEX ABDOMEN 1 VIEW: CPT | Mod: 26 | Performed by: STUDENT IN AN ORGANIZED HEALTH CARE EDUCATION/TRAINING PROGRAM

## 2022-01-01 PROCEDURE — 31645 BRNCHSC W/THER ASPIR 1ST: CPT | Mod: GC | Performed by: INTERNAL MEDICINE

## 2022-01-01 PROCEDURE — 82803 BLOOD GASES ANY COMBINATION: CPT | Performed by: STUDENT IN AN ORGANIZED HEALTH CARE EDUCATION/TRAINING PROGRAM

## 2022-01-01 PROCEDURE — 200N000002 HC R&B ICU UMMC

## 2022-01-01 PROCEDURE — 81001 URINALYSIS AUTO W/SCOPE: CPT

## 2022-01-01 PROCEDURE — 82805 BLOOD GASES W/O2 SATURATION: CPT | Performed by: SURGERY

## 2022-01-01 PROCEDURE — 250N000009 HC RX 250: Performed by: SURGERY

## 2022-01-01 PROCEDURE — 999N000128 HC STATISTIC PERIPHERAL IV START W/O US GUIDANCE

## 2022-01-01 PROCEDURE — 85018 HEMOGLOBIN: CPT | Performed by: STUDENT IN AN ORGANIZED HEALTH CARE EDUCATION/TRAINING PROGRAM

## 2022-01-01 PROCEDURE — 87088 URINE BACTERIA CULTURE: CPT | Performed by: EMERGENCY MEDICINE

## 2022-01-01 PROCEDURE — 80048 BASIC METABOLIC PNL TOTAL CA: CPT

## 2022-01-01 PROCEDURE — 84100 ASSAY OF PHOSPHORUS: CPT | Performed by: STUDENT IN AN ORGANIZED HEALTH CARE EDUCATION/TRAINING PROGRAM

## 2022-01-01 PROCEDURE — 99292 CRITICAL CARE ADDL 30 MIN: CPT | Mod: 25 | Performed by: PSYCHIATRY & NEUROLOGY

## 2022-01-01 PROCEDURE — 250N000011 HC RX IP 250 OP 636: Performed by: EMERGENCY MEDICINE

## 2022-01-01 PROCEDURE — 88112 CYTOPATH CELL ENHANCE TECH: CPT | Mod: 26 | Performed by: PATHOLOGY

## 2022-01-01 PROCEDURE — 5A09457 ASSISTANCE WITH RESPIRATORY VENTILATION, 24-96 CONSECUTIVE HOURS, CONTINUOUS POSITIVE AIRWAY PRESSURE: ICD-10-PCS | Performed by: INTERNAL MEDICINE

## 2022-01-01 PROCEDURE — 97535 SELF CARE MNGMENT TRAINING: CPT | Mod: GO | Performed by: OCCUPATIONAL THERAPIST

## 2022-01-01 PROCEDURE — 258N000001 HC RX 258: Performed by: STUDENT IN AN ORGANIZED HEALTH CARE EDUCATION/TRAINING PROGRAM

## 2022-01-01 PROCEDURE — 0W9B3ZZ DRAINAGE OF LEFT PLEURAL CAVITY, PERCUTANEOUS APPROACH: ICD-10-PCS | Performed by: RADIOLOGY

## 2022-01-01 PROCEDURE — 93971 EXTREMITY STUDY: CPT | Mod: 26 | Performed by: RADIOLOGY

## 2022-01-01 PROCEDURE — 84155 ASSAY OF PROTEIN SERUM: CPT

## 2022-01-01 PROCEDURE — 87205 SMEAR GRAM STAIN: CPT | Performed by: EMERGENCY MEDICINE

## 2022-01-01 PROCEDURE — 36556 INSERT NON-TUNNEL CV CATH: CPT | Mod: GC | Performed by: INTERNAL MEDICINE

## 2022-01-01 PROCEDURE — C9803 HOPD COVID-19 SPEC COLLECT: HCPCS | Performed by: EMERGENCY MEDICINE

## 2022-01-01 PROCEDURE — 999N000009 HC STATISTIC AIRWAY CARE

## 2022-01-01 PROCEDURE — 93306 TTE W/DOPPLER COMPLETE: CPT | Mod: 26 | Performed by: STUDENT IN AN ORGANIZED HEALTH CARE EDUCATION/TRAINING PROGRAM

## 2022-01-01 PROCEDURE — 999N000259 HC STATISTIC EXTUBATION

## 2022-01-01 PROCEDURE — 82330 ASSAY OF CALCIUM: CPT | Performed by: NURSE PRACTITIONER

## 2022-01-01 PROCEDURE — 85384 FIBRINOGEN ACTIVITY: CPT

## 2022-01-01 PROCEDURE — 85025 COMPLETE CBC W/AUTO DIFF WBC: CPT

## 2022-01-01 PROCEDURE — 99207 EEG VIDEO 12-26 HR UNMONITORED: CPT | Performed by: PSYCHIATRY & NEUROLOGY

## 2022-01-01 PROCEDURE — 97110 THERAPEUTIC EXERCISES: CPT | Mod: GO | Performed by: OCCUPATIONAL THERAPIST

## 2022-01-01 PROCEDURE — 84145 PROCALCITONIN (PCT): CPT

## 2022-01-01 PROCEDURE — 94799 UNLISTED PULMONARY SVC/PX: CPT

## 2022-01-01 PROCEDURE — 84157 ASSAY OF PROTEIN OTHER: CPT

## 2022-01-01 PROCEDURE — 250N000009 HC RX 250: Performed by: RADIOLOGY

## 2022-01-01 PROCEDURE — 76770 US EXAM ABDO BACK WALL COMP: CPT

## 2022-01-01 PROCEDURE — 88305 TISSUE EXAM BY PATHOLOGIST: CPT | Mod: 26 | Performed by: PATHOLOGY

## 2022-01-01 PROCEDURE — 99231 SBSQ HOSP IP/OBS SF/LOW 25: CPT | Performed by: PEDIATRICS

## 2022-01-01 PROCEDURE — 31622 DX BRONCHOSCOPE/WASH: CPT

## 2022-01-01 PROCEDURE — 0WJB3ZZ INSPECTION OF LEFT PLEURAL CAVITY, PERCUTANEOUS APPROACH: ICD-10-PCS | Performed by: PEDIATRICS

## 2022-01-01 PROCEDURE — 96360 HYDRATION IV INFUSION INIT: CPT | Performed by: EMERGENCY MEDICINE

## 2022-01-01 PROCEDURE — 258N000003 HC RX IP 258 OP 636: Performed by: EMERGENCY MEDICINE

## 2022-01-01 PROCEDURE — 85007 BL SMEAR W/DIFF WBC COUNT: CPT | Performed by: SURGERY

## 2022-01-01 PROCEDURE — 85384 FIBRINOGEN ACTIVITY: CPT | Performed by: STUDENT IN AN ORGANIZED HEALTH CARE EDUCATION/TRAINING PROGRAM

## 2022-01-01 PROCEDURE — 0B9F8ZX DRAINAGE OF RIGHT LOWER LUNG LOBE, VIA NATURAL OR ARTIFICIAL OPENING ENDOSCOPIC, DIAGNOSTIC: ICD-10-PCS | Performed by: INTERNAL MEDICINE

## 2022-01-01 PROCEDURE — 87040 BLOOD CULTURE FOR BACTERIA: CPT

## 2022-01-01 PROCEDURE — 87641 MR-STAPH DNA AMP PROBE: CPT

## 2022-01-01 PROCEDURE — 83880 ASSAY OF NATRIURETIC PEPTIDE: CPT

## 2022-01-01 PROCEDURE — 999N000147 HC STATISTIC PT IP EVAL DEFER

## 2022-01-01 PROCEDURE — 82610 CYSTATIN C: CPT | Performed by: NURSE PRACTITIONER

## 2022-01-01 PROCEDURE — 97165 OT EVAL LOW COMPLEX 30 MIN: CPT | Mod: GO | Performed by: OCCUPATIONAL THERAPIST

## 2022-01-01 PROCEDURE — 36620 INSERTION CATHETER ARTERY: CPT | Mod: GC | Performed by: INTERNAL MEDICINE

## 2022-01-01 PROCEDURE — 93005 ELECTROCARDIOGRAM TRACING: CPT | Performed by: EMERGENCY MEDICINE

## 2022-01-01 PROCEDURE — 83605 ASSAY OF LACTIC ACID: CPT | Performed by: NURSE PRACTITIONER

## 2022-01-01 PROCEDURE — 99291 CRITICAL CARE FIRST HOUR: CPT | Mod: 25 | Performed by: PSYCHIATRY & NEUROLOGY

## 2022-01-01 PROCEDURE — 3E033XZ INTRODUCTION OF VASOPRESSOR INTO PERIPHERAL VEIN, PERCUTANEOUS APPROACH: ICD-10-PCS | Performed by: INTERNAL MEDICINE

## 2022-01-01 PROCEDURE — 258N000003 HC RX IP 258 OP 636: Performed by: STUDENT IN AN ORGANIZED HEALTH CARE EDUCATION/TRAINING PROGRAM

## 2022-01-01 PROCEDURE — 82945 GLUCOSE OTHER FLUID: CPT

## 2022-01-01 PROCEDURE — 82040 ASSAY OF SERUM ALBUMIN: CPT

## 2022-01-01 PROCEDURE — 85384 FIBRINOGEN ACTIVITY: CPT | Performed by: NURSE PRACTITIONER

## 2022-01-01 PROCEDURE — 99233 SBSQ HOSP IP/OBS HIGH 50: CPT | Mod: 25 | Performed by: PSYCHIATRY & NEUROLOGY

## 2022-01-01 PROCEDURE — 32555 ASPIRATE PLEURA W/ IMAGING: CPT | Mod: LT | Performed by: RADIOLOGY

## 2022-01-01 PROCEDURE — 94150 VITAL CAPACITY TEST: CPT

## 2022-01-01 PROCEDURE — 96361 HYDRATE IV INFUSION ADD-ON: CPT | Performed by: EMERGENCY MEDICINE

## 2022-01-01 PROCEDURE — 93010 ELECTROCARDIOGRAM REPORT: CPT | Mod: 59 | Performed by: INTERNAL MEDICINE

## 2022-01-01 PROCEDURE — 99285 EMERGENCY DEPT VISIT HI MDM: CPT | Mod: CS,25 | Performed by: EMERGENCY MEDICINE

## 2022-01-01 PROCEDURE — 83605 ASSAY OF LACTIC ACID: CPT | Performed by: EMERGENCY MEDICINE

## 2022-01-01 PROCEDURE — 99207 PR NO BILLABLE SERVICE THIS VISIT: CPT | Performed by: STUDENT IN AN ORGANIZED HEALTH CARE EDUCATION/TRAINING PROGRAM

## 2022-01-01 PROCEDURE — 85730 THROMBOPLASTIN TIME PARTIAL: CPT | Performed by: NURSE PRACTITIONER

## 2022-01-01 PROCEDURE — 85610 PROTHROMBIN TIME: CPT | Performed by: NURSE PRACTITIONER

## 2022-01-01 PROCEDURE — 84100 ASSAY OF PHOSPHORUS: CPT | Performed by: SURGERY

## 2022-01-01 PROCEDURE — 86140 C-REACTIVE PROTEIN: CPT | Performed by: EMERGENCY MEDICINE

## 2022-01-01 PROCEDURE — 80051 ELECTROLYTE PANEL: CPT | Performed by: SURGERY

## 2022-01-01 PROCEDURE — 84484 ASSAY OF TROPONIN QUANT: CPT | Performed by: SURGERY

## 2022-01-01 PROCEDURE — 84145 PROCALCITONIN (PCT): CPT | Performed by: EMERGENCY MEDICINE

## 2022-01-01 PROCEDURE — 80053 COMPREHEN METABOLIC PANEL: CPT | Performed by: EMERGENCY MEDICINE

## 2022-01-01 PROCEDURE — 36415 COLL VENOUS BLD VENIPUNCTURE: CPT | Performed by: NURSE PRACTITIONER

## 2022-01-01 PROCEDURE — 255N000002 HC RX 255 OP 636: Performed by: STUDENT IN AN ORGANIZED HEALTH CARE EDUCATION/TRAINING PROGRAM

## 2022-01-01 RX ORDER — POLYETHYLENE GLYCOL 3350 17 G/17G
17 POWDER, FOR SOLUTION ORAL 2 TIMES DAILY
Status: DISCONTINUED | OUTPATIENT
Start: 2022-01-01 | End: 2022-01-01

## 2022-01-01 RX ORDER — DOCUSATE SODIUM 100 MG/1
100 CAPSULE, LIQUID FILLED ORAL DAILY
Status: DISCONTINUED | OUTPATIENT
Start: 2022-01-01 | End: 2022-01-01

## 2022-01-01 RX ORDER — OXYCODONE HYDROCHLORIDE 5 MG/1
5 TABLET ORAL EVERY 6 HOURS PRN
Status: DISCONTINUED | OUTPATIENT
Start: 2022-01-01 | End: 2022-01-01 | Stop reason: HOSPADM

## 2022-01-01 RX ORDER — MIDAZOLAM HCL IN 0.9 % NACL/PF 1 MG/ML
1-15 PLASTIC BAG, INJECTION (ML) INTRAVENOUS CONTINUOUS
Status: DISCONTINUED | OUTPATIENT
Start: 2022-01-01 | End: 2022-01-01 | Stop reason: HOSPADM

## 2022-01-01 RX ORDER — BISACODYL 10 MG
10 SUPPOSITORY, RECTAL RECTAL DAILY
Status: DISCONTINUED | OUTPATIENT
Start: 2022-01-01 | End: 2022-01-01 | Stop reason: HOSPADM

## 2022-01-01 RX ORDER — EPINEPHRINE IN SOD CHLOR,ISO 1 MG/10 ML
SYRINGE (ML) INTRAVENOUS
Status: DISCONTINUED
Start: 2022-01-01 | End: 2022-01-01 | Stop reason: HOSPADM

## 2022-01-01 RX ORDER — ENOXAPARIN SODIUM 100 MG/ML
40 INJECTION SUBCUTANEOUS EVERY 24 HOURS
Status: DISCONTINUED | OUTPATIENT
Start: 2022-01-01 | End: 2022-01-01

## 2022-01-01 RX ORDER — OXYBUTYNIN CHLORIDE 10 MG/1
10 TABLET, EXTENDED RELEASE ORAL DAILY
Status: DISCONTINUED | OUTPATIENT
Start: 2022-01-01 | End: 2022-01-01

## 2022-01-01 RX ORDER — MINOCYCLINE HYDROCHLORIDE 50 MG/1
50 CAPSULE ORAL EVERY 12 HOURS SCHEDULED
Status: DISCONTINUED | OUTPATIENT
Start: 2022-01-01 | End: 2022-01-01

## 2022-01-01 RX ORDER — DEXTROSE MONOHYDRATE 100 MG/ML
INJECTION, SOLUTION INTRAVENOUS CONTINUOUS
Status: DISCONTINUED | OUTPATIENT
Start: 2022-01-01 | End: 2022-01-01

## 2022-01-01 RX ORDER — NOREPINEPHRINE BITARTRATE 0.06 MG/ML
.01-.6 INJECTION, SOLUTION INTRAVENOUS CONTINUOUS
Status: DISCONTINUED | OUTPATIENT
Start: 2022-01-01 | End: 2022-01-01

## 2022-01-01 RX ORDER — PROPOFOL 10 MG/ML
5-75 INJECTION, EMULSION INTRAVENOUS CONTINUOUS
Status: DISCONTINUED | OUTPATIENT
Start: 2022-01-01 | End: 2022-01-01

## 2022-01-01 RX ORDER — NALOXONE HYDROCHLORIDE 0.4 MG/ML
0.4 INJECTION, SOLUTION INTRAMUSCULAR; INTRAVENOUS; SUBCUTANEOUS
Status: DISCONTINUED | OUTPATIENT
Start: 2022-01-01 | End: 2022-01-01 | Stop reason: HOSPADM

## 2022-01-01 RX ORDER — OXYBUTYNIN CHLORIDE 5 MG/1
5 TABLET ORAL 2 TIMES DAILY
Status: DISCONTINUED | OUTPATIENT
Start: 2022-01-01 | End: 2022-01-01 | Stop reason: HOSPADM

## 2022-01-01 RX ORDER — CEFTRIAXONE 2 G/1
2 INJECTION, POWDER, FOR SOLUTION INTRAMUSCULAR; INTRAVENOUS EVERY 24 HOURS
Status: DISCONTINUED | OUTPATIENT
Start: 2022-01-01 | End: 2022-01-01

## 2022-01-01 RX ORDER — CEFTRIAXONE 2 G/1
2 INJECTION, POWDER, FOR SOLUTION INTRAMUSCULAR; INTRAVENOUS EVERY 24 HOURS
Status: DISCONTINUED | OUTPATIENT
Start: 2022-01-01 | End: 2022-01-01 | Stop reason: HOSPADM

## 2022-01-01 RX ORDER — NALOXONE HYDROCHLORIDE 0.4 MG/ML
0.2 INJECTION, SOLUTION INTRAMUSCULAR; INTRAVENOUS; SUBCUTANEOUS
Status: DISCONTINUED | OUTPATIENT
Start: 2022-01-01 | End: 2022-01-01 | Stop reason: HOSPADM

## 2022-01-01 RX ORDER — FUROSEMIDE 20 MG
20 TABLET ORAL DAILY PRN
Status: DISCONTINUED | OUTPATIENT
Start: 2022-01-01 | End: 2022-01-01

## 2022-01-01 RX ORDER — FUROSEMIDE 10 MG/ML
40 INJECTION INTRAMUSCULAR; INTRAVENOUS ONCE
Status: COMPLETED | OUTPATIENT
Start: 2022-01-01 | End: 2022-01-01

## 2022-01-01 RX ORDER — ENOXAPARIN SODIUM 100 MG/ML
40 INJECTION SUBCUTANEOUS EVERY 24 HOURS
Status: DISCONTINUED | OUTPATIENT
Start: 2022-01-01 | End: 2022-01-01 | Stop reason: HOSPADM

## 2022-01-01 RX ORDER — NALOXONE HYDROCHLORIDE 0.4 MG/ML
0.1 INJECTION, SOLUTION INTRAMUSCULAR; INTRAVENOUS; SUBCUTANEOUS
Status: DISCONTINUED | OUTPATIENT
Start: 2022-01-01 | End: 2022-01-01 | Stop reason: HOSPADM

## 2022-01-01 RX ORDER — POTASSIUM CHLORIDE 1.5 G/1.58G
40 POWDER, FOR SOLUTION ORAL ONCE
Status: COMPLETED | OUTPATIENT
Start: 2022-01-01 | End: 2022-01-01

## 2022-01-01 RX ORDER — ZOLPIDEM TARTRATE 10 MG/1
10 TABLET ORAL
Qty: 30 TABLET | Refills: 5 | Status: SHIPPED | OUTPATIENT
Start: 2022-01-01

## 2022-01-01 RX ORDER — MEPERIDINE HYDROCHLORIDE 25 MG/ML
25-50 INJECTION INTRAMUSCULAR; INTRAVENOUS; SUBCUTANEOUS
Status: ACTIVE | OUTPATIENT
Start: 2022-01-01 | End: 2022-01-01

## 2022-01-01 RX ORDER — LIDOCAINE 40 MG/G
CREAM TOPICAL
Status: DISCONTINUED | OUTPATIENT
Start: 2022-01-01 | End: 2022-01-01 | Stop reason: HOSPADM

## 2022-01-01 RX ORDER — DEXTROSE MONOHYDRATE 25 G/50ML
25-50 INJECTION, SOLUTION INTRAVENOUS
Status: DISCONTINUED | OUTPATIENT
Start: 2022-01-01 | End: 2022-01-01

## 2022-01-01 RX ORDER — LIDOCAINE HYDROCHLORIDE 10 MG/ML
INJECTION, SOLUTION EPIDURAL; INFILTRATION; INTRACAUDAL; PERINEURAL
Status: COMPLETED
Start: 2022-01-01 | End: 2022-01-01

## 2022-01-01 RX ORDER — CARBOXYMETHYLCELLULOSE SODIUM 5 MG/ML
1 SOLUTION/ DROPS OPHTHALMIC
Status: DISCONTINUED | OUTPATIENT
Start: 2022-01-01 | End: 2022-01-01 | Stop reason: HOSPADM

## 2022-01-01 RX ORDER — DOCUSATE SODIUM 100 MG/1
200 CAPSULE, LIQUID FILLED ORAL 2 TIMES DAILY
Status: DISCONTINUED | OUTPATIENT
Start: 2022-01-01 | End: 2022-01-01

## 2022-01-01 RX ORDER — BACLOFEN 20 MG/1
20 TABLET ORAL
Status: DISCONTINUED | OUTPATIENT
Start: 2022-01-01 | End: 2022-01-01 | Stop reason: HOSPADM

## 2022-01-01 RX ORDER — IPRATROPIUM BROMIDE AND ALBUTEROL SULFATE 2.5; .5 MG/3ML; MG/3ML
3 SOLUTION RESPIRATORY (INHALATION) EVERY 4 HOURS
Status: DISCONTINUED | OUTPATIENT
Start: 2022-01-01 | End: 2022-01-01 | Stop reason: HOSPADM

## 2022-01-01 RX ORDER — BACLOFEN 20 MG/1
20 TABLET ORAL EVERY 4 HOURS PRN
Status: DISCONTINUED | OUTPATIENT
Start: 2022-01-01 | End: 2022-01-01 | Stop reason: HOSPADM

## 2022-01-01 RX ORDER — AMLODIPINE BESYLATE 2.5 MG/1
2.5 TABLET ORAL DAILY PRN
Status: DISCONTINUED | OUTPATIENT
Start: 2022-01-01 | End: 2022-01-01

## 2022-01-01 RX ORDER — NOREPINEPHRINE BITARTRATE 0.06 MG/ML
.01-.6 INJECTION, SOLUTION INTRAVENOUS CONTINUOUS
Status: DISCONTINUED | OUTPATIENT
Start: 2022-01-01 | End: 2022-01-01 | Stop reason: HOSPADM

## 2022-01-01 RX ORDER — LIDOCAINE HYDROCHLORIDE 10 MG/ML
1-30 INJECTION, SOLUTION EPIDURAL; INFILTRATION; INTRACAUDAL; PERINEURAL
Status: COMPLETED | OUTPATIENT
Start: 2022-01-01 | End: 2022-01-01

## 2022-01-01 RX ORDER — SODIUM CHLORIDE FOR INHALATION 3 %
3 VIAL, NEBULIZER (ML) INHALATION
Status: DISCONTINUED | OUTPATIENT
Start: 2022-01-01 | End: 2022-01-01

## 2022-01-01 RX ORDER — GLYCOPYRROLATE 0.2 MG/ML
0.2 INJECTION, SOLUTION INTRAMUSCULAR; INTRAVENOUS EVERY 4 HOURS PRN
Status: DISCONTINUED | OUTPATIENT
Start: 2022-01-01 | End: 2022-01-01 | Stop reason: HOSPADM

## 2022-01-01 RX ORDER — VECURONIUM BROMIDE 1 MG/ML
0.05 INJECTION, POWDER, LYOPHILIZED, FOR SOLUTION INTRAVENOUS EVERY 30 MIN PRN
Status: DISCONTINUED | OUTPATIENT
Start: 2022-01-01 | End: 2022-01-01 | Stop reason: HOSPADM

## 2022-01-01 RX ORDER — IPRATROPIUM BROMIDE AND ALBUTEROL SULFATE 2.5; .5 MG/3ML; MG/3ML
3 SOLUTION RESPIRATORY (INHALATION)
Status: DISCONTINUED | OUTPATIENT
Start: 2022-01-01 | End: 2022-01-01

## 2022-01-01 RX ORDER — ACETAMINOPHEN 325 MG/1
650 TABLET ORAL EVERY 8 HOURS PRN
Status: DISCONTINUED | OUTPATIENT
Start: 2022-01-01 | End: 2022-01-01 | Stop reason: HOSPADM

## 2022-01-01 RX ORDER — MAGNESIUM OXIDE 400 MG/1
400 TABLET ORAL EVERY 4 HOURS
Status: COMPLETED | OUTPATIENT
Start: 2022-01-01 | End: 2022-01-01

## 2022-01-01 RX ORDER — ALBUTEROL SULFATE 0.83 MG/ML
2.5 SOLUTION RESPIRATORY (INHALATION) EVERY 4 HOURS PRN
Status: DISCONTINUED | OUTPATIENT
Start: 2022-01-01 | End: 2022-01-01

## 2022-01-01 RX ORDER — SODIUM CHLORIDE FOR INHALATION 3 %
3 VIAL, NEBULIZER (ML) INHALATION EVERY 4 HOURS
Status: DISCONTINUED | OUTPATIENT
Start: 2022-01-01 | End: 2022-01-01 | Stop reason: HOSPADM

## 2022-01-01 RX ORDER — DIAZEPAM 5 MG
TABLET ORAL
Qty: 40 TABLET | Refills: 5 | Status: SHIPPED | OUTPATIENT
Start: 2022-01-01

## 2022-01-01 RX ORDER — NICOTINE POLACRILEX 4 MG
15-30 LOZENGE BUCCAL
Status: DISCONTINUED | OUTPATIENT
Start: 2022-01-01 | End: 2022-01-01

## 2022-01-01 RX ORDER — AMLODIPINE BESYLATE 2.5 MG/1
2.5 TABLET ORAL DAILY
Status: DISCONTINUED | OUTPATIENT
Start: 2022-01-01 | End: 2022-01-01 | Stop reason: HOSPADM

## 2022-01-01 RX ORDER — DEXTROSE MONOHYDRATE 100 MG/ML
INJECTION, SOLUTION INTRAVENOUS CONTINUOUS
Status: DISCONTINUED | OUTPATIENT
Start: 2022-01-01 | End: 2022-01-01 | Stop reason: HOSPADM

## 2022-01-01 RX ORDER — CEFTRIAXONE 1 G/1
1 INJECTION, POWDER, FOR SOLUTION INTRAMUSCULAR; INTRAVENOUS ONCE
Status: COMPLETED | OUTPATIENT
Start: 2022-01-01 | End: 2022-01-01

## 2022-01-01 RX ORDER — PROPOFOL 10 MG/ML
5-75 INJECTION, EMULSION INTRAVENOUS CONTINUOUS
Status: DISCONTINUED | OUTPATIENT
Start: 2022-01-01 | End: 2022-01-01 | Stop reason: HOSPADM

## 2022-01-01 RX ORDER — ACETAMINOPHEN 325 MG/1
650 TABLET ORAL EVERY 8 HOURS PRN
Status: DISCONTINUED | OUTPATIENT
Start: 2022-01-01 | End: 2022-01-01

## 2022-01-01 RX ORDER — MEPERIDINE HYDROCHLORIDE 25 MG/ML
25-50 INJECTION INTRAMUSCULAR; INTRAVENOUS; SUBCUTANEOUS
Status: DISCONTINUED | OUTPATIENT
Start: 2022-01-01 | End: 2022-01-01

## 2022-01-01 RX ORDER — NICOTINE POLACRILEX 4 MG
15-30 LOZENGE BUCCAL
Status: DISCONTINUED | OUTPATIENT
Start: 2022-01-01 | End: 2022-01-01 | Stop reason: HOSPADM

## 2022-01-01 RX ORDER — ACETAZOLAMIDE 500 MG/5ML
500 INJECTION, POWDER, LYOPHILIZED, FOR SOLUTION INTRAVENOUS EVERY 12 HOURS
Status: DISCONTINUED | OUTPATIENT
Start: 2022-01-01 | End: 2022-01-01

## 2022-01-01 RX ORDER — CEFTRIAXONE 1 G/1
1 INJECTION, POWDER, FOR SOLUTION INTRAMUSCULAR; INTRAVENOUS DAILY
Status: DISCONTINUED | OUTPATIENT
Start: 2022-01-01 | End: 2022-01-01

## 2022-01-01 RX ORDER — DIAZEPAM 5 MG
5 TABLET ORAL EVERY 6 HOURS PRN
Status: DISCONTINUED | OUTPATIENT
Start: 2022-01-01 | End: 2022-01-01 | Stop reason: HOSPADM

## 2022-01-01 RX ORDER — DEXTROSE MONOHYDRATE 50 MG/ML
INJECTION, SOLUTION INTRAVENOUS CONTINUOUS
Status: DISCONTINUED | OUTPATIENT
Start: 2022-01-01 | End: 2022-01-01

## 2022-01-01 RX ORDER — BISACODYL 10 MG
10 SUPPOSITORY, RECTAL RECTAL DAILY PRN
Status: DISCONTINUED | OUTPATIENT
Start: 2022-01-01 | End: 2022-01-01 | Stop reason: HOSPADM

## 2022-01-01 RX ORDER — CEFTRIAXONE 1 G/1
1 INJECTION, POWDER, FOR SOLUTION INTRAMUSCULAR; INTRAVENOUS EVERY 24 HOURS
Status: DISCONTINUED | OUTPATIENT
Start: 2022-01-01 | End: 2022-01-01

## 2022-01-01 RX ORDER — POLYETHYLENE GLYCOL 3350 17 G/17G
17 POWDER, FOR SOLUTION ORAL 2 TIMES DAILY
Status: DISCONTINUED | OUTPATIENT
Start: 2022-01-01 | End: 2022-01-01 | Stop reason: HOSPADM

## 2022-01-01 RX ORDER — BACLOFEN 20 MG/1
20 TABLET ORAL
Status: DISCONTINUED | OUTPATIENT
Start: 2022-01-01 | End: 2022-01-01

## 2022-01-01 RX ORDER — METOPROLOL TARTRATE 1 MG/ML
5 INJECTION, SOLUTION INTRAVENOUS EVERY 4 HOURS PRN
Status: DISCONTINUED | OUTPATIENT
Start: 2022-01-01 | End: 2022-01-01 | Stop reason: HOSPADM

## 2022-01-01 RX ORDER — FUROSEMIDE 20 MG
20 TABLET ORAL DAILY
Status: DISCONTINUED | OUTPATIENT
Start: 2022-01-01 | End: 2022-01-01

## 2022-01-01 RX ORDER — FENTANYL CITRATE 50 UG/ML
25-50 INJECTION, SOLUTION INTRAMUSCULAR; INTRAVENOUS
Status: DISCONTINUED | OUTPATIENT
Start: 2022-01-01 | End: 2022-01-01

## 2022-01-01 RX ORDER — ACETAZOLAMIDE 500 MG/5ML
500 INJECTION, POWDER, LYOPHILIZED, FOR SOLUTION INTRAVENOUS EVERY 12 HOURS
Status: COMPLETED | OUTPATIENT
Start: 2022-01-01 | End: 2022-01-01

## 2022-01-01 RX ORDER — VECURONIUM BROMIDE 1 MG/ML
0.1 INJECTION, POWDER, LYOPHILIZED, FOR SOLUTION INTRAVENOUS
Status: COMPLETED | OUTPATIENT
Start: 2022-01-01 | End: 2022-01-01

## 2022-01-01 RX ORDER — DEXTROSE MONOHYDRATE 25 G/50ML
25-50 INJECTION, SOLUTION INTRAVENOUS
Status: DISCONTINUED | OUTPATIENT
Start: 2022-01-01 | End: 2022-01-01 | Stop reason: HOSPADM

## 2022-01-01 RX ORDER — PROPOFOL 10 MG/ML
INJECTION, EMULSION INTRAVENOUS
Status: COMPLETED
Start: 2022-01-01 | End: 2022-01-01

## 2022-01-01 RX ADMIN — SODIUM CHLORIDE SOLN NEBU 3% 3 ML: 3 NEBU SOLN at 10:14

## 2022-01-01 RX ADMIN — PROPOFOL 40 MCG/KG/MIN: 10 INJECTION, EMULSION INTRAVENOUS at 07:31

## 2022-01-01 RX ADMIN — LIDOCAINE HYDROCHLORIDE 5 ML: 10 INJECTION, SOLUTION EPIDURAL; INFILTRATION; INTRACAUDAL; PERINEURAL at 12:24

## 2022-01-01 RX ADMIN — PROPOFOL 30 MCG/KG/MIN: 10 INJECTION, EMULSION INTRAVENOUS at 08:32

## 2022-01-01 RX ADMIN — Medication 1 DROP: at 10:39

## 2022-01-01 RX ADMIN — IPRATROPIUM BROMIDE AND ALBUTEROL SULFATE 3 ML: 2.5; .5 SOLUTION RESPIRATORY (INHALATION) at 11:47

## 2022-01-01 RX ADMIN — BACLOFEN 20 MG: 20 TABLET ORAL at 12:20

## 2022-01-01 RX ADMIN — ACETAMINOPHEN 650 MG: 325 TABLET, FILM COATED ORAL at 00:39

## 2022-01-01 RX ADMIN — IPRATROPIUM BROMIDE AND ALBUTEROL SULFATE 3 ML: 2.5; .5 SOLUTION RESPIRATORY (INHALATION) at 20:31

## 2022-01-01 RX ADMIN — DEXTROSE 15 G: 15 GEL ORAL at 15:17

## 2022-01-01 RX ADMIN — OXYCODONE HYDROCHLORIDE 5 MG: 5 TABLET ORAL at 19:42

## 2022-01-01 RX ADMIN — SODIUM CHLORIDE 500 MG: 9 INJECTION, SOLUTION INTRAVENOUS at 08:02

## 2022-01-01 RX ADMIN — OXYCODONE HYDROCHLORIDE 5 MG: 5 TABLET ORAL at 12:20

## 2022-01-01 RX ADMIN — IPRATROPIUM BROMIDE AND ALBUTEROL SULFATE 3 ML: 2.5; .5 SOLUTION RESPIRATORY (INHALATION) at 20:42

## 2022-01-01 RX ADMIN — OXYBUTYNIN CHLORIDE 5 MG: 5 TABLET ORAL at 08:31

## 2022-01-01 RX ADMIN — MINOCYCLINE HYDROCHLORIDE 50 MG: 50 CAPSULE ORAL at 22:11

## 2022-01-01 RX ADMIN — CEFTRIAXONE SODIUM 2 G: 2 INJECTION, POWDER, FOR SOLUTION INTRAMUSCULAR; INTRAVENOUS at 10:43

## 2022-01-01 RX ADMIN — PROPOFOL 20 MCG/KG/MIN: 10 INJECTION, EMULSION INTRAVENOUS at 06:44

## 2022-01-01 RX ADMIN — Medication 400 MG: at 23:34

## 2022-01-01 RX ADMIN — ALBUTEROL SULFATE 2.5 MG: 2.5 SOLUTION RESPIRATORY (INHALATION) at 02:56

## 2022-01-01 RX ADMIN — ALBUTEROL SULFATE 2.5 MG: 2.5 SOLUTION RESPIRATORY (INHALATION) at 10:14

## 2022-01-01 RX ADMIN — ACETAMINOPHEN 650 MG: 325 TABLET, FILM COATED ORAL at 10:33

## 2022-01-01 RX ADMIN — DOCUSATE SODIUM 200 MG: 50 LIQUID ORAL at 19:52

## 2022-01-01 RX ADMIN — ENOXAPARIN SODIUM 40 MG: 40 INJECTION SUBCUTANEOUS at 19:42

## 2022-01-01 RX ADMIN — DOCUSATE SODIUM 200 MG: 50 LIQUID ORAL at 10:45

## 2022-01-01 RX ADMIN — DOCUSATE SODIUM 200 MG: 50 LIQUID ORAL at 09:28

## 2022-01-01 RX ADMIN — SODIUM CHLORIDE SOLN NEBU 3% 3 ML: 3 NEBU SOLN at 13:20

## 2022-01-01 RX ADMIN — Medication: at 10:44

## 2022-01-01 RX ADMIN — CEFTRIAXONE SODIUM 1 G: 1 INJECTION, POWDER, FOR SOLUTION INTRAMUSCULAR; INTRAVENOUS at 10:05

## 2022-01-01 RX ADMIN — POLYETHYLENE GLYCOL 3350 17 G: 17 POWDER, FOR SOLUTION ORAL at 08:02

## 2022-01-01 RX ADMIN — MIDAZOLAM HYDROCHLORIDE 15 MG/HR: 1 INJECTION, SOLUTION INTRAVENOUS at 04:49

## 2022-01-01 RX ADMIN — IPRATROPIUM BROMIDE AND ALBUTEROL SULFATE 3 ML: 2.5; .5 SOLUTION RESPIRATORY (INHALATION) at 13:20

## 2022-01-01 RX ADMIN — BACLOFEN 20 MG: 20 TABLET ORAL at 13:36

## 2022-01-01 RX ADMIN — IPRATROPIUM BROMIDE AND ALBUTEROL SULFATE 3 ML: 2.5; .5 SOLUTION RESPIRATORY (INHALATION) at 02:29

## 2022-01-01 RX ADMIN — BACLOFEN 20 MG: 20 TABLET ORAL at 20:07

## 2022-01-01 RX ADMIN — POLYETHYLENE GLYCOL 3350 17 G: 17 POWDER, FOR SOLUTION ORAL at 19:54

## 2022-01-01 RX ADMIN — MINOCYCLINE HYDROCHLORIDE 50 MG: 50 CAPSULE ORAL at 19:42

## 2022-01-01 RX ADMIN — SODIUM CHLORIDE SOLN NEBU 3% 3 ML: 3 NEBU SOLN at 08:08

## 2022-01-01 RX ADMIN — BISACODYL 10 MG: 10 SUPPOSITORY RECTAL at 11:08

## 2022-01-01 RX ADMIN — BACLOFEN 20 MG: 20 TABLET ORAL at 23:34

## 2022-01-01 RX ADMIN — PROPOFOL 30 MCG/KG/MIN: 10 INJECTION, EMULSION INTRAVENOUS at 02:39

## 2022-01-01 RX ADMIN — BACLOFEN 20 MG: 20 TABLET ORAL at 22:14

## 2022-01-01 RX ADMIN — POLYETHYLENE GLYCOL 3350 17 G: 17 POWDER, FOR SOLUTION ORAL at 21:02

## 2022-01-01 RX ADMIN — MINOCYCLINE HYDROCHLORIDE 50 MG: 50 CAPSULE ORAL at 09:02

## 2022-01-01 RX ADMIN — Medication 100 MCG/HR: at 07:32

## 2022-01-01 RX ADMIN — IPRATROPIUM BROMIDE AND ALBUTEROL SULFATE 3 ML: 2.5; .5 SOLUTION RESPIRATORY (INHALATION) at 00:10

## 2022-01-01 RX ADMIN — SODIUM CHLORIDE, POTASSIUM CHLORIDE, SODIUM LACTATE AND CALCIUM CHLORIDE 500 ML: 600; 310; 30; 20 INJECTION, SOLUTION INTRAVENOUS at 09:27

## 2022-01-01 RX ADMIN — LIDOCAINE HYDROCHLORIDE 3 ML: 10 INJECTION, SOLUTION EPIDURAL; INFILTRATION; INTRACAUDAL; PERINEURAL at 16:42

## 2022-01-01 RX ADMIN — MIDAZOLAM HYDROCHLORIDE 15 MG/HR: 1 INJECTION, SOLUTION INTRAVENOUS at 10:59

## 2022-01-01 RX ADMIN — FENTANYL CITRATE 25 MCG: 50 INJECTION INTRAMUSCULAR; INTRAVENOUS at 04:39

## 2022-01-01 RX ADMIN — MINOCYCLINE HYDROCHLORIDE 50 MG: 50 CAPSULE ORAL at 21:02

## 2022-01-01 RX ADMIN — SODIUM CHLORIDE SOLN NEBU 3% 3 ML: 3 NEBU SOLN at 05:04

## 2022-01-01 RX ADMIN — PROPOFOL 30 MCG/KG/MIN: 10 INJECTION, EMULSION INTRAVENOUS at 01:44

## 2022-01-01 RX ADMIN — SODIUM CHLORIDE SOLN NEBU 3% 3 ML: 3 NEBU SOLN at 00:10

## 2022-01-01 RX ADMIN — OXYBUTYNIN CHLORIDE 5 MG: 5 TABLET ORAL at 19:42

## 2022-01-01 RX ADMIN — DIAZEPAM 5 MG: 5 TABLET ORAL at 23:34

## 2022-01-01 RX ADMIN — DEXTROSE MONOHYDRATE: 100 INJECTION, SOLUTION INTRAVENOUS at 20:47

## 2022-01-01 RX ADMIN — SODIUM CHLORIDE SOLN NEBU 3% 3 ML: 3 NEBU SOLN at 16:51

## 2022-01-01 RX ADMIN — IPRATROPIUM BROMIDE AND ALBUTEROL SULFATE 3 ML: 2.5; .5 SOLUTION RESPIRATORY (INHALATION) at 04:23

## 2022-01-01 RX ADMIN — CEFTRIAXONE SODIUM 2 G: 2 INJECTION, POWDER, FOR SOLUTION INTRAMUSCULAR; INTRAVENOUS at 09:01

## 2022-01-01 RX ADMIN — AMLODIPINE BESYLATE 2.5 MG: 2.5 TABLET ORAL at 10:24

## 2022-01-01 RX ADMIN — Medication 200 MCG/HR: at 20:47

## 2022-01-01 RX ADMIN — SODIUM CHLORIDE SOLN NEBU 3% 3 ML: 3 NEBU SOLN at 20:31

## 2022-01-01 RX ADMIN — BACLOFEN 20 MG: 20 TABLET ORAL at 10:29

## 2022-01-01 RX ADMIN — DOCUSATE SODIUM 200 MG: 50 LIQUID ORAL at 19:42

## 2022-01-01 RX ADMIN — Medication: at 10:28

## 2022-01-01 RX ADMIN — BACLOFEN 20 MG: 20 TABLET ORAL at 16:14

## 2022-01-01 RX ADMIN — DEXTROSE 15 G: 15 GEL ORAL at 08:09

## 2022-01-01 RX ADMIN — SODIUM CHLORIDE SOLN NEBU 3% 3 ML: 3 NEBU SOLN at 15:44

## 2022-01-01 RX ADMIN — DEXTROSE MONOHYDRATE: 100 INJECTION, SOLUTION INTRAVENOUS at 22:38

## 2022-01-01 RX ADMIN — PANTOPRAZOLE SODIUM 40 MG: 40 INJECTION, POWDER, FOR SOLUTION INTRAVENOUS at 08:21

## 2022-01-01 RX ADMIN — BACLOFEN 20 MG: 20 TABLET ORAL at 22:12

## 2022-01-01 RX ADMIN — BISACODYL 10 MG: 10 SUPPOSITORY RECTAL at 10:58

## 2022-01-01 RX ADMIN — FENTANYL CITRATE 25 MCG: 50 INJECTION INTRAMUSCULAR; INTRAVENOUS at 09:28

## 2022-01-01 RX ADMIN — BACLOFEN 20 MG: 20 TABLET ORAL at 10:45

## 2022-01-01 RX ADMIN — SODIUM CHLORIDE 1000 ML: 9 INJECTION, SOLUTION INTRAVENOUS at 10:35

## 2022-01-01 RX ADMIN — AZITHROMYCIN MONOHYDRATE 500 MG: 500 INJECTION, POWDER, LYOPHILIZED, FOR SOLUTION INTRAVENOUS at 09:53

## 2022-01-01 RX ADMIN — BACLOFEN 20 MG: 20 TABLET ORAL at 20:05

## 2022-01-01 RX ADMIN — ENOXAPARIN SODIUM 40 MG: 40 INJECTION SUBCUTANEOUS at 19:52

## 2022-01-01 RX ADMIN — ZOLPIDEM TARTRATE 10 MG: 5 TABLET, FILM COATED ORAL at 22:33

## 2022-01-01 RX ADMIN — PROPOFOL 30 MCG/KG/MIN: 10 INJECTION, EMULSION INTRAVENOUS at 17:11

## 2022-01-01 RX ADMIN — DIAZEPAM 5 MG: 5 TABLET ORAL at 19:42

## 2022-01-01 RX ADMIN — SODIUM CHLORIDE SOLN NEBU 3% 3 ML: 3 NEBU SOLN at 19:55

## 2022-01-01 RX ADMIN — FUROSEMIDE 20 MG: 20 TABLET ORAL at 09:03

## 2022-01-01 RX ADMIN — BACLOFEN 20 MG: 20 TABLET ORAL at 19:51

## 2022-01-01 RX ADMIN — SODIUM CHLORIDE, POTASSIUM CHLORIDE, SODIUM LACTATE AND CALCIUM CHLORIDE 1000 ML: 600; 310; 30; 20 INJECTION, SOLUTION INTRAVENOUS at 09:41

## 2022-01-01 RX ADMIN — BACLOFEN 20 MG: 20 TABLET ORAL at 18:06

## 2022-01-01 RX ADMIN — POLYETHYLENE GLYCOL 3350 17 G: 17 POWDER, FOR SOLUTION ORAL at 19:42

## 2022-01-01 RX ADMIN — MINOCYCLINE HYDROCHLORIDE 50 MG: 50 CAPSULE ORAL at 10:38

## 2022-01-01 RX ADMIN — BACLOFEN 20 MG: 20 TABLET ORAL at 10:08

## 2022-01-01 RX ADMIN — OXYBUTYNIN CHLORIDE 5 MG: 5 TABLET ORAL at 20:15

## 2022-01-01 RX ADMIN — MINOCYCLINE HYDROCHLORIDE 50 MG: 50 CAPSULE ORAL at 20:15

## 2022-01-01 RX ADMIN — BACLOFEN 20 MG: 20 TABLET ORAL at 09:01

## 2022-01-01 RX ADMIN — DOCUSATE SODIUM 200 MG: 50 LIQUID ORAL at 10:33

## 2022-01-01 RX ADMIN — PROPOFOL 40 MCG/KG/MIN: 10 INJECTION, EMULSION INTRAVENOUS at 20:01

## 2022-01-01 RX ADMIN — SODIUM CHLORIDE SOLN NEBU 3% 3 ML: 3 NEBU SOLN at 15:47

## 2022-01-01 RX ADMIN — SODIUM CHLORIDE, POTASSIUM CHLORIDE, SODIUM LACTATE AND CALCIUM CHLORIDE 500 ML: 600; 310; 30; 20 INJECTION, SOLUTION INTRAVENOUS at 16:30

## 2022-01-01 RX ADMIN — AZITHROMYCIN MONOHYDRATE 500 MG: 500 INJECTION, POWDER, LYOPHILIZED, FOR SOLUTION INTRAVENOUS at 10:43

## 2022-01-01 RX ADMIN — BACLOFEN 20 MG: 20 TABLET ORAL at 15:18

## 2022-01-01 RX ADMIN — BACLOFEN 20 MG: 20 TABLET ORAL at 14:01

## 2022-01-01 RX ADMIN — IPRATROPIUM BROMIDE AND ALBUTEROL SULFATE 3 ML: 2.5; .5 SOLUTION RESPIRATORY (INHALATION) at 16:47

## 2022-01-01 RX ADMIN — SODIUM CHLORIDE SOLN NEBU 3% 3 ML: 3 NEBU SOLN at 16:47

## 2022-01-01 RX ADMIN — SODIUM CHLORIDE SOLN NEBU 3% 3 ML: 3 NEBU SOLN at 08:43

## 2022-01-01 RX ADMIN — OXYBUTYNIN CHLORIDE 5 MG: 5 TABLET ORAL at 09:02

## 2022-01-01 RX ADMIN — BACLOFEN 20 MG: 20 TABLET ORAL at 22:10

## 2022-01-01 RX ADMIN — IPRATROPIUM BROMIDE AND ALBUTEROL SULFATE 3 ML: 2.5; .5 SOLUTION RESPIRATORY (INHALATION) at 05:04

## 2022-01-01 RX ADMIN — DOCUSATE SODIUM 200 MG: 50 LIQUID ORAL at 21:01

## 2022-01-01 RX ADMIN — OXYBUTYNIN CHLORIDE 5 MG: 5 TABLET ORAL at 19:52

## 2022-01-01 RX ADMIN — OXYBUTYNIN CHLORIDE 5 MG: 5 TABLET ORAL at 21:02

## 2022-01-01 RX ADMIN — IPRATROPIUM BROMIDE AND ALBUTEROL SULFATE 3 ML: 2.5; .5 SOLUTION RESPIRATORY (INHALATION) at 08:08

## 2022-01-01 RX ADMIN — ACETAZOLAMIDE SODIUM 500 MG: 500 INJECTION, POWDER, LYOPHILIZED, FOR SOLUTION INTRAVENOUS at 05:56

## 2022-01-01 RX ADMIN — SODIUM CHLORIDE, POTASSIUM CHLORIDE, SODIUM LACTATE AND CALCIUM CHLORIDE 500 ML: 600; 310; 30; 20 INJECTION, SOLUTION INTRAVENOUS at 20:27

## 2022-01-01 RX ADMIN — MIDAZOLAM HYDROCHLORIDE 15 MG/HR: 1 INJECTION, SOLUTION INTRAVENOUS at 21:34

## 2022-01-01 RX ADMIN — BACLOFEN 20 MG: 20 TABLET ORAL at 13:33

## 2022-01-01 RX ADMIN — DOCUSATE SODIUM 200 MG: 50 LIQUID ORAL at 09:29

## 2022-01-01 RX ADMIN — POLYETHYLENE GLYCOL 3350 17 G: 17 POWDER, FOR SOLUTION ORAL at 08:21

## 2022-01-01 RX ADMIN — MIDAZOLAM HYDROCHLORIDE 15 MG/HR: 1 INJECTION, SOLUTION INTRAVENOUS at 16:15

## 2022-01-01 RX ADMIN — Medication: at 18:06

## 2022-01-01 RX ADMIN — CEFTRIAXONE SODIUM 2 G: 2 INJECTION, POWDER, FOR SOLUTION INTRAMUSCULAR; INTRAVENOUS at 08:01

## 2022-01-01 RX ADMIN — VECURONIUM BROMIDE 5 MG: 1 INJECTION, POWDER, LYOPHILIZED, FOR SOLUTION INTRAVENOUS at 23:01

## 2022-01-01 RX ADMIN — Medication 200 MCG/HR: at 09:19

## 2022-01-01 RX ADMIN — IPRATROPIUM BROMIDE AND ALBUTEROL SULFATE 3 ML: 2.5; .5 SOLUTION RESPIRATORY (INHALATION) at 16:50

## 2022-01-01 RX ADMIN — ALBUTEROL SULFATE 2.5 MG: 2.5 SOLUTION RESPIRATORY (INHALATION) at 05:07

## 2022-01-01 RX ADMIN — IPRATROPIUM BROMIDE AND ALBUTEROL SULFATE 3 ML: 2.5; .5 SOLUTION RESPIRATORY (INHALATION) at 19:55

## 2022-01-01 RX ADMIN — IPRATROPIUM BROMIDE AND ALBUTEROL SULFATE 3 ML: 2.5; .5 SOLUTION RESPIRATORY (INHALATION) at 08:43

## 2022-01-01 RX ADMIN — OXYBUTYNIN CHLORIDE 5 MG: 5 TABLET ORAL at 10:32

## 2022-01-01 RX ADMIN — IPRATROPIUM BROMIDE AND ALBUTEROL SULFATE 3 ML: 2.5; .5 SOLUTION RESPIRATORY (INHALATION) at 15:44

## 2022-01-01 RX ADMIN — BACLOFEN 20 MG: 20 TABLET ORAL at 08:01

## 2022-01-01 RX ADMIN — OXYCODONE HYDROCHLORIDE 5 MG: 5 TABLET ORAL at 15:59

## 2022-01-01 RX ADMIN — ENOXAPARIN SODIUM 40 MG: 40 INJECTION SUBCUTANEOUS at 20:15

## 2022-01-01 RX ADMIN — Medication 50 MCG: at 19:57

## 2022-01-01 RX ADMIN — IPRATROPIUM BROMIDE AND ALBUTEROL SULFATE 3 ML: 2.5; .5 SOLUTION RESPIRATORY (INHALATION) at 09:34

## 2022-01-01 RX ADMIN — SODIUM CHLORIDE SOLN NEBU 3% 3 ML: 3 NEBU SOLN at 09:35

## 2022-01-01 RX ADMIN — SODIUM CHLORIDE SOLN NEBU 3% 3 ML: 3 NEBU SOLN at 19:30

## 2022-01-01 RX ADMIN — BACLOFEN 20 MG: 20 TABLET ORAL at 16:25

## 2022-01-01 RX ADMIN — BACLOFEN 20 MG: 20 TABLET ORAL at 19:42

## 2022-01-01 RX ADMIN — FUROSEMIDE 40 MG: 10 INJECTION, SOLUTION INTRAVENOUS at 09:50

## 2022-01-01 RX ADMIN — SODIUM CHLORIDE SOLN NEBU 3% 3 ML: 3 NEBU SOLN at 02:29

## 2022-01-01 RX ADMIN — Medication 1 DROP: at 08:41

## 2022-01-01 RX ADMIN — BACLOFEN 20 MG: 20 TABLET ORAL at 10:06

## 2022-01-01 RX ADMIN — Medication: at 01:46

## 2022-01-01 RX ADMIN — HUMAN ALBUMIN MICROSPHERES AND PERFLUTREN 6 ML: 10; .22 INJECTION, SOLUTION INTRAVENOUS at 12:21

## 2022-01-01 RX ADMIN — SODIUM CHLORIDE SOLN NEBU 3% 3 ML: 3 NEBU SOLN at 11:48

## 2022-01-01 RX ADMIN — MINOCYCLINE HYDROCHLORIDE 50 MG: 100 INJECTION INTRAVENOUS at 10:06

## 2022-01-01 RX ADMIN — MINOCYCLINE HYDROCHLORIDE 50 MG: 100 INJECTION INTRAVENOUS at 22:14

## 2022-01-01 RX ADMIN — SODIUM CHLORIDE, POTASSIUM CHLORIDE, SODIUM LACTATE AND CALCIUM CHLORIDE 500 ML: 600; 310; 30; 20 INJECTION, SOLUTION INTRAVENOUS at 18:33

## 2022-01-01 RX ADMIN — Medication 1 DROP: at 22:44

## 2022-01-01 RX ADMIN — POLYETHYLENE GLYCOL 3350 17 G: 17 POWDER, FOR SOLUTION ORAL at 20:05

## 2022-01-01 RX ADMIN — Medication 400 MG: at 19:51

## 2022-01-01 RX ADMIN — PROPOFOL 30 MCG/KG/MIN: 10 INJECTION, EMULSION INTRAVENOUS at 12:40

## 2022-01-01 RX ADMIN — PROPOFOL 30 MCG/KG/MIN: 10 INJECTION, EMULSION INTRAVENOUS at 20:51

## 2022-01-01 RX ADMIN — BACLOFEN 20 MG: 20 TABLET ORAL at 06:02

## 2022-01-01 RX ADMIN — BACLOFEN 20 MG: 20 TABLET ORAL at 21:57

## 2022-01-01 RX ADMIN — IPRATROPIUM BROMIDE AND ALBUTEROL SULFATE 3 ML: 2.5; .5 SOLUTION RESPIRATORY (INHALATION) at 15:47

## 2022-01-01 RX ADMIN — FENTANYL CITRATE 25 MCG: 50 INJECTION INTRAMUSCULAR; INTRAVENOUS at 00:42

## 2022-01-01 RX ADMIN — ENOXAPARIN SODIUM 40 MG: 40 INJECTION SUBCUTANEOUS at 17:33

## 2022-01-01 RX ADMIN — PANTOPRAZOLE SODIUM 40 MG: 40 INJECTION, POWDER, FOR SOLUTION INTRAVENOUS at 09:29

## 2022-01-01 RX ADMIN — ACETAMINOPHEN 650 MG: 325 TABLET, FILM COATED ORAL at 16:22

## 2022-01-01 RX ADMIN — DOCUSATE SODIUM 200 MG: 50 LIQUID ORAL at 20:15

## 2022-01-01 RX ADMIN — ENOXAPARIN SODIUM 40 MG: 40 INJECTION SUBCUTANEOUS at 20:08

## 2022-01-01 RX ADMIN — SODIUM CHLORIDE 500 MG: 9 INJECTION, SOLUTION INTRAVENOUS at 23:41

## 2022-01-01 RX ADMIN — SODIUM CHLORIDE SOLN NEBU 3% 3 ML: 3 NEBU SOLN at 04:23

## 2022-01-01 RX ADMIN — MINOCYCLINE HYDROCHLORIDE 50 MG: 50 CAPSULE ORAL at 08:03

## 2022-01-01 RX ADMIN — PROPOFOL 10 MCG/KG/MIN: 10 INJECTION, EMULSION INTRAVENOUS at 20:48

## 2022-01-01 RX ADMIN — POLYETHYLENE GLYCOL 3350 17 G: 17 POWDER, FOR SOLUTION ORAL at 09:01

## 2022-01-01 RX ADMIN — FENTANYL CITRATE 25 MCG: 50 INJECTION INTRAMUSCULAR; INTRAVENOUS at 22:11

## 2022-01-01 RX ADMIN — PANTOPRAZOLE SODIUM 40 MG: 40 INJECTION, POWDER, FOR SOLUTION INTRAVENOUS at 08:00

## 2022-01-01 RX ADMIN — MINOCYCLINE HYDROCHLORIDE 50 MG: 50 CAPSULE ORAL at 08:22

## 2022-01-01 RX ADMIN — CEFTRIAXONE SODIUM 2 G: 2 INJECTION, POWDER, FOR SOLUTION INTRAMUSCULAR; INTRAVENOUS at 07:59

## 2022-01-01 RX ADMIN — BACLOFEN 20 MG: 20 TABLET ORAL at 09:29

## 2022-01-01 RX ADMIN — Medication 0.03 MCG/KG/MIN: at 11:28

## 2022-01-01 RX ADMIN — POTASSIUM CHLORIDE 40 MEQ: 1.5 POWDER, FOR SOLUTION ORAL at 03:37

## 2022-01-01 RX ADMIN — ACETAMINOPHEN 650 MG: 325 TABLET, FILM COATED ORAL at 08:01

## 2022-01-01 RX ADMIN — SODIUM CHLORIDE 500 MG: 9 INJECTION, SOLUTION INTRAVENOUS at 17:55

## 2022-01-01 RX ADMIN — BACLOFEN 20 MG: 20 TABLET ORAL at 11:44

## 2022-01-01 RX ADMIN — FENTANYL CITRATE 50 MCG: 50 INJECTION INTRAMUSCULAR; INTRAVENOUS at 00:39

## 2022-01-01 RX ADMIN — DOCUSATE SODIUM 200 MG: 50 LIQUID ORAL at 08:21

## 2022-01-01 RX ADMIN — OXYBUTYNIN CHLORIDE 5 MG: 5 TABLET ORAL at 08:23

## 2022-01-01 RX ADMIN — MINOCYCLINE HYDROCHLORIDE 50 MG: 100 INJECTION INTRAVENOUS at 11:15

## 2022-01-01 RX ADMIN — CEFTRIAXONE SODIUM 1 G: 1 INJECTION, POWDER, FOR SOLUTION INTRAMUSCULAR; INTRAVENOUS at 17:34

## 2022-01-01 RX ADMIN — IPRATROPIUM BROMIDE AND ALBUTEROL SULFATE 3 ML: 2.5; .5 SOLUTION RESPIRATORY (INHALATION) at 19:30

## 2022-01-01 RX ADMIN — ACETAZOLAMIDE SODIUM 500 MG: 500 INJECTION, POWDER, LYOPHILIZED, FOR SOLUTION INTRAVENOUS at 20:40

## 2022-01-01 RX ADMIN — MINOCYCLINE HYDROCHLORIDE 50 MG: 50 CAPSULE ORAL at 19:52

## 2022-01-01 RX ADMIN — SODIUM CHLORIDE 3000 MG: 9 INJECTION, SOLUTION INTRAVENOUS at 16:59

## 2022-01-01 RX ADMIN — MIDAZOLAM HYDROCHLORIDE 2 MG/HR: 1 INJECTION, SOLUTION INTRAVENOUS at 09:39

## 2022-01-01 RX ADMIN — PANTOPRAZOLE SODIUM 40 MG: 40 INJECTION, POWDER, FOR SOLUTION INTRAVENOUS at 08:02

## 2022-01-01 RX ADMIN — BACLOFEN 20 MG: 20 TABLET ORAL at 08:21

## 2022-01-01 ASSESSMENT — ACTIVITIES OF DAILY LIVING (ADL)
ADLS_ACUITY_SCORE: 47
ADLS_ACUITY_SCORE: 39
ADLS_ACUITY_SCORE: 59
ADLS_ACUITY_SCORE: 55
ADLS_ACUITY_SCORE: 55
ADLS_ACUITY_SCORE: 43
ADLS_ACUITY_SCORE: 55
BATHING: 1-->ASSISTANCE NEEDED
ADLS_ACUITY_SCORE: 43
ADLS_ACUITY_SCORE: 57
DRESSING/BATHING_DIFFICULTY: YES
ADLS_ACUITY_SCORE: 51
ADLS_ACUITY_SCORE: 57
ADLS_ACUITY_SCORE: 43
ADLS_ACUITY_SCORE: 39
ADLS_ACUITY_SCORE: 43
TRANSFERRING: 0-->ASSISTANCE NEEDED (DEVELOPMENTALLY APPROPRIATE)
ADLS_ACUITY_SCORE: 55
ADLS_ACUITY_SCORE: 59
ADLS_ACUITY_SCORE: 55
VISION_MANAGEMENT: GLASSES
DOING_ERRANDS_INDEPENDENTLY_DIFFICULTY: YES
ADLS_ACUITY_SCORE: 55
ADLS_ACUITY_SCORE: 55
ADLS_ACUITY_SCORE: 57
TOILETING: 2-->COMPLETELY DEPENDENT
ADLS_ACUITY_SCORE: 55
EQUIPMENT_CURRENTLY_USED_AT_HOME: WHEELCHAIR, POWER
ADLS_ACUITY_SCORE: 57
ADLS_ACUITY_SCORE: 55
ADLS_ACUITY_SCORE: 57
ADLS_ACUITY_SCORE: 57
ADLS_ACUITY_SCORE: 35
TOILETING: 0-->NOT TOILET TRAINED OR ASSISTANCE NEEDED (DEVELOPMENTALLY APPROPRIATE)
ADLS_ACUITY_SCORE: 55
DIFFICULTY_EATING/SWALLOWING: NO
ADLS_ACUITY_SCORE: 43
ADLS_ACUITY_SCORE: 55
DRESSING/BATHING: BATHING DIFFICULTY, ASSISTANCE 1 PERSON;DRESSING DIFFICULTY, ASSISTANCE 1 PERSON
FALL_HISTORY_WITHIN_LAST_SIX_MONTHS: NO
ADLS_ACUITY_SCORE: 55
ADLS_ACUITY_SCORE: 55
ADLS_ACUITY_SCORE: 43
ADLS_ACUITY_SCORE: 57
ADLS_ACUITY_SCORE: 55
ADLS_ACUITY_SCORE: 43
TRANSFERRING: 2-->COMPLETELY DEPENDENT
ADLS_ACUITY_SCORE: 55
CONCENTRATING,_REMEMBERING_OR_MAKING_DECISIONS_DIFFICULTY: NO
ADLS_ACUITY_SCORE: 57
ADLS_ACUITY_SCORE: 59
ADLS_ACUITY_SCORE: 57
ADLS_ACUITY_SCORE: 43
ADLS_ACUITY_SCORE: 57
ADLS_ACUITY_SCORE: 55
DRESS: 2-->COMPLETELY DEPENDENT
ADLS_ACUITY_SCORE: 55
CHANGE_IN_FUNCTIONAL_STATUS_SINCE_ONSET_OF_CURRENT_ILLNESS/INJURY: NO
ADLS_ACUITY_SCORE: 59
ADLS_ACUITY_SCORE: 55
ADLS_ACUITY_SCORE: 43
ADLS_ACUITY_SCORE: 35
ADLS_ACUITY_SCORE: 55
ADLS_ACUITY_SCORE: 59
ADLS_ACUITY_SCORE: 55
TOILETING_ASSISTANCE: TOILETING DIFFICULTY, DEPENDENT
ADLS_ACUITY_SCORE: 55
ADLS_ACUITY_SCORE: 55
WALKING_OR_CLIMBING_STAIRS_DIFFICULTY: YES
ADLS_ACUITY_SCORE: 57
ADLS_ACUITY_SCORE: 55
ADLS_ACUITY_SCORE: 59
DRESS: 0-->ASSISTANCE NEEDED (DEVELOPMENTALLY APPROPRIATE)
ADLS_ACUITY_SCORE: 55
TOILETING_ISSUES: YES
ADLS_ACUITY_SCORE: 55
ADLS_ACUITY_SCORE: 55
ADLS_ACUITY_SCORE: 35
ADLS_ACUITY_SCORE: 57
ADLS_ACUITY_SCORE: 55
ADLS_ACUITY_SCORE: 43
WEAR_GLASSES_OR_BLIND: YES
ADLS_ACUITY_SCORE: 55
ADLS_ACUITY_SCORE: 59
ADLS_ACUITY_SCORE: 55
ADLS_ACUITY_SCORE: 57
ADLS_ACUITY_SCORE: 39
WALKING_OR_CLIMBING_STAIRS: AMBULATION DIFFICULTY, REQUIRES EQUIPMENT

## 2022-01-01 ASSESSMENT — SLEEP AND FATIGUE QUESTIONNAIRES
HOW LIKELY ARE YOU TO NOD OFF OR FALL ASLEEP WHILE WATCHING TV: MODERATE CHANCE OF DOZING
HOW LIKELY ARE YOU TO NOD OFF OR FALL ASLEEP WHEN YOU ARE A PASSENGER IN A CAR FOR AN HOUR WITHOUT A BREAK: MODERATE CHANCE OF DOZING
HOW LIKELY ARE YOU TO NOD OFF OR FALL ASLEEP WHILE SITTING QUIETLY AFTER LUNCH WITHOUT ALCOHOL: WOULD NEVER DOZE
HOW LIKELY ARE YOU TO NOD OFF OR FALL ASLEEP WHILE SITTING INACTIVE IN A PUBLIC PLACE: MODERATE CHANCE OF DOZING
HOW LIKELY ARE YOU TO NOD OFF OR FALL ASLEEP WHILE SITTING AND TALKING TO SOMEONE: WOULD NEVER DOZE
HOW LIKELY ARE YOU TO NOD OFF OR FALL ASLEEP IN A CAR, WHILE STOPPED FOR A FEW MINUTES IN TRAFFIC: SLIGHT CHANCE OF DOZING
HOW LIKELY ARE YOU TO NOD OFF OR FALL ASLEEP WHILE LYING DOWN TO REST IN THE AFTERNOON WHEN CIRCUMSTANCES PERMIT: SLIGHT CHANCE OF DOZING
HOW LIKELY ARE YOU TO NOD OFF OR FALL ASLEEP WHILE SITTING AND READING: SLIGHT CHANCE OF DOZING

## 2022-01-01 ASSESSMENT — ENCOUNTER SYMPTOMS
CONSTIPATION: 1
WEAKNESS: 1
MYALGIAS: 0
FEVER: 0
SHORTNESS OF BREATH: 1

## 2022-01-01 ASSESSMENT — PAIN SCALES - GENERAL: PAINLEVEL: NO PAIN (0)

## 2022-01-06 NOTE — PATIENT INSTRUCTIONS
I have ordered arterial blood gas test to be done in the pulmonary function lab at the [facility].    Please keep trying to use the nocturnal noninvasive ventilator.  You may want to try using it during the day a couple of times to see if you can relax into it and feel more comfortable.  Discuss ways of improving communication with your occupational therapist they may have some good ideas.    I will asked the team to reach out to you to schedule follow-up with your primary sleep provider in the next 2 -3 months or so.

## 2022-01-07 NOTE — NURSING NOTE
Attempted to reach patient via phone unsuccessful left message patient given Eduardo and Slava ABG/PFT scheduling number to contact.         Douglas Cano [facility]

## 2022-01-12 NOTE — PROGRESS NOTES
REQUISITION AND JUSTIFICATION FOR DURABLE MEDICAL EQUIPMENT    Patient Name:  Bart Corea  MR #:  [id number]  :  1960  Age/Gender:  61 year old male  Visit Date:  Bart Corea seen for seating and wheeled mobility evaluation by Karen Wells OTIRLANDA/L,ATP on 21 and OT eval by Gomez Bravo on 21.    CLINICAL CRITERIA FOR MOBILITY ASSISTIVE EQUIPMENT  Coverage Criteria Per Local Coverage Determination  A) Bart has mobility limitations due to Quadriplegia that significantly impairs his ability to participate in all of his mobility-related activities of daily living (MRADL). Specifically affected are toileting (being able to get there in time to prevent accidents), dressing, and bathing (getting into the bathroom of designated place). Current equipment used is [employer] Storm Torque RWD chair >5 years old with significant wear and tear due to heavy duty full time use.  This chair is no longer made and thus needs replacement and due to continued progression of weakness, additional power seat functions are needed. This patient needs the new equipment requested to be able to continue to be independent with all ADLS and IADLS including full time work. Please see additional documentation in the seating and wheeled mobility report for details.   Bart had a successful clinical trial here, and also a successful trial at home with the recommended equipment. Bart is very willing and physically / cognitively able to use the recommended equipment to assist his with mobility-related activities of daily living and general mobility. A group 3 power wheelchair is being requested because it has better suspension for a smooth ride and has the capabilities of expandable electronics to operate the  power seat functions Bart needs for independence with his activities of daily living. A Group 2 power wheelchair does not have sufficient electronics to support this patient's progressive neurological deficits  due to SCI.  B) Bart's mobility limitation cannot be sufficiently and safely resolved by the use of an appropriately fitted cane or walker because he is non ambulatory since injury secondary to paralysis. Strength of legs is 0/5 for one maximal repetition. Fatigue also impacts this patient's ability to ambulate, regardless of the gait aid.    C) Bart does not have sufficient upper extremity function to self-propel an optimally-configured manual wheelchair in his home to perform MRADLs during a typical day due to limitations in strength, endurance, range of motion, and coordination. Distance and time to propel a light weight manual wheelchair Nt as he is a full time power chair user for 40 years.  Strength of arms is R UE Scapular elevation: 5/5 ; retraction 3/5 shoulder abd: 50 degrees, 2/5 , flex 2/5, ext rot 1+/5, shoulder ext 3-/5, elbow flex 90(anti gravity) 3-/5; elbow ext full (gravity reduced) 2/5;  L Ue Scapular elevation: 5/5;  retraction: 3/5 ; shoulder abd 20 degrees 2-/5 ,, shoulder ext 2-, ext rot 1/5; elbow flex 2-  and elbow ext 2-/5.  D)  Bart is not able to use a POV/scooter because it will not fit in his home environment. Bart is unable to safely transfer to and from a POV, unable to operate the tiller steering system, and unable to maintain postural stability and position while operating the POV. Bart needs more appropriate seating and positioning than any scooter seat provides.  E) The need for this equipment is LIFETIME.     RECOMMENDATIONS FOR MOBILITY BASE, SEATING SYSTEM AND COMPONENTS  [employer]ACH[employer] Storm RX- this rear wheel drive power wheelchair is needed for this patient to continue to have independent mobility and to be able to allow him to complete or assist in all of his mobility related activities of daily living (MRADLs). This wheelchair will also have the seating and positioning system and seat function he needs to be able to use and tolerate the wheelchair full time, and  have functional and comfortable positioning for a full day's activities. Jc has Quadriplegia which impairs his ability to move in his home without the use of the requested wheelchair. He lives in an accessible home with level access and uses adapted van or transport services for transportation.    High speed motors- Jc is a highly experienced wheelchair user and is very active with his chair.  He uses a fast speed in order to safely cross streets and prevent any accidents.    Expandable controller -  Needed for operation of the joystick for mobility and seat functions    Harness for expandable controller - needs to be inline for communication between the controller and the joystick    Color Joystick - this is the joystick needed to be used by this patient for moving this wheelchair and also controlling the movement of the seat functions.    Accu trac / G-Trac module - this driving module will provide Jc with improved tracking and control capabilities of the requested power wheelchair, jolene at very slow speeds and on variable, not smooth terrain. This will improve efficiency for him to drive in a straight fashion on the intended path with fewer veer corrections, regardless of the terrain or slope of the surface that may cause the chair to turn toward one side or the other. The less often he has to make driving corrections for mobility, the less likely he will become fatigued from extraneous movements to control the joystick. This will help the wheelchair track in a straight fashion regardless of the terrain. This will also be helpful if alternative driving controls, such as switches are needed.    Toggle kit- switches to allow for independence with use of seat functions.    Height adjustable swing away joystick mount - needed to be able to move the joystick out of the way during transfers, or when at the desk using the computer, or at the table eating, so as not to inadvertently hit the joystick, thus moving  the wheelchair or turning the power on or off without his knowledge.    BP U shaped handle- joystick knob needed to safely drive chair, this is his current method for driving which works to allow independence with movement of joystick.    Ultra Low Dante CG Power Tilt and Recline  - This seating function combination is needed for this patient to be able to perform independent weight shifts and also to be able to change his seated position without the request of a care giver. Jc requires a powered seating system with both tilt and recline to optimize pressure distribution and postural repositioning.   The power tilt seat allows him to change his position by rotating rearward without changing the angle of his hips or knees. Due to atrophy of his buttocks he is at high risk of developing pressure ulcers if not able to change his position. Due to compromised ability to exert himself for weight shifting, the power tilt seat allows him to do this by operating it through the joystick. He can also use a slight degree of tilt for assisting in his seating and positioning for normal functions during the day a to assist keeping him in a midline, erect and upright position by using the effect of gravity.   The power reclining back feature also reduces pressures by allowing Jc to change the angle of his hips and knees into a more open and supine / recumbent position. This increases his tolerance and be able to remain in the requested wheelchair for a complete day and not be dependent on care givers to change him position or transfer him to another surface for comfort or resting. The recline feature can be used to access the perineal area necessary for toileting assistance. The power tilt seat and power recline back are necessary features that allow tasks to be done by the care giver without the need for transferring back to bed for these activities.  The medical justification for these seat functions is consistent with the  RESNA Position Papers regarding these seat function interventions (Javon et al., 2009). These seat functions will also reduce upper extremity strain per the Paralyzed 's of Ginette Guidelines for upper limb preservation (Boninger, et al., 2005).    Power elevating seat - Vertical movement is necessary to allow Jc to function and participate in a three-dimensional world. This seat feature will increase his ability to reach by bringing him closer for better access with weak arms while reaching into cupboards or closets.  During the home trial he was able to use this feature to access counters with weak UE. Power seat elevation also allows the user to have eye contact with others and reduces cervical strain and pain (including headaches from poor positioning). Vertical rise also provides psycho-social benefits of being on peer level and speaking eye-to-eye. Additionally, seat elevation allows certain medications to be kept out of reach of children but remain accessible to the user.    Matrx Jarred standard headrest pad - needed to keep Jc's head in an erect, midline and upright position whether he is in the upright, tilted or reclined position. His head and neck need to be supported as well as the rest of his body.    Vertical post mounting hardware - needed for mounting the requested headrest in the most appropriate position on the back of the wheelchair for optimal head and neck support and to be able to be removed for transfers.     Recessed planer interface plate- allows backrest to be mounted to chair    BP monoflex chest strap- allows for increaed truck support due to high level of injury limiting trunkal righting strength.    Matrx Elite E2 Solid curved and padded back support - firm and contoured back support is needed to support Jc's thoracolumbar area in an upright and midline position, with appropriate support pads as needed. This will provide support whether he is in the upright or his  position. This back support is essential to provide sufficient lateral contour to maximize his postural alignment and minimize his tendency to develop scoliosis and other secondary complications.    2 outback, cantilever, flip back,  full length armrests - needed for arm support at appropriate height and to allow them to be moved out of the way for safe transfers, not available with standard armrests that allow for integration with seat functions.    R waterfall arm pad and L full tray modular arm pad- for proper UE support with weak arms with chair use.    Elbow blocks Left- prevents rearward movement of LE with tilt back feature due to weakness limiting independence with forward motion.    Matrx ateral supports with adj/removable hardware- needed to provide support for weak trunk muscles in order to provide upright posture for optimal environmental engagement. Hardware needed to be able to remove for safety during transfers.      Knee support with removable hardware - to hold thighs straight and not splay out to the side and hardware to be able to remove the pads for transfers     Power elevating foot legrest- Allows for elevation and extension of lower extremities while elevating. This can improve circulation and prevent/reduce edema (with recline/tilt combination).  The lower legs of this wheelchair user act as a reservoir for fluid accumulation due to lack of movement. Elevation of this patient's legs above the level of the heart (left atrium) is recommended as part of the management of edema in conjunction with other measures such as support garments  This patient has lower extremity dependent edema while sitting in his wheelchair, which resolves with elevation of his legs. This feature, when used with the power recline back or tilt seat, can increase Jc's sitting tolerance while positioning him in a more natural position. This can also be helpful if he fatigues and requires rests throughout the day, without  transferring him back to bed.  Due to inability to perform a functional weight shifting, he is at risk for developing pressure ulcers. With the use of this feature it will allow for optimal weight shifting in conjunction with tilt and recline.  Per RESNA white paper on elevating legrests, using elevating legrests and tilting more than 30 degrees in combination with full recline significantly improves lower leg hemodynamics status as measured by near-infrared spectroscopy (Kade et al., 2010)  Additionally, these legrests can improve ground clearance to navigate thresholds and slopes and still allowing the legs to achieve a tight 90 degree position for typical driving conditions. This position shortens the overall functional wheelbase for improved maneuverability    2pc medium footplates- Required with individual adjustability  (angle and height) to accommodate bilateral leg length and asymmetries present.  The flip-up features allows for them to fold completely out of the way for safe transfers.      Power Positioning electronics to be operated through the M610 drive controller - this is needed to allow Jc to use the joystick of the wheelchair for control of mobility and operation of the power seat function. This is important for this patient with limited strength and coordination to increase ease of for operation of the seat functions.    2 Egg switches- to be programed for on/off and one for mode, mounted at shoulder for independence with w/c control features.    2 Batteries and  - gel sealed, and two are necessary to power the wheelchair. They are maintenance free and are safe for travel on the road or in the air. They are necessary to provide reliable use of the power wheelchair on a single charge.    ROHO quadtro seat cushion - this pressure distribution and positioning seat cushion will optimally  distribute seating pressures to prevent pressure ulcers, but also provide a stable base of support for  him to use during MRADLs.    Gooseneck clamp and mounting plate- needed to mount egg switches at shoulder.    Electric Leg bag opener- allows Jc to be able to independently empty leg bag when caregivers are not available.    This equipment is reasonable and necessary with reference to accepted standards of medical practice and treatment of this patient's condition and is not being recommended as a convenience item. Without this recommended equipment, he is highly likely to sustain injuries from falls, develop pressure sores or postural compensation, and/or be bed confined, which those costs far exceed the cost of the requested equipment.  Electronically signed by:  Karen ARROYO, ATP      Occupational Therapist, Assistive   [phone]      fax: [phone]      [email or web address]  [facility], [place] [address]    November 3, 2021    I have read and concur with the above recommendations.    Physician Printed Name __________________________________________    Physician SIgnature  _____________________________________________    Date of SIgnature ______________________________    Physician Phone  ______________________________      Addendum January 12, 2022    Although the denied items do no impact the function of the chair they are all medically necessary for Jc to safely and independently use a power chair as he is a quadriplegic with little R UE function.    The high speed motor package is needed in order for Jc to safely function in the community.  Once in his wheelchair he is very independent and can travel distances independently.  The high speed motor package allows him to safely cross streets at a crosswalk in a timely manner in order to prevent risk for getting hit as it is hard for cars to see wheelchairs on busy roads.  The G trac module is imperative for safe, independent driving  with limited hand control.  With limited hand control, he tends to have incoordinated, compensatory movements that can cause the chair to veer one way or the other.  This module with keep his chair tracking straight with weakness and incoordination (no recordable  strength or fine motor control) .  He uses gross movements from his shoulder to get his hand on/off his joystick and thus needs assistance for straight tracking of his chair for safety and to decrease fatigue with driving and the continuous corrections needed.  The electric leg bag opener allows him to independently manage his catheter bag to limit it from overfilling and back flowing into his catheter.  He does not have full time help and thus this will allow for independence with adl of bathroom management while is chair as he cannot transfer himself.    Please reconsider the denial of these items as they ARE medically necessary for this patient with use of this chair.    Electronically signed by:  Karen ARROYO, ATP      Occupational Therapist, Assistive   [phone]      fax: [phone]      [email or web address]  Seating Clinic- [facility], [address]  [address]  [place]   [address]    January 12, 2022

## 2022-01-14 NOTE — TELEPHONE ENCOUNTER
[facility] Call Center    Phone Message    May a detailed message be left on voicemail: yes     Reason for Call: Order(s): Home Care Orders: Skilled Nursing:  sn 1x a week for every other week for 60 days for catheter maintenance, please call with verbal order thank you.     Action Taken: Message routed to:  [facility] pcc    Travel Screening: Not Applicable

## 2022-01-17 NOTE — TELEPHONE ENCOUNTER
Verbal orders given to Casper from [facility], per Dr. Long, for Order(s): Home Care Orders: Skilled Nursing:  sn 1x a week for every other week for 60 days for catheter maintenance, please call with verbal order thank you. Renetta Verde LPN 1/17/2022 8:19 AM

## 2022-01-19 NOTE — TELEPHONE ENCOUNTER
[facility] Call Center    Phone Message    May a detailed message be left on voicemail: no     Reason for Call: Other: Paula from [employer] requesting call back to verify a fax was received regarding a power wheelchair for the pt, she stated it was faxed on 1/13/22     Action Taken: Message routed to:  [facility] shea

## 2022-01-25 NOTE — TELEPHONE ENCOUNTER
Soon mi he needs commode/shower chair per phone note the other day  Does he just need prescriptions for vs does he need me to addend my last note can you check?

## 2022-02-09 NOTE — TELEPHONE ENCOUNTER
Pt needs DME order for commode chair, but also needs documentation. Could you add this note on 12/29/21 OV note ?

## 2022-03-02 NOTE — PROGRESS NOTES
Faxed signed coy of Home Oxygen RX to [facility] FAX:  [phone].  Copy to HIM for scan into [facility].

## 2022-03-11 NOTE — PROGRESS NOTES
Faxed signed CMN for Trilogy Lillie Ventilator to [facility] FAX:  [phone].  Copy to scan into [facility].

## 2022-03-18 NOTE — TELEPHONE ENCOUNTER
Called [facility].  Gave verbal orders per Dr. Long for Order(s): Home Care Orders: Skilled Nursing:   Every 2 weeks for 60 days for suprapubic catheter maintenance         Fco Cano CMA ([facility]) at 2:29 PM on 3/18/2022

## 2022-03-18 NOTE — TELEPHONE ENCOUNTER
[facility] Call Center    Phone Message    May a detailed message be left on voicemail: yes     Reason for Call: Order(s): Home Care Orders: Skilled Nursing:   Every 2 weeks for 60 days for suprapubic catheter maintenance     Action Taken: Message routed to:  [facility] shea

## 2022-04-14 NOTE — PROGRESS NOTES
04/13/22 1400   Signing Clinician's Name / Credentials   Signing clinician's name / credentials Karen Wells OTR/L,ATP   Session Number   Session Number 32   Additional Session Number Discharge Summary   Authorization status [facility] only    OT Goal 3   Goal Description Pt will demonstrate improved UE functional strength and endurance to allow for use of R UE to safely operate his power w/c (turn on/off, drive distances and control safely) with new equipment as needed and as measured by improvements in R UE strength to shoulder abd 2+, elbow ext 2+, and elbow flexion 3-/5.   Target Date 02/22/22   Date Met   (not met)   Subjective Report   Subjective Report I am on the wait list for [facility], its my goal tog et   Objective Measure 1   Objective Measure R UE strength   Details Scapular elevation: 5/5 ; retraction 3/5 shoulder abd: 50 degrees, 2/5 , flex 2/5,  shoulder int rot  ,ext rot 1+/5, shoulder ext 3-/5, elbow flex 90(anti gravity) 3-/5; elbow ext full (gravity reduced) 2/5; forearm pronation ; supination    Objective Measure 2   Objective Measure L UE strength   Details Scapular elevation: 5/5;  retraction: 3/5 ; shoulder abd 20 degrees 2-/5 ,, shoulder ext 2-, shoulder int rot ,ext rot 1/5; elbow flex 2-  and elbow ext 2-/5   Electrical Stimulation   E-Stim, Attended Minutes (52929) 60   Skilled Intervention Skilled intervention: Skilled set up on [employer] 300() Functional Electrical Stimulation(FES) ergometry cycle for UB:  Pt dependent for proper placement of electrodes on L/R erector spinae, abdominals, scapular stabilizers, shoulder flexors/extensors, bicep, tricep,  for optimum muscle contraction, safe positioning on w/c within frame and cushion for prevention of skin breakdown or chair, feet positioned, UE's positioned on pedals of  using velcro and additional neoprene straps to add supportive positioning for weak UE PRN.  Passive motion assessed to ensure proper  positioning. Completed for purpose of improving muscle function and addressing muscle atrophy.   Patient Response Patient sad but aware of d/c today due to need to transition to maintenence program at [facility] for biking.  Missed many   Treatment Detail Treatment: Pt performed 30 minutes of active UB FES ergometry at .50 Nm resistance, 4+ miles distance equivalent, .1 kcal, .1 W including warm up and cool down. This therapist adjusted e-stim and cycling parameters in real-time to ensure palpable and comfortable muscle contractions throughout session based on previous settings, assure secure adhesion of electrodes, cue/assist for postural alignment and positioning on pedals, and cue for proper effort PRN. Please see [email or web address] for further details on patient's stimulation parameters and ergometry outcomes. Patient ID:[id number], PIN: [id number].   Progress goals progressing   Comments   Comments Discharge Summary- Patient seen for 32 sessions with inconsistent frequency due to ride issues.  Patient continues to benifit from FES cycling but strength and functional remain the same.  Patient still needs assistance for R Ue positioning onto joystick.  Rec. patient folow through with [facility] maintenence program for FES cycling.  He is currently on the wait list fo this.   Total Session Time   Timed Code Treatment Minutes 60   Total Treatment Time (sum of timed and untimed services) 60
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
## 2022-05-13 NOTE — TELEPHONE ENCOUNTER
[facility] Call Center    Phone Message    May a detailed message be left on voicemail: yes     Reason for Call: Other: Paula calling requesting the status of orders faxed to the clinic on 5/5/2022. Please have provider sign and fax to [phone]. Paula's phone and fax are the same number. thank you.      Action Taken: Message routed to:  [facility]    Travel Screening: Not Applicable

## 2022-05-18 NOTE — TELEPHONE ENCOUNTER
[facility] Call [facility]    Phone Message    May a detailed message be left on voicemail: yes     Reason for Call: Order(s): Home Care Orders: Skilled Nursing:  . Every 2 weeks for 8 weeks for suprapubic catheter changes.     Hoda Adams [facility]  #: [phone]    Action Taken: Message routed to:  [facility]    Travel Screening: Not Applicable

## 2022-05-18 NOTE — TELEPHONE ENCOUNTER
Verbal orders given to Hoda from [facility], per Dr. Long, for Order(s): Home Care Orders: Skilled Nursing:  . Every 2 weeks for 8 weeks for suprapubic catheter changes. Renetta Verde LPN 5/18/2022 2:53 PM

## 2022-06-30 NOTE — PROGRESS NOTES
HOME VISIT APPT DATE: 6/29/2022    I ARRIVED AT PATIENT'S HOME FOR OUR SCHEDULED HOME VISIT TIME. PATIENT WAS ON HOLD ON THE PHONE, SO I WENT IN AND COMPLETED THE SOFTWARE UPGRADE. I ALSO CHANGED OUT ALL OF HIS SUPPLIES SO HE COULD TRY AGAIN TO GO ON HIS NIV. PATIENT'S WIFE KALLI CAME INTO THE ROOM AND STATED SINCE SHE IS LEGALLY BLIND SHE HAS A REALLY HARD TIME SEEING THE NIV AND HELPING EMA WITH IT. SHE WANTED TO KNOW IF THERE WAS ANYTHING THAT WOULD BE EASIER TO USE. I DID MENTION THE BIG BUTTONS ON THE RM ASTRAL. I WILL SCHEDULE A TIME TO BRING THAT OUT FOR THEM SO THEY CAN SEE IT AND DECIDE IT THEY WOULD PREFER THAT NIV OVER THE TRILOGY. IF THEY DO, I WILL SEND AN RX TO DR. SMITH ABOUT THE SWITCH. PATIENT IS CURRENTLY NOT WEARING THE NIV AT ALL.    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 RISE 2, AVAPS SPEED 5  INTERFACE: NASAL MASK OR NASAL PILLOWS (WANTS TO TRY A FFM)  CIRCUIT/HUMIDITY: HEATED CIRCUIT AND HEATED HUMIDITY  ALARMS: DIS 60S  O2 BLEED IN (YES/NO): YES 1.5-2L/M    VENTILATOR INSPECTION DONE: YES  SETTINGS CHECK: YES  ALARM CHECK: YES   VENTILATOR SETTINGS VERIFIED: YES   CIRCUIT CHECK: YES  CHANGED: YES  CIRCUIT SUPPLIES GIVEN: YES   MASK: PATIENT CURRENTLY HAS EVERY TYPE OF MASK   FILTERS: EXTERNAL CHANGED W/CIRCUIT YES   OXYGEN EQUIPMENT REVIEWED IF APPLICABLE: YES     MIKAELA BEYER-TASH  [phone]

## 2022-07-07 NOTE — TELEPHONE ENCOUNTER
----- Message from Jason Long MD sent at 7/6/2022  5:16 PM CDT -----  Regarding: RE: Need PT Order Instead of OT  Ok ze Mitchell can you swith this to PT  ----- Message -----  From: Cha Grewal  Sent: 7/6/2022   4:33 PM CDT  To: Jason Long MD  Subject: Need PT Order Instead of OT                      Hi Dr. Long,    We received an OT order on this patient for cervical spine injury. We do not see patients for orthopedic OT. Please place a physical therapy order if appropriate and we will call the patient to schedule.     Thank you,    [facility] Central Scheduling

## 2022-07-15 NOTE — TELEPHONE ENCOUNTER
ZOLPIDEM 10MG TAB 10 Tablet  Last Written Prescription Date:   12/29/2021  Last Fill Quantity: 30,   # refills: 5  Last Office Visit :  12/29/2021  Future Office visit:  None    Routing refill request to provider for review/approval because:  Drug not on the [facility], [facility] or [facility] refill protocol or controlled substance      Radha Florence RN  Central Triage Red Flags/Med Refills

## 2022-08-01 NOTE — TELEPHONE ENCOUNTER
[facility] Call [facility]    Phone Message    May a detailed message be left on voicemail: yes     Reason for Call: Vandana calling from [facility] to see when they can get the orders back sent on 7/21/22 & 7/27/22. Please fax over when completed thank you.     Action Taken: Message routed to:  [facility]    Travel Screening: Not Applicable

## 2022-09-01 NOTE — PROGRESS NOTES
Type of Form Received: [employer]    Form Received (Date) 08/23/22   Form Filled out Yes, date 9/1/22   Placed in provider folder

## 2022-09-01 NOTE — PROGRESS NOTES
Type of Form Received: [employer]    Form Received (Date) 08/23/22   Form Filled out Yes, date 9/1/22   Placed in provider folder

## 2022-09-08 NOTE — PROGRESS NOTES
Type of Form Received: [employer]    Form Received (Date) 9/8/22   Form Filled out YES 9/12/22   Placed in provider folder Yes

## 2022-09-14 NOTE — TELEPHONE ENCOUNTER
Verbal orders given to Hoda from [facility], per Dr. Long, for Home Care Orders: Skilled Nursing:   SN: 1 every 2 week for 8 weeks, for suprapubic catheter changes. Renetta Verde LPN 9/14/2022 4:08 PM

## 2022-09-14 NOTE — TELEPHONE ENCOUNTER
[facility] Call [facility]    Phone Message    May a detailed message be left on voicemail: yes     Reason for Call: Order(s): Home Care Orders: Skilled Nursing:   SN: 1 every 2 week for 8 weeks, for suprapubic catheter changes, please call with verbal order thank you.    Action Taken: Message routed to:  [facility]    Travel Screening: Not Applicable

## 2022-09-23 NOTE — PROGRESS NOTES
[facility]    Form Received (Date) 9/23/22   Form Filled out Yes 9/29/22   Placed in provider folder Yes

## 2022-11-08 NOTE — PROGRESS NOTES
Type of Form Received: Rx for handles     Form Received (Date) 11/8/22   Form Filled out Yes 11/9/22   Placed in provider folder Yes

## 2022-11-11 NOTE — PLAN OF CARE
Concurrent stay review; Secondary Review Determination     [facility]          Under the authority of the Utilization Management Committee, the utilization review process indicated a secondary review on the above patient.  The review outcome is based on review of the medical records, discussions with staff, and applying clinical experience noted on the date of the review.          (x) Observation Status Appropriate - Concurrent stay review    RATIONALE FOR DETERMINATION   29 year old female with a past medical history of asthma presents to hospital with palpitations   Patient was found to have influenza A and mildly elevated troponin, ejection fraction seems to be preserved, no evidence of a large myocardial infarction or significant myocardial involvement.  No clear indication to change patient's status to inpatient. The severity of illness, intensity of service provided, expected LOS and risk for adverse outcome make the care appropriate for observation.      This document was produced using voice recognition software       The information on this document is developed by the utilization review team in order for the business office to ensure compliance.  This only denotes the appropriateness of proper admission status and does not reflect the quality of care rendered.         The definitions of Inpatient Status and Observation Status used in making the determination above are those provided in the CMS Coverage Manual, Chapter 1 and Chapter 6, section 70.4.      Sincerely,     CARLOS MANUEL SERNA MD    System Medical Director  Utilization Management  [facility].           4C PT Defer: per discussion with OT, patient at functional baseline. OT to continue to follow for UE strengthening and functional training.

## 2022-11-23 PROBLEM — R09.02 HYPOXIA: Status: ACTIVE | Noted: 2022-01-01

## 2022-11-23 PROBLEM — J90 PLEURAL EFFUSION ON LEFT: Status: ACTIVE | Noted: 2022-01-01

## 2022-11-23 NOTE — ED NOTES
Pt moved to STAB room due to cardiac arrest. Pt was with respiratory and was unable to perform assisted cough. Pt became hypoxic, CPR began. Pt shortly gained consciousness. 100 scottie and 5 of versed given. OG dropped.Amanda cath placed. Propofol infusion started.

## 2022-11-23 NOTE — ED PROVIDER NOTES
[facility] EMERGENCY DEPARTMENT ([facility])  11/23/22  History     Chief Complaint   Patient presents with     Edema     Hands and feet are swollen- thinks he has kidney issues       Constipation     Last BM was last week     Generalized Weakness     Feels run down- denies any fever     The history is provided by the patient, medical records and a relative.     Bart Corea is a 62 year old male with a history notable for traumatic quadriplegia C1-C4 with neurogenic bladder with suprapubic catheter in place, and LIZA who presents to the ED with a family member for evaluation of generalized weakness, edema and constipation.  Patient presents with multiple complaints but is most concerned about the discomfort and congestion in his chest and throat which began last week.  Yesterday the patient began producing phlegm.  The patient uses 3 L O2 nightly but when he woke up this morning his oxygen saturations read 86.  Patient also found it difficult to breathe while laying in a supine position.  He notes his breathing became better after sitting upright.  Patient complains of generalized weakness stating he feels very rundown.  The patient denies a fever or body aches.  Patient endorses feeling pain in his back for the past few days.  He also endorses swelling in his hands and feet which he attributes to potential kidney problems. The patient reports having a known kidney stone on a previous CT exam.  He reports frequently having infections in his urinary system and is currently taking a course of amoxicillin.  Patient complains of constipation as he has not been able to pass a bowel movement for the past week.  Patient resulted a negative home COVID-19 test yesterday.      CT A/P [facility] - 2/8/2021  IMPRESSION:   1.  4 mm nonobstructing left renal calculus.  2.  Suprapubic catheter decompresses the bladder. No acute findings.    I have reviewed the Medications, Allergies, Past Medical  and Surgical History, and Social History in the [facility] system.  PAST MEDICAL HISTORY:   Past Medical History:   Diagnosis Date     Asthma      Chronic infection     Bladder     Heart murmur      LIZA (obstructive sleep apnea) 12/3/2014     Quadriplegia, C1-C4 incomplete (H)        PAST SURGICAL HISTORY:   Past Surgical History:   Procedure Laterality Date     Bilateral ureteroscopy with holmium laser lithotripsy and basketing and removal of fragments  Left ureteral stent placement.  02/10/2010     COLONOSCOPY N/A 07/20/2018    Procedure: COMBINED COLONOSCOPY, SINGLE OR MULTIPLE BIOPSY/POLYPECTOMY BY BIOPSY;;  Surgeon: Grace Yang MD;  Location: [facility] GI     COMBINED CYSTOSCOPY, INSERT STENT URETER(S) Right 02/08/2021    Procedure: CYSTOSCOPY, WITH URETERAL STENT INSERTION;  Surgeon: Javier Barrientos MD;  Location: [facility] OR     COMBINED CYSTOSCOPY, RETROGRADES, URETEROSCOPY, LASER HOLMIUM LITHOTRIPSY URETER(S), INSERT STENT Right 2/19/2021    Procedure: CYSTOURETEROSCOPY, WITH RETROGRADE PYELOGRAM, HOLMIUM LASER LITHOTRIPSY AND STENT EXCHANGE, SUPRAPUBIC CATHETER EXCHANGE;  Surgeon: Bart Hebert MD;  Location: [facility] OR     CYSTOSCOPY, LITHOLAPAXY, COMBINED N/A 11/15/2019    Procedure: Cystolitholapaxy;  Surgeon: Obi Uriostegui MD;  Location: [facility] OR     INCISION AND DRAINAGE ABDOMEN WASHOUT, COMBINED N/A 01/16/2020    Procedure: Incision and drainage abdomenal Wall with Revision Of Vesicocutaneous Anastomosis;  Surgeon: Obi Uriostegui MD;  Location: [facility] OR     LASER HOLMIUM LITHOTRIPSY BLADDER N/A 01/16/2020    Procedure: LITHOTRIPSY, CALCULUS, BLADDER, USING HOLMIUM LASER;  Surgeon: Obi Uriostegui MD;  Location: [facility] OR     PICC SINGLE LUMEN PLACEMENT Left 02/14/2021    4Fr - 47cm (2cm external), Lateral brachial vein, SVC RA junction     skin flap on bottom       sp tube absess surgery         Past medical history, past surgical history, medications, and allergies were reviewed with the  patient. Additional pertinent items: None    FAMILY HISTORY:   Family History   Problem Relation Age of Onset     Cancer Father      Diabetes No family hx of      Glaucoma No family hx of      Hypertension No family hx of      Macular Degeneration No family hx of      Retinal detachment No family hx of      Anesthesia Reaction No family hx of      Deep Vein Thrombosis (DVT) No family hx of        SOCIAL HISTORY:   Social History     Tobacco Use     Smoking status: Every Day     Packs/day: 0.30     Years: 35.00     Pack years: 10.50     Types: Cigarettes     Smokeless tobacco: Never     Tobacco comments:     2/18/21 Did not complete full session. Would only answer some questions. 14mg patch and 2mg lozenges ordered   Substance Use Topics     Alcohol use: Yes     Comment: on weekends, 3-4+     Social history was reviewed with the patient. Additional pertinent items: None      Patient's Medications   New Prescriptions    No medications on file   Previous Medications    ACETAMINOPHEN (TYLENOL) 325 MG TABLET    Take 2 tablets (650 mg) by mouth every 8 hours as needed for mild pain or fever    ALBUTEROL (PROVENTIL) (2.5 MG/3ML) 0.083% NEB SOLUTION    Take 1 vial (2.5 mg) by nebulization every 4 hours as needed for wheezing    AMLODIPINE (NORVASC) 2.5 MG TABLET    Take 1 tablet (2.5 mg) by mouth daily as needed (autonomic dysreflexia)    BACLOFEN (LIORESAL) 20 MG TABLET    TAKE ONE TABLET BY MOUTH UP TO 6 TIMES DAILY    BISACODYL (DULCOLAX) 10 MG SUPPOSITORY    Place 20 mg rectally every 2 days.  Active ingredient in Magic Bullet (10mg per suppository)    CLOTRIMAZOLE-BETAMETHASONE (LOTRISONE) 1-0.05 % EXTERNAL CREAM    Apply topically 2 times daily Apply to itchy earlobe rash till clear    DIAZEPAM (VALIUM) 5 MG TABLET    TAKE 1 TABLET (5 MG) BY MOUTH EVERY 6 HOURS AS NEEDED FOR ANXIETY    DOCUSATE SODIUM (DOK) 100 MG CAPSULE    TAKE 1 CAPSULE (100 MG) BY MOUTH DAILY    FUROSEMIDE (LASIX) 20 MG TABLET    Take 1 tablet (20  mg) by mouth daily    HYDROCORTISONE (CORTAID) 1 % EXTERNAL CREAM    Apply topically 2 times daily To itchy earlobe    IBUPROFEN (ADVIL/MOTRIN) 800 MG TABLET    Take 1 tablet (800 mg) by mouth 3 times daily as needed for moderate pain    INCONTINENCE SUPPLIES (URINARY LEG BAG)    USE AS NEEDED    MINOCYCLINE (MINOCIN) 50 MG CAPSULE    Take 1 capsule (50 mg) by mouth 2 times daily    MULTIPLE VITAMINS-MINERALS (MULTIVITAMIN & MINERAL PO)    Take 1 capsule by mouth daily.    MULTIVITAMIN (CENTRUM SILVER) TABLET    Take 1 tablet by mouth daily    NEOMYCIN-POLYMYXIN-HYDROCORTISONE (CORTISPORIN) 3.5-29604-1 OTIC SOLUTION    Place 4 drops in ear(s) 2 times daily    NYSTATIN (MYCOSTATIN) 043950 UNIT/GM EXTERNAL POWDER    APPLY TO AFFECTED AREA THREE TIMES DAILY AS NEEDED    OXYBUTYNIN ER (DITROPAN-XL) 10 MG 24 HR TABLET    Take 1 tablet (10 mg) by mouth daily    SENNA (SM SENNA LAXATIVE) 8.6 MG TABLET    Take 1 tablet by mouth daily    SODIUM CHLORIDE 0.9%, BOTTLE, 0.9 % IRRIGATION    USE FOR URINARY CATHETER IRRIGATION    VITAMIN C (ASCORBIC ACID) 1000 MG TABS    Take 1 tablet (1,000 mg) by mouth daily    VITAMIN D3 (CHOLECALCIFEROL) 25 MCG (1000 UNITS) TABLET    Take 1 tablet (25 mcg) by mouth daily    ZOLPIDEM (AMBIEN) 10 MG TABLET    Take 1 tablet (10 mg) by mouth nightly as needed for sleep   Modified Medications    No medications on file   Discontinued Medications    BISACODYL (DULCOLAX) 10 MG SUPPOSITORY    Place 20 mg rectally every 3 days Active ingredient in Magic Bullet (10mg per suppository)          Allergies   Allergen Reactions     Vancomycin Anaphylaxis        Review of Systems   Constitutional: Negative for fever.   HENT: Positive for congestion.    Respiratory: Positive for shortness of breath.    Cardiovascular: Positive for leg swelling.   Gastrointestinal: Positive for constipation.   Musculoskeletal: Negative for myalgias.   Neurological: Positive for weakness.     A complete review of systems was  "performed with pertinent positives and negatives noted in the HPI, and all other systems negative.    Physical Exam   BP: 110/72  Pulse: 112  Temp: 97.3  F (36.3  C)  Resp: 16  Height: 172.7 cm (5' 8\")  Weight: 102.1 kg (225 lb)  SpO2: 96 %      Physical Exam  Vitals and nursing note reviewed.   Constitutional:       General: He is not in acute distress.     Appearance: He is well-developed. He is not ill-appearing, toxic-appearing or diaphoretic.      Comments: Patient is awake and alert, he is mentating normally and protecting his airway without difficulty.   HENT:      Head: Normocephalic and atraumatic.      Mouth/Throat:      Lips: Pink.      Mouth: Mucous membranes are moist.      Pharynx: Oropharynx is clear. No oropharyngeal exudate.   Eyes:      General: Lids are normal. No scleral icterus.     Extraocular Movements: Extraocular movements intact.      Right eye: No nystagmus.      Left eye: No nystagmus.      Conjunctiva/sclera: Conjunctivae normal.      Pupils: Pupils are equal, round, and reactive to light.   Neck:      Thyroid: No thyromegaly.      Vascular: No JVD.      Trachea: No tracheal deviation.   Cardiovascular:      Rate and Rhythm: Regular rhythm. Bradycardia present.      Pulses: Normal pulses.      Heart sounds: Normal heart sounds. No murmur heard.    No friction rub. No gallop.   Pulmonary:      Effort: Pulmonary effort is normal. No respiratory distress.      Breath sounds: Examination of the left-middle field reveals decreased breath sounds. Examination of the right-lower field reveals decreased breath sounds. Examination of the left-lower field reveals decreased breath sounds. Decreased breath sounds present.   Abdominal:      General: Bowel sounds are normal. There is distension.      Palpations: Abdomen is soft. There is no mass.      Tenderness: There is no abdominal tenderness. There is no guarding or rebound.   Musculoskeletal:         General: No tenderness. Normal range of motion. "      Cervical back: Normal range of motion and neck supple. No erythema or rigidity.      Right lower leg: No edema.      Left lower leg: No edema.   Lymphadenopathy:      Cervical: No cervical adenopathy.   Skin:     General: Skin is warm and dry.      Capillary Refill: Capillary refill takes less than 2 seconds.      Coloration: Skin is not pale.      Findings: No erythema or rash.   Neurological:      Mental Status: He is alert and oriented to person, place, and time.      Cranial Nerves: No cranial nerve deficit.      Sensory: Sensory deficit present.      Motor: Weakness present.      Comments: Quadriplegia noted, chronic   Psychiatric:         Mood and Affect: Mood and affect normal.         Speech: Speech normal.         Behavior: Behavior normal.         ED Course   9:10 AM  The patient was seen and examined by Dr. Cal Green in Room ED 07.     ED Course as of 11/23/22 1731 Wed Nov 23, 2022   1656 Patient brought to room 3 for resuscitation. 5 mg Versed given     [facility]    -Intubation    Date/Time: 11/23/2022 5:00 PM  Performed by: Cal Green MD  Authorized by: Cal Green MD     Risks, benefits and alternatives discussed.      PRE-PROCEDURE DETAILS     Patient status:  Unresponsive    Pretreatment medications:  Midazolam    Paralytics:  Rocuronium      PROCEDURE DETAILS     Preoxygenation:  Bag valve mask    CPR in progress: no      Intubation method:  Oral    Oral intubation technique:  Video-assisted    Laryngoscope blade:  Mac 4    Tube size (mm):  7.5    Tube type:  Cuffed    Number of attempts:  1    Cricoid pressure: yes      Tube visualized through cords: yes      PLACEMENT ASSESSMENT     ETT to lip:  24    Tube secured with:  ETT sanchez    Breath sounds:  Equal and absent over the epigastrium    Placement verification: chest rise, condensation, CXR verification, direct visualization, equal breath sounds, ETCO2  detector, tube exhalation and colorimetric ETCO2 device      CXR findings:  ETT in proper place    PROCEDURE    Patient Tolerance:  Patient tolerated the procedure well with no immediate complications              EKG Interpretation:      Interpreted by Cal Green MD  Time reviewed: 0945  Symptoms at time of EKG: cough, bradycardia   Rhythm: sinus bradycardia  Rate: 45  Ectopy: none  Conduction: normal  ST Segments/ T Waves: No acute ischemic changes  Q Waves: none  Comparison to prior: Unchanged    Clinical Impression: sinus bradycardia                Critical Care Addendum    My initial assessment, based on my review of nursing observations, review of vital signs, focused history and physical exam, established that Bart Corea has respiratory failure, which requires immediate intervention, and therefore he is critically ill.     After the initial assessment, the care team performed advanced airway management and consulted with Medical ICU to provide stabilization care. Due to the critical nature of this patient, I reassessed nursing observations, vital signs, physical exam and review of cardiac rhythm monitor multiple times prior to his disposition.     Time also spent performing documentation, reviewing test results, discussion with consultants and coordination of care.     Critical care time (excluding teaching time and procedures): 30 minutes.     Results for orders placed or performed during the hospital encounter of 11/23/22 (from the past 24 hour(s))   [place] Draw    Narrative    The following orders were created for panel order [place] Draw.  Procedure                               Abnormality         Status                     ---------                               -----------         ------                     Extra Blue Top Tube[[id number]]                              Final result               Extra Red Top Tube[[id number]]                               Final result                Extra Green Top (Lithium...[[id number]]                      Final result               Extra Purple Top Tube[[id number]]                            Final result                 Please view results for these tests on the individual orders.   Extra Blue Top Tube   Result Value Ref Range    Hold Specimen JIC    Extra Red Top Tube   Result Value Ref Range    Hold Specimen JIC    Extra Green Top (Lithium Heparin) Tube   Result Value Ref Range    Hold Specimen JIC    Extra Purple Top Tube   Result Value Ref Range    Hold Specimen JIC    CBC with platelets differential    Narrative    The following orders were created for panel order CBC with platelets differential.  Procedure                               Abnormality         Status                     ---------                               -----------         ------                     CBC with platelets and d...[[id number]]  Abnormal            Final result                 Please view results for these tests on the individual orders.   Comprehensive metabolic panel   Result Value Ref Range    Sodium 136 136 - 145 mmol/L    Potassium 4.0 3.4 - 5.3 mmol/L    Chloride 96 (L) 98 - 107 mmol/L    Carbon Dioxide (CO2) 31 (H) 22 - 29 mmol/L    Anion Gap 9 7 - 15 mmol/L    Urea Nitrogen 9.5 8.0 - 23.0 mg/dL    Creatinine 0.21 (L) 0.67 - 1.17 mg/dL    Calcium 9.0 8.8 - 10.2 mg/dL    Glucose 94 70 - 99 mg/dL    Alkaline Phosphatase 89 40 - 129 U/L    AST 17 10 - 50 U/L    ALT 7 (L) 10 - 50 U/L    Protein Total 6.5 6.4 - 8.3 g/dL    Albumin 3.5 3.5 - 5.2 g/dL    Bilirubin Total 0.3 <=1.2 mg/dL    GFR Estimate >90 >60 mL/min/1.73m2   TSH with free T4 reflex   Result Value Ref Range    TSH 3.17 0.30 - 4.20 uIU/mL   Nt probnp inpatient (BNP)   Result Value Ref Range    N terminal Pro BNP Inpatient 196 0 - 900 pg/mL   CRP inflammation   Result Value Ref Range    CRP Inflammation 7.95 (H) <5.00 mg/L   Procalcitonin   Result Value Ref Range    Procalcitonin 0.09 (H) <0.05 ng/mL   CBC  with platelets and differential   Result Value Ref Range    WBC Count 5.6 4.0 - 11.0 10e3/uL    RBC Count 4.28 (L) 4.40 - 5.90 10e6/uL    Hemoglobin 13.2 (L) 13.3 - 17.7 g/dL    Hematocrit 41.4 40.0 - 53.0 %    MCV 97 78 - 100 fL    MCH 30.8 26.5 - 33.0 pg    MCHC 31.9 31.5 - 36.5 g/dL    RDW 13.9 10.0 - 15.0 %    Platelet Count 136 (L) 150 - 450 10e3/uL    % Neutrophils 77 %    % Lymphocytes 8 %    % Monocytes 14 %    % Eosinophils 1 %    % Basophils 0 %    % Immature Granulocytes 0 %    NRBCs per 100 WBC 0 <1 /100    Absolute Neutrophils 4.3 1.6 - 8.3 10e3/uL    Absolute Lymphocytes 0.5 (L) 0.8 - 5.3 10e3/uL    Absolute Monocytes 0.8 0.0 - 1.3 10e3/uL    Absolute Eosinophils 0.0 0.0 - 0.7 10e3/uL    Absolute Basophils 0.0 0.0 - 0.2 10e3/uL    Absolute Immature Granulocytes 0.0 <=0.4 10e3/uL    Absolute NRBCs 0.0 10e3/uL   [place] Draw    Narrative    The following orders were created for panel order [place] Draw.  Procedure                               Abnormality         Status                     ---------                               -----------         ------                     Extra Green Top (Lithium...[[id number]]                                                   Please view results for these tests on the individual orders.   Symptomatic; Unknown Influenza A/B & SARS-CoV2 (COVID-19) Virus PCR Multiplex Nose    Specimen: Nose; Swab   Result Value Ref Range    Influenza A PCR Negative Negative    Influenza B PCR Negative Negative    RSV PCR Negative Negative    SARS CoV2 PCR Negative Negative    Narrative    Testing was performed using the Xpert Xpress CoV2/Flu/RSV Assay on the [employer] GeneXpert Instrument. This test should be ordered for the detection of SARS-CoV-2 and influenza viruses in individuals who meet clinical and/or epidemiological criteria. Test performance is unknown in asymptomatic patients. This test is for in vitro diagnostic use under the FDA EUA for laboratories certified under CLIA to  perform high or moderate complexity testing. This test has not been FDA cleared or approved. A negative result does not rule out the presence of PCR inhibitors in the specimen or target RNA in concentration below the limit of detection for the assay. If only one viral target is positive but coinfection with multiple targets is suspected, the sample should be re-tested with another FDA cleared, approved, or authorized test, if coinfection would change clinical management. This test was validated by the [facility]. These laboratories are certified under the Clinical Laboratory Improvement Amendments of 1988 (CLIA-88) as qualified to perform high complexity laboratory testing.   Lactic acid whole blood   Result Value Ref Range    Lactic Acid 0.8 0.7 - 2.0 mmol/L   XR Chest Port 1 View    Narrative    Portable chest    INDICATION: Cough    COMPARISON: CT 3/19/2021    FINDINGS: Left heart borders obscured by extensive opacification of  the mid to lower left lung. Left hemidiaphragm is silhouetted. Bones  appear osteopenic.      Impression    IMPRESSION: Probable left pleural effusion and extensive left lower  lung atelectasis. Recommend follow-up to exclude central obstruction.  Effusion appears increased from CT of 3/19/2021.    RICO GALLEGO MD         SYSTEM ID:  [id number]   EKG 12-lead, tracing only   Result Value Ref Range    Systolic Blood Pressure  mmHg    Diastolic Blood Pressure  mmHg    Ventricular Rate 45 BPM    Atrial Rate 45 BPM    NJ Interval 152 ms    QRS Duration 96 ms     ms    QTc 375 ms    P Axis 47 degrees    R AXIS 54 degrees    T Axis 55 degrees    Interpretation ECG       Sinus bradycardia  Possible Left atrial enlargement  Borderline ECG  Unconfirmed report - interpretation of this ECG is computer generated - see medical record for final interpretation  Confirmed by - EMERGENCY ROOM, PHYSICIAN ([id number]),  CLAOS KINNEY ([id number]) on 11/23/2022 10:50:06 AM     CT  Abdomen Pelvis w/o Contrast    Narrative    EXAMINATION: CT ABDOMEN PELVIS W/O CONTRAST, 11/23/2022 10:21 AM    INDICATION: back ache, eval for stone    COMPARISON STUDY: CT, 8/5/2022    TECHNIQUE: CT scan of the abdomen and pelvis was performed on  multidetector CT scanner using volumetric acquisition technique and  images were reconstructed in multiple planes with variable thickness  and reviewed on dedicated workstations.     CONTRAST: Without contrast    CT scan radiation dose is optimized to minimum requisite dose using  automated dose modulation techniques.    FINDINGS:    Lower thorax: Increased right lower lobe atelectasis with volume loss  and small right pleural effusion. Chronic appearing left pleural  effusion with left lower lobe atelectasis.    Liver: Similar appearance of multiple benign hepatic cysts. No  intrahepatic biliary ductal dilation.    Biliary System: Normal gallbladder. No extrahepatic biliary ductal  dilation.    Pancreas: No mass or pancreatic ductal dilation.    Adrenal glands: Similar appearance of bilateral adrenal adenomas.    Spleen: Normal.    Kidneys: Similar appearance of nonobstructing left renal calculi  measuring up to 4.6 mm. No suspicious mass, obstructing calculus or  hydronephrosis.    Gastrointestinal tract : The appendix is not definitively identified.  No secondary signs of acute appendicitis. Normal caliber small bowel.   Colonic diverticulosis without evidence of acute diverticulitis.    Mesentery/peritoneum/retroperitoneum: No mass. No free fluid or air.    Lymph nodes: No bulky lymphadenopathy.    Vasculature: Patent major abdominal vasculature.    Pelvis: Urinary bladder is decompressed with suprapubic catheter in  place. Similar appearance of soft tissue density along the suprapubic  catheter tract.    Osseous structures: No aggressive or acute osseous lesion.  Bones  appear osteopenic.      Soft tissues: Within normal limits.      Impression    IMPRESSION:   1.  Similar appearance of nonobstructing left renal calculus measuring  up to 4.6 mm. No hydronephrosis or hydroureter. Decompressed urinary  bladder with suprapubic catheter in place.  2. Increased right lower lobe atelectasis with small right pleural  effusion. Chronic appearing left pleural effusion with left lower lobe  atelectasis.    I have personally reviewed the examination and initial interpretation  and I agree with the findings.    MARK VALLES MD         SYSTEM ID:  [id number]     *Note: Due to a large number of results and/or encounters for the requested time period, some results have not been displayed. A complete set of results can be found in Results Review.     Medications   baclofen (LIORESAL) tablet 20 mg (20 mg Oral Given 11/23/22 1333)   lidocaine 1 % 0.1-1 mL (has no administration in time range)   lidocaine (LMX4) cream (has no administration in time range)   sodium chloride (PF) 0.9% PF flush 3 mL (has no administration in time range)   sodium chloride (PF) 0.9% PF flush 3 mL (has no administration in time range)   melatonin tablet 1 mg (has no administration in time range)   enoxaparin ANTICOAGULANT (LOVENOX) injection 40 mg (has no administration in time range)   acetaminophen (TYLENOL) tablet 650 mg (has no administration in time range)   albuterol (PROVENTIL) neb solution 2.5 mg (has no administration in time range)   amLODIPine (NORVASC) tablet 2.5 mg (has no administration in time range)   baclofen (LIORESAL) tablet 20 mg (has no administration in time range)   bisacodyl (DULCOLAX) suppository 10 mg (has no administration in time range)   diazepam (VALIUM) tablet 5 mg (has no administration in time range)   furosemide (LASIX) tablet 20 mg (has no administration in time range)   oxybutynin ER (DITROPAN XL) 24 hr tablet 10 mg (has no administration in time range)   zolpidem (AMBIEN) tablet 10 mg (has no administration in time range)   minocycline (MINOCIN) capsule 50 mg (has no  administration in time range)   docusate sodium (COLACE) capsule 200 mg (has no administration in time range)   polyethylene glycol (MIRALAX) Packet 17 g (has no administration in time range)   EPINEPHrine (ADRENALIN) 1 MG/10ML injection (  Not Given 11/23/22 1718)   propofol (DIPRIVAN) infusion (40 mcg/kg/min × 102.1 kg Intravenous Rate/Dose Change 11/23/22 1730)   cefTRIAXone (ROCEPHIN) 1 g vial to attach to  mL bag for ADULTS or NS 50 mL bag for PEDS (has no administration in time range)   azithromycin (ZITHROMAX) 500 mg in sodium chloride 0.9 % 250 mL intermittent infusion (has no administration in time range)   0.9% sodium chloride BOLUS (1,000 mLs Intravenous New Bag 11/23/22 1035)             Assessments & Plan (with Medical Decision Making)     This patient presented to the emergency department due to edema, constipation, and difficulty breathing last night with cough and difficulty clearing sputum.  He did need supplemental oxygen last night but at presentation here saturations were in the mid 90s, but were noted to, at times, desaturate but clear when patient was able to cough secretions.  He was placed on nasal cannula O2 at 2 L/min.  Chest x-ray demonstrated large left pleural effusion which looks worse from previous imaging.  I did scan his abdomen and there is no evidence of acute intra-abdominal pathology or ureterolithiasis.  He has been bradycardic in the emergency department which does seem to be his baseline.  When falling asleep once heart rate did drop into the mid 30s but when he woke up heart rate increased into the 50s.  He has been maintaining a good blood pressure and has been mentating normally throughout.  COVID and influenza testing is negative and lactate is not elevated.  CRP and procalcitonin were mildly elevated.  TSH was within normal limits.  I did discuss the case with internal medicine and patient will be admitted to their service to an [facility] level of care as he has needed a  one-to-one assistant to help assist him with cough due to decreased strength of cough from his previous C-spine injury.    While still in the emergency department, patient was noted to begin to desaturate and have a difficult time clearing his airway.  Attempts by the one-to-one assistant to assist cough were not successful so rapid response was called.  I went into the room and found the patient hypoxic with altered mental status.  I did attempt to assist the cough but was unsuccessful.  Patient was immediately moved to a resuscitation room and we began to bag-valve-mask ventilate him with increasing O2 saturations.  He did become unresponsive but still did have palpable pulses.  He was intubated by myself as per the above procedure note and oxygen saturations improved.  He was deep suctioned and sputum will be sent for culture.  Blood cultures were obtained and he was written to receive Rocephin and Zithromax.  ICU staff was at bedside and patient will be admitted to the medical ICU at this point with acute respiratory failure, hypoxic.    I have reviewed the nursing notes.    I have reviewed the findings, diagnosis, plan and need for follow up with the patient.    New Prescriptions    No medications on file       Final diagnoses:   Hypoxia   Pleural effusion on left   Acute respiratory failure with hypoxia (H)     I, Palmer Alvarado, am serving as a trained medical scribe to document services personally performed by Cal Green MD, based on the provider's statements to me.      I, Cal Green MD, was physically present and have reviewed and verified the accuracy of this note documented by Palmer Alvarado.     11/23/2022   [facility] EMERGENCY DEPARTMENT     Cal Green MD  11/23/22 [id number]

## 2022-11-23 NOTE — ED TRIAGE NOTES
Pt presents with complaints of generalized not feeling well, constipation and swelling. Patient has been feeling off for the past 9 days and progressively getting well.     Triage Assessment     Row Name 11/23/22 0905       Triage Assessment (Adult)    Airway WDL WDL       Respiratory WDL    Respiratory WDL --  c/o phlegm       Peripheral/Neurovascular WDL    Peripheral Neurovascular WDL --  Edema present       Cognitive/Neuro/Behavioral WDL    Cognitive/Neuro/Behavioral WDL WDL

## 2022-11-23 NOTE — H&P
Physician Attestation   I, Eren Vanegas MD, was present with the medical/ERICA student who participated in the service and in the documentation of the note.  I have verified the history and personally performed the physical exam and medical decision making.  I agree with the assessment and plan of care as documented in the note.      I personally reviewed vital signs, medications, labs and imaging.    Acute hypoxemic respiratory failure: patient presented with chest pain and cough and was found to have mild hypoxemia and persistent left sided pleural effusion and bilateral lower lobe atelectasis. The left sided pleural effusion is at least 18 months old and appears stable on imaging. There is a right sided pleural effusion that is small but marginally larger than a similar CT study from 8/2022. While infection is possible, empyema seems less likely given the chronicity and stability of this patient. PNA is possible but there is no infiltrate on imaging and aside from hypoxia, there is no fever, no leukocytosis and minimal procalcitonin elevation. Lower concern for sepsis with normal lactate, but notably vital signs aren't reliable indicators of SIRS given concomitant dysautonomia. CHF unlikely given normal NT pro BNP and relatively normal cardiac function in recent history. I did not personally see the patient until after he had fulminant respiratory failure and was intubated in the ED. Unclear why he had acute decline in his respiratory status all of the sudden but mucus plugging might be a consideration given his immobility. PE a consideration, but has not been worked up further at this point. We would consider a thoracentesis on the left side for diagnostic reasons. Of note, viral panel negative.     Acute encephalopathy, likely metabolic from critical illness: I did not see the patient prior to intubation.     Chest pain: this is per report. EKG is stable. Chest pain does sound somewhat typical in nature. We  "will obtain a troponin, repeat a TTE tomorrow and trend EKG and troponin should the latter be elevated.     Pyuria: this is definitely not surprising in the setting of chronic indwelling gastelum catheter. Notably, there is no fever or leukocytosis. We will send urine cx and monitor for s/s of infection such as fever, leukocytosis, hemodynamic instability, lactic acidosis. Should he have clinical decompensation, it would likely be best to administer tobramycin as several of his last urine cultures have grown pseudomonas that has been resistant to cefepime, ceftazadime and pip-tazo.     Constipation: continue aggressive bowel regimen. This is an acute on chronic problem for this patient.     Eren Vanegas MD  Date of Service (when I saw the patient): 11/23/22      --------------    [facility]    History and Physical - Medicine Service, [facility] TEAM 5       Date of Admission:  11/23/2022    Assessment & Plan      Bart Corea is a 62 year old male admitted on 11/23/2022. He has PMH of traumatic quadriplegia C1-C4 with neurogenic bladder with suprapubic catheter in place Asthma, LIZA, chronic bladder infection on antibiotics (Minocyclin) and Autonomic dysreflexia presents with difficulty breathing concerning acute hypoxic respiratory failure.     Acute Hypoxic Respiratory Failure:  Left Arm and hand swelling:  Chest Pain/ Acute coronary syndrome?:  Jc's CT showed: :Increased right lower lobe atelectasis with small right pleural effusion. Chronic appearing left pleural effusion with left lower lobe atelectasis.\" It is not clear why he has the atelectasis and pleural effusion. It seems chronic but diagnostic thoracocentesis will be helpful.   - O2 nasal Canula  - CBC  - BMP  - Troponin  - Diagnostic thoracocentesis & pleural fluid culture  - Left arm U/S to exclude clots.   - Consider CT PE  - ECHO  - Troponion  - EKG  - I/O  - CXR    UTI:  Jc has a neurogenic bladder & " recurrent stones with stent placement and removal, he has urinary catheter & recurrent UTI with resistant organism  - Continue PTA Minocyclin   - UA & CULTURE  - Monitor for fever, pain  - KUB U/S  - Consider IV antibiotics  - Acetaminophen (TYLENOL) tablet 650 mg Q 8 hrs PRN    Constipation:   Jc has constipation, he is taking Sodium docusate daily and Senna every 3 days, last time took Senna was yesterday. Last BM was 9 days ago.   - Docusate sodium (COLACE) capsule 200 mg BID  - Miralax cap 100 mg BID  - Consider enema if oral medications or rectal suppository didn't work by tomorrow.    Autonomic Dysreflexia:  - Amlodipine 2.5 mg, Oral, DAILY PRN  - monitor VS & symptoms    Asthma:  - Albuterol neb Q 4 hr PRN  - Monitor     LIZA:  - Monitor  - CPAP at [facility]    Quadriplegia C1-C4 with neurogenic bladder:  - Baclofen  - Urinary catheter  - Turn every 2 hrs  - 1 on 1 assistant  - Consider OT eval.       Diet: Regular Diet Adult    DVT Prophylaxis: Enoxaparin (Lovenox) SQ  Amanda Catheter: Not present  Fluids: Oral  Central Lines: None  Cardiac Monitoring: None  Code Status:  Full code    Clinically Significant Risk Factors Present on Admission                               Disposition Plan      Expected Discharge Date: 11/25/2022                The patient's care was discussed with the Attending Physician, Dr. Vanegas.    Yamilet Montalvo  Medical Student  Medicine Service, [address]  Securely message with the Vocera Web Console (learn more here)  Text page via [facility] Paging/Directory   Please see signed in provider for up to date coverage information    ______________________________________________________________________    Chief Complaint   Difficulty breathing    History is obtained from the patient    History of Present Illness   Bart Corea is a 62 year old male admitted on 11/23/2022. He has PMH of traumatic quadriplegia C1-C4 with neurogenic  bladder with suprapubic catheter in place Asthma, LIZA, chronic bladder infection on antibiotics (Minocyclin) and Autonomic dysreflexia presents with difficulty breathing that started 2-3 days ago that gets worse, he has a little chest pain 3/10 that started 2-3 days ago, it comes and goes, it descried as sharp, or pressure  Like on the left upper chest. It doesn't radiate. Jc c/o cough that start 2-3 days ago, with some sputum that is hard to expectorate & he needs the assistant to push on his chest to help him expectorate. .     Jc use O2 Via nasal canula at home during night. He has the BIPAP at home but doesn't like using it because he is claustrophobic and can't tolerate the mask.     Jc has constipation, he is taking Sodium docusate daily and Senna every 3 days, last time took Senna was yesterday. Last BM was 9 days ago.     Jc has bruises on the back of the left hand and swelling in his left arm start 6 months ago, but gets worse.   He denied fever, abdominal pain, nausea, vomiting or diarrhea.       Review of Systems    The 10 point Review of Systems is negative other than noted in the HPI or here.   Constitutional: Negative for fever.   HENT: Positive for congestion.    Respiratory: Positive for shortness of breath.    Cardiovascular: Positive for leg swelling.   Gastrointestinal: Positive for constipation.   Musculoskeletal: Negative for myalgias.   Neurological: Positive for weakness.       Past Medical History    I have reviewed this patient's medical history and updated it with pertinent information if needed.   Past Medical History:   Diagnosis Date     Asthma      Chronic infection     Bladder     Heart murmur      LIZA (obstructive sleep apnea) 12/3/2014     Quadriplegia, C1-C4 incomplete (H)        Past Surgical History   I have reviewed this patient's surgical history and updated it with pertinent information if needed.  Past Surgical History:   Procedure Laterality Date     Bilateral  ureteroscopy with holmium laser lithotripsy and basketing and removal of fragments  Left ureteral stent placement.  02/10/2010     COLONOSCOPY N/A 07/20/2018    Procedure: COMBINED COLONOSCOPY, SINGLE OR MULTIPLE BIOPSY/POLYPECTOMY BY BIOPSY;;  Surgeon: Garce Yang MD;  Location: [facility]     COMBINED CYSTOSCOPY, INSERT STENT URETER(S) Right 02/08/2021    Procedure: CYSTOSCOPY, WITH URETERAL STENT INSERTION;  Surgeon: Javier Barrientos MD;  Location: [facility] OR     COMBINED CYSTOSCOPY, RETROGRADES, URETEROSCOPY, LASER HOLMIUM LITHOTRIPSY URETER(S), INSERT STENT Right 2/19/2021    Procedure: CYSTOURETEROSCOPY, WITH RETROGRADE PYELOGRAM, HOLMIUM LASER LITHOTRIPSY AND STENT EXCHANGE, SUPRAPUBIC CATHETER EXCHANGE;  Surgeon: Bart Hebert MD;  Location: [facility] OR     CYSTOSCOPY, LITHOLAPAXY, COMBINED N/A 11/15/2019    Procedure: Cystolitholapaxy;  Surgeon: Obi Uriostegui MD;  Location: [facility]     INCISION AND DRAINAGE ABDOMEN WASHOUT, COMBINED N/A 01/16/2020    Procedure: Incision and drainage abdomenal Wall with Revision Of Vesicocutaneous Anastomosis;  Surgeon: Obi Uriostegui MD;  Location: [facility] OR     LASER HOLMIUM LITHOTRIPSY BLADDER N/A 01/16/2020    Procedure: LITHOTRIPSY, CALCULUS, BLADDER, USING HOLMIUM LASER;  Surgeon: Obi Uriostegui MD;  Location: [facility] OR     PICC SINGLE LUMEN PLACEMENT Left 02/14/2021    4Fr - 47cm (2cm external), Lateral brachial vein, SVC RA junction     skin flap on bottom       sp tube absess surgery         Social History   I have reviewed this patient's social history and updated it with pertinent information if needed. Bart Corea  reports that he has been smoking cigarettes. He has a 10.50 pack-year smoking history. He has never used smokeless tobacco. He reports current alcohol use. He reports that he does not currently use drugs after having used the following drugs: Marijuana.   Jc lives with his wife. He has a PCA 12 hours/day. He smokes 4-5 cigarrates/  day. He drinks liquor 2 cups every other day. He stopped using Marijuana 2-3 years ago.     Family History   I have reviewed this patient's family history and updated it with pertinent information if needed.  Family History   Problem Relation Age of Onset     Cancer Father      Diabetes No family hx of      Glaucoma No family hx of      Hypertension No family hx of      Macular Degeneration No family hx of      Retinal detachment No family hx of      Anesthesia Reaction No family hx of      Deep Vein Thrombosis (DVT) No family hx of    Mother and father  of cancer. Dad has Asbestosis and lung cancer. Mom had small cell lymphoma.     Prior to Admission Medications   Prior to Admission Medications   Prescriptions Last Dose Informant Patient Reported? Taking?   Incontinence Supplies (URINARY LEG BAG)  Other Yes No   Sig: USE AS NEEDED   Multiple Vitamins-Minerals (MULTIVITAMIN & MINERAL PO) Not Taking Other Yes No   Sig: Take 1 capsule by mouth daily.   Patient not taking: Reported on 2022   Vitamin D3 (CHOLECALCIFEROL) 25 mcg (1000 units) tablet 2022 at 0800 Other No Yes   Sig: Take 1 tablet (25 mcg) by mouth daily   acetaminophen (TYLENOL) 325 MG tablet Unknown Other No Yes   Sig: Take 2 tablets (650 mg) by mouth every 8 hours as needed for mild pain or fever   albuterol (PROVENTIL) (2.5 MG/3ML) 0.083% neb solution 2022 at 0700 Other No Yes   Sig: Take 1 vial (2.5 mg) by nebulization every 4 hours as needed for wheezing   amLODIPine (NORVASC) 2.5 MG tablet More than a month Other No Yes   Sig: Take 1 tablet (2.5 mg) by mouth daily as needed (autonomic dysreflexia)   baclofen (LIORESAL) 20 MG tablet 2022 at 0800 Other No Yes   Sig: TAKE ONE TABLET BY MOUTH UP TO 6 TIMES DAILY   bisacodyl (DULCOLAX) 10 MG suppository 2022 at Unknown time Other No Yes   Sig: Place 20 mg rectally every 2 days.  Active ingredient in Magic Bullet (10mg per suppository)   Patient taking differently: Place  20 mg rectally every 3 days.  Active ingredient in Magic Bullet (10mg per suppository)   clotrimazole-betamethasone (LOTRISONE) 1-0.05 % external cream Unknown Other No Yes   Sig: Apply topically 2 times daily Apply to itchy earlobe rash till clear   diazepam (VALIUM) 5 MG tablet 11/21/2022 at night Other No Yes   Sig: TAKE 1 TABLET (5 MG) BY MOUTH EVERY 6 HOURS AS NEEDED FOR ANXIETY   docusate sodium (DOK) 100 MG capsule 11/23/2022 at 0800 Other No Yes   Sig: TAKE 1 CAPSULE (100 MG) BY MOUTH DAILY   furosemide (LASIX) 20 MG tablet More than a month Other No Yes   Sig: Take 1 tablet (20 mg) by mouth daily   Patient taking differently: Take 20 mg by mouth daily as needed   hydrocortisone (CORTAID) 1 % external cream Unknown Other No Yes   Sig: Apply topically 2 times daily To itchy earlobe   ibuprofen (ADVIL/MOTRIN) 800 MG tablet Unknown Other No Yes   Sig: Take 1 tablet (800 mg) by mouth 3 times daily as needed for moderate pain   minocycline (MINOCIN) 50 MG capsule 11/23/2022 at 0800 Other No Yes   Sig: Take 1 capsule (50 mg) by mouth 2 times daily   multivitamin (CENTRUM SILVER) tablet Not Taking Other No No   Sig: Take 1 tablet by mouth daily   Patient not taking: Reported on 11/23/2022   neomycin-polymyxin-hydrocortisone (CORTISPORIN) 3.5-36337-9 otic solution Not Taking Other No No   Sig: Place 4 drops in ear(s) 2 times daily   Patient not taking: Reported on 11/23/2022   nystatin (MYCOSTATIN) 814309 UNIT/GM external powder Unknown Other No Yes   Sig: APPLY TO AFFECTED AREA THREE TIMES DAILY AS NEEDED   oxybutynin ER (DITROPAN-XL) 10 MG 24 hr tablet 11/23/2022 at 0800 Other No Yes   Sig: Take 1 tablet (10 mg) by mouth daily   senna (SM SENNA LAXATIVE) 8.6 MG tablet Not Taking at 0800 Other No No   Sig: Take 1 tablet by mouth daily   Patient not taking: Reported on 11/23/2022   sodium chloride 0.9%, bottle, 0.9 % irrigation  Other No No   Sig: USE FOR URINARY CATHETER IRRIGATION   vitamin C (ASCORBIC ACID) 1000  MG TABS 11/23/2022 at 0800 Other No Yes   Sig: Take 1 tablet (1,000 mg) by mouth daily   zolpidem (AMBIEN) 10 MG tablet 11/22/2022 at night Other No Yes   Sig: Take 1 tablet (10 mg) by mouth nightly as needed for sleep      Facility-Administered Medications: None     Allergies   Allergies   Allergen Reactions     Vancomycin Anaphylaxis       Physical Exam   Vital Signs: Temp: 97.4  F (36.3  C) Temp src: Oral BP: 105/69 Pulse: (!) 42   Resp: 16 SpO2: 97 % O2 Device: Nasal cannula Oxygen Delivery: 2 LPM  Weight: 225 lbs 0 oz    General Appearance: in mild distress due to cough, sputum expectoration difficulty & SOB  Eyes: no judice  HEENT: normocephalic, no nasal redness or throat redness  Respiratory: diminished air entry in the left lower lung, diffuse crerpitation.   Cardiovascular: RRR no MRG  GI: + BS distended abd from obesitiy  Lymph/Hematologic: Ecchymosis on the back of Lt hand  Genitourinary: Catheter in place, no suprapubic tenderness, tender costochondral angles B/L  Skin: no rash, bruises on the back of the lt hand. Oedema of let hand, B/L lower limb edema.  Musculoskeletal: 0/5 all 4 extremities  Neurologic: loss of sensation at all 4 extremities  Psychiatric: flat affect, slurred speech, anxious due to his inability to expropriate the sputum     Data   Data reviewed today: I reviewed all medications, new labs and imaging results over the last 24 hours. I personally reviewed the abdominal CT image(s) showing Increased right lower lobe atelectasis with small right pleural.effusion.    Recent Labs   Lab 11/23/22  0913   WBC 5.6   HGB 13.2*   MCV 97   *      POTASSIUM 4.0   CHLORIDE 96*   CO2 31*   BUN 9.5   CR 0.21*   ANIONGAP 9   LIU 9.0   GLC 94   ALBUMIN 3.5   PROTTOTAL 6.5   BILITOTAL 0.3   ALKPHOS 89   ALT 7*   AST 17     Recent Results (from the past 24 hour(s))   XR Chest Port 1 View    Narrative    Portable chest    INDICATION: Cough    COMPARISON: CT 3/19/2021    FINDINGS: Left heart  borders obscured by extensive opacification of  the mid to lower left lung. Left hemidiaphragm is silhouetted. Bones  appear osteopenic.      Impression    IMPRESSION: Probable left pleural effusion and extensive left lower  lung atelectasis. Recommend follow-up to exclude central obstruction.  Effusion appears increased from CT of 3/19/2021.    RICO GALLEGO MD         SYSTEM ID:  [id number]   CT Abdomen Pelvis w/o Contrast    Narrative    EXAMINATION: CT ABDOMEN PELVIS W/O CONTRAST, 11/23/2022 10:21 AM    INDICATION: back ache, eval for stone    COMPARISON STUDY: CT, 8/5/2022    TECHNIQUE: CT scan of the abdomen and pelvis was performed on  multidetector CT scanner using volumetric acquisition technique and  images were reconstructed in multiple planes with variable thickness  and reviewed on dedicated workstations.     CONTRAST: Without contrast    CT scan radiation dose is optimized to minimum requisite dose using  automated dose modulation techniques.    FINDINGS:    Lower thorax: Increased right lower lobe atelectasis with volume loss  and small right pleural effusion. Chronic appearing left pleural  effusion with left lower lobe atelectasis.    Liver: Similar appearance of multiple benign hepatic cysts. No  intrahepatic biliary ductal dilation.    Biliary System: Normal gallbladder. No extrahepatic biliary ductal  dilation.    Pancreas: No mass or pancreatic ductal dilation.    Adrenal glands: Similar appearance of bilateral adrenal adenomas.    Spleen: Normal.    Kidneys: Similar appearance of nonobstructing left renal calculi  measuring up to 4.6 mm. No suspicious mass, obstructing calculus or  hydronephrosis.    Gastrointestinal tract : The appendix is not definitively identified.  No secondary signs of acute appendicitis. Normal caliber small bowel.   Colonic diverticulosis without evidence of acute diverticulitis.    Mesentery/peritoneum/retroperitoneum: No mass. No free fluid or air.    Lymph nodes:  No bulky lymphadenopathy.    Vasculature: Patent major abdominal vasculature.    Pelvis: Urinary bladder is decompressed with suprapubic catheter in  place. Similar appearance of soft tissue density along the suprapubic  catheter tract.    Osseous structures: No aggressive or acute osseous lesion.  Bones  appear osteopenic.      Soft tissues: Within normal limits.      Impression    IMPRESSION:   1. Similar appearance of nonobstructing left renal calculus measuring  up to 4.6 mm. No hydronephrosis or hydroureter. Decompressed urinary  bladder with suprapubic catheter in place.  2. Increased right lower lobe atelectasis with small right pleural  effusion. Chronic appearing left pleural effusion with left lower lobe  atelectasis.    I have personally reviewed the examination and initial interpretation  and I agree with the findings.    MARK VALLES MD         SYSTEM ID:  [id number]

## 2022-11-23 NOTE — PROGRESS NOTES
Order for CPAP at night. Pt will not wear and cannot take off the mask if need be.     Did some more quad coughing for this pt too.     Malgorzata Rader, RT

## 2022-11-23 NOTE — PROGRESS NOTES
Call from RN in regards to this pt needing help with coughing and getting secretions out. This pt is a quadriplegic and at home his assistant will perform a quad cough when needed.   RT came down and performed the quad cough with him while the nurse was in the room.  I talked about other therapies we do and mentioned the cough assist but the pt is not up to using that machine.     Will continue to monitor and provide support.    Malgorzata Rader, RT

## 2022-11-23 NOTE — PROGRESS NOTES
Type of Form Received: HOME CERT    Form Received (Date) 11/23/22   Form Filled out Yes 11/28/22   Placed in provider folder Yes

## 2022-11-23 NOTE — PROGRESS NOTES
Pt intubated during code blue.   Initial vent settings: CMV 15/450/8+/100%.   ETT: 7.5, 24@ the lips.  Large amount of thick white/creamy secretion from the ETT.    Will continue to monitor and provide support.     Malgorzata Rader, RT

## 2022-11-23 NOTE — ED NOTES
"Per RN on 1:1 w/ patient, pt was talking when he suddenly had an episode of apnea; RN states that he started to turn blue but was able to mouth the words \"cough\" as in he needed an assist w/ cough. Sats at this time @ 88%. RN was able to help pt with cough and color/ saturations returned to WNL. MD notified. 1:1 remains in place.   "

## 2022-11-24 NOTE — PLAN OF CARE
Defer 4AB PT    Physical Therapy: Orders received. Chart reviewed and discussed with care team. Per chart review, pt is dependent with mechanical lift at baseline for transfers to power wheelchair. Physical Therapy not indicated as patient's inpatient needs can be met by single discipline while inpatient. OT to address UE strengthening, trunk control and ADLs as appropriate.? Defer discharge recommendations to Occupational Therapy.? Will complete orders.

## 2022-11-24 NOTE — PROGRESS NOTES
Admitted/transferred from: ED to  @ 1840  Reason for admission/transfer: Higher level of care d/t intubation/sedation  2 RN skin assessment: completed by Micki Rordiguez   Result of skin assessment and interventions/actions: Lower right thigh scar, blanchable coccyx, bruise R posterior knee, mottled/cyanotic bilateral toes   Height, weight, drug calc weight: Done  Patient belongings clothing in patient belongings bag in room, necklaces in specimen cup in safe.   MDRO education added to care plan YES  ?

## 2022-11-24 NOTE — PLAN OF CARE
Goal Outcome Evaluation:    Neuro: Alert to drowsy on vent, following commands and nodding appropriately. Prop turned off, continues to be alert to drowsy. Mouthing words for needs. Some frustration trying to communicate. Quadriplegia, uppers with slight contraction and none in lowers. CV: HR 50-60s. -130s. Tmax: 99.  Pulm: CMV settings, PS and weaning. Coarse-clear lung sounds.  Pt needs assistance coughing at baseline. No thoracentesis indicated today per MICU team.  GI: Per family - pt had BM yesterday 11/23, bowel meds given today. Per family - put uses magic jet for BM assistance and occasional digital stimulation.   OG - clamped; OK per team for meds.  : Suprapubic catheter.   IV/Gtt: PIVs. Prop stopped at 1240.  Pain: PRN fent. Oxy ordered.  Plan: Early PS trial in the AM for possible extubation.    Will continue to monitor for safety and comfort.    Wendy Durán RN

## 2022-11-24 NOTE — H&P
MEDICAL ICU H&P  11/23/2022    Date of Hospital Admission: 11/23/2022  Date of ICU Admission: 11/23/22  Reason for Critical Care Admission: Hypoxic Respiratory failure 2/2 CAP/Aspiration  Date of Service (when I saw the patient): 11/23/2022    ASSESSMENT: Bart Corea is a 62 year old male with a history notable for traumatic quadriplegia C1-C4, asymptomatic Bradycardia (40's- 50's), neurogenic bladder with suprapubic catheter in place, and LIZA who presents to the ED for evaluation of generalized weakness, edema and constipation. Code Blue called ~5PM due to hypoxia after respiratory was unable to perform an assisted cough, from report pt appear not to have gone into cardiac arrest (briefly had CPR performed) but rather intubated due to hypoxic respiratory failure.    CHANGES and MAJOR THINGS TODAY:   - Intubated today due to hypoxic respiratory failure  - Sputum, Respiratory panel, UA/culture, CXR  - Start Ceftriaxone and Azithromycin for CAP/Aspiration PNA  - Trop, EKG, BNP, Procal  - Start Fentanyl and continue Propofol for pain and sedation  - Pharmacy Consult for Med Rec    PLAN:    Neuro:  # Pain and sedation  Fentanyl 25-50mcg q2hrs PRN for pain.   Propofol gtt for sedation  - RASS goal -1 to -2    # Hx of Traumatic Quadriplegia C1-C4  # Autonomic Dysreflexia  - Holding Amlodipine 2.5 mg  - Monitor VS & symptoms  - Holding PTA Baclofen (PRN?)    Pulmonary:  # Acute Hypoxic Respiratory Failure  # C/f CAP vs Viral PNA vs Aspiration PNA  - Intubated and sedated goal O2>94%  - ABG, CXR, Respiratory Panel, Sputum cx  - Cycle Trop, EKG today  - Procal pending  - Consider DVT/PE work up with any acute decline  - Abx as below    # Asthma  - Albuterol neb Q 4 hr PRN  - Monitor     LIZA  - Monitor  - CPAP at night    Cardiovascular:  # HTN  - Holding PTA amlodipine    GI/Nutrition:  # Chronic Constipation  cJ has constipation, he is taking Sodium docusate daily and Senna every 3 days, last time took Senna was  yesterday. Last BM was 9 days ago.   - Docusate sodium (COLACE) capsule 200 mg BID  - Miralax cap 100 mg BID  - Consider enema if oral medications or rectal suppository didn't work by tomorrow.    # Nutrition  - Nutrition evaluation    Renal/Fluids/Electrolytes:  # UTI:  Jc has a neurogenic bladder & recurrent stones with stent placement and removal, he has urinary catheter & recurrent UTI with resistant organism  - Continue PTA Minocyclin   - UA & CULTURE  - Monitor for fever, pain  - KUB U/S  - Consider IV antibiotics  - Acetaminophen (TYLENOL) tablet 650 mg Q 8 hrs PRN    Endocrine:  No active concerns    ID:  # C/f CAP vs Viral PNA vs Aspiration PNA  # C/f underlying infxn  - Intubated and sedated goal O2>94%  - ABG, CXR, Respiratory Panel, Sputum cx  - Cycle Trop, EKG today  - Procal pending    Abx:  Ceftriaxone (11/23--> current)  Azithromycin (500mg 11/23, 250mg --> current)  Minocycline (prophylactic)    11/23 Bcx: Pending  11/23 Sputum Cx: Pending  11/23 UA: pending  11/23 Ucx: pending    Hematology:    # Anemia   Hgb minimally low at 13.2 at baseline which appears to be around between 12-13.   - Monitor    # Thrombocytopenia  Likely reactive 2/2 acute illness.   - CTM    Musculoskeletal:  # Quadriplegia/Deconditioning  - PT/OT consult    Skin:  # Bilateral upper extremity hyperpigmentation  Possibly related to minocycline use  - Monitor for now    # Chronic wound on the left arm  - Wound consult    General Cares/Prophylaxis:    Fluids: None  Nutrition: NPO  DVT Prophylaxis: Heparin SQ  GI Prophylaxis: PPI  Restraints: None  Family Communication: Wife and daughter updated at bedside  Code Status: Full Code    Lines/tubes/drains:   - PIV x2  NG tube  Suprapubic catheter    Disposition:  - Medical ICU     Patient seen and findings/plan discussed with medical ICU staff, Dr. Dorie Vergara.    Edwar Gallegos MD  Internal Medicine Resident, PGY-1  Pager:  "[phone]    -----------------------------------------------------------------------    HISTORY PRESENTING ILLNESS:     \"Bart Corea is a 62 year old male admitted on 11/23/2022. He has PMH of traumatic quadriplegia C1-C4 with neurogenic bladder with suprapubic catheter in place Asthma, LIZA, chronic bladder infection on antibiotics (Minocyclin) and Autonomic dysreflexia presents with difficulty breathing that started 2-3 days ago that gets worse, he has a little chest pain 3/10 that started 2-3 days ago, it comes and goes, it descried as sharp, or pressure  Like on the left upper chest. It doesn't radiate. Jc c/o cough that start 2-3 days ago, with some sputum that is hard to expectorate & he needs the assistant to push on his chest to help him expectorate. .      Jc use O2 Via nasal canula at home during night. He has the BIPAP at home but doesn't like using it because he is claustrophobic and can't tolerate the mask.      Jc has constipation, he is taking Sodium docusate daily and Senna every 3 days, last time took Senna was yesterday. Last BM was 9 days ago.      Jc has bruises on the back of the left hand and swelling in his left arm start 6 months ago, but gets worse.\"    Franco Ba called at ~5:00pm due to rapid desaturation necessitating intubation, transferred to ICU for further cares.      REVIEW OF SYSTEMS: Unable to assess as pt was intubated and sedated      PAST MEDICAL HISTORY:   Past Medical History:   Diagnosis Date     Asthma      Chronic infection     Bladder     Heart murmur      LIZA (obstructive sleep apnea) 12/3/2014     Quadriplegia, C1-C4 incomplete (H)      SURGICAL HISTORY:  Past Surgical History:   Procedure Laterality Date     Bilateral ureteroscopy with holmium laser lithotripsy and basketing and removal of fragments  Left ureteral stent placement.  02/10/2010     COLONOSCOPY N/A 07/20/2018    Procedure: COMBINED COLONOSCOPY, SINGLE OR MULTIPLE BIOPSY/POLYPECTOMY BY " BIOPSY;;  Surgeon: Grace Yang MD;  Location: [facility]     COMBINED CYSTOSCOPY, INSERT STENT URETER(S) Right 02/08/2021    Procedure: CYSTOSCOPY, WITH URETERAL STENT INSERTION;  Surgeon: Javier Barrientos MD;  Location: [facility] OR     COMBINED CYSTOSCOPY, RETROGRADES, URETEROSCOPY, LASER HOLMIUM LITHOTRIPSY URETER(S), INSERT STENT Right 2/19/2021    Procedure: CYSTOURETEROSCOPY, WITH RETROGRADE PYELOGRAM, HOLMIUM LASER LITHOTRIPSY AND STENT EXCHANGE, SUPRAPUBIC CATHETER EXCHANGE;  Surgeon: Bart Hebert MD;  Location: [facility]     CYSTOSCOPY, LITHOLAPAXY, COMBINED N/A 11/15/2019    Procedure: Cystolitholapaxy;  Surgeon: Obi Uriostegui MD;  Location: [facility]     INCISION AND DRAINAGE ABDOMEN WASHOUT, COMBINED N/A 01/16/2020    Procedure: Incision and drainage abdomenal Wall with Revision Of Vesicocutaneous Anastomosis;  Surgeon: Obi Uriostegui MD;  Location: [facility]     LASER HOLMIUM LITHOTRIPSY BLADDER N/A 01/16/2020    Procedure: LITHOTRIPSY, CALCULUS, BLADDER, USING HOLMIUM LASER;  Surgeon: Obi Uriostegui MD;  Location: [facility]     PICC SINGLE LUMEN PLACEMENT Left 02/14/2021    4Fr - 47cm (2cm external), Lateral brachial vein, SVC RA junction     skin flap on bottom       sp tube absess surgery       SOCIAL HISTORY:  Social History     Socioeconomic History     Marital status:    Tobacco Use     Smoking status: Every Day     Packs/day: 0.30     Years: 35.00     Pack years: 10.50     Types: Cigarettes     Smokeless tobacco: Never     Tobacco comments:     2/18/21 Did not complete full session. Would only answer some questions. 14mg patch and 2mg lozenges ordered   Substance and Sexual Activity     Alcohol use: Yes     Comment: on weekends, 3-4+     Drug use: Not Currently     Types: Marijuana     Comment: occ marijuana for spasms     Sexual activity: Never     FAMILY HISTORY:   Family History   Problem Relation Age of Onset     Cancer Father      Diabetes No family hx of      Glaucoma No  family hx of      Hypertension No family hx of      Macular Degeneration No family hx of      Retinal detachment No family hx of      Anesthesia Reaction No family hx of      Deep Vein Thrombosis (DVT) No family hx of      ALLERGIES:   Allergies   Allergen Reactions     Vancomycin Anaphylaxis     MEDICATIONS:  No current facility-administered medications on file prior to encounter.  acetaminophen (TYLENOL) 325 MG tablet, Take 2 tablets (650 mg) by mouth every 8 hours as needed for mild pain or fever  albuterol (PROVENTIL) (2.5 MG/3ML) 0.083% neb solution, Take 1 vial (2.5 mg) by nebulization every 4 hours as needed for wheezing  amLODIPine (NORVASC) 2.5 MG tablet, Take 1 tablet (2.5 mg) by mouth daily as needed (autonomic dysreflexia)  baclofen (LIORESAL) 20 MG tablet, TAKE ONE TABLET BY MOUTH UP TO 6 TIMES DAILY  bisacodyl (DULCOLAX) 10 MG suppository, Place 20 mg rectally every 2 days.  Active ingredient in Magic Bullet (10mg per suppository) (Patient taking differently: Place 20 mg rectally every 3 days.  Active ingredient in Magic Bullet (10mg per suppository))  clotrimazole-betamethasone (LOTRISONE) 1-0.05 % external cream, Apply topically 2 times daily Apply to itchy earlobe rash till clear  diazepam (VALIUM) 5 MG tablet, TAKE 1 TABLET (5 MG) BY MOUTH EVERY 6 HOURS AS NEEDED FOR ANXIETY  docusate sodium (DOK) 100 MG capsule, TAKE 1 CAPSULE (100 MG) BY MOUTH DAILY  furosemide (LASIX) 20 MG tablet, Take 1 tablet (20 mg) by mouth daily (Patient taking differently: Take 20 mg by mouth daily as needed)  hydrocortisone (CORTAID) 1 % external cream, Apply topically 2 times daily To itchy earlobe  ibuprofen (ADVIL/MOTRIN) 800 MG tablet, Take 1 tablet (800 mg) by mouth 3 times daily as needed for moderate pain  minocycline (MINOCIN) 50 MG capsule, Take 1 capsule (50 mg) by mouth 2 times daily  nystatin (MYCOSTATIN) 713392 UNIT/GM external powder, APPLY TO AFFECTED AREA THREE TIMES DAILY AS NEEDED  oxybutynin ER  (DITROPAN-XL) 10 MG 24 hr tablet, Take 1 tablet (10 mg) by mouth daily  vitamin C (ASCORBIC ACID) 1000 MG TABS, Take 1 tablet (1,000 mg) by mouth daily  Vitamin D3 (CHOLECALCIFEROL) 25 mcg (1000 units) tablet, Take 1 tablet (25 mcg) by mouth daily  zolpidem (AMBIEN) 10 MG tablet, Take 1 tablet (10 mg) by mouth nightly as needed for sleep  Incontinence Supplies (URINARY LEG BAG), USE AS NEEDED  Multiple Vitamins-Minerals (MULTIVITAMIN & MINERAL PO), Take 1 capsule by mouth daily. (Patient not taking: Reported on 11/23/2022)  multivitamin (CENTRUM SILVER) tablet, Take 1 tablet by mouth daily (Patient not taking: Reported on 11/23/2022)  neomycin-polymyxin-hydrocortisone (CORTISPORIN) 3.5-22242-0 otic solution, Place 4 drops in ear(s) 2 times daily (Patient not taking: Reported on 11/23/2022)  senna (SM SENNA LAXATIVE) 8.6 MG tablet, Take 1 tablet by mouth daily (Patient not taking: Reported on 11/23/2022)  sodium chloride 0.9%, bottle, 0.9 % irrigation, USE FOR URINARY CATHETER IRRIGATION        PHYSICAL EXAMINATION:  Temp:  [96.8  F (36  C)-97.4  F (36.3  C)] 97.4  F (36.3  C)  Pulse:  [] 47  Resp:  [15-18] 18  BP: (105-211)/() 167/86  FiO2 (%):  [60 %-100 %] 60 %  SpO2:  [90 %-100 %] 100 %  No intake/output data recorded.  General: Intubated and Sedated  HEENT: ETT in place  Neuro: Intubated and Sedated  Pulm/Resp: Coarse breath sounds bilaterally worse on the right with expiratory wheeze.  CV: Bradycardic rate with normal rhythm   Abdomen: Soft, non-distended, non-tender.  : Amanda catheter in place, urine yellow and clear  Incisions/Skin: Chronic wound on the left arm       LABS: Reviewed.   Arterial Blood Gases   Recent Labs   Lab 11/23/22 2017   PH 7.52*   PCO2 41   PO2 87   HCO3 33*     Complete Blood Count   Recent Labs   Lab 11/23/22  0913   WBC 5.6   HGB 13.2*   *     Basic Metabolic Panel  Recent Labs   Lab 11/23/22  1851 11/23/22  0913   NA  --  136   POTASSIUM  --  4.0   CHLORIDE  --   96*   CO2  --  31*   BUN  --  9.5   CR  --  0.21*  0.21*   * 94     Liver Function Tests  Recent Labs   Lab 11/23/22  0913   AST 17   ALT 7*   ALKPHOS 89   BILITOTAL 0.3   ALBUMIN 3.5     Coagulation Profile  No lab results found in last 7 days.    IMAGING:  Recent Results (from the past 24 hour(s))   XR Chest Port 1 View    Narrative    Portable chest    INDICATION: Cough    COMPARISON: CT 3/19/2021    FINDINGS: Left heart borders obscured by extensive opacification of  the mid to lower left lung. Left hemidiaphragm is silhouetted. Bones  appear osteopenic.      Impression    IMPRESSION: Probable left pleural effusion and extensive left lower  lung atelectasis. Recommend follow-up to exclude central obstruction.  Effusion appears increased from CT of 3/19/2021.    RICO GALLEGO MD         SYSTEM ID:  [id number]   CT Abdomen Pelvis w/o Contrast    Narrative    EXAMINATION: CT ABDOMEN PELVIS W/O CONTRAST, 11/23/2022 10:21 AM    INDICATION: back ache, eval for stone    COMPARISON STUDY: CT, 8/5/2022    TECHNIQUE: CT scan of the abdomen and pelvis was performed on  multidetector CT scanner using volumetric acquisition technique and  images were reconstructed in multiple planes with variable thickness  and reviewed on dedicated workstations.     CONTRAST: Without contrast    CT scan radiation dose is optimized to minimum requisite dose using  automated dose modulation techniques.    FINDINGS:    Lower thorax: Increased right lower lobe atelectasis with volume loss  and small right pleural effusion. Chronic appearing left pleural  effusion with left lower lobe atelectasis.    Liver: Similar appearance of multiple benign hepatic cysts. No  intrahepatic biliary ductal dilation.    Biliary System: Normal gallbladder. No extrahepatic biliary ductal  dilation.    Pancreas: No mass or pancreatic ductal dilation.    Adrenal glands: Similar appearance of bilateral adrenal adenomas.    Spleen: Normal.    Kidneys:  Similar appearance of nonobstructing left renal calculi  measuring up to 4.6 mm. No suspicious mass, obstructing calculus or  hydronephrosis.    Gastrointestinal tract : The appendix is not definitively identified.  No secondary signs of acute appendicitis. Normal caliber small bowel.   Colonic diverticulosis without evidence of acute diverticulitis.    Mesentery/peritoneum/retroperitoneum: No mass. No free fluid or air.    Lymph nodes: No bulky lymphadenopathy.    Vasculature: Patent major abdominal vasculature.    Pelvis: Urinary bladder is decompressed with suprapubic catheter in  place. Similar appearance of soft tissue density along the suprapubic  catheter tract.    Osseous structures: No aggressive or acute osseous lesion.  Bones  appear osteopenic.      Soft tissues: Within normal limits.      Impression    IMPRESSION:   1. Similar appearance of nonobstructing left renal calculus measuring  up to 4.6 mm. No hydronephrosis or hydroureter. Decompressed urinary  bladder with suprapubic catheter in place.  2. Increased right lower lobe atelectasis with small right pleural  effusion. Chronic appearing left pleural effusion with left lower lobe  atelectasis.    I have personally reviewed the examination and initial interpretation  and I agree with the findings.    MARK VALLES MD         SYSTEM ID:  [id number]   XR Chest Port 1 View    Impression    RESIDENT PRELIMINARY INTERPRETATION  Impression: Endotracheal tube in the midthoracic trachea. Partially  visualized pulmonary opacities in this limited study.   XR Abdomen Port 1 View    Impression    RESIDENT PRELIMINARY INTERPRETATION  Impression: Gastric tube with tip and sidehole in the stomach.  Nonobstructive bowel gas pattern

## 2022-11-24 NOTE — PROGRESS NOTES
Major Shift Events:  Opens eyes to voice/spontaneously. Following commands. Quadriplegic. RUE w/ slight contraction. PERRL. Requiring PRN fentanyl for pain. Propofol gtt weaned to 20 mcg/kg/min. Bradycardic 40-50s. /30%/10/8. No cough reflex per baseline. Suprapubic cath in place, oliguric. No BM.   Plan: TTE, diagnostic thoracentesis today  For vital signs and complete assessments, please see documentation flowsheets.

## 2022-11-24 NOTE — PROGRESS NOTES
MEDICAL ICU PROGRESS NOTE  11/24/2022      Date of Service (when I saw the patient): 11/24/2022    ASSESSMENT: Bart Corea is a 62 year old male with a history notable for traumatic quadriplegia C1-C4, asymptomatic Bradycardia (40's- 50's), neurogenic bladder with suprapubic catheter in place, and LIZA who presents to the ED for evaluation of generalized weakness, edema and constipation. Code Blue called ~5PM due to hypoxia after respiratory was unable to perform an assisted cough, from report pt appear not to have gone into cardiac arrest (briefly had CPR performed) but rather intubated due to hypoxic respiratory failure.       CHANGES and MAJOR THINGS TODAY:   - SBT and PS trial today- 5/5 plan for extubation if able  - Plan for thoracocentesis of left pleural effusion  - Discontinue Ceftriaxone and Azithromycin  - Repeat VBG  - Restart PTA baclofen       PLAN:     Neuro:  # Pain and sedation  Fentanyl 25-50mcg q2hrs PRN for pain.   Propofol gtt for sedation  - RASS goal 0 to -1     # Hx of Traumatic Quadriplegia C1-C4  # Autonomic Dysreflexia  - Holding Amlodipine 2.5 mg  - Monitor VS & symptoms  - Restart PTA Baclofen    Pulmonary:  # Acute Hypoxic Respiratory Failure likely 2/2 mucous plugging  Initially concerned for CAP vs Viral vs Aspiration PNA though laboratory workup up so far is unrevealing. Currently on minimal vent settings with possibility of extubation. Hypoxic arrest likely related to mucous plugging as pt is unable to clear airways at baseline due to C1-C4 quadriplegia. Cardiac work up for hypoxia was unremarkable.  - Intubated and sedated goal O2>94%  - Respiratory Panel, Sputum cx  - Procal 0.09  - Discontinue abx  - SBT on 5/5 PS, plan for extubation today or tomorrow     # Asthma  - Albuterol neb Q 4 hr PRN  - Monitor      # LIZA  - Monitor  - CPAP at night     Cardiovascular:  # HTN  - Holding PTA amlodipine     GI/Nutrition:  # Chronic Constipation  Jc has constipation, he is taking  Sodium docusate daily and Senna every 3 days, last time took Senna was yesterday. Last BM was 9 days ago.   - Docusate sodium (COLACE) capsule 200 mg BID  - Miralax cap 100 mg BID  - Consider enema if oral medications or rectal suppository didn't work by tomorrow.     # Nutrition  - Nutrition evaluation     Renal/Fluids/Electrolytes:  # UTI  # Hx of chronic UTI's on Minocycline suppression  Hx of Neurogenic bladder & recurrent stones with stent placement and removal, he has urinary catheter & recurrent UTI with resistant organism  - Continue PTA Minocyclin   - UA & CULTURE with nitritis and leukocytes, pending Urine culture  - Monitor for fever, pain  - KUB U/S- WNL     Endocrine:  No active concerns     ID:  # C/f CAP vs Viral PNA vs Aspiration PNA  # C/f underlying infxn  Initially concerned for CAP vs Viral vs Aspiration PNA though laboratory workup up so far is unrevealing. Currently on minimal vent settings with possibility of extubation. Hypoxic arrest likely related to mucous plugging rather than PNA as pt is unable to clear airways at baseline due to C1-C4 quadriplegia. Cardiac work up for hypoxia was unremarkable. Infectious workup so far unremarkable.   - Intubated and sedated goal O2>94%  - Respiratory Panel, Sputum cx  - Procal Negative     Abx:  Ceftriaxone (11/23--> 11/24)  Azithromycin (500mg 11/23, 250mg --> 11/24)  Minocycline (prophylactic)     11/23 Bcx: Pending  11/23 Sputum Cx: Pending  11/23 UA: pending  11/23 Ucx: pending     Hematology:    # Anemia   Hgb minimally low at 13.2 at baseline which appears to be around between 12-13.   - Monitor     # Thrombocytopenia  Likely reactive 2/2 acute illness.   - CTM     Musculoskeletal:  # Quadriplegia/Deconditioning  - PT/OT consult     Skin:  # Bilateral upper extremity hyperpigmentation  Possibly related to minocycline use  - Monitor for now     # Chronic wound on the left arm  - Wound consult     General Cares/Prophylaxis:    Fluids:  "None  Nutrition: NPO  DVT Prophylaxis: Heparin SQ  GI Prophylaxis: PPI  Restraints: None  Family Communication: Wife and daughter updated at bedside  Code Status: Full Code     Lines/tubes/drains:        - PIV x2  NG tube  Suprapubic catheter     Disposition:  - Medical ICU      Patient seen and findings/plan discussed with medical ICU staff, Dr. Dorie Vergara.     Edwar Gallegos MD  Internal Medicine Resident, PGY-1  Pager: [phone]             # Obesity: Estimated body mass index is 31.68 kg/m  as calculated from the following:    Height as of this encounter: 1.727 m (5' 7.99\").    Weight as of this encounter: 94.5 kg (208 lb 5.4 oz).        ====================================  INTERVAL HISTORY:     Overnight: No acute events reported    Today: Feeling better but having some back pain. Otherwise more alert and oriented to self, place, and time.    OBJECTIVE:   1. VITAL SIGNS:   Temp:  [96.8  F (36  C)-100.3  F (37.9  C)] 98.6  F (37  C)  Pulse:  [41-87] 52  Resp:  [10-18] 18  BP: (102-211)/() 118/57  FiO2 (%):  [30 %-100 %] 30 %  SpO2:  [90 %-100 %] 96 %  Vent Mode: CPAP/PS  (Continuous positive airway pressure with Pressure Support)  FiO2 (%): 30 %  Resp Rate (Set): 10 breaths/min  Tidal Volume (Set, mL): 450 mL  PEEP (cm H2O): 5 cmH2O  Pressure Support (cm H2O): 5 cmH2O  Resp: 18    2. INTAKE/ OUTPUT:   I/O last 3 completed shifts:  In: 886.92 [I.V.:346.92; NG/GT:540]  Out: 1470 [Urine:1470]    3. PHYSICAL EXAMINATION:  General: Intubated and Sedated  HEENT: ETT in place  Neuro: Intubated and Sedated  Pulm/Resp: Coarse breath sounds bilaterally worse on the right with expiratory wheeze.  CV: Bradycardic rate with normal rhythm   Abdomen: Soft, non-distended, non-tender.  : Amanda catheter in place, urine yellow and clear  Incisions/Skin: Chronic wound on the left arm    4. LABS:   Arterial Blood Gases   Recent Labs   Lab 11/23/22 2017   PH 7.52*   PCO2 41   PO2 87   HCO3 33*     Complete Blood " Count   Recent Labs   Lab 11/24/22  0430 11/23/22  0913   WBC 5.5 5.6   HGB 12.0* 13.2*   * 136*     Basic Metabolic Panel  Recent Labs   Lab 11/24/22  1512 11/24/22  1225 11/24/22  1124 11/24/22  0838 11/24/22  0759 11/24/22  0625 11/23/22  1851 11/23/22  0913   NA  --   --   --   --   --  132*  --  136   POTASSIUM  --   --  3.9  --   --  5.7*  --  4.0   CHLORIDE  --   --   --   --   --  97*  --  96*   CO2  --   --   --   --   --  22  --  31*   BUN  --   --   --   --   --  11.2  --  9.5   CR  --   --   --   --   --  0.20*  --  0.21*  0.21*  0.21*   GLC 74 87  --  111* 76 81   < > 94    < > = values in this interval not displayed.     Liver Function Tests  Recent Labs   Lab 11/24/22  0625 11/23/22  0913   AST 26 17   ALT 13 7*   ALKPHOS 76 89   BILITOTAL 0.2 0.3   ALBUMIN 3.1* 3.5     Coagulation Profile  No lab results found in last 7 days.    5. RADIOLOGY:   Recent Results (from the past 24 hour(s))   XR Chest Port 1 View    Narrative    Exam: XR CHEST PORT 1 VIEW, 11/23/2022 5:46 PM    Indication: Endotracheal intubation    Comparison: Same day radiograph    Findings:   Single portable AP view of the upper chest for purposes of  endotracheal tube position check. The endotracheal tube is in the mid  thoracic trachea approximately 3.6 cm from the michelle. Partial  visualized enteric tube. Patient is rotated to the right. Partially  visualized left pulmonary opacities. No visualization of the  diaphragm. No definite pneumothorax.      Impression    Impression: Endotracheal tube in the midthoracic trachea. Partially  visualized unchanged pulmonary opacities in this limited study.  Improved aeration of the left lower lobe.    I have personally reviewed the examination and initial interpretation  and I agree with the findings.    BONITA SALCIDO MD         SYSTEM ID:  [id number]   XR Abdomen Port 1 View    Narrative    Exam: XR ABDOMEN PORT 1 VIEW, 11/23/2022 5:48 PM    Indication: OG placement     Comparison:  11/23/2022    Findings:   Single portable AP view of the upper abdomen for purposes of gastric  tube position check. Gastric tube with the tip and sidehole in the  stomach. Nonobstructive bowel gas pattern. Supine view is suboptimal  for free air evaluation. Refer to same day chest radiograph for chest  findings.      Impression    Impression: Gastric tube with tip and sidehole in the stomach.  Nonobstructive bowel gas pattern    I have personally reviewed the examination and initial interpretation  and I agree with the findings.    BONITA SALCIDO MD         SYSTEM ID:  [id number]

## 2022-11-24 NOTE — CONSULTS
Consult placed for thoracentesis bedside, per EMR review patient in MICU and intubated. Defer need for thora to MICU expertise. See their note.     -- no thora today from CAPS team  -- CAPS team happy to help attempt bedside thora if needed, do not hesitate to place a consult into [facility] and we will assess within 24 hrs  -- Note for paracentesis and/or thoracentesis in patients with known liver disease we recommend goal platelets >20 fibrinogen >100.      Gerry Pruitt MD    [phone]

## 2022-11-25 NOTE — CONSULTS
Urology Consult History and Physical    Name: Bart Corea    MRN: [id number]   YOB: 1960       We were asked to see Bart Corea at the request of Dr. Vanegas for evaluation and treatment of SPT exchange.        Chief Complaint:   SPT exchange    History is obtained from the provider and chart review          History of Present Illness:   Bart Corea is a 62 year old male with a history notable for traumatic quadriplegia C1-C4, asymptomatic Bradycardia (40's- 50's), neurogenic bladder with suprapubic catheter in place, and LIZA, who presented to the ED for evaluation of generalized weakness, edema and constipation. Code Blue called ~5PM on 11/23 due to hypoxic respiratory failure while in the ED pt was subsequently intubated. Patient has been extubated today. Urology consulted for suprapubic tube exchange.     Per chart review, patient has had a suprapubic tube for >15 years. Has tube exchanged every 2 months, last exchange in mid-September 2022. Per primary team, no nursing staff on MICU has experience changing SPT and family has requested exchange per urology. Prior urologic history of kidney and bladder stones, as well as recurrent UTI's with resistant organisms. No current UTI sx, no ongoing concern for active UTI. WBC WNL, Cr baseline. Being treated for pneumonia with ceftriaxone and azithromycin. Plan to undergo diagnostic thoracentesis today.          Past Medical History:     Past Medical History:   Diagnosis Date     Asthma      Chronic infection     Bladder     Heart murmur      LIZA (obstructive sleep apnea) 12/3/2014     Quadriplegia, C1-C4 incomplete (H)             Past Surgical History:     Past Surgical History:   Procedure Laterality Date     Bilateral ureteroscopy with holmium laser lithotripsy and basketing and removal of fragments  Left ureteral stent placement.  02/10/2010     COLONOSCOPY N/A 07/20/2018    Procedure: COMBINED COLONOSCOPY, SINGLE OR MULTIPLE  BIOPSY/POLYPECTOMY BY BIOPSY;;  Surgeon: Grace Yang MD;  Location: [facility]     COMBINED CYSTOSCOPY, INSERT STENT URETER(S) Right 02/08/2021    Procedure: CYSTOSCOPY, WITH URETERAL STENT INSERTION;  Surgeon: Javier Barrientos MD;  Location: [facility]     COMBINED CYSTOSCOPY, RETROGRADES, URETEROSCOPY, LASER HOLMIUM LITHOTRIPSY URETER(S), INSERT STENT Right 2/19/2021    Procedure: CYSTOURETEROSCOPY, WITH RETROGRADE PYELOGRAM, HOLMIUM LASER LITHOTRIPSY AND STENT EXCHANGE, SUPRAPUBIC CATHETER EXCHANGE;  Surgeon: Bart Hebert MD;  Location: [facility]     CYSTOSCOPY, LITHOLAPAXY, COMBINED N/A 11/15/2019    Procedure: Cystolitholapaxy;  Surgeon: Obi Uriostegui MD;  Location[facility]     INCISION AND DRAINAGE ABDOMEN WASHOUT, COMBINED N/A 01/16/2020    Procedure: Incision and drainage abdomenal Wall with Revision Of Vesicocutaneous Anastomosis;  Surgeon: Obi Uriostegui MD;  Location: [facility]     LASER HOLMIUM LITHOTRIPSY BLADDER N/A 01/16/2020    Procedure: LITHOTRIPSY, CALCULUS, BLADDER, USING HOLMIUM LASER;  Surgeon: Obi Uriostegui MD;  Location: [facility]     PICC SINGLE LUMEN PLACEMENT Left 02/14/2021    4Fr - 47cm (2cm external), Lateral brachial vein, SVC RA junction     skin flap on bottom       sp tube absess surgery              Social History:     Social History     Tobacco Use     Smoking status: Every Day     Packs/day: 0.30     Years: 35.00     Pack years: 10.50     Types: Cigarettes     Smokeless tobacco: Never     Tobacco comments:     2/18/21 Did not complete full session. Would only answer some questions. 14mg patch and 2mg lozenges ordered   Substance Use Topics     Alcohol use: Yes     Comment: on weekends, 3-4+            Family History:     Family History   Problem Relation Age of Onset     Cancer Father      Diabetes No family hx of      Glaucoma No family hx of      Hypertension No family hx of      Macular Degeneration No family hx of      Retinal detachment No family hx of       Anesthesia Reaction No family hx of      Deep Vein Thrombosis (DVT) No family hx of               Allergies:     Allergies   Allergen Reactions     Vancomycin Anaphylaxis            Medications:     Current Facility-Administered Medications   Medication     acetaminophen (TYLENOL) tablet 650 mg     albuterol (PROVENTIL) neb solution 2.5 mg     amLODIPine (NORVASC) tablet 2.5 mg     amLODIPine (NORVASC) tablet 2.5 mg     azithromycin (ZITHROMAX) 500 mg in sodium chloride 0.9 % 250 mL intermittent infusion     baclofen (LIORESAL) tablet 20 mg     baclofen (LIORESAL) tablet 20 mg     bisacodyl (DULCOLAX) suppository 10 mg     carboxymethylcellulose PF (REFRESH PLUS) 0.5 % ophthalmic solution 1 drop     cefTRIAXone (ROCEPHIN) 1 g vial to attach to  mL bag for ADULTS or NS 50 mL bag for PEDS     dextrose 10% infusion     glucose gel 15-30 g    Or     dextrose 50 % injection 25-50 mL    Or     glucagon injection 1 mg     diazepam (VALIUM) tablet 5 mg     docusate (COLACE) 50 MG/5ML liquid 200 mg     enoxaparin ANTICOAGULANT (LOVENOX) injection 40 mg     fentaNYL (PF) (SUBLIMAZE) injection 25-50 mcg     furosemide (LASIX) tablet 20 mg     lidocaine (LMX4) cream     lidocaine 1 % 0.1-1 mL     propofol (DIPRIVAN) infusion    And     propofol (DIPRIVAN) bolus from infusion pump 10 mg    And     Medication Instruction     melatonin tablet 1 mg     minocycline (MINOCIN) capsule 50 mg     naloxone (NARCAN) injection 0.2 mg    Or     naloxone (NARCAN) injection 0.4 mg    Or     naloxone (NARCAN) injection 0.2 mg    Or     naloxone (NARCAN) injection 0.4 mg     oxybutynin (DITROPAN) tablet 5 mg     oxyCODONE (ROXICODONE) tablet 5 mg     pantoprazole (PROTONIX) 2 mg/mL suspension 40 mg    Or     pantoprazole (PROTONIX) IV push injection 40 mg     polyethylene glycol (MIRALAX) Packet 17 g     sodium chloride (PF) 0.9% PF flush 3 mL     sodium chloride (PF) 0.9% PF flush 3 mL     zolpidem (AMBIEN) tablet 10 mg              "Review of Systems:    ROS: 10 point ROS neg other than the symptoms noted above in the HPI.          Physical Exam:     Patient Vitals for the past 24 hrs:   BP Temp Temp src Pulse Resp SpO2 Height Weight   11/25/22 1200 (!) 141/68 99.9  F (37.7  C) Axillary 54 (!) 32 97 % -- --   11/25/22 1100 132/62 -- -- (!) 49 15 96 % -- --   11/25/22 1000 (!) 145/67 -- -- 53 14 96 % -- --   11/25/22 0900 135/62 -- -- 59 15 95 % -- --   11/25/22 0800 125/60 99.8  F (37.7  C) Axillary 59 17 95 % -- --   11/25/22 0738 -- -- -- 57 -- 97 % -- --   11/25/22 0700 106/53 -- -- (!) 47 13 95 % -- --   11/25/22 0600 125/64 -- -- (!) 47 14 95 % -- --   11/25/22 0517 115/55 -- -- (!) 48 16 94 % -- --   11/25/22 0500 (!) 87/43 -- -- (!) 46 17 93 % -- --   11/25/22 0400 115/59 99  F (37.2  C) Axillary (!) 47 10 96 % 1.727 m (5' 7.99\") 97 kg (213 lb 13.5 oz)   11/25/22 0300 119/60 -- -- (!) 47 10 96 % -- --   11/25/22 0200 103/53 99.5  F (37.5  C) -- (!) 48 10 96 % -- --   11/25/22 0100 121/60 -- -- (!) 48 20 96 % -- --   11/25/22 0000 (!) 152/67 (!) 102  F (38.9  C) Axillary 51 14 97 % -- --   11/24/22 2200 132/64 -- -- 56 11 95 % -- --   11/24/22 2100 110/48 -- -- (!) 46 10 94 % -- --   11/24/22 2045 -- -- -- -- -- 94 % -- --   11/24/22 2000 131/63 100.4  F (38  C) Axillary 56 10 96 % -- --   11/24/22 1900 134/67 -- -- 54 10 97 % -- --   11/24/22 1800 119/68 -- -- 54 10 97 % -- --   11/24/22 1700 120/56 -- -- (!) 47 10 96 % -- --   11/24/22 1605 106/47 -- -- 52 -- 95 % -- --   11/24/22 1600 (!) 87/48 98.8  F (37.1  C) Axillary (!) 44 10 94 % -- --   11/24/22 1500 120/56 -- -- 59 10 96 % -- --   11/24/22 1400 107/58 -- -- (!) 49 16 96 % -- --   11/24/22 1300 118/57 -- -- 52 17 96 % -- --     General: age-appropriate appearing male in NAD, awake and responsive to questions but groggy  HEENT: Head AT/NC, EOMI, CN Grossly intact  Lungs: oxymask in place, non-labored breathing   Heart: No obvious jugular venous distension present.  Abdomen: " suprapubic tube in place, draining clear yellow urine   : normal male external genitalia  Musculoskeltal: extremities normal, no peripheral edema  Skin: no suspicious lesions or rashes  Neuro: Alert and responding to yes or no questions           Data:   All laboratory data reviewed:    Recent Labs   Lab 11/25/22  0632 11/24/22  0430 11/23/22  0913   WBC 5.8 5.5 5.6   HGB 12.0* 12.0* 13.2*   * 137* 136*     Recent Labs   Lab 11/25/22  1036 11/25/22  0830 11/25/22  0632 11/25/22  0630 11/24/22  1225 11/24/22  1124 11/24/22  0759 11/24/22  0625 11/23/22  1851 11/23/22  0913   NA  --   --  135*  --   --   --   --  132*  --  136   POTASSIUM  --   --  4.1  --   --  3.9  --  5.7*  --  4.0   CHLORIDE  --   --  95*  --   --   --   --  97*  --  96*   CO2  --   --  28  --   --   --   --  22  --  31*   BUN  --   --  9.2  --   --   --   --  11.2  --  9.5   CR  --   --  0.21*  --   --   --   --  0.20*  --  0.21*  0.21*  0.21*   GLC 92 76 82 81   < >  --    < > 81   < > 94   LIU  --   --  9.0  --   --   --   --  8.6*  --  9.0   MAG  --   --  1.9  --   --  1.8  --   --   --   --    PHOS  --   --  4.1  --   --  3.5  --   --   --   --     < > = values in this interval not displayed.     Recent Labs   Lab 11/23/22 2037   COLOR Yellow   APPEARANCE Slightly Cloudy*   URINEGLC Negative   URINEBILI Negative   URINEKETONE 40*   SG 1.020   URINEPH 8.0*   PROTEIN 30*   NITRITE Positive*   LEUKEST Moderate*   RBCU 8*   WBCU 44*     All pertinent imaging reviewed.         Impression and Plan:   Impression:   62yoM with hx of quadriplegia, with suprapubic tube in place >15 years. Admitted for hypoxic respiratory failure secondary to PNA. Suprapubic tube exchanged (see procedure note below). Of note, primary team requested SPT exchange on the basis of positive UA and prior positive urine cultures. Discussed that with SPT in place, patient will always have positive urine cultures as he is likely colonized, and unless there is ongoing  concern for active urinary tract infection, positive UCx is not an indication for SPT exchange.       Procedure:   Verbal consent obtained prior to starting procedure. Patient prepped and draped in sterile fashion. Prior SPT (16Fr non-latex catheter) removed after withdrawing 10cc from balloon. New 16Fr non-latex catheter inserted into suprapubic tract with minimal resistance, with return of clear yellow urine, and balloon filled with 10cc sterile water. Catheter was mobile in tract. Patient tolerated the procedure well.   Plan:   - next SPT exchange in 8 weeks, per previously established schedule  - suprapubic tract is well-established, and the exchange can be performed by bedside RN or home nursing if patient has been discharged  - urology to sign off; please call with any questions or concerns        Denisse Hayward MD  Urology Resident, PGY-2

## 2022-11-25 NOTE — PROGRESS NOTES
Transferred to:  at 2245  Belongings: with patient  Amanda removed? N/A, suprapubic cath in place  Central line removed? N/A  Chart and medications sent with patient: yes  Family notified: Wife Sarah notified

## 2022-11-25 NOTE — PROCEDURES
[facility]  CAPS NOTE  Date of Admission:  11/23/2022  Consult Requested by: ICU  Reason for Consult: diagnostic evaluation of pleural effusion and management of symptomatic pleural effusion    POC US GUIDE FOR THORACENTESIS     Impression  Limited Bedside Lung Ultrasound, performed and interpreted by me.  Indication: Dyspnea, Acute Hypoxic Respiratory Failure and Pleural Effusion    With the patient positioned left lateral decubitus LEFT lung fields were examined for evidence of thoracic free fluid, pulmonary consolidation, and pulmonary edema.    Findings:    Left Athena: no B-lines, no  pleural fluid pocket, normal lung sliding  Left Mid: no B-lines, small  pleural fluid pocket, normal lung sliding  Left Lower: no B-lines, small  pleural fluid pocket, normal lung sliding, hypomobile diaphragm  Left Flank: no B-lines, small  pleural fluid pocket, normal lung sliding, hypomobile diaphragm    IMPRESSION:  Notable LEFT LUNG with no B-lines, small  pleural fluid pocket per above    Gerry Pruitt MD  11/25/2022      History of Present Illness:   61 yo M with PMH of quadralplegia, admitted with SOB and AHRF now s/p extubation to bipap  Feeling SOB but better  No pain  No orthopnea   No FCS    Review of Systems:  Per above    Physical Exam:  Vital Signs: Temp: 99.9  F (37.7  C) Temp src: Axillary BP: 122/60 Pulse: 60   Resp: 19 SpO2: 98 % O2 Device: (S) BiPAP/CPAP    Weight: 213 lbs 13.54 oz  General Appearance: Frail appearing man lying up in bed NAD  Respiratory: comfortable WOB on NPPV  MSK:  Sarcopenia, UE and LE atrophy,  +quadraplegia    Assessment and Plan:  :: Requested procedure was not performed.  Insufficient fluid for safe bedside thoracentesis.  :: could consider IR consult for dgx thora or if change in size of fluid pocket/effusion re-consult CAPS for re-eval     Gerry Pruitt MD  [facility]  Securely message  with the [employer] Web Console (learn more here)  Text page via [facility] Paging/Directory   Please see signed in provider for up to date coverage information

## 2022-11-25 NOTE — PLAN OF CARE
Neuro: Intubated. Frustrated. Mouths responses to assessment questions. A&Ox4. Quadriplegic at baseline. Withdraws in RUE.  Cardiac: Sinus ronald. SBPs 110s-150s. Tmax 102F downtrending.  Respiratory: Intubated. CMV RR 10/FiO2 30%//PEEP 5  GI/: Low normal UOP via PTA suprapubic catheter. No BM this shift.  Diet/appetite: NPO  Activity:  Quadriplegic. In bed this shift.  Pain: PRN Fentanylx1 for back pain with relief.  Skin: L arm abscess. WOC consulted. Dusky toes BLE.  LDA's: Intubated. Suprapubic catheter. 2x PIV LUE.    Plan: Pressure support in AM. Continue with POC. Notify primary team with changes.   Goal Outcome Evaluation:

## 2022-11-25 NOTE — PLAN OF CARE
ICU End of Shift Summary. See flowsheets for vital signs and detailed assessment.    Changes this shift: Tmax: 99.9. A&Ox4. Follows commands. Quadriplegic at baseline. SR-SB 40s-60s. BP stable, slightly hypertensive. Extubated @ 1205 to BIPAP 40%. Then transitioned to 3L NC w/ BIPAP @ night. Assessed for possible thoracentesis, not enough fluid to do bedside. Passed bedside swallow study. D10 drip stopped since patient is eating now. Tolerated clear and full liquid diets, advanced to regular diet. Suprapubic catheter replaced by urology for UA/UC.     Plan: BIPAP overnight. Possible transfer to the floor tomorrow.     Goal Outcome Evaluation:      Plan of Care Reviewed With: patient, spouse    Overall Patient Progress: improvingOverall Patient Progress: improving    Outcome Evaluation: Extubated @ 1205, on 3L NC w/ BIPAP @ night. Suprapubic cath replaced. Started on clear liquid diet. Tolerated well.

## 2022-11-25 NOTE — SIGNIFICANT EVENT
SPIRITUAL HEALTH SERVICES Significant Event  Scientologist Sacrament of ANOINTING  [facility] ([place] 4e    Pt anointed by Father Gonsalo Solis   Pager [phone]

## 2022-11-25 NOTE — PROGRESS NOTES
ICU Daily Rounding Checklist     Checklist Response Notes   Can sedation be reduced?  Yes    Can analgesia be reduced? Yes    Is delirium being assessed, addressed and prevented? Yes    Spontaneous awakening trial and/or Spontaneous breathing trial candidate?  Yes    Total fluid balance goal reviewed?  Yes Target Goals:        I=O [12h]             I=O [24h]   Is the patient at goals for lung protective ventilation? Yes    Head of bed elevation (30 degrees)? Yes    Skin breakdown assessment (prevention) completed? Yes    Is enteral nutrition at goal? Yes    Is blood glucose at goal? No    Deep venous thrombosis prophylaxis? Yes      Gastric ulcer prophylaxis?  Yes If coagulopathy (INR-1.5 PTT2x normal. Ph<50k), mechanical ventilation 48hr, history of GI bleed/ulcer within past year. TBL, SCI, or burn, or if >= 2 minor risk factors (sepsis, ICU stay 1 week, occult GI bleed > 6 days. glucocorticoid therapy, NSAID use, antiplatelet use)   Can Antibiotics be narrowed or discontinued? No    Early mobility candidate and physical therapy consulted? Yes    Is gastelum catheter needed? Yes Suprapubic catheter   Is central venous/arterial catheter needed? NA    Has the family been updated? Yes    Are the patient's goals of care and code status current? Yes

## 2022-11-25 NOTE — PLAN OF CARE
Admitted/transferred from:   Reason for admission/transfer: Lateral Tx 2/2 stroke admit  2 RN skin assessment: completed by Calderon RN, Viviana PRIETO RN  Result of skin assessment and interventions/actions: Previously documented LUE abscess, preventative mepilex  Height, weight, drug calc weight: Done  Patient belongings (see Flowsheet)  MDRO education added to care planYes  ?

## 2022-11-25 NOTE — PROGRESS NOTES
Pt Suctioned and extubated to BiPAP per physician order. Cuff leak was not noted prior to extubation and team is aware of that. Fellow MD was present during extubation. Pt tolerated the procedure well with no immediate complications. Labs pending.    BiPAP settings; IPAP 12, EPAP 6, RR 16, FiO2 40%. Sat 99%    RT will continue to monitor pt.    Ruba Nguyen, RT

## 2022-11-25 NOTE — PROGRESS NOTES
MEDICAL ICU PROGRESS NOTE  11/25/2022      Date of Service (when I saw the patient): 11/25/2022    ASSESSMENT: Bart Corea is a 62 year old male with a history notable for traumatic quadriplegia C1-C4, asymptomatic Bradycardia (40's- 50's), neurogenic bladder with suprapubic catheter in place, and LIZA who presents to the ED for evaluation of generalized weakness, edema and constipation. Code Blue called ~5PM on 11/23 due to hypoxic respiratory failure while in the ED pt was subsequently intubated.       CHANGES and MAJOR THINGS TODAY:   - SBT and PS trial today- 5/5 plan for extubation if able  - Plan for thoracocentesis of left pleural effusion  - Restart Ceftriaxone and Azithromycin  - Repeat VBG  - Restart PTA amlodipine  - Speech consult  - UA with urine culture  - Suprapubic catheter exchange by Urology       PLAN:     Neuro:  # Pain and sedation  Oxycodone 5mg q6hr for pain  Discontinue Propofol gtt for sedation  - RASS goal 0 to -1     # Hx of Traumatic Quadriplegia C1-C4  # Autonomic Dysreflexia  - Restart Amlodipine 2.5 mg  - Monitor VS & symptoms  - Restart PTA Baclofen    Pulmonary:  # Acute Hypoxic Respiratory Failure likely 2/2 mucous plugging- Improved  # Hypoventilation Syndrome likely 2/2 spinal chord injury  # c/f Superimposed CAP  Initially concerned for CAP vs Viral vs Aspiration PNA though laboratory workup up so far is unrevealing. Currently on minimal vent settings with possibility of extubation. Hypoxic arrest likely related to mucous plugging as pt is unable to clear airways at baseline due to C1-C4 quadriplegia. Cardiac work up for hypoxia was unremarkable. Pt had a fever overnight for which he received Tylenol.   - Intubated and sedated goal O2>94%  - Respiratory Panel, Sputum cx  - Procal 0.09  - Restart Abx as below  - SBT on 5/5 PS, plan for extubation today or tomorrow    # Chronic LL pleural effusion  # Small RL pleural effusion  - Thoracocentesis with laboratory work up     #  Asthma  - Albuterol neb Q 4 hr PRN  - Monitor      # LIZA  - Monitor  - CPAP at night     Cardiovascular:  # HTN  - Holding PTA amlodipine     GI/Nutrition:  # Chronic Constipation  Jc has constipation, he is taking Sodium docusate daily and Senna every 3 days, last time took Senna was yesterday. Last BM was 9 days ago.   - Docusate sodium (COLACE) capsule 200 mg BID  - Miralax cap 100 mg BID  - Consider enema if oral medications or rectal suppository didn't work by tomorrow.     # Nutrition  - Nutrition evaluation     Renal/Fluids/Electrolytes:  # UTI  # Hx of chronic UTI's on Minocycline suppression  Hx of Neurogenic bladder & recurrent stones with stent placement and removal, he has urinary catheter & recurrent UTI with resistant organism  - Continue PTA Minocyclin   - UA & CULTURE with nitritis and leukocytes, pending Urine culture  - Monitor for fever, pain  - KUB U/S- WNL  - Suprapubic catheter exchange- Appreciate Urology's assistance  - Repeat UA and Urine Cx     Endocrine:  # Hypoglycemia likely nutritional  Pt was placed NPO after intubation on 11/23, had plans to be extubated in the evening of 11/24. No current diet order or NG. Will plan to extubate today, if unable will place NJ and start tube feeds.     ID:  # C/f CAP vs Viral PNA vs Aspiration PNA  # C/f underlying infxn  Initially concerned for CAP vs Viral vs Aspiration PNA though laboratory workup up so far is unrevealing. Currently on minimal vent settings with possibility of extubation. Hypoxic arrest likely related to mucous plugging rather than PNA as pt is unable to clear airways at baseline due to C1-C4 quadriplegia. Cardiac work up for hypoxia was unremarkable. Infectious workup so far unremarkable though fever overnight concerning after abx were stopped yesterday.  - Intubated and sedated goal O2>94%  - Respiratory Panel negative, Sputum cx NGTD  - Procal Negative  - Restart Ceftriaxone and Azithromycin     Abx:  Ceftriaxone (11/23-->  "11/24, 11/25--> current)  Azithromycin (500mg 11/23, 250mg --> 11/24, 11/25--> current)  Minocycline (prophylactic)     11/23 Bcx: NGTD  11/23 Sputum Cx: NGTD  11/23 UA: Nitrites and Leuks  11/23 Ucx: P. Aeruginosa      Hematology:    # Anemia   Hgb minimally low at 12.0 at baseline which appears to be around between 12-13.   - Monitor     # Thrombocytopenia  Likely reactive 2/2 acute illness.   - CTM     Musculoskeletal:  # Quadriplegia/Deconditioning  - PT/OT consult     Skin:  # Bilateral upper extremity hyperpigmentation  Possibly related to minocycline use  - Monitor for now     # Chronic wound on the left arm  - Wound consult     General Cares/Prophylaxis:    Fluids: D10W 20cc/hr  Nutrition: NPO, pending swallow eval  DVT Prophylaxis: Heparin SQ  GI Prophylaxis: PPI  Restraints: None  Family Communication: Wife and daughter updated at bedside  Code Status: Full Code     Lines/tubes/drains:        - PIV x2  - NG tube  - Suprapubic catheter     Disposition:  - Medicine floor     Patient seen and findings/plan discussed with medical ICU staff, Dr. Dorie Vergara.     Edwar Gallegos MD  Internal Medicine Resident, PGY-1  Pager: [phone]             # Obesity: Estimated body mass index is 32.52 kg/m  as calculated from the following:    Height as of this encounter: 1.727 m (5' 7.99\").    Weight as of this encounter: 97 kg (213 lb 13.5 oz).        ====================================  INTERVAL HISTORY:     Overnight: No acute events reported    Today: Feeling better but having some back pain. Otherwise more alert and oriented to self, place, and time. Doing well on PST.    OBJECTIVE:   1. VITAL SIGNS:   Temp:  [98.6  F (37  C)-102  F (38.9  C)] 99.8  F (37.7  C)  Pulse:  [44-59] 49  Resp:  [10-20] 15  BP: ()/(43-68) 132/62  Cuff Mean (mmHg):  [97] 97  FiO2 (%):  [30 %] 30 %  SpO2:  [93 %-97 %] 96 %  Vent Mode: (S) CPAP/PS  (Continuous positive airway pressure with Pressure Support)  FiO2 (%): 30 " %  Resp Rate (Set): 10 breaths/min  Tidal Volume (Set, mL): 450 mL  PEEP (cm H2O): (S) 5 cmH2O  Pressure Support (cm H2O): 5 cmH2O  Resp: 15    2. INTAKE/ OUTPUT:   I/O last 3 completed shifts:  In: 912.14 [I.V.:227.14; NG/GT:685]  Out: 1820 [Urine:1820]    3. PHYSICAL EXAMINATION:  General: Intubated and Sedated  HEENT: ETT in place  Neuro: Intubated and Sedated  Pulm/Resp: Coarse breath sounds bilaterally worse on the right with expiratory wheeze.  CV: Bradycardic rate with normal rhythm   Abdomen: Soft, non-distended, non-tender.  : Amanda catheter in place, urine yellow and clear  Incisions/Skin: Chronic wound on the left arm    4. LABS:   Arterial Blood Gases   Recent Labs   Lab 11/25/22  0905 11/23/22 2017   PH 7.46* 7.52*   PCO2 46* 41   PO2 58* 87   HCO3 33* 33*     Complete Blood Count   Recent Labs   Lab 11/25/22  0632 11/24/22  0430 11/23/22  0913   WBC 5.8 5.5 5.6   HGB 12.0* 12.0* 13.2*   * 137* 136*     Basic Metabolic Panel  Recent Labs   Lab 11/25/22  1036 11/25/22  0830 11/25/22  0632 11/25/22  0630 11/24/22  1225 11/24/22  1124 11/24/22  0759 11/24/22  0625 11/23/22  1851 11/23/22  0913   NA  --   --  135*  --   --   --   --  132*  --  136   POTASSIUM  --   --  4.1  --   --  3.9  --  5.7*  --  4.0   CHLORIDE  --   --  95*  --   --   --   --  97*  --  96*   CO2  --   --  28  --   --   --   --  22  --  31*   BUN  --   --  9.2  --   --   --   --  11.2  --  9.5   CR  --   --  0.21*  --   --   --   --  0.20*  --  0.21*  0.21*  0.21*   GLC 92 76 82 81   < >  --    < > 81   < > 94    < > = values in this interval not displayed.     Liver Function Tests  Recent Labs   Lab 11/24/22  0625 11/23/22  0913   AST 26 17   ALT 13 7*   ALKPHOS 76 89   BILITOTAL 0.2 0.3   ALBUMIN 3.1* 3.5     Coagulation Profile  No lab results found in last 7 days.    5. RADIOLOGY:   Recent Results (from the past 24 hour(s))   US Upper Extremity Venous Duplex Right    Narrative    Right upper extremity venous duplex  ultrasound    INDICATION: Right greater than left edema    COMPARISON: No similar comparison scans.    FINDINGS: The included right internal jugular vein as well as the  right upper extremity venous system shows no evidence of echogenic  thrombus. Normal color flow and waveform appearances are present.  There is some subcutaneous fluid with no dominant organized fluid  collection.      Impression    IMPRESSION: Mild right upper extremity subcutaneous edema with no  evidence of DVT.    RICO GALLEGO MD         SYSTEM ID:  [id number]

## 2022-11-26 NOTE — PROVIDER NOTIFICATION
Time of notification: 5:00 AM  Provider notified: MICU 1 Night resident  Patient status: Increased FiO2 on BiPAP to 100% to maintain sats >92. Pt not able to clear secretions  Temp:  [97.2  F (36.2  C)-99.9  F (37.7  C)] 97.2  F (36.2  C)  Pulse:  [47-78] 61  Resp:  [13-32] 30  BP: ()/(46-74) 114/54  FiO2 (%):  [30 %-100 %] 100 %  SpO2:  [80 %-99 %] 95 %  Orders received:   STAT chest x-ray, ABG, NT suction, continue to monitor.

## 2022-11-26 NOTE — PROGRESS NOTES
MEDICAL ICU PROGRESS NOTE  11/26/2022      Date of Service (when I saw the patient): 11/26/2022    ASSESSMENT: Bart Corea is a 62 year old male with a history notable for traumatic quadriplegia C1-C4, asymptomatic Bradycardia (40's- 50's), neurogenic bladder with suprapubic catheter in place, and LIZA who presents to the ED for evaluation of generalized weakness, edema and constipation. Code Blue called ~5PM due to hypoxia after respiratory was unable to perform an assisted cough, from report pt appear not to have gone into cardiac arrest (briefly had CPR performed) but rather intubated due to hypoxic respiratory failure.       CHANGES and MAJOR THINGS TODAY:   - Plan for thoracocentesis of left pleural effusion per IR today (diagnostic and therapeutic), labs ordered  - Adding chest physiotherapy, hypertonic nebs q4h, DUONEB's q4h, and cough assist to help with known secretions and poor cough response  - IV 40 mg Lasix once for diuresis  - Repeat UA/UC off of replaced suprapubic catheter to monitor for infection  - Restarting PTA amlodipine 2.5 mg     PLAN:     Neuro:  # Hx of Traumatic Quadriplegia C1-C4  # Autonomic Dysreflexia  - Restarting PTA Amlodipine 2.5 mg  - Continue PTA baclofen 20 mg 6 times daily    Pulmonary:  # Acute Hypoxic Respiratory Failure likely 2/2 mucous plugging  # LIZA  Initially concerned for CAP vs Viral vs Aspiration PNA though laboratory workup up so far is unrevealing. Hypoxic arrest likely related to mucous plugging as pt is unable to clear airways at baseline due to C1-C4 quadriplegia. Cardiac work up for hypoxia was unremarkable. Patient extubated on 11/25/22, now on HFNC with oxymask and BiPAP at night. Persistent left pleural effusion that may be contributing to hypoxia.    - IR consult for left thoracentesis, diagnostic and therapeutic   > Labs ordered to evaluate and determine transudate vs. Exudate   - Continue to wean oxygen as able, continue BiPAP at night   - IV lasix  40 mg once to help improve oxygenation   - Adding chest physiotherapy, hypertonic nebs q4h, DUONEB's q4h, and cough assist to help with known secretions and poor cough response    # Hx Asthma  - Albuterol neb Q 4 hr PRN     Cardiovascular:  # HTN  - Restarting PTA amlodipine     GI/Nutrition:  # Chronic Constipation  Patient is taking Sodium docusate daily and Senna every 3 days, last time took Senna was yesterday. Last BM was 3 days ago.   - Docusate sodium (COLACE) capsule 200 mg BID  - Miralax cap 100 mg BID  - Consider enema if oral medications or rectal suppository didn't work by tomorrow.     Renal/Fluids/Electrolytes:  # UTI  # Hx of chronic UTI's on Minocycline suppression  Hx of Neurogenic bladder & recurrent stones with stent placement and removal, he has urinary catheter & recurrent UTI with resistant organism  - Continue PTA Minocyclin for suppression  - UA/UC repeat today from clean suprapubic catheter  - Monitor for fever, pain  - KUB U/S- WNL     Endocrine:  No active concerns     ID:  # C/f CAP vs Viral PNA vs Aspiration PNA  # C/f underlying infxn  Initially concerned for CAP vs Viral vs Aspiration PNA though laboratory workup up so far is unrevealing. Extubated on 11/25/22 to BiPAP. Hypoxic arrest likely related to mucous plugging rather than PNA as pt is unable to clear airways at baseline due to C1-C4 quadriplegia. Cardiac work up for hypoxia was unremarkable. Infectious workup so far unremarkable.   - On HFNC with occasional oxymask to maintain saturations >90%. BiPAP at night.  - Stopped ABX on 11/25/22, continue to monitor   - Cultures and ABX as below     Abx:  Ceftriaxone (11/23-11/24)  Azithromycin (500mg 11/23, 250mg -11/24)  Minocycline (Continue for prophylaxis)     Cultures:  11/23 Bcx: NGTD  11/23 Sputum Cx: Normal [place]  11/23 Ucx: >100,000 Pseudomonas (taken off of old suprapubic catheter)  11/24: Legionella negative  11/24: Streptococcus Pneumoniae negative  11/25 BCx: NGTD  11/26:  UA/UC to be collected off of replaced suprapubic catheter     Hematology:    # Anemia   Hgb minimally low at 13.2 at baseline which appears to be around between 12-13.   - CTM     # Thrombocytopenia  Likely reactive 2/2 acute illness.   - CTM     Musculoskeletal:  # Quadriplegia/Deconditioning  - PT/OT consult    :  # Suprapubic Catheter  Catheter was exchanged on 11/25/22 per urology. Repeat UA/UC to evaluate for colonization of previous pseudomonal growth.   - Next SPT exchange in 8 weeks, per previously established schedule     Skin:  # Bilateral upper extremity hyperpigmentation  Possibly related to minocycline use  - Monitor for now     # Chronic wound on the left arm  - Wound consult     General Cares/Prophylaxis:    Fluids: None  Nutrition: Advance diet as tolerated  DVT Prophylaxis: Heparin SQ  GI Prophylaxis: PPI  Restraints: None  Family Communication: Daughter updated at bedside  Code Status: Full Code     Lines/tubes/drains:        - PIV x2  - Suprapubic catheter, exchanged 11/25/22     Disposition:  - Medical ICU      Patient seen and findings/plan discussed with medical ICU staff, Dr. Dorie Vergara.     Kris Jimenez MD  Internal Medicine Resident, PGY-2  Pager: [phone]    ====================================  INTERVAL HISTORY:     Overnight: Patient with mucus plugging requiring increased BiPAP settings. ABG without significant changes. CXR stable.     Today: Feeling better but having some back pain. Otherwise more alert and oriented to self, place, and time.    OBJECTIVE:   1. VITAL SIGNS:   Temp:  [97.2  F (36.2  C)-99.9  F (37.7  C)] 98.4  F (36.9  C)  Pulse:  [49-79] 66  Resp:  [14-44] 20  BP: ()/(46-78) 135/62  FiO2 (%):  [30 %-100 %] 100 %  SpO2:  [80 %-99 %] 96 %  Vent Mode: (S) CPAP/PS  (Continuous positive airway pressure with Pressure Support)  FiO2 (%): 100 %  Resp Rate (Set): 10 breaths/min  Tidal Volume (Set, mL): 450 mL  PEEP (cm H2O): (S) 5 cmH2O  Pressure Support (cm H2O):  5 cmH2O  Resp: 20    2. INTAKE/ OUTPUT:   I/O last 3 completed shifts:  In: 2000 [P.O.:1680; I.V.:240; NG/GT:80]  Out: 4115 [Urine:4115]    3. PHYSICAL EXAMINATION:  General: On BiPAP, a bit anxious  Neuro: AAOx3, able to communicate needs  Pulm/Resp: Coarse breath sounds bilaterally worse on the right with expiratory wheeze.  CV: Normal rate, regular rhythm, no murmurs, rubs, or gallops  Abdomen: Soft, non-distended, non-tender  : Amanda catheter in place, urine yellow and clear  Incisions/Skin: Chronic wound on the left arm    4. LABS:   Arterial Blood Gases   Recent Labs   Lab 11/26/22  0512 11/25/22  0905 11/23/22 2017   PH 7.37 7.46* 7.52*   PCO2 60* 46* 41   PO2 85 58* 87   HCO3 35* 33* 33*     Complete Blood Count   Recent Labs   Lab 11/26/22  0645 11/25/22  0632 11/24/22  0430 11/23/22  0913   WBC 6.0 5.8 5.5 5.6   HGB 12.0* 12.0* 12.0* 13.2*   * 134* 137* 136*     Basic Metabolic Panel  Recent Labs   Lab 11/26/22  0922 11/26/22  0645 11/25/22  1603 11/25/22  1353 11/25/22  0830 11/25/22  0632 11/24/22  1225 11/24/22  1124 11/24/22  0759 11/24/22  0625 11/23/22  1851 11/23/22  0913   NA  --  136  --   --   --  135*  --   --   --  132*  --  136   POTASSIUM  --  4.2  --   --   --  4.1  --  3.9  --  5.7*  --  4.0   CHLORIDE  --  96*  --   --   --  95*  --   --   --  97*  --  96*   CO2  --  27  --   --   --  28  --   --   --  22  --  31*   BUN  --  6.7*  --   --   --  9.2  --   --   --  11.2  --  9.5   CR  --  0.21*  --   --   --  0.21*  --   --   --  0.20*  --  0.21*  0.21*  0.21*   GLC 92 94 99 92   < > 82   < >  --    < > 81   < > 94    < > = values in this interval not displayed.     Liver Function Tests  Recent Labs   Lab 11/24/22  0625 11/23/22  0913   AST 26 17   ALT 13 7*   ALKPHOS 76 89   BILITOTAL 0.2 0.3   ALBUMIN 3.1* 3.5     Coagulation Profile  No lab results found in last 7 days.    5. RADIOLOGY:   Recent Results (from the past 24 hour(s))   XR Chest Port 1 View    Narrative    XR CHEST  PORT 1 VIEW  11/26/2022 5:28 AM     HISTORY:  respiratory distress       COMPARISON:  Radiographs 11/23/2022    FINDINGS:   Frontal view of the chest. Interval removal of the endotracheal tube  and enteric tube. Trachea is midline. Left heart border remains  obscured. Improved aeration of the right lung and left upper lung  fields. Continued bibasilar and retrocardiac opacities. Small left  pleural effusion. No appreciable pneumothorax.      Impression    IMPRESSION: Improved aeration of the right lung and left upper lung  fields. Continued bibasilar and retrocardiac atelectasis. Small left  pleural effusion.    I have personally reviewed the examination and initial interpretation  and I agree with the findings.    BONITA SALCIDO MD         SYSTEM ID:  [id number]

## 2022-11-26 NOTE — PLAN OF CARE
Neuro: A&Ox4. PERRLA. Quadriplegic. Minimal movement RUE.   Cardiac: SR. Afebrile. MAPs>65.   Respiratory: Pt w/increased respiratory support needs. BiPAP overnight FiO2 60%->100%. MICU resident notified. See note.  GI/: Adequate urine output via suprapubic catheter. No BM this shift. Bowel regimen in place.  Diet/appetite: Tolerating regular diet.   Activity:  In bed this shift.  Pain: At acceptable level on current regimen.   Skin: No new deficits noted.  LDA's:    Plan: Continue with POC. Notify primary team with changes.   Goal Outcome Evaluation:

## 2022-11-26 NOTE — PROCEDURES
Procedure Note  11/26/2022     Procedure: left pigtail chest tube placement    Reason for procedure: left pneumothorax after thoracentesis for pleural effusion, respiratory distress    Complications: None    Description of procedure: Consent was obtained from patient and his wife. Ultrasound was used to confirm lack of sliding sign on left chest wall. Time out was performed. Left chest was prepped and draped in sterile fashion. Site was anesthetized with 10cc of 1% lidocaine. Needle was inserted into chest wall until air visualized in saline-filled syringe. Pigtail was inserted using Seldinger technique over wire. Chest tube connected to atrium which was connected to suction with an air leak that resolved quickly. Patient tolerated procedure well, respiratory distress improved. No immediate complications.     SICU staff was available for entirety of procedure.     Jasmin Childs MD  Surgery PGY-2

## 2022-11-26 NOTE — PROGRESS NOTES
Transferred to: , Room 306 at 09:20  Status at time of transfer: VS stable, but with increasing O2 needs.  Belongings: wheelchair and belongings moved with pt. Pt's wife double checked the room.  Amanda removed? (if no, why?): Pt has a long term suprapubic catheter. Urology exchanged it last night.  Chart and medications: Chart and meds with pt and given to  RN.  Family notified: Yes, pt's wife, Sarah, at bedside and was present for transfer.

## 2022-11-26 NOTE — PROGRESS NOTES
Called to room by RN due to patient desaturating and feeling SOB. ABG completed and Albuterol given. NT suction performed in right nostril x3 with minimal results. Only able to pass to back of throat before hitting resistance. Patient placed back on BIPAP with increased IPAP and EPAP due to recent ABG results. RT spoke with room RN on events and RT will continue to follow closely.    Sylvester Rock, RT

## 2022-11-26 NOTE — PROGRESS NOTES
RT provided scheduled nebs with Lucinda. Performed quad cough post nebs. Patient on HFNC 60% 40L at this time. Per provider Cough assist ordered from [facility]. Will provide treatment when available.Communicated to nursing staff.

## 2022-11-26 NOTE — CONSULTS
"INTERVENTIONAL RADIOLOGY CONSULT      Bart Corea is a 62 year old male with a history notable for traumatic quadriplegia C1-C4, asymptomatic Bradycardia (40's- 50's), neurogenic bladder with suprapubic catheter in place, and LIZA who presents to the ED for evaluation of generalized weakness, edema and constipation. Code Blue called ~5PM due to hypoxia after respiratory was unable to perform an assisted cough, from report pt appear not to have gone into cardiac arrest (briefly had CPR performed) but rather intubated due to hypoxic respiratory failure.     Patient found to have left pleural effusion. CAPS team unsuccessful in thoracentesis.     /62 (BP Location: Right arm)   Pulse 66   Temp 98.4  F (36.9  C) (Oral)   Resp 20   Ht 1.727 m (5' 7.99\")   Wt 99 kg (218 lb 4.1 oz)   SpO2 96%   BMI 33.19 kg/m    Recent Labs   Lab Test 11/26/22  0645   WBC 6.0   HGB 12.0*   *     Lab Results   Component Value Date    INR 1.06 02/16/2021    INR 0.91 02/08/2021       Plan for left sided diagnostic and therapeutic thoracentesis with IR today.      Hari Peacock MD  Interventional Radiology Fellow  IR pass pager: [phone]    --------------------------------------------------------------------------------------------------------    Discussed with Dr. Peacock. Agree with assessment and plan. Thoracentesis performed.  "

## 2022-11-26 NOTE — IR NOTE
Patient Name: Bart Corea  Medical Record Number: [id number]  Today's Date: 11/26/2022    Procedure: Left thoracentesis  Proceduralist: Dr. Mendoza and Dr. Peacock  Pathology present: NA    Procedure Start: 1216  Procedure end: 1224  Sedation medications administered: None     Report given to: Tisha COLLAZO  : JORJE    Other Notes: Pt arrived to IR room 2 from 4C. Consent reviewed. Pt and wife denies any questions or concerns regarding procedure. Pt positioned lateral left side up and monitored per protocol. Pt tolerated procedure without any noted complications. Pt transferred back to 4C.    650ml thoracentesis yellow fluid removed

## 2022-11-26 NOTE — PROGRESS NOTES
Patient continues to have oxygenation issues as well as increased WOB. BS are coarse, mainly on right side. Assisted patient with multiple attempts at quad coughing after prn nebulizer treatments. Also worked with patient on PEP therapy before quad coughing. Remains on BIPAP. RT to continue to follow.    Sylvester Rock, RT

## 2022-11-26 NOTE — PLAN OF CARE
Admitted/transferred from: 4E @ 0973  Reason for admission/transfer: MICU patient  Patient status upon admission/transfer: On monitor. Stable BPs. On HFNC 100% during transport. Upon arrival- some fluctuation in O2 sats requiring additional O2 need. Brought with HFNC and BiPAP machine. Pt significant other with pt.   Interventions: Oxymask 8 LPM required in addition to HFNC 100%   Plan: Plan for pt to go to thoracentesis later in day  2 RN skin assessment: completed by Tisha SCHWARZ and Mireille ANNA   Result of skin assessment and interventions/actions: Red blanchable bilateral heels (heels elevated), bruising on L ankle and R big toe, L arm abscess (WOC following), red blanchable sacrum (Mepi on sacrum), Red blanchable in groins (intradry applied)  Height, weight, drug calc weight: Done  Patient belongings (see Flowsheet - Adult Profile for details): With patient

## 2022-11-26 NOTE — PROGRESS NOTES
08:10--MICU Team rounded and formed plan of care for the day, including IR Consult for left thoracentesis. Also, please pass on to IR that the pocket is best viewed when pt is sitting up in High Fowlers (almost 90 degrees).   08:30--RT switched pt from BiPAP to HFNC.  Received call that pt will be transferring asap to 4C, Room 306.

## 2022-11-26 NOTE — PROCEDURES
[facility]    Procedure: IR Procedure Note    Date/Time: 11/26/2022 12:27 PM  Performed by: Hari Peacock MD  Authorized by: Bran Mendoza MD       UNIVERSAL PROTOCOL   Site Marked: NA  Prior Images Obtained and Reviewed:  Yes  Required items: Required blood products, implants, devices and special equipment available    Patient identity confirmed:  Verbally with patient, arm band, provided demographic data and hospital-assigned identification number  Patient was reevaluated immediately before administering moderate or deep sedation or anesthesia  Confirmation Checklist:  Patient's identity using two indicators, relevant allergies, procedure was appropriate and matched the consent or emergent situation and correct equipment/implants were available  Time out: Immediately prior to the procedure a time out was called    Universal Protocol: the Joint Commission Universal Protocol was followed    Preparation: Patient was prepped and draped in usual sterile fashion       ANESTHESIA    Anesthesia: Local infiltration  Local Anesthetic:  Lidocaine 1% without epinephrine      SEDATION  Patient Sedated: Yes    Vital signs: Vital signs monitored during sedation    See dictated procedure note for full details.  Findings: Left sided thoracentesis, 600 ml straw colored fluid aspirated. 60 ml sent for labs.     Specimens: none    Complications: None    Condition: Stable    Plan: Left sided thoracentesis, 600 ml straw colored fluid aspirated. 60 ml sent for labs.       PROCEDURE  Describe Procedure: Left sided thoracentesis, 600 ml straw colored fluid aspirated. 60 ml sent for labs.   Patient Tolerance:  Patient tolerated the procedure well with no immediate complications  Length of time physician/provider present for 1:1 monitoring during sedation: 15

## 2022-11-27 NOTE — PLAN OF CARE
ICU End of Shift Summary. See flowsheets for vital signs and detailed assessment.    Changes this shift: Pt transferrred to  from  at 0930. Upon arrival: A&Ox4, following commands, oxygen needs HFNC 100%, and intermittently additional oxymask also needed.     Pt down to IR for thora ~1130. Upon coming back to , O2 sats dropped, pt expereinced resp distress, became obtunded and hypotensive. Pt placed on BiPAP at 100%. Team was notified and assessed pt at bedside. STAT chest x-ray and labs ordered. X1 500 mL LR bolus given for hypotension. Chest tube placed ~1600. Following placement, pt mentation returned to baseline.     Now on HFNC at 70% FiO2. HR 60-80s. Afebrile, normotensive. No BM this shift. Lasix given x1, adequate output via subpubic cath. Able to swallow without difficulty.     Plan: Place pt on BiPAP overnight. Continue to wean FiO2 as tolerated. Pt due for BM.

## 2022-11-27 NOTE — PROGRESS NOTES
CLINICAL NUTRITION SERVICES - ASSESSMENT NOTE     Nutrition Prescription    RECOMMENDATIONS FOR MDs/PROVIDERS TO ORDER:  1. More aggressive bowel regimen as last stool was reported to be 11/14.   2. Monitor lytes (Phos, Mg++, and K+) for refeeding syndrome. If lytes trend low, aggressively replace. Pt with +10 ketones per urinalysis on 11/25. However, K+, Mg++, and phos are WNL or not low.   3. Rec to consider multivitamin with minerals (also to ensure micronutrient needs are met), folic acid, and thiamine due to etoh use PTA.       Malnutrition Status:    Unable to determine due to pt busy with RN during attempts. Minimally, pt at risk for malnutrition.    Recommendations already ordered by Registered Dietitian (RD):  Oral supplements    Future/Additional Recommendations:  1. Rec continue regular diet order, liberalized to help encourage oral intake. Encourage tolerated foods/beverages (may best tolerate soft foods requiring little chewing and liquids).  2. Monitor phos trends and potential need for restriction. Phos of 4.7 on 11/26.   3. Consider checking vitamin D status. Pt ordered to take vitamin D and vitamin D deficiency lab was 33 on 9/15/15.   4. Monitor BG control. BG was 124 on 11/26.   5. Consider reordering vitamin C supplementation (tobacco use, h/o kidney stones).  6. If TF becomes nutrition plan of care, would rec place feeding tube (FT) and initiate TFs, Osmolite 1.5 (concentrated, maintenance TF formula;) and order 1 pkt Prosource TF20 BID. Initiate TFs at 15 mL/hr, advancing rate by 10 mL Q 8 hrs (or per provider discretion, pending hemodynamic stability) to goal rate of 45 mL/hr. Osmolite 1.5 Luis @ goal of  45ml/hr  (1080ml/day) + 1 pkt Prosource TF20 BID will provide: 1780 kcals, 107 g PRO, 822 ml free H20, 219 g CHO, and 0 g fiber daily.         If begin tube feeds:    - Flush FT with 30 mL water Q 4 hrs for patency. Rec provider adjust free water flushes as needed, pending fluid status.   -  "Ensure K+/Mg++/Phos labs are ordered daily until TFs advance to goal infusion to evaluate for sx of refeeding with nutrition received. If lytes trend low, aggressively replace lytes. Do not adv TF greater than 30 mL/hr unless K+ >3, Mg++ > 1.6, and Phos > 1.9.   - If not already ordered, order a multivitamin/mineral (certavite or liquid multivitamin/minerals 15 mL/day via FT) to help ensure micronutrient needs being met with suspected hypermetabolic demands and potential interruptions to TF infusions.   - Monitor TF and possible need to adjust nutrition support regimen if necessary, pending medical course and nutrition status.       - Monitor need to check a metabolic cart study.    - Send a nutrition consult for \"Registered Dietitian to Order TF per Medical Nutrition Therapy Guidelines\" if desire RD to order TFs.      REASON FOR ASSESSMENT  Bart Corea is a/an 62 year old male assessed by the dietitian for malnutrition score of two or greater (Have you recently lost weight without trying?: unsure. Have you been eating poorly because of a decreased appetite?: no)     NUTRITION/ADDITIONAL HISTORY  Per RD note 2/16/21: \"Nutrition hx: Not able to obtain from patient, working with other providers and then out of room, passed patient and spouse in hallway. Diet: Regular. Intermittent NPO 2/8, 2/12, 2/15. Intake/Tolerance: %.\"    Per H & P, \"PMH of traumatic quadriplegia C1-C4 with neurogenic bladder with suprapubic catheter in place Asthma, LIZA, chronic bladder infection on antibiotics (Minocyclin) and Autonomic dysreflexia presents with difficulty breathing concerning acute hypoxic respiratory failure. He denied fever, abdominal pain, nausea, vomiting or diarrhea. He drinks liquor 2 cups every other day. Jc has constipation, he is taking Sodium docusate daily and Senna every 3 days, last time took Senna was yesterday. Last BM was 9 days ago.\" Lives with his wife, has a PCA. Pt was ordered to take, noting, " "dulcolax, docusate, lasix, senna, vitamin C, and vitamin D PTA.     Attempts x2 to see pt. Pt busy with RN during attempts.     CURRENT NUTRITION ORDERS  Diet: Adv diet as tolerated, regular (since 11/25). Previously regular diet, NPO, clear liquids or full liquids 11/23-11/25.   Intake/Tolerance: Tolerating diet. Per nursing flowsheets (% intake), pt consuming 100% on 11/25 and 25% on 11/26. Ordered two meals yesterday and breakfast so far today (11/27).     LABS  Labs reviewed    MEDICATIONS  Medications reviewed  Gtt: None infusing 11/27    ANTHROPOMETRICS  Height: 172.7 cm (5' 7.992\")  Most Recent Weight: 99 kg (218 lb 4.1 oz)    IBW: 61.3 kg - Adjusted for quadriplegia      BMI: Obesity Grade II BMI 35-39.9 - BMI of 35.6 on 11/23, adjusted for quadriplegia  Weight History: 93.4 kg (1/17/20), 93.4 kg (2/16/21), 102.1 kg (1/6/2022) - Pt has lost 3% of body wt over the last 10-11 months.   Dosing Weight: 70 kg (adjusted, based on lowest wt so far this admission of 94.5 kg on 11/23)    ASSESSED NUTRITION NEEDS (for inpatient hospital stay)  Estimated Energy Needs: 6102-2028 kcals/day (20 - 25 kcals/kg)  Justification: Estimated needs with wt status  Estimated Protein Needs:  grams protein/day (1.2 - 1.5 grams of pro/kg)  Justification: Increased needs for lean body mass maintenance, goal for gains  Estimated Fluid Needs: 1750-200+ mL/day (25 - 30+ mL/kg)   Justification: Maintenance but pending fluid status and diuresis.     PHYSICAL FINDINGS/OTHER FINDINGS  See malnutrition section below.  Resp: Intubated 11/23, extubated 11/25. On Bipap, 40% FiO2 or HiFlow nasal cannula.   GI: Last stool on 11/14. On scheduled colace BID, receiving; miralax BID, held once.    MALNUTRITION  % Intake: Unable to fully assess, pt busy with RN during attempts.   % Weight Loss: Weight loss does not meet criteria  Subcutaneous Fat Loss: Unable to assess, pt busy with RN during attempts.   Muscle Loss: Unable to assess, pt busy " with RN during attempts - quadriplegia, suspect low muscle mass  Fluid Accumulation/Edema: Mild  Malnutrition Diagnosis: Unable to determine due to pt busy with RN during attempts. Minimally, pt at risk for malnutrition.     NUTRITION DIAGNOSIS  Predicted inadequate nutrient intake (protein-energy) related to respiratory status.    INTERVENTIONS  Implementation  Nutrition Education: Per provider order if indicated .  Medical food supplement therapy: Placed order for chocolate Ensure Enlive at 10:00 and 14:00.     Goals  Patient to consume % of nutritionally adequate meal trays TID, or the equivalent with supplements/snacks.     Monitoring/Evaluation  Progress toward goals will be monitored and evaluated per protocol.       4C (MICU) RD pager: [phone]  Weekend/Holiday RD pager: [phone]

## 2022-11-27 NOTE — PROGRESS NOTES
Notified by the [facility] medicine physician that the patient had hypotension with maps in the 50s to 60s and systolic blood pressures in the 70s.  He was given a 500 cc bolus but pressures remained borderline low.  ECG showed stable sinus bradycardia.  Lactic acid 0.6.  The patient was started on a peripheral drip of norepinephrine and given an additional 500 ml.  Blood cultures, urine cultures and renal ultrasound were sent.     The ICU team will accept the patient back to our service.    Patient seen and evaluated by myself and the ICU attending physician, Dr. Beny Hall.  For additional details regarding his plan of care in the ICU, see his ICU daily progress note from earlier today.    Luigi Velasquez MD      [facility]    ICU Accept Note - Hospitalist Service, GOLD TEAM 12       Date of Admission:  11/23/2022    Assessment & Plan   Bart Corea is a 62 year old male admitted on 11/23/2022 who is being transferred out of the ICU on 11/27/2022. He has a history of quadriplegia and was admitted with acute hypoxic respiratory failure secondary to pneumonia and mucus plugging.  Hospital course was complicated by pneumothorax after thoracentesis.    Active issues:   Acute respiratory failure with hypoxia  Community acquired pneumonia with possible parapneumonic effusion  Mucus plugging  - Continue ceftriaxone; would complete longer course of antibiotics (7-14 days) given possible parapneumonic effusion, though has had effusion in past. Could consider switching to Augmentin. Has completed 3 doses azithromycin 500mg and 4 days ceftriaxone  - Scheduled duonebs, hypertonic saline, cough assist, and chest physiotherapy Q4h; would ask pulm to help with wean    Pneumothorax, secondary to thoracentesis  Left pleural effusion, transudative  - Pulmonary consulting to manage chest tube. Currently -20 cm H2O  - Daily chest x-ray  - Diuresis with furosemide 20mg daily  -  Treating pneumonia as above    Obstructive sleep apnea:   Continue BiPAP at night; patient has not been using as it has been difficult to communicate his needs over the sound of the BiPAP. Would discuss with pulm options (family has been considering microphone and other technological innovations).    HFpEF: Furosemide 20mg daily    Quadriplegia with spasticity  Neurogenic bowel  Neurogenic bladder; chronic UTI on minocycline suppression. Suprapubic catheter changed 11/25  - Baclofen 5x daily scheduled (takes up to 6x daily at home PRN), diazepam PRN  - Had bowel cleanout today (usually does every 3 days). Continue daily docusate and miralax  - Minocycline suppression. Will not treat pseudomonas on UCx given likely colonization  - Oxybutynin daily    Essential hypertension: Amlodipine 2.5mg daily    Macrocytic anemia, thrombocytopenia: Monitor        Plan for next 24 hours:  Discuss with pulm removing chest tube, decreasing frequency of nebulizers and respiratory care, find way to tolerate BiPAP at home       ICU Transfer Checklist    YES NO Links/Additional comments   Current meds & orders reviewed & updated? [x] [] Transfer Navigator   O2 requirements appropriate for accepting floor? [x] [] O2 Device: High Flow Nasal Cannula (HFNC)   Oxygen Delivery: 35 LPM  FiO2 (%): 60 %   Appropriate antibiotics ordered? [x] [] Fever Report  30 Day Micro   Appropriate fluids ordered? [] [x]    Appropriate diet ordered? [x] [] Advance Diet as Tolerated: Regular Diet Adult  Snacks/Supplements Adult: Ensure Enlive; Between Meals   Telemetry indicated/ordered?    [x] [] Cardiac Monitoring: ACTIVE order. Indication: ICU     Appropriate DVT prophy ordered? [x] [] Anticoag/VTE   Amanda indicated/ordered?    [] [x] Not present   Central lines indicated? [] [x] LDA Avatar      PT/OT indicated/ordered? [x] [] D/C Planner  [facility]   Code status reviewed? [x] [] Full Code   Preferred contact on file? [x] []      Disposition Plan     "     The patient's care was discussed with the Bedside Nurse, Patient, Patient's Family and MICU Team.    Enrique Taylor MD  Hospitalist Service, GOLD TEAM 12  [facility]  Securely message with the Vocera Web Console (learn more here)  Text page via [facility] Paging/Directory   Please see signed in provider for up to date coverage information      Clinically Significant Risk Factors         # Hyponatremia: Lowest Na = 135 mmol/L (Ref range: 136-145) in last 2 days, will monitor as appropriate      # Hypoalbuminemia: Lowest albumin = 3.1 g/dL (Ref range: 3.5-5.2) at 11/24/2022  6:25 AM, will monitor as appropriate          # Obesity: Estimated body mass index is 33.19 kg/m  as calculated from the following:    Height as of this encounter: 1.727 m (5' 7.99\").    Weight as of this encounter: 99 kg (218 lb 4.1 oz).          ______________________________________________________________________    Interval History   Reports he is breathing well now. Had low oxygen after he was on the commode today, but now he is breathing well. No fever or chills. Says he does not feel like he has a urinary infection (cannot feel below nipples, but sometimes feels pressure in back with UTI).    Data reviewed today: I reviewed all medications, new labs and imaging results over the last 24 hours.    Physical Exam   Vital Signs: Temp: 97.6  F (36.4  C) Temp src: Oral BP: 138/77 Pulse: 72   Resp: 20 SpO2: 90 % O2 Device: High Flow Nasal Cannula (HFNC) Oxygen Delivery: 35 LPM  Weight: 218 lbs 4.09 oz  Constitutional: awake, alert, cooperative, no apparent distress, and appears stated age  Respiratory: Breathing comfortably on high flow nasal cannula, no wheezing or crackles    "

## 2022-11-27 NOTE — PLAN OF CARE
ICU End of Shift Summary. See flowsheets for vital signs and detailed assessment.    Changes this shift: A&Ox4, able to make needs known. Afebrile. HR High 50s-80s. Currently on HFNC 50 %, 40 LPM.     Bisacodyl suppository used for bowel regimen. Pt up to home commode using lift. While on commode, pt required HFNC 100%. 55 LPM, and oxymask at 8 LPM in addition to HFNC (team aware). O2 requirements weaned down after pt was back in bed.     Adequate output via suprapubic catheter. Pt worked with OT today doing ROM exercises. Soft call light placed either by cheek or R arm.     Pt now has med-surg orders.     Around ~1815, pt started to have low BPs and MAPs. Team paged, 500 mL LR bolus ordered and given. Pressures did not respond to bolus, team paged. Team has since been in contact with night shift nurse.      Plan: Wean oxygen requirements as tolerated. Switch from HFNC to BiPAP at night. Provide BP interventions as appropriate/ordered.

## 2022-11-27 NOTE — PLAN OF CARE
"ICU End of Shift Summary. See flowsheets for vital signs and detailed assessment.    Changes this shift: low grade temperature at 2000 of 99.3. temperature has now returned to normal. Patient A&Ox4. Starting to regain mobility in right arm- soft call light put in place. Heart murmur noticed at 0000 assessment and patient c/o chest pressure- MD assessed. No interventions at this time. HR stable 50-60's overnight. BP soft but stable overnight. On HFNC 80% FiO2 and Bipap 40% FiO2. RN used as sitter for bipap safety. Good UOP via suprapubic cath- due to be exchanged.     Patient is overdue to have a bowel movement. Family brought in patients restroom chair on 11/26- patient wants to attempt to use it today when wife is present    Patient requires more ROM- have PT/OT assist. Specifically arms where patient can feel when arms are stretched and patient can regain his previous strength of right arm    Plan: Continue to monitor and access readiness to transfer. Notify MD of any changes        Problem: Plan of Care - These are the overarching goals to be used throughout the patient stay.    Goal: Patient-Specific Goal (Individualized)  Description: You can add care plan individualizations to a care plan. Examples of Individualization might be:  \"Parent requests to be called daily at 9am for status\", \"I have a hard time hearing out of my right ear\", or \"Do not touch me to wake me up as it startles me\".  Outcome: Progressing  Flowsheets (Taken 11/27/2022 0632)  Individualized Care Needs: I like to be repositioned frequently for comfort. patient can use soft touch call light if placed near his right hand  Anxieties, Fears or Concerns: being on oxygen forever, fearful/uncomfortable that he cannot cough up secretions   Goal Outcome Evaluation:                        "

## 2022-11-27 NOTE — PROGRESS NOTES
MEDICAL ICU PROGRESS NOTE  11/27/2022      Date of Service (when I saw the patient): 11/27/2022    ASSESSMENT: Bart Corea is a 62 year old male with a history notable for traumatic quadriplegia C1-C4, asymptomatic Bradycardia (40's- 50's), neurogenic bladder with suprapubic catheter in place, and LIZA who presents to the ED for evaluation of generalized weakness, edema and constipation. Code Blue called ~5PM due to hypoxia after respiratory was unable to perform an assisted cough, from report pt appear not to have gone into cardiac arrest (briefly had CPR performed) but rather intubated due to hypoxic respiratory failure.       CHANGES and MAJOR THINGS TODAY:   - Continue chest physiotherapy, hypertonic nebs q4h, DUONEB's q4h, and cough assist to help with known secretions and poor cough response  - Start PO 20mg Lasix for presumed HFpEF  - Repeat CXR to follow PTX  - Repeat VBG     PLAN:     Neuro:  # Hx of Traumatic Quadriplegia C1-C4  # Autonomic Dysreflexia  - Holding PTA Amlodipine 2.5 mg  - Continue PTA baclofen 20 mg 6 times daily    Pulmonary:  # Acute Hypoxic Respiratory Failure likely 2/2 mucous plugging  # LIZA  Initially concerned for CAP vs Viral vs Aspiration PNA though laboratory workup up so far is unrevealing. Hypoxic arrest likely related to mucous plugging as pt is unable to clear airways at baseline due to C1-C4 quadriplegia. Cardiac work up for hypoxia was unremarkable. Patient extubated on 11/25/22, now on HFNC with oxymask and BiPAP at night. Persistent left pleural effusion that may be contributing to hypoxia.    - Continue to wean oxygen as ablenight   - Continue chest physiotherapy, hypertonic nebs q4h, DUONEB's q4h, and cough assist to help with known secretions and poor cough response    # PTX- Thoracentesis associated   # Chronic Left Sided Pleural Effusion  IR consult for left thoracentesis, diagnostic and therapeutic. Pt returned from procedure with with worsening oxygen  requirements. CXR obtained and showed left sided PTX. Chest tube was placed with the assistance of our Surgery Colleagues. Repeat CXR showing improved PTX, continues on suction for now. Pulmonology to manage Chest tube in the future.  - Thoracentesis labs showing transudate effusion, likely due to HFpEF management as below  - Pulm consult once transferred off the ICU  - Repeat CXR pending    # Hx Asthma  - Albuterol neb Q 4 hr PRN     Cardiovascular:  # C/f HFpEF (EF 55-60%)  Has had a chronic left pleural effusion since January of 2021, developed a trace right sided effusion seen on imaging at admission. Most recent echo (2/2021 Appears fluid up on exam with lower extremity swelling as well. Hepatic panel WNL, Cr normal. Likely a component of diastolic dysfunction.   - Plan to start PO Lasix 20mg  - Cardiology outpatient follow up at discharge    # HTN  - Restarting PTA amlodipine     GI/Nutrition:  # Chronic Constipation  Patient is taking Sodium docusate daily and Senna every 3 days, last time took Senna was yesterday. Last BM was 5 days ago.   - Docusate sodium (COLACE) capsule 200 mg BID  - Miralax cap 100 mg BID  - Biscodyl 20mg Enemas today  - Consider enema if oral medications or rectal suppository didn't work by tomorrow.     Renal/Fluids/Electrolytes:  # UTI  # Hx of chronic UTI's on Minocycline suppression  Hx of Neurogenic bladder & recurrent stones with stent placement and removal, he has urinary catheter & recurrent UTI with resistant organism  - Continue PTA Minocyclin for suppression  - UA/UC repeat today from clean suprapubic catheter  - Monitor for fever, pain  - KUB U/S- WNL     Endocrine:  No active concerns     ID:  # CAP vs Viral PNA vs Aspiration PNA  # C/f underlying infxn  Initially concerned for CAP vs Viral vs Aspiration PNA though laboratory workup up so far is unrevealing. Extubated on 11/25/22 to BiPAP. Hypoxic arrest likely related to mucous plugging rather than PNA as pt is unable to  clear airways at baseline due to C1-C4 quadriplegia. Cardiac work up for hypoxia was unremarkable. Infectious workup so far unremarkable.   - On HFNC with occasional oxymask to maintain saturations >90%. BiPAP at night.  - Cultures and ABX as below  - Continue CTX empiric course, finished Azithromycin     Abx:  Ceftriaxone (11/23-11/24, 11/25--> 11/27)  Azithromycin (500mg 11/23, 500mg -11/24--> 11/25)  Minocycline (Continue for prophylaxis)     Cultures:  11/23 Bcx: NGTD  11/23 Sputum Cx: Normal [place]  11/23 Ucx: >100,000 Pseudomonas (taken off of old suprapubic catheter)  11/24: Legionella negative  11/24: Streptococcus Pneumoniae negative  11/25 BCx: NGTD  11/26: UA/UC to be collected off of replaced suprapubic catheter     Hematology:    # Anemia   Hgb minimally low at 13.2 at baseline which appears to be around between 12-13.   - CTM     # Thrombocytopenia  Likely reactive 2/2 acute illness.   - CTM     Musculoskeletal:  # Quadriplegia/Deconditioning  - PT/OT consult    :  # Suprapubic Catheter  Catheter was exchanged on 11/25/22 per urology. Repeat UA/UC to evaluate for colonization of previous pseudomonal growth.   - Next SPT exchange in 8 weeks, per previously established schedule     Skin:  # Bilateral upper extremity hyperpigmentation  Possibly related to minocycline use  - Monitor for now     # Chronic wound on the left arm  - Wound consult     General Cares/Prophylaxis:    Fluids: None  Nutrition: Full Diet  DVT Prophylaxis: Heparin SQ  GI Prophylaxis: None  Restraints: None  Family Communication: Family updated at bedside  Code Status: Full Code     Lines/tubes/drains:        - PIV x2  - Suprapubic catheter, exchanged 11/25/22     Disposition:  - Medical ICU      Patient seen and findings/plan discussed with medical ICU staff, Dr. Dorie Vergara.     Edwar Gallegos MD  Internal Medicine Resident, PGY-1  Pager: [phone]    ====================================  INTERVAL HISTORY:     Overnight:  Patient now on HFNC 40%. VBG improved.     Today: Feeling better recalls how terrifying yesterday was, was worried that he was going to be intubated again. Feels that his breathing has improved.     OBJECTIVE:   1. VITAL SIGNS:   Temp:  [97.6  F (36.4  C)-99.3  F (37.4  C)] 98.3  F (36.8  C)  Pulse:  [45-88] 69  Resp:  [11-32] 15  BP: ()/(39-85) 112/56  FiO2 (%):  [40 %-100 %] 60 %  SpO2:  [75 %-100 %] 91 %  FiO2 (%): 60 %  Resp: 15    2. INTAKE/ OUTPUT:   I/O last 3 completed shifts:  In: 1230 [P.O.:350; I.V.:380; IV Piggyback:500]  Out: 2310 [Urine:2295; Chest Tube:15]    3. PHYSICAL EXAMINATION:  General: On HFNC, a bit anxious  Neuro: AAOx3, able to communicate needs  Pulm/Resp: Coarse breath sounds bilaterally worse on the right with expiratory wheeze.  CV: Normal rate, regular rhythm, no murmurs, rubs, or gallops  Abdomen: Soft, non-distended, non-tender  : Amanda catheter in place, urine yellow and clear  Incisions/Skin: Chronic wound on the left arm    4. LABS:   Arterial Blood Gases   Recent Labs   Lab 11/26/22  0512 11/25/22  0905 11/23/22 2017   PH 7.37 7.46* 7.52*   PCO2 60* 46* 41   PO2 85 58* 87   HCO3 35* 33* 33*     Complete Blood Count   Recent Labs   Lab 11/27/22  0554 11/26/22  0645 11/25/22  0632 11/24/22  0430   WBC 6.3 6.0 5.8 5.5   HGB 11.4* 12.0* 12.0* 12.0*   * 135* 134* 137*     Basic Metabolic Panel  Recent Labs   Lab 11/27/22  0858 11/27/22  0554 11/26/22  2000 11/26/22  1620 11/26/22  0922 11/26/22  0645 11/25/22  0830 11/25/22  0632 11/24/22  1225 11/24/22  1124 11/24/22  0759 11/24/22  0625   NA  --  135*  --   --   --  136  --  135*  --   --   --  132*   POTASSIUM  --  4.4  --   --   --  4.2  --  4.1  --  3.9  --  5.7*   CHLORIDE  --  95*  --   --   --  96*  --  95*  --   --   --  97*   CO2  --  30*  --   --   --  27  --  28  --   --   --  22   BUN  --  9.9  --   --   --  6.7*  --  9.2  --   --   --  11.2   CR  --  0.23*  --   --   --  0.21*  --  0.21*  --   --   --   0.20*   GLC 95 92 124* 118*   < > 94   < > 82   < >  --    < > 81    < > = values in this interval not displayed.     Liver Function Tests  Recent Labs   Lab 11/24/22  0625 11/23/22  0913   AST 26 17   ALT 13 7*   ALKPHOS 76 89   BILITOTAL 0.2 0.3   ALBUMIN 3.1* 3.5     Coagulation Profile  No lab results found in last 7 days.    5. RADIOLOGY:   Recent Results (from the past 24 hour(s))   IR Thoracentesis    Narrative    PROCEDURES 11/26/2022 12:28 PM  1. Ultrasound guided left diagnostic and therapeutic thoracentesis.    CLINICAL HISTORY: left pleural effusion. Left thoracentesis requested.    COMPARISONS: Chest x-ray 11/26/2022. Ultrasound 11/25/2022    STAFF RADIOLOGIST: RAUL Fragoso MD    FELLOW: SONYA Peacock MD    I, THEE FRAGOSO MD, attest that I was present in the procedure room for  the entire procedure.    MEDICATIONS: No intravenous sedation administered.     PROCEDURE: The patient understood the limitations, alternatives, and  risks of the procedure and requested the procedure be performed. Both  written and verbal consent were obtained.    Patient placed in the right lateral decubitus position. The left  posterior lateral back was prepped and draped in the usual sterile  fashion. 1% lidocaine without epinephrine was used for local  anesthesia.    Under ultrasound guidance, a 5 Citizen of [place] Filter [place] centesis  catheter needle was advanced into the left pleural effusion.  Ultrasound image documenting catheter needle placement was saved in  the patient's record.    Catheter advanced over the needle. Needle removed. Ultrasound image  documenting catheter placement was saved in the patient's record.    50 mL fluid aspirated and submitted for the requested laboratory  studies.    Catheter to vacuum suction. 650 mL total fluid aspirated. Catheter  removed. Sterile airtight dressing applied. No immediate complication.      Impression    IMPRESSION: Ultrasound guided left thoracentesis. 650 mL aspirated. 50  mL  submitted for the requested laboratory studies. Postprocedure care  and observation in the patient's inpatient care unit.     I have personally reviewed the examination and initial interpretation  and I agree with the findings.    THEE FRAGOSO MD         SYSTEM ID:  [id number]   XR Chest Port 1 View   Result Value    Radiologist flags Pneumothorax postthoracentesis (Urgent)    Narrative    Exam: XR CHEST PORT 1 VIEW, 11/26/2022 2:04 PM    Indication: Hypoxia    Comparison: 11/26/2022    Findings:   Left pneumothorax post thoracentesis. Very little fluid remains. Heart  upper limits of normal in size. Pulmonary vasculature within normal  limits. Lungs are otherwise clear. Unchanged left lower lobe  atelectasis.      Impression    Impression: Pneumothorax post thoracentesis.    [Urgent Result: Pneumothorax postthoracentesis]    Finding was identified on 11/26/2022 2:05 PM.     Dr. Fragoso was contacted by Dr. Salcido at 11/26/2022 2:09 PM and  verbalized understanding of the urgent finding.      BONITA SALCIDO MD         SYSTEM ID:  [id number]   XR Chest Port 1 View    Narrative    Exam: XR CHEST PORT 1 VIEW, 11/26/2022 5:02 PM    Indication: s/p left chest tube placement; interval eval    Comparison: Same day radiograph    Findings:   Placement of a left-sided pleural chest tube projecting over the mid  left lung. Decreased left-sided pneumothorax now with a small to  moderate basilar component. The heart borders are obscured. No  pneumothorax on the right.      Impression    Impression: Decreased small to moderate left pneumothorax with left  pleural chest tube placement.    I have personally reviewed the examination and initial interpretation  and I agree with the findings.    GUSTAVO DUNBAR MD         SYSTEM ID:  [id number]

## 2022-11-27 NOTE — PROGRESS NOTES
"   11/27/22 1110   Appointment Info   Signing Clinician's Name / Credentials (OT) Marion Campbell, CANDELARIOR/L   Living Environment   People in Home spouse   Current Living Arrangements house   Home Accessibility no concerns   Transportation Anticipated family or friend will provide   Living Environment Comments Pt lives with his wife in a house that is well-set up for his needs.   Self-Care   Usual Activity Tolerance moderate   Current Activity Tolerance fair   Equipment Currently Used at Home wheelchair, power;commode chair;shower chair;lift device;orthosis   Fall history within last six months no   Activity/Exercise/Self-Care Comment Pt has assist from wife, daughter (when home), and long-term PCA for ADLs.   Instrumental Activities of Daily Living (IADL)   Previous Responsibilities work;finances;shopping   IADL Comments Pt's family and PCA provide assist with IADLs. Pt works as a  for the [employer]. Pt's house is set up with wireless/hands free devices ([employer]) to promote pt IND.   General Information   Onset of Illness/Injury or Date of Surgery 11/26/22   Referring Physician Edwar Gallegos MD   Patient/Family Therapy Goal Statement (OT) To improve RUE strength, activity tolerance.   Additional Occupational Profile Info/Pertinent History of Current Problem Per chart: \"Bart Corea is a 62 year old male with a history notable for traumatic quadriplegia C1-C4, asymptomatic Bradycardia (40's- 50's), neurogenic bladder with suprapubic catheter in place, and LIZA who presents to the ED for evaluation of generalized weakness, edema and constipation. Code Blue called ~5PM due to hypoxia after respiratory was unable to perform an assisted cough, from report pt appear not to have gone into cardiac arrest (briefly had CPR performed) but rather intubated due to hypoxic respiratory failure.\"   Existing Precautions/Restrictions oxygen therapy device and L/min   Limitations/Impairments " "insensate body part   Cognitive Status Examination   Orientation Status orientation to person, place and time   Cognitive Status Comments Pt fatigued from \"terrible (medical) week\"   Visual Perception   Visual Impairment/Limitations corrective lenses full-time   Impact of Vision Impairment on Function (Vision) WFL   Sensory   Sensory Comments Pt reports little/no sensation below his nipple line. Pt can sense deep/proprioceptive pressure during ROM stretches. Pt experiences feelings of \"fullness\" in bowel at times.   Pain Assessment   Patient Currently in Pain No  (Pt reports feeling generally stiff from lack of mobility)   Range of Motion Comprehensive   Comment, General Range of Motion Full ROM in RUE, L shoulder ROM limited to ~110 deg shoulder flexion. Full ROM BLE   Strength Comprehensive (MMT)   Comment, General Manual Muscle Testing (MMT) Assessment Pt has 2+ to 3/5 in R shoulder and elbow. Pt reporting RUE is weaker than usual. Pt uses R hand for driving motorized WC   Coordination   Coordination Comments Pt w/ limited R finger flexion/extension. Will continue to assess pt's hand function.   Bed Mobility   Comment (Bed Mobility) Total A   Transfers   Transfer Comments Total A w/ OH lift   Balance   Balance Comments Full-time WC user.   Activities of Daily Living   BADL Assessment/Intervention   (Pt recieves assist w/ all ADLs at baseline. Today, pt is more fatigued than usual during ADLs.)   Clinical Impression   Criteria for Skilled Therapeutic Interventions Met (OT) Yes, treatment indicated   OT Diagnosis Reduced tolerance for ADLs, impaired RUE function   OT Problem List-Impairments impacting ADL problems related to;activity tolerance impaired;strength   Planned Therapy Interventions (OT) ADL retraining;ROM;strengthening   Clinical Decision Making Complexity (OT) low complexity   Anticipated Equipment Needs Upon Discharge (OT)   (none)   Risk & Benefits of therapy have been explained evaluation/treatment " results reviewed;care plan/treatment goals reviewed;risks/benefits reviewed;current/potential barriers reviewed;participants voiced agreement with care plan;participants included;patient;spouse/significant other;daughter   Clinical Impression Comments Pt presents today w/ reduce activity tolerance and RUE strength. Pt is at risk for further deconditioning during hospitalization. Pt will benefit from IP OT to maintain ROM, promote RUE strength, and to maintain ADL routines.   OT Total Evaluation Time   OT Brown Low Complexity Minutes (54484) 10   OT Goals   Therapy Frequency (OT) 3 times/wk   OT Predicted Duration/Target Date for Goal Attainment 12/09/22   OT Goals OT Goal 1;OT Goal 2   OT: Goal 1 Pt will complete UE/LE ROM HEP w/ assist.   OT: Goal 2 Pt will maintain ADL routines, with adaptations due to hospital setting, to promote IND and activity tolerance.   OT Discharge Planning   OT Plan OT: UE/LE ROM - family is very helpful, ADLs w/ assist, work w/ RN to faciliate ADL routines   OT Discharge Recommendation (DC Rec) home with assist   OT Rationale for DC Rec Pt is near ADL baseline, has excellent support and set up from family. Pt will be safe to discharge home once medically stable.   OT Brief overview of current status Total A for ADLs, completed UE/LE ROM, bowel routine   Total Session Time   Total Session Time (sum of timed and untimed services) 10

## 2022-11-28 PROBLEM — T88.4XXA HARD TO INTUBATE: Status: ACTIVE | Noted: 2022-01-01

## 2022-11-28 NOTE — PLAN OF CARE
"ICU End of Shift Summary. See flowsheets for vital signs and detailed assessment.    Changes this shift: Code blue at approximately 0705. See code blue charting for details.     Neuro: Around 0845 seizure like activity noted including intense facial twitching and tongue twitching. Switched to versed drip from propofol, neurocrit consulted, EEG ordered, & therapeutic hypothermia ordered. Artic sun hypothermia started at approximately @ 1230. Facial twitching continued to worsen but seemed to improve once cooling began. Versed @15, Fentanyl @200. Pupils equal 2mm, NPi 3-4.     Resp: Intubated. PEEP 8, FiO2 70%. PIPs improved down to 25-30. Bronch done and sputum samples sent. Copious thick secretions. Chest tube to -20 suction. Between 3234-8295, increased output from chest tube (see I&Os), team notified.    Cardiac: HR prior to hypothermia 60-70. HR down 55-60. Levo now off. SBP 150s. Echo done, EF 55-60%. Reached goal hypothermia temperature (36C) at 1430. 1.5L LR given total.     GI/: Renal US done. Suprapubic cath exchanged. 1x soft BM. UOP dropping now to marginal output ~20-30ml/hr.      Plan: therapeutic hypothermia protocol for tonight, monitor electrolytes and other labs. EEG monitoring.       Goal Outcome Evaluation:      Plan of Care Reviewed With: patient, spouse    Overall Patient Progress: decliningOverall Patient Progress: declining    Outcome Evaluation: code blue at 0705, multiple rounds of epi, intubated, post cardiac arrest seizure like activity, therapeutic hypothermia initiated.      Problem: Plan of Care - These are the overarching goals to be used throughout the patient stay.    Goal: Patient-Specific Goal (Individualized)  Description: You can add care plan individualizations to a care plan. Examples of Individualization might be:  \"Parent requests to be called daily at 9am for status\", \"I have a hard time hearing out of my right ear\", or \"Do not touch me to wake me up as it startles " "me\".  Outcome: Not Progressing     Problem: Gas Exchange Impaired  Goal: Optimal Gas Exchange  Outcome: Not Progressing     Problem: UTI (Urinary Tract Infection)  Goal: Improved Infection Symptoms  Outcome: Not Progressing     "

## 2022-11-28 NOTE — CONSULTS
Neurocritical Care Consultation    Reason for critical care admission: Worsening hypoxic respiratory failure   Admitting Team: MICU 1  Date of Service:  11/28/2022  Date of Admission:  11/23/2022  Hospital Day: 6    Assessment/Plan  Bart Corea is a 62 year old male with a past medical history including quadriplegia who was admitted to [facility] on 11/23/2022 for evaluation and management of acute hypoxic respiratory failure secondary to CAP and mucous plugging. On 11/27/2022 he was transferred to ICU MICU service for evaluation and management of increasing hypoxia and shock. Approximately 0700 on 11/28/2022 he was noted to have coughing which lead to respiratory arrest requiring intubation.     NCC was consulted on 11/28/2022 after facial twitching post- respiratory arrest with concern for seizure.     Metabolic considerations:  BUN/Cr 7.7/0.23   No transaminitis   Elevated lactic acid     Imaging:  No recent head CT     vEEG:  Pending     CNS acting medications:  Briefly on Propofol this AM, now off  Midazolam 6 mg/hour   Fentanyl 100 mcg/hour     Neuro  #Concern for seizure versus myoclonus   -vEEG ordered   -If patient condition allows limiting CNS acting agents will allow for more accurate neurologic exam  -If patient condition allows limiting midazolam and Propofol will allow vEEG to capture seizures if seizure activity is present     NCC will follow for vEEG monitoring. We will review recording after vEEG is placed and contact MICU 1 with further recommendation for midazolam or other medication titration. I discussed the plan with MICU 1 resident.     TIME SPENT ON THIS ENCOUNTER  I spent 50 minutes of critical care time on the unit/floor managing the care of Bart Corea. Upon evaluation, this patient had a high probability of imminent or life-threatening deterioration due to concern for seizure, which required my direct attention, intervention, and personal management. Greater than 50% of my time  was spent at the bedside counseling the patient and/or coordinating care regarding services listed in this note.    The patient was seen and discussed with the NCC attending, Dr. Pancho Jeffries, and he is in agreement with the assessment and plan.    JC Pagan, CNP  Neurocritical Care *[id number]    History of Present Illness:  Bart Corea is a 62 year old male with a past medical history including quadriplegia who was admitted to [facility] on 11/23/2022 for evaluation and management of acute hypoxic respiratory failure secondary to CAP and mucous plugging. On 11/27/2022 Mr. Corea developed worsening hypoxic failure and shock. Approximately 7 AM on 11/28/2022 he had difficulty breathing with sudden oxygen desaturation and PEA arrest. The estimated down-time is longer than 15 minutes. Approximately one- hour after ROSC and intubation he was noted to have rhythmic bilateral facial twitching and NCC was consulted for concern of seizure.     Mr. Corea was put on a propofol drip initially, then switched to midazolam and Fentanyl. On exam he is noted to have bilateral intermittent facial twitching/jerking which appears to be myoclonic activity.       Allergies   Allergen Reactions     Vancomycin Anaphylaxis       PAST MEDICAL HISTORY:   Past Medical History:   Diagnosis Date     Asthma      Chronic infection     Bladder     Hard to intubate 11/28/2022     Heart murmur      LIZA (obstructive sleep apnea) 12/3/2014     Quadriplegia, C1-C4 incomplete (H)        PAST SURGICAL HISTORY:   Past Surgical History:   Procedure Laterality Date     Bilateral ureteroscopy with holmium laser lithotripsy and basketing and removal of fragments  Left ureteral stent placement.  02/10/2010     COLONOSCOPY N/A 07/20/2018    Procedure: COMBINED COLONOSCOPY, SINGLE OR MULTIPLE BIOPSY/POLYPECTOMY BY BIOPSY;;  Surgeon: Grace Yang MD;  Location: [facility]     COMBINED CYSTOSCOPY, INSERT STENT URETER(S) Right 02/08/2021     Procedure: CYSTOSCOPY, WITH URETERAL STENT INSERTION;  Surgeon: Javier Barrientos MD;  Location: [facility] OR     COMBINED CYSTOSCOPY, RETROGRADES, URETEROSCOPY, LASER HOLMIUM LITHOTRIPSY URETER(S), INSERT STENT Right 2/19/2021    Procedure: CYSTOURETEROSCOPY, WITH RETROGRADE PYELOGRAM, HOLMIUM LASER LITHOTRIPSY AND STENT EXCHANGE, SUPRAPUBIC CATHETER EXCHANGE;  Surgeon: Bart Hebert MD;  Location: [facility] OR     CYSTOSCOPY, LITHOLAPAXY, COMBINED N/A 11/15/2019    Procedure: Cystolitholapaxy;  Surgeon: Obi Uriostegui MD;  Location: [facility]     INCISION AND DRAINAGE ABDOMEN WASHOUT, COMBINED N/A 01/16/2020    Procedure: Incision and drainage abdomenal Wall with Revision Of Vesicocutaneous Anastomosis;  Surgeon: Obi Uriostegui MD;  Location: [facility] OR     IR THORACENTESIS  11/26/2022     LASER HOLMIUM LITHOTRIPSY BLADDER N/A 01/16/2020    Procedure: LITHOTRIPSY, CALCULUS, BLADDER, USING HOLMIUM LASER;  Surgeon: Obi Uriostegui MD;  Location: [facility] OR     PICC SINGLE LUMEN PLACEMENT Left 02/14/2021    4Fr - 47cm (2cm external), Lateral brachial vein, SVC RA junction     skin flap on bottom       sp tube absess surgery         FAMILY HISTORY: I have reviewed this patient's family history and updated it with pertinent information if needed.  Family History   Problem Relation Age of Onset     Cancer Father      Diabetes No family hx of      Glaucoma No family hx of      Hypertension No family hx of      Macular Degeneration No family hx of      Retinal detachment No family hx of      Anesthesia Reaction No family hx of      Deep Vein Thrombosis (DVT) No family hx of      SOCIAL HISTORY:   Social History     Tobacco Use     Smoking status: Every Day     Packs/day: 0.30     Years: 35.00     Pack years: 10.50     Types: Cigarettes     Smokeless tobacco: Never     Tobacco comments:     2/18/21 Did not complete full session. Would only answer some questions. 14mg patch and 2mg lozenges ordered   Substance Use  "Topics     Alcohol use: Yes     Comment: on weekends, 3-4+       ROS: Review of systems not obtained due to patient factors - critical condition    Current Medications:    [Held by provider] amLODIPine  2.5 mg Oral Daily     artificial tears   Both Eyes Q8H     Baclofen  20 mg Oral or Feeding Tube 5x Daily     bisacodyl  10 mg Rectal Daily     cefTRIAXone  2 g Intravenous Q24H     docusate  200 mg Oral BID     enoxaparin ANTICOAGULANT  40 mg Subcutaneous Q24H     [Held by provider] furosemide  20 mg Oral Daily     ipratropium - albuterol 0.5 mg/2.5 mg/3 mL  3 mL Nebulization Q4H     lactated ringers  1,000 mL Intravenous Once     lactated ringers  500 mL Intravenous Once     minocycline  50 mg Intravenous Q12H     [Held by provider] oxybutynin  5 mg Oral or Feeding Tube BID     pantoprazole  40 mg Per Feeding Tube QAM AC    Or     pantoprazole  40 mg Intravenous QAM AC     polyethylene glycol  17 g Oral or Feeding Tube BID     sodium chloride  3 mL Nebulization Q4H     sodium chloride (PF)  3 mL Intracatheter Q8H       PRN Medications:  acetaminophen, albuterol, baclofen, bisacodyl, artificial tears, glucose **OR** dextrose **OR** glucagon, diazepam, fentaNYL, lidocaine 4%, lidocaine (buffered or not buffered), - MEDICATION INSTRUCTIONS -, - MEDICATION INSTRUCTIONS -, melatonin, meperidine, metoprolol, midazolam, naloxone **OR** naloxone **OR** naloxone **OR** naloxone, oxyCODONE, sodium chloride, sodium chloride (PF), vecuronium, vecuronium    Infusions:    fentaNYL 100 mcg/hr (11/28/22 0800)     - MEDICATION INSTRUCTIONS -       - MEDICATION INSTRUCTIONS -       midazolam       norepinephrine 0.04 mcg/kg/min (11/28/22 0815)       Physical Examination:  Vitals: /64   Pulse 59   Temp 99.9  F (37.7  C) (Axillary)   Resp 13   Ht 1.727 m (5' 7.99\")   Wt 99 kg (218 lb 4.1 oz)   SpO2 97%   BMI 33.19 kg/m    General: Adult male patient, lying in bed, critically-ill.  HEENT: Normocephalic, atraumatic, no " icterus.  Cardiac: RRR on bedside monitor.   Pulm: He is synchronous with ventilator.   Abdomen: Non-distended.  Extremities: Distal extremities appear adequately perfused.   Skin: No obvious rash or lesion on exposed skin.   Psych: Sedated.   Neuro:  Mental status: Sedated, intubated.   Cranial nerves: Deferred. Myoclonic twitching of bilateral face.   Motor: Baseline quadriplegia.   Sensory: Deferred.   Coordination: Deferred.   Gait: Deferred.    Labs:  Recent Labs   Lab 11/28/22  0740 11/28/22  0734 11/28/22  0700 11/28/22  0653 11/27/22  1557 11/27/22  0554 11/24/22  1124 11/24/22  0625 11/23/22  0913   * 136 133* 133* 134* 135*   < > 132* 136   POTASSIUM 5.7* 5.3 6.1* 4.6 4.5 4.4   < > 5.7* 4.0   CHLORIDE  --  91* 94* 93* 93* 95*   < > 97* 96*   CO2  --  35* 22 31* 27 30*   < > 22 31*   BUN  --   --  6.7* 6.5* 9.7 9.9   < > 11.2 9.5   CR  --   --  0.19* 0.18* 0.22* 0.23*   < > 0.20* 0.21*  0.21*  0.21*   LIU  --   --  9.3 8.7* 8.9 8.8   < > 8.6* 9.0   BILITOTAL  --   --  0.5  --   --   --   --  0.2 0.3   ALKPHOS  --   --  81  --   --   --   --  76 89   ALT  --   --  14  --   --   --   --  13 7*   AST  --   --  27  --   --   --   --  26 17    < > = values in this interval not displayed.     Recent Labs   Lab 11/28/22  0740 11/28/22  0700 11/28/22  0653 11/27/22  2018 11/27/22  0554 11/26/22  0645   WBC  --  10.6 6.1  --  6.3 6.0   HGB 13.6 12.7* 11.6* 11.1* 11.4* 12.0*   PLT  --  126* 150  --  141* 135*     Recent Labs   Lab 11/26/22  0512 11/25/22  0905 11/23/22  2017   PH 7.37 7.46* 7.52*   PCO2 60* 46* 41   PO2 85 58* 87   HCO3 35* 33* 33*       All cultures:  Recent Labs   Lab 11/26/22  1220 11/25/22  0632 11/23/22  2037 11/23/22  1721 11/23/22  1720 11/23/22  1717 11/23/22  1034   CULTURE No growth after 1 day No growth after 3 days  No growth after 3 days >100,000 CFU/mL Pseudomonas aeruginosa* No growth after 4 days No growth after 4 days 1+ Normal [place] >100,000 CFU/mL Pseudomonas aeruginosa*        Imaging:    Results for orders placed or performed during the hospital encounter of 11/23/22 (from the past 24 hour(s))   Basic metabolic panel   Result Value Ref Range    Sodium 134 (L) 136 - 145 mmol/L    Potassium 4.5 3.4 - 5.3 mmol/L    Chloride 93 (L) 98 - 107 mmol/L    Carbon Dioxide (CO2) 27 22 - 29 mmol/L    Anion Gap 14 7 - 15 mmol/L    Urea Nitrogen 9.7 8.0 - 23.0 mg/dL    Creatinine 0.22 (L) 0.67 - 1.17 mg/dL    Calcium 8.9 8.8 - 10.2 mg/dL    Glucose 96 70 - 99 mg/dL    GFR Estimate >90 >60 mL/min/1.73m2   Extra Tube ([place] Draw)    Narrative    The following orders were created for panel order Extra Tube ([place] Draw).  Procedure                               Abnormality         Status                     ---------                               -----------         ------                     Extra Purple Top Tube[[id number]]                            Final result                 Please view results for these tests on the individual orders.   Extra Purple Top Tube   Result Value Ref Range    Hold Specimen JI    Hemoglobin   Result Value Ref Range    Hemoglobin 11.1 (L) 13.3 - 17.7 g/dL   Lactic acid whole blood   Result Value Ref Range    Lactic Acid 0.6 (L) 0.7 - 2.0 mmol/L   EKG 12-lead, complete   Result Value Ref Range    Systolic Blood Pressure  mmHg    Diastolic Blood Pressure  mmHg    Ventricular Rate 59 BPM    Atrial Rate 59 BPM    ME Interval 146 ms    QRS Duration 96 ms     ms    QTc 386 ms    P Axis 56 degrees    R AXIS 47 degrees    T Axis 44 degrees    Interpretation ECG       Sinus bradycardia  Otherwise normal ECG  When compared with ECG of 23-NOV-2022 19:11,  No significant change was found     Blood Culture Hand, Left    Specimen: Hand, Left; Blood   Result Value Ref Range    Culture No growth after 12 hours    Blood Culture Arm, Right    Specimen: Arm, Right; Blood   Result Value Ref Range    Culture No growth after 12 hours    XR Chest Port 1 View    Narrative    Exam:  XR CHEST PORT 1 VIEW, 11/28/2022 6:20 AM    Indication: Evaluate left pneumothorax and effusion    Comparison: 11/27/2022    Findings:   Left apically directed chest tube is in place. No appreciable  pneumothorax. Persistent moderate left pleural effusion and  atelectasis/consolidation in the mid to lower lung zone. Decreasing  airspace opacities in the right lung.      Impression    Impression:   1. Unchanged moderate left pleural effusion. No appreciable  pneumothorax.  2. Decreasing airspace opacities on the right.    I have personally reviewed the examination and initial interpretation  and I agree with the findings.    RCIO GALLEGO MD         SYSTEM ID:  [id number]   Basic metabolic panel   Result Value Ref Range    Sodium 133 (L) 136 - 145 mmol/L    Potassium 4.6 3.4 - 5.3 mmol/L    Chloride 93 (L) 98 - 107 mmol/L    Carbon Dioxide (CO2) 31 (H) 22 - 29 mmol/L    Anion Gap 9 7 - 15 mmol/L    Urea Nitrogen 6.5 (L) 8.0 - 23.0 mg/dL    Creatinine 0.18 (L) 0.67 - 1.17 mg/dL    Calcium 8.7 (L) 8.8 - 10.2 mg/dL    Glucose 107 (H) 70 - 99 mg/dL    GFR Estimate >90 >60 mL/min/1.73m2   CBC with platelets   Result Value Ref Range    WBC Count 6.1 4.0 - 11.0 10e3/uL    RBC Count 3.84 (L) 4.40 - 5.90 10e6/uL    Hemoglobin 11.6 (L) 13.3 - 17.7 g/dL    Hematocrit 37.4 (L) 40.0 - 53.0 %    MCV 97 78 - 100 fL    MCH 30.2 26.5 - 33.0 pg    MCHC 31.0 (L) 31.5 - 36.5 g/dL    RDW 13.8 10.0 - 15.0 %    Platelet Count 150 150 - 450 10e3/uL   Phosphorus   Result Value Ref Range    Phosphorus 3.2 2.5 - 4.5 mg/dL   Magnesium   Result Value Ref Range    Magnesium 1.7 1.7 - 2.3 mg/dL   Comprehensive metabolic panel   Result Value Ref Range    Sodium 133 (L) 136 - 145 mmol/L    Potassium 6.1 (HH) 3.4 - 5.3 mmol/L    Chloride 94 (L) 98 - 107 mmol/L    Carbon Dioxide (CO2) 22 22 - 29 mmol/L    Anion Gap 17 (H) 7 - 15 mmol/L    Urea Nitrogen 6.7 (L) 8.0 - 23.0 mg/dL    Creatinine 0.19 (L) 0.67 - 1.17 mg/dL    Calcium 9.3 8.8 - 10.2  mg/dL    Glucose 175 (H) 70 - 99 mg/dL    Alkaline Phosphatase 81 40 - 129 U/L    AST 27 10 - 50 U/L    ALT 14 10 - 50 U/L    Protein Total 6.2 (L) 6.4 - 8.3 g/dL    Albumin 3.0 (L) 3.5 - 5.2 g/dL    Bilirubin Total 0.5 <=1.2 mg/dL    GFR Estimate >90 >60 mL/min/1.73m2    Narrative    Specimen is hemolyzed which can falsely elevate AST. Analysis of a non-hemolyzed specimen may result in a lower value.    INR   Result Value Ref Range    INR 1.17 (H) 0.85 - 1.15   Partial thromboplastin time   Result Value Ref Range    aPTT 32 22 - 38 Seconds   Magnesium   Result Value Ref Range    Magnesium 2.2 1.7 - 2.3 mg/dL   Phosphorus   Result Value Ref Range    Phosphorus 3.7 2.5 - 4.5 mg/dL   Troponin T, High Sensitivity   Result Value Ref Range    Troponin T, High Sensitivity 20 <=22 ng/L   CBC with Platelets & Differential    Narrative    The following orders were created for panel order CBC with Platelets & Differential.  Procedure                               Abnormality         Status                     ---------                               -----------         ------                     CBC with platelets and d...[[id number]]  Abnormal            Final result               Manual Differential[[id number]]          Abnormal            Final result                 Please view results for these tests on the individual orders.   CBC with platelets and differential   Result Value Ref Range    WBC Count 10.6 4.0 - 11.0 10e3/uL    RBC Count 4.09 (L) 4.40 - 5.90 10e6/uL    Hemoglobin 12.7 (L) 13.3 - 17.7 g/dL    Hematocrit 41.4 40.0 - 53.0 %     (H) 78 - 100 fL    MCH 31.1 26.5 - 33.0 pg    MCHC 30.7 (L) 31.5 - 36.5 g/dL    RDW 13.8 10.0 - 15.0 %    Platelet Count 126 (L) 150 - 450 10e3/uL   Extra Tube ([place] Draw)    Narrative    The following orders were created for panel order Extra Tube ([place] Draw).  Procedure                               Abnormality         Status                     ---------                                -----------         ------                     Extra Red Top Tube[[id number]]                               Final result                 Please view results for these tests on the individual orders.   Extra Red Top Tube   Result Value Ref Range    Hold Specimen [facility]    Manual Differential   Result Value Ref Range    % Neutrophils 66 %    % Lymphocytes 23 %    % Monocytes 8 %    % Eosinophils 3 %    % Basophils 0 %    NRBCs per 100 WBC 1 (H) <=0 %    Absolute Neutrophils 7.0 1.6 - 8.3 10e3/uL    Absolute Lymphocytes 2.4 0.8 - 5.3 10e3/uL    Absolute Monocytes 0.8 0.0 - 1.3 10e3/uL    Absolute Eosinophils 0.3 0.0 - 0.7 10e3/uL    Absolute Basophils 0.0 0.0 - 0.2 10e3/uL    Absolute NRBCs 0.1 (H) <=0.0 10e3/uL    RBC Morphology Confirmed RBC Indices     Platelet Assessment  Automated Count Confirmed. Platelet morphology is normal.     Automated Count Confirmed. Platelet morphology is normal.    [place] Cells Slight (A) None Seen   Glucose by meter   Result Value Ref Range    GLUCOSE BY METER POCT 185 (H) 70 - 99 mg/dL   Lactic acid whole blood   Result Value Ref Range    Lactic Acid 9.6 (HH) 0.7 - 2.0 mmol/L   Blood gas venous with oxyhemoglobin   Result Value Ref Range    pH Venous 7.09 (LL) 7.32 - 7.43    pCO2 Venous 104 (HH) 40 - 50 mm Hg    pO2 Venous 51 (H) 25 - 47 mm Hg    Bicarbonate Venous 32 (H) 21 - 28 mmol/L    FIO2 21     Oxyhemoglobin Venous 68 (L) 70 - 75 %    Base Excess/Deficit (+/-) -0.9 -7.7 - 1.9 mmol/L    Lactic Acid 9.6 (HH) 0.7 - 2.0 mmol/L   Ionized Calcium   Result Value Ref Range    Calcium Ionized 5.0 4.4 - 5.2 mg/dL    Lactic Acid 9.6 (HH) 0.7 - 2.0 mmol/L   Electrolyte Panel, Whole Blood   Result Value Ref Range    Sodium 136 133 - 144 mmol/L    Potassium 5.3 3.4 - 5.3 mmol/L    Chloride 91 (L) 94 - 109 mmol/L    Carbon Dioxide 35 (H) 20 - 32 mmol/L    Anion Gap 10 5 - 18 mmol/L    Lactic Acid 9.6 (HH) 0.7 - 2.0 mmol/L   XR Chest Port 1 View    Narrative    EXAM: XR CHEST PORT 1 VIEW  11/28/2022 7:38 AM    HISTORY: 62 years Male intubated and concerns for mucous plugging.     COMPARISON: 0615 hours.    TECHNIQUE: Portable supine AP view of the chest.    FINDINGS:   Patient is rotated to the left. Bilateral costophrenic angles are  coned out of the field of view. Defibrillator pads in place. Interval  placement of endotracheal tube with tip projecting 5.3 cm above the  michelle. Left chest pigtail catheter drain grossly stable in position.  Interval worsening of left greater than right pulmonary opacities with  near complete opacification of the left lung field with loss of air  bronchograms suggestive of central mucous plugging. Right perihilar  and basilar predominant mixed interstitial and airspace opacities.   Midline trachea. No discernible pneumothorax. Unremarkable upper  abdomen. No acute or suspicious osseous abnormalities. Diffuse  osteopenia with demineralization of the bilateral humeral heads.  Unremarkable soft tissues.      Impression    IMPRESSION:   1.  Endotracheal tube tip projects 5.3 cm above michelle. Consider  advancement. Stable left chest pigtail catheter drain.  2.  Worsening left sided near-complete opacification suggestive of  worsening atelectasis as a result of central mucous plugging. May also  represent worsening lobar consolidation suggestive of pneumonia.    I have personally reviewed the examination and initial interpretation  and I agree with the findings.    RICO GALLEGO MD         SYSTEM ID:  [id number]   iStat Gases Electrolytes ICA Glucose Venous, POCT   Result Value Ref Range    CPB Applied No     Hematocrit POCT 40 40 - 53 %    Calcium, Ionized Whole Blood POCT 5.3 (H) 4.4 - 5.2 mg/dL    Glucose Whole Blood POCT 166 (H) 70 - 99 mg/dL    Bicarbonate Venous POCT 33 (H) 21 - 28 mmol/L    Hemoglobin POCT 13.6 13.3 - 17.7 g/dL    Potassium POCT 5.7 (H) 3.4 - 5.3 mmol/L    Sodium POCT 131 (L) 133 - 144 mmol/L    pCO2 Venous POCT 108 (HH) 40 - 50 mm Hg    pO2  Venous POCT 26 25 - 47 mm Hg    pH Venous POCT 7.10 (LL) 7.32 - 7.43    O2 Sat, Venous POCT 28 (L) 94 - 100 %   US Renal Complete Non-Vascular    Narrative    EXAMINATION: Ultrasound renal complete non-vascular, 11/28/2022 8:44  AM     COMPARISON: 1/6/2022, CT 11/23/2022    HISTORY: Blood on urinalysis, concern for kidney stone versus  obstruction    TECHNIQUE: The kidneys and bladder were scanned in the standard  fashion with specialized ultrasound transducer(s) using both gray  scale and limited color/spectral Doppler techniques.    FINDINGS:    Right kidney: Measures 12.6 cm in length. Parenchyma is of normal  thickness and echogenicity. No focal mass. No hydronephrosis. Round  simple appearing cyst within the inferior pole, measuring  approximately 0.9 x 0.7 x 0.6 cm.     Left kidney: Measures 12.2 cm cm in length. Parenchyma is of normal  thickness and echogenicity. No focal mass. No hydronephrosis. Question  nonobstructing calculus given focal twinkle artifact and minimal  posterior shadowing near inferior pole.    Bladder: Decompressed with suprapubic catheter in place.      Impression    IMPRESSION:  1.  No hydronephrosis.  2.  Probable nonobstructing calculus within the left kidney,  potentially the sonographic correlate to lower pole stone seen on CT  11/23/2022.      I have personally reviewed the examination and initial interpretation  and I agree with the findings.    SHARAD MONIQUE MD         SYSTEM ID:  [id number]   Comprehensive metabolic panel   Result Value Ref Range    Sodium 135 (L) 136 - 145 mmol/L    Potassium 4.3 3.4 - 5.3 mmol/L    Chloride 90 (L) 98 - 107 mmol/L    Carbon Dioxide (CO2) 25 22 - 29 mmol/L    Anion Gap 20 (H) 7 - 15 mmol/L    Urea Nitrogen 7.7 (L) 8.0 - 23.0 mg/dL    Creatinine 0.23 (L) 0.67 - 1.17 mg/dL    Calcium 9.2 8.8 - 10.2 mg/dL    Glucose 200 (H) 70 - 99 mg/dL    Alkaline Phosphatase 83 40 - 129 U/L    AST 30 10 - 50 U/L    ALT 22 10 - 50 U/L    Protein Total 6.8 6.4 - 8.3  g/dL    Albumin 3.3 (L) 3.5 - 5.2 g/dL    Bilirubin Total 0.6 <=1.2 mg/dL    GFR Estimate >90 >60 mL/min/1.73m2   CBC with Platelets & Differential    Narrative    The following orders were created for panel order CBC with Platelets & Differential.  Procedure                               Abnormality         Status                     ---------                               -----------         ------                     CBC with platelets and d...[[id number]]  Abnormal            Final result                 Please view results for these tests on the individual orders.   Blood gas venous   Result Value Ref Range    pH Venous 7.48 (H) 7.32 - 7.43    pCO2 Venous 45 40 - 50 mm Hg    pO2 Venous 35 25 - 47 mm Hg    Bicarbonate Venous 33 (H) 21 - 28 mmol/L    Base Excess/Deficit (+/-) 8.3 (H) -7.7 - 1.9 mmol/L    FIO2 90    INR   Result Value Ref Range    INR 1.06 0.85 - 1.15   Lactic acid whole blood   Result Value Ref Range    Lactic Acid 3.8 (H) 0.7 - 2.0 mmol/L   Ionized Calcium   Result Value Ref Range    Calcium Ionized 4.2 (L) 4.4 - 5.2 mg/dL   CBC with platelets and differential   Result Value Ref Range    WBC Count 14.1 (H) 4.0 - 11.0 10e3/uL    RBC Count 4.32 (L) 4.40 - 5.90 10e6/uL    Hemoglobin 13.2 (L) 13.3 - 17.7 g/dL    Hematocrit 41.6 40.0 - 53.0 %    MCV 96 78 - 100 fL    MCH 30.6 26.5 - 33.0 pg    MCHC 31.7 31.5 - 36.5 g/dL    RDW 13.8 10.0 - 15.0 %    Platelet Count 165 150 - 450 10e3/uL    % Neutrophils 88 %    % Lymphocytes 4 %    % Monocytes 7 %    % Eosinophils 0 %    % Basophils 0 %    % Immature Granulocytes 1 %    NRBCs per 100 WBC 0 <1 /100    Absolute Neutrophils 12.5 (H) 1.6 - 8.3 10e3/uL    Absolute Lymphocytes 0.5 (L) 0.8 - 5.3 10e3/uL    Absolute Monocytes 1.0 0.0 - 1.3 10e3/uL    Absolute Eosinophils 0.0 0.0 - 0.7 10e3/uL    Absolute Basophils 0.0 0.0 - 0.2 10e3/uL    Absolute Immature Granulocytes 0.1 <=0.4 10e3/uL    Absolute NRBCs 0.0 10e3/uL   INR   Result Value Ref Range    INR 1.19  (H) 0.85 - 1.15   Partial thromboplastin time   Result Value Ref Range    aPTT 32 22 - 38 Seconds   Fibrinogen activity   Result Value Ref Range    Fibrinogen Activity 579 (H) 170 - 490 mg/dL   CBC with platelets   Result Value Ref Range    WBC Count 9.0 4.0 - 11.0 10e3/uL    RBC Count 3.77 (L) 4.40 - 5.90 10e6/uL    Hemoglobin 11.5 (L) 13.3 - 17.7 g/dL    Hematocrit 34.6 (L) 40.0 - 53.0 %    MCV 92 78 - 100 fL    MCH 30.5 26.5 - 33.0 pg    MCHC 33.2 31.5 - 36.5 g/dL    RDW 13.7 10.0 - 15.0 %    Platelet Count 149 (L) 150 - 450 10e3/uL   Lactic acid whole blood   Result Value Ref Range    Lactic Acid 1.7 0.7 - 2.0 mmol/L   Blood gas arterial   Result Value Ref Range    pH Arterial 7.60 (H) 7.35 - 7.45    pCO2 Arterial 32 (L) 35 - 45 mm Hg    pO2 Arterial 65 (L) 80 - 105 mm Hg    FIO2 90     Bicarbonate Arterial 31 (H) 21 - 28 mmol/L    Base Excess/Deficit (+/-) 9.6 (H) -9.0 - 1.8 mmol/L    Asher's Test Artline    XR Chest Port 1 View    Narrative    EXAM: XR CHEST PORT 1 VIEW 11/28/2022 11:19 AM    HISTORY: 62 years Male central line placement, ETT postioning.     COMPARISON: 0732 hours.    TECHNIQUE: Portable supine AP view of the chest.    FINDINGS:   Patient markedly rotated to the left. Defibrillator pad projects over  the mid sternum. Portions of the lower lung fields are coned out of  field of view. Endotracheal tube tip lies between 5 and 6 cm above the  michelle. Interval placement of esophageal temperature probe projecting  midline and terminating in the midesophagus. Left hemithorax pigtail  catheter drain stable in position. Left upper approach central venous  catheter tip projects over the left innominate vein near the  confluence of the SVC.    Interval exam changes include improved aeration of left upper lobe  with continued opacification of the bilateral lower lobes. Better  demonstration of left pleural effusion. No conspicuous pneumothorax.  The upper abdomen is unremarkable. Degenerative changes of  the  thoracic spine. Soft tissues are unremarkable. Redemonstration of  demineralization diffusely suggestive of osteopenia.      Impression    IMPRESSION:   1.  Endotracheal tube tip projects from 5 to 6 cm above the michelle.  Consider advancement.  2.  Left upper approach central venous catheter tip projects near the  confluence of the SVC. Consider minor advancement.  3.  Properly positioned esophageal temperature probe, grossly stable  position of left chest pigtail catheter drain.  4.  Improved left upper lobe aeration and basilar predominant  pulmonary opacities.    I have personally reviewed the examination and initial interpretation  and I agree with the findings.    GUSTAVO DUNBAR MD         SYSTEM ID:  [id number]   XR Abdomen Port 1 View    Narrative    EXAMINATION:  XR ABDOMEN PORT 1 VIEW 11/28/2022 11:21 AM.    COMPARISON: 11/23/2022.    HISTORY:  OGT placement    FINDINGS: Single portable supine view of the abdomen. Nonobstructive  bowel gas pattern. Mild gaseous distention of the stomach. Gastric  tube tip and sidehole project over the stomach. Partially visualized  cutaneous pacer pads over chest. Multilevel degenerative changes in  the spine. Calcified phleboliths in the pelvis.      Impression    IMPRESSION: Gastric tube tip and sidehole project over the stomach.    SHARAD MONIQUE MD         SYSTEM ID:  [id number]   Glucose by meter   Result Value Ref Range    GLUCOSE BY METER POCT 161 (H) 70 - 99 mg/dL   Central line    Narrative    Farrukh Dmias MD     11/28/2022 11:51 AM  [facility]    Central line    Date/Time: 11/28/2022 11:50 AM  Performed by: Farrukh Dimas MD  Authorized by: Josh Laboy MD   Indications: vascular access      UNIVERSAL PROTOCOL   Site Marked: Yes  Prior Images Obtained and Reviewed:  NA  Required items: Required blood products, implants, devices and special   equipment available    Patient identity confirmed:   Verbally with patient and arm band  Patient was reevaluated immediately before administering moderate or deep   sedation or anesthesia  Confirmation Checklist:  Patient's identity using two indicators  Time out: Immediately prior to the procedure a time out was called    Universal Protocol: the Joint Commission Universal Protocol was followed    Preparation: Patient was prepped and draped in usual sterile fashion    ESBL (mL):  10    SEDATION  Patient Sedated: Yes    Sedation:  See MAR for details  Vital signs: Vital signs monitored during sedation      Preparation: skin prepped with 2% chlorhexidine  Skin prep agent dried: skin prep agent completely dried prior to procedure  Sterile barriers: all five maximum sterile barriers used - cap, mask,   sterile gown, sterile gloves, and large sterile sheet  Hand hygiene: hand hygiene performed prior to central venous catheter   insertion  Patient position: flat  Catheter type: triple lumen  Pre-procedure: landmarks identified  Ultrasound guidance: yes  Sterile ultrasound techniques: sterile gel and sterile probe covers were   used  Number of attempts: 1  Successful placement: yes  Post-procedure: line sutured and dressing applied  Assessment: blood return through all ports,  free fluid flow and placement   verified by x-ray      PROCEDURE    Patient Tolerance:  Patient tolerated the procedure well with no immediate   complications  Length of time physician/provider present for 1:1 monitoring during   sedation: 15   Echo Complete   Result Value Ref Range    LVEF  55-60%     Narrative    [id number]  [id number]  [id number]  [id number]^AYANA^TESSA^[facility]  Echocardiography Laboratory  [address] [place] [id number]     Name: MICHOACANO WHITE  MRN: [id number]  : 1960  Study Date: 2022 11:43 AM  Age: 62 yrs  Gender: Male  Patient Location: [facility] [place]  Reason For Study: Shock, Cardiac Arrest  Ordering Physician:  TESSA PIÑA  Performed By: Jimmy Cross RDCS     BSA: 2.1 m2  Height: 68 in  Weight: 218 lb  HR: 58  BP: 126/64 mmHg  ______________________________________________________________________________  Procedure  Complete Portable Echo Adult. Contrast Optison. Optison (NDC #4211-3082-96)  given intravenously. Patient was given 6 ml mixture of 3 ml Optison and 6 ml  saline. 3 ml wasted.  ______________________________________________________________________________  Interpretation Summary  Global and regional left ventricular function is normal with an EF of 55-60%.  Global right ventricular function is normal. The right ventricle is normal  size.  No significant valvular abnormalities.  This study was compared with the study from 02/10/2021. No significant changes  noted.  ______________________________________________________________________________  Left Ventricle  Global and regional left ventricular function is normal with an EF of 55-60%.  Left ventricular size is normal. Left ventricular wall thickness is normal.  Left ventricular diastolic function is normal.     Right Ventricle  Global right ventricular function is normal. The right ventricle is normal  size.     Atria  Both atria appear normal.     Mitral Valve  The mitral valve is normal. Trace mitral insufficiency is present.     Aortic Valve  The aortic valve is tricuspid. On Doppler interrogation, there is no  significant stenosis or regurgitation.     Tricuspid Valve  The tricuspid valve is normal. Trace tricuspid insufficiency is present.  Pulmonary artery systolic pressure cannot be assessed.     Pulmonic Valve  The valve leaflets are not well visualized. Trace pulmonic insufficiency is  present.     Vessels  The aorta root is normal. The thoracic aorta cannot be assessed. Dilation of  the inferior vena cava is present with abnormal respiratory variation in  diameter. Unable to assess mean RA pressure given the patient is on  a  ventilator.     Pericardium  Trivial pericardial effusion is present. Chamber compression is not present;  there is no evidence for tamponade. Noted adjacent to the LV inferolateral  segments.     Miscellaneous  A left pleural effusion is present.     Compared to Previous Study  This study was compared with the study from 02/10/2021 . No significant  changes noted.  ______________________________________________________________________________  MMode/2D Measurements & Calculations  IVSd: 0.88 cm  LVIDd: 5.5 cm  LVIDs: 3.5 cm  LVPWd: 1.1 cm  FS: 35.9 %  LV mass(C)d: 207.7 grams  LV mass(C)dI: 98.0 grams/m2  LA Volume Index (BP): 30.2 ml/m2  RWT: 0.40  TAPSE: 2.2 cm     Doppler Measurements & Calculations  MV E max monroe: 84.9 cm/sec  MV A max monroe: 31.4 cm/sec  MV E/A: 2.7  MV dec slope: 440.1 cm/sec2  MV dec time: 0.19 sec  PA acc time: 0.08 sec  E/E' av.0  Lateral E/e': 7.1  Medial E/e': 8.9     ______________________________________________________________________________  Report approved by: Lucie Starks 2022 01:11 PM         Glucose by meter   Result Value Ref Range    GLUCOSE BY METER POCT 142 (H) 70 - 99 mg/dL     *Note: Due to a large number of results and/or encounters for the requested time period, some results have not been displayed. A complete set of results can be found in Results Review.       All relevant imaging and laboratory values personally reviewed.

## 2022-11-28 NOTE — PROGRESS NOTES
ICU Daily Rounding Checklist     Checklist Response Notes   Can sedation be reduced?  No    Can analgesia be reduced? No    Is delirium being assessed, addressed and prevented? Yes    Spontaneous awakening trial and/or Spontaneous breathing trial candidate?  No    Total fluid balance goal reviewed?  Yes Target Goals:        + 500mL [12h]             +500mL [24h]   Is the patient at goals for lung protective ventilation? Yes    Head of bed elevation (30 degrees)? Yes    Skin breakdown assessment (prevention) completed? Yes    Is enteral nutrition at goal? Yes    Is blood glucose at goal? Yes    Deep venous thrombosis prophylaxis? Yes      Gastric ulcer prophylaxis?  Yes If coagulopathy (INR-1.5 PTT2x normal. Ph<50k), mechanical ventilation 48hr, history of GI bleed/ulcer within past year. TBL, SCI, or burn, or if >= 2 minor risk factors (sepsis, ICU stay 1 week, occult GI bleed > 6 days. glucocorticoid therapy, NSAID use, antiplatelet use)   Can Antibiotics be narrowed or discontinued? Yes    Early mobility candidate and physical therapy consulted? No    Is gastelum catheter needed? Yes    Is central venous/arterial catheter needed? Yes    Has the family been updated? Yes    Are the patient's goals of care and code status current? Yes

## 2022-11-28 NOTE — PROGRESS NOTES
MEDICAL ICU PROGRESS NOTE  11/28/2022      Date of Service (when I saw the patient): 11/28/2022    ASSESSMENT: Bart Corea is a 62 year old male with a history notable for traumatic quadriplegia C1-C4, asymptomatic Bradycardia (40's- 50's), neurogenic bladder with suprapubic catheter in place, and LIZA who presents to the ED for evaluation of generalized weakness, edema and constipation. Initially in the ICU due to hypoxic respiratory failure. Pt had Code Mejia called at roughly 0700, PEA arrest believed to be 2/2 extensive mucous plugging confirmed with BAL. Time to successful intubation roughly 24min, ROSC in 17min.        CHANGES and MAJOR THINGS TODAY:   - Bronchoscopy with BAL  - Place OGT, Abdomen Xray for confirmation   - Arterial Line placement  - Central line placement  - Repeat rainbow of labs  - Discontinue propofol  - Versed gtt & PRN  - Blood culture x 2  - Neuro crit consult  - Cooling with [place] Sun  - 1400 Labs     PLAN:     Neuro:  # PEA arrest 2/2 AHRF/Mucous Plugging (ROSC 17min)  # c/f Anoxic Brain Injury versus myoclonus   Approximately 0700 on 11/28/2022 he was noted to have coughing which lead to respiratory arrest requiring intubation. NCC was consulted on 11/28/2022 after facial twitching post- respiratory arrest with concern for seizure.   - vEEG ordered   - If patient condition allows limiting CNS acting agents will allow for more accurate neurologic exam  - If patient condition allows limiting midazolam and Propofol will allow vEEG to capture seizures if seizure activity is present   - Will review recording after vEEG is placed and contact with further recommendation for midazolam or other medication titration.  - Neuron specific Enolase ordered will follow for next three days  - TTM protocol as below, achieved 36C at 2:36pm on 11/28    # Hx of Traumatic Quadriplegia C1-C4  # Autonomic Dysreflexia  - Holding PTA Amlodipine 2.5 mg  - Continue PTA baclofen 20 mg 6 times  daily    Pulmonary:  # Acute Hypoxic Respiratory Failure likely 2/2 mucous plugging  # Community Acquired PNA  Initially concerned for CAP vs Viral vs Aspiration PNA though laboratory workup up was unrevealing. Initial hypoxic arrest (11/23) likely related to mucous plugging as pt is unable to clear airways at baseline due to C1-C4 quadriplegia. Patient extubated on 11/25/22 to HFNC with oxymask and BiPAP at night. Persistent left pleural effusion was tapped for 600cc (transudative) c/b PTX now s/p chest tube. Pt suffered PEA arrest believed to be due to extensive mucous plugging with ROSC of 17 min.   - Holding Chest physiotherapy, hypertonic nebs q4h, DUONEB's q4h, and cough assist to help with known secretions and poor cough response  - Repeat VBG at 1800  - Bronchoscopy Today with BAL cultures  - Abx as below    Vent Mode: CMV/AC  (Continuous Mandatory Ventilation/ Assist Control)  FiO2 (%): 100 %  Resp Rate (Set): 24 breaths/min  Tidal Volume (Set, mL): 400 mL  PEEP (cm H2O): 8 cmH2O  Resp: 17    # PTX- Thoracentesis associated   # Chronic Left Sided Pleural Effusion  IR consult for left thoracentesis, diagnostic and therapeutic. Pt returned from procedure with with worsening oxygen requirements on 11/26. CXR obtained and showed left sided PTX. Chest tube was placed with the assistance of our Surgery Colleagues. Repeat CXR showing improved PTX, continues on suction for now.  - Thoracentesis labs showing transudate effusion, likely due to HFpEF vs Chronic Atelectasis (quadraplegia with left diaphragmatic paralysis)  - Monitoring chest tubes for now, will reevaluate removal tomorrow  - Repeat CXR    # LIZA  - Uses CPAP at night    # Hx Asthma  - Albuterol neb Q 4 hr PRN     Cardiovascular:  # PEA arrest likely due to Hypoxia 2/2 Mucous plugging  At 0530, pt complained of cough and congestion. Assisted with quad cough with minimum secretions being coughed up. Therefore, pt was NT suctioned with improvement in  breathing and congestion. Vitals remained stable with O2 sats >92% on HFNC 60%, 50L. However, pt remained anxious and appeared to still be attempting to cough more. RT notified. This RN remained in room to assist further with quad cough. At 0703, pt stated he was having a difficult time breathing. As pt was talking, O2 saturations quickly desatted to 40%, and pulse was lost. Code blue initiated. Intubation attempts with manual in line stabilization and CPR in progress. Difficult intubation, able to visualize cords but unable to advance the ETT. 2nd attempt with a bougie, poor visualization with copious secretions in the oropharynx. Successful intubation with CMAC D-blade. Multiple rounds of CPR with Morgan. Received Epi. Time to intubation roughly 24min, ROSC 17min. See flowsheet for code documentation and medications.   - TTM order set with goal temp 36C (Achieved at 2:36PM)  - Repeat BMP, Ical, electrolytes, VBG  - Repeat CXR, EKG    # Shock likely distributive vs septic  Pt initially transferred overnight due to concerns of ongoing hypotension not responsive to fluid bolus. Was started on Levophed and titrated down to minimal dose of 0.008 this morning. Possibly related to starting BIPAP or related to circadian BP cycle. Unfortunately pt had PEA arrest in the AM.  - Levophed gtt wean as able.    # C/f HFpEF (EF 55-60%) vs Chronic Atelectasis 2/2 C1-C4 injury  Has had a chronic left pleural effusion since January of 2021, developed a trace right sided effusion seen on imaging at admission. Most recent echo (2/2021) with an EF of 55-60%. Appears fluid up on exam with lower extremity swelling as well. Hepatic panel WNL, Cr normal. Likely a component of diastolic dysfunction.   - Holding PO Lasix 20mg  - Cardiology outpatient    # HTN  - Holding PTA amlodipine     GI/Nutrition:  # Chronic Constipation  Patient is taking Sodium docusate daily and Senna every 3 days, last time took Senna was yesterday. Last BM was 5  "days ago.   - Docusate sodium (COLACE) capsule 200 mg BID  - Miralax cap 100 mg BID  - Biscodyl 20mg Enemas today  - Consider enema if oral medications or rectal suppository     Renal/Fluids/Electrolytes:  # UTI  # Hx of chronic UTI's on Minocycline suppression  Hx of Neurogenic bladder & recurrent stones with stent placement and removal, he has urinary catheter & recurrent UTI with resistant organism  - Continue PTA Minocyclin for suppression  - UA/UC repeat today from clean suprapubic catheter  - Monitor for fever, pain  - KUB U/S- WNL     Endocrine:  # Hyperglycemia likely stress response  Pt with increasing BG in the setting of recent PEA arrest. BG slowly increasing to 160+ will monitor for now and add sliding scale insulin when appropriate.     ID:  # CAP vs Viral PNA vs Aspiration PNA  Initially concerned for CAP vs Viral vs Aspiration PNA though laboratory workup up so far is unrevealing. Extubated on 11/25/22 to BiPAP. Hypoxic arrest likely related to mucous plugging rather than PNA as pt is unable to clear airways at baseline due to C1-C4 quadriplegia. Infectious workup was otherwise unremarkable though pt spiked a fever and had a white count with brief discontinuation of abx. Will complete a course of abx for CAP tomorrow, will reevaluate tomorrow to determine if need to broaden or extend course. Bronchoscopy today \" The R lung was explored first--there were copious, thick, mucopurulent appearing secretions tinged with blood sseen throughout the first bronchial segments. Therapeutic suctioning was performed and multiple tenacious mucous plugs were removed. A bronchial washing from the RLL was sent for specimen analysis. After multiple rounds of therapeutic suctioning, the L lung was explored. Again, there were copious, thick secretions present in the first bronchial segments that were suctioned until the second bronchial segments were visualized.\"  - BAL cultures pending  - Cultures and ABX as below  - " Continue CTX empiric course, finished Azithromycin     Abx:  Ceftriaxone (11/23-11/24, 11/25--> 11/27)  Azithromycin (500mg 11/23, 500mg -11/24--> 11/25)  Minocycline (Continue for prophylaxis)     Cultures:  11/28 BAL: Gram stain, Resp Cx, Cytology  11/23 Bcx: NGTD  11/23 Sputum Cx: Normal fr[place]  11/23 Ucx: >100,000 Pseudomonas (taken off of old suprapubic catheter)  11/24: Legionella negative  11/24: Streptococcus Pneumoniae negative  11/25 BCx: NGTD  11/26: UA/UC to be collected off of replaced suprapubic catheter     Hematology:    # Anemia   Hgb minimally low at 13.2 at baseline which appears to be around between 12-13.   - CTM     # Thrombocytopenia  Likely reactive 2/2 acute illness.   - CTM     Musculoskeletal:  # Quadriplegia/Deconditioning  - PT/OT consult    :  # Suprapubic Catheter  Catheter was exchanged on 11/25/22 per urology. Repeat UA/UC to evaluate for colonization of previous pseudomonal growth.   - Next SPT exchange in 8 weeks, per previously established schedule     Skin:  # Bilateral upper extremity hyperpigmentation  Possibly related to minocycline use  - Monitor for now     # Chronic wound on the left arm  - Wound consult     General Cares/Prophylaxis:    Fluids: None  Nutrition: Full Diet  DVT Prophylaxis: Heparin SQ  GI Prophylaxis: None  Restraints: None  Family Communication: Family updated at bedside  Code Status: Full Code     Lines/tubes/drains:        - PIV x3  - ETT  - Arterial Line (11/28)  - CVC (11/28)  - Chest tube (11/26)  - Suprapubic catheter, exchanged 11/26/22     Disposition:  - Medical ICU      Patient seen and findings/plan discussed with medical ICU staff, Dr. Josh Laboy.     Edwar Gallegos MD  Internal Medicine Resident, PGY-1  Pager: [phone]    ====================================  INTERVAL HISTORY:     Overnight: Transferred to ICU due to hypotension requiring pressors    Today:     At 0530, pt complained of cough and congestion. Assisted with quad  cough with minimum secretions being coughed up. Therefore, pt was NT suctioned with improvement in breathing and congestion. Vitals remained stable with O2 sats >92% on HFNC 60%, 50L. However, pt remained anxious and appeared to still be attempting to cough more. RT notified. This RN remained in room to assist further with quad cough. At 0703, pt stated he was having a difficult time breathing. As pt was talking, O2 saturations quickly desatted to 40%, and pulse was lost. Code blue initiated. Intubation attempts with manual in line stabilization and CPR in progress. Difficult intubation, able to visualize cords but unable to advance the ETT. 2nd attempt with a bougie, poor visualization with copious secretions in the oropharynx. Successful intubation with CMAC D-blade. Multiple rounds of CPR with Morgan. Received Epi. Time to intubation roughly 24min, ROSC 17min. See flowsheet for code documentation and medications.     OBJECTIVE:   1. VITAL SIGNS:   Temp:  [98  F (36.7  C)-99.9  F (37.7  C)] 98.2  F (36.8  C)  Pulse:  [49-83] 59  Resp:  [9-35] 17  BP: ()/() 101/88  MAP:  [77 mmHg-109 mmHg] 109 mmHg  Arterial Line BP: (112-164)/(58-80) 164/80  FiO2 (%):  [30 %-100 %] 100 %  SpO2:  [89 %-100 %] 100 %  Vent Mode: CMV/AC  (Continuous Mandatory Ventilation/ Assist Control)  FiO2 (%): 100 %  Resp Rate (Set): 24 breaths/min  Tidal Volume (Set, mL): 400 mL  PEEP (cm H2O): 8 cmH2O  Resp: 17    2. INTAKE/ OUTPUT:   I/O last 3 completed shifts:  In: 1468.3 [P.O.:300; I.V.:168.3; IV Piggyback:1000]  Out: 2577 [Urine:2570; Chest Tube:7]    3. PHYSICAL EXAMINATION:  General: On HFNC, a bit anxious  Neuro: AAOx3, able to communicate needs  Pulm/Resp: Coarse breath sounds bilaterally worse on the right with expiratory wheeze.  CV: Normal rate, regular rhythm, no murmurs, rubs, or gallops  Abdomen: Soft, non-distended, non-tender  : Amanda catheter in place, urine yellow and clear  Incisions/Skin: Chronic wound on the  left arm    4. LABS:   Arterial Blood Gases   Recent Labs   Lab 11/28/22  1321 11/28/22  1109 11/26/22  0512 11/25/22  0905   PH 7.55* 7.60* 7.37 7.46*   PCO2 38 32* 60* 46*   PO2 168* 65* 85 58*   HCO3 33* 31* 35* 33*     Complete Blood Count   Recent Labs   Lab 11/28/22  1109 11/28/22  0929 11/28/22  0740 11/28/22  0700 11/28/22  0653   WBC 9.0 14.1*  --  10.6 6.1   HGB 11.5* 13.2* 13.6 12.7* 11.6*   * 165  --  126* 150     Basic Metabolic Panel  Recent Labs   Lab 11/28/22  1319 11/28/22  1149 11/28/22  0929 11/28/22  0740 11/28/22  0734 11/28/22  0727 11/28/22  0700 11/28/22  0653 11/27/22  1557   NA  --   --  135* 131* 136  --  133* 133* 134*   POTASSIUM  --   --  4.3 5.7* 5.3  --  6.1* 4.6 4.5   CHLORIDE  --   --  90*  --  91*  --  94* 93* 93*   CO2  --   --  25  --  35*  --  22 31* 27   BUN  --   --  7.7*  --   --   --  6.7* 6.5* 9.7   CR  --   --  0.23*  --   --   --  0.19* 0.18* 0.22*   * 161* 200* 166*  --    < > 175* 107* 96    < > = values in this interval not displayed.     Liver Function Tests  Recent Labs   Lab 11/28/22  1109 11/28/22  0929 11/28/22  0700 11/24/22  0625 11/23/22  0913   AST  --  30 27 26 17   ALT  --  22 14 13 7*   ALKPHOS  --  83 81 76 89   BILITOTAL  --  0.6 0.5 0.2 0.3   ALBUMIN  --  3.3* 3.0* 3.1* 3.5   INR 1.19* 1.06 1.17*  --   --      Coagulation Profile  Recent Labs   Lab 11/28/22  1109 11/28/22  0929 11/28/22  0700   INR 1.19* 1.06 1.17*   PTT 32  --  32       5. RADIOLOGY:   Recent Results (from the past 24 hour(s))   XR Chest Port 1 View    Narrative    Exam: XR CHEST PORT 1 VIEW, 11/28/2022 6:20 AM    Indication: Evaluate left pneumothorax and effusion    Comparison: 11/27/2022    Findings:   Left apically directed chest tube is in place. No appreciable  pneumothorax. Persistent moderate left pleural effusion and  atelectasis/consolidation in the mid to lower lung zone. Decreasing  airspace opacities in the right lung.      Impression    Impression:   1.  Unchanged moderate left pleural effusion. No appreciable  pneumothorax.  2. Decreasing airspace opacities on the right.    I have personally reviewed the examination and initial interpretation  and I agree with the findings.    RICO GALLEGO MD         SYSTEM ID:  [id number]   XR Chest Port 1 View    Narrative    EXAM: XR CHEST PORT 1 VIEW 11/28/2022 7:38 AM    HISTORY: 62 years Male intubated and concerns for mucous plugging.     COMPARISON: 0615 hours.    TECHNIQUE: Portable supine AP view of the chest.    FINDINGS:   Patient is rotated to the left. Bilateral costophrenic angles are  coned out of the field of view. Defibrillator pads in place. Interval  placement of endotracheal tube with tip projecting 5.3 cm above the  michelle. Left chest pigtail catheter drain grossly stable in position.  Interval worsening of left greater than right pulmonary opacities with  near complete opacification of the left lung field with loss of air  bronchograms suggestive of central mucous plugging. Right perihilar  and basilar predominant mixed interstitial and airspace opacities.   Midline trachea. No discernible pneumothorax. Unremarkable upper  abdomen. No acute or suspicious osseous abnormalities. Diffuse  osteopenia with demineralization of the bilateral humeral heads.  Unremarkable soft tissues.      Impression    IMPRESSION:   1.  Endotracheal tube tip projects 5.3 cm above michelle. Consider  advancement. Stable left chest pigtail catheter drain.  2.  Worsening left sided near-complete opacification suggestive of  worsening atelectasis as a result of central mucous plugging. May also  represent worsening lobar consolidation suggestive of pneumonia.    I have personally reviewed the examination and initial interpretation  and I agree with the findings.    RICO GALELGO MD         SYSTEM ID:  [id number]   US Renal Complete Non-Vascular    Narrative    EXAMINATION: Ultrasound renal complete non-vascular, 11/28/2022  8:44  AM     COMPARISON: 1/6/2022, CT 11/23/2022    HISTORY: Blood on urinalysis, concern for kidney stone versus  obstruction    TECHNIQUE: The kidneys and bladder were scanned in the standard  fashion with specialized ultrasound transducer(s) using both gray  scale and limited color/spectral Doppler techniques.    FINDINGS:    Right kidney: Measures 12.6 cm in length. Parenchyma is of normal  thickness and echogenicity. No focal mass. No hydronephrosis. Round  simple appearing cyst within the inferior pole, measuring  approximately 0.9 x 0.7 x 0.6 cm.     Left kidney: Measures 12.2 cm cm in length. Parenchyma is of normal  thickness and echogenicity. No focal mass. No hydronephrosis. Question  nonobstructing calculus given focal twinkle artifact and minimal  posterior shadowing near inferior pole.    Bladder: Decompressed with suprapubic catheter in place.      Impression    IMPRESSION:  1.  No hydronephrosis.  2.  Probable nonobstructing calculus within the left kidney,  potentially the sonographic correlate to lower pole stone seen on CT  11/23/2022.      I have personally reviewed the examination and initial interpretation  and I agree with the findings.    SHARAD MONIQUE MD         SYSTEM ID:  [id number]   XR Chest Port 1 View    Narrative    EXAM: XR CHEST PORT 1 VIEW 11/28/2022 11:19 AM    HISTORY: 62 years Male central line placement, ETT postioning.     COMPARISON: 0732 hours.    TECHNIQUE: Portable supine AP view of the chest.    FINDINGS:   Patient markedly rotated to the left. Defibrillator pad projects over  the mid sternum. Portions of the lower lung fields are coned out of  field of view. Endotracheal tube tip lies between 5 and 6 cm above the  michelle. Interval placement of esophageal temperature probe projecting  midline and terminating in the midesophagus. Left hemithorax pigtail  catheter drain stable in position. Left upper approach central venous  catheter tip projects over the left innominate vein  near the  confluence of the SVC.    Interval exam changes include improved aeration of left upper lobe  with continued opacification of the bilateral lower lobes. Better  demonstration of left pleural effusion. No conspicuous pneumothorax.  The upper abdomen is unremarkable. Degenerative changes of the  thoracic spine. Soft tissues are unremarkable. Redemonstration of  demineralization diffusely suggestive of osteopenia.      Impression    IMPRESSION:   1.  Endotracheal tube tip projects from 5 to 6 cm above the michelle.  Consider advancement.  2.  Left upper approach central venous catheter tip projects near the  confluence of the SVC. Consider minor advancement.  3.  Properly positioned esophageal temperature probe, grossly stable  position of left chest pigtail catheter drain.  4.  Improved left upper lobe aeration and basilar predominant  pulmonary opacities.    I have personally reviewed the examination and initial interpretation  and I agree with the findings.    GUSTAVO DUNBAR MD         SYSTEM ID:  [id number]   XR Abdomen Port 1 View    Narrative    EXAMINATION:  XR ABDOMEN PORT 1 VIEW 2022 11:21 AM.    COMPARISON: 2022.    HISTORY:  OGT placement    FINDINGS: Single portable supine view of the abdomen. Nonobstructive  bowel gas pattern. Mild gaseous distention of the stomach. Gastric  tube tip and sidehole project over the stomach. Partially visualized  cutaneous pacer pads over chest. Multilevel degenerative changes in  the spine. Calcified phleboliths in the pelvis.      Impression    IMPRESSION: Gastric tube tip and sidehole project over the stomach.    SHARAD MONIQUE MD         SYSTEM ID:  [id number]   Echo Complete   Result Value    LVEF  55-60%    Narrative    [id number]  [id number]  [id number]  [id number]^AYANA^TESSA^[facility],[place]  Echocardiography Laboratory  [address] [place] [id number]     Name: LUNAROSEANNA ANSHU  MRN: [id number]  :  1960  Study Date: 11/28/2022 11:43 AM  Age: 62 yrs  Gender: Male  Patient Location: [facility]  Reason For Study: Shock, Cardiac Arrest  Ordering Physician: TESSA PIÑA  Performed By: Jimmy Cross RDCS     BSA: 2.1 m2  Height: 68 in  Weight: 218 lb  HR: 58  BP: 126/64 mmHg  ______________________________________________________________________________  Procedure  Complete Portable Echo Adult. Contrast Optison. Optison (NDC #6913-0075-41)  given intravenously. Patient was given 6 ml mixture of 3 ml Optison and 6 ml  saline. 3 ml wasted.  ______________________________________________________________________________  Interpretation Summary  Global and regional left ventricular function is normal with an EF of 55-60%.  Global right ventricular function is normal. The right ventricle is normal  size.  No significant valvular abnormalities.  This study was compared with the study from 02/10/2021. No significant changes  noted.  ______________________________________________________________________________  Left Ventricle  Global and regional left ventricular function is normal with an EF of 55-60%.  Left ventricular size is normal. Left ventricular wall thickness is normal.  Left ventricular diastolic function is normal.     Right Ventricle  Global right ventricular function is normal. The right ventricle is normal  size.     Atria  Both atria appear normal.     Mitral Valve  The mitral valve is normal. Trace mitral insufficiency is present.     Aortic Valve  The aortic valve is tricuspid. On Doppler interrogation, there is no  significant stenosis or regurgitation.     Tricuspid Valve  The tricuspid valve is normal. Trace tricuspid insufficiency is present.  Pulmonary artery systolic pressure cannot be assessed.     Pulmonic Valve  The valve leaflets are not well visualized. Trace pulmonic insufficiency is  present.     Vessels  The aorta root is normal. The thoracic aorta cannot be assessed. Dilation  of  the inferior vena cava is present with abnormal respiratory variation in  diameter. Unable to assess mean RA pressure given the patient is on a  ventilator.     Pericardium  Trivial pericardial effusion is present. Chamber compression is not present;  there is no evidence for tamponade. Noted adjacent to the LV inferolateral  segments.     Miscellaneous  A left pleural effusion is present.     Compared to Previous Study  This study was compared with the study from 02/10/2021 . No significant  changes noted.  ______________________________________________________________________________  MMode/2D Measurements & Calculations  IVSd: 0.88 cm  LVIDd: 5.5 cm  LVIDs: 3.5 cm  LVPWd: 1.1 cm  FS: 35.9 %  LV mass(C)d: 207.7 grams  LV mass(C)dI: 98.0 grams/m2  LA Volume Index (BP): 30.2 ml/m2  RWT: 0.40  TAPSE: 2.2 cm     Doppler Measurements & Calculations  MV E max monroe: 84.9 cm/sec  MV A max monroe: 31.4 cm/sec  MV E/A: 2.7  MV dec slope: 440.1 cm/sec2  MV dec time: 0.19 sec  PA acc time: 0.08 sec  E/E' av.0  Lateral E/e': 7.1  Medial E/e': 8.9     ______________________________________________________________________________  Report approved by: Lucie Starks 2022 01:11 PM

## 2022-11-28 NOTE — PROGRESS NOTES
CLINICAL NUTRITION SERVICES - BRIEF NOTE  *Please see full assessment note from 11/27/2022    New Findings:  Pt was intubated this morning following code blue. Targeted temperature management started. OGT placed (AXR). Recommend starting TF once fully rewarmed. +BMs.     Labs: K+ 3.5 (low normal), Cr 0.22 (L), Phos 3.7 (WNL)  NEW Meds: Miralax BID, Fentanyl, Versed, Norepi @ 0.03 mcg/kg/min, Propofol (off)    Interventions  Collaboration with other providers - rounds       --Once FULLY rewarmed, please consult RD for TF initiation/nutrition management.   Enteral Nutrition - recs  --If EN becomes POC:  --GOAL: Osmolite 1.5 Luis @ goal of 50ml/hr (1200ml/day) +3 Prosource TF20 packets will provide: 2040 kcals (29 kcal/kg), 135 g PRO (1.9 g/kg), 914 ml free H20, 244 g CHO, and 0 g fiber daily.  --Start TF @ 10 ml/hr and advance by 10 ml q 8 hrs until goal rate.  --Do not start or advance TF rate unless K+ >3.0, Mg++ > 1.5,  and Phos > 1.9.  --Minimum 30 ml q 4 hrs water flushes for tube patency.  --If gastric enteral access: HOB > 30 degrees.  --MVI/minerals supplement if not already ordered.    RD to follow per protocol.    Laura Vasquez, MS, RD, LD, [facility]  [facility]U pager: [phone]  ASCOM: [id number]

## 2022-11-28 NOTE — PROGRESS NOTES
"    SPIRITUAL HEALTH SERVICES Progress Note  [facility]    Saw pt Bart Corea per Code Blue protocol/STAT consult.      Patient/Family Understanding of Illness and Goals of Care - Pt unresponsive, but wife and step daughter were at the bedside and pt's brother arrived shortly after.  Pt's wife shared that pt has been in and out of the hospital for many years and \"its always a roller coaster.\"  Pt's wife suffered a stroke several years ago, and her  is quadriplegic from a neck injury when he was 21.  Wife shared that she takes care of her , although their step daughter is a big source of support and care as well.  She shared that her and her  had spoken about complications from his hospitalization two days ago, and pt had said \"I don't want to die.\"  She said he is \"my superman,\" and has fought through many adverse situations in the past.  However, she also acknowledges that \"he wouldn't want to be on the vent forever,\" and while she isn't ready to \"let him go\" she also does not want him to suffer.        Distress and Loss - Pt has had many years of challenging health issues to navigate, and pt's wife and stepdaughter have been primary caregivers.  Pt's stepdaughter has \"had to grow up fast\" and taken over caregiving and many of the main roles in the home for years, and she named that this has been difficult for her to navigate.  Pt's stepdaughter also works as a CNA so she has some basic medical knowledge.       Strengths, Coping, and Resources - Pt and family are Holiness, and pt's wife said that she has family who is supporting her.  Stepdaughter named her  as a source of support as well.        Meaning, Beliefs, and Spirituality - Holiness ashley, although pt and wife have not attended Jain in a while.  They do find prayer meaningful and pt was anointed by the  at the hospital on 11/25.        Plan of Care - [facility] will remain available and continue to visit/support the " family as needed.     Rabbi Felicia Tran, [facility]  Staff   [phone]

## 2022-11-28 NOTE — PROGRESS NOTES
[facility]  Procedure Note           Intubation:       Bart Corea  MRN# [id number]   November 28, 2022, 8:05 AM Indication: Cardiac arrest           Patient intubated at: November 28, 2022, 8:05 AM   Family informed of: Why intubation was required   Informed consent: Not obtainable   Cervical spine: Was not stabilized during the procedure   Sedative medication: Was administered during the procedure   Technique used: Fiberoptic visualization with GlideScope   Endotracheal tube size: 7.0 cm with cuff   Number of attempts: 3   Placement confirmed by: Auscultation of bilateral breath sounds  Visualization of bilateral chest wall rise  End-tidal CO2 monitor  Chest X-ray   ET tube repositioning: Was not performed   Tube secured at: 22 cm      This procedure was performed with difficulty and he tolerated the procedure well with an unsuccessful intubation attempt.      Recorded by Paula Dumont, RT     3 RRT at bedside for Code blue

## 2022-11-28 NOTE — ANESTHESIA PROCEDURE NOTES
Airway       Patient location during procedure: ICU  Staff -        Anesthesiologist:  Star Sigala MD       Resident/Fellow: Chaitanya Burch MD       Other Anesthesia Staff: Tuan Lane MD       Performed By: resident  Consent for Airway        Urgency: emergent       Consent: The procedure was performed in an emergent situation.  Indications and Patient Condition       Indications for airway management: cardiovascular arrest       Mallampati: Not Assessed     Induction type:other (comments)       Mask difficulty assessment: 2 - vent by mask + OA or adjuvant +/- NMBA    Final Airway Details       Final airway type: endotracheal airway       Successful airway: ETT - single  Endotracheal Airway Details        ETT size (mm): 8.0       Cuffed: yes       Successful intubation technique: direct laryngoscopy and video laryngoscopy       VL Blade Size: MAC D Blade       Grade View of Cords: 2       Adjucts: stylet       Position: Right       Measured from: lips    Post intubation assessment        Placement verified by: capnometry        Number of attempts at approach: 4 or more       Secured with: commercial tube sanchez       Ease of procedure: difficult       Dentition: Intact and Unchanged    Additional Comments       Patient in PEA arrest on arrival. Hx of unstable C-spine. Intubation attempts with manual in line stabilization and CPR in progress. Difficult intubation, able to visualize cords but unable to advance the ETT. 2nd attempt with a bougie, poor visualization with copious secretions in the oropharynx. Successful intubation with CMAC D-blade. No medications administered.

## 2022-11-28 NOTE — PROGRESS NOTES
[facility]  Patient is currently open to home care services with [facility]. The patient is currently receiving RN services.  [facility]  and team have been notified of patient admission.  [facility] liaison will continue to follow patient during stay.  If appropriate provide orders to resume home care at time of discharge.

## 2022-11-28 NOTE — PROGRESS NOTES
"Bronchoscopy Risk Assessment Guidelines      A. Patient symptoms to consider when assessing pulmonary TB risk are:    I. Cough greater than 3 weeks; and fever, hemoptysis, pleuritic chest    pain, weight loss greater than 10 lbs, night sweats, fatigue, infiltrates on    upper lobes or superior segments of lower lobes, cavitation on chest    x-ray.   B. Patient risk factors to consider when assessing pulmonary TB risk are:    I. Exposure to known TB case, foreign-born persons (within 5 years of    arrival to [place]), residence in a crowded setting (correctional facility,     long-term care center, etc.), persons with HIV or immunosuppression.    Patients with symptoms and risk factors should generally be considered \"suspect risk\" and bronchoscopies should be performed in airborne precautions.    This patient has NO KNOWN RISK of Tuberculosis (proceed with bronchoscopy)    Specimens sent: yes  Complications: None  Scope used: #[id number]  Slim  Attending Physician: Josh Bach, RT on 11/28/2022 at 2:37 PM  "

## 2022-11-28 NOTE — PROCEDURES
[facility]    Central line    Date/Time: 11/28/2022 11:50 AM  Performed by: Farrukh Dimas MD  Authorized by: Josh Laboy MD   Indications: vascular access      UNIVERSAL PROTOCOL   Site Marked: Yes  Prior Images Obtained and Reviewed:  NA  Required items: Required blood products, implants, devices and special equipment available    Patient identity confirmed:  Verbally with patient and arm band  Patient was reevaluated immediately before administering moderate or deep sedation or anesthesia  Confirmation Checklist:  Patient's identity using two indicators  Time out: Immediately prior to the procedure a time out was called    Universal Protocol: the Joint Commission Universal Protocol was followed    Preparation: Patient was prepped and draped in usual sterile fashion    ESBL (mL):  10    SEDATION  Patient Sedated: Yes    Sedation:  See MAR for details  Vital signs: Vital signs monitored during sedation      Preparation: skin prepped with 2% chlorhexidine  Skin prep agent dried: skin prep agent completely dried prior to procedure  Sterile barriers: all five maximum sterile barriers used - cap, mask, sterile gown, sterile gloves, and large sterile sheet  Hand hygiene: hand hygiene performed prior to central venous catheter insertion  Patient position: flat  Catheter type: triple lumen  Pre-procedure: landmarks identified  Ultrasound guidance: yes  Sterile ultrasound techniques: sterile gel and sterile probe covers were used  Number of attempts: 1  Successful placement: yes  Post-procedure: line sutured and dressing applied  Assessment: blood return through all ports,  free fluid flow and placement verified by x-ray      PROCEDURE    Patient Tolerance:  Patient tolerated the procedure well with no immediate complications  Length of time physician/provider present for 1:1 monitoring during sedation: 15

## 2022-11-28 NOTE — PROCEDURES
[facility]    Arterial line placement    Date/Time: 11/28/2022 11:51 AM  Performed by: Farrukh Dimas MD  Authorized by: Josh Laboy MD       UNIVERSAL PROTOCOL   Site Marked: NA  Prior Images Obtained and Reviewed:  NA  Required items: Required blood products, implants, devices and special equipment available    Patient identity confirmed:  Verbally with patient and arm band  Patient was reevaluated immediately before administering moderate or deep sedation or anesthesia  Confirmation Checklist:  Patient's identity using two indicators  Time out: Immediately prior to the procedure a time out was called    Universal Protocol: the Joint Commission Universal Protocol was followed    Preparation: Patient was prepped and draped in usual sterile fashion    ESBL (mL):  5  Indication: multiple ABGs respiratory failure hemodynamic monitoring  Location:  Left radial      SEDATION  Patient Sedated: Yes    Sedation Type:  Deep  Sedation:  See MAR for details  Vital signs: Vital signs monitored during sedation      PROCEDURE DETAILS  Asher's Test Normal?: Asher's test not abnormal  Needle Gauge:  20  Seldinger technique: Seldinger technique used    Number of Attempts:  1  Post-procedure:  Line sutured and dressing applied      PROCEDURE    Patient Tolerance:  Patient tolerated the procedure well with no immediate complications  Length of time physician/provider present for 1:1 monitoring during sedation: 10

## 2022-11-28 NOTE — PROGRESS NOTES
Brief cross cover note:  Paged by nurse for hypotension.  SBP around 90 on multiple checks and both right and left arm.  Patient asymptomatic with good mentation and looks well.  Has been getting diuresis.  Perhaps intravascularly dry.  We will attempt 500 cc bolus with BP recheck.  Sylvester Rodriguez DO 6:44 PM     Pt having continued low BP despite 500cc bolus. MAPs in high 50's to low 60's consistently, checked by provider and nurses on multiple attempts on both arms and cuff is appropriate size. Pt seen at bedside, mentation intact and asymptomatic. Hx of kidney stones causing hypotension. Does have history of quadriplegia and likely dysautonomia however this is a change from previously. Will start broad workup and re-admit to ICU.  -Another 500cc bolus  -lactate  -x2 blood cultures  -UA w/ reflex to culture (however would wait on this, pt has chronic suprapubic cath and type of cath is not carried here. Will need to have one brought from home for cath exchange to get a better sample)  -EKG  -Renal US  -Admit to ICU team, staff and resident called  -Held PTA PO lasix and amlodipine    Sylvester Rodriguez DO 8:24 PM

## 2022-11-28 NOTE — PROGRESS NOTES
At start of shift, pt hypotensive with MAPs in 50s. MD at bedside. 500 ml LR bolus given with no improvement. Levo started and remained at .008 mcg/kg/min for majority of night to maintain MAPs >65. Labs collected and EKG obtained. Pt had been adequately sleeping.    Neuro: A&Ox4. Able to appropriately use soft touch to make needs known.  Cardiac: SB. Levo continued to maintain MAP >65.   Respiratory: Bipap 40% overnight. HFNC 60%, 50L during day. Weak cough with thick secretions. Assist with quad cough by RN. Q4H nebulizer, cough assist and chest physiotherapy completed by RT.  GI/: Suprapubic catheter. Needs exchanging for UA. Catheter specific to pt and will need family to bring in for exchange. Requested from wife.  Diet/appetite: Tolerating regular diet.  Activity: Lift.  Pain: Chronic back pain. Pt refused medications and some relief with repositions.  Skin: No new deficits noted. Abscess on left upper arm, JEAN. WOC following.      MAJOR SHIFT EVENT:  At 0530, pt complained of cough and congestion. Assisted with quad cough with minimum secretions being coughed up. Therefore, pt was NT suctioned with improvement in breathing and congestion. Vitals remained stable with O2 sats >92% on HFNC 60%, 50L. However, pt remained anxious and appeared to still be attempting to cough more. RT notified. This RN remained in room to assist further with quad cough. At 0703, pt stated he was having a difficult time breathing. As pt was talking, O2 saturations quickly desatted to 40%, and pulse was lost. Code blue initiated.    See flowsheet for code documentation and medications.

## 2022-11-28 NOTE — PROGRESS NOTES
EEG CLINICAL NEUROPHYSIOLOGY PRELIMINARY REPORT    First hour of video EEG reviewed.  Fentanyl 200 mcg/h, midazolam 15 mg/h throughout.  Undergoing cooling.  Extensive myogenic artifact.  Background appears significantly attenuated; however, difficult to exclude low amplitude electrocerebral activity given extent of myogenic artifact.  Myoclonic mouth opening movements are noted every 4 to 6 seconds.  These are accompanied by a time locked single or multiple spikes on EEG that have the appearance of myogenic artifact. EEG does not show show sustained seizure activity.  Suspect that spiky potentials are artifact related to masseter activation rather than electrocerebral correlate.    Study consistent with a severe diffuse encephalopathy.  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  EEG at present does not meet standard criteria for nonconvulsive status epilepticus.    We hope to attach masseter and sternocleidomastoid electrodes.  Ultimately it may be difficult to exclude electrocerebral correlate to the movements and to completely assess for presence of electrocerebral activity without use of paralytic agents.  Will defer to managing service asked to whether this is clinically indicated.  Full report to follow.    Akin Hankins MD  Contact information for physicians covering Epilepsy and EEG is available on [facility].  Click search, enter neurology adult/[facility] in group name box, click on neurology adult/[facility], then click Staff Epilepsy and EEG.

## 2022-11-28 NOTE — PROCEDURES
Bronchoscopy Note     Procedure: Bronchoscopy with BAL     Indication: New infiltrates on imaging; cardiac arrest, thought to be d/t mucous plugging     Performed by: Farrukh Dimas MD. Josh Laboy MD.      Anesthesia: Sedated with midazolam and fentanyl infusions. See MAR for details.    Findings: Following administration of 1% lidocaine (10 mL) and the bronchoscope was introduced via the ET tube. The entire tracheobronchial tree was inspected. The michelle was sharp without evidence of lesions or active bleed. The R lung was explored first--there were copious, thick, mucopurulent appearing secretions tinged with blood sseen throughout the first bronchial segments. Therapeutic suctioning was performed and multiple tenacious mucous plugs were removed. A bronchial washing from the RLL was sent for specimen analysis. After multiple rounds of therapeutic suctioning, the L lung was explored. Again, there were copious, thick secretions present in the first bronchial segments that were suctioned until the second bronchial segments were visualized.     No immediate complications. Total of 20 minutes of one to one monitoring for conscious sedation.     Dr. Laboy was present for the entirety of the procedure.    Farrukh Dimas MD   Pulmonary/Critical Care Fellow   Pager #[phone]

## 2022-11-28 NOTE — PROGRESS NOTES
Cardiac arrest note:   Patient lost pulse around 0705 hours following cough assist/attempts to suction mucus plugs. Bradycardic followed by PEA, received CPR immediately and multiple doses of Epi every 3 minutes, while anesthesia intubated, appeared to be difficult intubation. We did achieve ROSC briefly twice but did not last more than 1 minute at a time. 2 Amps of bicarb were also given during CPR. Total time down was 17 minutes. Achieved ROSC at 0723 hours and required only low dose NE gtt afterwards. Blood gas showed severe respiratory acidosis 7.10/108. Once airway was in pulse was re-achieved. CXR showed no alternative etiology.

## 2022-11-29 NOTE — PROGRESS NOTES
[facility], Procedure Note          Extubation:       Bart Corea  MRN# [id number]   November 29, 2022, 5:04 PM         Patient extubated at: November 29, 2022, 5:04 PM   Supplemental Oxygen: Via NA- terminal extubation   Cough: The cough is NA   Secretion Mode: NA   Secretion Amount: NA   Respiratory Exam:: NA     NA   Skin Exam:: NA   Patient Status:    Arterial Blood Gasses:          Recorded by Paula Dumont, RT     Terminal extubation

## 2022-11-29 NOTE — PROGRESS NOTES
Neurocritical Care Progress Note    Reason for critical care admission: Worsening hypoxic respiratory failure   Admitting Team: MICU 1   Date of Service:  11/29/2022  Date of Admission:  11/23/2022  Hospital Day: 7    Assessment/Plan  Bart Corea is a 62 year old male with a past medical history including quadriplegia who was admitted to [facility] on 11/23/2022 for evaluation and management of acute hypoxic respiratory failure secondary to CAP and mucous plugging. On 11/27/2022 he was transferred to ICU MICU service for evaluation and management of increasing hypoxia and shock. Approximately 0700 on 11/28/2022 he was noted to have coughing which lead to respiratory arrest requiring intubation.      NCC was consulted on 11/28/2022 after facial twitching post- respiratory arrest with concern for seizure.      Metabolic considerations:  BUN/Cr 7.7/0.25   No transaminitis   Temperature 96.8     Imaging:  No recent head CT      vEEG:  Runs of spikes and polyspikes had increased to about 20% at around 9 PM but have now reduced back to less than 5% of ongoing recording. At present does not meet criteria for nonconvulsive status epilepticus.     CNS acting medications:  Propofol 30 mcg/kg/min   Midazolam 15 mg/hour   Fentanyl 200 mcg/hour   Valproate 3 g load 11/28, 500 mg BID maintenance   Vecuronium 6.84 mg 2301 11/28      Neuro  #Concern for seizure versus myoclonus   -vEEG ordered   -If patient condition allows limiting CNS acting agents will allow for more accurate neurologic exam  -If patient condition allows limiting midazolam and Propofol will allow vEEG to capture seizures if seizure activity is present     Vecuronium given 11/28/2022. Facial twitching did not stop therefore the facial jerking is likely due to brain activity rather than pure anoxic myoclonus.      TIME SPENT ON THIS ENCOUNTER  I spent 35 minutes of critical care time on the unit/floor managing the care of Bart Corea. Upon evaluation, this  patient had a high probability of imminent or life-threatening deterioration due to concern for seizure, which required my direct attention, intervention, and personal management. Greater than 50% of my time was spent at the bedside counseling the patient and/or coordinating care regarding services listed in this note.     The patient was seen and discussed with the NCC attending, Dr. Pancho Jeffries, and he is in agreement with the assessment and plan.     Pina Ro, APRN, CNP  Neurocritical Care *[id number]        Physician Attestation      I saw and evaluated Bart Corea as part of a shared APRN/PA visit.     I personally reviewed the vital signs, medications, labs and imaging.    Key management decisions made by me and carried out under my direction include: EEG with suggestion of severe anoxic injury with concern for status myoclonus which would portend a poor prognosis.     Counseling and/or coordination of care performed by me:  Discussions with wife and daughter at bedside. Informed family of findings and likely clinical outcome. They expressed that Jc would not want to be supported on a ventilator nor would it be acceptable with permanent cognitive deficit which would most likely be in the future. I suspect that he would not return to his pre-morbid baseline. With this information his wife and daughter at bedside made the decision to transition Jc to comfort measures only. We will discontinue EEG and [employer] at this time. Decision by family relayed to MICU team. We will sign of now, call with questions.     I spent 40 minutes face-to-face and/or coordinating care. Over 50% of the time on the unit was spent counseling the patient and/or coordinating care as documented above.    Pancho Jeffries MD  Date of Service (when I saw the patient): 11/29/22      24 Hour Vital Signs Summary:  Temp: 96.8  F (36  C) Temp  Min: 96.4  F (35.8  C)  Max: 99.3  F (37.4  C)  Resp: 16 Resp  Min: 12  Max:  28  SpO2: 100 % SpO2  Min: 91 %  Max: 100 %  Pulse: 53 Pulse  Min: 47  Max: 74    No data recorded  BP: (!) 91/36 Systolic (24hrs), Av , Min:73 , Max:144   Diastolic (24hrs), Av, Min:36, Max:125    Respiratory monitoring:   Vent Mode: CMV/AC  (Continuous Mandatory Ventilation/ Assist Control)  FiO2 (%): 45 %  Resp Rate (Set): 16 breaths/min  Tidal Volume (Set, mL): 400 mL  PEEP (cm H2O): 8 cmH2O  Resp: 16      I/O last 3 completed shifts:  In: 3234.84 [I.V.:1464.84; NG/GT:270; IV Piggyback:1500]  Out: 3917 [Urine:3139; Emesis/NG output:200; Chest Tube:578]    Current Medications:    [Held by provider] amLODIPine  2.5 mg Oral Daily     artificial tears   Both Eyes Q8H     Baclofen  20 mg Oral or Feeding Tube 5x Daily     bisacodyl  10 mg Rectal Daily     cefTRIAXone  2 g Intravenous Q24H     docusate  200 mg Oral BID     enoxaparin ANTICOAGULANT  40 mg Subcutaneous Q24H     ipratropium - albuterol 0.5 mg/2.5 mg/3 mL  3 mL Nebulization Q4H     minocycline  50 mg Intravenous Q12H     [Held by provider] oxybutynin  5 mg Oral or Feeding Tube BID     pantoprazole  40 mg Per Feeding Tube QAM AC    Or     pantoprazole  40 mg Intravenous QAM AC     polyethylene glycol  17 g Oral or Feeding Tube BID     sodium chloride  3 mL Nebulization Q4H     sodium chloride (PF)  3 mL Intracatheter Q8H     valproate  500 mg Intravenous Q8H       PRN Medications:  acetaminophen, albuterol, baclofen, bisacodyl, artificial tears, glucose **OR** dextrose **OR** glucagon, diazepam, fentaNYL, lidocaine 4%, lidocaine (buffered or not buffered), - MEDICATION INSTRUCTIONS -, - MEDICATION INSTRUCTIONS -, melatonin, meperidine, metoprolol, midazolam, naloxone **OR** naloxone **OR** naloxone **OR** naloxone, oxyCODONE, sodium chloride, sodium chloride (PF), vecuronium    Infusions:    dextrose 10% 40 mL/hr at 22 07     fentaNYL 200 mcg/hr (22 07)     - MEDICATION INSTRUCTIONS -       - MEDICATION INSTRUCTIONS -        "midazolam 15 mg/hr (11/29/22 0700)     norepinephrine 0.05 mcg/kg/min (11/29/22 0730)     propofol 30 mcg/kg/min (11/29/22 0700)       Allergies   Allergen Reactions     Vancomycin Anaphylaxis       Physical Examination:  Vitals: BP (!) 91/36   Pulse 53   Temp 96.8  F (36  C)   Resp 16   Ht 1.727 m (5' 7.99\")   Wt 99 kg (218 lb 4.1 oz)   SpO2 100%   BMI 33.19 kg/m    General: Adult male patient, lying in bed, critically-ill.  HEENT: Normocephalic, atraumatic, no icterus.  Cardiac: RRR on bedside monitor.   Pulm: He is synchronous with ventilator.   Abdomen: Non-distended.  Extremities: Distal extremities appear adequately perfused.   Skin: No obvious rash or lesion on exposed skin.   Psych: Sedated.   Neuro:  Mental status: Sedated, intubated.   Cranial nerves: Deferred. Myoclonic twitching of bilateral face.   Motor: Baseline quadriplegia.   Sensory: Deferred.   Coordination: Deferred.   Gait: Deferred.    Labs:  Recent Labs   Lab 11/29/22  0417 11/29/22  0008 11/28/22  1901 11/28/22  1509 11/28/22  1109 11/28/22  0929 11/28/22  0734 11/28/22  0700    138 136 134*   < > 135*   < > 133*   POTASSIUM 3.1* 3.0* 3.5 3.5   < > 4.3   < > 6.1*   CHLORIDE 101 97* 94* 93*   < > 90*   < > 94*   CO2 26 27 27 29   < > 25   < > 22   BUN 7.7* 8.1 9.2 8.5   < > 7.7*  --  6.7*   CR 0.25* 0.25* 0.22* 0.22*   < > 0.23*  --  0.19*   LIU 8.9 8.8 8.5* 8.6*   < > 9.2  --  9.3   BILITOTAL 0.6  --   --  0.4  --  0.6  --  0.5   ALKPHOS 65  --   --  70  --  83  --  81   ALT 21  --   --  21  --  22  --  14   AST 29  --   --  24  --  30  --  27    < > = values in this interval not displayed.       Recent Labs   Lab 11/29/22  0417 11/28/22  1109 11/28/22  0929 11/28/22  0740 11/28/22  0700   WBC 6.0 9.0 14.1*  --  10.6   HGB 11.6* 11.5* 13.2* 13.6 12.7*     152 149* 165  --  126*       Recent Labs   Lab 11/29/22  0417 11/29/22  0008 11/28/22  2214 11/28/22  1900   PH 7.49* 7.48* 7.49* 7.55*   PCO2 40 41 41 37   PO2 74* 143* " 113* 77*   HCO3 30* 30* 31* 32*       All cultures:  Recent Labs   Lab 11/28/22  0944 11/28/22  0935 11/27/22  2130 11/26/22  1220 11/25/22  0632 11/23/22 2037 11/23/22  1721 11/23/22  1720 11/23/22  1717 11/23/22  1034   CULTURE No growth after 12 hours No growth after 12 hours No growth after 1 day  No growth after 1 day No growth after 2 days No growth after 3 days  No growth after 3 days >100,000 CFU/mL Pseudomonas aeruginosa* No Growth No Growth 1+ Normal fr[place] >100,000 CFU/mL Pseudomonas aeruginosa*       Imaging/Laboratory Values:    6.0 (11/29 0417) >-------------------<  35.9 (11/29 0417) 152, 152 (11/29 0417)       141 (11/29 0417) 101 (11/29 0417) 7.7 (11/29 0417)    ----------------------------  3.1 (11/29 0417) ----------------------------  26 (11/29 0417) --------------------------<  0.25 (11/29 0417) 129 (11/29 1014)     Most Recent ABG:Recent Labs   Lab Test 11/29/22 0417   PH 7.49*   PO2 74*   PCO2 40   HCO3 30*   LALA 6.4*     LDL  LDL Cholesterol Calculated   Date Value Ref Range Status   12/29/2021 126 (H) <=100 mg/dL Final   08/14/2019 113 (H) <100 mg/dL Final     Comment:     Above desirable:  100-129 mg/dl  Borderline High:  130-159 mg/dL  High:             160-189 mg/dL  Very high:       >189 mg/dl       A1c  Hemoglobin A1C   Date Value Ref Range Status   08/14/2019 5.0 0 - 5.6 % Final     Comment:     Normal <5.7% Prediabetes 5.7-6.4%  Diabetes 6.5% or higher - adopted from ADA   consensus guidelines.           All relevant imaging and laboratory values personally reviewed.

## 2022-11-29 NOTE — PLAN OF CARE
Goal Outcome Evaluation:      Plan of Care Reviewed With: patient, child    Overall Patient Progress: no changeOverall Patient Progress: no change       ICU End of Shift Summary. See flowsheets for vital signs and detailed assessment.    Changes this shift: RASS -5 with no visible seizing/myoclonus, BPs somewhat labile with turns but other VSS, cooled to 36 C with arctic sun. K+ replaced x1 per provider order; RN replacement due to cooling. LS coarse but minimal secretions via ETT suction.    Plan: Rewarm today 1430.

## 2022-11-29 NOTE — PROGRESS NOTES
EEG CLINICAL NEUROPHYSIOLOGY PRELIMINARY REPORT    EEG through approximately 6 AM today reviewed.  Fentanyl 150 mcg/h, midazolam 15 mg/h utilized throughout.  Propofol 30 mcg/kg/min added approximately 10 PM yesterday.    Background with low amplitude runs of alpha and theta alternating with periods of severe suppression.  Electrocerebral activity is present but there are no normal rhythms and no reactivity.  Generalized high amplitude spikes continue but are much reduced from yesterday evening and are no longer associated with myoclonic jerks.  Sustained electrographic seizures not seen at any point.    To summarize, findings continue Severely abnormal.  1) there is severe suppression of electrocerebral activity.  Electro cerebral activity is clearly present.  Findings may in part be related to sedative drips employed.  However, sedative drips at the doses employed typically do not cause this degree of the suppression indicating cerebral dysfunction separate from anesthetic effects.  2) myoclonus with cortical basis was demonstrated.  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  Persistence of myoclonus increased somewhat in the evening of 11/28 but improved again following the administration of propofol.  Sustained seizure activity was not seen at any point.    Full report in chart.    Akin Hankins MD  Contact information for physicians covering Epilepsy and EEG is available on [facility].  Click search, enter neurology adult/[facility] in group name box, click on neurology adult/ummc, then click Staff Epilepsy and EEG.

## 2022-11-29 NOTE — PLAN OF CARE
Goal Outcome Evaluation:      Plan of Care Reviewed With: patient    Overall Patient Progress: no change    Outcome Evaluation: Propofol gtt added, Vec given x1, D10 added.    Neuro: PERRL with pupillometer. Unarousable, RASS -5. EEG running, 5mg Vecuronium given IVP x1 per EEG MD at 11pm (button pushed on EEG), see his note. Visible muscle jerking of face/neck ceased with addition of propofol ~9:30pm.    Cardiac: HR 50-60s, MAP labile, levo gtt off-on.     Resp: Pt overbreathing, clenching jaw/ETT, and alkalotic, propofol gtt added and vent sync achieved. See 10pm ABG.     GI: Bowel meds held d/t cooling (verified with ICU Pharmacist).     : UOP via suprapubic catheter, increasing.     Drips: D10 added for BG in 90s, Prop @ 30, Levo @ 0.01, Fent @ 200, Versed @ 15.      Plan: GI meds and Lovenox held while cooling pt. Daughter updated via phone x2. Notify MICU team of any changes.      Microbiology lab called to verify specimens sent during bronch from day shift post the code blue. This writer is unable to verify the origin of the specimens, need RT to follow-up on specimens with Micro lab tomorrow.

## 2022-11-29 NOTE — PROGRESS NOTES
EEG CLINICAL NEUROPHYSIOLOGY PRELIMINARY REPORT    Administration of vancuronium did abolish movements and EMG but did not abolish epileptiform appearing activity at scalp. This confirms that spikes recorded at scalp are of cerebral origin and epileptiform. Abolition of scalp EMG artefact demonstrates other electrocerebral activity exceeding 2 uV as well.    Runs of spikes and polyspikes had increased to about 20% at around 9 PM but have now reduced back to less than 5% of ongoing recording. At present does not meet criteria for nonconvulsive status epilepticus.    Discussed previously with neurocrit.    Akin Hankins MD  Contact information for physicians covering Epilepsy and EEG is available on [facility].  Click search, enter neurology adult/[facility]mc in group name box, click on neurology adult/ummc, then click Staff Epilepsy and EEG.

## 2022-11-29 NOTE — PROGRESS NOTES
"SPIRITUAL HEALTH SERVICES  SPIRITUAL ASSESSMENT Progress Note  [facility] ([place]) 4C     REFERRAL SOURCE: End of Life    Pt was transitioned to comfort care.  Family and friends gathered at bedside, reminiscing and sharing stories about pt, and expressing their sadness that pt is dying.  Wife and daughter said that pt \"would not want to be on a vent forever,\" and that if pt's mind is not able to function normally than he \"would not want this.\"  Family wants to honor patient's wishes.   provided prayer and grief support.      PLAN: SHS will remain available     Felicia Tran    Pager: [phone]    "

## 2022-11-29 NOTE — SIGNIFICANT EVENT
Jc was terminally extubated at 1704.   Family at bedside with patient.   Time of death declared at 1753.    [facility] reference number [id number], spoke with representative Saravanan. Patient is not a candidate for solid organ donation but may be a candidate for other tissue donation. Final confirmation call made to [facility] at 1822.      Home is [address] in [place]    Report called to security for body removal. Belongings are currently with the patient.

## 2022-11-29 NOTE — PROGRESS NOTES
CLINICAL NUTRITION SERVICES    Informed decision made to change pt s status to comfort care. Nutrition interventions discontinued and no further interventions planned at this time. RD can be consulted if needed.    RD signing off on 11/29/2022.    Laura Vasquez, MS, RD, LD, [facility] pager: [phone]  ASCOM: [id number]

## 2022-11-29 NOTE — CONSULTS
Care Management Follow Up    Length of Stay (days): 6    Expected Discharge Date: 11/29/2022     Concerns to be Addressed:  End of Life/Comfort Care     Patient plan of care discussed at interdisciplinary rounds: Yes    Additional Information:  RNCC updating MALLORY and notified that patient transitioned to end of life/comfort care. Current active order set placed as of today 11/29.     MECHE Guzman, FAMN., RN  Nurse Care Coordinator  Pager: [phone] - RNCC [facility]  Social Work and Care Management Department   [address] [place] [address] [place] [id number]  [email or web address]  Employed by [facility]     SEARCHABLE in AMCOM - search CARE COORDINATOR     [employer] [place] [employer] ([phone]) Saturday & Sunday; ([phone]) [facility] Recognized Holidays     Units: 4A, 4C, 4E, 5A & 5B   Pager: [phone]    Units: 6A & 6B    Pager: [phone]    Units: 6C & 6D   Pager: [phone]    Units: 7A, 7B, 7C, 7D & 5C    Pager: [phone]    Units: [facility] ED, 5 Ortho, 5 Med/Surg, 6 Med/Surg, 8A, 10 ICU, & [facility]    Pager: [phone]

## 2022-11-29 NOTE — PROGRESS NOTES
MEDICAL ICU PROGRESS NOTE  11/29/2022      Date of Service (when I saw the patient): 11/29/2022    ASSESSMENT: Bart Corea is a 62 year old male with a history notable for traumatic quadriplegia C1-C4, asymptomatic Bradycardia (40's- 50's), neurogenic bladder with suprapubic catheter in place, and LIZA who presents to the ED for evaluation of generalized weakness, edema and constipation. Initially in the ICU due to hypoxic respiratory failure. Pt had Franco Ba called at roughly 0700, PEA arrest believed to be 2/2 extensive mucous plugging confirmed with BAL. Time to successful intubation roughly 24min, ROSC in 17min. Poor prognosis with multiple signs of severe anoxic brain injury following PEA arrest.        CHANGES and MAJOR THINGS TODAY:   - Needs new art line before rewarming  - RASS goal - 5 while on Versed (for seizures)  - CHEST XRAY  - Antibiotics OK, no need to broaden at this time  - Baseline CO2 might be around 50  - Gases q4hr with cooling protocol  - Comfort Care measures per Pt's and Family's previous wishes     PLAN:     Neuro:  # PEA arrest 2/2 AHRF/Mucous Plugging (ROSC 17min)  # c/f Anoxic Brain Injury versus myoclonus   Approximately 0700 on 11/28/2022 he was noted to have coughing which lead to respiratory arrest requiring intubation. NCC was consulted on 11/28/2022 after facial twitching post- respiratory arrest with concern for seizure.   - Neuron specific Enolase ordered will follow for next three days  - TTM protocol as below, achieved 36C at 2:36pm on 11/28 continue until 2:36 on 11/29  - Neurophysiology EEG Reading  1) there is severe suppression of electrocerebral activity.  Electro cerebral activity is clearly present.  Findings may in part be related to sedative drips employed.  However, sedative drips at the doses employed typically do not cause this degree of the suppression indicating cerebral dysfunction separate from anesthetic effects.  2) myoclonus with cortical basis was  demonstrated.  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  Persistence of myoclonus increased somewhat in the evening of 11/28 but improved again following the administration of propofol.  Sustained seizure activity was not seen at any point.    # Hx of Traumatic Quadriplegia C1-C4  # Autonomic Dysreflexia  - Holding PTA Amlodipine 2.5 mg  - Continue PTA baclofen 20 mg 6 times daily    Pulmonary:  # Acute Hypoxic Respiratory Failure likely 2/2 mucous plugging  # Community Acquired PNA  Initially concerned for CAP vs Viral vs Aspiration PNA though laboratory workup up was unrevealing. Initial hypoxic arrest (11/23) likely related to mucous plugging as pt is unable to clear airways at baseline due to C1-C4 quadriplegia. Patient extubated on 11/25/22 to HFNC with oxymask and BiPAP at night. Persistent left pleural effusion was tapped for 600cc (transudative) c/b PTX now s/p chest tube. Pt suffered PEA arrest believed to be due to extensive mucous plugging with ROSC of 17 min.   - Holding Chest physiotherapy, hypertonic nebs q4h, DUONEB's q4h, and cough assist to help with known secretions and poor cough response  - q4hr VBG  - Bronchoscopy awaiting BAL cultures  - Abx as below    Vent Mode: CMV/AC  (Continuous Mandatory Ventilation/ Assist Control)  FiO2 (%): 45 %  Resp Rate (Set): 16 breaths/min  Tidal Volume (Set, mL): 400 mL  PEEP (cm H2O): 8 cmH2O  Resp: 14    # PTX- Thoracentesis associated   # Chronic Left Sided Pleural Effusion  IR consult for left thoracentesis, diagnostic and therapeutic. Pt returned from procedure with with worsening oxygen requirements on 11/26. CXR obtained and showed left sided PTX. Chest tube was placed with the assistance of our Surgery Colleagues. Repeat CXR showing improved PTX, continues on suction for now.  - Thoracentesis labs showing transudate effusion, likely due to HFpEF vs Chronic Atelectasis (quadraplegia with left diaphragmatic paralysis)  - Monitoring  chest tubes for now, will reevaluate later today.    # LIZA  - Uses CPAP at night    # Hx Asthma  - Albuterol neb Q 4 hr PRN     Cardiovascular:  # PEA arrest likely due to Hypoxia 2/2 Mucous plugging  At 0530, pt complained of cough and congestion. Assisted with quad cough with minimum secretions being coughed up. Therefore, pt was NT suctioned with improvement in breathing and congestion. Vitals remained stable with O2 sats >92% on HFNC 60%, 50L. However, pt remained anxious and appeared to still be attempting to cough more. RT notified. This RN remained in room to assist further with quad cough. At 0703, pt stated he was having a difficult time breathing. As pt was talking, O2 saturations quickly desatted to 40%, and pulse was lost. Code blue initiated. Intubation attempts with manual in line stabilization and CPR in progress. Difficult intubation, able to visualize cords but unable to advance the ETT. 2nd attempt with a bougie, poor visualization with copious secretions in the oropharynx. Successful intubation with CMAC D-blade. Multiple rounds of CPR with Morgan. Received multiple rounds of Epi. Time to intubation roughly 24min, ROSC 17min. See flowsheet for code documentation and medications.   - TTM order set with goal temp 36C (Achieved at 2:36PM, discontinue in 24hr)    # Shock likely distributive vs septic  Pt initially transferred overnight due to concerns of ongoing hypotension not responsive to fluid bolus. Was started on Levophed and titrated down to minimal dose of 0.008 this morning. Possibly related to starting BIPAP or related to circadian BP cycle. Unfortunately pt had PEA arrest in the AM.  - Levophed gtt wean as able.    # C/f HFpEF (EF 55-60%) vs Chronic Atelectasis 2/2 C1-C4 injury  Has had a chronic left pleural effusion since January of 2021, developed a trace right sided effusion seen on imaging at admission. Most recent echo (2/2021) with an EF of 55-60%. Appears fluid up on exam with lower  "extremity swelling as well. Hepatic panel WNL, Cr normal. Likely a component of diastolic dysfunction.   - Holding PO Lasix 20mg  - Cardiology outpatient    # HTN  - Holding PTA amlodipine     GI/Nutrition:  # Chronic Constipation  Patient is taking Sodium docusate daily and Senna every 3 days, last time took Senna was yesterday. Last BM was 5 days ago.   - Docusate sodium (COLACE) capsule 200 mg BID  - Miralax cap 100 mg BID  - Biscodyl 20mg Enemas today  - Consider enema if oral medications or rectal suppository     Renal/Fluids/Electrolytes:  # UTI  # Hx of chronic UTI's on Minocycline suppression  Hx of Neurogenic bladder & recurrent stones with stent placement and removal, he has urinary catheter & recurrent UTI with resistant organism  - Continue PTA Minocyclin for suppression  - UA/UC repeat today from clean suprapubic catheter  - Monitor for fever, pain  - KUB U/S- WNL    # Hypokalemia  - Repleated overnight, recheck BMP and replete as needed     Endocrine:  # Hyperglycemia likely stress response  Pt with increasing BG in the setting of recent PEA arrest. BG slowly increasing to 160+ will monitor for now and add sliding scale insulin when appropriate. On D10 to maintain glucose in 120-150 range.  - D10W discontinue once off TTM     ID:  # CAP vs Viral PNA vs Aspiration PNA  Initially concerned for CAP vs Viral vs Aspiration PNA though laboratory workup up so far is unrevealing. Extubated on 11/25/22 to BiPAP. Hypoxic arrest likely related to mucous plugging rather than PNA as pt is unable to clear airways at baseline due to C1-C4 quadriplegia. Infectious workup was otherwise unremarkable though pt spiked a fever and had a white count with brief discontinuation of abx. Will complete a course of abx for CAP tomorrow, will reevaluate tomorrow to determine if need to broaden or extend course. Bronchoscopy today \" The R lung was explored first--there were copious, thick, mucopurulent appearing secretions tinged " "with blood sseen throughout the first bronchial segments. Therapeutic suctioning was performed and multiple tenacious mucous plugs were removed. A bronchial washing from the RLL was sent for specimen analysis. After multiple rounds of therapeutic suctioning, the L lung was explored. Again, there were copious, thick secretions present in the first bronchial segments that were suctioned until the second bronchial segments were visualized.\"  - BAL cultures pending  - Cultures and ABX as below  - Complete CTX empiric course, finished Azithromycin     Abx:  Ceftriaxone (11/23-11/24, 11/25--> 11/27)  Azithromycin (500mg 11/23, 500mg -11/24--> 11/25)  Minocycline (Continue for prophylaxis)     Cultures:  11/28 BAL: Gram stain, Resp Cx, Cytology  11/23 Bcx: NGTD  11/23 Sputum Cx: Normal fr[place]  11/23 Ucx: >100,000 Pseudomonas (taken off of old suprapubic catheter)  11/24: Legionella negative  11/24: Streptococcus Pneumoniae negative  11/25 BCx: NGTD  11/26: UA/UC to be collected off of replaced suprapubic catheter     Hematology:    # Anemia   Hgb minimally low at 13.2 at baseline which appears to be around between 12-13.   - CTM     # Thrombocytopenia  Likely reactive 2/2 acute illness.   - CTM     Musculoskeletal:  # Quadriplegia/Deconditioning  - PT/OT consult    :  # Suprapubic Catheter  Catheter was exchanged on 11/25/22 per urology. Repeat UA/UC to evaluate for colonization of previous pseudomonal growth.   - Next SPT exchange in 8 weeks, per previously established schedule     Skin:  # Bilateral upper extremity hyperpigmentation  Possibly related to minocycline use  - Monitor for now     # Chronic wound on the left arm  - Wound consult     General Cares/Prophylaxis:    Fluids: None  Nutrition: Full Diet  DVT Prophylaxis: Heparin SQ  GI Prophylaxis: None  Restraints: None  Family Communication: Family updated at bedside  Code Status: Full Code     Lines/tubes/drains:        - PIV x3  - ETT  - Arterial Line (11/28)  - " CVC (11/28)  - Chest tube (11/26)  - Suprapubic catheter, exchanged 11/26/22     Disposition:  - Medical ICU      Patient seen and findings/plan discussed with medical ICU staff, Dr. Josh Laboy.     Edwar Gallegos MD  Internal Medicine Resident, PGY-1  Pager: [phone]    ====================================  INTERVAL HISTORY:     Overnight:  Added propofol for adjunct sedative. Depakote load overnight    Today: Intubated and sedated, prognosis grim per NCC. Family contemplating comfort measures.     OBJECTIVE:   1. VITAL SIGNS:   Temp:  [96.1  F (35.6  C)-97  F (36.1  C)] 96.8  F (36  C)  Pulse:  [47-70] 55  Resp:  [12-27] 14  BP: ()/(36-66) 129/66  MAP:  [50 mmHg-136 mmHg] 121 mmHg  Arterial Line BP: ()/() 132/113  FiO2 (%):  [40 %-60 %] 45 %  SpO2:  [91 %-100 %] 91 %  Vent Mode: CMV/AC  (Continuous Mandatory Ventilation/ Assist Control)  FiO2 (%): 45 %  Resp Rate (Set): 16 breaths/min  Tidal Volume (Set, mL): 400 mL  PEEP (cm H2O): 8 cmH2O  Resp: 14    2. INTAKE/ OUTPUT:   I/O last 3 completed shifts:  In: 3234.84 [I.V.:1464.84; NG/GT:270; IV Piggyback:1500]  Out: 3917 [Urine:3139; Emesis/NG output:200; Chest Tube:578]    3. PHYSICAL EXAMINATION:  General: Intubated and sedated  Neuro: Deeply sedated, not withdrawing to pain, good cough  Pulm/Resp: Mechanical breath sounds, course in upper lung segments  CV: Normal rate, regular rhythm, no murmurs, rubs, or gallops  Abdomen: Soft, non-distended, non-tender  : Amanda catheter in place, urine yellow and clear  Incisions/Skin: Chronic wound on the left arm    4. LABS:   Arterial Blood Gases   Recent Labs   Lab 11/29/22  0417 11/29/22  0008 11/28/22  2214 11/28/22  1900   PH 7.49* 7.48* 7.49* 7.55*   PCO2 40 41 41 37   PO2 74* 143* 113* 77*   HCO3 30* 30* 31* 32*     Complete Blood Count   Recent Labs   Lab 11/29/22  0417 11/28/22  1109 11/28/22  0929 11/28/22  0740 11/28/22  0700   WBC 6.0 9.0 14.1*  --  10.6   HGB 11.6* 11.5* 13.2*  13.6 12.7*     152 149* 165  --  126*     Basic Metabolic Panel  Recent Labs   Lab 11/29/22  1014 11/29/22  0917 11/29/22  0832 11/29/22  0703 11/29/22  0507 11/29/22  0417 11/29/22  0103 11/29/22  0008 11/28/22  2028 11/28/22  1901 11/28/22  1624 11/28/22  1509   NA  --   --   --   --   --  141  --  138  --  136  --  134*   POTASSIUM  --   --   --   --   --  3.1*  --  3.0*  --  3.5  --  3.5   CHLORIDE  --   --   --   --   --  101  --  97*  --  94*  --  93*   CO2  --   --   --   --   --  26  --  27  --  27  --  29   BUN  --   --   --   --   --  7.7*  --  8.1  --  9.2  --  8.5   CR  --   --   --   --   --  0.25*  --  0.25*  --  0.22*  --  0.22*   * 127* 123* 137*   < > 116*   < > 106*  106*   < > 94   < > 124*  119*    < > = values in this interval not displayed.     Liver Function Tests  Recent Labs   Lab 11/29/22  0417 11/28/22  1509 11/28/22  1109 11/28/22  0929 11/28/22  0700   AST 29 24  --  30 27   ALT 21 21  --  22 14   ALKPHOS 65 70  --  83 81   BILITOTAL 0.6 0.4  --  0.6 0.5   ALBUMIN 3.0* 3.1*  --  3.3* 3.0*   INR 1.21*  --  1.19* 1.06 1.17*     Coagulation Profile  Recent Labs   Lab 11/29/22  0417 11/28/22  1109 11/28/22  0929 11/28/22  0700   INR 1.21* 1.19* 1.06 1.17*   PTT 33 32  --  32       5. RADIOLOGY:   Recent Results (from the past 24 hour(s))   XR Chest Port 1 View    Narrative    Exam: XR CHEST PORT 1 VIEW, 11/29/2022 11:39 AM    Indication: Evaluation ongoing PNA & pleural effusion   pleural edema concerns    Comparison: 11/28/2022    Findings:   ET tube tip projects over mid thoracic trachea. Esophageal temperature  probe projects over the mid esophagus. Left apically directed chest  tube in place. Defibrillator pad. Left PICC line tip near the  brachiocephalic confluence. Obscured cardiomediastinal silhouette.  Persistent left pleural effusion, bibasilar atelectasis and  consolidations.      Impression    Impression:   1. Persistent left pleural effusion and  bibasilar  atelectasis/consolidations.  2. Support devices in place as above.    I have personally reviewed the examination and initial interpretation  and I agree with the findings.    GUSTAVO DUNBAR MD         SYSTEM ID:  [id number]

## 2022-11-29 NOTE — PROGRESS NOTES
"SPIRITUAL HEALTH SERVICES  SPIRITUAL ASSESSMENT Progress Note  [facility] ([place] 4C     REFERRAL SOURCE: Follow up     visited pt's wife and step daughter who were at bedside.  Wife shared that she is awaiting more information from the medical team, but is glad that pt is stable.  Step daughter said she is \"hanging on\" and is also hoping to hear from the team about pt's prognosis.   provided support and reminded them that [facility] would remain available for them, so to reach out anytime.   will continue to visit.    PLAN:  will continue to follow.     Felicia Tran    Pager: [phone]    "

## 2022-11-30 LAB
ATRIAL RATE - MUSE: 62 BPM
BACTERIA BLD CULT: NO GROWTH
BACTERIA BLD CULT: NO GROWTH
BACTERIA BRONCH: NO GROWTH
DIASTOLIC BLOOD PRESSURE - MUSE: NORMAL MMHG
GRAM STAIN RESULT: NORMAL
GRAM STAIN RESULT: NORMAL
INTERPRETATION ECG - MUSE: NORMAL
NSE SERPL IA-MCNC: 21 NG/ML
P AXIS - MUSE: 85 DEGREES
PR INTERVAL - MUSE: 132 MS
QRS DURATION - MUSE: 84 MS
QT - MUSE: 412 MS
QTC - MUSE: 418 MS
R AXIS - MUSE: 75 DEGREES
SYSTOLIC BLOOD PRESSURE - MUSE: NORMAL MMHG
T AXIS - MUSE: 74 DEGREES
VENTRICULAR RATE- MUSE: 62 BPM

## 2022-11-30 NOTE — DISCHARGE SUMMARY
[facility]  Discharge Summary - Medicine & Pediatrics       Date of Admission:  2022  Date of Discharge:  2022  Discharging Provider: Josh Laboy MD  Discharge Service: Hospitalist Service    Discharge Diagnoses   PEA arrest due to hypoxic hypercapnic respiratory failure secondary to pneumonia with mucous plugging    History of traumatic quadriplegia (C1-C4 with autonomic dysreflexia  LIZA  Asthma       Follow-ups Needed After Discharge   Not applicable    Unresulted Labs Ordered in the Past 30 Days of this Admission     Date and Time Order Name Status Description    2022 10:01 PM Neuron Specific Enolase In process     2022  9:32 AM Blood Culture Hand, Left Preliminary     2022  9:32 AM Blood Culture Arm, Right Preliminary     2022  9:07 AM Neuron Specific Enolase In process     2022  8:10 PM Blood Culture Arm, Right Preliminary     2022  8:10 PM Blood Culture Hand, Left Preliminary     2022 10:43 AM Pleural fluid Aerobic Bacterial Culture Routine with Gram Stain Preliminary     2022  5:05 AM Respiratory Aerobic Bacterial Culture with Gram Stain Preliminary     2022  6:13 AM Blood Culture Peripheral Blood Preliminary     2022  6:13 AM Blood Culture Peripheral Blood Preliminary           Discharge Disposition   Patient  during this admission  Condition at discharge:       Hospital Course   Bart Corea was admitted on 2022 for acute hypoxic respiratory failure.      He was treated for community-acquired pneumonia.      On 2022 he developed acute hypoxic hypercapnic respiratory failure likely due to mucous plugging which led to cardiac arrest with pulseless electrical activity.  He received chest compressions, epinephrine and was intubated per ACLS protocol.  See code note for additional details.  Treated with therapeutic hypothermia after the code.  However, per the  patient's previously expressed preference and his family's request, given his poor neurological prognosis, he was transitioned to comfort cares on 2022 and  shortly thereafter.      Consultations This Hospital Stay   INTERNAL MEDICINE PROCEDURE TEAM ADULT IP CONSULT [facility]  PHYSICAL THERAPY ADULT IP CONSULT  OCCUPATIONAL THERAPY ADULT IP CONSULT  MEDICATION HISTORY IP PHARMACY CONSULT  INTERNAL MEDICINE PROCEDURE TEAM ADULT IP CONSULT [facility]  UROLOGY IP CONSULT  SPEECH LANGUAGE PATH ADULT IP CONSULT  INTERVENTIONAL RADIOLOGY ADULT/PEDS IP CONSULT  NURSING TO CONSULT FOR VASCULAR ACCESS CARE IP CONSULT  NEUROLOGY CRITICAL CARE ADULT IP CONSULT  SPIRITUAL HEALTH SERVICES IP CONSULT  CARE MANAGEMENT / SOCIAL WORK IP CONSULT    Code Status   No CPR- Do NOT Intubate       The patient was discussed with Dr. Ray Velasquez MD  Intensive care unit service  [facility] UNIT [address] [address] [address]  Phone: [phone]  ______________________________________________________________________    Physical Exam   See death note.      Primary Care Physician   Jason Long    Discharge Orders   No discharge procedures on file.    Significant Results and Procedures     Discharge Medications   Current Discharge Medication List      CONTINUE these medications which have NOT CHANGED    Details   acetaminophen (TYLENOL) 325 MG tablet Take 2 tablets (650 mg) by mouth every 8 hours as needed for mild pain or fever  Qty: 30 tablet, Refills: 11    Associated Diagnoses: Complicated urinary tract infection      albuterol (PROVENTIL) (2.5 MG/3ML) 0.083% neb solution Take 1 vial (2.5 mg) by nebulization every 4 hours as needed for wheezing  Qty: 100 mL, Refills: 3    Associated Diagnoses: Chronic bronchitis, unspecified chronic bronchitis type (H)      amLODIPine (NORVASC) 2.5 MG tablet Take 1 tablet (2.5 mg) by mouth daily as needed  (autonomic dysreflexia)  Qty: 30 tablet, Refills: 1    Associated Diagnoses: Autonomic dysreflexia      baclofen (LIORESAL) 20 MG tablet TAKE ONE TABLET BY MOUTH UP TO 6 TIMES DAILY  Qty: 540 tablet, Refills: 3    Associated Diagnoses: Quadriplegia (H); Spasm; Groin pain, unspecified laterality      bisacodyl (DULCOLAX) 10 MG suppository Place 20 mg rectally every 2 days.  Active ingredient in Magic Bullet (10mg per suppository)  Qty: 45 suppository, Refills: 3    Associated Diagnoses: Constipation, unspecified constipation type      clotrimazole-betamethasone (LOTRISONE) 1-0.05 % external cream Apply topically 2 times daily Apply to itchy earlobe rash till clear  Qty: 15 g, Refills: 1    Associated Diagnoses: Rash      diazepam (VALIUM) 5 MG tablet TAKE 1 TABLET (5 MG) BY MOUTH EVERY 6 HOURS AS NEEDED FOR ANXIETY  Qty: 40 tablet, Refills: 5    Associated Diagnoses: Spasm      docusate sodium (DOK) 100 MG capsule TAKE 1 CAPSULE (100 MG) BY MOUTH DAILY  Qty: 100 capsule, Refills: 3    Associated Diagnoses: Quadriplegia (H); Spasm; Bronchitis with bronchospasm; Rash and other nonspecific skin eruption; Wheezing; High cholesterol      furosemide (LASIX) 20 MG tablet Take 1 tablet (20 mg) by mouth daily  Qty: 30 tablet, Refills: 1    Associated Diagnoses: Hypervolemia, unspecified hypervolemia type      hydrocortisone (CORTAID) 1 % external cream Apply topically 2 times daily To itchy earlobe  Qty: 30 g, Refills: 11    Associated Diagnoses: Rash      ibuprofen (ADVIL/MOTRIN) 800 MG tablet Take 1 tablet (800 mg) by mouth 3 times daily as needed for moderate pain  Qty: 60 tablet, Refills: 11    Associated Diagnoses: Quadriplegia (H); Spasm; Bronchitis with bronchospasm; Rash and other nonspecific skin eruption; Wheezing; High cholesterol      minocycline (MINOCIN) 50 MG capsule Take 1 capsule (50 mg) by mouth 2 times daily  Qty: 60 capsule, Refills: 11    Associated Diagnoses: Quadriplegia (H); Rash and other nonspecific  skin eruption      nystatin (MYCOSTATIN) 864610 UNIT/GM external powder APPLY TO AFFECTED AREA THREE TIMES DAILY AS NEEDED  Qty: 60 g, Refills: 3    Associated Diagnoses: Quadriplegia (H); Spasm; Rash and other nonspecific skin eruption; High cholesterol      oxybutynin ER (DITROPAN-XL) 10 MG 24 hr tablet Take 1 tablet (10 mg) by mouth daily  Qty: 90 tablet, Refills: 3    Associated Diagnoses: Quadriplegia (H); Spasm      vitamin C (ASCORBIC ACID) 1000 MG TABS Take 1 tablet (1,000 mg) by mouth daily  Qty: 90 tablet, Refills: 3    Associated Diagnoses: History of kidney stones      Vitamin D3 (CHOLECALCIFEROL) 25 mcg (1000 units) tablet Take 1 tablet (25 mcg) by mouth daily  Qty: 90 tablet, Refills: 3    Associated Diagnoses: Quadriplegia (H)      zolpidem (AMBIEN) 10 MG tablet Take 1 tablet (10 mg) by mouth nightly as needed for sleep  Qty: 30 tablet, Refills: 5    Associated Diagnoses: Insomnia, unspecified type      Incontinence Supplies (URINARY LEG BAG) USE AS NEEDED      Multiple Vitamins-Minerals (MULTIVITAMIN & MINERAL PO) Take 1 capsule by mouth daily.      multivitamin (CENTRUM SILVER) tablet Take 1 tablet by mouth daily  Qty: 90 tablet, Refills: 3    Associated Diagnoses: Encounter for nutritional assessment      neomycin-polymyxin-hydrocortisone (CORTISPORIN) 3.5-82148-3 otic solution Place 4 drops in ear(s) 2 times daily  Qty: 10 mL, Refills: 11    Comments: Replacing ciprofloxacin-dexamethasone due to the coverage  Associated Diagnoses: Ear infection      senna (SM SENNA LAXATIVE) 8.6 MG tablet Take 1 tablet by mouth daily  Qty: 90 tablet, Refills: 3    Associated Diagnoses: Constipation, unspecified constipation type      sodium chloride 0.9%, bottle, 0.9 % irrigation USE FOR URINARY CATHETER IRRIGATION  Qty: 500 mL, Refills: 11    Associated Diagnoses: Injury at C4 level of cervical spinal cord, subsequent encounter (H); Injury of fifth cervical spinal cord, subsequent encounter (H); Injury at C6  level of cervical spinal cord, subsequent encounter (H); Neurogenic bladder           Allergies   Allergies   Allergen Reactions     Vancomycin Anaphylaxis

## 2022-11-30 NOTE — DEATH PRONOUNCEMENT
MD DEATH PRONOUNCEMENT    Called to pronounce Bart Corea dead.    Physical Exam: Unresponsive to noxious stimuli, Spontaneous respirations absent, Breath sounds absent, Carotid pulse absent, Heart sounds absent, Pupillary light reflex absent and Corneal blink reflex absent    Patient was pronounced dead at 5:53 PM, 2022.    Preliminary Cause of Death: PEA Arrest due to Acute Hypoxic Hypercapnic Respiratory Failure secondary to pneumonia with mucous plugging.    Principal Problem:    Pleural effusion on left  Active Problems:    Hypoxia       Infectious disease present?: YES    Communicable disease present? (examples: HIV, chicken pox, TB, Ebola, CJD) :  NO    Multi-drug resistant organism present? (example: MRSA): YES    Please consider an autopsy if any of the following exist:  NO Unexpected or unexplained death during or following any dental, medical, or surgical diagnostic treatment procedures.   NO Death of mother at or up to seven days after delivery.     NO All  and pediatric deaths.     NO Death where the cause is sufficiently obscure to delay completion of the death certificate.   NO Deaths in which autopsy would confirm a suspected illness/condition that would affect surviving family members or recipients of transplanted organs.     The following deaths must be reported to the 's Office:  NO A death that may be due entirely or in part to any factors other than natural disease (recent surgery, recent trauma, suspected abuse/neglect).   NO A death that may be an accident, suicide, or homicide.     NO Any sudden, unexpected death in which there is no prior history of significant heart disease or any other condition associated with sudden death.   NO A death under suspicious, unusual, or unexpected circumstances.    NO Any death which is apparently due to natural causes but in which the  does not have a personal physician familiar with the patient s medical history,  social, or environmental situation or the circumstances of the terminal event.   NO Any death apparently due to Sudden Infant Death Syndrome.     NO Deaths that occur during, in association with, or as consequences of a diagnostic, therapeutic, or anesthetic procedure.   NO Any death in which a fracture of a major bone has occurred within the past (6) six months.   NO A death of persons note seen by their physician within 120 days of demise.     NO Any death in which the  was an inmate of a public institution or was in the custody of Law Enforcement personnel.   NO  All unexpected deaths of children   NO Solid organ donors   NO Unidentified bodies   NO Deaths of persons whose bodies are to be cremated or otherwise disposed of so that the bodies will later be unavailable for examination;   NO Deaths unattended by a physician outside of a licensed healthcare facility or licensed residential hospice program   NO Deaths occurring within 24 hours of arrival to a health care facility if death is unexpected.    NO Deaths associated with the decedent s employment.   NO Deaths attributed to acts of terrorism.   NO Any death in which there is uncertainty as to whether it is a medical examiner s care should be discussed with the medical investigator.        Body disposition: Autopsy was discussed with family member:  Spouse and Daughter in person.  Permission for autopsy was declined.      Edwar Gallegos MD  Internal Medicine Resident, PGY-1  Pager: [phone]

## 2022-11-30 NOTE — PLAN OF CARE
Patient was picked up by security around 19:42.   Belongings in plastic bag sent with patient, but the  wheelchairs are remaining on the unit as of now because security doesn't have enough storage room in the [facility]. Patient's family will be notified.

## 2022-12-01 LAB
BACTERIA PLR CULT: NO GROWTH
GRAM STAIN RESULT: NORMAL
GRAM STAIN RESULT: NORMAL
NSE SERPL IA-MCNC: 17.6 NG/ML

## 2022-12-02 LAB
BACTERIA BLD CULT: NO GROWTH
BACTERIA BLD CULT: NO GROWTH

## 2022-12-03 LAB
BACTERIA BLD CULT: NO GROWTH
BACTERIA BLD CULT: NO GROWTH

## 2023-03-23 NOTE — TELEPHONE ENCOUNTER
[facility] Internal Medicine Progress Note      Subjective    Pt reports feeling jaimee today. She is still in some pain but it's controlled with the current regimen. She was able to eat today for the first time. Denies CP, SOB, abd pain, n/v/f/d/c     Medications  Current Facility-Administered Medications   Medication Dose Route Frequency Provider Last Rate Last Admin   • bisacodyl (DULCOLAX) suppository 10 mg  10 mg Rectal Daily PRN Yvette Jaquez CNP   10 mg at 03/23/23 0015   • docusate sodium-sennosides (SENOKOT S) 50-8.6 MG 1 tablet  1 tablet Oral Nightly Yvette Jaquez CNP       • morphine injection 4 mg  4 mg Intravenous Q2H PRN Oscar Thakur MD   4 mg at 03/23/23 0425   • HYDROcodone-acetaminophen (NORCO)  MG per tablet 2 tablet  2 tablet Oral Q6H PRN Oscar Thakur MD   2 tablet at 03/23/23 1008   • polyethylene glycol (MIRALAX) packet 17 g  17 g Oral Daily Oscar Thakur MD   17 g at 03/23/23 1000   • bisacodyl (DULCOLAX) suppository 10 mg  10 mg Rectal Once Oscar Thakur MD       • Avatrombopag Maleate Tab 20 mg  20 mg Oral Daily with lunch Oscar Thakur MD   20 mg at 03/23/23 1155   • fluconazole (DIFLUCAN) tablet 200 mg  200 mg Oral Daily Gustavo Starks MD   200 mg at 03/23/23 1000   • docusate sodium (COLACE) 50 MG/5ML liquid 100 mg  100 mg Oral Daily Gustavo Starks MD   100 mg at 03/21/23 1605   • lactated ringers infusion   Intravenous Continuous Gustavo Starks  mL/hr at 03/23/23 1013 New Bag at 03/23/23 1013   • lidocaine viscous 2 % oral solution 15 mL  15 mL Oral PRN Mohit Jackson MD   15 mL at 03/20/23 1747   • atomoxetine (STRATTERA) capsule 60 mg  60 mg Oral Daily Mohit Jackson MD       • ondansetron (ZOFRAN ODT) disintegrating tablet 4 mg  4 mg Oral Q6H PRN Gustavo Starks MD   4 mg at 03/16/23 9247          Vitals:     Vitals with min/max:      Vital Last Value 24 Hour Range   Temperature 98.2 °F (36.8 °C) (03/23/23 0738) Temp  Min: 98.2 °F (36.8 °C)   Pt is requesting a letter that he is legally handicapped.  OK for this ?    Soon-Mi   Max: 98.8 °F (37.1 °C)   Pulse 81 (03/23/23 0738) Pulse  Min: 81  Max: 98   Respiratory 18 (03/23/23 0738) Resp  Min: 16  Max: 18   Non-Invasive  Blood Pressure 125/77 (03/23/23 0738) BP  Min: 125/77  Max: 131/73   Pulse Oximetry 97 % (03/23/23 0738) SpO2  Min: 96 %  Max: 98 %   Arterial   Blood Pressure   No data recorded         Physical Exam  Physical Exam  Constitutional:       General: She is not in acute distress.     Appearance: She is well-developed. She is obese. She is not ill-appearing, toxic-appearing or diaphoretic.   HENT:      Head: Normocephalic and atraumatic.      Right Ear: External ear normal. There is no impacted cerumen.      Left Ear: External ear normal. There is no impacted cerumen.      Nose: Nose normal. No congestion or rhinorrhea.      Mouth/Throat:      Mouth: Mucous membranes are dry.      Pharynx: No oropharyngeal exudate or posterior oropharyngeal erythema.      Neck: Normal range of motion and neck supple. No rigidity.   Eyes:      General: No scleral icterus.        Right eye: No discharge.         Left eye: No discharge.      Conjunctiva/sclera: Conjunctivae normal.      Pupils: Pupils are equal, round, and reactive to light.   Cardiovascular:      Rate and Rhythm: Regular rhythm.      Heart sounds: Normal heart sounds. No murmur heard.    No gallop.   Pulmonary:      Effort: Pulmonary effort is normal. No respiratory distress.      Breath sounds: Normal breath sounds. No wheezing or rales.   Abdominal:      General: Bowel sounds are normal. There is no distension.      Palpations: Abdomen is soft.      Tenderness: There is no abdominal tenderness.   Musculoskeletal:         General: Normal range of motion.      Right lower leg: No edema.      Left lower leg: No edema.   Lymphadenopathy:      Cervical: No cervical adenopathy.   Skin:     General: Skin is warm and dry.      Capillary Refill: Capillary refill takes less than 2 seconds.      Coloration: Skin is not pale.       Findings: Bruising present.   Neurological:      Mental Status: She is alert and oriented to person, place, and time.      Motor: Weakness present.      Deep Tendon Reflexes: Reflexes are normal and symmetric.   Psychiatric:         Behavior: Behavior normal.         Thought Content: Thought content normal.         Judgment: Judgment normal.           Imaging  No results found.      Labs   Recent Labs   Lab 03/23/23  0518 03/22/23 2028 03/22/23  0511 03/21/23  0842   WBC 7.7  --  9.7 13.3*   HCT 26.8*  --  27.4* 26.0*   HGB 8.7*  --  9.0* 8.5*   *  --  117* 114*   INR  --   --   --  1.1   PTT  --   --   --  32*   SODIUM  --  135  --  134*   POTASSIUM  --  4.1  --  4.0   CHLORIDE  --  102  --  104   CO2  --  28  --  26   CALCIUM  --  9.0  --  8.9   GLUCOSE  --  85  --  88   BUN  --  7  --  7   CREATININE  --  0.64  --  0.62   AST  --   --   --  13   GPT  --   --   --  28   ALKPT  --   --   --  64   BILIRUBIN  --   --   --  0.3   ALBUMIN  --   --   --  2.6*     - Surgical pathology (3/14): Benign bilateral tonsils with reactive follicular hyperplasia  - Iron panel (3/21): 12 iron, 5 % iron sat  - Von Willebrand factor , Von Willebrand activity 248 (3/22)    Assessment and Plan:    #Tonsil hypertrophy and tonsillitis  #S/p tonsillectomy (3/14/2023), complicated by postop bleed  #Hx of ITP.  - S/p x1 dose of Relister (03/20/2023)   - Pain control: PRN Norco, PRN morphine    - Supportive care: bowel regimen, encourage IS   - Fall precautions   - PT/OT/ST as tolerated   - ENT following     #Odynophagia  #Dysphagia  - S/p Decadron   - Continue PRN viscous lidociane    - Diet as below   - Aspiration precautions   - ST following   - ENT following                Possible Candida in presence of dry mouth and hx of steroid use   - Abx: PO fluconazole (3/21-TBD)   - Encourage routine month care with biotin    - Encourage oral water intake     Hyponatremia, mild, likely dehydration from poor PO fluid intake   - Na  as above   - Continue IVFs (LR @ 100 cc/hr)     Constipation - likely opioid induced  - S/p x1 dose of Relister (03/20/2023)   - Bowel regimen and supportive care as above   - Encourage Po oral intake   - Encourage out of bed ambulating.     Chronic idiopathic thrombocytopenia   - PLT as above   - Continue home Avatrombopag    - Bleeding precautions   - Hematology following               ADHD - Continue home atomoxetine      DIET: Soft for dentition chop  DVT PROPHYLAXIS: SCDs    Code Status: Full Resuscitation    Disposition: Pending clinical improvement    Primary Care Physician  Octavio Cooley MD        All patient questions answered  All labs and imaging reviewed    Charting performed by pelon Garland for Dr. Odalis Santos      All medical record entries made by the tonyibshahid were at my direction. I have reviewed the chart and agree that the record accurately reflects my personal performance of the history, physical exam, hospital course, and assessment and plan.

## 2023-05-26 NOTE — PROGRESS NOTES
[facility]    Progress Note - Camryn's Service        Date of Admission:  2/8/2021    Assessment & Plan       Bart Corea is a 60 year old male with PMH quadriplegia due to C4-C6 SCI, neurogenic bladder s/p suprapubic catheter, hx of nephrolithiasis who was admitted on 2/8/2021 autonomic dysreflexia in the setting of right renal calculus and UTI. Initially hypertensive at home, admitted for UTI, s/p right uretral stent placement, then post procedure hypotensive and meeting septic shock criteria requiring pressors. He was transferred from ICU to [facility] on 2/10.        # Sepsis 2/2 complicated UTI  # Right renal calculus, obstructive  # S/p right ureter stent placement  # Bacteroides fragilis in blood culture (growth on 2nd day in 1/2 bcx)  # Suprapubic catheter, chronic  CT AP showed right renal calculus and hydronephrosis, UA indicitve of infection, back/abd pain c/w past UTIs/stones. He underwent right ureteral stent placement 2/8/2021, transferred initially to the ICU with postoperative hypotension with c/f sepsis. Urine culture 2/08 growing Pseudomonas. He does have a history of MDR Pseudomonas with intermediate resistance to pip/tazo. Initial ID recommendation was ceftolozane/ tazobactam, however this is unavailable at this time due to recall. Decision was made to continue Zosyn and tobramycin while awaiting expanded sensitivities. The patient has now remained afebrile, off pressors since 2/10.   - Urology following, appreciate recs  - Suprapubic cath change today  - ID consulted, appreciate recs   - Continue IV tobramycin x2 weeks (2/08-2/22), pharmacy to assist with dosing, will need weekly CBC with diff and CMP    - Discontinue IV Zosyn 3.375 mg q6h   - Start PO Flagyl 500 mg q8h for bacteremia (2/12-2/26)  - PTA oxybutynin 10 mg daily    Antibiotics  - PO Flagyl (2/12- )  - IV Tobramycin (2/9 - )  - IV Zosyn (2/8 -2/12)  - Linezolid (2/9 -2/10) - for  sepsis     # Acute hypoxic respiratory failure  # Left-sided pleural effusion, chronic  # Hypervolemia   # Hypercapnia (likely chronic)   # Edema in feet  Initially on room air, post procedure requiring 3-6 L and weaned to room air over the course of ICU stay. Patient has some apneic events while sleeping but does not require oxygen when awake at baseline- new O2 need 2/12. CT of the abdomen revealed left pleural effusion with pleural thickening, initially concerning for empyema however this was also seen on prior CT in 2018. Looking further back, it does appear he required thoracentesis in ~2009 at which time fluid was consistent with parapneumonic effusion. Has had improved respiratory status, no cough, fever, chills. Previously, patient's shortness of breath followed fluid bolus and improved with Lasix, hesitant to redose Lasix in setting of hypotension. Does have pre-existing asthma and chronic hypercapnia. Echocardiogram 2/08 with EF 55-60%.   - Repeat CT chest with contrast to reevaluation pleural effusion   - Incentive spirometry   - Consider further Lasix if worsening hypoxia  - Lymphedema consult      # Hx insomnia  # Delirium   Notes indicate a history of insomnia, for which he started Ambien within the past year. More recent PCP notes mention decreasing the Ambien 10 to 5 mg, and wife mentions that his vivid dreams may have increased since starting this medication about a year ago. He also uses diazepam as an outpatient. Also mention of alcohol and tobacco use.   -  consult  - Increase diazepam to home dose of 10 mg given concern that some of his symptoms consistent with benzo withdrawal and has been on 1/2 dose  - Add melatonin at bedtime     # Autonomic dysreflexia  # Hypotension  # Quadriplegia 2/2 C4-C6 SCI   Frequent bradycardia and fluctuating blood pressure at home (baseline ~110/70) per patient report. Patient initially hypertensive at home over the past week, lower blood pressures after using  "his home amlodipine which he uses PRN, typically a few times a year Initially admitted to ICU 2/9 with symptomatic hypotension-shock, requiring pressor support. He continues to have intermittent hypotension, though with MAPs>50 and off dopamine since 2/10 AM. Being cautious with IVF given his overall hypervolemia and requiring Lasix x1. TTE on 2/10 with small pericardial effusion, normal EF and cardiac motion.   - Telemetry, q4h vitals  - Attempt elevation of legs if persistently hypotensive/MAPs <50    # Leukopenia, resolved  # Thrombocytopenia, stable  Likely related to sepsis. No history of similar thrombocytopenia on chart review, however, when septic in the past did have leukopenia down to 3.6 (2017).   - trend CBC      # Spacticity, chronic  - PTA Baclofen 20 mg 4 times daily scheduled, additional PRN 2 times daily  - PTA diazepam 10 daily, additional PRN 5 mg every day        Diet: Regular Diet Adult    Fluids: PO  Lines: PIV x2  DVT Prophylaxis: Enoxaparin (Lovenox) SQ  Amanda Catheter: not present  Code Status: Full Code         Disposition Plan   Expected discharge: 2 - 3 days, recommended to prior living arrangement once adequate pain management/ tolerating PO medications, antibiotic plan established and SIRS/Sepsis treated.  Entered: Maxine Chowdhury MD 02/12/2021, 6:57 AM     The patient's care was discussed with the Attending Physician, Dr. Alexander.    Maxine Chowdhury MD  [facility]  Please see sign in/sign out for up to date coverage information  ______________________________________________________________________    Interval History   Cross cover paged @[phone] regarding patient with hypotension and increased confusion. BP improved with elevating legs. Patient was disoriented to situation, place, time, not agitated. Notably had taken his PTA Ambien earlier in the evening and does reportedly have \"vivid dreams\" at baseline. " Suspected hospital delirium and no additional intervention taken. No fevers/chills.     Data reviewed today: I reviewed all medications, new labs and imaging results over the last 24 hours.     Physical Exam   Vital Signs: Temp: 98.3  F (36.8  C) Temp src: Oral BP: 112/55 Pulse: 53   Resp: 18 SpO2: 97 % O2 Device: Nasal cannula Oxygen Delivery: 2 LPM  Weight: 219 lbs 9.25 oz     Physical Exam  Constitutional:       General: He is not in acute distress.     Appearance: He is well-developed. He is not diaphoretic.   Cardiovascular:      Rate and Rhythm: Normal rate.      Comments: Distant heart sounds, HR 60s  Pulmonary:      Effort: Pulmonary effort is normal.      Breath sounds: Wheezing (expiratory) present.      Comments: 2L O2  Abdominal:      General: There is no distension.      Palpations: Abdomen is soft.      Tenderness: Tenderness: minimal sensation at baseline. There is no guarding.   Musculoskeletal:      Right lower leg: Edema (1+ pitting) present.      Left lower leg: Edema (1+ pitting) present.   Skin:     General: Skin is warm.      Coloration: Skin is pale (bilateral feet). Skin is not jaundiced.      Findings: Bruising (involving the bilateral feet and multiple toes. patient's wife present and states feet look at baseline) present. No rash.   Neurological:      Mental Status: He is alert.      Comments: Quadriplegia at baseline   Psychiatric:         Mood and Affect: Mood normal.        Data   Recent Labs   Lab 02/11/21  0656 02/10/21  0532 02/10/21  0402 02/08/21  1137 02/08/21  1137   WBC 4.8 4.0 Canceled, Test credited   < > 3.5*   HGB 12.8* 14.5 Canceled, Test credited   < > 15.9   MCV 95 97 Canceled, Test credited   < > 95   PLT 79* 82* Canceled, Test credited   < > 83*   INR  --   --   --   --  0.91   * 136 Canceled, Test credited   < > 132*   POTASSIUM 4.0 4.2 Canceled, Test credited   < > 4.5   CHLORIDE 96 99 Canceled, Test credited   < > 98   CO2 31 33* Canceled, Test credited   < >  34*   BUN 13 12 Canceled, Test credited   < > 17   CR 0.38* 0.42* Canceled, Test credited   < > 0.30*   ANIONGAP 4 4 Canceled, Test credited   < > 1*   LIU 8.6 8.7 Canceled, Test credited   < > 9.2   GLC 83 75 Canceled, Test credited   < > 72   ALBUMIN  --  2.6* Canceled, Test credited   < > 3.2*   PROTTOTAL  --  6.0* Canceled, Test credited   < > 6.6*   BILITOTAL  --  1.1 Canceled, Test credited   < > 0.5   ALKPHOS  --  89 Canceled, Test credited   < > 106   ALT  --  42 Canceled, Test credited   < > 35   AST  --  29 Canceled, Test credited   < > 19   TROPI  --   --   --   --  <0.015    < > = values in this interval not displayed.     No results found for this or any previous visit (from the past 24 hour(s)).   None

## 2023-06-19 NOTE — PROGRESS NOTES
[facility]  Sleep Clinic Consultation    Patient Name:  Devin Acosta  MRN:  [id number]    :  1951  Date of Service:  2023  Primary care provider:  No Ref-Primary, Physician      Neurology consultation service was asked to see Devin Acosta by self-referral.    Reason for referral::  Friend who is neighbors with Dr. Leon suggested that he see Dr. Leon.       History of Present Illness:   Devin Acosta is a 72 year old R handed male with history of HTN, L hand tremor and L rotator cuff repair who presents due to prompting from his friend who is Dr. Leon's neighbor. He denies any concerns regarding his sleep. We discussed this visit would primarily focus on screening for various sleep disorders.  He denies concerns regarding insomnia, restless legs syndrome, dream enactment, or sleep apnea.     Patient was diagnosed with Parkinson's disease about 1-1.5 years ago in setting of L hand tremor. He was on carbidopa-levodopa for 3-4 months and was eventually weaned off months ago - he didn't feel that the carbidopa-levodopa was helpful. He now follows with neurologist, Dr. Gloevr, at [facility] who is skeptical about the diagnosis of Parkinson's disease given postural and no resting hand tremor, no rigidity, or other Parkinsonian features.    He believes he had a sleep study performed many years ago (results not available to view in EMR).     On exam, patient has bilateral postural hand tremors L > R that improve with rest. He believes alcohol does improve the tremors. Anxiety worsens them. No head, voice, or leg tremors. His tremors do not impede his quality of life.     Sleep-Wake schedule  Bedtime: 8 PM, watches about 1 hour of TV and falls asleep at 9PM  Sleep onset: 1 hour after going to bed per above   Nocturnal awakenings: Wakes up about 1-2x / night for urination  Wake time: 5:30 - 6AM   Naps: In the last 3-4 weeks, he's needed to take a nap for  Jc is a 61 year old who is being evaluated via a billable video visit.      How would you like to obtain your AVS? MyChart  If the video visit is dropped, the invitation should be resent by: Send to e-mail at: [email or web address]  Will anyone else be joining your video visit? No      Video Start Time: 1:28 PM  Video-Visit Details    Type of service:  Video Visit    Video End Time:2:07 PM    Originating Location (pt. Location): Home    Distant Location (provider location):  [facility]     Platform used for Video Visit: Loudr     Chief complaint: Patient wants to return ventilator and just use supplemental oxygen    History of Present Illness: 61-year-old gentleman with quadriplegia.  He was diagnosed with obstructive sleep apnea hypoxemia and hypoventilation in 2014.  He did not pursue therapy at that time.  This last year he was hospitalized and found to be hypercapnic.  He also had hypoxemia.  He was recommended to use ventilatory support.  He had been started on trilogy nocturnal noninvasive ventilation with supplemental oxygen.  He reports he used it for a little bit but then switched just to oxygen.  He finds it difficult to communicate when he is wearing the mask and this is a problem for him he also needs to get turned and he is unable to ask for help when he has the mask on.  His wife cannot hear him if he has the vent.      He reports that using supplemental oxygen has been beneficial to his sleep quality feel he feels that he is able to sleep through the night and get good quality sleep.  Although he does admit that it has been deteriorating somewhat.  He does have difficulty finding good personal care attendant help at night.  He is currently working with an occupational therapist but has not discussed these concerns about lack of ability to communicate with the vent on.  He reached out to his primary sleep provider who recommended that he follow-up in  about 20 minutes   Preferred sleep position: sleeps in either L or R lateral decubitus posture   Employment: Self-employed. 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  Shift work?: No      Sleep-related breathing  Snoring: Not that he's aware of   Apneas: Not that he's aware of   Waking up snoring/choking/gasping: No  Morning HAs: No  Dry mouth: No  Mouth breather?: Thinks he's more of a nose breather   Weight change: No major changes, possibly slightly increased in setting of eating more     Sleep-related behaviors  Restless legs: No  Active sleeper?: Not that he's aware  Dream enactment: Not that he's aware of  Sleep walking: None  Nightmares/Night terrors: None  Sleep paralysis: None  Confusional arousals: None     Sleep hygiene  Sleep partners: None  Caffeine: 2 cups of coffee a day   EtOH: Occasional beer for social events   Tobacco: None  Sleep-aids: Ambien 5 mg qhs  Recreational drug use: none     ROS:   Constipation: none  Anosmia: none  Orthostasis: none  Hallucinations: none  Memory: no concerns      A comprehensive ROS was performed and pertinent findings were included in HPI.     PMH  No past medical history on file.  No past surgical history on file.    Pertinent Medications   I have personally reviewed the patient's medication list.     -Ambien 5mg at night - has been using it for about 10 years  -Losartan 50 mg daily  -Carvedilol 6.25 mg BID   -Chlorthalidone 25 mg   -No longer taking trazodone  -No longer taking Valium   -B1 sublingual tablets     Allergies  I have personally reviewed the patient's allergy list.     Family History    -No known family history of neurologic disease (no Parkinson's, strokes, seizures, dementia)    Physical Examination   Vitals: There were no vitals taken for this visit.  General: Sitting in a chair, no acute distress   Head: Normocephalic/atraumatic  Eyes: no icterus  Cardiac: Regular rate per vitals  Pulmonary:  clinic.      [place] Sleepiness Scale  Total score - [place]: 9 (1/6/2022  1:23 PM)   (Less than 10 normal)    Insomnia Severity Scale  HOMER Total Score: 14  (normal 0-7, mild 8-14, moderate 15-21, severe 22-28)    Past Medical History:   Diagnosis Date     Asthma      Chronic infection     Bladder     Heart murmur      LIZA (obstructive sleep apnea) 12/3/2014     Quadriplegia, C1-C4 incomplete (H)        Allergies   Allergen Reactions     Vancomycin Anaphylaxis       Current Outpatient Medications   Medication     acetaminophen (TYLENOL) 325 MG tablet     albuterol (PROVENTIL) (2.5 MG/3ML) 0.083% neb solution     amLODIPine (NORVASC) 2.5 MG tablet     baclofen (LIORESAL) 20 MG tablet     bisacodyl (DULCOLAX) 10 MG suppository     clotrimazole-betamethasone (LOTRISONE) 1-0.05 % external cream     diazepam (VALIUM) 5 MG tablet     docusate sodium (DOK) 100 MG capsule     furosemide (LASIX) 20 MG tablet     hydrocortisone (CORTAID) 1 % external cream     ibuprofen (ADVIL/MOTRIN) 800 MG tablet     Incontinence Supplies (URINARY LEG BAG)     minocycline (MINOCIN) 50 MG capsule     Multiple Vitamins-Minerals (MULTIVITAMIN & MINERAL PO)     multivitamin (CENTRUM SILVER) tablet     neomycin-polymyxin-hydrocortisone (CORTISPORIN) 3.5-06581-0 otic solution     nystatin (MYCOSTATIN) 891665 UNIT/GM external powder     oxybutynin ER (DITROPAN-XL) 10 MG 24 hr tablet     senna (SM SENNA LAXATIVE) 8.6 MG tablet     sodium chloride 0.9%, bottle, 0.9 % irrigation     vitamin C (ASCORBIC ACID) 1000 MG TABS     Vitamin D3 (CHOLECALCIFEROL) 25 mcg (1000 units) tablet     zolpidem (AMBIEN) 10 MG tablet     No current facility-administered medications for this visit.       Social History     Socioeconomic History     Marital status:      Spouse name: Not on file     Number of children: Not on file     Years of education: Not on file     Highest education level: Not on file   Occupational History     Not on file   Tobacco Use      Smoking status: Current Every Day Smoker     Packs/day: 0.30     Years: 35.00     Pack years: 10.50     Types: Cigarettes     Smokeless tobacco: Never Used     Tobacco comment: 2/18/21 Did not complete full session. Would only answer some questions. 14mg patch and 2mg lozenges ordered   Substance and Sexual Activity     Alcohol use: Yes     Comment: on weekends, 3-4+     Drug use: Not Currently     Types: Marijuana     Comment: occ marijuana for spasms     Sexual activity: Never   Other Topics Concern     Parent/sibling w/ CABG, MI or angioplasty before 65F 55M? Not Asked   Social History Narrative     Not on file     Social Determinants of Health     Financial Resource Strain: Not on file   Food Insecurity: Not on file   Transportation Needs: Not on file   Physical Activity: Not on file   Stress: Not on file   Social Connections: Not on file   Intimate Partner Violence: Not on file   Housing Stability: Not on file       Family History   Problem Relation Age of Onset     Cancer Father      Diabetes No family hx of      Glaucoma No family hx of      Hypertension No family hx of      Macular Degeneration No family hx of      Retinal detachment No family hx of      Anesthesia Reaction No family hx of      Deep Vein Thrombosis (DVT) No family hx of            EXAM:  Wt 102.1 kg (225 lb)   BMI 34.21 kg/m    GENERAL: Alert and no distress  EYES: Eyes grossly normal to inspection.  No discharge or erythema, or obvious scleral/conjunctival abnormalities.  RESP: No audible wheeze, cough, or visible cyanosis.  No visible retractions or increased work of breathing.    SKIN: Visible skin clear. No significant rash, abnormal pigmentation or lesions.  NEURO: Cranial nerves grossly intact.  Mentation and speech appropriate for age.  PSYCH: Mentation appears normal, affect normal/bright, judgement and insight intact, normal speech and appearance well-groomed.       PSG 12/2/2014  Weight 160 lbs BMI 24.5  AHI 8.5, RDI 9.4, Lowest  non-labored, breathing comfortably on room air  GI: non-distended  Skin: No rash or lesion on exposed skin  Extremities: Extremities warm, no edema   Psych: Affect congruent, animated without masked facies     Neuro:  Mental status: Awake, alert, attentive, oriented to self, location, time (month, year), and president. Language is fluent and coherent with intact comprehension, naming and repetition.  Cranial nerves: Pupils equal, conjugate gaze, EOMI, facial sensation intact to light touch, Visual fields intact, face symmetric, shoulder shrug strong, tongue midline, no dysarthria.   Motor: Paratonia in all extremities. B/l postural hand tremors L >> R. Finger tapping with decreased finger tapping speed bilaterally (mildly worse in L hand > R, likely attributable to being R hand predominant). Foot tapping with normal speed and amplitude bilaterally. 5/5 strength bilaterally in deltoids, biceps, triceps, hip flexors, knee flexion, knee extension, plantarflexion, dorsiflexion.   Reflexes: Not tested   Sensory: Intact to light touch in proximal and distal aspects of all 4 extremities   Coordination: FNF without ataxia or dysmetria but bilateral intention tremors L > R   Gait: Normal width, stride length, turn, and arm swing. Station normal.      Investigations   I have personally reviewed pertinent labs, tests, and radiological imaging. Discussion of notable findings is included under Impression.     Assessment   Devin Acosta is a 72 year old R handed man with history of HTN, L hand tremor and L rotator cuff repair who presents for screening of sleep disorders at the prompting of his neighbor who happens to be Dr. Leon's neighbor.     The patient has no symptoms suggestive of sleep related disorders. He does not have any Parkinsonian features on exam and denies any prodromal symptoms. Given improvement in his postural hand tremors with alcohol, we discussed he may have a diagnosis of essential tremor. This can be  "monitored by his neurologist, Dr. Glover, or his PCP and discussed that there are treatment options available should his tremors ever significantly impact his quality of life. Also discussed general recommendations to improve \"brain health\" and modifiable risk factors for prevention of dementia including routine aerobic exercise, avoiding activities that could pose risk for traumatic brain injury, maintaining socially active, and establishing a daily routine (e.g. discussed that there is some data that suggests early long term may be associated with increased risk of dementia).     Plan  -No follow up required in sleep clinic. Return as needed.    -Never drive if tired or sleepy.    Patient was seen and discussed with Dr. Leon.    Manasa Simpson MD  PGY-2 Neurology Resident    Outpatient Sleep Neurology Staff  I have seen and evaluated the patient and discussed with Dr Simpson on 6-20-23.  I supervised the resident during the visit and agree with the documentation.      In addition to face to face time I have also reviewed the patients chart, coordinated care, assisted in placing orders and documentation.  Total time (Face-to-Face, care coordination, orders, documentation) spent on 6-20-23 was 45 minutes.     Pancho Leon MD        " O2 SAT 68%  Time with oxygen saturation below 89% was 10.8 minutes  Transcutaneous CO2 monitoring was used with peak CO2 60 mmHg  Hypoxemia was supine and REM dependent        Download from trilogy scan   7 days of use, No use since in June    ASSESSMENT:  61-year-old gentleman with quadriplegia, obstructive sleep apnea complicated by hypoxemia and hypoventilation.  Currently using supplemental oxygen with some benefit but this is not treating the underlying ventilatory failure.  Patient does have fears about using nocturnal noninvasive ventilation.    PLAN:  Recommend he work with occupational therapy to see if there are ways that he can set up a system at night that allows him to feel more safe and able to communicate with the mask on.  Encouraged him to keep trying to acclimate to using the mask.  Consider trying episodes during the day.  Patient is also interested in reassessing carbon dioxide levels while awake.  Ordered ABG to be done in the PFT lab.  Follow-up with primary sleep provider within the next 2 to 3 months.      60 minutes spent on the date of the encounter doing chart review, history and exam, documentation and further activities per the note    Joann Harkins M.D.  Pulmonary/Critical Care/Sleep Medicine    [facility]   Floor 1, [address]   [address] [place] [address]. [place] [address]   Appointments: [phone]    The above note was dictated using voice recognition software and may include typographical errors. Please contact the author for any clarifications.

## 2024-05-14 NOTE — PROGRESS NOTES
Agree with orders. Renetta EduardoKEITH 12/10/2018 1:53 PM        [facility] now requests orders and shares plan of care/discharge summaries for some patients through [facility].  Please REPLY TO THIS MESSAGE OR ROUTE BACK TO THE AUTHOR in order to give authorization for orders when needed.  This is considered a verbal order, you will still receive a faxed copy of orders for signature.  Thank you for your assistance in improving collaboration for our patients.    ORDER    Patient seen today for 60 day recertification assessment with [facility]. Requesting the following Home Care orders to start with new certification period beginning 12/6/2018:    SN  1 month 1         3 month 1         3 PRN    And the following order for catheter management:    Okay to change S/P catheter every two weeks. Catheter is 16 FR with 8 cc syringe  Irrigate with 60 cc normal saline daily and as needed, caregiver can irrigate catheter on the non nurse visit days.    Thank you,    VALE Durham Case Manager  [facility]  [phone]  [email or web address]   HR=57 bpm, DQMI=763/54 mmhg, SpO2=98.0 %, Resp=13 B/min, EtCO2=37 mmHg, Apnea=4 Seconds, Pain=0, Thompson=2, Comment=kelvin sr

## 2025-05-27 NOTE — ANESTHESIA PREPROCEDURE EVALUATION
Anesthesia Pre-Procedure Evaluation    Patient: Bart Corea   MRN:     [id number] Gender:   male   Age:    59 year old :      1960        Preoperative Diagnosis: Neurogenic bladder [N31.9]  Bladder stones [N21.0]  Suprapubic catheter dysfunction, initial encounter (H) [T83.010A]   Procedure(s):  LITHOTRIPSY, CALCULUS, BLADDER, USING HOLMIUM LASER     Past Medical History:   Diagnosis Date     Asthma      Chronic infection     Bladder     Heart murmur      LIZA (obstructive sleep apnea) 12/3/2014     Quadriplegia, C1-C4 incomplete (H)       Past Surgical History:   Procedure Laterality Date     Bilateral ureteroscopy with holmium laser lithotripsy and basketing and removal of fragments  Left ureteral stent placement.  02/10/2010     COLONOSCOPY N/A 2018    Procedure: COMBINED COLONOSCOPY, SINGLE OR MULTIPLE BIOPSY/POLYPECTOMY BY BIOPSY;;  Surgeon: Grace Yang MD;  Location: [facility]     CYSTOSCOPY, LITHOLAPAXY, COMBINED N/A 11/15/2019    Procedure: Cystolitholapaxy;  Surgeon: Obi Uriostegui MD;  Location: [facility]     skin flap on bottom       sp tube absess surgery            Anesthesia Evaluation     . Pt has had prior anesthetic. Type: General           ROS/MED HX    ENT/Pulmonary:     (+)sleep apnea, tobacco use, Current use Intermittent asthma doesn't use CPAP , . .    Neurologic:     (+)Spinal cord injury year sustained:  level of injury: C4-C5 with autonomic hyperflexia symptoms,     Cardiovascular:     (+) ----. : . . . :. . Previous cardiac testing Echodate:2017results:Interpretation Summary  Left ventricular size is normal.  The Ejection Fraction is estimated at 50-55%.  Right ventricular function, chamber size, wall motion, and thickness are  normal.  Pulmonary artery systolic pressure is normal.  The inferior vena cava is normal.  Trivial pericardial effusion is present.  date: results:ECG reviewed date:10/16/2019 results:Sinus bradycardia, otherwise normal date: results:          (-) taking anticoagulants/antiplatelets   METS/Exercise Tolerance:  1 - Eating, dressing   Hematologic:  - neg hematologic  ROS   (+) History of Transfusion no previous transfusion reaction -     (-) history of blood clots   Musculoskeletal:  - neg musculoskeletal ROS       GI/Hepatic:     (+) GERD Asymptomatic on medication,       Renal/Genitourinary:     (+) Other Renal/ Genitourinary, neurogenic bladder with suprapubic catheter.  bladder stones      Endo:  - neg endo ROS       Psychiatric:  - neg psychiatric ROS       Infectious Disease:   (+) MRSA, Other Infectious Disease ESBL, CRE, KPC      Malignancy:      - no malignancy   Other:    (+) No chance of pregnancy other significant disability Wheelchair bound                       PHYSICAL EXAM:   Mental Status/Neuro: A/A/O   Airway: Facies: Challenging  Mallampati: IV  Mouth/Opening: Limited  TM distance: < 6 cm  Neck ROM: Limited   Respiratory: Respiratory Auscultation: coarsee breath sounds lower lobes.     Resp. Rate: Normal     Resp. Effort: Normal      CV: Rhythm: Regular  Heart: Normal Sounds  Edema: None   Comments:      Dental: Normal Dentition                LABS:  CBC:   Lab Results   Component Value Date    WBC 6.7 10/18/2019    WBC 6.0 08/14/2019    HGB 14.2 10/18/2019    HGB 15.1 08/14/2019    HCT 44.2 10/18/2019    HCT 47.6 08/14/2019     10/18/2019     08/14/2019     BMP:   Lab Results   Component Value Date     10/18/2019     08/14/2019    POTASSIUM 4.0 10/18/2019    POTASSIUM 4.1 08/14/2019    CHLORIDE 103 10/18/2019    CHLORIDE 102 08/14/2019    CO2 31 10/18/2019    CO2 31 08/14/2019    BUN 13 10/18/2019    BUN 12 08/14/2019    CR 0.33 (L) 10/18/2019    CR 0.34 (L) 08/14/2019     (H) 10/18/2019    GLC 88 08/14/2019     COAGS:   Lab Results   Component Value Date    PTT 32 12/19/2007    INR 1.06 02/01/2010     POC:   Lab Results   Component Value Date    BGM 86 11/19/2015     OTHER:   Lab Results  "  Component Value Date    PH 7.38 12/02/2014    LACT 0.4 (L) 08/28/2016    A1C 5.0 08/14/2019    LIU 8.4 (L) 10/18/2019    PHOS 3.3 02/04/2010    MAG 2.1 02/04/2010    ALBUMIN 3.3 (L) 08/14/2019    PROTTOTAL 7.1 08/14/2019    ALT 17 08/14/2019    AST 11 08/14/2019    ALKPHOS 104 08/14/2019    BILITOTAL 0.4 08/14/2019    LIPASE 187 07/14/2017    KIA <9 (L) 10/21/2009    TSH 1.71 03/31/2017    CRP 13.4 (H) 03/04/2010    SED 29 (H) 03/04/2010        Preop Vitals    BP Readings from Last 3 Encounters:   11/15/19 96/59   11/15/19 (!) 76/48   10/16/19 121/81    Pulse Readings from Last 3 Encounters:   11/15/19 50   11/15/19 50   10/16/19 (!) 44      Resp Readings from Last 3 Encounters:   11/15/19 16   10/09/19 16   09/21/19 16    SpO2 Readings from Last 3 Encounters:   11/15/19 99%   11/15/19 95%   10/16/19 98%      Temp Readings from Last 1 Encounters:   11/15/19 97.6  F (36.4  C) (Oral)    Ht Readings from Last 1 Encounters:   11/15/19 1.727 m (5' 8\")      Wt Readings from Last 1 Encounters:   11/15/19 90.7 kg (200 lb)    Estimated body mass index is 30.41 kg/m  as calculated from the following:    Height as of 11/15/19: 1.727 m (5' 8\").    Weight as of 11/15/19: 90.7 kg (200 lb).     LDA:  Suprapubic Catheter (Active)   Number of days:         Assessment:   ASA SCORE: 3    H&P: History and physical reviewed and following examination; no interval change.   Smoking Status:  Non-Smoker/Unknown   NPO Status: NPO Appropriate     Plan:   Anes. Type:  General   Pre-Medication: None   Induction:  IV (Standard)   Airway: ETT; Oral; CMAC/VL   Access/Monitoring: PIV   Maintenance: Balanced     Postop Plan:   Postop Pain: Opioids  Postop Sedation/Airway: Not planned  Disposition: Outpatient     PONV Management:   Adult Risk Factors:, Non-Smoker, Postop Opioids   Prevention: Ondansetron, Dexamethasone     CONSENT: Direct conversation   Plan and risks discussed with: Patient   Blood Products: Consent Deferred (Minimal Blood Loss) "                PAC Discussion and Assessment    ASA Classification: 3  Case is suitable for: [place]  Anesthetic techniques and relevant risks discussed: GA  Invasive monitoring and risk discussed:   Types:   Possibility and Risk of blood transfusion discussed:   NPO instructions given:   Additional anesthetic preparation and risks discussed:   Needs early admission to pre-op area:   Other:     PAC Resident/NP Anesthesia Assessment:  Jc Corea is a 59 year old male scheduled for LITHOTRIPSY, CALCULUS, BLADDER, USING HOLMIUM LASER on 1/16/20  by Dr. Uriostegui in treatment of Neurogenic bladder, bladder stones.  PAC referral for risk assessment and optimization for anesthesia with comorbid conditions of asthma, LIZA, current tobacco use disorder, quadriplegia:    Pre-operative considerations:  1.  Cardiac:  Functional status- METS 1- wheelchair bound. History of BP elevation (200/100) when undergoing this procure earlier this year at the [facility], so the surgery has been moved to the [place].  No other cardiac history. EKG 10/2019 showed sinus bradycardia, otherwise normal EKG.  Normal ECHO 2017. low risk surgery with 0.4% (RCRI #) risk of major adverse cardiac event.   2.  Pulm:  Airway feasible.  LIZA not using CPAP. Asthma using Singulair (continue DOS) and albuterol (conitnue DOS). Current tobacco use.  Encouraged smoking cessation and educated not to smoke on the day of the procedure. Antibiotics and prednisone taper last fall. Patient reports symptoms have resolved.   3.  GI:  Risk of PONV score = 1.  If > 2, anti-emetic intervention recommended. GERD well controlled on Prilosec.  Continue bowel regimen as needed.   4.  Neuro: quadriplegia secondary to spinal cord injury C4-C5 in 1981. He has a history of autonomic hyperreflexia symptoms taking baclofen (continue DOS) and amlodipine (continue DOS).  5. : neurogenic bladder with recurrent UTIs. Bladder stones seen on imaging. Above procedure planned. Patient  prefers to complete UA and bring it into lab later today.    6. ID: h/o MRSA, ESBL, CRE and KPC.   Precautions as indicated    VTE risk: 0.5%    Patient is optimized and is acceptable candidate for the proposed procedure.  No further diagnostic evaluation is needed.     Patient discussed with Dr. Benitez    **For further details of assessment, testing, and physical exam please see H and P completed on same date.      Andreina Haley PA-C        Mid-Level Provider/Resident:   Date:   Time:     Attending Anesthesiologist Anesthesia Assessment:        Anesthesiologist:   Date:   Time:   Pass/Fail:   Disposition:     PAC Pharmacist Assessment:        Pharmacist:   Date:   Time:    Boris Benitez MD   (V5) oriented

## (undated) DEVICE — CATH TRAY FOLEY SURESTEP 16FR WDRAIN BAG STLK LATEX A300316A

## (undated) DEVICE — ENDO SEAL BX PORT BPS-A

## (undated) DEVICE — GUIDEWIRE SENSOR DUAL FLEX ANG 0.035"X150CM M0066703010

## (undated) DEVICE — BASKET NITINOL TIPLESS HALO  1.5FRX120CM 554120

## (undated) DEVICE — BAG URINARY DRAIN LUBRISIL IC 4000ML LF 253509A

## (undated) DEVICE — BAG DRAINAGE URO CATCHER II 4-040-14-10

## (undated) DEVICE — LINEN TOWEL PACK X5 5464

## (undated) DEVICE — SUCTION TIP YANKAUER STR K87

## (undated) DEVICE — ENDO SEAL BX PORT GREEN CS-W7S

## (undated) DEVICE — SPECIMEN CONTAINER 5OZ STERILE 2600SA

## (undated) DEVICE — SOL WATER IRRIG 1000ML BOTTLE 2F7114

## (undated) DEVICE — SOL NACL 0.9% IRRIG 3000ML BAG 2B7477

## (undated) DEVICE — RAD RX CONRAY 60% (50ML) 095305 CHARGE PER ML

## (undated) DEVICE — CATH URETERAL OPEN END 5FRX70CM M0064002010

## (undated) DEVICE — COVER NEOPROBE SOFTFLEX 5X96" W/BANDS 20-PC596

## (undated) DEVICE — PACK CYSTO CUSTOM ASC

## (undated) DEVICE — PAD CHUX UNDERPAD 23X24" 7136

## (undated) DEVICE — DRAPE IOBAN C-SECTION W/POUCH 30X35" 6657

## (undated) DEVICE — GUIDEWIRE ZIPWIRE ANG STIFF .035"X150CM M006630223B0

## (undated) DEVICE — TUBING SUCTION 10'X3/16" N510

## (undated) DEVICE — GLOVE PROTEXIS W/NEU-THERA 7.5  2D73TE75

## (undated) DEVICE — BLADE KNIFE SURG 11 371111

## (undated) DEVICE — ESU GROUND PAD ADULT W/CORD E7507

## (undated) DEVICE — SHEATH URETERAL 12/14FRX35CM SET

## (undated) DEVICE — SUCTION MANIFOLD NEPTUNE 2 SYS 4 PORT 0702-020-000

## (undated) DEVICE — PUMP SYSTEM SINGLE ACTION M0067201000

## (undated) DEVICE — CATH URETERAL OPEN END 05FR 70CM 020015

## (undated) DEVICE — SHEATH URETERAL ACCESS NAVIGATOR HD 11/13FRX36CM M0062502220

## (undated) DEVICE — SUCTION MANIFOLD DORNOCH ULTRA CART UL-CL500

## (undated) DEVICE — GOWN XLG DISP 9545

## (undated) DEVICE — ADAPTER CATH CHECK-FLO 9FR FLL 050885 G15476

## (undated) DEVICE — LIGHT HANDLE X1 31140133

## (undated) DEVICE — SU VICRYL 3-0 SH CR 8X18" J774

## (undated) DEVICE — NDL COUNTER 20CT 31142493

## (undated) DEVICE — GUIDEWIRE SENSOR DUAL FLEX STR 0.035"X150CM M0066703080

## (undated) DEVICE — DRAPE C-ARM W/STRAPS 42X72" 07-CA104

## (undated) DEVICE — LINEN GOWN XLG 5407

## (undated) DEVICE — GLOVE PROTEXIS W/NEU-THERA 8.0  2D73TE80

## (undated) DEVICE — SOL NACL 0.9% IRRIG 1000ML BOTTLE 2F7124

## (undated) DEVICE — SYR INFLATION DEVICE 20ML W/ 26GA TORQUE DEVICE M001151062

## (undated) DEVICE — SU MONOCRYL 4-0 PS-2 27" UND Y426H

## (undated) DEVICE — ESU PENCIL W/COATED BLADE E2450H

## (undated) DEVICE — EVACUATOR BLADDER UROVAC LATEX M0067301250

## (undated) DEVICE — DRSG TELFA 3X8" 1238

## (undated) DEVICE — PACK CYSTO UMMC CUSTOM

## (undated) DEVICE — SYR 10ML LL W/O NDL 302995

## (undated) DEVICE — CONNECTOR WATER VALVE PERFUSION PACK STR 020272801

## (undated) DEVICE — DRSG GAUZE 4X4" TRAY 6939

## (undated) DEVICE — LASER FIBER HOLMIUM 200 RBLF200

## (undated) DEVICE — CATH URETERAL DUAL LUMEN 10FRX54CM M0064051000

## (undated) DEVICE — SU SILK 3-0 SH CR 8X18" C013D

## (undated) DEVICE — PAD CHUX UNDERPAD 30X30"

## (undated) DEVICE — DRSG TEGADERM 2 3/8X2 3/4" 1624W

## (undated) DEVICE — LASER FIBER HOLMIUM 550 SIDEFIRE M0068408460 840-846

## (undated) DEVICE — LITHOTRIPSY PROBE

## (undated) DEVICE — JELLY LUBRICATING SURGILUBE 2OZ TUBE

## (undated) DEVICE — SYR PISTON URETHRAL 60ML 68000

## (undated) DEVICE — SYR 70ML TOOMEY 041170

## (undated) RX ORDER — SODIUM CHLORIDE, SODIUM LACTATE, POTASSIUM CHLORIDE, CALCIUM CHLORIDE 600; 310; 30; 20 MG/100ML; MG/100ML; MG/100ML; MG/100ML
INJECTION, SOLUTION INTRAVENOUS
Status: DISPENSED
Start: 2021-02-08

## (undated) RX ORDER — LIDOCAINE HYDROCHLORIDE 20 MG/ML
INJECTION, SOLUTION EPIDURAL; INFILTRATION; INTRACAUDAL; PERINEURAL
Status: DISPENSED
Start: 2020-01-16

## (undated) RX ORDER — HYDROMORPHONE HYDROCHLORIDE 1 MG/ML
INJECTION, SOLUTION INTRAMUSCULAR; INTRAVENOUS; SUBCUTANEOUS
Status: DISPENSED
Start: 2021-02-19

## (undated) RX ORDER — HYDRALAZINE HYDROCHLORIDE 20 MG/ML
INJECTION INTRAMUSCULAR; INTRAVENOUS
Status: DISPENSED
Start: 2020-01-16

## (undated) RX ORDER — KETOROLAC TROMETHAMINE 30 MG/ML
INJECTION, SOLUTION INTRAMUSCULAR; INTRAVENOUS
Status: DISPENSED
Start: 2019-11-15

## (undated) RX ORDER — DEXAMETHASONE SODIUM PHOSPHATE 4 MG/ML
INJECTION, SOLUTION INTRA-ARTICULAR; INTRALESIONAL; INTRAMUSCULAR; INTRAVENOUS; SOFT TISSUE
Status: DISPENSED
Start: 2021-02-19

## (undated) RX ORDER — FENTANYL CITRATE 50 UG/ML
INJECTION, SOLUTION INTRAMUSCULAR; INTRAVENOUS
Status: DISPENSED
Start: 2021-02-19

## (undated) RX ORDER — FENTANYL CITRATE 50 UG/ML
INJECTION, SOLUTION INTRAMUSCULAR; INTRAVENOUS
Status: DISPENSED
Start: 2018-07-20

## (undated) RX ORDER — FENTANYL CITRATE 50 UG/ML
INJECTION, SOLUTION INTRAMUSCULAR; INTRAVENOUS
Status: DISPENSED
Start: 2020-01-16

## (undated) RX ORDER — LABETALOL 20 MG/4 ML (5 MG/ML) INTRAVENOUS SYRINGE
Status: DISPENSED
Start: 2020-01-16

## (undated) RX ORDER — LIDOCAINE HYDROCHLORIDE 20 MG/ML
INJECTION, SOLUTION EPIDURAL; INFILTRATION; INTRACAUDAL; PERINEURAL
Status: DISPENSED
Start: 2021-02-19

## (undated) RX ORDER — PROPOFOL 10 MG/ML
INJECTION, EMULSION INTRAVENOUS
Status: DISPENSED
Start: 2021-02-19

## (undated) RX ORDER — SIMETHICONE 20 MG/.3ML
EMULSION ORAL
Status: DISPENSED
Start: 2018-07-20

## (undated) RX ORDER — HYDRALAZINE HYDROCHLORIDE 20 MG/ML
INJECTION INTRAMUSCULAR; INTRAVENOUS
Status: DISPENSED
Start: 2019-11-15

## (undated) RX ORDER — CEFAZOLIN SODIUM 2 G/100ML
INJECTION, SOLUTION INTRAVENOUS
Status: DISPENSED
Start: 2020-01-16

## (undated) RX ORDER — FENTANYL CITRATE 50 UG/ML
INJECTION, SOLUTION INTRAMUSCULAR; INTRAVENOUS
Status: DISPENSED
Start: 2021-02-08

## (undated) RX ORDER — HYDROMORPHONE HYDROCHLORIDE 1 MG/ML
INJECTION, SOLUTION INTRAMUSCULAR; INTRAVENOUS; SUBCUTANEOUS
Status: DISPENSED
Start: 2020-01-16

## (undated) RX ORDER — HEPARIN SODIUM 1000 [USP'U]/ML
INJECTION, SOLUTION INTRAVENOUS; SUBCUTANEOUS
Status: DISPENSED
Start: 2021-02-19

## (undated) RX ORDER — PROPOFOL 10 MG/ML
INJECTION, EMULSION INTRAVENOUS
Status: DISPENSED
Start: 2020-01-16

## (undated) RX ORDER — ONDANSETRON 2 MG/ML
INJECTION INTRAMUSCULAR; INTRAVENOUS
Status: DISPENSED
Start: 2021-02-19

## (undated) RX ORDER — LIDOCAINE HYDROCHLORIDE 10 MG/ML
INJECTION, SOLUTION EPIDURAL; INFILTRATION; INTRACAUDAL; PERINEURAL
Status: DISPENSED
Start: 2022-01-01

## (undated) RX ORDER — DEXTROSE MONOHYDRATE 25 G/50ML
INJECTION, SOLUTION INTRAVENOUS
Status: DISPENSED
Start: 2021-02-08